# Patient Record
Sex: MALE | Race: WHITE | NOT HISPANIC OR LATINO
[De-identification: names, ages, dates, MRNs, and addresses within clinical notes are randomized per-mention and may not be internally consistent; named-entity substitution may affect disease eponyms.]

---

## 2017-03-09 ENCOUNTER — APPOINTMENT (OUTPATIENT)
Dept: FAMILY MEDICINE | Facility: CLINIC | Age: 82
End: 2017-03-09

## 2017-03-09 VITALS
TEMPERATURE: 98.6 F | HEART RATE: 71 BPM | DIASTOLIC BLOOD PRESSURE: 80 MMHG | BODY MASS INDEX: 22.5 KG/M2 | SYSTOLIC BLOOD PRESSURE: 120 MMHG | OXYGEN SATURATION: 98 % | HEIGHT: 66 IN | WEIGHT: 140 LBS | RESPIRATION RATE: 16 BRPM

## 2017-03-09 DIAGNOSIS — F17.210 NICOTINE DEPENDENCE, CIGARETTES, UNCOMPLICATED: ICD-10-CM

## 2017-03-09 DIAGNOSIS — Z78.9 OTHER SPECIFIED HEALTH STATUS: ICD-10-CM

## 2017-03-10 LAB
24R-OH-CALCIDIOL SERPL-MCNC: 36.3 PG/ML
ALBUMIN SERPL ELPH-MCNC: 4.1 G/DL
ALP BLD-CCNC: 145 U/L
ALT SERPL-CCNC: 9 U/L
ANION GAP SERPL CALC-SCNC: 14 MMOL/L
AST SERPL-CCNC: 17 U/L
BASOPHILS # BLD AUTO: 0.03 K/UL
BASOPHILS NFR BLD AUTO: 0.5 %
BILIRUB SERPL-MCNC: 1.4 MG/DL
BUN SERPL-MCNC: 22 MG/DL
CALCIUM SERPL-MCNC: 9.2 MG/DL
CHLORIDE SERPL-SCNC: 101 MMOL/L
CHOLEST SERPL-MCNC: 186 MG/DL
CHOLEST/HDLC SERPL: 3.4 RATIO
CO2 SERPL-SCNC: 24 MMOL/L
CREAT SERPL-MCNC: 1.25 MG/DL
EOSINOPHIL # BLD AUTO: 0.21 K/UL
EOSINOPHIL NFR BLD AUTO: 3.8 %
FERRITIN SERPL-MCNC: 252.7 NG/ML
FOLATE SERPL-MCNC: 7.7 NG/ML
GLUCOSE SERPL-MCNC: 100 MG/DL
HBA1C MFR BLD HPLC: 5.3 %
HCT VFR BLD CALC: 47.9 %
HDLC SERPL-MCNC: 54 MG/DL
HGB BLD-MCNC: 16.1 G/DL
IMM GRANULOCYTES NFR BLD AUTO: 0.2 %
IRON SATN MFR SERPL: 53 %
IRON SERPL-MCNC: 164 UG/DL
LDLC SERPL CALC-MCNC: 92 MG/DL
LYMPHOCYTES # BLD AUTO: 1.57 K/UL
LYMPHOCYTES NFR BLD AUTO: 28.5 %
MAN DIFF?: NORMAL
MCHC RBC-ENTMCNC: 32.3 PG
MCHC RBC-ENTMCNC: 33.6 GM/DL
MCV RBC AUTO: 96.2 FL
MONOCYTES # BLD AUTO: 0.58 K/UL
MONOCYTES NFR BLD AUTO: 10.5 %
NEUTROPHILS # BLD AUTO: 3.11 K/UL
NEUTROPHILS NFR BLD AUTO: 56.5 %
PLATELET # BLD AUTO: 214 K/UL
POTASSIUM SERPL-SCNC: 4.3 MMOL/L
PROT SERPL-MCNC: 7.5 G/DL
RBC # BLD: 4.98 M/UL
RBC # FLD: 13.6 %
SODIUM SERPL-SCNC: 139 MMOL/L
T4 FREE SERPL-MCNC: 1.4 NG/DL
TIBC SERPL-MCNC: 307 UG/DL
TRIGL SERPL-MCNC: 199 MG/DL
TSH SERPL-ACNC: 3.02 UIU/ML
UIBC SERPL-MCNC: 143 UG/DL
VIT B12 SERPL-MCNC: 320 PG/ML
WBC # FLD AUTO: 5.51 K/UL

## 2017-04-06 ENCOUNTER — APPOINTMENT (OUTPATIENT)
Dept: FAMILY MEDICINE | Facility: CLINIC | Age: 82
End: 2017-04-06

## 2017-04-06 VITALS
BODY MASS INDEX: 22.5 KG/M2 | DIASTOLIC BLOOD PRESSURE: 80 MMHG | HEIGHT: 66 IN | WEIGHT: 140 LBS | TEMPERATURE: 98.2 F | HEART RATE: 69 BPM | OXYGEN SATURATION: 98 % | SYSTOLIC BLOOD PRESSURE: 130 MMHG

## 2017-04-06 VITALS — RESPIRATION RATE: 14 BRPM

## 2017-04-06 DIAGNOSIS — F17.200 NICOTINE DEPENDENCE, UNSPECIFIED, UNCOMPLICATED: ICD-10-CM

## 2017-04-06 DIAGNOSIS — N40.0 BENIGN PROSTATIC HYPERPLASIA WITHOUT LOWER URINARY TRACT SYMPMS: ICD-10-CM

## 2017-04-14 ENCOUNTER — OUTPATIENT (OUTPATIENT)
Dept: OUTPATIENT SERVICES | Facility: HOSPITAL | Age: 82
LOS: 1 days | End: 2017-04-14

## 2017-04-14 ENCOUNTER — INPATIENT (INPATIENT)
Facility: HOSPITAL | Age: 82
LOS: 3 days | Discharge: HOME | DRG: 948 | End: 2017-04-18
Attending: FAMILY MEDICINE | Admitting: INTERNAL MEDICINE
Payer: MEDICARE

## 2017-04-14 ENCOUNTER — APPOINTMENT (OUTPATIENT)
Dept: MRI IMAGING | Facility: HOSPITAL | Age: 82
End: 2017-04-14

## 2017-04-14 DIAGNOSIS — M24.9 JOINT DERANGEMENT, UNSPECIFIED: ICD-10-CM

## 2017-04-14 DIAGNOSIS — R53.1 WEAKNESS: ICD-10-CM

## 2017-04-14 DIAGNOSIS — R26.9 UNSPECIFIED ABNORMALITIES OF GAIT AND MOBILITY: ICD-10-CM

## 2017-04-14 DIAGNOSIS — J44.9 CHRONIC OBSTRUCTIVE PULMONARY DISEASE, UNSPECIFIED: ICD-10-CM

## 2017-04-14 DIAGNOSIS — I63.30 CEREBRAL INFARCTION DUE TO THROMBOSIS OF UNSPECIFIED CEREBRAL ARTERY: ICD-10-CM

## 2017-04-14 DIAGNOSIS — Z96.641 PRESENCE OF RIGHT ARTIFICIAL HIP JOINT: ICD-10-CM

## 2017-04-14 DIAGNOSIS — N40.0 BENIGN PROSTATIC HYPERPLASIA WITHOUT LOWER URINARY TRACT SYMPTOMS: ICD-10-CM

## 2017-04-14 PROCEDURE — 93010 ELECTROCARDIOGRAM REPORT: CPT

## 2017-04-14 PROCEDURE — 99223 1ST HOSP IP/OBS HIGH 75: CPT

## 2017-04-14 PROCEDURE — 71010: CPT | Mod: 26

## 2017-04-15 PROCEDURE — 99233 SBSQ HOSP IP/OBS HIGH 50: CPT

## 2017-04-15 PROCEDURE — 93306 TTE W/DOPPLER COMPLETE: CPT | Mod: 26

## 2017-04-15 PROCEDURE — 93880 EXTRACRANIAL BILAT STUDY: CPT | Mod: 26

## 2017-04-16 PROCEDURE — 99233 SBSQ HOSP IP/OBS HIGH 50: CPT

## 2017-04-17 ENCOUNTER — APPOINTMENT (OUTPATIENT)
Dept: MRI IMAGING | Facility: HOSPITAL | Age: 82
End: 2017-04-17

## 2017-04-17 PROCEDURE — 99233 SBSQ HOSP IP/OBS HIGH 50: CPT

## 2017-04-17 PROCEDURE — 99222 1ST HOSP IP/OBS MODERATE 55: CPT

## 2017-04-17 PROCEDURE — 76376 3D RENDER W/INTRP POSTPROCES: CPT | Mod: 26

## 2017-04-17 PROCEDURE — 70544 MR ANGIOGRAPHY HEAD W/O DYE: CPT | Mod: 26

## 2017-04-17 PROCEDURE — 72192 CT PELVIS W/O DYE: CPT | Mod: 26

## 2017-04-17 PROCEDURE — 70547 MR ANGIOGRAPHY NECK W/O DYE: CPT | Mod: 26

## 2017-04-18 ENCOUNTER — INPATIENT (INPATIENT)
Facility: HOSPITAL | Age: 82
LOS: 9 days | Discharge: HOME HEALTH | DRG: 949 | End: 2017-04-28
Attending: PSYCHIATRY & NEUROLOGY | Admitting: PSYCHIATRY & NEUROLOGY
Payer: MEDICARE

## 2017-04-18 DIAGNOSIS — K59.00 CONSTIPATION, UNSPECIFIED: ICD-10-CM

## 2017-04-18 DIAGNOSIS — K21.9 GASTRO-ESOPHAGEAL REFLUX DISEASE WITHOUT ESOPHAGITIS: ICD-10-CM

## 2017-04-18 DIAGNOSIS — I69.391 DYSPHAGIA FOLLOWING CEREBRAL INFARCTION: ICD-10-CM

## 2017-04-18 DIAGNOSIS — G47.00 INSOMNIA, UNSPECIFIED: ICD-10-CM

## 2017-04-18 DIAGNOSIS — J84.10 PULMONARY FIBROSIS, UNSPECIFIED: ICD-10-CM

## 2017-04-18 DIAGNOSIS — R05 COUGH: ICD-10-CM

## 2017-04-18 DIAGNOSIS — Z51.89 ENCOUNTER FOR OTHER SPECIFIED AFTERCARE: ICD-10-CM

## 2017-04-18 DIAGNOSIS — I69.319 UNSPECIFIED SYMPTOMS AND SIGNS INVOLVING COGNITIVE FUNCTIONS FOLLOWING CEREBRAL INFARCTION: ICD-10-CM

## 2017-04-18 DIAGNOSIS — R53.81 OTHER MALAISE: ICD-10-CM

## 2017-04-18 DIAGNOSIS — I63.9 CEREBRAL INFARCTION, UNSPECIFIED: ICD-10-CM

## 2017-04-18 DIAGNOSIS — R45.4 IRRITABILITY AND ANGER: ICD-10-CM

## 2017-04-18 DIAGNOSIS — N40.0 BENIGN PROSTATIC HYPERPLASIA WITHOUT LOWER URINARY TRACT SYMPTOMS: ICD-10-CM

## 2017-04-18 DIAGNOSIS — J18.9 PNEUMONIA, UNSPECIFIED ORGANISM: ICD-10-CM

## 2017-04-18 DIAGNOSIS — R26.9 UNSPECIFIED ABNORMALITIES OF GAIT AND MOBILITY: ICD-10-CM

## 2017-04-18 DIAGNOSIS — B37.0 CANDIDAL STOMATITIS: ICD-10-CM

## 2017-04-18 DIAGNOSIS — F43.20 ADJUSTMENT DISORDER, UNSPECIFIED: ICD-10-CM

## 2017-04-18 DIAGNOSIS — J44.9 CHRONIC OBSTRUCTIVE PULMONARY DISEASE, UNSPECIFIED: ICD-10-CM

## 2017-04-18 DIAGNOSIS — J40 BRONCHITIS, NOT SPECIFIED AS ACUTE OR CHRONIC: ICD-10-CM

## 2017-04-18 PROCEDURE — 84443 ASSAY THYROID STIM HORMONE: CPT

## 2017-04-18 PROCEDURE — 72192 CT PELVIS W/O DYE: CPT

## 2017-04-18 PROCEDURE — 70553 MRI BRAIN STEM W/O & W/DYE: CPT

## 2017-04-18 PROCEDURE — 93306 TTE W/DOPPLER COMPLETE: CPT

## 2017-04-18 PROCEDURE — 85025 COMPLETE CBC W/AUTO DIFF WBC: CPT

## 2017-04-18 PROCEDURE — 81003 URINALYSIS AUTO W/O SCOPE: CPT

## 2017-04-18 PROCEDURE — A9579: CPT

## 2017-04-18 PROCEDURE — 97162 PT EVAL MOD COMPLEX 30 MIN: CPT

## 2017-04-18 PROCEDURE — 82746 ASSAY OF FOLIC ACID SERUM: CPT

## 2017-04-18 PROCEDURE — 82962 GLUCOSE BLOOD TEST: CPT

## 2017-04-18 PROCEDURE — 71045 X-RAY EXAM CHEST 1 VIEW: CPT

## 2017-04-18 PROCEDURE — 82607 VITAMIN B-12: CPT

## 2017-04-18 PROCEDURE — 93005 ELECTROCARDIOGRAM TRACING: CPT

## 2017-04-18 PROCEDURE — 82553 CREATINE MB FRACTION: CPT

## 2017-04-18 PROCEDURE — 80061 LIPID PANEL: CPT

## 2017-04-18 PROCEDURE — 83880 ASSAY OF NATRIURETIC PEPTIDE: CPT

## 2017-04-18 PROCEDURE — 85730 THROMBOPLASTIN TIME PARTIAL: CPT

## 2017-04-18 PROCEDURE — 76376 3D RENDER W/INTRP POSTPROCES: CPT

## 2017-04-18 PROCEDURE — 82550 ASSAY OF CK (CPK): CPT

## 2017-04-18 PROCEDURE — 84484 ASSAY OF TROPONIN QUANT: CPT

## 2017-04-18 PROCEDURE — 83735 ASSAY OF MAGNESIUM: CPT

## 2017-04-18 PROCEDURE — 99233 SBSQ HOSP IP/OBS HIGH 50: CPT

## 2017-04-18 PROCEDURE — 70544 MR ANGIOGRAPHY HEAD W/O DYE: CPT

## 2017-04-18 PROCEDURE — 94640 AIRWAY INHALATION TREATMENT: CPT

## 2017-04-18 PROCEDURE — 99223 1ST HOSP IP/OBS HIGH 75: CPT

## 2017-04-18 PROCEDURE — 70547 MR ANGIOGRAPHY NECK W/O DYE: CPT

## 2017-04-18 PROCEDURE — 93880 EXTRACRANIAL BILAT STUDY: CPT

## 2017-04-18 PROCEDURE — 84436 ASSAY OF TOTAL THYROXINE: CPT

## 2017-04-18 PROCEDURE — 85610 PROTHROMBIN TIME: CPT

## 2017-04-18 PROCEDURE — 85027 COMPLETE CBC AUTOMATED: CPT

## 2017-04-18 PROCEDURE — 83036 HEMOGLOBIN GLYCOSYLATED A1C: CPT

## 2017-04-18 PROCEDURE — 99285 EMERGENCY DEPT VISIT HI MDM: CPT | Mod: 25

## 2017-04-18 PROCEDURE — 97116 GAIT TRAINING THERAPY: CPT

## 2017-04-18 PROCEDURE — 80048 BASIC METABOLIC PNL TOTAL CA: CPT

## 2017-04-19 PROCEDURE — 93010 ELECTROCARDIOGRAM REPORT: CPT

## 2017-04-19 PROCEDURE — 99223 1ST HOSP IP/OBS HIGH 75: CPT

## 2017-04-19 PROCEDURE — 99233 SBSQ HOSP IP/OBS HIGH 50: CPT

## 2017-04-20 PROCEDURE — 99232 SBSQ HOSP IP/OBS MODERATE 35: CPT

## 2017-04-21 PROCEDURE — 99232 SBSQ HOSP IP/OBS MODERATE 35: CPT

## 2017-04-21 PROCEDURE — 71020: CPT | Mod: 26

## 2017-04-22 PROCEDURE — 99232 SBSQ HOSP IP/OBS MODERATE 35: CPT

## 2017-04-23 PROCEDURE — 71010: CPT | Mod: 26

## 2017-04-23 PROCEDURE — 99233 SBSQ HOSP IP/OBS HIGH 50: CPT

## 2017-04-23 PROCEDURE — 99232 SBSQ HOSP IP/OBS MODERATE 35: CPT

## 2017-04-24 PROCEDURE — 99233 SBSQ HOSP IP/OBS HIGH 50: CPT

## 2017-04-24 PROCEDURE — 99223 1ST HOSP IP/OBS HIGH 75: CPT

## 2017-04-24 PROCEDURE — 71250 CT THORAX DX C-: CPT | Mod: 26

## 2017-04-24 PROCEDURE — 99232 SBSQ HOSP IP/OBS MODERATE 35: CPT

## 2017-04-25 PROCEDURE — 99232 SBSQ HOSP IP/OBS MODERATE 35: CPT

## 2017-04-25 PROCEDURE — 74230 X-RAY XM SWLNG FUNCJ C+: CPT | Mod: 26

## 2017-04-25 PROCEDURE — 99233 SBSQ HOSP IP/OBS HIGH 50: CPT

## 2017-04-26 PROCEDURE — 99232 SBSQ HOSP IP/OBS MODERATE 35: CPT

## 2017-04-26 PROCEDURE — 99233 SBSQ HOSP IP/OBS HIGH 50: CPT

## 2017-04-27 PROCEDURE — 99232 SBSQ HOSP IP/OBS MODERATE 35: CPT

## 2017-04-27 PROCEDURE — 99233 SBSQ HOSP IP/OBS HIGH 50: CPT

## 2017-04-27 PROCEDURE — 74020: CPT | Mod: 26

## 2017-04-28 ENCOUNTER — APPOINTMENT (OUTPATIENT)
Dept: NEUROLOGY | Facility: CLINIC | Age: 82
End: 2017-04-28

## 2017-04-28 PROCEDURE — 99232 SBSQ HOSP IP/OBS MODERATE 35: CPT

## 2017-04-28 PROCEDURE — 99239 HOSP IP/OBS DSCHRG MGMT >30: CPT

## 2017-05-05 PROCEDURE — 92523 SPEECH SOUND LANG COMPREHEN: CPT

## 2017-05-05 PROCEDURE — 85025 COMPLETE CBC W/AUTO DIFF WBC: CPT

## 2017-05-05 PROCEDURE — 71046 X-RAY EXAM CHEST 2 VIEWS: CPT

## 2017-05-05 PROCEDURE — 97530 THERAPEUTIC ACTIVITIES: CPT

## 2017-05-05 PROCEDURE — 97110 THERAPEUTIC EXERCISES: CPT

## 2017-05-05 PROCEDURE — 92611 MOTION FLUOROSCOPY/SWALLOW: CPT

## 2017-05-05 PROCEDURE — 71250 CT THORAX DX C-: CPT

## 2017-05-05 PROCEDURE — 80048 BASIC METABOLIC PNL TOTAL CA: CPT

## 2017-05-05 PROCEDURE — 97167 OT EVAL HIGH COMPLEX 60 MIN: CPT

## 2017-05-05 PROCEDURE — 97163 PT EVAL HIGH COMPLEX 45 MIN: CPT

## 2017-05-05 PROCEDURE — 85027 COMPLETE CBC AUTOMATED: CPT

## 2017-05-05 PROCEDURE — 74230 X-RAY XM SWLNG FUNCJ C+: CPT

## 2017-05-05 PROCEDURE — 92610 EVALUATE SWALLOWING FUNCTION: CPT

## 2017-05-05 PROCEDURE — 93005 ELECTROCARDIOGRAM TRACING: CPT

## 2017-05-05 PROCEDURE — 80053 COMPREHEN METABOLIC PANEL: CPT

## 2017-05-05 PROCEDURE — 94640 AIRWAY INHALATION TREATMENT: CPT

## 2017-05-05 PROCEDURE — 82247 BILIRUBIN TOTAL: CPT

## 2017-05-05 PROCEDURE — 74020: CPT

## 2017-05-05 PROCEDURE — 97535 SELF CARE MNGMENT TRAINING: CPT

## 2017-05-05 PROCEDURE — 92507 TX SP LANG VOICE COMM INDIV: CPT

## 2017-05-05 PROCEDURE — 97116 GAIT TRAINING THERAPY: CPT

## 2017-05-05 PROCEDURE — 71045 X-RAY EXAM CHEST 1 VIEW: CPT

## 2017-05-11 ENCOUNTER — APPOINTMENT (OUTPATIENT)
Dept: FAMILY MEDICINE | Facility: CLINIC | Age: 82
End: 2017-05-11

## 2017-06-09 ENCOUNTER — APPOINTMENT (OUTPATIENT)
Dept: FAMILY MEDICINE | Facility: CLINIC | Age: 82
End: 2017-06-09

## 2017-08-03 ENCOUNTER — EMERGENCY (EMERGENCY)
Facility: HOSPITAL | Age: 82
LOS: 1 days | Discharge: HOME | End: 2017-08-03
Admitting: EMERGENCY MEDICINE
Payer: MEDICARE

## 2017-08-03 DIAGNOSIS — R06.02 SHORTNESS OF BREATH: ICD-10-CM
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
## 2017-08-03 DIAGNOSIS — S61.012A LACERATION WITHOUT FOREIGN BODY OF LEFT THUMB WITHOUT DAMAGE TO NAIL, INITIAL ENCOUNTER: ICD-10-CM

## 2017-08-03 DIAGNOSIS — Z23 ENCOUNTER FOR IMMUNIZATION: ICD-10-CM
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
## 2017-08-03 DIAGNOSIS — Z79.899 OTHER LONG TERM (CURRENT) DRUG THERAPY: ICD-10-CM

## 2017-08-03 DIAGNOSIS — F17.210 NICOTINE DEPENDENCE, CIGARETTES, UNCOMPLICATED: ICD-10-CM

## 2017-08-03 DIAGNOSIS — Z79.82 LONG TERM (CURRENT) USE OF ASPIRIN: ICD-10-CM

## 2017-08-03 PROCEDURE — 99285 EMERGENCY DEPT VISIT HI MDM: CPT | Mod: 25

## 2017-08-03 PROCEDURE — 71020: CPT | Mod: 26

## 2017-08-03 PROCEDURE — 99284 EMERGENCY DEPT VISIT MOD MDM: CPT

## 2017-08-03 PROCEDURE — 73140 X-RAY EXAM OF FINGER(S): CPT | Mod: 26,LT

## 2017-08-03 PROCEDURE — 90471 IMMUNIZATION ADMIN: CPT

## 2017-08-03 PROCEDURE — 94640 AIRWAY INHALATION TREATMENT: CPT

## 2017-08-03 PROCEDURE — 90700 DTAP VACCINE < 7 YRS IM: CPT

## 2017-08-03 PROCEDURE — 71046 X-RAY EXAM CHEST 2 VIEWS: CPT

## 2017-08-03 PROCEDURE — 73140 X-RAY EXAM OF FINGER(S): CPT

## 2017-12-17 ENCOUNTER — INPATIENT (INPATIENT)
Facility: HOSPITAL | Age: 82
LOS: 2 days | Discharge: HOME | DRG: 281 | End: 2017-12-20
Attending: INTERNAL MEDICINE | Admitting: INTERNAL MEDICINE
Payer: MEDICARE

## 2017-12-17 DIAGNOSIS — I21.4 NON-ST ELEVATION (NSTEMI) MYOCARDIAL INFARCTION: ICD-10-CM

## 2017-12-17 DIAGNOSIS — I25.10 ATHEROSCLEROTIC HEART DISEASE OF NATIVE CORONARY ARTERY WITHOUT ANGINA PECTORIS: ICD-10-CM

## 2017-12-17 DIAGNOSIS — N40.0 BENIGN PROSTATIC HYPERPLASIA WITHOUT LOWER URINARY TRACT SYMPTOMS: ICD-10-CM
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
## 2017-12-17 DIAGNOSIS — S22.41XA MULTIPLE FRACTURES OF RIBS, RIGHT SIDE, INITIAL ENCOUNTER FOR CLOSED FRACTURE: ICD-10-CM

## 2017-12-17 DIAGNOSIS — R55 SYNCOPE AND COLLAPSE: ICD-10-CM
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
## 2017-12-17 DIAGNOSIS — Z86.73 PERSONAL HISTORY OF TRANSIENT ISCHEMIC ATTACK (TIA), AND CEREBRAL INFARCTION WITHOUT RESIDUAL DEFICITS: ICD-10-CM

## 2017-12-17 DIAGNOSIS — F17.210 NICOTINE DEPENDENCE, CIGARETTES, UNCOMPLICATED: ICD-10-CM

## 2017-12-17 DIAGNOSIS — J44.1 CHRONIC OBSTRUCTIVE PULMONARY DISEASE WITH (ACUTE) EXACERBATION: ICD-10-CM

## 2017-12-17 PROCEDURE — 93010 ELECTROCARDIOGRAM REPORT: CPT

## 2017-12-17 PROCEDURE — 99223 1ST HOSP IP/OBS HIGH 75: CPT | Mod: AI

## 2017-12-17 PROCEDURE — 71010: CPT | Mod: 26

## 2017-12-17 PROCEDURE — 71250 CT THORAX DX C-: CPT | Mod: 26

## 2017-12-18 PROCEDURE — 99223 1ST HOSP IP/OBS HIGH 75: CPT

## 2017-12-18 PROCEDURE — 93010 ELECTROCARDIOGRAM REPORT: CPT

## 2017-12-18 PROCEDURE — 99233 SBSQ HOSP IP/OBS HIGH 50: CPT

## 2017-12-18 PROCEDURE — 93306 TTE W/DOPPLER COMPLETE: CPT | Mod: 26

## 2017-12-19 PROCEDURE — 93018 CV STRESS TEST I&R ONLY: CPT

## 2017-12-19 PROCEDURE — 78452 HT MUSCLE IMAGE SPECT MULT: CPT | Mod: 26

## 2017-12-19 PROCEDURE — 93016 CV STRESS TEST SUPVJ ONLY: CPT

## 2017-12-19 PROCEDURE — 99233 SBSQ HOSP IP/OBS HIGH 50: CPT

## 2017-12-20 PROCEDURE — 71045 X-RAY EXAM CHEST 1 VIEW: CPT

## 2017-12-20 PROCEDURE — 99285 EMERGENCY DEPT VISIT HI MDM: CPT | Mod: 25

## 2017-12-20 PROCEDURE — 85730 THROMBOPLASTIN TIME PARTIAL: CPT

## 2017-12-20 PROCEDURE — 99238 HOSP IP/OBS DSCHRG MGMT 30/<: CPT

## 2017-12-20 PROCEDURE — 83036 HEMOGLOBIN GLYCOSYLATED A1C: CPT

## 2017-12-20 PROCEDURE — 78452 HT MUSCLE IMAGE SPECT MULT: CPT

## 2017-12-20 PROCEDURE — 82550 ASSAY OF CK (CPK): CPT

## 2017-12-20 PROCEDURE — 97162 PT EVAL MOD COMPLEX 30 MIN: CPT

## 2017-12-20 PROCEDURE — 80061 LIPID PANEL: CPT

## 2017-12-20 PROCEDURE — 83735 ASSAY OF MAGNESIUM: CPT

## 2017-12-20 PROCEDURE — 85610 PROTHROMBIN TIME: CPT

## 2017-12-20 PROCEDURE — 96374 THER/PROPH/DIAG INJ IV PUSH: CPT

## 2017-12-20 PROCEDURE — 85027 COMPLETE CBC AUTOMATED: CPT

## 2017-12-20 PROCEDURE — 84484 ASSAY OF TROPONIN QUANT: CPT

## 2017-12-20 PROCEDURE — 71250 CT THORAX DX C-: CPT

## 2017-12-20 PROCEDURE — 80053 COMPREHEN METABOLIC PANEL: CPT

## 2017-12-20 PROCEDURE — 96375 TX/PRO/DX INJ NEW DRUG ADDON: CPT

## 2017-12-20 PROCEDURE — 94640 AIRWAY INHALATION TREATMENT: CPT

## 2017-12-20 PROCEDURE — 80048 BASIC METABOLIC PNL TOTAL CA: CPT

## 2017-12-20 PROCEDURE — 93306 TTE W/DOPPLER COMPLETE: CPT

## 2017-12-20 PROCEDURE — 83880 ASSAY OF NATRIURETIC PEPTIDE: CPT

## 2017-12-20 PROCEDURE — 93017 CV STRESS TEST TRACING ONLY: CPT

## 2017-12-20 PROCEDURE — 84100 ASSAY OF PHOSPHORUS: CPT

## 2017-12-20 PROCEDURE — 82553 CREATINE MB FRACTION: CPT

## 2017-12-20 PROCEDURE — 84443 ASSAY THYROID STIM HORMONE: CPT

## 2017-12-20 PROCEDURE — 93005 ELECTROCARDIOGRAM TRACING: CPT

## 2017-12-27 ENCOUNTER — MEDICATION RENEWAL (OUTPATIENT)
Age: 82
End: 2017-12-27

## 2018-04-22 ENCOUNTER — INPATIENT (INPATIENT)
Facility: HOSPITAL | Age: 83
LOS: 2 days | Discharge: LEFT BEFORE DISCHARGE | DRG: 554 | End: 2018-04-25
Attending: INTERNAL MEDICINE | Admitting: INTERNAL MEDICINE
Payer: MEDICARE

## 2018-04-22 DIAGNOSIS — N40.0 BENIGN PROSTATIC HYPERPLASIA WITHOUT LOWER URINARY TRACT SYMPTOMS: ICD-10-CM

## 2018-04-22 DIAGNOSIS — Z86.718 PERSONAL HISTORY OF OTHER VENOUS THROMBOSIS AND EMBOLISM: ICD-10-CM

## 2018-04-22 DIAGNOSIS — J44.1 CHRONIC OBSTRUCTIVE PULMONARY DISEASE WITH (ACUTE) EXACERBATION: ICD-10-CM

## 2018-04-22 DIAGNOSIS — R74.8 ABNORMAL LEVELS OF OTHER SERUM ENZYMES: ICD-10-CM

## 2018-04-22 DIAGNOSIS — M25.551 PAIN IN RIGHT HIP: ICD-10-CM
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
## 2018-04-22 DIAGNOSIS — F17.210 NICOTINE DEPENDENCE, CIGARETTES, UNCOMPLICATED: ICD-10-CM

## 2018-04-22 DIAGNOSIS — M88.1: ICD-10-CM
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
## 2018-04-22 DIAGNOSIS — M88.88 OSTEITIS DEFORMANS OF OTHER BONES: ICD-10-CM
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
## 2018-04-22 DIAGNOSIS — Z79.82 LONG TERM (CURRENT) USE OF ASPIRIN: ICD-10-CM

## 2018-04-22 DIAGNOSIS — Z79.01 LONG TERM (CURRENT) USE OF ANTICOAGULANTS: ICD-10-CM

## 2018-04-22 DIAGNOSIS — S22.41XA MULTIPLE FRACTURES OF RIBS, RIGHT SIDE, INITIAL ENCOUNTER FOR CLOSED FRACTURE: ICD-10-CM

## 2018-04-22 DIAGNOSIS — Z96.641 PRESENCE OF RIGHT ARTIFICIAL HIP JOINT: ICD-10-CM

## 2018-04-22 PROCEDURE — 71045 X-RAY EXAM CHEST 1 VIEW: CPT | Mod: 26

## 2018-04-22 PROCEDURE — 99222 1ST HOSP IP/OBS MODERATE 55: CPT

## 2018-04-22 PROCEDURE — 93010 ELECTROCARDIOGRAM REPORT: CPT

## 2018-04-22 PROCEDURE — 73552 X-RAY EXAM OF FEMUR 2/>: CPT | Mod: 26,RT

## 2018-04-22 PROCEDURE — 76376 3D RENDER W/INTRP POSTPROCES: CPT | Mod: 26

## 2018-04-22 PROCEDURE — 72192 CT PELVIS W/O DYE: CPT | Mod: 26

## 2018-04-22 PROCEDURE — 73502 X-RAY EXAM HIP UNI 2-3 VIEWS: CPT | Mod: 26,RT

## 2018-04-23 PROCEDURE — 99233 SBSQ HOSP IP/OBS HIGH 50: CPT

## 2018-04-24 PROCEDURE — 78320: CPT | Mod: 26

## 2018-04-24 PROCEDURE — 99233 SBSQ HOSP IP/OBS HIGH 50: CPT

## 2018-04-24 PROCEDURE — 71045 X-RAY EXAM CHEST 1 VIEW: CPT | Mod: 26

## 2018-04-24 PROCEDURE — 78306 BONE IMAGING WHOLE BODY: CPT | Mod: 26

## 2018-04-25 PROCEDURE — 99285 EMERGENCY DEPT VISIT HI MDM: CPT | Mod: 25

## 2018-04-25 PROCEDURE — 73552 X-RAY EXAM OF FEMUR 2/>: CPT

## 2018-04-25 PROCEDURE — 84080 ASSAY ALKALINE PHOSPHATASES: CPT

## 2018-04-25 PROCEDURE — 97161 PT EVAL LOW COMPLEX 20 MIN: CPT

## 2018-04-25 PROCEDURE — 76376 3D RENDER W/INTRP POSTPROCES: CPT

## 2018-04-25 PROCEDURE — 97116 GAIT TRAINING THERAPY: CPT

## 2018-04-25 PROCEDURE — 85730 THROMBOPLASTIN TIME PARTIAL: CPT

## 2018-04-25 PROCEDURE — 96374 THER/PROPH/DIAG INJ IV PUSH: CPT

## 2018-04-25 PROCEDURE — 80047 BASIC METABLC PNL IONIZED CA: CPT

## 2018-04-25 PROCEDURE — 94640 AIRWAY INHALATION TREATMENT: CPT

## 2018-04-25 PROCEDURE — 71045 X-RAY EXAM CHEST 1 VIEW: CPT

## 2018-04-25 PROCEDURE — 82306 VITAMIN D 25 HYDROXY: CPT

## 2018-04-25 PROCEDURE — 81003 URINALYSIS AUTO W/O SCOPE: CPT

## 2018-04-25 PROCEDURE — 85027 COMPLETE CBC AUTOMATED: CPT

## 2018-04-25 PROCEDURE — 78803 RP LOCLZJ TUM SPECT 1 AREA: CPT

## 2018-04-25 PROCEDURE — 72192 CT PELVIS W/O DYE: CPT

## 2018-04-25 PROCEDURE — 99239 HOSP IP/OBS DSCHRG MGMT >30: CPT

## 2018-04-25 PROCEDURE — 83735 ASSAY OF MAGNESIUM: CPT

## 2018-04-25 PROCEDURE — 78306 BONE IMAGING WHOLE BODY: CPT

## 2018-04-25 PROCEDURE — 80048 BASIC METABOLIC PNL TOTAL CA: CPT

## 2018-04-25 PROCEDURE — 73502 X-RAY EXAM HIP UNI 2-3 VIEWS: CPT

## 2018-04-25 PROCEDURE — 82310 ASSAY OF CALCIUM: CPT

## 2018-04-25 PROCEDURE — A9503: CPT

## 2018-04-25 PROCEDURE — 84100 ASSAY OF PHOSPHORUS: CPT

## 2018-04-25 PROCEDURE — 85610 PROTHROMBIN TIME: CPT

## 2018-04-25 PROCEDURE — 93005 ELECTROCARDIOGRAM TRACING: CPT

## 2018-04-25 PROCEDURE — 80053 COMPREHEN METABOLIC PANEL: CPT

## 2018-06-25 ENCOUNTER — APPOINTMENT (OUTPATIENT)
Dept: FAMILY MEDICINE | Facility: CLINIC | Age: 83
End: 2018-06-25
Payer: MEDICARE

## 2018-06-25 VITALS
HEIGHT: 66 IN | DIASTOLIC BLOOD PRESSURE: 80 MMHG | TEMPERATURE: 97.9 F | OXYGEN SATURATION: 94 % | BODY MASS INDEX: 22.34 KG/M2 | SYSTOLIC BLOOD PRESSURE: 124 MMHG | WEIGHT: 139 LBS | HEART RATE: 66 BPM

## 2018-06-25 VITALS — RESPIRATION RATE: 16 BRPM

## 2018-06-25 PROCEDURE — 99214 OFFICE O/P EST MOD 30 MIN: CPT

## 2018-06-25 NOTE — PHYSICAL EXAM
[No Acute Distress] : no acute distress [Well Nourished] : well nourished [Well Developed] : well developed [Ill-Appearing] : ill-appearing [Normal Sclera/Conjunctiva] : normal sclera/conjunctiva [PERRL] : pupils equal round and reactive to light [EOMI] : extraocular movements intact [Normal Outer Ear/Nose] : the outer ears and nose were normal in appearance [Normal Oropharynx] : the oropharynx was normal [No JVD] : no jugular venous distention [Supple] : supple [No Lymphadenopathy] : no lymphadenopathy [Thyroid Normal, No Nodules] : the thyroid was normal and there were no nodules present [No Respiratory Distress] : no respiratory distress  [No Accessory Muscle Use] : no accessory muscle use [Normal Rate] : normal rate  [Regular Rhythm] : with a regular rhythm [Normal S1, S2] : normal S1 and S2 [No Murmur] : no murmur heard [No Carotid Bruits] : no carotid bruits [No Abdominal Bruit] : a ~M bruit was not heard ~T in the abdomen [No Varicosities] : no varicosities [Pedal Pulses Present] : the pedal pulses are present [No Edema] : there was no peripheral edema [No Extremity Clubbing/Cyanosis] : no extremity clubbing/cyanosis [No Palpable Aorta] : no palpable aorta [Soft] : abdomen soft [Non Tender] : non-tender [Non-distended] : non-distended [No Masses] : no abdominal mass palpated [No HSM] : no HSM [Normal Bowel Sounds] : normal bowel sounds [Normal Posterior Cervical Nodes] : no posterior cervical lymphadenopathy [Normal Anterior Cervical Nodes] : no anterior cervical lymphadenopathy [No CVA Tenderness] : no CVA  tenderness [No Spinal Tenderness] : no spinal tenderness [No Joint Swelling] : no joint swelling [Grossly Normal Strength/Tone] : grossly normal strength/tone [No Rash] : no rash [Normal Gait] : normal gait [Coordination Grossly Intact] : coordination grossly intact [No Focal Deficits] : no focal deficits [Deep Tendon Reflexes (DTR)] : deep tendon reflexes were 2+ and symmetric [Speech Grossly Normal] : speech grossly normal [Memory Grossly Normal] : memory grossly normal [Normal Affect] : the affect was normal [Alert and Oriented x3] : oriented to person, place, and time [Normal Mood] : the mood was normal [Normal Insight/Judgement] : insight and judgment were intact [de-identified] : Expiratory wheezing [de-identified] : Severe hip girdle pain, radiating down both legs

## 2018-06-25 NOTE — HEALTH RISK ASSESSMENT
[Any fall with injury in past year] : Patient reported fall with injury in the past year [0] : 2) Feeling down, depressed, or hopeless: Not at all (0) [] : No [de-identified] : wine with meals [[id number]] : 0

## 2018-06-25 NOTE — REVIEW OF SYSTEMS
[Fatigue] : fatigue [Shortness Of Breath] : shortness of breath [Wheezing] : wheezing [Cough] : cough [Dyspnea on Exertion] : dyspnea on exertion [Joint Pain] : joint pain [Joint Stiffness] : joint stiffness [Muscle Weakness] : muscle weakness [Back Pain] : back pain [Negative] : Heme/Lymph [Fever] : no fever [Chills] : no chills [Night Sweats] : no night sweats [Recent Change In Weight] : ~T no recent weight change [Muscle Pain] : no muscle pain [Joint Swelling] : no joint swelling

## 2018-06-25 NOTE — HISTORY OF PRESENT ILLNESS
[FreeTextEntry8] : Patient is an 85-year-old gentleman presents today for an acute visit. He states that he is feeling fatigued, short of breath with minimal exertion. Patient has had multiple admissions due to fatigue. Patient is awake, alert, and oriented x3, presently in no acute distress. He was calm and cooperative, but does have multiple medical problems. Patient has had multiple episodes of blood work done. His most recent hemoglobin was 14.1, kidney functions were within normal limits liver function tests were normal cholesterol within normal limits. Hemoglobin A1c 5.0. Patient's medical history is significant for chronic obstructive pulmonary disease. That is emphysema. He started with frequent falls, and history of head trauma, status post a fall.

## 2018-06-25 NOTE — ASSESSMENT
[FreeTextEntry1] : Assessment and plan:\par \par 1. Severe hip girdle pain, especially with ambulation at rest. He says that the pain is better, but never totally 100% gone. In the differential patient may have underlying polymyalgia rheumatica. I will attempt a course of prednisone and have the patient followup in a week or 2.\par \par 2. Chronic obstructive pulmonary disease with chronic dyspnea, even at rest. I recommended the patient restart all of his medications for chronic obstructive pulmonary disease, ulcers, etc., which he has not been taking.\par \par 3. Unsteady gait with frequent falls. Recommend rolling walker.\par \par 4. Followup with me in 2 weeks\par \par I had a long and detailed discussion with patient regarding the use of prednisone. We will attempt starting milligrams p.o. daily. Patient will followup in 2 weeks as stated earlier. I told him to make certain that E. prior to taking the medications. Face-to-face with the patient 25 minutes

## 2018-07-06 ENCOUNTER — APPOINTMENT (OUTPATIENT)
Dept: FAMILY MEDICINE | Facility: CLINIC | Age: 83
End: 2018-07-06

## 2018-07-16 ENCOUNTER — APPOINTMENT (OUTPATIENT)
Dept: FAMILY MEDICINE | Facility: CLINIC | Age: 83
End: 2018-07-16
Payer: MEDICARE

## 2018-07-16 VITALS
TEMPERATURE: 97.6 F | SYSTOLIC BLOOD PRESSURE: 158 MMHG | DIASTOLIC BLOOD PRESSURE: 66 MMHG | OXYGEN SATURATION: 97 % | HEART RATE: 56 BPM

## 2018-07-16 DIAGNOSIS — R94.31 ABNORMAL ELECTROCARDIOGRAM [ECG] [EKG]: ICD-10-CM

## 2018-07-16 DIAGNOSIS — Z87.09 PERSONAL HISTORY OF OTHER DISEASES OF THE RESPIRATORY SYSTEM: ICD-10-CM

## 2018-07-16 PROCEDURE — 93000 ELECTROCARDIOGRAM COMPLETE: CPT | Mod: 59

## 2018-07-16 PROCEDURE — 99214 OFFICE O/P EST MOD 30 MIN: CPT | Mod: 25

## 2018-07-16 NOTE — ASSESSMENT
[FreeTextEntry1] : Assessment and plan:\par \par Electrocardiogram when compared to electrocardiogram done in 2014. There are no acute changes. Patient has acute exacerbation of COPD subsequently he has not been doing any of his puffers nor nebulizer treatment. Therefore, I have prescribed prednisone, which he needs to continue levofloxacin 500 mg p.o. daily for 7 days nebulizer treatments 4 times a day and Advair. Had a long and detailed discussion with patient face-to-face with the patient approximately 35 minutes greater than 50% of the time was spent on education, counseling, and coordination of care of chronic obstructive pulmonary disease.

## 2018-07-16 NOTE — HISTORY OF PRESENT ILLNESS
[Moderate] : moderate [Gradual] : gradually [Constant] : constant [Congestion] : congestion [Cough] : cough [Wheezing] : wheezing [Anorexia] : anorexia [Shortness Of Breath] : shortness of breath [Activity] : with activity [At Night] : at night [Worsening] : worsening [Sore Throat] : no sore throat [Chills] : no chills [Earache] : no earache [Fatigue] : not fatigue [Headache] : no headache [Fever] : no fever [FreeTextEntry5] : nothing [FreeTextEntry8] : Patient is an 85-year-old gentleman, who presents today for an acute visit. Patient states that he is short of breath. Subsequently, when questioned, he says that he is short of breath for years. Patient says that it has worsened exercise capacity has decreased. Patient has a history of chronic obstructive pulmonary disease, chronic shortness of breath, emphysema, and abnormal EKG. Patient recently was diagnosed with PMR and started on prednisone, which presently is not taking.\par \par \par Patient also complaining of mild chest discomfort\par

## 2018-07-16 NOTE — HEALTH RISK ASSESSMENT
[Any fall with injury in past year] : Patient reported fall with injury in the past year [0] : 2) Feeling down, depressed, or hopeless: Not at all (0) [] : No [de-identified] : wine [[id number]] : 0

## 2018-07-16 NOTE — PHYSICAL EXAM
[No Acute Distress] : no acute distress [Well Nourished] : well nourished [Well Developed] : well developed [Ill-Appearing] : ill-appearing [Normal Sclera/Conjunctiva] : normal sclera/conjunctiva [PERRL] : pupils equal round and reactive to light [EOMI] : extraocular movements intact [Normal Outer Ear/Nose] : the outer ears and nose were normal in appearance [Normal Oropharynx] : the oropharynx was normal [No JVD] : no jugular venous distention [Supple] : supple [No Lymphadenopathy] : no lymphadenopathy [Thyroid Normal, No Nodules] : the thyroid was normal and there were no nodules present [No Respiratory Distress] : no respiratory distress  [No Accessory Muscle Use] : no accessory muscle use [Normal Rate] : normal rate  [Regular Rhythm] : with a regular rhythm [Normal S1, S2] : normal S1 and S2 [No Murmur] : no murmur heard [No Carotid Bruits] : no carotid bruits [No Abdominal Bruit] : a ~M bruit was not heard ~T in the abdomen [No Varicosities] : no varicosities [Pedal Pulses Present] : the pedal pulses are present [No Edema] : there was no peripheral edema [No Extremity Clubbing/Cyanosis] : no extremity clubbing/cyanosis [No Palpable Aorta] : no palpable aorta [Soft] : abdomen soft [Non Tender] : non-tender [Non-distended] : non-distended [No Masses] : no abdominal mass palpated [No HSM] : no HSM [Normal Bowel Sounds] : normal bowel sounds [Normal Posterior Cervical Nodes] : no posterior cervical lymphadenopathy [Normal Anterior Cervical Nodes] : no anterior cervical lymphadenopathy [No CVA Tenderness] : no CVA  tenderness [No Spinal Tenderness] : no spinal tenderness [No Joint Swelling] : no joint swelling [Grossly Normal Strength/Tone] : grossly normal strength/tone [No Rash] : no rash [Normal Gait] : normal gait [Coordination Grossly Intact] : coordination grossly intact [No Focal Deficits] : no focal deficits [Deep Tendon Reflexes (DTR)] : deep tendon reflexes were 2+ and symmetric [Speech Grossly Normal] : speech grossly normal [Memory Grossly Normal] : memory grossly normal [Normal Affect] : the affect was normal [Alert and Oriented x3] : oriented to person, place, and time [Normal Mood] : the mood was normal [Normal Insight/Judgement] : insight and judgment were intact [de-identified] : Expiratory wheezing and rhonchi [de-identified] :  hip girdle pain, radiating down both legs,Improved with steroid

## 2018-07-27 ENCOUNTER — APPOINTMENT (OUTPATIENT)
Dept: FAMILY MEDICINE | Facility: CLINIC | Age: 83
End: 2018-07-27
Payer: MEDICARE

## 2018-07-27 VITALS
RESPIRATION RATE: 18 BRPM | HEART RATE: 68 BPM | DIASTOLIC BLOOD PRESSURE: 70 MMHG | TEMPERATURE: 98 F | SYSTOLIC BLOOD PRESSURE: 130 MMHG | OXYGEN SATURATION: 96 %

## 2018-07-27 DIAGNOSIS — Z00.00 ENCOUNTER FOR GENERAL ADULT MEDICAL EXAMINATION W/OUT ABNORMAL FINDINGS: ICD-10-CM
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
## 2018-07-27 DIAGNOSIS — M48.00 SPINAL STENOSIS, SITE UNSPECIFIED: ICD-10-CM

## 2018-07-27 PROCEDURE — 99214 OFFICE O/P EST MOD 30 MIN: CPT

## 2018-07-27 RX ORDER — LEVOFLOXACIN 500 MG/1
500 TABLET, FILM COATED ORAL DAILY
Qty: 7 | Refills: 1 | Status: COMPLETED | COMMUNITY
Start: 2018-07-16 | End: 2018-07-27

## 2018-07-27 NOTE — PHYSICAL EXAM
[No Acute Distress] : no acute distress [Well Nourished] : well nourished [Well Developed] : well developed [Ill-Appearing] : ill-appearing [Normal Sclera/Conjunctiva] : normal sclera/conjunctiva [PERRL] : pupils equal round and reactive to light [EOMI] : extraocular movements intact [Normal Outer Ear/Nose] : the outer ears and nose were normal in appearance [Normal Oropharynx] : the oropharynx was normal [No JVD] : no jugular venous distention [Supple] : supple [No Lymphadenopathy] : no lymphadenopathy [Thyroid Normal, No Nodules] : the thyroid was normal and there were no nodules present [No Respiratory Distress] : no respiratory distress  [No Accessory Muscle Use] : no accessory muscle use [Normal Rate] : normal rate  [Regular Rhythm] : with a regular rhythm [Normal S1, S2] : normal S1 and S2 [No Murmur] : no murmur heard [No Carotid Bruits] : no carotid bruits [No Abdominal Bruit] : a ~M bruit was not heard ~T in the abdomen [No Varicosities] : no varicosities [Pedal Pulses Present] : the pedal pulses are present [No Edema] : there was no peripheral edema [No Extremity Clubbing/Cyanosis] : no extremity clubbing/cyanosis [No Palpable Aorta] : no palpable aorta [Soft] : abdomen soft [Non Tender] : non-tender [Non-distended] : non-distended [No Masses] : no abdominal mass palpated [No HSM] : no HSM [Normal Bowel Sounds] : normal bowel sounds [Normal Posterior Cervical Nodes] : no posterior cervical lymphadenopathy [Normal Anterior Cervical Nodes] : no anterior cervical lymphadenopathy [No CVA Tenderness] : no CVA  tenderness [No Spinal Tenderness] : no spinal tenderness [No Joint Swelling] : no joint swelling [Grossly Normal Strength/Tone] : grossly normal strength/tone [No Rash] : no rash [Speech Grossly Normal] : speech grossly normal [Memory Grossly Normal] : memory grossly normal [Normal Affect] : the affect was normal [Alert and Oriented x3] : oriented to person, place, and time [Normal Mood] : the mood was normal [Normal Insight/Judgement] : insight and judgment were intact [de-identified] : Expiratory wheezing and rhonchi [de-identified] :  hip girdle pain, radiating down both legs,Improved with steroid [de-identified] : Unsteady gait. Patient ambulates with cane, frequent falls at home, usually indoor when he ambulates outdoor he utilizes walker

## 2018-07-27 NOTE — HISTORY OF PRESENT ILLNESS
[FreeTextEntry1] : Please see history of present illness:\par \par Patient presents again alone for followup appointment [de-identified] : Patient is an 85-year-old gentleman, who presents today for followup appointment. Patient has multiple medical problems. I was called by his daughter stating that she feels as the prednisone is causing a problem with her father's condition. Unfortunately, patient by physical exam and also by complaints has PMR. The instructions that were given to the patient were to take 30 mg a day of prednisone for one week then drop it to 20 mg for one week and then 10 mg p.o. and to leave it at that dose until seen by me. Patient still complaining of unsteady gait chronic lower back pain. He has also underlying chronic obstructive pulmonary disease patient still with severe unsteady gait ambulates with pain. Has had frequent falls. Patient does have chronic obstructive pulmonary disease with underlying emphysema continues to use tobacco. Patient does admit to not following direction, suite. Medications has not been utilizing nebulizer nor any of his pulmonary medications patient presents today with audible rhonchi and wheezing even at rest.

## 2018-07-27 NOTE — REVIEW OF SYSTEMS
[Fatigue] : fatigue [Shortness Of Breath] : shortness of breath [Wheezing] : wheezing [Cough] : cough [Dyspnea on Exertion] : dyspnea on exertion [Joint Pain] : joint pain [Joint Stiffness] : joint stiffness [Muscle Weakness] : muscle weakness [Back Pain] : back pain [Unsteady Walk] : ataxia [Negative] : Heme/Lymph [Fever] : no fever [Chills] : no chills [Night Sweats] : no night sweats [Recent Change In Weight] : ~T no recent weight change [Muscle Pain] : no muscle pain [Joint Swelling] : no joint swelling [Dizziness] : no dizziness

## 2018-07-27 NOTE — COUNSELING
[Smoking cessation counseling provided] : smoking cessation [Behavioral health counseling provided] : behavioral health  [Quit Smoking] : Quit smoking [Sleep ___ hours/day] : Sleep [unfilled] hours/day [Engage in a relaxing activity] : Engage in a relaxing activity [Plan in advance] : Plan in advance [Patient Non-adherent to care plan] : Patient non-adherent to care plan [Needs reinforcement, provided] : Patient needs reinforcement on understanding lifestyle changes and  the steps needed to achieve self management goals and reinforcement was provided

## 2018-07-27 NOTE — HEALTH RISK ASSESSMENT
[Two or more falls in past year] : Patient reported two or more falls in the past year [0] : 2) Feeling down, depressed, or hopeless: Not at all (0) [] : No [[id number]] : 0

## 2018-07-27 NOTE — ASSESSMENT
[FreeTextEntry1] : Assessment and plan:\par \par 1. Polymyalgia rheumatica. Patient has had response to steroids. Pain has decreased we will titrate prednisone for lowest effective dose. He will start 20 mg for the next week, then drop it to 10 mg a day. Followup appointment with me in 2 weeks.\par \par 2. Chronic obstructive pulmonary disease or medications. Refilled. Patient was not doing inhalations as prescribed and referral to pulmonology.\par \par 3. Tobacco use. Recommendations. Tobacco cessation 10 minutes spent on topic.\par \par 4. Unsteady gait secondary to balance problem. Discussed the patient decrease alcohol intake and recommend physical therapy to evaluate and treat that is to increase strength, gait and balance training.\par \par 5. Face to face with patient 25 minutes on above topics. Left a long and detailed message for patient's daughter Ella on answering machine. I have attempted to call her already 4 times not available.\par \par 6. Comprehensive blood work obtained at this visit\par \par 7. Detailed discussion with patient regarding compliance with medications and recommendations

## 2018-07-30 LAB
ALBUMIN SERPL ELPH-MCNC: 3.8 G/DL
ALP BLD-CCNC: 81 U/L
ALT SERPL-CCNC: 86 U/L
ANA SER IF-ACNC: NEGATIVE
ANION GAP SERPL CALC-SCNC: 13 MMOL/L
AST SERPL-CCNC: 31 U/L
BASOPHILS # BLD AUTO: 0.01 K/UL
BASOPHILS NFR BLD AUTO: 0.1 %
BILIRUB SERPL-MCNC: 0.9 MG/DL
BUN SERPL-MCNC: 31 MG/DL
CALCIUM SERPL-MCNC: 9.1 MG/DL
CHLORIDE SERPL-SCNC: 100 MMOL/L
CHOLEST SERPL-MCNC: 227 MG/DL
CHOLEST/HDLC SERPL: 2.8 RATIO
CO2 SERPL-SCNC: 26 MMOL/L
CREAT SERPL-MCNC: 1.01 MG/DL
CRP SERPL-MCNC: <0.1 MG/DL
EOSINOPHIL # BLD AUTO: 0.01 K/UL
EOSINOPHIL NFR BLD AUTO: 0.1 %
ERYTHROCYTE [SEDIMENTATION RATE] IN BLOOD BY WESTERGREN METHOD: 5 MM/HR
GLUCOSE SERPL-MCNC: 97 MG/DL
HBA1C MFR BLD HPLC: 5.4 %
HCT VFR BLD CALC: 48.4 %
HCV AB SER QL: NONREACTIVE
HCV S/CO RATIO: 0.16 S/CO
HDLC SERPL-MCNC: 82 MG/DL
HGB BLD-MCNC: 16.3 G/DL
IMM GRANULOCYTES NFR BLD AUTO: 0.3 %
LDLC SERPL CALC-MCNC: 99 MG/DL
LYMPHOCYTES # BLD AUTO: 1.19 K/UL
LYMPHOCYTES NFR BLD AUTO: 10 %
MAN DIFF?: NORMAL
MCHC RBC-ENTMCNC: 32.3 PG
MCHC RBC-ENTMCNC: 33.7 GM/DL
MCV RBC AUTO: 95.8 FL
MONOCYTES # BLD AUTO: 0.83 K/UL
MONOCYTES NFR BLD AUTO: 7 %
NEUTROPHILS # BLD AUTO: 9.85 K/UL
NEUTROPHILS NFR BLD AUTO: 82.5 %
PLATELET # BLD AUTO: 203 K/UL
POTASSIUM SERPL-SCNC: 4.7 MMOL/L
PROT SERPL-MCNC: 6.6 G/DL
RBC # BLD: 5.05 M/UL
RBC # FLD: 15.6 %
SODIUM SERPL-SCNC: 139 MMOL/L
T4 FREE SERPL-MCNC: 1.4 NG/DL
TRIGL SERPL-MCNC: 228 MG/DL
TSH SERPL-ACNC: 1.76 UIU/ML
WBC # FLD AUTO: 11.93 K/UL

## 2018-08-13 ENCOUNTER — APPOINTMENT (OUTPATIENT)
Dept: FAMILY MEDICINE | Facility: CLINIC | Age: 83
End: 2018-08-13
Payer: MEDICARE

## 2018-08-13 VITALS
RESPIRATION RATE: 18 BRPM | HEART RATE: 65 BPM | OXYGEN SATURATION: 98 % | DIASTOLIC BLOOD PRESSURE: 70 MMHG | TEMPERATURE: 98 F | SYSTOLIC BLOOD PRESSURE: 120 MMHG

## 2018-08-13 VITALS — WEIGHT: 139 LBS | BODY MASS INDEX: 22.34 KG/M2 | HEIGHT: 66 IN

## 2018-08-13 DIAGNOSIS — M35.3 POLYMYALGIA RHEUMATICA: ICD-10-CM

## 2018-08-13 DIAGNOSIS — J86.9 PYOTHORAX W/OUT FISTULA: ICD-10-CM

## 2018-08-13 DIAGNOSIS — R06.00 DYSPNEA, UNSPECIFIED: ICD-10-CM

## 2018-08-13 DIAGNOSIS — R07.9 CHEST PAIN, UNSPECIFIED: ICD-10-CM

## 2018-08-13 DIAGNOSIS — R53.83 OTHER FATIGUE: ICD-10-CM

## 2018-08-13 DIAGNOSIS — R26.89 OTHER ABNORMALITIES OF GAIT AND MOBILITY: ICD-10-CM

## 2018-08-13 DIAGNOSIS — J44.9 CHRONIC OBSTRUCTIVE PULMONARY DISEASE, UNSPECIFIED: ICD-10-CM

## 2018-08-13 DIAGNOSIS — I73.9 PERIPHERAL VASCULAR DISEASE, UNSPECIFIED: ICD-10-CM

## 2018-08-13 PROCEDURE — 99214 OFFICE O/P EST MOD 30 MIN: CPT

## 2018-08-13 NOTE — ASSESSMENT
[FreeTextEntry1] : Assessment and plan:\par \par Multiple medical problems due to the new onset of chest pain and lower extremity claudication. I recommended the patient be evaluated by cardiology and vascular surgery. He does have a history of DVT in the past, which was treated with anticoagulation for approximately one year. This information was gotten from the patient's daughter. Presently, the patient is awake, alert, and oriented x3. He is in no acute distress. He is ambulating with a cane. Nebulizer treatments and nebulizer were never picked up nor was the Advair secondary to expense.\par \par Face-to-face with the patient and daughter. Detailed discussion regarding chronic obstructive pulmonary disease, ischemic heart disease, and peripheral vascular disease. Both of them were given a chance to ask questions. All questions that they asked where answered to their satisfaction.

## 2018-08-13 NOTE — HEALTH RISK ASSESSMENT
[Any fall with injury in past year] : Patient reported fall with injury in the past year [] : No [de-identified] : 2 cigarettes daily [de-identified] : Wine with meals

## 2018-08-13 NOTE — PHYSICAL EXAM
[No Acute Distress] : no acute distress [Well Nourished] : well nourished [Well Developed] : well developed [Ill-Appearing] : ill-appearing [Normal Sclera/Conjunctiva] : normal sclera/conjunctiva [PERRL] : pupils equal round and reactive to light [EOMI] : extraocular movements intact [Normal Outer Ear/Nose] : the outer ears and nose were normal in appearance [Normal Oropharynx] : the oropharynx was normal [No JVD] : no jugular venous distention [Supple] : supple [No Lymphadenopathy] : no lymphadenopathy [Thyroid Normal, No Nodules] : the thyroid was normal and there were no nodules present [No Respiratory Distress] : no respiratory distress  [No Accessory Muscle Use] : no accessory muscle use [Normal Rate] : normal rate  [Regular Rhythm] : with a regular rhythm [Normal S1, S2] : normal S1 and S2 [No Murmur] : no murmur heard [Soft] : abdomen soft [Non Tender] : non-tender [Non-distended] : non-distended [No Masses] : no abdominal mass palpated [No HSM] : no HSM [Normal Bowel Sounds] : normal bowel sounds [Normal Posterior Cervical Nodes] : no posterior cervical lymphadenopathy [Normal Anterior Cervical Nodes] : no anterior cervical lymphadenopathy [No CVA Tenderness] : no CVA  tenderness [No Spinal Tenderness] : no spinal tenderness [No Joint Swelling] : no joint swelling [Grossly Normal Strength/Tone] : grossly normal strength/tone [No Rash] : no rash [Speech Grossly Normal] : speech grossly normal [Memory Grossly Normal] : memory grossly normal [Normal Affect] : the affect was normal [Alert and Oriented x3] : oriented to person, place, and time [Normal Mood] : the mood was normal [Normal Insight/Judgement] : insight and judgment were intact [de-identified] : Expiratory wheezing and rhonchi [de-identified] : Varicose veins bilaterally. Pedal pulses weak [de-identified] :  hip girdle pain,  [de-identified] : Unsteady gait. Patient ambulates with cane, frequent falls at home, usually indoor when he ambulates outdoor he utilizes walker

## 2018-08-13 NOTE — HISTORY OF PRESENT ILLNESS
[Family Member] : family member [FreeTextEntry1] : See history of present illness [de-identified] : Is an 85-year-old gentleman accompanied by his daughter. He is here for a followup visit. Still complaining of dyspnea with exertion and fatigue. We have stopped his prednisone history of polymyalgia rheumatica. Presently, the patient is not complaining of any pain. Wet snow at this visit is that he was complaining of chest pain. According to his daughter. It's been approximately one week that the patient has been complaining of chest pain presently. He presents here in the office and states that the chest pain is no longer there. Patient said that the pain lasted approximately a week. It would come on its own and would improve on its own. He said nothing because of the come on and he doesn't know if anything made it go away on its own. Patient presently awake, alert, and oriented x3 in no acute distress. Still has unsteady gait, which puts him at increased risk of falling.

## 2018-08-16 ENCOUNTER — INPATIENT (INPATIENT)
Facility: HOSPITAL | Age: 83
LOS: 1 days | Discharge: HOME | DRG: 176 | End: 2018-08-18
Attending: INTERNAL MEDICINE | Admitting: STUDENT IN AN ORGANIZED HEALTH CARE EDUCATION/TRAINING PROGRAM
Payer: MEDICARE

## 2018-08-16 VITALS
OXYGEN SATURATION: 97 % | DIASTOLIC BLOOD PRESSURE: 74 MMHG | TEMPERATURE: 98 F | RESPIRATION RATE: 24 BRPM | SYSTOLIC BLOOD PRESSURE: 148 MMHG | HEART RATE: 72 BPM

## 2018-08-16 DIAGNOSIS — R06.03 ACUTE RESPIRATORY DISTRESS: ICD-10-CM

## 2018-08-16 DIAGNOSIS — I25.10 ATHEROSCLEROTIC HEART DISEASE OF NATIVE CORONARY ARTERY WITHOUT ANGINA PECTORIS: ICD-10-CM

## 2018-08-16 DIAGNOSIS — R07.9 CHEST PAIN, UNSPECIFIED: ICD-10-CM

## 2018-08-16 DIAGNOSIS — I16.1 HYPERTENSIVE EMERGENCY: ICD-10-CM

## 2018-08-16 DIAGNOSIS — I77.819 AORTIC ECTASIA, UNSPECIFIED SITE: ICD-10-CM

## 2018-08-16 DIAGNOSIS — J44.1 CHRONIC OBSTRUCTIVE PULMONARY DISEASE WITH (ACUTE) EXACERBATION: ICD-10-CM

## 2018-08-16 DIAGNOSIS — Z71.89 OTHER SPECIFIED COUNSELING: ICD-10-CM

## 2018-08-16 LAB
ALBUMIN SERPL ELPH-MCNC: 3.1 G/DL — LOW (ref 3.3–5)
ALP SERPL-CCNC: 94 U/L — SIGNIFICANT CHANGE UP (ref 40–120)
ALT FLD-CCNC: 20 U/L DA — SIGNIFICANT CHANGE UP (ref 10–45)
ANION GAP SERPL CALC-SCNC: 9 MMOL/L — SIGNIFICANT CHANGE UP (ref 5–17)
APTT BLD: 29 SEC — SIGNIFICANT CHANGE UP (ref 27.5–37.4)
AST SERPL-CCNC: 22 U/L — SIGNIFICANT CHANGE UP (ref 10–40)
BASOPHILS # BLD AUTO: 0.1 K/UL — SIGNIFICANT CHANGE UP (ref 0–0.2)
BASOPHILS NFR BLD AUTO: 1 % — SIGNIFICANT CHANGE UP (ref 0–2)
BILIRUB SERPL-MCNC: 0.6 MG/DL — SIGNIFICANT CHANGE UP (ref 0.2–1.2)
BUN SERPL-MCNC: 18 MG/DL — SIGNIFICANT CHANGE UP (ref 7–23)
CALCIUM SERPL-MCNC: 8.9 MG/DL — SIGNIFICANT CHANGE UP (ref 8.4–10.5)
CHLORIDE SERPL-SCNC: 104 MMOL/L — SIGNIFICANT CHANGE UP (ref 96–108)
CK MB BLD-MCNC: 2.4 % — SIGNIFICANT CHANGE UP (ref 0–3.5)
CK MB CFR SERPL CALC: 1.8 NG/ML — SIGNIFICANT CHANGE UP (ref 0.5–10)
CK MB CFR SERPL CALC: 1.8 NG/ML — SIGNIFICANT CHANGE UP (ref 0.5–10)
CK MB CFR SERPL CALC: 1.9 NG/ML — SIGNIFICANT CHANGE UP (ref 0.5–10)
CK SERPL-CCNC: 79 U/L — SIGNIFICANT CHANGE UP (ref 30–200)
CO2 SERPL-SCNC: 27 MMOL/L — SIGNIFICANT CHANGE UP (ref 22–31)
CREAT SERPL-MCNC: 1.15 MG/DL — SIGNIFICANT CHANGE UP (ref 0.5–1.3)
D DIMER BLD IA.RAPID-MCNC: 458 NG/ML DDU — HIGH
EOSINOPHIL # BLD AUTO: 0.1 K/UL — SIGNIFICANT CHANGE UP (ref 0–0.5)
EOSINOPHIL NFR BLD AUTO: 1.4 % — SIGNIFICANT CHANGE UP (ref 0–6)
GLUCOSE SERPL-MCNC: 81 MG/DL — SIGNIFICANT CHANGE UP (ref 70–99)
HCT VFR BLD CALC: 44.1 % — SIGNIFICANT CHANGE UP (ref 39–50)
HGB BLD-MCNC: 14.8 G/DL — SIGNIFICANT CHANGE UP (ref 13–17)
INR BLD: 1.11 RATIO — SIGNIFICANT CHANGE UP (ref 0.88–1.16)
LIDOCAIN IGE QN: 79 U/L — SIGNIFICANT CHANGE UP (ref 73–393)
LYMPHOCYTES # BLD AUTO: 1.4 K/UL — SIGNIFICANT CHANGE UP (ref 1–3.3)
LYMPHOCYTES # BLD AUTO: 23.3 % — SIGNIFICANT CHANGE UP (ref 13–44)
MCHC RBC-ENTMCNC: 31.7 PG — SIGNIFICANT CHANGE UP (ref 27–34)
MCHC RBC-ENTMCNC: 33.6 GM/DL — SIGNIFICANT CHANGE UP (ref 32–36)
MCV RBC AUTO: 94.2 FL — SIGNIFICANT CHANGE UP (ref 80–100)
MONOCYTES # BLD AUTO: 0.4 K/UL — SIGNIFICANT CHANGE UP (ref 0–0.9)
MONOCYTES NFR BLD AUTO: 6.8 % — SIGNIFICANT CHANGE UP (ref 2–14)
NEUTROPHILS # BLD AUTO: 4.2 K/UL — SIGNIFICANT CHANGE UP (ref 1.8–7.4)
NEUTROPHILS NFR BLD AUTO: 67.5 % — SIGNIFICANT CHANGE UP (ref 43–77)
NT-PROBNP SERPL-SCNC: 399 PG/ML — HIGH (ref 0–300)
PLATELET # BLD AUTO: 279 K/UL — SIGNIFICANT CHANGE UP (ref 150–400)
POTASSIUM SERPL-MCNC: 4.5 MMOL/L — SIGNIFICANT CHANGE UP (ref 3.5–5.3)
POTASSIUM SERPL-SCNC: 4.5 MMOL/L — SIGNIFICANT CHANGE UP (ref 3.5–5.3)
PROT SERPL-MCNC: 7.1 G/DL — SIGNIFICANT CHANGE UP (ref 6–8.3)
PROTHROM AB SERPL-ACNC: 12.4 SEC — SIGNIFICANT CHANGE UP (ref 9.8–12.7)
RBC # BLD: 4.68 M/UL — SIGNIFICANT CHANGE UP (ref 4.2–5.8)
RBC # FLD: 12.4 % — SIGNIFICANT CHANGE UP (ref 10.3–14.5)
SODIUM SERPL-SCNC: 140 MMOL/L — SIGNIFICANT CHANGE UP (ref 135–145)
TROPONIN I SERPL-MCNC: <.017 NG/ML — LOW (ref 0.02–0.06)
WBC # BLD: 6.2 K/UL — SIGNIFICANT CHANGE UP (ref 3.8–10.5)
WBC # FLD AUTO: 6.2 K/UL — SIGNIFICANT CHANGE UP (ref 3.8–10.5)

## 2018-08-16 PROCEDURE — 93010 ELECTROCARDIOGRAM REPORT: CPT

## 2018-08-16 PROCEDURE — 71045 X-RAY EXAM CHEST 1 VIEW: CPT | Mod: 26

## 2018-08-16 PROCEDURE — 93306 TTE W/DOPPLER COMPLETE: CPT | Mod: 26

## 2018-08-16 PROCEDURE — 99223 1ST HOSP IP/OBS HIGH 75: CPT

## 2018-08-16 PROCEDURE — 99222 1ST HOSP IP/OBS MODERATE 55: CPT

## 2018-08-16 PROCEDURE — 75635 CT ANGIO ABDOMINAL ARTERIES: CPT | Mod: 26

## 2018-08-16 PROCEDURE — 99285 EMERGENCY DEPT VISIT HI MDM: CPT

## 2018-08-16 PROCEDURE — 71275 CT ANGIOGRAPHY CHEST: CPT | Mod: 26

## 2018-08-16 RX ORDER — ASPIRIN/CALCIUM CARB/MAGNESIUM 324 MG
81 TABLET ORAL DAILY
Qty: 0 | Refills: 0 | Status: DISCONTINUED | OUTPATIENT
Start: 2018-08-16 | End: 2018-08-18

## 2018-08-16 RX ORDER — NITROGLYCERIN 6.5 MG
1 CAPSULE, EXTENDED RELEASE ORAL ONCE
Qty: 0 | Refills: 0 | Status: COMPLETED | OUTPATIENT
Start: 2018-08-16 | End: 2018-08-16

## 2018-08-16 RX ORDER — IPRATROPIUM/ALBUTEROL SULFATE 18-103MCG
3 AEROSOL WITH ADAPTER (GRAM) INHALATION EVERY 4 HOURS
Qty: 0 | Refills: 0 | Status: DISCONTINUED | OUTPATIENT
Start: 2018-08-16 | End: 2018-08-18

## 2018-08-16 RX ORDER — HEPARIN SODIUM 5000 [USP'U]/ML
5000 INJECTION INTRAVENOUS; SUBCUTANEOUS EVERY 8 HOURS
Qty: 0 | Refills: 0 | Status: DISCONTINUED | OUTPATIENT
Start: 2018-08-16 | End: 2018-08-16

## 2018-08-16 RX ORDER — METOPROLOL TARTRATE 50 MG
25 TABLET ORAL DAILY
Qty: 0 | Refills: 0 | Status: DISCONTINUED | OUTPATIENT
Start: 2018-08-16 | End: 2018-08-18

## 2018-08-16 RX ORDER — AMLODIPINE BESYLATE 2.5 MG/1
5 TABLET ORAL DAILY
Qty: 0 | Refills: 0 | Status: DISCONTINUED | OUTPATIENT
Start: 2018-08-16 | End: 2018-08-18

## 2018-08-16 RX ORDER — SODIUM CHLORIDE 9 MG/ML
3 INJECTION INTRAMUSCULAR; INTRAVENOUS; SUBCUTANEOUS ONCE
Qty: 0 | Refills: 0 | Status: COMPLETED | OUTPATIENT
Start: 2018-08-16 | End: 2018-08-16

## 2018-08-16 RX ORDER — NITROGLYCERIN 6.5 MG
0.4 CAPSULE, EXTENDED RELEASE ORAL
Qty: 0 | Refills: 0 | Status: DISCONTINUED | OUTPATIENT
Start: 2018-08-16 | End: 2018-08-18

## 2018-08-16 RX ORDER — ENOXAPARIN SODIUM 100 MG/ML
65 INJECTION SUBCUTANEOUS EVERY 12 HOURS
Qty: 0 | Refills: 0 | Status: DISCONTINUED | OUTPATIENT
Start: 2018-08-16 | End: 2018-08-17

## 2018-08-16 RX ORDER — CILOSTAZOL 100 MG/1
100 TABLET ORAL
Qty: 0 | Refills: 0 | Status: DISCONTINUED | OUTPATIENT
Start: 2018-08-16 | End: 2018-08-18

## 2018-08-16 RX ORDER — CARBIDOPA AND LEVODOPA 25; 100 MG/1; MG/1
1 TABLET ORAL THREE TIMES A DAY
Qty: 0 | Refills: 0 | Status: DISCONTINUED | OUTPATIENT
Start: 2018-08-16 | End: 2018-08-18

## 2018-08-16 RX ORDER — PANTOPRAZOLE SODIUM 20 MG/1
40 TABLET, DELAYED RELEASE ORAL ONCE
Qty: 0 | Refills: 0 | Status: COMPLETED | OUTPATIENT
Start: 2018-08-16 | End: 2018-08-16

## 2018-08-16 RX ORDER — ASPIRIN/CALCIUM CARB/MAGNESIUM 324 MG
325 TABLET ORAL ONCE
Qty: 0 | Refills: 0 | Status: COMPLETED | OUTPATIENT
Start: 2018-08-16 | End: 2018-08-16

## 2018-08-16 RX ORDER — ENOXAPARIN SODIUM 100 MG/ML
65 INJECTION SUBCUTANEOUS EVERY 12 HOURS
Qty: 0 | Refills: 0 | Status: DISCONTINUED | OUTPATIENT
Start: 2018-08-16 | End: 2018-08-16

## 2018-08-16 RX ADMIN — PANTOPRAZOLE SODIUM 40 MILLIGRAM(S): 20 TABLET, DELAYED RELEASE ORAL at 11:51

## 2018-08-16 RX ADMIN — ENOXAPARIN SODIUM 65 MILLIGRAM(S): 100 INJECTION SUBCUTANEOUS at 18:41

## 2018-08-16 RX ADMIN — CILOSTAZOL 100 MILLIGRAM(S): 100 TABLET ORAL at 21:59

## 2018-08-16 RX ADMIN — Medication 3 MILLILITER(S): at 21:13

## 2018-08-16 RX ADMIN — Medication 60 MILLIGRAM(S): at 21:58

## 2018-08-16 RX ADMIN — CARBIDOPA AND LEVODOPA 1 TABLET(S): 25; 100 TABLET ORAL at 21:59

## 2018-08-16 RX ADMIN — SODIUM CHLORIDE 3 MILLILITER(S): 9 INJECTION INTRAMUSCULAR; INTRAVENOUS; SUBCUTANEOUS at 12:07

## 2018-08-16 RX ADMIN — Medication 1 INCH(S): at 18:18

## 2018-08-16 RX ADMIN — Medication 125 MILLIGRAM(S): at 11:51

## 2018-08-16 RX ADMIN — Medication 1 INCH(S): at 11:50

## 2018-08-16 RX ADMIN — Medication 3 MILLILITER(S): at 16:18

## 2018-08-16 RX ADMIN — Medication 325 MILLIGRAM(S): at 11:50

## 2018-08-16 NOTE — H&P ADULT - HISTORY OF PRESENT ILLNESS
Pt is a 85 y o M from home, lives with wife, presented with worsening shortness of breath, weakness, dizziness, chest pain yesterday. Pt states he is a current smoker, used to smoke a lot now only 2 cigarettes a day, denies any stents/MI in past, no history of CVA.

## 2018-08-16 NOTE — ED PROVIDER NOTE - PHYSICAL EXAMINATION
General:     NAD, well-nourished, well-appearing  Head:     NC/AT, EOMI, oral mucosa moist  Neck:     trachea midline  Lungs:     + wheezing bilaterally  CVS:     S1S2, RRR, no m/g/r  Abd:     +BS, s/nt/nd, no organomegaly  Ext:    2+ radial and pedal pulses, no c/c/e  Neuro: AAOx3, no sensory/motor deficits

## 2018-08-16 NOTE — H&P ADULT - PROBLEM SELECTOR PLAN 2
Unclear if cardiac or pulmonary, may be COPD exacerbation as pt has no signs of volume overload, will give nebs and steroids, but will get serial trops r/o ischemic event, echo

## 2018-08-16 NOTE — ED ADULT NURSE NOTE - NSIMPLEMENTINTERV_GEN_ALL_ED
Implemented All Fall with Harm Risk Interventions:  [place] to call system. Call bell, personal items and telephone within reach. Instruct patient to call for assistance. Room bathroom lighting operational. Non-slip footwear when patient is off stretcher. Physically safe environment: no spills, clutter or unnecessary equipment. Stretcher in lowest position, wheels locked, appropriate side rails in place. Provide visual cue, wrist band, yellow gown, etc. Monitor gait and stability. Monitor for mental status changes and reorient to person, place, and time. Review medications for side effects contributing to fall risk. Reinforce activity limits and safety measures with patient and family. Provide visual clues: red socks.

## 2018-08-16 NOTE — ED ADULT NURSE REASSESSMENT NOTE - NS ED NURSE REASSESS COMMENT FT1
Pt is received resting on stretcher and he offers c/c "I still feel a little tight" pointing to anterior chest region, pt is slightly tachpnic Pulse ox=98% on 3L nc. CM is SR. Pt awaits CTA, CT tech pt delay 2 hrs post meal. report called to Janette ABREU

## 2018-08-16 NOTE — H&P ADULT - PROBLEM SELECTOR PLAN 1
CXR shows aortic dilation possibly anuerysmal, will get CTA chest to r/o dissection and evaluate for aneurysm, as well as echo

## 2018-08-16 NOTE — ED PROVIDER NOTE - PMH
CAD (coronary artery disease)    Dyslipidemia    HTN (hypertension)    Parkinson disease    Peripheral vascular disease

## 2018-08-16 NOTE — H&P ADULT - NSHPLABSRESULTS_GEN_ALL_CORE
14.8   6.2   )-----------( 279      ( 16 Aug 2018 11:50 )             44.1   08-16    140  |  104  |  18  ----------------------------<  81  4.5   |  27  |  1.15    Ca    8.9      16 Aug 2018 11:50    TPro  7.1  /  Alb  3.1<L>  /  TBili  0.6  /  DBili  x   /  AST  22  /  ALT  20  /  AlkPhos  94  08-16    CARDIAC MARKERS ( 16 Aug 2018 11:50 )  <.017 ng/mL / x     / 79 U/L / x     / 1.9 ng/mL    Serum Pro-Brain Natriuretic Peptide (08.16.18 @ 11:50)    Serum Pro-Brain Natriuretic Peptide: 399 pg/mL        EXAM:  XR CHEST AP OR PA 1V      PROCEDURE DATE:  08/16/2018        INTERPRETATION:  AP chest on August 16, 2018 at 10:46 AM. Patient has   chest pain.    Poor inspiration crowds the chest. Heart is magnified by technique.    The aorta is dilated and uncoiled and possibly aneurysmal.    Small granuloma at the right base again noted.    On April 24 of this year there was a small right base pleural pulmonary   process which is no longer evident. Presently lungs show no acute finding.    Old right rib fractures again noted.    IMPRESSION: No acute lung finding at this time. Other findings stable as   above.

## 2018-08-16 NOTE — ED ADULT NURSE REASSESSMENT NOTE - NS ED NURSE REASSESS COMMENT FT1
patient received from outgoing RN resting in [facility] a&Ox3   sitting up in [facility] with no complaints of cp sob or dizziness  On cardiac monitor bp cuff and 02sat VSS breathing with ease on 2LNC and appears in no distress  Aware of admission, bedside echo in progress awaiting trasnport to assigned unit

## 2018-08-16 NOTE — H&P ADULT - ASSESSMENT
Pt is a 85 y o M PMH CAD/PAD, HTN from home, current smoker presented with shortness of breath, chest pain admitted for r/o ACS, COPD exacerbation, respiratory distress.

## 2018-08-16 NOTE — ED ADULT NURSE REASSESSMENT NOTE - NS ED NURSE REASSESS COMMENT FT1
pt. reports some relief of chest tight ness after Duoneb and repeat VSS. Care endorsed to [place] ABREU on tel.

## 2018-08-16 NOTE — H&P ADULT - PROBLEM SELECTOR PLAN 3
Serial trops, echo, Cardiology Skwiersky notified Serial trops, echo, discussed with Cardiology Dr. Hinds

## 2018-08-16 NOTE — H&P ADULT - PROBLEM SELECTOR PLAN 4
Pt reported some blurry vision last few weeks, has been worsening, may be related to uncontrolled HTN

## 2018-08-16 NOTE — H&P ADULT - NSHPPHYSICALEXAM_GEN_ALL_CORE
T(C): 36.5 (08-16-18 @ 10:32), Max: 36.5 (08-16-18 @ 10:32)  HR: 58 (08-16-18 @ 12:48) (58 - 72)  BP: 163/70 (08-16-18 @ 12:48) (148/74 - 163/70)  RR: 18 (08-16-18 @ 12:48) (18 - 25)  SpO2: 100% (08-16-18 @ 12:48) (97% - 100%)    PHYSICAL EXAM:  GENERAL: NAD, well-groomed, weak appearing  HEAD:  Atraumatic, Normocephalic  EYES: EOMI, PERRLA, conjunctiva and sclera clear  ENMT: Moist mucous membranes, Good dentition  NECK: Supple, No JVD  NERVOUS SYSTEM:  A/O x3, Good concentration; CN 2-12 intact, No focal deficits  CHEST/LUNG: Coarse upper airway sounds, congestion but lower lungs clear; No rales, rhonchi, wheezing, or rubs  HEART: Regular rate and rhythm; S1/S2, No murmurs, rubs, or gallops  ABDOMEN: Soft, Nontender, Nondistended; Bowel sounds present  VASCULAR: Normal pulses, Normal capillary refill  EXTREMITIES:  2+ Peripheral Pulses, No clubbing, cyanosis, or edema  SKIN: Warm, Intact  PSYCH: Normal mood and affect

## 2018-08-16 NOTE — ED PROVIDER NOTE - NS ED ROS FT
Constitutional: (-) fever  (-)chills  (-)sweats  Eyes/ENT: (-) blurry vision, (-) epistaxis  (-)rhinorrhea   (-) sore throat    Cardiovascular: (+) chest pain, (-) palpitations (-) edema   Respiratory: (-) cough, (+) shortness of breath   Gastrointestinal: (-)nausea  (-)vomiting, (-) diarrhea  (-) abdominal pain   :  (-)dysuria, (-)frequency, (-)urgency, (-)hematuria  Musculoskeletal: (-) neck pain, (-) back pain, (-) joint pain  Integumentary: (-) rash, (-) edema  Neurological: (-) headache, (-) altered mental status  (-)LOC

## 2018-08-16 NOTE — H&P ADULT - NSHPREVIEWOFSYSTEMS_GEN_ALL_CORE
REVIEW OF SYSTEMS:  CONSTITUTIONAL: No fever, weight loss, + fatigue  EYES: No eye pain, +blurry vision  ENMT:  No difficulty hearing, tinnitus, vertigo; No sinus or throat pain  NECK: No neck pain or neck stiffness  RESPIRATORY: No cough, wheezing, chills or hemoptysis; + shortness of breath  CARDIOVASCULAR: + chest pain, -palpitations, +dizziness, no leg swelling  GASTROINTESTINAL: No abdominal pain, No nausea, vomiting, or hematemesis; No diarrhea or constipation  GENITOURINARY: No dysuria, frequency, hematuria, or incontinence  NEUROLOGICAL: No headaches, memory loss, +overall loss of strength, numbness, or tremors  SKIN: No itching, burning, rashes, or lesions   ENDOCRINE: No heat or cold intolerance; No hair loss  MUSCULOSKELETAL: No joint pain or swelling; No muscle, back, or extremity pain  PSYCHIATRIC: No depression, anxiety, mood swings, or difficulty sleeping  HEME/LYMPH: No easy bruising or bleeding  ALLERY AND IMMUNOLOGIC: No hives or eczema    All other ROS reviewed and negative except as otherwise stated

## 2018-08-17 ENCOUNTER — TRANSCRIPTION ENCOUNTER (OUTPATIENT)
Age: 83
End: 2018-08-17

## 2018-08-17 DIAGNOSIS — I10 ESSENTIAL (PRIMARY) HYPERTENSION: ICD-10-CM

## 2018-08-17 DIAGNOSIS — E78.5 HYPERLIPIDEMIA, UNSPECIFIED: ICD-10-CM

## 2018-08-17 DIAGNOSIS — I73.9 PERIPHERAL VASCULAR DISEASE, UNSPECIFIED: ICD-10-CM

## 2018-08-17 DIAGNOSIS — I26.99 OTHER PULMONARY EMBOLISM WITHOUT ACUTE COR PULMONALE: ICD-10-CM

## 2018-08-17 DIAGNOSIS — G20 PARKINSON'S DISEASE: ICD-10-CM

## 2018-08-17 LAB
ANION GAP SERPL CALC-SCNC: 8 MMOL/L — SIGNIFICANT CHANGE UP (ref 5–17)
BUN SERPL-MCNC: 24 MG/DL — HIGH (ref 7–23)
CALCIUM SERPL-MCNC: 8.6 MG/DL — SIGNIFICANT CHANGE UP (ref 8.4–10.5)
CHLORIDE SERPL-SCNC: 103 MMOL/L — SIGNIFICANT CHANGE UP (ref 96–108)
CHOLEST SERPL-MCNC: 183 MG/DL — SIGNIFICANT CHANGE UP (ref 10–199)
CO2 SERPL-SCNC: 25 MMOL/L — SIGNIFICANT CHANGE UP (ref 22–31)
CREAT SERPL-MCNC: 1.27 MG/DL — SIGNIFICANT CHANGE UP (ref 0.5–1.3)
GLUCOSE SERPL-MCNC: 146 MG/DL — HIGH (ref 70–99)
HBA1C BLD-MCNC: 5.5 % — SIGNIFICANT CHANGE UP (ref 4–5.6)
HCT VFR BLD CALC: 41.6 % — SIGNIFICANT CHANGE UP (ref 39–50)
HDLC SERPL-MCNC: 52 MG/DL — SIGNIFICANT CHANGE UP
HGB BLD-MCNC: 14.1 G/DL — SIGNIFICANT CHANGE UP (ref 13–17)
LIPID PNL WITH DIRECT LDL SERPL: 118 MG/DL — SIGNIFICANT CHANGE UP
MCHC RBC-ENTMCNC: 31.9 PG — SIGNIFICANT CHANGE UP (ref 27–34)
MCHC RBC-ENTMCNC: 33.9 GM/DL — SIGNIFICANT CHANGE UP (ref 32–36)
MCV RBC AUTO: 94.1 FL — SIGNIFICANT CHANGE UP (ref 80–100)
PLATELET # BLD AUTO: 291 K/UL — SIGNIFICANT CHANGE UP (ref 150–400)
POTASSIUM SERPL-MCNC: 4 MMOL/L — SIGNIFICANT CHANGE UP (ref 3.5–5.3)
POTASSIUM SERPL-SCNC: 4 MMOL/L — SIGNIFICANT CHANGE UP (ref 3.5–5.3)
RBC # BLD: 4.42 M/UL — SIGNIFICANT CHANGE UP (ref 4.2–5.8)
RBC # FLD: 12.3 % — SIGNIFICANT CHANGE UP (ref 10.3–14.5)
SODIUM SERPL-SCNC: 136 MMOL/L — SIGNIFICANT CHANGE UP (ref 135–145)
TOTAL CHOLESTEROL/HDL RATIO MEASUREMENT: 3.5 RATIO — SIGNIFICANT CHANGE UP (ref 3.4–9.6)
TRIGL SERPL-MCNC: 66 MG/DL — SIGNIFICANT CHANGE UP (ref 10–149)
WBC # BLD: 9 K/UL — SIGNIFICANT CHANGE UP (ref 3.8–10.5)
WBC # FLD AUTO: 9 K/UL — SIGNIFICANT CHANGE UP (ref 3.8–10.5)

## 2018-08-17 PROCEDURE — 71045 X-RAY EXAM CHEST 1 VIEW: CPT | Mod: 26

## 2018-08-17 PROCEDURE — 78452 HT MUSCLE IMAGE SPECT MULT: CPT | Mod: 26

## 2018-08-17 PROCEDURE — 93016 CV STRESS TEST SUPVJ ONLY: CPT

## 2018-08-17 PROCEDURE — 99233 SBSQ HOSP IP/OBS HIGH 50: CPT

## 2018-08-17 PROCEDURE — 99232 SBSQ HOSP IP/OBS MODERATE 35: CPT | Mod: 25

## 2018-08-17 PROCEDURE — 93018 CV STRESS TEST I&R ONLY: CPT

## 2018-08-17 PROCEDURE — 93970 EXTREMITY STUDY: CPT | Mod: 26

## 2018-08-17 PROCEDURE — 93010 ELECTROCARDIOGRAM REPORT: CPT

## 2018-08-17 RX ORDER — APIXABAN 2.5 MG/1
2 TABLET, FILM COATED ORAL
Qty: 24 | Refills: 0 | OUTPATIENT
Start: 2018-08-17 | End: 2018-08-22

## 2018-08-17 RX ORDER — TIOTROPIUM BROMIDE 18 UG/1
1 CAPSULE ORAL; RESPIRATORY (INHALATION)
Qty: 1 | Refills: 0 | OUTPATIENT
Start: 2018-08-17

## 2018-08-17 RX ORDER — APIXABAN 2.5 MG/1
10 TABLET, FILM COATED ORAL EVERY 12 HOURS
Qty: 0 | Refills: 0 | Status: DISCONTINUED | OUTPATIENT
Start: 2018-08-17 | End: 2018-08-18

## 2018-08-17 RX ORDER — TIOTROPIUM BROMIDE 18 UG/1
1 CAPSULE ORAL; RESPIRATORY (INHALATION) DAILY
Qty: 0 | Refills: 0 | Status: DISCONTINUED | OUTPATIENT
Start: 2018-08-17 | End: 2018-08-18

## 2018-08-17 RX ORDER — BUDESONIDE AND FORMOTEROL FUMARATE DIHYDRATE 160; 4.5 UG/1; UG/1
2 AEROSOL RESPIRATORY (INHALATION)
Qty: 1 | Refills: 0 | OUTPATIENT
Start: 2018-08-17

## 2018-08-17 RX ORDER — TIOTROPIUM BROMIDE 18 UG/1
1 CAPSULE ORAL; RESPIRATORY (INHALATION) DAILY
Qty: 0 | Refills: 0 | Status: COMPLETED | OUTPATIENT
Start: 2018-08-17 | End: 2019-07-16

## 2018-08-17 RX ORDER — REGADENOSON 0.08 MG/ML
0.4 INJECTION, SOLUTION INTRAVENOUS ONCE
Qty: 0 | Refills: 0 | Status: COMPLETED | OUTPATIENT
Start: 2018-08-17 | End: 2018-08-17

## 2018-08-17 RX ORDER — BUDESONIDE AND FORMOTEROL FUMARATE DIHYDRATE 160; 4.5 UG/1; UG/1
2 AEROSOL RESPIRATORY (INHALATION)
Qty: 0 | Refills: 0 | Status: DISCONTINUED | OUTPATIENT
Start: 2018-08-17 | End: 2018-08-18

## 2018-08-17 RX ADMIN — CARBIDOPA AND LEVODOPA 1 TABLET(S): 25; 100 TABLET ORAL at 15:07

## 2018-08-17 RX ADMIN — Medication 3 MILLILITER(S): at 16:31

## 2018-08-17 RX ADMIN — Medication 3 MILLILITER(S): at 04:55

## 2018-08-17 RX ADMIN — AMLODIPINE BESYLATE 5 MILLIGRAM(S): 2.5 TABLET ORAL at 05:54

## 2018-08-17 RX ADMIN — Medication 3 MILLILITER(S): at 08:30

## 2018-08-17 RX ADMIN — Medication 3 MILLILITER(S): at 00:01

## 2018-08-17 RX ADMIN — ENOXAPARIN SODIUM 65 MILLIGRAM(S): 100 INJECTION SUBCUTANEOUS at 05:53

## 2018-08-17 RX ADMIN — Medication 81 MILLIGRAM(S): at 15:06

## 2018-08-17 RX ADMIN — APIXABAN 10 MILLIGRAM(S): 2.5 TABLET, FILM COATED ORAL at 17:33

## 2018-08-17 RX ADMIN — Medication 60 MILLIGRAM(S): at 05:53

## 2018-08-17 RX ADMIN — CILOSTAZOL 100 MILLIGRAM(S): 100 TABLET ORAL at 05:54

## 2018-08-17 RX ADMIN — CILOSTAZOL 100 MILLIGRAM(S): 100 TABLET ORAL at 17:32

## 2018-08-17 RX ADMIN — REGADENOSON 0.4 MILLIGRAM(S): 0.08 INJECTION, SOLUTION INTRAVENOUS at 10:40

## 2018-08-17 RX ADMIN — CARBIDOPA AND LEVODOPA 1 TABLET(S): 25; 100 TABLET ORAL at 05:54

## 2018-08-17 NOTE — PROGRESS NOTE ADULT - ASSESSMENT
86 y/o male with PMH of COPD, Parkinson, PAD, HTN, HLD, p/w chest pain and new PE. 86 y/o male with PMH of COPD, PAD, HTN, HLD, p/w chest pain and new PE.

## 2018-08-17 NOTE — PROGRESS NOTE ADULT - PROBLEM SELECTOR PLAN 6
New PE in RLL - started on eliquis Cont. Nebs and steroid  Pulmonary consult - Dr. Thomson following

## 2018-08-17 NOTE — DISCHARGE NOTE ADULT - SECONDARY DIAGNOSIS.
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

## 2018-08-17 NOTE — PROGRESS NOTE ADULT - PROBLEM SELECTOR PLAN 5
Cont. Sinemet   Pt. with frequent Falls, as per PMD - Dr. Oakley who recommended ДМИТРИЙ on discharge   PT eval for ДМИТРИЙ New PE in RLL - started on eliquis

## 2018-08-17 NOTE — CONSULT NOTE ADULT - PROBLEM SELECTOR PROBLEM 2
Chest pain, unspecified type Other pulmonary embolism without acute cor pulmonale, unspecified chronicity

## 2018-08-17 NOTE — CONSULT NOTE ADULT - PROBLEM SELECTOR PROBLEM 4
Peripheral vascular disease Coronary artery disease involving native coronary artery of native heart without angina pectoris

## 2018-08-17 NOTE — CHART NOTE - NSCHARTNOTEFT_GEN_A_CORE
B/L LE venous doppler shows right posterior tibial vein thrombus  pt. also with small PE  Pt. will continue apixaban as per protocol - 10 mg po q12 hrs for 14 doses followed by 5 mg q12hrs for 90 days

## 2018-08-17 NOTE — DISCHARGE NOTE ADULT - MEDICATION SUMMARY - MEDICATIONS TO TAKE
I will START or STAY ON the medications listed below when I get home from the hospital:    aspirin 81 mg oral tablet  -- 1 tab(s) by mouth once a day  -- Indication: For CAD    apixaban 5 mg oral tablet  -- 2 tab(s) by mouth every 12 hours  -- Indication: For Pulmonary embolism     apixaban 5 mg oral tablet  -- 1 tab(s) by mouth every 12 hours   -- Check with your doctor before becoming pregnant.  It is very important that you take or use this exactly as directed.  Do not skip doses or discontinue unless directed by your doctor.  Obtain medical advice before taking any non-prescription drugs as some may affect the action of this medication.    -- Indication: For Pulmonary embolism     carbidopa-levodopa 25 mg-100 mg oral tablet  -- 1 tab(s) by mouth 3 times a day  -- Indication: For Parkinson disease    Metoprolol Succinate ER 25 mg oral tablet, extended release  -- 1 tab(s) by mouth once a day  -- Indication: For CAD    Symbicort 160 mcg-4.5 mcg/inh inhalation aerosol  -- 2 puff(s) inhaled 2 times a day   -- Check with your doctor before becoming pregnant.  For inhalation only.  Rinse mouth thoroughly after use.    -- Indication: For COPD exacerbation    tiotropium 18 mcg inhalation capsule  -- 1 cap(s) inhaled once a day  -- Indication: For Chest pain, unspecified type    Norvasc 5 mg oral tablet  -- 1 tab(s) by mouth once a day  -- Indication: For HTN    cilostazol 100 mg oral tablet  -- 1 tab(s) by mouth 2 times a day  -- Indication: For Peripheral arterial disease I will START or STAY ON the medications listed below when I get home from the hospital:    predniSONE 10 mg oral tablet  -- Take 4 tabs once a day x 3 days, then 3 tabs once a day x 3 days, then 2 tabs once a day x 2 days, then 1 tab once a day x 3 days  -- It is very important that you take or use this exactly as directed.  Do not skip doses or discontinue unless directed by your doctor.  Obtain medical advice before taking any non-prescription drugs as some may affect the action of this medication.  Take with food or milk.    -- Indication: For COPD exacerbation    aspirin 81 mg oral tablet  -- 1 tab(s) by mouth once a day  -- Indication: For CAD    apixaban 5 mg oral tablet  -- 2 tab(s) by mouth every 12 hours  x 6 days then change to 1 tab every 12 hours   -- Indication: For Blood clot    carbidopa-levodopa 25 mg-100 mg oral tablet  -- 1 tab(s) by mouth 3 times a day  -- Indication: For Parkinson disease    Metoprolol Succinate ER 25 mg oral tablet, extended release  -- 1 tab(s) by mouth once a day  -- Indication: For CAD    Symbicort 160 mcg-4.5 mcg/inh inhalation aerosol  -- 2 puff(s) inhaled 2 times a day   -- Check with your doctor before becoming pregnant.  For inhalation only.  Rinse mouth thoroughly after use.    -- Indication: For COPD exacerbation    tiotropium 18 mcg inhalation capsule  -- 1 cap(s) inhaled once a day  -- Indication: For COPD exacerbation    Norvasc 5 mg oral tablet  -- 1 tab(s) by mouth once a day  -- Indication: For HTN    cilostazol 100 mg oral tablet  -- 1 tab(s) by mouth 2 times a day  -- Indication: For Peripheral arterial disease

## 2018-08-17 NOTE — CONSULT NOTE ADULT - ASSESSMENT
85 male with COPD exacerbation, tobacco use, chest pain
In summary the patient is an elderly man with fairly typical cp no wma on echo no ekg abnormalities and 2 negative troponin    if no further symptoms will get vmpi in am  could consider repeat cath

## 2018-08-17 NOTE — DISCHARGE NOTE ADULT - NSFTFCONTACT3DT_GEN_ALL_CORE
Pt stated her FMLA papers are filled out and turned into her job, pt needs a letter stating she is off work starting 10/22/17, she called off, and her HR dept stated she should get a letter stating she is off work and it can be excused, please advise 17-Aug-2018

## 2018-08-17 NOTE — PROGRESS NOTE ADULT - PROBLEM SELECTOR PLAN 2
Cont. ASA, BB, statin   Nuclear stress test today for chest pain Cont. ASA, BB, statin   Nuclear stress test on 8/17/18 - normal

## 2018-08-17 NOTE — DISCHARGE NOTE ADULT - HOSPITAL COURSE
86 y/o male with PMH of COPD (emphysema), smoker, PAD, HTN, p/e chest pain. CTA shows small PE - started on eliquis as per protocol, nuclear stress test normal, followed by cardiology - Dr. Locke and pulmonology - Dr. Thomson, stable for discharge. Pt. with Hx of Falls and weakness, refused ДМИТРИЙ, will be discharged home with home PT.

## 2018-08-17 NOTE — PROGRESS NOTE ADULT - PROBLEM SELECTOR PLAN 1
Resolved at present, negative cardiac enzymes x 3  Nuclear stress test today   Cardiology - Dr. Locke following Resolved at present, negative cardiac enzymes x 3  Nuclear stress test on 8/17/18 - normal   Cardiology - Dr. Locke following

## 2018-08-17 NOTE — DISCHARGE NOTE ADULT - CARE PLAN
Principal Discharge DX:	Other chronic pulmonary embolism without acute cor pulmonale  Goal:	monitor  Assessment and plan of treatment:	Cont. apixaban as per protocol  Secondary Diagnosis:	COPD exacerbation  Goal:	prevent exacerbations  Assessment and plan of treatment:	Cont. 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 current regimen  Secondary Diagnosis:	Essential hypertension  Goal:	maintain BP wnl  Assessment and plan of treatment:	Controlled - continue current meds  Secondary Diagnosis:	Peripheral arterial disease  Goal:	controlled  Assessment and plan of treatment:	Cont. pletal  Secondary Diagnosis:	Smoking  Goal:	smoking cessation  Assessment and plan of treatment:	Pt. does not plan to quit  Smoking cessation reinforced

## 2018-08-17 NOTE — CONSULT NOTE ADULT - PROBLEM SELECTOR PROBLEM 3
Coronary artery disease involving native coronary artery of native heart without angina pectoris Chest pain, unspecified type

## 2018-08-17 NOTE — PROGRESS NOTE ADULT - ASSESSMENT
85 year old with dyspnea , chest pain, RLL segmental PE, age indetermintate ( ?acute) CAD.Troponins negative.  Suspect COPD. R/o coronary ischemia.    Patient already anticoagulated, echo without RV dysfunction/enlargement.  Scheduled for pharm. Nuke stress, would also obtain venous duplex and pulmonary consults.

## 2018-08-17 NOTE — DISCHARGE NOTE ADULT - PATIENT PORTAL LINK FT
You can access the [facility] Patient Portal, offered by [facility], by registering with the following website: [email or web address]

## 2018-08-17 NOTE — DISCHARGE NOTE ADULT - PLAN OF CARE
monitor Cont. apixaban as per protocol prevent exacerbations Cont. spiriva and symbicort  Smoking cessation teaching controlled cont. current regimen maintain BP wnl Controlled - continue current meds Cont. pletal smoking cessation Pt. does not plan to quit  Smoking cessation reinforced

## 2018-08-17 NOTE — CONSULT NOTE ADULT - SUBJECTIVE AND OBJECTIVE BOX
PULMONARY CONSULT    Initial HPI on admission:  HPI:  Pt is a 85 y o M from home, lives with wife, presented with worsening shortness of breath, weakness, dizziness, chest pain yesterday. Pt states he is a current smoker, used to smoke a lot now only 2 cigarettes a day, denies any stents/MI in past, no history of CVA. (16 Aug 2018 14:05)      BRIEF HOSPITAL COURSE: Emphysema on CT. Small subacute to chronic PE found. DVT found. Poor exercise tolerance. Used to be on Advair but stopped it himself.     PAST MEDICAL & SURGICAL HISTORY:  HTN (hypertension)  Dyslipidemia  CAD (coronary artery disease)  Peripheral vascular disease  Parkinson disease  No significant past surgical history    Allergies    No Known Allergies    Intolerances      FAMILY HISTORY:  No pertinent family history in first degree relatives    Social history: active 70 pk-yr history    Review of Systems:  CONSTITUTIONAL: No fever, chills, or fatigue  EYES: No eye pain, visual disturbances, or discharge  ENMT:  No difficulty hearing, tinnitus, vertigo; No sinus or throat pain  NECK: No pain or stiffness  RESPIRATORY: Per above  CARDIOVASCULAR: chest pain  GASTROINTESTINAL: No abdominal or epigastric pain. No nausea, vomiting, or hematemesis; No diarrhea or constipation. No melena or hematochezia.  GENITOURINARY: No dysuria, frequency, hematuria, or incontinence  NEUROLOGICAL: No headaches, memory loss, loss of strength, numbness, or tremors  SKIN: No itching, burning, rashes, or lesions   MUSCULOSKELETAL: No joint pain or swelling; No muscle, back, or extremity pain  PSYCHIATRIC: No depression, anxiety, mood swings, or difficulty sleeping    Medications:  MEDICATIONS  (STANDING):  ALBUTerol/ipratropium for Nebulization 3 milliLiter(s) Nebulizer every 4 hours  amLODIPine   Tablet 5 milliGRAM(s) Oral daily  apixaban 10 milliGRAM(s) Oral every 12 hours  aspirin enteric coated 81 milliGRAM(s) Oral daily  carbidopa/levodopa  25/100 1 Tablet(s) Oral three times a day  cilostazol 100 milliGRAM(s) Oral two times a day  methylPREDNISolone sodium succinate Injectable 60 milliGRAM(s) IV Push every 8 hours  metoprolol succinate ER 25 milliGRAM(s) Oral daily    MEDICATIONS  (PRN):  nitroglycerin     SubLingual 0.4 milliGRAM(s) SubLingual every 5 minutes PRN Chest Pain    Vital Signs Last 24 Hrs  T(C): 36.4 (17 Aug 2018 05:43), Max: 36.5 (16 Aug 2018 20:49)  T(F): 97.5 (17 Aug 2018 05:43), Max: 97.7 (16 Aug 2018 20:49)  HR: 90 (17 Aug 2018 08:32) (63 - 90)  BP: 106/56 (17 Aug 2018 05:43) (106/56 - 169/86)  BP(mean): --  RR: 16 (17 Aug 2018 05:43) (16 - 22)  SpO2: 95% (17 Aug 2018 08:32) (95% - 100%)    08-16 @ 07:01  -  08-17 @ 07:00  --------------------------------------------------------  IN: 0 mL / OUT: 550 mL / NET: -550 mL    LABS:                        14.1   9.0   )-----------( 291      ( 17 Aug 2018 05:20 )             41.6     08-17    136  |  103  |  24<H>  ----------------------------<  146<H>  4.0   |  25  |  1.27    Ca    8.6      17 Aug 2018 05:20    TPro  7.1  /  Alb  3.1<L>  /  TBili  0.6  /  DBili  x   /  AST  22  /  ALT  20  /  AlkPhos  94  08-16    CARDIAC MARKERS ( 16 Aug 2018 22:30 )  <.017 ng/mL / x     / x     / x     / 1.8 ng/mL  CARDIAC MARKERS ( 16 Aug 2018 14:50 )  <.017 ng/mL / x     / x     / x     / 1.8 ng/mL  CARDIAC MARKERS ( 16 Aug 2018 11:50 )  <.017 ng/mL / x     / 79 U/L / x     / 1.9 ng/mL      CAPILLARY BLOOD GLUCOSE        PT/INR - ( 16 Aug 2018 11:50 )   PT: 12.4 sec;   INR: 1.11 ratio         PTT - ( 16 Aug 2018 11:50 )  PTT:29.0 sec      Serum Pro-Brain Natriuretic Peptide: 399 pg/mL (08-16-18 @ 11:50)    CULTURES: (if applicable)    Physical Examination:    General: No acute distress.      HEENT: Pupils equal, reactive to light.  Symmetric.    PULM: decreased BS at bases    CVS: S1, S2    ABD: Soft, nondistended, nontender, normoactive bowel sounds, no masses    EXT: No edema, nontender    SKIN: Warm and well perfused, no rashes noted.    NEURO: Alert, oriented, interactive, nonfocal    RADIOLOGY REVIEWED  CXR:    CT chest: eccentric subacute PE, emphysema, some fibrosis    TTE:
Chief Complaint: cp    HPI:History supplied by daughter and pt. 85 yr old white man presents with one day hx of midsternal chest pressure. Patient has had episodes before but today was the first day in recent weeks that daughter could convince him to come in. he denies radiation to neck back or arms. continue to smoke .Had cath in  with 30 per cent rca lesion. Negative nuclear study in December.    PMH:   HTN (hypertension)  Dyslipidemia  CAD (coronary artery disease)  Peripheral vascular disease  Parkinson disease    PSH:   No significant past surgical history    Family History:  FAMILY HISTORY:  No pertinent family history in first degree relatives      Social History:  Smokin ppdx70 yrs  Alcohol:occ  Drugs:no    Allergies:  No Known Allergies      Medications:  amLODIPine   Tablet 5 milliGRAM(s) Oral daily  aspirin enteric coated 81 milliGRAM(s) Oral daily  carbidopa/levodopa  25/100 1 Tablet(s) Oral three times a day  cilostazol 100 milliGRAM(s) Oral two times a day  metoprolol succinate ER 25 milliGRAM(s) Oral daily      REVIEW OF SYSTEMS:  CONSTITUTIONAL: No fever, weight loss, or fatigue  EYES: No eye pain, visual disturbances, or discharge  ENMT:  No difficulty hearing, tinnitus, vertigo; No sinus or throat pain  NECK: No pain or stiffness  BREASTS: No pain, masses, or nipple discharge  RESPIRATORY: No cough, wheezing, chills or hemoptysis; No shortness of breath  CARDIOVASCULAR: as per hpi  GASTROINTESTINAL: No abdominal or epigastric pain. No nausea, vomiting, or hematemesis; No diarrhea or constipation. No melena or hematochezia.  GENITOURINARY: No dysuria, frequency, hematuria, or incontinence  NEUROLOGICAL: No headaches, memory loss, loss of strength, numbness, or tremors  SKIN: No itching, burning, rashes, or lesions   LYMPH NODES: No enlarged glands  ENDOCRINE: No heat or cold intolerance; No hair loss  MUSCULOSKELETAL: No joint pain or swelling; No muscle, back, or extremity pain  PSYCHIATRIC: No depression, anxiety, mood swings, or difficulty sleeping  HEME/LYMPH: No easy bruising, or bleeding gums  ALLERY AND IMMUNOLOGIC: No hives or eczema    Physical Exam:  T(C): 36.5 (18 @ 10:32), Max: 36.5 (18 @ 10:32)  HR: 79 (18 @ 14:36) (58 - 79)  BP: 169/86 (18 @ 14:36) (148/74 - 169/86)  RR: 22 (18 @ 14:36) (18 - 25)  SpO2: 100% (18 @ 14:36) (97% - 100%)  Wt(kg): --    GENERAL: NAD, well-groomed, well-developed  HEAD:  Atraumatic, Normocephalic  EYES: EOMI, conjunctiva and sclera clear  ENT: Moist mucous membranes,  NECK: Supple, No JVD, no bruits  CHEST/LUNG: Clear to percussion bilaterally; No rales, rhonchi, wheezing, or rubs  HEART: Regular rate and rhythm; No murmurs, rubs, or gallops PMI non displaced.  ABDOMEN: Soft, Nontender, Nondistended; Bowel sounds present  EXTREMITIES:  2+ Peripheral Pulses, No clubbing, cyanosis, or edema  SKIN: No rashes or lesions  NERVOUS SYSTEM:  Alert & Oriented X3, Good concentration; Motor Strength 5/5 B/L upper and lower extremities; DTRs 2+ intact and symmetric    Cardiovascular Diagnostic Testing:  ECG:nl    Labs:                        14.8   6.2   )-----------( 279      ( 16 Aug 2018 11:50 )             44.1         140  |  104  |  18  ----------------------------<  81  4.5   |  27  |  1.15    Ca    8.9      16 Aug 2018 11:50    TPro  7.1  /  Alb  3.1<L>  /  TBili  0.6  /  DBili  x   /  AST  22  /  ALT  20  /  AlkPhos  94      PT/INR - ( 16 Aug 2018 11:50 )   PT: 12.4 sec;   INR: 1.11 ratio         PTT - ( 16 Aug 2018 11:50 )  PTT:29.0 sec  CARDIAC MARKERS ( 16 Aug 2018 14:50 )  <.017 ng/mL / x     / x     / x     / 1.8 ng/mL  CARDIAC MARKERS ( 16 Aug 2018 11:50 )  <.017 ng/mL / x     / 79 U/L / x     / 1.9 ng/mL      Serum Pro-Brain Natriuretic Peptide: 399 pg/mL ( @ 11:50)    cxr-nl        Imaging:

## 2018-08-18 VITALS — OXYGEN SATURATION: 98 %

## 2018-08-18 PROCEDURE — 75635 CT ANGIO ABDOMINAL ARTERIES: CPT

## 2018-08-18 PROCEDURE — 93005 ELECTROCARDIOGRAM TRACING: CPT

## 2018-08-18 PROCEDURE — 93970 EXTREMITY STUDY: CPT

## 2018-08-18 PROCEDURE — 99285 EMERGENCY DEPT VISIT HI MDM: CPT | Mod: 25

## 2018-08-18 PROCEDURE — 97161 PT EVAL LOW COMPLEX 20 MIN: CPT

## 2018-08-18 PROCEDURE — 80061 LIPID PANEL: CPT

## 2018-08-18 PROCEDURE — 85610 PROTHROMBIN TIME: CPT

## 2018-08-18 PROCEDURE — 80053 COMPREHEN METABOLIC PANEL: CPT

## 2018-08-18 PROCEDURE — 85730 THROMBOPLASTIN TIME PARTIAL: CPT

## 2018-08-18 PROCEDURE — 82550 ASSAY OF CK (CPK): CPT

## 2018-08-18 PROCEDURE — 83036 HEMOGLOBIN GLYCOSYLATED A1C: CPT

## 2018-08-18 PROCEDURE — 85379 FIBRIN DEGRADATION QUANT: CPT

## 2018-08-18 PROCEDURE — 94640 AIRWAY INHALATION TREATMENT: CPT

## 2018-08-18 PROCEDURE — 71045 X-RAY EXAM CHEST 1 VIEW: CPT

## 2018-08-18 PROCEDURE — 96375 TX/PRO/DX INJ NEW DRUG ADDON: CPT

## 2018-08-18 PROCEDURE — 83880 ASSAY OF NATRIURETIC PEPTIDE: CPT

## 2018-08-18 PROCEDURE — 93306 TTE W/DOPPLER COMPLETE: CPT

## 2018-08-18 PROCEDURE — 84484 ASSAY OF TROPONIN QUANT: CPT

## 2018-08-18 PROCEDURE — 85027 COMPLETE CBC AUTOMATED: CPT

## 2018-08-18 PROCEDURE — 71275 CT ANGIOGRAPHY CHEST: CPT

## 2018-08-18 PROCEDURE — 94760 N-INVAS EAR/PLS OXIMETRY 1: CPT

## 2018-08-18 PROCEDURE — 83690 ASSAY OF LIPASE: CPT

## 2018-08-18 PROCEDURE — 78452 HT MUSCLE IMAGE SPECT MULT: CPT

## 2018-08-18 PROCEDURE — 96374 THER/PROPH/DIAG INJ IV PUSH: CPT

## 2018-08-18 PROCEDURE — A9500: CPT

## 2018-08-18 PROCEDURE — 99232 SBSQ HOSP IP/OBS MODERATE 35: CPT

## 2018-08-18 PROCEDURE — 99239 HOSP IP/OBS DSCHRG MGMT >30: CPT

## 2018-08-18 PROCEDURE — 82553 CREATINE MB FRACTION: CPT

## 2018-08-18 PROCEDURE — 80048 BASIC METABOLIC PNL TOTAL CA: CPT

## 2018-08-18 RX ORDER — APIXABAN 2.5 MG/1
1 TABLET, FILM COATED ORAL
Qty: 60 | Refills: 0 | OUTPATIENT
Start: 2018-08-18 | End: 2018-09-16

## 2018-08-18 RX ADMIN — Medication 3 MILLILITER(S): at 09:46

## 2018-08-18 RX ADMIN — APIXABAN 10 MILLIGRAM(S): 2.5 TABLET, FILM COATED ORAL at 16:59

## 2018-08-18 RX ADMIN — CARBIDOPA AND LEVODOPA 1 TABLET(S): 25; 100 TABLET ORAL at 14:15

## 2018-08-18 RX ADMIN — Medication 20 MILLIGRAM(S): at 06:36

## 2018-08-18 RX ADMIN — CILOSTAZOL 100 MILLIGRAM(S): 100 TABLET ORAL at 06:37

## 2018-08-18 RX ADMIN — CARBIDOPA AND LEVODOPA 1 TABLET(S): 25; 100 TABLET ORAL at 06:37

## 2018-08-18 RX ADMIN — TIOTROPIUM BROMIDE 1 CAPSULE(S): 18 CAPSULE ORAL; RESPIRATORY (INHALATION) at 09:49

## 2018-08-18 RX ADMIN — AMLODIPINE BESYLATE 5 MILLIGRAM(S): 2.5 TABLET ORAL at 06:37

## 2018-08-18 RX ADMIN — Medication 25 MILLIGRAM(S): at 06:37

## 2018-08-18 RX ADMIN — BUDESONIDE AND FORMOTEROL FUMARATE DIHYDRATE 2 PUFF(S): 160; 4.5 AEROSOL RESPIRATORY (INHALATION) at 09:48

## 2018-08-18 RX ADMIN — Medication 3 MILLILITER(S): at 16:10

## 2018-08-18 RX ADMIN — APIXABAN 10 MILLIGRAM(S): 2.5 TABLET, FILM COATED ORAL at 06:37

## 2018-08-18 RX ADMIN — Medication 3 MILLILITER(S): at 16:09

## 2018-08-18 RX ADMIN — Medication 81 MILLIGRAM(S): at 12:06

## 2018-08-18 RX ADMIN — CILOSTAZOL 100 MILLIGRAM(S): 100 TABLET ORAL at 16:59

## 2018-08-18 RX ADMIN — Medication 40 MILLIGRAM(S): at 15:30

## 2018-08-18 NOTE — PROGRESS NOTE ADULT - PROBLEM SELECTOR PROBLEM 4
Coronary artery disease involving native coronary artery of native heart without angina pectoris
Peripheral vascular disease
Peripheral vascular disease

## 2018-08-18 NOTE — PROGRESS NOTE ADULT - ATTENDING COMMENTS
cont steroids/nebs/LABA/LAMA  OOB to chair  may need O2 at home  PT
patient seen and examined at bedside rounds in am. feels okay. no complaints at this time. pulmonary consult- COPD, PE, started on eliquis. d/w Dr. Locke, will get nuc stress.

## 2018-08-18 NOTE — PROGRESS NOTE ADULT - PROBLEM SELECTOR PLAN 1
Resolved at present, negative cardiac enzymes x 3  Nuclear stress test on 8/17/18 - normal   Cardiology - Dr. Locke following

## 2018-08-18 NOTE — PROGRESS NOTE ADULT - SUBJECTIVE AND OBJECTIVE BOX
Follow up for  dyspnea, chest pressure    SUBJ:  A little better, still some dyspnea at rest. No chest pain. Receiving lovenox for PE by CTA yesterday.    PMH  HTN (hypertension)  Dyslipidemia  CAD (coronary artery disease)  Peripheral vascular disease  Parkinson disease      MEDICATIONS  (STANDING):  ALBUTerol/ipratropium for Nebulization 3 milliLiter(s) Nebulizer every 4 hours  amLODIPine   Tablet 5 milliGRAM(s) Oral daily  aspirin enteric coated 81 milliGRAM(s) Oral daily  carbidopa/levodopa  25/100 1 Tablet(s) Oral three times a day  cilostazol 100 milliGRAM(s) Oral two times a day  enoxaparin Injectable 65 milliGRAM(s) SubCutaneous every 12 hours  methylPREDNISolone sodium succinate Injectable 60 milliGRAM(s) IV Push every 8 hours  metoprolol succinate ER 25 milliGRAM(s) Oral daily  regadenoson Injectable 0.4 milliGRAM(s) IV Push once    MEDICATIONS  (PRN):  nitroglycerin     SubLingual 0.4 milliGRAM(s) SubLingual every 5 minutes PRN Chest Pain        PHYSICAL EXAM:  Vital Signs Last 24 Hrs  T(C): 36.4 (17 Aug 2018 05:43), Max: 36.5 (16 Aug 2018 10:32)  T(F): 97.5 (17 Aug 2018 05:43), Max: 97.7 (16 Aug 2018 10:32)  HR: 90 (17 Aug 2018 08:32) (58 - 90)  BP: 106/56 (17 Aug 2018 05:43) (106/56 - 169/86)  BP(mean): --  RR: 16 (17 Aug 2018 05:43) (16 - 25)  SpO2: 95% (17 Aug 2018 08:32) (95% - 100%)    GENERAL: NAD, well-groomed, well-developed mildly dyspneic  HEAD:  Atraumatic, Normocephalic  EYES: EOMI, PERRLA, conjunctiva and sclera clear  ENT: Moist mucous membranes,  NECK: Supple, No JVD, no bruits  CHEST/LUNG: Clear to percussion and auscultation bilaterally; No rales, rhonchi, wheezing, or rubs  HEART: Regular rate and rhythm; No murmurs, rubs, or gallops PMI non displaced.  ABDOMEN: Soft, Nontender, Nondistended; Bowel sounds present  EXTREMITIES:  Absent Peripheral Pulses, No clubbing, cyanosis, or edema. No cords or Kathleen's          ECG:  < from: 12 Lead ECG (18 @ 06:09) >    Ventricular Rate 96 BPM    Atrial Rate 96 BPM    P-R Interval 156 ms    QRS Duration 86 ms    Q-T Interval 366 ms    QTC Calculation(Bezet) 462 ms    P Axis 49 degrees    R Axis -36 degrees    T Axis 23 degrees    Diagnosis Line Sinus rhythm with premature atrial complexes  Left axis deviation  Anterior infarct (cited on or before 2018)  Abnormal ECG  When compared with ECG of 16-AUG-2018 10:27,  premature atrial complexes are now present  Confirmed by APOLINAR TRIMBLE, ROSA VALDEZ () on 2018 8:14:22 AM    < end of copied text >      LABS:                        14.1   9.0   )-----------( 291      ( 17 Aug 2018 05:20 )             41.6         136  |  103  |  24<H>  ----------------------------<  146<H>  4.0   |  25  |  1.27    Ca    8.6      17 Aug 2018 05:20    TPro  7.1  /  Alb  3.1<L>  /  TBili  0.6  /  DBili  x   /  AST  22  /  ALT  20  /  AlkPhos  94  08-16    CARDIAC MARKERS ( 16 Aug 2018 22:30 )  <.017 ng/mL / x     / x     / x     / 1.8 ng/mL  CARDIAC MARKERS ( 16 Aug 2018 14:50 )  <.017 ng/mL / x     / x     / x     / 1.8 ng/mL  CARDIAC MARKERS ( 16 Aug 2018 11:50 )  <.017 ng/mL / x     / 79 U/L / x     / 1.9 ng/mL    elevated D dimer noted      PT/INR - ( 16 Aug 2018 11:50 )   PT: 12.4 sec;   INR: 1.11 ratio         PTT - ( 16 Aug 2018 11:50 )  PTT:29.0 sec    I&O's Summary    16 Aug 2018 07:01  -  17 Aug 2018 07:00  --------------------------------------------------------  IN: 0 mL / OUT: 550 mL / NET: -550 mL      BNPSerum Pro-Brain Natriuretic Peptide: 399 pg/mL ( @ 11:50)      RADIOLOGY & ADDITIONAL STUDIES:  < from: CT Angio Chest w/ IV Cont (18 @ 16:23) >    EXAM:  CT ANGIO CHEST (W)AW IC    EXAM:  CT ANGIO ABD AOR W RUN(W)AW IC      PROCEDURE DATE:  2018        INTERPRETATION:  CLINICAL HISTORY:  Chest pain, dilated aorta; evaluate   for pulmonary embolism and/or aneurysm/dissection.    Multipleaxial images of the chest, abdomen and pelvis were obtained from   the lung apices through symphysis pubis with the administration of   intravascular contrast. Nocc of Omnipaque 350 was administered   intravenously without complication. Reformatted coronal and sagittal   images are submitted.    COMPARISON: CT evaluation of the chest 2017 and 2016    FINDINGS: There is no evidence for thoracic or abdominal aortic aneurysm   formation or dissection caliber of the ascending and descending aorta   appears unremarkable. A filling defect is eccentric lesion located within   a segmental pulmonary arterial branch within the right lower lobe   consistent with pulmonary emboli of indeterminate age (representing an   interval change from prior CTA chest evaluation 2016).    No abnormally enlarged mediastinal or hilar lymph nodes are noted. There   is no evidence for axillary lymphadenopathy. The heart size appears   within normal limits. No pericardial effusion is identified. Coronary   arterial calcifications are identified. The central bronchial anatomy   appears patent.    Scarlike opacities are identified within the right apex and right lower   lobe. Reticular opacities are identified within the periphery of the lung   parenchyma, right greater than left, likely related to chronic   interstitial lung disease. There are calcified nodules identified within   the right lower lobe and left upper lobe measuring 9 mm and 11 mm   respectively. A 5 mm partially calcified right upper lobe lung nodule is   noted. A noncalcified 0.5 cm nodule is identified within the posterior   aspect of the left upper lobe. There is no evidence for lobar   consolidation. There is no evidence for pleural effusion or pneumothorax.   No mediastinal shift is noted.    The liver is normal in attenuation and size. No focal hepatic masses are   identified. There is no evidence for intrahepatic or extrahepatic biliary   dilatation. The gallbladder is contracted, limiting assessment; no   definite gallstones or gallbladder wall thickening identified. The   spleen, pancreas and adrenal glands are unremarkable.    The kidneys enhance bilaterally, without evidence for space-occupying   lesion or hydronephrosis. No renal calculi areidentified. The abdominal   aorta is normal in course and caliber. No abnormally enlarged   retroperitoneal or pelvic lymphadenopathy is noted.    Fecal material is scattered throughout the colon. There is no evidence   for mechanical bowel obstruction. No colonic wall thickening is   identified. There is no evidence for free intraperitoneal air or fluid.   There is no evidence for acute appendicitis.    The urinary bladder appears unremarkable.     The patient is status post right total hip replacement. Compression   fracture deformity of L3 identified, indeterminate age. 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 Degenerative changes of the   thoracic and lumbar spine noted. Chronic appearing deformity of the right   thoracic cage evident.    IMPRESSION:    1. A filling defect is eccentric lesion located within a right lower lobe   segmental pulmonary arterial branch consistent with pulmonary embolism of   indeterminate age. No evidence for thoracic or abdominal aortic aneurysm   or dissection.    Results discussed with Dr. Elias by telephone.      < end of copied text >      ECHO:        < from: TTE Echo Complete w/Doppler (18 @ 14:42) >    EXAM:  ECHO TTE WO CON COMP [id number]      PROCEDURE DATE:  2018        INTERPRETATION:  REPORT:    TRANSTHORACIC ECHOCARDIOGRAM REPORT         Patient Name:   ELIZABETH MCLAUGHLIN Patient Location: [address] Rec #:  [id number]           Accession #:      [id number]  Account #:      [id number]            Height:           69.7 in 177.0 cm  YOB: 1932         Weight:           144.0 lb 65.30 kg  Patient Age:    85 years          BSA:              1.81 m²  Patient Gender: M         BP:               / mmHg       Date of Exam:        2018 2:42:22 PM  Sonographer:         FT  Copy report sent to: [facility]    Procedure:     2D Echo/Doppler/Color Doppler Complete.  Indications:   Chest pain, unspecified - R07.9  Diagnosis:     Other chest pain - R07.89; Other chest pain - R07.89;   Other chest                 pain - R07.89  Study Details: Technically limited study. Study quality was adversely   affected                 due to body habitus, COPD and with limited parasternal   imaging.         2D AND M-MODE MEASUREMENTS (normal ranges within parentheses):  Aorta/Left Atrium:             Normal  Aortic Root (Mmode): 3.60 cm (2.4-3.7)  Left Atrium (Mmode): 4.22 cm (1.9-4.0)    LV DIASTOLIC FUNCTION:  MV Peak E: 0.68m/s Decel Time: 220 msec  MV Peak A: 1.25 m/s  E/A Ratio: 0.54    SPECTRAL DOPPLER ANALYSIS (where applicable):  Mitral Valve:  MV P1/2 Time: 63.83 msec  MV Area, PHT: 3.45 cm²    Aortic Valve: AoV Max Harshil: 1.70 m/s AoV Peak P.6 mmHg AoV Mean P.9 mmHg    LVOT Vmax: 1.03 m/s LVOT VTI: 0.217 m LVOT Diameter:    AoV Area, Vmax:  AoV Area, VTI:  AoV Area, Vmn:  Ao VTI: 0.292  Aortic Insufficiency:  AI Half-time:  555 msec  AI Decel Rate: 1.86 m/s²    Tricuspid Valve and PA/RV Systolic Pressure: TR Max Velocity: 2.16 m/s RA   Pressure:  RVSP/PASP:       PHYSICIAN INTERPRETATION:  Left Ventricle: The left ventricular internal cavity size is normal.  Global LV systolic function was normal. Left ventricular ejection   fraction, by visual estimation, is 61%. Spectral Doppler shows impaired   relaxation pattern of left ventricular myocardial filling (Grade I   diastolic dysfunction).  Right Ventricle: Normal right ventricular size and function.  Left Atrium: The left atrium is normal in size. LA volume Index is 22.6   ml/m² ml/m2.  Right Atrium: The right atrium is normal in size.  Pericardium: There is no evidence of pericardial effusion.  Mitral Valve: Thickening and calcification of the anterior and posterior   mitral valve leaflets. Mild mitral valve regurgitation is seen.  Tricuspid Valve: Structurally normal tricuspid valve, with normal leaflet   excursion. Mild tricuspid regurgitation is visualized.  Aortic Valve: Sclerotic aortic valve with normal opening. Peak Av   velocity is 1.70 m/s, mean transaortic gradient equals 5.9 mmHg. The   aortic valve mean gradient is 5.9 mmHgconsistent with normally opening   aortic valve. Mild aortic valve regurgitation is seen.  Pulmonic Valve: The pulmonic valve was not well visualized.  Aorta: The aortic root is normal in size and structure.  Pulmonary Artery: The pulmonary artery is not well seen.       Summary:   1. 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 Left ventricular ejection fraction, by visual estimation, is 61%.   3. Normal global left ventricular systolic function.   4. Spectral Doppler shows impaired relaxation pattern of left   ventricular myocardial filling (Grade I diastolic dysfunction).   5. Thickening and calcification of the anterior and posterior mitral   valve leaflets.   6. Mild tricuspid regurgitation.   7. Mild aortic regurgitation.   8. LA volume Index is 22.6 ml/m² ml/m2.   9. The aortic valve mean gradient is 5.9 mmHg consistent with normally   opening aortic valve.    < end of copied text >
Follow up for chest pain  SUBJ: Patient without further chest pain still with some sob  PMH  HTN (hypertension)  Dyslipidemia  CAD (coronary artery disease)  Peripheral vascular disease  Parkinson disease      MEDICATIONS  (STANDING):  ALBUTerol/ipratropium for Nebulization 3 milliLiter(s) Nebulizer every 4 hours  amLODIPine   Tablet 5 milliGRAM(s) Oral daily  apixaban 10 milliGRAM(s) Oral every 12 hours  aspirin enteric coated 81 milliGRAM(s) Oral daily  buDESOnide 160 MICROgram(s)/formoterol 4.5 MICROgram(s) Inhaler 2 Puff(s) Inhalation two times a day  carbidopa/levodopa  25/100 1 Tablet(s) Oral three times a day  cilostazol 100 milliGRAM(s) Oral two times a day  metoprolol succinate ER 25 milliGRAM(s) Oral daily  predniSONE   Tablet 40 milliGRAM(s) Oral daily  tiotropium 18 MICROgram(s) Capsule 1 Capsule(s) Inhalation daily    MEDICATIONS  (PRN):  nitroglycerin     SubLingual 0.4 milliGRAM(s) SubLingual every 5 minutes PRN Chest Pain        PHYSICAL EXAM:  Vital Signs Last 24 Hrs  T(C): 36.6 (18 Aug 2018 06:56), Max: 36.9 (17 Aug 2018 23:29)  T(F): 97.8 (18 Aug 2018 06:56), Max: 98.4 (17 Aug 2018 23:29)  HR: 82 (18 Aug 2018 06:56) (82 - 110)  BP: 120/58 (18 Aug 2018 06:56) (105/43 - 121/65)  BP(mean): --  RR: 16 (18 Aug 2018 06:56) (16 - 16)  SpO2: 98% (18 Aug 2018 09:50) (92% - 98%)    GENERAL: NAD, well-groomed, well-developed  HEAD:  Atraumatic, Normocephalic  EYES: EOMI, PERRLA, conjunctiva and sclera clear  ENT: Moist mucous membranes,  NECK: Supple, No JVD, no bruits  CHEST/LUNG: Clear to percussion bilaterally; No rales, rhonchi, wheezing, or rubs  HEART: Regular rate and rhythm; No murmurs, rubs, or gallops PMI non displaced.  ABDOMEN: Soft, Nontender, Nondistended; Bowel sounds present  EXTREMITIES:  2+ Peripheral Pulses, No clubbing, cyanosis, or edema  SKIN: No rashes or lesions  NERVOUS SYSTEM:  Alert & Oriented X3, Good concentration; Motor Strength 5/5 B/L upper and lower extremities; DTRs 2+ intact and symmetric      TELEMETRY: rsr    ECG:    LABS:                        14.1   9.0   )-----------( 291      ( 17 Aug 2018 05:20 )             41.6     08-17    136  |  103  |  24<H>  ----------------------------<  146<H>  4.0   |  25  |  1.27    Ca    8.6      17 Aug 2018 05:20      CARDIAC MARKERS ( 16 Aug 2018 22:30 )  <.017 ng/mL / x     / x     / x     / 1.8 ng/mL  CARDIAC MARKERS ( 16 Aug 2018 14:50 )  <.017 ng/mL / x     / x     / x     / 1.8 ng/mL          I&O's Summary    17 Aug 2018 07:01  -  18 Aug 2018 07:00  --------------------------------------------------------  IN: 0 mL / OUT: 700 mL / NET: -700 mL    18 Aug 2018 07:01  -  18 Aug 2018 13:57  --------------------------------------------------------  IN: 250 mL / OUT: 200 mL / NET: 50 mL      BNP    RADIOLOGY & ADDITIONAL STUDIES:    ECHO:
Follow-up Pulm Progress Note    Weak in chair. "I feel ok".     Medications:  MEDICATIONS  (STANDING):  ALBUTerol/ipratropium for Nebulization 3 milliLiter(s) Nebulizer every 4 hours  amLODIPine   Tablet 5 milliGRAM(s) Oral daily  apixaban 10 milliGRAM(s) Oral every 12 hours  aspirin enteric coated 81 milliGRAM(s) Oral daily  buDESOnide 160 MICROgram(s)/formoterol 4.5 MICROgram(s) Inhaler 2 Puff(s) Inhalation two times a day  carbidopa/levodopa  25/100 1 Tablet(s) Oral three times a day  cilostazol 100 milliGRAM(s) Oral two times a day  methylPREDNISolone sodium succinate Injectable 20 milliGRAM(s) IV Push every 8 hours  metoprolol succinate ER 25 milliGRAM(s) Oral daily  tiotropium 18 MICROgram(s) Capsule 1 Capsule(s) Inhalation daily    MEDICATIONS  (PRN):  nitroglycerin     SubLingual 0.4 milliGRAM(s) SubLingual every 5 minutes PRN Chest Pain    Vital Signs Last 24 Hrs  T(C): 36.6 (18 Aug 2018 06:56), Max: 36.9 (17 Aug 2018 23:29)  T(F): 97.8 (18 Aug 2018 06:56), Max: 98.4 (17 Aug 2018 23:29)  HR: 82 (18 Aug 2018 06:56) (82 - 110)  BP: 120/58 (18 Aug 2018 06:56) (105/43 - 121/65)  BP(mean): --  RR: 16 (18 Aug 2018 06:56) (16 - 16)  SpO2: 95% (18 Aug 2018 06:56) (92% - 95%)    08-17 @ 07:01  -  08-18 @ 07:00  --------------------------------------------------------  IN: 0 mL / OUT: 700 mL / NET: -700 mL    LABS:                        14.1   9.0   )-----------( 291      ( 17 Aug 2018 05:20 )             41.6     08-17    136  |  103  |  24<H>  ----------------------------<  146<H>  4.0   |  25  |  1.27    Ca    8.6      17 Aug 2018 05:20    TPro  7.1  /  Alb  3.1<L>  /  TBili  0.6  /  DBili  x   /  AST  22  /  ALT  20  /  AlkPhos  94  08-16      CARDIAC MARKERS ( 16 Aug 2018 22:30 )  <.017 ng/mL / x     / x     / x     / 1.8 ng/mL  CARDIAC MARKERS ( 16 Aug 2018 14:50 )  <.017 ng/mL / x     / x     / x     / 1.8 ng/mL  CARDIAC MARKERS ( 16 Aug 2018 11:50 )  <.017 ng/mL / x     / 79 U/L / x     / 1.9 ng/mL    CAPILLARY BLOOD GLUCOSE    PT/INR - ( 16 Aug 2018 11:50 )   PT: 12.4 sec;   INR: 1.11 ratio         PTT - ( 16 Aug 2018 11:50 )  PTT:29.0 sec      Serum Pro-Brain Natriuretic Peptide: 399 pg/mL (08-16-18 @ 11:50)    CULTURES: (if applicable)    Physical Examination:  PULM: Decreased BS bilaterally  CVS: S1, S2 heard    RADIOLOGY REVIEWED  CXR:     CT chest: emphysema, some fibrosis, eccentric PE    TTE:
Patient is a 85y old  Male who presents with a chief complaint of shortness of breath, weak, dizziness, chest pain (16 Aug 2018 14:05)      Patient seen and examined at bedside, no events overnight, in no acute distress.     ALLERGIES:  No Known Allergies    MEDICATIONS:  ALBUTerol/ipratropium for Nebulization 3 milliLiter(s) Nebulizer every 4 hours  amLODIPine   Tablet 5 milliGRAM(s) Oral daily  apixaban 10 milliGRAM(s) Oral every 12 hours  aspirin enteric coated 81 milliGRAM(s) Oral daily  carbidopa/levodopa  25/100 1 Tablet(s) Oral three times a day  cilostazol 100 milliGRAM(s) Oral two times a day  methylPREDNISolone sodium succinate Injectable 60 milliGRAM(s) IV Push every 8 hours  metoprolol succinate ER 25 milliGRAM(s) Oral daily  nitroglycerin     SubLingual 0.4 milliGRAM(s) SubLingual every 5 minutes PRN  regadenoson Injectable 0.4 milliGRAM(s) IV Push once    Vital Signs Last 24 Hrs  T(C): 36.4 (17 Aug 2018 05:43), Max: 36.5 (16 Aug 2018 20:49)  T(F): 97.5 (17 Aug 2018 05:43), Max: 97.7 (16 Aug 2018 20:49)  HR: 90 (17 Aug 2018 08:32) (58 - 90)  BP: 106/56 (17 Aug 2018 05:43) (106/56 - 169/86)  BP(mean): --  RR: 16 (17 Aug 2018 05:43) (16 - 22)  SpO2: 95% (17 Aug 2018 08:32) (95% - 100%)  I&O's Summary    16 Aug 2018 07:01  -  17 Aug 2018 07:00  --------------------------------------------------------  IN: 0 mL / OUT: 550 mL / NET: -550 mL          PAST MEDICAL & SURGICAL HISTORY:  HTN (hypertension)  Dyslipidemia  CAD (coronary artery disease)  Peripheral vascular disease  Parkinson disease  No significant past surgical history      Home Medications:  aspirin 81 mg oral tablet: 1 tab(s) orally once a day (08 Aug 2014 10:16)  carbidopa-levodopa 25 mg-100 mg oral tablet: 1 tab(s) orally 3 times a day (08 Aug 2014 10:16)  cilostazol 100 mg oral tablet: 1 tab(s) orally 2 times a day (08 Aug 2014 10:16)  Metoprolol Succinate ER 25 mg oral tablet, extended release: 1 tab(s) orally once a day (08 Aug 2014 10:16)  Norvasc 5 mg oral tablet: 1 tab(s) orally once a day (08 Aug 2014 14:48)      PHYSICAL EXAM:  General: NAD, A/O x3  ENT: MMM  Neck: Supple, No JVD  Lungs: Clear to percussion bilaterally  Cardio: RRR, S1/S2, No murmurs  Abdomen: Soft, Nontender, Nondistended; Bowel sounds present  Extremities: No clubbing, cyanosis, or edema    LABS:                        14.1   9.0   )-----------( 291      ( 17 Aug 2018 05:20 )             41.6     08-17    136  |  103  |  24  ----------------------------<  146  4.0   |  25  |  1.27    Ca    8.6      17 Aug 2018 05:20    TPro  7.1  /  Alb  3.1  /  TBili  0.6  /  DBili  x   /  AST  22  /  ALT  20  /  AlkPhos  94  08-16    PT/INR - ( 16 Aug 2018 11:50 )   PT: 12.4 sec;   INR: 1.11 ratio         PTT - ( 16 Aug 2018 11:50 )  PTT:29.0 sec      08-17 [id number] 5.5    RADIOLOGY & ADDITIONAL TESTS: < from: CT Angio Abd Aorta w/run-off w/ IV Cont (08.16.18 @ 16:23) >  1. A filling defect is eccentric lesion located within a right lower lobe   segmental pulmonary arterial branch consistent with pulmonary embolism of   indeterminate age. No evidence for thoracic or abdominal aortic aneurysm   or dissection.    < end of copied text >      Care Discussed with Consultants/Other Providers: Cardiology - Amador Tafoya,monology - Dr. Thomson
Patient is a 85y old  Male who presents with a chief complaint of shortness of breath, weak, dizziness, chest pain      Patient seen and examined at bedside. feels well, no complaints.     ALLERGIES:  No Known Allergies    MEDICATIONS:  ALBUTerol/ipratropium for Nebulization 3 milliLiter(s) Nebulizer every 4 hours  amLODIPine   Tablet 5 milliGRAM(s) Oral daily  apixaban 10 milliGRAM(s) Oral every 12 hours  aspirin enteric coated 81 milliGRAM(s) Oral daily  buDESOnide 160 MICROgram(s)/formoterol 4.5 MICROgram(s) Inhaler 2 Puff(s) Inhalation two times a day  carbidopa/levodopa  25/100 1 Tablet(s) Oral three times a day  cilostazol 100 milliGRAM(s) Oral two times a day  metoprolol succinate ER 25 milliGRAM(s) Oral daily  nitroglycerin     SubLingual 0.4 milliGRAM(s) SubLingual every 5 minutes PRN  predniSONE   Tablet 40 milliGRAM(s) Oral daily  tiotropium 18 MICROgram(s) Capsule 1 Capsule(s) Inhalation daily    Vital Signs Last 24 Hrs  T(F): 97.8 (18 Aug 2018 06:56), Max: 98.4 (17 Aug 2018 23:29)  HR: 82 (18 Aug 2018 06:56) (82 - 110)  BP: 120/58 (18 Aug 2018 06:56) (105/43 - 121/65)  RR: 16 (18 Aug 2018 06:56) (16 - 16)  SpO2: 98% (18 Aug 2018 09:50) (92% - 98%)  I&O's Summary    17 Aug 2018 07:01  -  18 Aug 2018 07:00  --------------------------------------------------------  IN: 0 mL / OUT: 700 mL / NET: -700 mL    18 Aug 2018 07:01  -  18 Aug 2018 12:08  --------------------------------------------------------  IN: 250 mL / OUT: 200 mL / NET: 50 mL        PHYSICAL EXAM:  General: NAD, alert oriented   Neck: Supple, No JVD  Lungs: Clear to auscultation bilaterally  Cardio: RRR, S1/S2, No murmurs  Abdomen: Soft, Nontender, Nondistended; Bowel sounds present  Extremities: No clubbing, cyanosis, or edema    LABS:                        14.1   9.0   )-----------( 291      ( 17 Aug 2018 05:20 )             41.6     08-17    136  |  103  |  24  ----------------------------<  146  4.0   |  25  |  1.27    Ca    8.6      17 Aug 2018 05:20    TPro  7.1  /  Alb  3.1  /  TBili  0.6  /  DBili  x   /  AST  22  /  ALT  20  /  AlkPhos  94  08-16    eGFR if Non African American: 51 mL/min/1.73M2 (08-17-18 @ 05:20)  eGFR if : 59 mL/min/1.73M2 (08-17-18 @ 05:20)    PT/INR - ( 16 Aug 2018 11:50 )   PT: 12.4 sec;   INR: 1.11 ratio         PTT - ( 16 Aug 2018 11:50 )  PTT:29.0 sec    CARDIAC MARKERS ( 16 Aug 2018 22:30 )  <.017 ng/mL / x     / x     / x     / 1.8 ng/mL  CARDIAC MARKERS ( 16 Aug 2018 14:50 )  <.017 ng/mL / x     / x     / x     / 1.8 ng/mL  CARDIAC MARKERS ( 16 Aug 2018 11:50 )  <.017 ng/mL / x     / 79 U/L / x     / 1.9 ng/mL      08-17 Chol 183 mg/dL  mg/dL HDL 52 mg/dL Trig 66 mg/dL    CAPILLARY BLOOD GLUCOSE      08-17 [id number] 5.5      RADIOLOGY & ADDITIONAL TESTS:    Care Discussed with Consultants/Other Providers:

## 2018-08-18 NOTE — PROGRESS NOTE ADULT - PROBLEM SELECTOR PROBLEM 2
Coronary artery disease involving native coronary artery of native heart without angina pectoris
Other pulmonary embolism without acute cor pulmonale, unspecified chronicity
Coronary artery disease involving native coronary artery of native heart without angina pectoris

## 2018-08-18 NOTE — PHYSICAL THERAPY INITIAL EVALUATION ADULT - ADDITIONAL COMMENTS
Pt. reports he lives in private house with 4 LUCY with HR, and remains on first floor.  Pt. reports he has a RW and shower chair at home, and uses a straight cane to ambulate.  He wears glasses.

## 2018-08-18 NOTE — PROGRESS NOTE ADULT - ASSESSMENT
84 y/o male with PMH of COPD, PAD, HTN, HLD, p/w chest pain and new PE.    discharge planning, family and patient refused subacute rehab, wants to go home with home care/PT.

## 2018-08-18 NOTE — PROGRESS NOTE ADULT - ASSESSMENT
Imp:    Elderly man with cp, hx of non obs cad with no evidence of acs and negative vmpi  Currently being treated for PE    Continue current regimen

## 2018-08-19 RX ORDER — CILOSTAZOL 100 MG/1
1 TABLET ORAL
Qty: 60 | Refills: 0 | OUTPATIENT
Start: 2018-08-19

## 2018-08-19 RX ORDER — AMLODIPINE BESYLATE 2.5 MG/1
1 TABLET ORAL
Qty: 30 | Refills: 0 | OUTPATIENT
Start: 2018-08-19

## 2018-08-19 RX ORDER — METOPROLOL TARTRATE 50 MG
1 TABLET ORAL
Qty: 30 | Refills: 0 | OUTPATIENT
Start: 2018-08-19

## 2018-08-20 ENCOUNTER — CHART COPY (OUTPATIENT)
Age: 83
End: 2018-08-20

## 2018-08-20 DIAGNOSIS — I26.99 OTHER PULMONARY EMBOLISM W/OUT ACUTE COR PULMONALE: ICD-10-CM

## 2018-08-20 DIAGNOSIS — I10 ESSENTIAL (PRIMARY) HYPERTENSION: ICD-10-CM

## 2018-08-20 RX ORDER — BUDESONIDE AND FORMOTEROL FUMARATE DIHYDRATE 160; 4.5 UG/1; UG/1
160-4.5 AEROSOL RESPIRATORY (INHALATION) TWICE DAILY
Refills: 0 | Status: ACTIVE | COMMUNITY

## 2018-08-20 RX ORDER — TIOTROPIUM BROMIDE 18 UG/1
18 CAPSULE ORAL; RESPIRATORY (INHALATION) DAILY
Refills: 0 | Status: ACTIVE | COMMUNITY

## 2018-08-20 RX ORDER — ASPIRIN 81 MG
81 TABLET, DELAYED RELEASE (ENTERIC COATED) ORAL DAILY
Refills: 0 | Status: ACTIVE | COMMUNITY

## 2018-08-20 RX ORDER — ALBUTEROL SULFATE 90 UG/1
108 (90 BASE) AEROSOL, METERED RESPIRATORY (INHALATION)
Qty: 1 | Refills: 3 | Status: COMPLETED | COMMUNITY
Start: 2018-07-27 | End: 2018-08-18

## 2018-08-20 RX ORDER — TAMSULOSIN HYDROCHLORIDE 0.4 MG/1
0.4 CAPSULE ORAL
Qty: 30 | Refills: 3 | Status: COMPLETED | COMMUNITY
Start: 2017-04-06 | End: 2018-08-18

## 2018-08-21 ENCOUNTER — MEDICATION RENEWAL (OUTPATIENT)
Age: 83
End: 2018-08-21

## 2018-08-21 RX ORDER — APIXABAN 5 MG/1
5 TABLET, FILM COATED ORAL TWICE DAILY
Refills: 0 | Status: DISCONTINUED | COMMUNITY
End: 2018-08-21

## 2018-08-22 ENCOUNTER — MEDICATION RENEWAL (OUTPATIENT)
Age: 83
End: 2018-08-22

## 2018-08-22 RX ORDER — DABIGATRAN ETEXILATE MESYLATE 150 MG/1
150 CAPSULE ORAL TWICE DAILY
Qty: 60 | Refills: 3 | Status: DISCONTINUED | COMMUNITY
Start: 2018-08-21 | End: 2018-08-21

## 2018-08-22 RX ORDER — RIVAROXABAN 20 MG/1
20 TABLET, FILM COATED ORAL
Qty: 90 | Refills: 3 | Status: ACTIVE | COMMUNITY
Start: 2018-08-21 | End: 1900-01-01

## 2018-08-24 RX ORDER — APIXABAN 2.5 MG/1
1 TABLET, FILM COATED ORAL
Qty: 180 | Refills: 0 | OUTPATIENT
Start: 2018-08-24 | End: 2018-11-21

## 2018-08-27 ENCOUNTER — RX RENEWAL (OUTPATIENT)
Age: 83
End: 2018-08-27

## 2018-08-28 ENCOUNTER — APPOINTMENT (OUTPATIENT)
Dept: FAMILY MEDICINE | Facility: HOME HEALTH | Age: 83
End: 2018-08-28
Payer: MEDICARE

## 2018-08-28 PROCEDURE — 99349 HOME/RES VST EST MOD MDM 40: CPT

## 2018-08-30 ENCOUNTER — APPOINTMENT (OUTPATIENT)
Dept: CARDIOLOGY | Facility: CLINIC | Age: 83
End: 2018-08-30

## 2018-08-31 ENCOUNTER — APPOINTMENT (OUTPATIENT)
Dept: FAMILY MEDICINE | Facility: CLINIC | Age: 83
End: 2018-08-31

## 2018-09-10 RX ORDER — AMLODIPINE BESYLATE 5 MG/1
5 TABLET ORAL
Refills: 0 | Status: COMPLETED | COMMUNITY
End: 2018-09-10

## 2018-09-10 RX ORDER — CARBIDOPA AND LEVODOPA 25; 100 MG/1; MG/1
25-100 TABLET ORAL 3 TIMES DAILY
Refills: 0 | Status: COMPLETED | COMMUNITY
End: 2018-09-10

## 2018-09-12 ENCOUNTER — MEDICATION RENEWAL (OUTPATIENT)
Age: 83
End: 2018-09-12

## 2018-09-12 DIAGNOSIS — R60.0 LOCALIZED EDEMA: ICD-10-CM

## 2018-09-23 ENCOUNTER — RX RENEWAL (OUTPATIENT)
Age: 83
End: 2018-09-23

## 2018-10-02 ENCOUNTER — MED ADMIN CHARGE (OUTPATIENT)
Age: 83
End: 2018-10-02

## 2018-10-02 ENCOUNTER — APPOINTMENT (OUTPATIENT)
Dept: FAMILY MEDICINE | Facility: HOME HEALTH | Age: 83
End: 2018-10-02
Payer: MEDICARE

## 2018-10-02 PROCEDURE — 90732 PPSV23 VACC 2 YRS+ SUBQ/IM: CPT

## 2018-10-02 PROCEDURE — 90662 IIV NO PRSV INCREASED AG IM: CPT

## 2018-10-02 PROCEDURE — G0008: CPT

## 2018-10-02 PROCEDURE — 99349 HOME/RES VST EST MOD MDM 40: CPT | Mod: 25

## 2018-10-02 PROCEDURE — G0009: CPT

## 2018-10-18 ENCOUNTER — OTHER (OUTPATIENT)
Age: 83
End: 2018-10-18

## 2018-10-22 LAB
25(OH)D3 SERPL-MCNC: 33 NG/ML
ALBUMIN SERPL ELPH-MCNC: 3.9 G/DL
ALP BLD-CCNC: 84 U/L
ALT SERPL-CCNC: 12 U/L
ANION GAP SERPL CALC-SCNC: 14 MMOL/L
AST SERPL-CCNC: 19 U/L
BASOPHILS # BLD AUTO: 0.03 K/UL
BASOPHILS NFR BLD AUTO: 0.4 %
BILIRUB SERPL-MCNC: 0.4 MG/DL
BUN SERPL-MCNC: 23 MG/DL
CALCIUM SERPL-MCNC: 9.6 MG/DL
CHLORIDE SERPL-SCNC: 104 MMOL/L
CHOLEST SERPL-MCNC: 148 MG/DL
CHOLEST/HDLC SERPL: 3.1 RATIO
CO2 SERPL-SCNC: 22 MMOL/L
CREAT SERPL-MCNC: 1.13 MG/DL
EOSINOPHIL # BLD AUTO: 0.21 K/UL
EOSINOPHIL NFR BLD AUTO: 2.9 %
GLUCOSE SERPL-MCNC: 95 MG/DL
HBA1C MFR BLD HPLC: 4.3 %
HCT VFR BLD CALC: 29.5 %
HDLC SERPL-MCNC: 48 MG/DL
HGB BLD-MCNC: 9 G/DL
IMM GRANULOCYTES NFR BLD AUTO: 0.1 %
LDLC SERPL CALC-MCNC: 75 MG/DL
LYMPHOCYTES # BLD AUTO: 2.03 K/UL
LYMPHOCYTES NFR BLD AUTO: 28.3 %
MAN DIFF?: NORMAL
MCHC RBC-ENTMCNC: 27 PG
MCHC RBC-ENTMCNC: 30.5 GM/DL
MCV RBC AUTO: 88.6 FL
MONOCYTES # BLD AUTO: 0.77 K/UL
MONOCYTES NFR BLD AUTO: 10.7 %
NEUTROPHILS # BLD AUTO: 4.12 K/UL
NEUTROPHILS NFR BLD AUTO: 57.6 %
PLATELET # BLD AUTO: 516 K/UL
POTASSIUM SERPL-SCNC: 4.7 MMOL/L
PROT SERPL-MCNC: 7.4 G/DL
RBC # BLD: 3.33 M/UL
RBC # FLD: 15.8 %
SODIUM SERPL-SCNC: 140 MMOL/L
T4 SERPL-MCNC: 6.2 UG/DL
TRIGL SERPL-MCNC: 123 MG/DL
TSH SERPL-ACNC: 3.71 UIU/ML
WBC # FLD AUTO: 7.17 K/UL

## 2018-10-23 ENCOUNTER — INPATIENT (INPATIENT)
Facility: HOSPITAL | Age: 83
LOS: 2 days | Discharge: HOME | DRG: 812 | End: 2018-10-26
Attending: INTERNAL MEDICINE | Admitting: INTERNAL MEDICINE
Payer: MEDICARE

## 2018-10-23 VITALS
SYSTOLIC BLOOD PRESSURE: 90 MMHG | TEMPERATURE: 98 F | HEIGHT: 67 IN | WEIGHT: 139.99 LBS | HEART RATE: 59 BPM | DIASTOLIC BLOOD PRESSURE: 66 MMHG | OXYGEN SATURATION: 95 % | RESPIRATION RATE: 16 BRPM

## 2018-10-23 DIAGNOSIS — I10 ESSENTIAL (PRIMARY) HYPERTENSION: ICD-10-CM

## 2018-10-23 DIAGNOSIS — J44.9 CHRONIC OBSTRUCTIVE PULMONARY DISEASE, UNSPECIFIED: ICD-10-CM

## 2018-10-23 DIAGNOSIS — D64.9 ANEMIA, UNSPECIFIED: ICD-10-CM

## 2018-10-23 DIAGNOSIS — I26.99 OTHER PULMONARY EMBOLISM WITHOUT ACUTE COR PULMONALE: ICD-10-CM

## 2018-10-23 DIAGNOSIS — Z29.9 ENCOUNTER FOR PROPHYLACTIC MEASURES, UNSPECIFIED: ICD-10-CM

## 2018-10-23 DIAGNOSIS — I73.9 PERIPHERAL VASCULAR DISEASE, UNSPECIFIED: ICD-10-CM

## 2018-10-23 DIAGNOSIS — E78.5 HYPERLIPIDEMIA, UNSPECIFIED: ICD-10-CM

## 2018-10-23 LAB
ABO RH CONFIRMATION: SIGNIFICANT CHANGE UP
ALBUMIN SERPL ELPH-MCNC: 3.3 G/DL — SIGNIFICANT CHANGE UP (ref 3.3–5)
ALP SERPL-CCNC: 100 U/L — SIGNIFICANT CHANGE UP (ref 40–120)
ALT FLD-CCNC: 20 U/L DA — SIGNIFICANT CHANGE UP (ref 10–45)
ANION GAP SERPL CALC-SCNC: 10 MMOL/L — SIGNIFICANT CHANGE UP (ref 5–17)
APTT BLD: 38.7 SEC — HIGH (ref 27.5–37.4)
AST SERPL-CCNC: 21 U/L — SIGNIFICANT CHANGE UP (ref 10–40)
BASOPHILS # BLD AUTO: 0.1 K/UL — SIGNIFICANT CHANGE UP (ref 0–0.2)
BASOPHILS NFR BLD AUTO: 0.9 % — SIGNIFICANT CHANGE UP (ref 0–2)
BILIRUB SERPL-MCNC: 0.3 MG/DL — SIGNIFICANT CHANGE UP (ref 0.2–1.2)
BLD GP AB SCN SERPL QL: SIGNIFICANT CHANGE UP
BUN SERPL-MCNC: 29 MG/DL — HIGH (ref 7–23)
CALCIUM SERPL-MCNC: 9 MG/DL — SIGNIFICANT CHANGE UP (ref 8.4–10.5)
CHLORIDE SERPL-SCNC: 104 MMOL/L — SIGNIFICANT CHANGE UP (ref 96–108)
CK SERPL-CCNC: 54 U/L — SIGNIFICANT CHANGE UP (ref 30–200)
CO2 SERPL-SCNC: 26 MMOL/L — SIGNIFICANT CHANGE UP (ref 22–31)
CREAT SERPL-MCNC: 1.38 MG/DL — HIGH (ref 0.5–1.3)
EOSINOPHIL # BLD AUTO: 0.2 K/UL — SIGNIFICANT CHANGE UP (ref 0–0.5)
EOSINOPHIL NFR BLD AUTO: 3.7 % — SIGNIFICANT CHANGE UP (ref 0–6)
GLUCOSE SERPL-MCNC: 109 MG/DL — HIGH (ref 70–99)
HCT VFR BLD CALC: 31.3 % — LOW (ref 39–50)
HGB BLD-MCNC: 9.6 G/DL — LOW (ref 13–17)
INR BLD: 1.89 RATIO — HIGH (ref 0.88–1.16)
LIDOCAIN IGE QN: 143 U/L — SIGNIFICANT CHANGE UP (ref 73–393)
LYMPHOCYTES # BLD AUTO: 1.7 K/UL — SIGNIFICANT CHANGE UP (ref 1–3.3)
LYMPHOCYTES # BLD AUTO: 26.4 % — SIGNIFICANT CHANGE UP (ref 13–44)
MCHC RBC-ENTMCNC: 27.1 PG — SIGNIFICANT CHANGE UP (ref 27–34)
MCHC RBC-ENTMCNC: 30.8 GM/DL — LOW (ref 32–36)
MCV RBC AUTO: 88.2 FL — SIGNIFICANT CHANGE UP (ref 80–100)
MONOCYTES # BLD AUTO: 0.5 K/UL — SIGNIFICANT CHANGE UP (ref 0–0.9)
MONOCYTES NFR BLD AUTO: 8.1 % — SIGNIFICANT CHANGE UP (ref 1–9)
NEUTROPHILS # BLD AUTO: 4 K/UL — SIGNIFICANT CHANGE UP (ref 1.8–7.4)
NEUTROPHILS NFR BLD AUTO: 60.9 % — SIGNIFICANT CHANGE UP (ref 43–77)
PLATELET # BLD AUTO: 466 K/UL — HIGH (ref 150–400)
POTASSIUM SERPL-MCNC: 4.2 MMOL/L — SIGNIFICANT CHANGE UP (ref 3.5–5.3)
POTASSIUM SERPL-SCNC: 4.2 MMOL/L — SIGNIFICANT CHANGE UP (ref 3.5–5.3)
PROT SERPL-MCNC: 8 G/DL — SIGNIFICANT CHANGE UP (ref 6–8.3)
PROTHROM AB SERPL-ACNC: 21.2 SEC — HIGH (ref 9.8–12.7)
RBC # BLD: 3.55 M/UL — LOW (ref 4.2–5.8)
RBC # FLD: 16.1 % — HIGH (ref 10.3–14.5)
SODIUM SERPL-SCNC: 140 MMOL/L — SIGNIFICANT CHANGE UP (ref 135–145)
TROPONIN I SERPL-MCNC: <.017 NG/ML — LOW (ref 0.02–0.06)
WBC # BLD: 6.6 K/UL — SIGNIFICANT CHANGE UP (ref 3.8–10.5)
WBC # FLD AUTO: 6.6 K/UL — SIGNIFICANT CHANGE UP (ref 3.8–10.5)

## 2018-10-23 PROCEDURE — 99223 1ST HOSP IP/OBS HIGH 75: CPT | Mod: AI

## 2018-10-23 PROCEDURE — 99285 EMERGENCY DEPT VISIT HI MDM: CPT

## 2018-10-23 PROCEDURE — 71045 X-RAY EXAM CHEST 1 VIEW: CPT | Mod: 26

## 2018-10-23 PROCEDURE — 93010 ELECTROCARDIOGRAM REPORT: CPT

## 2018-10-23 RX ORDER — BUDESONIDE AND FORMOTEROL FUMARATE DIHYDRATE 160; 4.5 UG/1; UG/1
2 AEROSOL RESPIRATORY (INHALATION)
Qty: 0 | Refills: 0 | Status: DISCONTINUED | OUTPATIENT
Start: 2018-10-23 | End: 2018-10-26

## 2018-10-23 RX ORDER — METOPROLOL TARTRATE 50 MG
25 TABLET ORAL DAILY
Qty: 0 | Refills: 0 | Status: DISCONTINUED | OUTPATIENT
Start: 2018-10-23 | End: 2018-10-26

## 2018-10-23 RX ORDER — TIOTROPIUM BROMIDE 18 UG/1
1 CAPSULE ORAL; RESPIRATORY (INHALATION) DAILY
Qty: 0 | Refills: 0 | Status: DISCONTINUED | OUTPATIENT
Start: 2018-10-23 | End: 2018-10-26

## 2018-10-23 RX ORDER — SODIUM CHLORIDE 9 MG/ML
3 INJECTION INTRAMUSCULAR; INTRAVENOUS; SUBCUTANEOUS ONCE
Qty: 0 | Refills: 0 | Status: COMPLETED | OUTPATIENT
Start: 2018-10-23 | End: 2018-10-23

## 2018-10-23 RX ORDER — SODIUM CHLORIDE 9 MG/ML
1000 INJECTION INTRAMUSCULAR; INTRAVENOUS; SUBCUTANEOUS
Qty: 0 | Refills: 0 | Status: COMPLETED | OUTPATIENT
Start: 2018-10-23 | End: 2018-10-23

## 2018-10-23 RX ORDER — CILOSTAZOL 100 MG/1
100 TABLET ORAL
Qty: 0 | Refills: 0 | Status: DISCONTINUED | OUTPATIENT
Start: 2018-10-23 | End: 2018-10-26

## 2018-10-23 RX ORDER — RIVAROXABAN 15 MG-20MG
15 KIT ORAL
Qty: 0 | Refills: 0 | Status: DISCONTINUED | OUTPATIENT
Start: 2018-10-23 | End: 2018-10-24

## 2018-10-23 RX ADMIN — CILOSTAZOL 100 MILLIGRAM(S): 100 TABLET ORAL at 22:06

## 2018-10-23 RX ADMIN — BUDESONIDE AND FORMOTEROL FUMARATE DIHYDRATE 2 PUFF(S): 160; 4.5 AEROSOL RESPIRATORY (INHALATION) at 21:57

## 2018-10-23 RX ADMIN — SODIUM CHLORIDE 3 MILLILITER(S): 9 INJECTION INTRAMUSCULAR; INTRAVENOUS; SUBCUTANEOUS at 16:53

## 2018-10-23 RX ADMIN — SODIUM CHLORIDE 75 MILLILITER(S): 9 INJECTION INTRAMUSCULAR; INTRAVENOUS; SUBCUTANEOUS at 22:00

## 2018-10-23 NOTE — H&P ADULT - HISTORY OF PRESENT ILLNESS
87 yo m pmh PE on long term a/c, htn, dyslipidemia, copd, pad was sent from pcp office dr dsouza for a drop in hgb 16 to 9.6.  patient c/o occasional dizziness and generalized weakness  pt denies any weight loss chest pain sob palpitations no fever no chills

## 2018-10-23 NOTE — H&P ADULT - NSHPREVIEWOFSYSTEMS_GEN_ALL_CORE
REVIEW OF SYSTEM:    Constitutional: No fever, chills, fatigue  Neuro: No headache, numbness,   Resp: No cough, wheezing, shortness of breath  CVS: No chest pain, palpitations, leg swelling  GI: No abdominal pain, nausea, vomiting, diarrhea   : No dysuria, frequency, incontinence  Skin: No itching, burning, rashes, or lesions   Msk: No joint pain or swelling  Psych: No depression, anxiety, mood swings

## 2018-10-23 NOTE — ED PROVIDER NOTE - NS ED ROS FT
Constitutional: (-) fever  (-)chills  (-)sweats, weakness, fatigue  Eyes/ENT: (-) blurry vision, (-) epistaxis  (-)rhinorrhea   (-) sore throat    Cardiovascular: (-) chest pain, (-) palpitations (-) edema   Respiratory: (-) cough, (-) shortness of breath   Gastrointestinal: (-)nausea  (-)vomiting, (-) diarrhea  (-) abdominal pain   :  (-)dysuria, (-)frequency, (-)urgency, (-)hematuria  Musculoskeletal: (-) neck pain, (-) back pain, (-) joint pain  Integumentary: (-) rash, (-) edema  Neurological: (-) headache, (-) altered mental status  (-)LOC

## 2018-10-23 NOTE — H&P ADULT - NSHPSOCIALHISTORY_GEN_ALL_CORE
+ active smoker  for 60 years 1 cig daily  no etoh no drug abuse  mom dead cause unknown  dad dead stroke

## 2018-10-23 NOTE — ED PROVIDER NOTE - PHYSICAL EXAMINATION
General:     NAD, pale   Head:     NC/AT, EOMI, oral mucosa dry  Neck:     trachea midline  Lungs:     CTA b/l, no w/r/r  CVS:     S1S2, RRR,  Abd:     +BS, s/nt/nd, no organomegaly  Ext:    2+ radial and pedal pulses, no c/c/e  Neuro: AAOx3, no sensory/motor deficits

## 2018-10-23 NOTE — H&P ADULT - PROBLEM SELECTOR PLAN 7
IMPROVE VTE Individual Risk Assessment          RISK                                                          Points  [  ] Previous VTE                                                3  [  ] Thrombophilia                                             2  [  ] Lower limb paralysis                                   2        (unable to hold up >15 seconds)    [  ] Current Cancer                                             2         (within 6 months)  [  ] Immobilization > 24 hrs                              1  [  ] ICU/CCU stay > 24 hours                             1  [  x] Age > 60                                                         1    IMPROVE VTE Score:         [       1  ]

## 2018-10-23 NOTE — H&P ADULT - ASSESSMENT
85 yo m pmh PE on long term a/c ,htn, dyslipidemia, copd, pad was sent from pcp office dr dsouza for a drop in hgb 16 to 9.6.  guaic neg trend h/h  +risa ck urine sodium urine creatinine normal saline 75/ml hr stop after 1 liter ck bmp in am hold lasix

## 2018-10-23 NOTE — H&P ADULT - NSHPLABSRESULTS_GEN_ALL_CORE
9.6    6.6   )-----------( 466      ( 23 Oct 2018 16:49 )             31.3       10-23    140  |  104  |  29<H>  ----------------------------<  109<H>  4.2   |  26  |  1.38<H>    Ca    9.0      23 Oct 2018 16:49    TPro  8.0  /  Alb  3.3  /  TBili  0.3  /  DBili  x   /  AST  21  /  ALT  20  /  AlkPhos  100  10-23      PT/INR - ( 23 Oct 2018 16:49 )   PT: 21.2 sec;   INR: 1.89 ratio         PTT - ( 23 Oct 2018 16:49 )  PTT:38.7 sec    CARDIAC MARKERS ( 23 Oct 2018 16:49 )  <.017 ng/mL / x     / 54 U/L / x     / x              < from: Xray Chest 1 View AP/PA (10.23.18 @ 16:52) >      IMPRESSION:   Stable chest.  Negative for acute cardiopulmonary process.    < end of copied text >

## 2018-10-24 DIAGNOSIS — D64.9 ANEMIA, UNSPECIFIED: ICD-10-CM

## 2018-10-24 LAB
ANION GAP SERPL CALC-SCNC: 7 MMOL/L — SIGNIFICANT CHANGE UP (ref 5–17)
APPEARANCE UR: CLEAR — SIGNIFICANT CHANGE UP
BACTERIA # UR AUTO: ABNORMAL /HPF
BILIRUB UR-MCNC: ABNORMAL
BUN SERPL-MCNC: 27 MG/DL — HIGH (ref 7–23)
CALCIUM SERPL-MCNC: 8.1 MG/DL — LOW (ref 8.4–10.5)
CHLORIDE SERPL-SCNC: 107 MMOL/L — SIGNIFICANT CHANGE UP (ref 96–108)
CO2 SERPL-SCNC: 25 MMOL/L — SIGNIFICANT CHANGE UP (ref 22–31)
COLOR SPEC: YELLOW — SIGNIFICANT CHANGE UP
CREAT SERPL-MCNC: 1.22 MG/DL — SIGNIFICANT CHANGE UP (ref 0.5–1.3)
DIFF PNL FLD: NEGATIVE — SIGNIFICANT CHANGE UP
EPI CELLS # UR: SIGNIFICANT CHANGE UP
GLUCOSE SERPL-MCNC: 91 MG/DL — SIGNIFICANT CHANGE UP (ref 70–99)
GLUCOSE UR QL: 50 MG/DL
HCT VFR BLD CALC: 22.9 % — LOW (ref 39–50)
HCT VFR BLD CALC: 24.2 % — LOW (ref 39–50)
HCT VFR BLD CALC: 24.4 % — LOW (ref 39–50)
HGB BLD-MCNC: 7.2 G/DL — LOW (ref 13–17)
HGB BLD-MCNC: 7.4 G/DL — LOW (ref 13–17)
HGB BLD-MCNC: 7.6 G/DL — LOW (ref 13–17)
KETONES UR-MCNC: NEGATIVE — SIGNIFICANT CHANGE UP
LEUKOCYTE ESTERASE UR-ACNC: ABNORMAL
MCHC RBC-ENTMCNC: 27.3 PG — SIGNIFICANT CHANGE UP (ref 27–34)
MCHC RBC-ENTMCNC: 27.5 PG — SIGNIFICANT CHANGE UP (ref 27–34)
MCHC RBC-ENTMCNC: 27.5 PG — SIGNIFICANT CHANGE UP (ref 27–34)
MCHC RBC-ENTMCNC: 30.6 GM/DL — LOW (ref 32–36)
MCHC RBC-ENTMCNC: 31.3 GM/DL — LOW (ref 32–36)
MCHC RBC-ENTMCNC: 31.6 GM/DL — LOW (ref 32–36)
MCV RBC AUTO: 87.2 FL — SIGNIFICANT CHANGE UP (ref 80–100)
MCV RBC AUTO: 87.9 FL — SIGNIFICANT CHANGE UP (ref 80–100)
MCV RBC AUTO: 89.1 FL — SIGNIFICANT CHANGE UP (ref 80–100)
NITRITE UR-MCNC: NEGATIVE — SIGNIFICANT CHANGE UP
PH UR: 5 — SIGNIFICANT CHANGE UP (ref 5–8)
PLATELET # BLD AUTO: 349 K/UL — SIGNIFICANT CHANGE UP (ref 150–400)
PLATELET # BLD AUTO: 350 K/UL — SIGNIFICANT CHANGE UP (ref 150–400)
PLATELET # BLD AUTO: 383 K/UL — SIGNIFICANT CHANGE UP (ref 150–400)
POTASSIUM SERPL-MCNC: 4.1 MMOL/L — SIGNIFICANT CHANGE UP (ref 3.5–5.3)
POTASSIUM SERPL-SCNC: 4.1 MMOL/L — SIGNIFICANT CHANGE UP (ref 3.5–5.3)
PROT UR-MCNC: 30 MG/DL
RBC # BLD: 2.62 M/UL — LOW (ref 4.2–5.8)
RBC # BLD: 2.7 M/UL — LOW (ref 4.2–5.8)
RBC # BLD: 2.72 M/UL — LOW (ref 4.2–5.8)
RBC # BLD: 2.78 M/UL — LOW (ref 4.2–5.8)
RBC # FLD: 16.4 % — HIGH (ref 10.3–14.5)
RBC # FLD: 16.5 % — HIGH (ref 10.3–14.5)
RBC # FLD: 16.7 % — HIGH (ref 10.3–14.5)
RBC CASTS # UR COMP ASSIST: NEGATIVE /HPF — SIGNIFICANT CHANGE UP (ref 0–4)
RETICS #: 35.9 K/UL — SIGNIFICANT CHANGE UP (ref 25–125)
RETICS/RBC NFR: 1.3 % — SIGNIFICANT CHANGE UP (ref 0.5–2.5)
SODIUM SERPL-SCNC: 139 MMOL/L — SIGNIFICANT CHANGE UP (ref 135–145)
SP GR SPEC: 1.02 — SIGNIFICANT CHANGE UP (ref 1.01–1.02)
UROBILINOGEN FLD QL: NEGATIVE — SIGNIFICANT CHANGE UP
WBC # BLD: 6.7 K/UL — SIGNIFICANT CHANGE UP (ref 3.8–10.5)
WBC # BLD: 6.9 K/UL — SIGNIFICANT CHANGE UP (ref 3.8–10.5)
WBC # BLD: 7.5 K/UL — SIGNIFICANT CHANGE UP (ref 3.8–10.5)
WBC # FLD AUTO: 6.7 K/UL — SIGNIFICANT CHANGE UP (ref 3.8–10.5)
WBC # FLD AUTO: 6.9 K/UL — SIGNIFICANT CHANGE UP (ref 3.8–10.5)
WBC # FLD AUTO: 7.5 K/UL — SIGNIFICANT CHANGE UP (ref 3.8–10.5)
WBC UR QL: ABNORMAL /HPF (ref 0–5)

## 2018-10-24 PROCEDURE — 99223 1ST HOSP IP/OBS HIGH 75: CPT

## 2018-10-24 RX ORDER — LANOLIN ALCOHOL/MO/W.PET/CERES
3 CREAM (GRAM) TOPICAL AT BEDTIME
Qty: 0 | Refills: 0 | Status: DISCONTINUED | OUTPATIENT
Start: 2018-10-24 | End: 2018-10-26

## 2018-10-24 RX ADMIN — CILOSTAZOL 100 MILLIGRAM(S): 100 TABLET ORAL at 17:21

## 2018-10-24 RX ADMIN — BUDESONIDE AND FORMOTEROL FUMARATE DIHYDRATE 2 PUFF(S): 160; 4.5 AEROSOL RESPIRATORY (INHALATION) at 09:12

## 2018-10-24 RX ADMIN — Medication 3 MILLIGRAM(S): at 21:02

## 2018-10-24 RX ADMIN — BUDESONIDE AND FORMOTEROL FUMARATE DIHYDRATE 2 PUFF(S): 160; 4.5 AEROSOL RESPIRATORY (INHALATION) at 21:21

## 2018-10-24 RX ADMIN — TIOTROPIUM BROMIDE 1 CAPSULE(S): 18 CAPSULE ORAL; RESPIRATORY (INHALATION) at 09:12

## 2018-10-24 RX ADMIN — Medication 3 MILLIGRAM(S): at 01:43

## 2018-10-24 RX ADMIN — RIVAROXABAN 15 MILLIGRAM(S): KIT at 05:29

## 2018-10-24 RX ADMIN — CILOSTAZOL 100 MILLIGRAM(S): 100 TABLET ORAL at 05:29

## 2018-10-24 NOTE — CONSULT NOTE ADULT - ASSESSMENT
86-year-old gentleman with history of hypertension, COPD, and peripheral vascular disease, who was sent from his primary care physician's office (Dr. Day) for anemia. Patient complained of dizziness and weakness on admission. Patient denies any gross bleeding. No complaints of nausea/vomiting, or abdominal pain.  Hematology consulted for patient's anemia.

## 2018-10-24 NOTE — CONSULT NOTE ADULT - PROBLEM SELECTOR RECOMMENDATION 9
Patient with recent drop in hemoglobin-rule out occult blood loss, hemolysis. No overt bleeding noted clinically at present. Guaiac stool, check iron studies, B12/folate, reticulocyte count, haptoglobin.   Continue to follow CBC closely-pending the above/course, may need to consider abdominal/pelvic imaging to rule out bleed in patient on anticoagulant PTA.

## 2018-10-24 NOTE — CONSULT NOTE ADULT - SUBJECTIVE AND OBJECTIVE BOX
ELIZABETH MCLAUGHLIN  86y  Male  Admitting: LUCAS Orellana    HPI:  86-year-old gentleman with history of hypertension, COPD, and peripheral vascular disease, who was sent from his primary care physician's office (Dr. Day) for anemia. Patient complained of dizziness and weakness on admission. Patient denies any gross bleeding. No complaints of nausea/vomiting, or abdominal pain.  Hematology consulted for patient's anemia.    PAST MEDICAL & SURGICAL HISTORY:  HTN (hypertension)  Dyslipidemia  CAD (coronary artery disease)  Peripheral vascular disease  Parkinson disease  No significant past surgical history    HEALTH ISSUES - PROBLEM Dx:  Prophylactic measure: Prophylactic measure  Dyslipidemia: Dyslipidemia  COPD (chronic obstructive pulmonary disease): COPD (chronic obstructive pulmonary disease)  Peripheral vascular disease: Peripheral vascular disease  Essential hypertension: Essential hypertension  Pulmonary embolism: Pulmonary embolism  Normocytic anemia: Normocytic anemia    MEDICATIONS  (STANDING):  buDESOnide 160 MICROgram(s)/formoterol 4.5 MICROgram(s) Inhaler 2 Puff(s) Inhalation two times a day  cilostazol 100 milliGRAM(s) Oral two times a day  melatonin 3 milliGRAM(s) Oral at bedtime  metoprolol succinate ER 25 milliGRAM(s) Oral daily  tiotropium 18 MICROgram(s) Capsule 1 Capsule(s) Inhalation daily    MEDICATIONS  (PRN):    Allergies    No Known Allergies    FAMILY HISTORY:  No pertinent family history in first degree relatives. Patient denies FH of anemia.      SOCIAL HISTORY: No EtOH, no tobacco. Retired [employer]. .    REVIEW OF SYSTEMS:    CONSTITUTIONAL: +weakness; no fevers or chills  EYES/ENT: No visual changes;  No throat pain   NECK: No pain or stiffness  RESPIRATORY: No cough, wheezing, hemoptysis; No shortness of breath  CARDIOVASCULAR: No chest pain or palpitations  GASTROINTESTINAL: No abdominal or epigastric pain. No nausea, vomiting, or hematemesis; No diarrhea or constipation. No melena or hematochezia.  GENITOURINARY: No dysuria, frequency or hematuria  NEUROLOGICAL: +weakness  SKIN: No itching, burning, rashes, or lesions   All other review of systems is negative unless indicated above.    Height (cm): 177.8 (10-23 @ 18:57)  Weight (kg): 71.3 (10-23 @ 18:57)  BMI (kg/m2): 22.6 (10-23 @ 18:57)  BSA (m2): 1.88 (10-23 @ 18:57)    T(F): 97.9 (10-24-18 @ 05:20), Max: 98.2 (10-23-18 @ 16:15)  HR: 65 (10-24-18 @ 09:12)  BP: 98/40 (10-24-18 @ 05:20)  RR: 14 (10-24-18 @ 05:20)  SpO2: 100% (10-24-18 @ 09:12)    GENERAL: NAD, well-developed  HEAD:  Atraumatic, Normocephalic  EYES: EOMI, PERRLA, conjunctiva and sclera clear  Oropharynx: poor dentition  NECK: Supple, No JVD  CHEST/LUNG: Clear to auscultation bilaterally; No wheeze  HEART: Regular rate and rhythm; No murmurs, rubs, or gallops  ABDOMEN: Soft, Nontender, Nondistended; Unable to appreciate hepatosplenomegaly.  EXTREMITIES:  no calf tenderness  NEUROLOGY: generally weak  SKIN: No rashes or lesions      Labs:             7.4    6.9   )-----------( 350      ( 10-24 @ 06:51 )             24.2                9.6    6.6   )-----------( 466      ( 10-23 @ 16:49 )             31.3       10-24    139  |  107  |  27<H>  ----------------------------<  91  4.1   |  25  |  1.22    TPro  8.0  /  Alb  3.3  /  TBili  0.3  /  DBili  x   /  AST  21  /  ALT  20  /  AlkPhos  100  10-23      PT/INR - ( 23 Oct 2018 16:49 )   PT: 21.2 sec;   INR: 1.89 ratio         PTT - ( 23 Oct 2018 16:49 )  PTT:38.7 sec  Absolute Reticulocytes: 35.9 K/uL (10-24-18 @ 06:31)    Radiology and additional tests:  < from: Xray Chest 1 View AP/PA (10.23.18 @ 16:52) >    EXAM:  XR CHEST AP OR PA 1V      PROCEDURE DATE:  10/23/2018        INTERPRETATION:    DATE OF STUDY: 10/23/18.    PRIOR: 8/17/18.    CLINICAL INDICATION: 86-yo-male - weakness - assess for chest process.    TECHNIQUE: portable chest - done upright.    FINDINGS:   Thoracic aortic atheromatous changes and ectasia noted.  The cardiomediastinal silhouette is otherwise unremarkable.  Chronic, bilateral, fibrotic/interstitial lung changes are stable.  No bilateral focal infiltrates. No pleural effusion or pneumothorax.  There are degenerative changes of the thoracic spine.    IMPRESSION:   Stable chest.  Negative for acute cardiopulmonary process.    ROLLY PRESLEY   This document has been electronically signed. Oct 23 2018  5:23PM    < end of copied text >

## 2018-10-24 NOTE — PROGRESS NOTE ADULT - SUBJECTIVE AND OBJECTIVE BOX
Patient is a 86y old  Male who presents with a chief complaint of weakness (23 Oct 2018 17:57)          Patient seen and examined at bedside.    ALLERGIES:  No Known Allergies        Vital Signs Last 24 Hrs  T(F): 97.9 (24 Oct 2018 05:20), Max: 98.2 (23 Oct 2018 16:15)  HR: 65 (24 Oct 2018 09:12) (59 - 81)  BP: 98/40 (24 Oct 2018 05:20) (90/66 - 149/43)  RR: 14 (24 Oct 2018 05:20) (14 - 18)  SpO2: 100% (24 Oct 2018 09:12) (95% - 100%)  I&O's Summary    23 Oct 2018 07:01  -  24 Oct 2018 07:00  --------------------------------------------------------  IN: 120 mL / OUT: 100 mL / NET: 20 mL      MEDICATIONS:  buDESOnide 160 MICROgram(s)/formoterol 4.5 MICROgram(s) Inhaler 2 Puff(s) Inhalation two times a day  cilostazol 100 milliGRAM(s) Oral two times a day  melatonin 3 milliGRAM(s) Oral at bedtime  metoprolol succinate ER 25 milliGRAM(s) Oral daily  rivaroxaban 15 milliGRAM(s) Oral two times a day  tiotropium 18 MICROgram(s) Capsule 1 Capsule(s) Inhalation daily      PHYSICAL EXAM:  General: NAD, A/O x 3  ENT: MMM  Neck: Supple, No JVD  Lungs: Clear to auscultation bilaterally  Cardio: RRR, S1/S2, No murmurs  Abdomen: Soft, Nontender, Nondistended; Bowel sounds present  Extremities: No cyanosis, No edema    LABS:                        7.4    6.9   )-----------( 350      ( 24 Oct 2018 06:51 )             24.2     10-24    139  |  107  |  27  ----------------------------<  91  4.1   |  25  |  1.22    Ca    8.1      24 Oct 2018 06:51    TPro  8.0  /  Alb  3.3  /  TBili  0.3  /  DBili  x   /  AST  21  /  ALT  20  /  AlkPhos  100  10-23    eGFR if Non African American: 53 mL/min/1.73M2 (10-24-18 @ 06:51)  eGFR if : 62 mL/min/1.73M2 (10-24-18 @ 06:51)    PT/INR - ( 23 Oct 2018 16:49 )   PT: 21.2 sec;   INR: 1.89 ratio         PTT - ( 23 Oct 2018 16:49 )  PTT:38.7 sec    CARDIAC MARKERS ( 23 Oct 2018 16:49 )  <.017 ng/mL / x     / 54 U/L / x     / x          08-17 Chol 183 mg/dL  mg/dL HDL 52 mg/dL Trig 66 mg/dL        CAPILLARY BLOOD GLUCOSE        08-17 [id number] 5.5          RADIOLOGY & ADDITIONAL TESTS:    < from: Xray Chest 1 View AP/PA (10.23.18 @ 16:52) >  EXAM:  XR CHEST AP OR PA 1V      PROCEDURE DATE:  10/23/2018        INTERPRETATION:    DATE OF STUDY: 10/23/18.    PRIOR: 8/17/18.    CLINICAL INDICATION: 86-yo-male - weakness - assess for chest process.    TECHNIQUE: portable chest - done upright.    FINDINGS:   Thoracic aortic atheromatous changes and ectasia noted.  The cardiomediastinal silhouette is otherwise unremarkable.  Chronic, bilateral, fibrotic/interstitial lung changes are stable.  No bilateral focal infiltrates. No pleural effusion or pneumothorax.  There are degenerative changes of the thoracic spine.    IMPRESSION:   Stable chest.  Negative for acute cardiopulmonary process.                    ROLLY PRESLEY   This document has been electronically signed. Oct 23 2018  5:23PM          Care Discussed with Consultants/Other Providers: Patient is a 86y old  Male who presents with a chief complaint of weakness (23 Oct 2018 17:57)          Patient seen and examined at bedside.    ALLERGIES:  No Known Allergies        Vital Signs Last 24 Hrs  T(F): 97.9 (24 Oct 2018 05:20), Max: 98.2 (23 Oct 2018 16:15)  HR: 65 (24 Oct 2018 09:12) (59 - 81)  BP: 98/40 (24 Oct 2018 05:20) (90/66 - 149/43)  RR: 14 (24 Oct 2018 05:20) (14 - 18)  SpO2: 100% (24 Oct 2018 09:12) (95% - 100%)  I&O's Summary    23 Oct 2018 07:01  -  24 Oct 2018 07:00  --------------------------------------------------------  IN: 120 mL / OUT: 100 mL / NET: 20 mL      MEDICATIONS:  buDESOnide 160 MICROgram(s)/formoterol 4.5 MICROgram(s) Inhaler 2 Puff(s) Inhalation two times a day  cilostazol 100 milliGRAM(s) Oral two times a day  melatonin 3 milliGRAM(s) Oral at bedtime  metoprolol succinate ER 25 milliGRAM(s) Oral daily  rivaroxaban 15 milliGRAM(s) Oral two times a day  tiotropium 18 MICROgram(s) Capsule 1 Capsule(s) Inhalation daily      PHYSICAL EXAM:  General: NAD, A/O x 3  Neck: Supple, No JVD  Lungs: Clear to auscultation bilaterally  Cardio: RRR, S1/S2, No murmurs  Abdomen: Soft, Nontender, Nondistended; Bowel sounds present  Extremities: No cyanosis, No edema    LABS:                        7.4    6.9   )-----------( 350      ( 24 Oct 2018 06:51 )             24.2     10-24    139  |  107  |  27  ----------------------------<  91  4.1   |  25  |  1.22    Ca    8.1      24 Oct 2018 06:51    TPro  8.0  /  Alb  3.3  /  TBili  0.3  /  DBili  x   /  AST  21  /  ALT  20  /  AlkPhos  100  10-23    eGFR if Non African American: 53 mL/min/1.73M2 (10-24-18 @ 06:51)  eGFR if : 62 mL/min/1.73M2 (10-24-18 @ 06:51)    PT/INR - ( 23 Oct 2018 16:49 )   PT: 21.2 sec;   INR: 1.89 ratio         PTT - ( 23 Oct 2018 16:49 )  PTT:38.7 sec    CARDIAC MARKERS ( 23 Oct 2018 16:49 )  <.017 ng/mL / x     / 54 U/L / x     / x          08-17 Chol 183 mg/dL  mg/dL HDL 52 mg/dL Trig 66 mg/dL        CAPILLARY BLOOD GLUCOSE        08-17 [id number] 5.5          RADIOLOGY & ADDITIONAL TESTS:    < from: Xray Chest 1 View AP/PA (10.23.18 @ 16:52) >  EXAM:  XR CHEST AP OR PA 1V      PROCEDURE DATE:  10/23/2018        INTERPRETATION:    DATE OF STUDY: 10/23/18.    PRIOR: 8/17/18.    CLINICAL INDICATION: 86-yo-male - weakness - assess for chest process.    TECHNIQUE: portable chest - done upright.    FINDINGS:   Thoracic aortic atheromatous changes and ectasia noted.  The cardiomediastinal silhouette is otherwise unremarkable.  Chronic, bilateral, fibrotic/interstitial lung changes are stable.  No bilateral focal infiltrates. No pleural effusion or pneumothorax.  There are degenerative changes of the thoracic spine.    IMPRESSION:   Stable chest.  Negative for acute cardiopulmonary process.                    ROLLY PRESLEY   This document has been electronically signed. Oct 23 2018  5:23PM          Care Discussed with Consultants/Other Providers:

## 2018-10-24 NOTE — PROGRESS NOTE ADULT - PROBLEM SELECTOR PLAN 1
heme/onc consult pending  continue to monitor cbc  no signs of bleeding - initial guaiac negative  will repeat guaiac

## 2018-10-25 LAB
ANION GAP SERPL CALC-SCNC: 8 MMOL/L — SIGNIFICANT CHANGE UP (ref 5–17)
BUN SERPL-MCNC: 23 MG/DL — SIGNIFICANT CHANGE UP (ref 7–23)
CALCIUM SERPL-MCNC: 8.4 MG/DL — SIGNIFICANT CHANGE UP (ref 8.4–10.5)
CHLORIDE SERPL-SCNC: 106 MMOL/L — SIGNIFICANT CHANGE UP (ref 96–108)
CO2 SERPL-SCNC: 26 MMOL/L — SIGNIFICANT CHANGE UP (ref 22–31)
CREAT SERPL-MCNC: 1.17 MG/DL — SIGNIFICANT CHANGE UP (ref 0.5–1.3)
FERRITIN SERPL-MCNC: 22 NG/ML — LOW (ref 30–400)
GLUCOSE SERPL-MCNC: 97 MG/DL — SIGNIFICANT CHANGE UP (ref 70–99)
HAPTOGLOB SERPL-MCNC: 121 MG/DL — SIGNIFICANT CHANGE UP (ref 34–200)
HCT VFR BLD CALC: 23 % — LOW (ref 39–50)
HCT VFR BLD CALC: 23 % — LOW (ref 39–50)
HCT VFR BLD CALC: 27.2 % — LOW (ref 39–50)
HGB BLD-MCNC: 7.3 G/DL — LOW (ref 13–17)
HGB BLD-MCNC: 8.5 G/DL — LOW (ref 13–17)
IRON SATN MFR SERPL: 14 UG/DL — LOW (ref 45–165)
IRON SATN MFR SERPL: 4 % — LOW (ref 16–55)
MCHC RBC-ENTMCNC: 27.9 PG — SIGNIFICANT CHANGE UP (ref 27–34)
MCHC RBC-ENTMCNC: 28 PG — SIGNIFICANT CHANGE UP (ref 27–34)
MCHC RBC-ENTMCNC: 31.2 GM/DL — LOW (ref 32–36)
MCHC RBC-ENTMCNC: 31.7 GM/DL — LOW (ref 32–36)
MCV RBC AUTO: 88.5 FL — SIGNIFICANT CHANGE UP (ref 80–100)
MCV RBC AUTO: 89.3 FL — SIGNIFICANT CHANGE UP (ref 80–100)
PLATELET # BLD AUTO: 331 K/UL — SIGNIFICANT CHANGE UP (ref 150–400)
PLATELET # BLD AUTO: 340 K/UL — SIGNIFICANT CHANGE UP (ref 150–400)
POTASSIUM SERPL-MCNC: 4.1 MMOL/L — SIGNIFICANT CHANGE UP (ref 3.5–5.3)
POTASSIUM SERPL-SCNC: 4.1 MMOL/L — SIGNIFICANT CHANGE UP (ref 3.5–5.3)
RBC # BLD: 2.6 M/UL — LOW (ref 4.2–5.8)
RBC # BLD: 3.05 M/UL — LOW (ref 4.2–5.8)
RBC # FLD: 16 % — HIGH (ref 10.3–14.5)
RBC # FLD: 16.7 % — HIGH (ref 10.3–14.5)
SODIUM SERPL-SCNC: 140 MMOL/L — SIGNIFICANT CHANGE UP (ref 135–145)
TIBC SERPL-MCNC: 324 UG/DL — SIGNIFICANT CHANGE UP (ref 220–430)
UIBC SERPL-MCNC: 310 UG/DL — SIGNIFICANT CHANGE UP (ref 110–370)
VIT B12 SERPL-MCNC: 362 PG/ML — SIGNIFICANT CHANGE UP (ref 232–1245)
WBC # BLD: 6.9 K/UL — SIGNIFICANT CHANGE UP (ref 3.8–10.5)
WBC # BLD: 7.3 K/UL — SIGNIFICANT CHANGE UP (ref 3.8–10.5)
WBC # FLD AUTO: 6.9 K/UL — SIGNIFICANT CHANGE UP (ref 3.8–10.5)
WBC # FLD AUTO: 7.3 K/UL — SIGNIFICANT CHANGE UP (ref 3.8–10.5)

## 2018-10-25 PROCEDURE — 99232 SBSQ HOSP IP/OBS MODERATE 35: CPT

## 2018-10-25 PROCEDURE — 71260 CT THORAX DX C+: CPT | Mod: 26

## 2018-10-25 PROCEDURE — 99233 SBSQ HOSP IP/OBS HIGH 50: CPT | Mod: GC

## 2018-10-25 PROCEDURE — 74177 CT ABD & PELVIS W/CONTRAST: CPT | Mod: 26

## 2018-10-25 RX ADMIN — CILOSTAZOL 100 MILLIGRAM(S): 100 TABLET ORAL at 17:21

## 2018-10-25 RX ADMIN — TIOTROPIUM BROMIDE 1 CAPSULE(S): 18 CAPSULE ORAL; RESPIRATORY (INHALATION) at 09:41

## 2018-10-25 RX ADMIN — CILOSTAZOL 100 MILLIGRAM(S): 100 TABLET ORAL at 05:39

## 2018-10-25 RX ADMIN — BUDESONIDE AND FORMOTEROL FUMARATE DIHYDRATE 2 PUFF(S): 160; 4.5 AEROSOL RESPIRATORY (INHALATION) at 09:40

## 2018-10-25 RX ADMIN — Medication 25 MILLIGRAM(S): at 05:39

## 2018-10-25 RX ADMIN — Medication 3 MILLIGRAM(S): at 21:41

## 2018-10-25 RX ADMIN — BUDESONIDE AND FORMOTEROL FUMARATE DIHYDRATE 2 PUFF(S): 160; 4.5 AEROSOL RESPIRATORY (INHALATION) at 21:28

## 2018-10-25 NOTE — PROGRESS NOTE ADULT - SUBJECTIVE AND OBJECTIVE BOX
Patient is a 86y old  Male who presents with a chief complaint of weakness (25 Oct 2018 08:53)          Patient seen and examined at bedside.    ALLERGIES:  No Known Allergies        Vital Signs Last 24 Hrs  T(F): 98 (25 Oct 2018 05:15), Max: 98.6 (24 Oct 2018 16:35)  HR: 67 (25 Oct 2018 05:15) (65 - 72)  BP: 128/52 (25 Oct 2018 05:15) (108/72 - 133/57)  RR: 14 (25 Oct 2018 05:15) (14 - 14)  SpO2: 96% (24 Oct 2018 21:22) (96% - 100%)  I&O's Summary    24 Oct 2018 07:01  -  25 Oct 2018 07:00  --------------------------------------------------------  IN: 1200 mL / OUT: 1050 mL / NET: 150 mL      MEDICATIONS:  buDESOnide 160 MICROgram(s)/formoterol 4.5 MICROgram(s) Inhaler 2 Puff(s) Inhalation two times a day  cilostazol 100 milliGRAM(s) Oral two times a day  melatonin 3 milliGRAM(s) Oral at bedtime  metoprolol succinate ER 25 milliGRAM(s) Oral daily  tiotropium 18 MICROgram(s) Capsule 1 Capsule(s) Inhalation daily      PHYSICAL EXAM:  General: NAD, A/O x 3  ENT: MMM  Neck: Supple, No JVD  Lungs: Clear to auscultation bilaterally  Cardio: RRR, S1/S2, No murmurs  Abdomen: Soft, Nontender, Nondistended; Bowel sounds present  Extremities: No cyanosis, No edema    LABS:                        7.3    6.9   )-----------( 331      ( 25 Oct 2018 06:41 )             23.0     10-25    140  |  106  |  23  ----------------------------<  97  4.1   |  26  |  1.17    Ca    8.4      25 Oct 2018 06:41    TPro  8.0  /  Alb  3.3  /  TBili  0.3  /  DBili  x   /  AST  21  /  ALT  20  /  AlkPhos  100  10-23    eGFR if Non African American: 56 mL/min/1.73M2 (10-25-18 @ 06:41)  eGFR if : 65 mL/min/1.73M2 (10-25-18 @ 06:41)    PT/INR - ( 23 Oct 2018 16:49 )   PT: 21.2 sec;   INR: 1.89 ratio         PTT - ( 23 Oct 2018 16:49 )  PTT:38.7 sec    CARDIAC MARKERS ( 23 Oct 2018 16:49 )  <.017 ng/mL / x     / 54 U/L / x     / x           Chol 183 mg/dL  mg/dL HDL 52 mg/dL Trig 66 mg/dL        CAPILLARY BLOOD GLUCOSE         [id number] 5.5    Urinalysis Basic - ( 24 Oct 2018 15:50 )    Color: Yellow / Appearance: Clear / S.025 / pH: x  Gluc: x / Ketone: Negative  / Bili: Small / Urobili: Negative   Blood: x / Protein: 30 mg/dL / Nitrite: Negative   Leuk Esterase: Moderate / RBC: Negative /HPF / WBC 6-10 /HPF   Sq Epi: x / Non Sq Epi: Neg.-Few / Bacteria: Few /HPF          RADIOLOGY & ADDITIONAL TESTS:    Care Discussed with Consultants/Other Providers:

## 2018-10-25 NOTE — PROGRESS NOTE ADULT - ATTENDING COMMENTS
Consultant notes reviewed : YES [x ] ; NO [ ]  Case discussed with attending physician / consultant: YES [x ] ; NO [ ]
I have personally seen and examined patient on the above date.  I discussed the case with NP and I agree with findings and plan as detailed per note above, which I have amended where appropriate.    85 yo m pmh PE on long term a/c ,htn, dyslipidemia, copd, pad was sent from pcp office dr dsouza for a drop in hgb 16 to 9.6.  guaic neg repeat guaic h/h dropping ct chest abd pel neg for malignancy. Will transfuse 1 unit ck cbc in am c/w holding ac at this time   +risa resolved
I have personally seen and examined patient on the above date.  I discussed the case with JENNA Moy and I agree with findings and plan as detailed per note above, which I have amended where appropriate.  pt with worsening dyspnea, chronic hypoxic respiratory failure, on home oxygen. acute anemia, bedside guaiac negative in ER. send stool for occult blood, check iron studies, retic count. heme/onc consult- Dr Castillo. monitor H/H q8H, hold xarelto for now.

## 2018-10-25 NOTE — PROGRESS NOTE ADULT - PROBLEM SELECTOR PLAN 1
Patient with recent drop in hemoglobin-rule out occult blood loss. No overt bleeding noted clinically at present. Case discussed with Dr. Orellana. Patient to have abdominal/pelvic CAT scan to rule out bleed (prior anticoagulant, which is now on hold). Check heptoglobin. Iron studies pending. Continue to monitor CBC. Hemoglobin low, but stable at present. To consider packed red blood cells if further decrease in hemoglobin/increasing symptoms develop.

## 2018-10-25 NOTE — PROGRESS NOTE ADULT - PROBLEM SELECTOR PLAN 1
heme/onc consult appreciated  f/u imaging to rule out bleeding/malignancy  trend cbc  transfuse if necessary

## 2018-10-25 NOTE — PROGRESS NOTE ADULT - SUBJECTIVE AND OBJECTIVE BOX
ELIZABETH MCLAUGHLIN   86y   Male    Admitting: LUCAS Orellana  HPI:  86-year-old gentleman with history of hypertension, COPD, and peripheral vascular disease, who was sent from his primary care physician's office (Dr. Day) for anemia. Patient complained of dizziness and weakness on admission. Patient denies any gross bleeding. No complaints of nausea/vomiting, or abdominal pain.  Hematology consulted for patient's anemia.    PAST MEDICAL & SURGICAL HISTORY:  HTN (hypertension)  Dyslipidemia  CAD (coronary artery disease)  Peripheral vascular disease  Parkinson disease  No significant past surgical history    HEALTH ISSUES - PROBLEM Dx:  Acute anemia: Acute anemia  Prophylactic measure: Prophylactic measure  Dyslipidemia: Dyslipidemia  COPD (chronic obstructive pulmonary disease): COPD (chronic obstructive pulmonary disease)  Peripheral vascular disease: Peripheral vascular disease  Essential hypertension: Essential hypertension  Pulmonary embolism: Pulmonary embolism  Normocytic anemia: Normocytic anemia    MEDICATIONS  (STANDING):  buDESOnide 160 MICROgram(s)/formoterol 4.5 MICROgram(s) Inhaler 2 Puff(s) Inhalation two times a day  cilostazol 100 milliGRAM(s) Oral two times a day  melatonin 3 milliGRAM(s) Oral at bedtime  metoprolol succinate ER 25 milliGRAM(s) Oral daily  tiotropium 18 MICROgram(s) Capsule 1 Capsule(s) Inhalation daily    MEDICATIONS  (PRN):    Allergies    No Known Allergies    INTERVAL HPI/OVERNIGHT EVENTS:  Patient S&E at bedside. No c/o CP or SOB.     VITAL SIGNS:  T(F): 98 (10-25-18 @ 05:15)  HR: 67 (10-25-18 @ 05:15)  BP: 128/52 (10-25-18 @ 05:15)  RR: 14 (10-25-18 @ 05:15)  SpO2: 96% (10-24-18 @ 21:22)    PHYSICAL EXAM:  Constitutional: NAD  Eyes: sclera non-icteric  Neck: no JVD  Respiratory: scattered crackles ant.  Cardiovascular: RRR, no M/R/G  Gastrointestinal: soft, NTND, no masses palpable, no hepatosplenomegaly  Extremities: no calf tenderness  Neurological: Awake, alert.    Labs:             7.3    6.9   )-----------( 331      ( 10-25 @ 06:41 )             23.0                7.2    7.5   )-----------( 349      ( 10-24 @ 23:20 )             22.9                7.6    6.7   )-----------( 383      ( 10-24 @ 15:39 )             24.4                7.4    6.9   )-----------( 350      ( 10-24 @ 06:51 )             24.2                9.6    6.6   )-----------( 466      ( 10-23 @ 16:49 )             31.3       10-25    140  |  106  |  23  ----------------------------<  97  4.1   |  26  |  1.17    Ca    8.4      25 Oct 2018 06:41    TPro  8.0  /  Alb  3.3  /  TBili  0.3  /  DBili  x   /  AST  21  /  ALT  20  /  AlkPhos  100  10-23    PT/INR - ( 23 Oct 2018 16:49 )   PT: 21.2 sec;   INR: 1.89 ratio    PTT - ( 23 Oct 2018 16:49 )  PTT:38.7 sec  Absolute Reticulocytes: 35.9 K/uL (10-24-18 @ 06:31)

## 2018-10-26 ENCOUNTER — TRANSCRIPTION ENCOUNTER (OUTPATIENT)
Age: 83
End: 2018-10-26

## 2018-10-26 VITALS — OXYGEN SATURATION: 96 %

## 2018-10-26 LAB
ALBUMIN SERPL ELPH-MCNC: 2.8 G/DL — LOW (ref 3.3–5)
ALP SERPL-CCNC: 85 U/L — SIGNIFICANT CHANGE UP (ref 40–120)
ALT FLD-CCNC: 15 U/L DA — SIGNIFICANT CHANGE UP (ref 10–45)
ANION GAP SERPL CALC-SCNC: 7 MMOL/L — SIGNIFICANT CHANGE UP (ref 5–17)
AST SERPL-CCNC: 13 U/L — SIGNIFICANT CHANGE UP (ref 10–40)
BILIRUB SERPL-MCNC: 1.2 MG/DL — SIGNIFICANT CHANGE UP (ref 0.2–1.2)
BUN SERPL-MCNC: 20 MG/DL — SIGNIFICANT CHANGE UP (ref 7–23)
CALCIUM SERPL-MCNC: 8.4 MG/DL — SIGNIFICANT CHANGE UP (ref 8.4–10.5)
CHLORIDE SERPL-SCNC: 107 MMOL/L — SIGNIFICANT CHANGE UP (ref 96–108)
CO2 SERPL-SCNC: 26 MMOL/L — SIGNIFICANT CHANGE UP (ref 22–31)
CREAT SERPL-MCNC: 1.23 MG/DL — SIGNIFICANT CHANGE UP (ref 0.5–1.3)
FOLATE RBC-MCNC: 3833 NG/ML — HIGH (ref 499–1504)
GLUCOSE SERPL-MCNC: 96 MG/DL — SIGNIFICANT CHANGE UP (ref 70–99)
HCT VFR BLD CALC: 27.4 % — LOW (ref 39–50)
HGB BLD-MCNC: 8.9 G/DL — LOW (ref 13–17)
MCHC RBC-ENTMCNC: 28.1 PG — SIGNIFICANT CHANGE UP (ref 27–34)
MCHC RBC-ENTMCNC: 32.3 GM/DL — SIGNIFICANT CHANGE UP (ref 32–36)
MCV RBC AUTO: 86.9 FL — SIGNIFICANT CHANGE UP (ref 80–100)
PLATELET # BLD AUTO: 324 K/UL — SIGNIFICANT CHANGE UP (ref 150–400)
POTASSIUM SERPL-MCNC: 4.1 MMOL/L — SIGNIFICANT CHANGE UP (ref 3.5–5.3)
POTASSIUM SERPL-SCNC: 4.1 MMOL/L — SIGNIFICANT CHANGE UP (ref 3.5–5.3)
PROT SERPL-MCNC: 6.7 G/DL — SIGNIFICANT CHANGE UP (ref 6–8.3)
RBC # BLD: 3.16 M/UL — LOW (ref 4.2–5.8)
RBC # FLD: 16.6 % — HIGH (ref 10.3–14.5)
SODIUM SERPL-SCNC: 140 MMOL/L — SIGNIFICANT CHANGE UP (ref 135–145)
WBC # BLD: 7.2 K/UL — SIGNIFICANT CHANGE UP (ref 3.8–10.5)
WBC # FLD AUTO: 7.2 K/UL — SIGNIFICANT CHANGE UP (ref 3.8–10.5)

## 2018-10-26 PROCEDURE — 99232 SBSQ HOSP IP/OBS MODERATE 35: CPT

## 2018-10-26 PROCEDURE — 99239 HOSP IP/OBS DSCHRG MGMT >30: CPT

## 2018-10-26 RX ORDER — FERROUS SULFATE 325(65) MG
325 TABLET ORAL DAILY
Qty: 0 | Refills: 0 | Status: DISCONTINUED | OUTPATIENT
Start: 2018-10-26 | End: 2018-10-26

## 2018-10-26 RX ORDER — RIVAROXABAN 15 MG-20MG
15 KIT ORAL
Qty: 0 | Refills: 0 | Status: DISCONTINUED | OUTPATIENT
Start: 2018-10-26 | End: 2018-10-26

## 2018-10-26 RX ORDER — RIVAROXABAN 15 MG-20MG
1 KIT ORAL
Qty: 0 | Refills: 0 | COMMUNITY
Start: 2018-10-26

## 2018-10-26 RX ADMIN — Medication 25 MILLIGRAM(S): at 07:13

## 2018-10-26 RX ADMIN — TIOTROPIUM BROMIDE 1 CAPSULE(S): 18 CAPSULE ORAL; RESPIRATORY (INHALATION) at 09:34

## 2018-10-26 RX ADMIN — CILOSTAZOL 100 MILLIGRAM(S): 100 TABLET ORAL at 07:13

## 2018-10-26 RX ADMIN — BUDESONIDE AND FORMOTEROL FUMARATE DIHYDRATE 2 PUFF(S): 160; 4.5 AEROSOL RESPIRATORY (INHALATION) at 09:34

## 2018-10-26 NOTE — DISCHARGE NOTE ADULT - MEDICATION SUMMARY - MEDICATIONS TO TAKE
I will START or STAY ON the medications listed below when I get home from the hospital:    rivaroxaban 15 mg oral tablet  -- 1 tab(s) by mouth 2 times a day  -- Indication: For vte ppx    Metoprolol Succinate ER 25 mg oral tablet, extended release  -- 1 tab(s) by mouth once a day  -- Indication: For hypertension    Symbicort 160 mcg-4.5 mcg/inh inhalation aerosol  -- 2 puff(s) inhaled 2 times a day   -- Check with your doctor before becoming pregnant.  For inhalation only.  Rinse mouth thoroughly after use.    -- Indication: For COPD (chronic obstructive pulmonary disease)    tiotropium 18 mcg inhalation capsule  -- 1 cap(s) inhaled once a day  -- Indication: For COPD (chronic obstructive pulmonary disease)    Lasix 20 mg oral tablet  -- 1 tab(s) by mouth once a day  -- Indication: For Chf    cilostazol 100 mg oral tablet  -- 1 tab(s) by mouth 2 times a day  -- Indication: For intermittent claudication

## 2018-10-26 NOTE — PROGRESS NOTE ADULT - SUBJECTIVE AND OBJECTIVE BOX
Patient is a 86y old  Male who presents with a chief complaint of weakness (26 Oct 2018 06:38)      Patient seen and examined at bedside.    ALLERGIES:  No Known Allergies    MEDICATIONS:  buDESOnide 160 MICROgram(s)/formoterol 4.5 MICROgram(s) Inhaler 2 Puff(s) Inhalation two times a day  cilostazol 100 milliGRAM(s) Oral two times a day  melatonin 3 milliGRAM(s) Oral at bedtime  metoprolol succinate ER 25 milliGRAM(s) Oral daily  tiotropium 18 MICROgram(s) Capsule 1 Capsule(s) Inhalation daily    Vital Signs Last 24 Hrs  T(F): 98.3 (26 Oct 2018 04:54), Max: 98.9 (25 Oct 2018 16:00)  HR: 61 (26 Oct 2018 04:54) (61 - 72)  BP: 127/53 (26 Oct 2018 04:54) (108/49 - 143/55)  RR: 14 (26 Oct 2018 04:54) (14 - 14)  SpO2: 94% (26 Oct 2018 04:54) (94% - 100%)  I&O's Summary    25 Oct 2018 07:01  -  26 Oct 2018 07:00  --------------------------------------------------------  IN: 300 mL / OUT: 1600 mL / NET: -1300 mL        PHYSICAL EXAM:  General: NAD,   ENT: MMM +pallor   Neck: Supple, No JVD  Lungs: Clear to auscultation bilaterally  Cardio: RRR, S1/S2, No murmurs  Abdomen: Soft, Nontender, Nondistended; Bowel sounds present  Extremities: No cyanosis, No edema    LABS:                        8.5    7.3   )-----------( 340      ( 25 Oct 2018 20:10 )             27.2     10-25    140  |  106  |  23  ----------------------------<  97  4.1   |  26  |  1.17    Ca    8.4      25 Oct 2018 06:41    TPro  8.0  /  Alb  3.3  /  TBili  0.3  /  DBili  x   /  AST  21  /  ALT  20  /  AlkPhos  100  10-23    eGFR if Non African American: 56 mL/min/1.73M2 (10-25-18 @ 06:41)  eGFR if : 65 mL/min/1.73M2 (10-25-18 @ 06:41)    PT/INR - ( 23 Oct 2018 16:49 )   PT: 21.2 sec;   INR: 1.89 ratio         PTT - ( 23 Oct 2018 16:49 )  PTT:38.7 sec    CARDIAC MARKERS ( 23 Oct 2018 16:49 )  <.017 ng/mL / x     / 54 U/L / x     / x           Chol 183 mg/dL  mg/dL HDL 52 mg/dL Trig 66 mg/dL        CAPILLARY BLOOD GLUCOSE         [id number] 5.5    Urinalysis Basic - ( 24 Oct 2018 15:50 )    Color: Yellow / Appearance: Clear / S.025 / pH: x  Gluc: x / Ketone: Negative  / Bili: Small / Urobili: Negative   Blood: x / Protein: 30 mg/dL / Nitrite: Negative   Leuk Esterase: Moderate / RBC: Negative /HPF / WBC 6-10 /HPF   Sq Epi: x / Non Sq Epi: Neg.-Few / Bacteria: Few /HPF          RADIOLOGY & ADDITIONAL TESTS:  < from: CT Abdomen and Pelvis w/ IV Cont (10.25.18 @ 10:10) >  INTERPRETATION:  CT chest, abdomen and pelvis with contrast    History anemia    Contrast Omnipaque 90 cc; 10 cc discarded    There is mild to moderate pulmonary fibrotic change peripherally without   acute consolidation, discrete mass, pleural or pericardial effusion or   hilar or mediastinal adenopathy. There is mild cardiomegaly. The   ascending aorta is mildly ectatic. Granulomatous calcifications are noted    The liver, spleen, pancreas and gallbladder, adrenals and kidneys and   appendix are within normal limits. Urinary bladder is grossly normal. The   prostate is enlarged, measuring 4.5 cm across. There is metallic related   artifact related to a right hip prosthesis limiting evaluation of the low   pelvis. 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 There are no   gross osseous metastases. There is no bowel dilatation or ascites or   adenopathy or focal inflammation of the peritoneal fat. There are healed   right mid thoracic rib fractures.    IMPRESSION:    Negative for evidence of occult malignancy.    < end of copied text >    Care Discussed with Consultants/Other Providers:

## 2018-10-26 NOTE — DISCHARGE NOTE ADULT - HOSPITAL COURSE
86-year-old gentleman with history of hypertension, COPD, and peripheral vascular disease, who was sent from his primary care physician's office (Dr. Day) for anemia. Patient complained of dizziness and weakness on admission. Patient denies any gross bleeding. No complaints of nausea/vomiting, or abdominal pain.  Hematology consult obtained.  Pt transfused PRBC"s.  Imaging did not reveal any bleeding or malignancy.  No signs of bleeding.  Pt medically stable for outpatient follow up.

## 2018-10-26 NOTE — PROGRESS NOTE ADULT - PROBLEM SELECTOR PLAN 1
s/p 1 prbc transfusion ck cbc today if stable restart anticoagulation outpatient f/u hematology and pcp s/p 1 prbc transfusion ck cbc today if stable restart anticoagulation outpatient f/u hematology and pcp  + NOAH will start ferrous sulfate today

## 2018-10-26 NOTE — DISCHARGE NOTE ADULT - CARE PROVIDERS DIRECT ADDRESSES
[email or web address]

## 2018-10-26 NOTE — DISCHARGE NOTE ADULT - PATIENT PORTAL LINK FT
You can access the [facility] Patient Portal, offered by [facility], by registering with the following website: [email or web address]

## 2018-10-26 NOTE — DISCHARGE NOTE ADULT - CARE PLAN
Principal Discharge DX:	Acute anemia  Goal:	prevent symptomatic anemia and further blood transfusions  Assessment and plan of treatment:	hematology follow up and management  Secondary Diagnosis:	Chronic obstructive pulmonary disease, unspecified COPD type  Secondary Diagnosis:	Dyslipidemia  Secondary Diagnosis:	Essential hypertension  Secondary Diagnosis:	Peripheral vascular disease  Secondary Diagnosis:	Other pulmonary embolism without acute cor pulmonale, unspecified chronicity

## 2018-10-26 NOTE — DISCHARGE NOTE ADULT - CARE PROVIDER_API CALL
Betzy Pretty), Internal Medicine; Medical Oncology  [address] [place], [place] [id number]  Phone: [phone]  Fax: [phone]    Natanael Day), Medicine  [address]  [place], [place] [id number]  Phone: [phone]  Fax: [phone]

## 2018-10-26 NOTE — PROGRESS NOTE ADULT - PROBLEM SELECTOR PLAN 1
Reviewed CT abdomen- unremarkable study, no evidence of malignancy; hemoglobin improving. Consider transfusion support if symptomatic. Would reduce unnecessary phlebotomies.

## 2018-10-26 NOTE — DISCHARGE NOTE ADULT - SECONDARY DIAGNOSIS.
Chronic obstructive pulmonary disease, unspecified COPD type Dyslipidemia Essential hypertension Peripheral vascular disease Other pulmonary embolism without acute cor pulmonale, unspecified chronicity

## 2018-10-26 NOTE — PROGRESS NOTE ADULT - ASSESSMENT
85 yo m pmh PE on long term a/c ,htn, dyslipidemia, copd, pad was sent from pcp office dr dsouza for a drop in hgb 16 to 9.6.  s/p 1 PRBC transfusion yesterday 10/25   monitor h/h ct chest abd pel neg for occult malignancy unlikely hemolysis ck cbc today if stable restart a/c and d/c with outpatient f/u with both pcp and hematology
86-year-old gentleman with history of hypertension, COPD, and peripheral vascular disease, who presented with symptomatic (dizziness and weakness) anemia. Patient denies any gross bleeding. No complaints of nausea/vomiting, or abdominal pain.  Hematology consulted for patient's anemia.
86-year-old gentleman with history of hypertension, COPD, and peripheral vascular disease, who was sent from his primary care physician's office (Dr. Day) for anemia. Patient complained of dizziness and weakness on admission. Patient denies any gross bleeding. No complaints of nausea/vomiting, or abdominal pain.  Hematology consulted for patient's anemia.
87 yo m pmh PE on long term a/c ,htn, dyslipidemia, copd, pad was sent from pcp office dr dsouza for a drop in hgb 16 to 9.6.
87 yo m pmh PE on long term a/c ,htn, dyslipidemia, copd, pad was sent from pcp office dr dsouza for a drop in hgb 16 to 9.6.

## 2018-10-26 NOTE — CHART NOTE - NSCHARTNOTEFT_GEN_A_CORE
Nutrition    Patient seen secondary to h/o COPD.  Patient noted with discharge plans for today.  Discussed COPD and nutrition interventions with patient.  Small/frequent meals suggested to patient as well as p.o. supplements if intake declines.  Patient receptive to information.  Will remain available prn.

## 2018-10-26 NOTE — PROGRESS NOTE ADULT - SUBJECTIVE AND OBJECTIVE BOX
ELIZABETH MCLAUGHLIN, 86y Male  MRN: [id number]  ATTENDING: Nagi Orellana    HPI:  86-year-old gentleman with history of hypertension, COPD, and peripheral vascular disease, who presented with symptomatic (dizziness and weakness) anemia. Patient denies any gross bleeding. No complaints of nausea/vomiting, or abdominal pain.  Hematology consulted for patient's anemia.    MEDICATIONS:  buDESOnide 160 MICROgram(s)/formoterol 4.5 MICROgram(s) Inhaler 2 Puff(s) Inhalation two times a day  cilostazol 100 milliGRAM(s) Oral two times a day  melatonin 3 milliGRAM(s) Oral at bedtime  metoprolol succinate ER 25 milliGRAM(s) Oral daily  tiotropium 18 MICROgram(s) Capsule 1 Capsule(s) Inhalation daily    All other medications reviewed.    SUBJECTIVE:  Alert; denies N/V/ BRBPR; appears comfortable.    VITALS:  T(C): 36.8 (10-26-18 @ 04:54), Max: 37.2 (10-25-18 @ 16:00)  T(F): 98.3 (10-26-18 @ 04:54), Max: 98.9 (10-25-18 @ 16:00)  HR: 61 (10-26-18 @ 04:54) (61 - 72)  BP: 127/53 (10-26-18 @ 04:54) (108/49 - 143/55)    PHYSICAL EXAM:  Alert and oriented  HEENT: non- icteric sclerae  Respiratory: bilateral clear to auscultation  Cardiovascular : S1, S2 rhythmic  Abdomen: soft, distended, + BS  Extremities: no tenderness;  -c/c/e    LABS:  (10-25) WBC: 7.3 K/uL,Hemoglobin: 8.5 g/dL, Hematocrit: 27.2 %,  Platelet: 340 K/uL  (10-25) Na: 140 mmol/L ; K: 4.1 mmol/L ; BUN: 23 mg/dL ; Cr: 1.17 mg/dL.  Hemoglobin: 8.5 g/dL (10-25-18 @ 20:10)  Hemoglobin: 7.3 g/dL (10-25-18 @ 06:41)  Hemoglobin: 7.2 g/dL (10-24-18 @ 23:20)    RADIOLOGY:  CT Abdomen and Pelvis w/ IV Cont (10.25.18 @ 10:10)   IMPRESSION:  Negative for evidence of occult malignancy. ELIZABETH MCLAUGHLIN, 86y Male  MRN: [id number]  ATTENDING: Nagi Orellana    HPI:  86-year-old gentleman with history of hypertension, COPD, and peripheral vascular disease, who presented with symptomatic (dizziness and weakness) anemia. Patient denies any gross bleeding. No complaints of nausea/vomiting, or abdominal pain.  Hematology consulted for patient's anemia.    MEDICATIONS:  buDESOnide 160 MICROgram(s)/formoterol 4.5 MICROgram(s) Inhaler 2 Puff(s) Inhalation two times a day  cilostazol 100 milliGRAM(s) Oral two times a day  melatonin 3 milliGRAM(s) Oral at bedtime  metoprolol succinate ER 25 milliGRAM(s) Oral daily  tiotropium 18 MICROgram(s) Capsule 1 Capsule(s) Inhalation daily    All other medications reviewed.    SUBJECTIVE:  Alert; denies N/V/ BRBPR; appears comfortable.    VITALS:  T(C): 36.8 (10-26-18 @ 04:54), Max: 37.2 (10-25-18 @ 16:00)  T(F): 98.3 (10-26-18 @ 04:54), Max: 98.9 (10-25-18 @ 16:00)  HR: 61 (10-26-18 @ 04:54) (61 - 72)  BP: 127/53 (10-26-18 @ 04:54) (108/49 - 143/55)    PHYSICAL EXAM:  Alert and oriented  HEENT: non- icteric sclerae  Respiratory: bilateral clear to auscultation  Cardiovascular : S1, S2 rhythmic  Abdomen: soft, distended, + BS  Extremities: no tenderness;  -c/c/e    LABS:  (10-26) WBC: 7.2 K/uL,Hemoglobin: 8.9 g/dL, Hematocrit: 27.4 %,  Platelet: 324 K/uL  (10-25) WBC: 7.3 K/uL,Hemoglobin: 8.5 g/dL, Hematocrit: 27.2 %,  Platelet: 340 K/uL  (10-25) Na: 140 mmol/L ; K: 4.1 mmol/L ; BUN: 23 mg/dL ; Cr: 1.17 mg/dL.  Hemoglobin: 8.9 g/dL (10-26-18 @ 07:00)  Hemoglobin: 8.5 g/dL (10-25-18 @ 20:10)  Hemoglobin: 7.3 g/dL (10-25-18 @ 06:41)    RADIOLOGY:  CT Abdomen and Pelvis w/ IV Cont (10.25.18 @ 10:10)   IMPRESSION:  Negative for evidence of occult malignancy.

## 2018-10-30 ENCOUNTER — APPOINTMENT (OUTPATIENT)
Dept: FAMILY MEDICINE | Facility: HOME HEALTH | Age: 83
End: 2018-10-30
Payer: MEDICARE

## 2018-10-30 PROCEDURE — 99349 HOME/RES VST EST MOD MDM 40: CPT

## 2018-10-31 ENCOUNTER — RX RENEWAL (OUTPATIENT)
Age: 83
End: 2018-10-31

## 2018-10-31 DIAGNOSIS — R05 COUGH: ICD-10-CM

## 2018-11-05 ENCOUNTER — LABORATORY RESULT (OUTPATIENT)
Age: 83
End: 2018-11-05

## 2018-11-27 PROCEDURE — 99221 1ST HOSP IP/OBS SF/LOW 40: CPT

## 2018-11-27 PROCEDURE — 71045 X-RAY EXAM CHEST 1 VIEW: CPT

## 2018-11-27 PROCEDURE — 86922 COMPATIBILITY TEST ANTIGLOB: CPT

## 2018-11-27 PROCEDURE — 84484 ASSAY OF TROPONIN QUANT: CPT

## 2018-11-27 PROCEDURE — 83010 ASSAY OF HAPTOGLOBIN QUANT: CPT

## 2018-11-27 PROCEDURE — 83540 ASSAY OF IRON: CPT

## 2018-11-27 PROCEDURE — 86850 RBC ANTIBODY SCREEN: CPT

## 2018-11-27 PROCEDURE — 94640 AIRWAY INHALATION TREATMENT: CPT

## 2018-11-27 PROCEDURE — 85027 COMPLETE CBC AUTOMATED: CPT

## 2018-11-27 PROCEDURE — 36000 PLACE NEEDLE IN VEIN: CPT

## 2018-11-27 PROCEDURE — 74177 CT ABD & PELVIS W/CONTRAST: CPT

## 2018-11-27 PROCEDURE — 81001 URINALYSIS AUTO W/SCOPE: CPT

## 2018-11-27 PROCEDURE — P9016: CPT

## 2018-11-27 PROCEDURE — 93005 ELECTROCARDIOGRAM TRACING: CPT

## 2018-11-27 PROCEDURE — 86901 BLOOD TYPING SEROLOGIC RH(D): CPT

## 2018-11-27 PROCEDURE — 85045 AUTOMATED RETICULOCYTE COUNT: CPT

## 2018-11-27 PROCEDURE — 36430 TRANSFUSION BLD/BLD COMPNT: CPT

## 2018-11-27 PROCEDURE — 99285 EMERGENCY DEPT VISIT HI MDM: CPT

## 2018-11-27 PROCEDURE — 83690 ASSAY OF LIPASE: CPT

## 2018-11-27 PROCEDURE — 82747 ASSAY OF FOLIC ACID RBC: CPT

## 2018-11-27 PROCEDURE — 82550 ASSAY OF CK (CPK): CPT

## 2018-11-27 PROCEDURE — 85610 PROTHROMBIN TIME: CPT

## 2018-11-27 PROCEDURE — 80053 COMPREHEN METABOLIC PANEL: CPT

## 2018-11-27 PROCEDURE — 82607 VITAMIN B-12: CPT

## 2018-11-27 PROCEDURE — 82728 ASSAY OF FERRITIN: CPT

## 2018-11-27 PROCEDURE — 71260 CT THORAX DX C+: CPT

## 2018-11-27 PROCEDURE — 83550 IRON BINDING TEST: CPT

## 2018-11-27 PROCEDURE — 86900 BLOOD TYPING SEROLOGIC ABO: CPT

## 2018-11-27 PROCEDURE — 80048 BASIC METABOLIC PNL TOTAL CA: CPT

## 2018-11-27 PROCEDURE — 85730 THROMBOPLASTIN TIME PARTIAL: CPT

## 2018-12-11 ENCOUNTER — APPOINTMENT (OUTPATIENT)
Dept: FAMILY MEDICINE | Facility: HOME HEALTH | Age: 83
End: 2018-12-11
Payer: MEDICARE

## 2018-12-11 PROCEDURE — 99349 HOME/RES VST EST MOD MDM 40: CPT

## 2018-12-18 NOTE — ED PROVIDER NOTE - CADM POA URETHRAL CATHETER
INR is 2.4 with goal of 2.0 to 3.0. Per protocol, warfarin dose remains the same with INR recheck in 2 weeks. On-call Provider: Dr Bob Pickens. Niurka vgea.
No

## 2019-02-12 ENCOUNTER — APPOINTMENT (OUTPATIENT)
Dept: FAMILY MEDICINE | Facility: HOME HEALTH | Age: 84
End: 2019-02-12
Payer: MEDICARE

## 2019-02-12 PROCEDURE — 99349 HOME/RES VST EST MOD MDM 40: CPT

## 2019-02-13 ENCOUNTER — RX RENEWAL (OUTPATIENT)
Age: 84
End: 2019-02-13

## 2019-02-13 DIAGNOSIS — G62.9 POLYNEUROPATHY, UNSPECIFIED: ICD-10-CM

## 2019-02-14 ENCOUNTER — APPOINTMENT (OUTPATIENT)
Dept: FAMILY MEDICINE | Facility: CLINIC | Age: 84
End: 2019-02-14

## 2019-03-19 ENCOUNTER — INPATIENT (INPATIENT)
Facility: HOSPITAL | Age: 84
LOS: 2 days | Discharge: NURSING, REHAB OR LONG-TERM CARE | DRG: 65 | End: 2019-03-22
Attending: HOSPITALIST | Admitting: HOSPITALIST
Payer: MEDICARE

## 2019-03-19 VITALS
OXYGEN SATURATION: 95 % | WEIGHT: 139.99 LBS | TEMPERATURE: 98 F | RESPIRATION RATE: 17 BRPM | SYSTOLIC BLOOD PRESSURE: 163 MMHG | DIASTOLIC BLOOD PRESSURE: 83 MMHG | HEIGHT: 67 IN | HEART RATE: 79 BPM

## 2019-03-19 DIAGNOSIS — I63.9 CEREBRAL INFARCTION, UNSPECIFIED: ICD-10-CM

## 2019-03-19 DIAGNOSIS — J44.9 CHRONIC OBSTRUCTIVE PULMONARY DISEASE, UNSPECIFIED: ICD-10-CM

## 2019-03-19 DIAGNOSIS — Z72.0 TOBACCO USE: ICD-10-CM

## 2019-03-19 DIAGNOSIS — R20.0 ANESTHESIA OF SKIN: ICD-10-CM

## 2019-03-19 LAB
ALBUMIN SERPL ELPH-MCNC: 3.4 G/DL — SIGNIFICANT CHANGE UP (ref 3.3–5)
ALP SERPL-CCNC: 128 U/L — HIGH (ref 40–120)
ALT FLD-CCNC: 14 U/L DA — SIGNIFICANT CHANGE UP (ref 10–45)
ANION GAP SERPL CALC-SCNC: 11 MMOL/L — SIGNIFICANT CHANGE UP (ref 5–17)
APPEARANCE UR: CLEAR — SIGNIFICANT CHANGE UP
APTT BLD: 36.9 SEC — HIGH (ref 27.5–36.3)
AST SERPL-CCNC: 23 U/L — SIGNIFICANT CHANGE UP (ref 10–40)
BASOPHILS # BLD AUTO: 0.1 K/UL — SIGNIFICANT CHANGE UP (ref 0–0.2)
BASOPHILS NFR BLD AUTO: 1.2 % — SIGNIFICANT CHANGE UP (ref 0–2)
BILIRUB SERPL-MCNC: 0.4 MG/DL — SIGNIFICANT CHANGE UP (ref 0.2–1.2)
BILIRUB UR-MCNC: NEGATIVE — SIGNIFICANT CHANGE UP
BUN SERPL-MCNC: 15 MG/DL — SIGNIFICANT CHANGE UP (ref 7–23)
CALCIUM SERPL-MCNC: 9.2 MG/DL — SIGNIFICANT CHANGE UP (ref 8.4–10.5)
CHLORIDE SERPL-SCNC: 105 MMOL/L — SIGNIFICANT CHANGE UP (ref 96–108)
CO2 SERPL-SCNC: 27 MMOL/L — SIGNIFICANT CHANGE UP (ref 22–31)
COLOR SPEC: YELLOW — SIGNIFICANT CHANGE UP
CREAT SERPL-MCNC: 1.2 MG/DL — SIGNIFICANT CHANGE UP (ref 0.5–1.3)
DIFF PNL FLD: ABNORMAL
EOSINOPHIL # BLD AUTO: 0.3 K/UL — SIGNIFICANT CHANGE UP (ref 0–0.5)
EOSINOPHIL NFR BLD AUTO: 4.2 % — SIGNIFICANT CHANGE UP (ref 0–6)
GLUCOSE SERPL-MCNC: 92 MG/DL — SIGNIFICANT CHANGE UP (ref 70–99)
GLUCOSE UR QL: NEGATIVE — SIGNIFICANT CHANGE UP
HCT VFR BLD CALC: 36.1 % — LOW (ref 39–50)
HGB BLD-MCNC: 10.7 G/DL — LOW (ref 13–17)
INR BLD: 1.89 RATIO — HIGH (ref 0.88–1.16)
KETONES UR-MCNC: NEGATIVE — SIGNIFICANT CHANGE UP
LEUKOCYTE ESTERASE UR-ACNC: NEGATIVE — SIGNIFICANT CHANGE UP
LYMPHOCYTES # BLD AUTO: 1.5 K/UL — SIGNIFICANT CHANGE UP (ref 1–3.3)
LYMPHOCYTES # BLD AUTO: 23.8 % — SIGNIFICANT CHANGE UP (ref 13–44)
MCHC RBC-ENTMCNC: 25.4 PG — LOW (ref 27–34)
MCHC RBC-ENTMCNC: 29.7 GM/DL — LOW (ref 32–36)
MCV RBC AUTO: 85.6 FL — SIGNIFICANT CHANGE UP (ref 80–100)
MONOCYTES # BLD AUTO: 0.5 K/UL — SIGNIFICANT CHANGE UP (ref 0–0.9)
MONOCYTES NFR BLD AUTO: 8.6 % — SIGNIFICANT CHANGE UP (ref 1–9)
NEUTROPHILS # BLD AUTO: 3.9 K/UL — SIGNIFICANT CHANGE UP (ref 1.8–7.4)
NEUTROPHILS NFR BLD AUTO: 62.3 % — SIGNIFICANT CHANGE UP (ref 43–77)
NITRITE UR-MCNC: NEGATIVE — SIGNIFICANT CHANGE UP
PH UR: 6 — SIGNIFICANT CHANGE UP (ref 5–8)
PLATELET # BLD AUTO: 382 K/UL — SIGNIFICANT CHANGE UP (ref 150–400)
POTASSIUM SERPL-MCNC: 4.7 MMOL/L — SIGNIFICANT CHANGE UP (ref 3.5–5.3)
POTASSIUM SERPL-SCNC: 4.7 MMOL/L — SIGNIFICANT CHANGE UP (ref 3.5–5.3)
PROT SERPL-MCNC: 8.7 G/DL — HIGH (ref 6–8.3)
PROT UR-MCNC: 15
PROTHROM AB SERPL-ACNC: 21.6 SEC — HIGH (ref 10–12.9)
RBC # BLD: 4.22 M/UL — SIGNIFICANT CHANGE UP (ref 4.2–5.8)
RBC # FLD: 19.1 % — HIGH (ref 10.3–14.5)
SODIUM SERPL-SCNC: 143 MMOL/L — SIGNIFICANT CHANGE UP (ref 135–145)
SP GR SPEC: 1.02 — SIGNIFICANT CHANGE UP (ref 1.01–1.02)
TROPONIN I SERPL-MCNC: <.017 NG/ML — LOW (ref 0.02–0.06)
UROBILINOGEN FLD QL: NEGATIVE — SIGNIFICANT CHANGE UP
WBC # BLD: 6.2 K/UL — SIGNIFICANT CHANGE UP (ref 3.8–10.5)
WBC # FLD AUTO: 6.2 K/UL — SIGNIFICANT CHANGE UP (ref 3.8–10.5)

## 2019-03-19 PROCEDURE — 99223 1ST HOSP IP/OBS HIGH 75: CPT | Mod: AI

## 2019-03-19 PROCEDURE — 99285 EMERGENCY DEPT VISIT HI MDM: CPT

## 2019-03-19 PROCEDURE — 93010 ELECTROCARDIOGRAM REPORT: CPT

## 2019-03-19 PROCEDURE — 71045 X-RAY EXAM CHEST 1 VIEW: CPT | Mod: 26

## 2019-03-19 PROCEDURE — 70450 CT HEAD/BRAIN W/O DYE: CPT | Mod: 26

## 2019-03-19 RX ORDER — GABAPENTIN 400 MG/1
300 CAPSULE ORAL THREE TIMES A DAY
Qty: 0 | Refills: 0 | Status: DISCONTINUED | OUTPATIENT
Start: 2019-03-19 | End: 2019-03-20

## 2019-03-19 RX ORDER — METOPROLOL TARTRATE 50 MG
1 TABLET ORAL
Qty: 0 | Refills: 0 | COMMUNITY

## 2019-03-19 RX ORDER — RIVAROXABAN 15 MG-20MG
0 KIT ORAL
Qty: 0 | Refills: 0 | COMMUNITY

## 2019-03-19 RX ORDER — BUDESONIDE AND FORMOTEROL FUMARATE DIHYDRATE 160; 4.5 UG/1; UG/1
2 AEROSOL RESPIRATORY (INHALATION)
Qty: 0 | Refills: 0 | Status: DISCONTINUED | OUTPATIENT
Start: 2019-03-19 | End: 2019-03-22

## 2019-03-19 RX ORDER — TAMSULOSIN HYDROCHLORIDE 0.4 MG/1
0.4 CAPSULE ORAL AT BEDTIME
Qty: 0 | Refills: 0 | Status: DISCONTINUED | OUTPATIENT
Start: 2019-03-19 | End: 2019-03-22

## 2019-03-19 RX ORDER — GABAPENTIN 400 MG/1
0 CAPSULE ORAL
Qty: 0 | Refills: 0 | COMMUNITY

## 2019-03-19 RX ORDER — ASPIRIN/CALCIUM CARB/MAGNESIUM 324 MG
81 TABLET ORAL DAILY
Qty: 0 | Refills: 0 | Status: DISCONTINUED | OUTPATIENT
Start: 2019-03-19 | End: 2019-03-22

## 2019-03-19 RX ORDER — ATORVASTATIN CALCIUM 80 MG/1
40 TABLET, FILM COATED ORAL AT BEDTIME
Qty: 0 | Refills: 0 | Status: DISCONTINUED | OUTPATIENT
Start: 2019-03-19 | End: 2019-03-22

## 2019-03-19 RX ORDER — NICOTINE POLACRILEX 2 MG
1 GUM BUCCAL DAILY
Qty: 0 | Refills: 0 | Status: DISCONTINUED | OUTPATIENT
Start: 2019-03-19 | End: 2019-03-22

## 2019-03-19 RX ORDER — FUROSEMIDE 40 MG
1 TABLET ORAL
Qty: 0 | Refills: 0 | COMMUNITY

## 2019-03-19 RX ORDER — ASPIRIN/CALCIUM CARB/MAGNESIUM 324 MG
81 TABLET ORAL DAILY
Qty: 0 | Refills: 0 | Status: DISCONTINUED | OUTPATIENT
Start: 2019-03-19 | End: 2019-03-19

## 2019-03-19 RX ORDER — IPRATROPIUM/ALBUTEROL SULFATE 18-103MCG
3 AEROSOL WITH ADAPTER (GRAM) INHALATION EVERY 6 HOURS
Qty: 0 | Refills: 0 | Status: DISCONTINUED | OUTPATIENT
Start: 2019-03-19 | End: 2019-03-22

## 2019-03-19 RX ADMIN — GABAPENTIN 300 MILLIGRAM(S): 400 CAPSULE ORAL at 21:52

## 2019-03-19 RX ADMIN — Medication 3 MILLILITER(S): at 22:44

## 2019-03-19 RX ADMIN — ATORVASTATIN CALCIUM 40 MILLIGRAM(S): 80 TABLET, FILM COATED ORAL at 21:52

## 2019-03-19 RX ADMIN — TAMSULOSIN HYDROCHLORIDE 0.4 MILLIGRAM(S): 0.4 CAPSULE ORAL at 21:52

## 2019-03-19 RX ADMIN — BUDESONIDE AND FORMOTEROL FUMARATE DIHYDRATE 2 PUFF(S): 160; 4.5 AEROSOL RESPIRATORY (INHALATION) at 22:39

## 2019-03-19 NOTE — ED PROVIDER NOTE - OBJECTIVE STATEMENT
this is an 87 yo male presenting to the ED with complaints of numbness to the left side of his mouth, with difficulty speaking and weakness starting over the weekend. states he stopped his medication about 1 weeks ago (xarelto for PE 1 yr ago, metoprolol) because he was frustrated with difficulty walking. today, daughter decided to bring patient to the ED for further evaluation. denies headache, nausea, vomiting, fever, chills. states he is a smoker and has COPD and has worsening cough. normally able to walk, slowly, with the assistance of a cane.

## 2019-03-19 NOTE — ED PROVIDER NOTE - ATTENDING CONTRIBUTION TO CARE
Vipul with NP [place]. Patient with numbness to the left side of mouth. Pt state he feels like he is having a hard time speaking and has weakness. Onset was over the weekend. Patient with rhochi that cleared with cough (he is an active smoker). Ptient with subjective numbness or diminished sensation tot he left side of face. Tongue midline. Poor effort during exam. Lower extremity exam, 2/5. Plan for ekg labs cxr ct. I performed a face to face bedside interview with patient regarding history of present illness, review of symptoms and past medical history. I completed an independent physical exam.  I have discussed the patient's plan of care with Physician Assistant (PA). I agree with note as stated above, having amended the EMR as needed to reflect my findings.   This includes History of Present Illness, HIV, Past Medical/Surgical/Family/Social History, Allergies and Home Medications, Review of Systems, Physical Exam, and any Progress Notes during the time I functioned as the attending physician for this patient

## 2019-03-19 NOTE — H&P ADULT - NSICDXPASTMEDICALHX_GEN_ALL_CORE_FT
PAST MEDICAL HISTORY:  CAD (coronary artery disease)     Dyslipidemia     HTN (hypertension)     Parkinson disease     Peripheral vascular disease

## 2019-03-19 NOTE — ED ADULT NURSE NOTE - NSIMPLEMENTINTERV_GEN_ALL_ED
Implemented All Fall with Harm Risk Interventions:  [place] to call system. Call bell, personal items and telephone within reach. Instruct patient to call for assistance. Room bathroom lighting operational. Non-slip footwear when patient is off stretcher. Physically safe environment: no spills, clutter or unnecessary equipment. Stretcher in lowest position, wheels locked, appropriate side rails in place. Provide visual cue, wrist band, yellow gown, etc. Monitor gait and stability. Monitor for mental status changes and reorient to person, place, and time. Review medications for side effects contributing to fall risk. Reinforce activity limits and safety measures with patient and family. Provide visual clues: red socks.

## 2019-03-19 NOTE — H&P ADULT - NSHPLABSRESULTS_GEN_ALL_CORE
LABS:                     10.7   6.2   )-----------( 382      ( 19 Mar 2019 17:05 )             36.1     03-    143  |  105  |  15  ----------------------------<  92  4.7   |  27  |  1.20    Ca    9.2      19 Mar 2019 17:05    TPro  8.7<H>  /  Alb  3.4  /  TBili  0.4  /  DBili  x   /  AST  23  /  ALT  14  /  AlkPhos  128<H>  03-19    PT/INR - ( 19 Mar 2019 17:05 )   PT: 21.6 sec;   INR: 1.89 ratio         PTT - ( 19 Mar 2019 17:05 )  PTT:36.9 sec  Urinalysis Basic - ( 19 Mar 2019 17:25 )    Color: Yellow / Appearance: Clear / S.020 / pH: x  Gluc: x / Ketone: Negative  / Bili: Negative / Urobili: Negative   Blood: x / Protein: 15 / Nitrite: Negative   Leuk Esterase: Negative / RBC: 0-4 /HPF / WBC Negative /HPF   Sq Epi: x / Non Sq Epi: Neg.-Few / Bacteria: Trace /HPF     CAPILLARY BLOOD GLUCOSE    Urinalysis Basic - ( 19 Mar 2019 17:25 )    Color: Yellow / Appearance: Clear / S.020 / pH: x  Gluc: x / Ketone: Negative  / Bili: Negative / Urobili: Negative   Blood: x / Protein: 15 / Nitrite: Negative   Leuk Esterase: Negative / RBC: 0-4 /HPF / WBC Negative /HPF   Sq Epi: x / Non Sq Epi: Neg.-Few / Bacteria: Trace /HPF    RADIOLOGY & ADDITIONAL TESTS:    Consultant(s) Notes Reviewed:  [x ] YES  [ ] NO  Care Discussed with Consultants/Other Providers [ x] YES  [ ] NO  Imaging Personally Reviewed:  [ ] YES  [ ] NO

## 2019-03-19 NOTE — H&P ADULT - ASSESSMENT
86M PMH CAD, HTN, tobacco, daily alcohol, COPD, poorly compliant with medications presents for left sided numbness and dysarthria.  Patient recently stopped all his medications at home.      Meds from Dr. Day's note but patient very poorly compliant.      IMPROVE VTE Individual Risk Assessment          RISK                                                          Points  [ x ] Previous VTE                                                3  [  ] Thrombophilia                                             2  [  ] Lower limb paralysis                                   2        (unable to hold up >15 seconds)    [  ] Current Cancer                                             2         (within 6 months)  [X  ] Immobilization > 24 hrs                              1  [  ] ICU/CCU stay > 24 hours                             1  [X  ] Age > 60                                                         1    IMPROVE VTE Score:         [     5    ]    Total Risk Factor Score:    0 - 1:   Consider IPC  >2 - 3:  Thromboprophylaxis required (enoxaparin or SQ heparin)        >4:   High Risk: Thromboprophylaxis required (enoxaparin or SQ heparin), optional add IPC  **If CONTRAINDICATION to enoxaparin or SQ heparin, USE IPCs**

## 2019-03-19 NOTE — ED PROVIDER NOTE - PHYSICAL EXAMINATION
PE:   GEN: Awake, alert, interactive, NAD, non-toxic appearing.   HEAD: Atraumatic  EYES: Sclera white, conjunctiva pink, PERRLA  MOUTH/SPEECH: Mild dysarthria, poor dentition  CARDIAC: Reg rate and rhythm, S1,S2, no murmur/rub/gallop. Strong central and peripheral pulses, Brisk cap refill, no evident pedal edema.   RESP: No distress noted. L/S rhonchi that clear with cough, without accessory muscle  ABD: soft, supple, non-tender, no guarding. BS x 4, normoactive.   NEURO: diminished sensation to left upper and lower quadrants of face, R>L strength in each side of face, tongue midline, normal jaw clench, asymmetric smile, eom wnl, no nystagmus, normal shoulder shrug, poor effort assessing pronator drift, good strength upper and lower extremities, unable to thumb to other fingers, hand over hand and finger to nose due to UE tremors  SKIN: Warm, dry, normal color, without apparent rashes.

## 2019-03-19 NOTE — H&P ADULT - NSHPPHYSICALEXAM_GEN_ALL_CORE
T(C): 36.4 (03-19-19 @ 16:21), Max: 36.4 (03-19-19 @ 16:21)  HR: 66 (03-19-19 @ 18:49) (66 - 79)  BP: 154/73 (03-19-19 @ 18:49) (154/73 - 163/83)  RR: 16 (03-19-19 @ 18:49) (16 - 17)  SpO2: 100% (03-19-19 @ 18:49) (95% - 100%)  Wt(kg): --Vital Signs Last 24 Hrs  T(C): 36.4 (19 Mar 2019 16:21), Max: 36.4 (19 Mar 2019 16:21)  T(F): 97.5 (19 Mar 2019 16:21), Max: 97.5 (19 Mar 2019 16:21)  HR: 66 (19 Mar 2019 18:49) (66 - 79)  BP: 154/73 (19 Mar 2019 18:49) (154/73 - 163/83)  BP(mean): --  RR: 16 (19 Mar 2019 18:49) (16 - 17)  SpO2: 100% (19 Mar 2019 18:49) (95% - 100%)    PHYSICAL EXAM:  GENERAL: NAD, appears chronically ill   HEAD:  Atraumatic, Normocephalic, maybe mild left sided droop?  EYES: EOMI, PERRLA, conjunctiva and sclera clear  ENMT: No tonsillar erythema, exudates, or enlargement; Moist mucous membranes, Good dentition, No lesions  NECK: Supple, No JVD, Normal thyroid  NERVOUS SYSTEM:  Alert & Oriented X3, Good concentration; Motor Strength 2/5 B/L upper and lower extremities; DTRs 2+ intact and symmetric  CHEST/LUNG: Clear to percussion bilaterally; No rales, rhonchi, wheezing, or rubs  HEART: Regular rate and rhythm; No murmurs, rubs, or gallops  ABDOMEN: Soft, Nontender, Nondistended; Bowel sounds present  EXTREMITIES:  2+ Peripheral Pulses, No clubbing, cyanosis, or edema  LYMPH: No lymphadenopathy noted  SKIN: No rashes or lesions

## 2019-03-19 NOTE — ED PROVIDER NOTE - NIH STROKE SCALE: 6A. MOTOR LEG, LEFT, QM
How Severe Is Your Rash?: mild Is This A New Presentation, Or A Follow-Up?: Rash Additional History: He has eczema but she wants to make sure something else isn’t going on. (0) No drift; leg holds 30 degree position for full 5 secs

## 2019-03-19 NOTE — ED PROVIDER NOTE - NS ED ROS FT
Constitutional: - Fever, - Chills, - Anorexia, - Fatigue  Eyes: - Discharge, - Irritation, - Redness, - Visual changes, - Light sensitivity  EARS: - Ear Pain, - Apparent hearing problems  NOSE: - Congestion, - Bloody nose  MOUTH/THROAT: + Vocal Changes, - Drooling, - Sore throat, + Trouble speaking  NECK: - Lumps, - Stiffness, - Pain  CV: - Palpitations, - Chest Pain, - Edema   RESP:  + Cough, - Shortness of Breath, - Dyspnea on Exertion, - Trouble speaking, - Pain with breathing, - Wheezing  GI: - Diarrhea, - Constipation, - Nausea, - Vomiting, + Abdominal Pain (left lower quadrant, intermittently)  : - Dysuria, -Frequency, - Hematuria, - Hesitancy, - Incontinence  MSK: - Myalgias, - Arthralgias, - Weakness, - Deformities, - Injuries  SKIN: - Color change, - Rash, - Swelling, - Bruises, - Wounds  NEURO: - Change in behavior, - Dec. Alertness, - Headache, - Dizziness, - Numbness/Tingling, - Change in speech, - Weakness, - Seizure-like activity Constitutional: - Fever, - Chills, - Anorexia, - Fatigue  Eyes: - Discharge, - Irritation, - Redness, - Visual changes, - Light sensitivity  EARS: - Ear Pain, - Apparent hearing problems  NOSE: - Congestion, - Bloody nose  MOUTH/THROAT: + Vocal Changes, - Drooling, - Sore throat  NECK: - Lumps, - Stiffness, - Pain  CV: - Palpitations, - Chest Pain, - Edema   RESP:  + Cough, - Shortness of Breath, - Dyspnea on Exertion, - Trouble speaking, - Pain with breathing, - Wheezing  GI: - Diarrhea, - Constipation, - Nausea, - Vomiting, + Abdominal Pain (left lower quadrant, intermittently)  : - Dysuria, -Frequency, - Hematuria, - Hesitancy, - Incontinence  MSK: - Myalgias, - Arthralgias, + Weakness  SKIN: - Color change, - Rash, - Swelling  NEURO: - Change in behavior, - Dec. Alertness, - Headache, - Dizziness, + Numbness/Tingling, + Change in speech, + Weakness

## 2019-03-19 NOTE — H&P ADULT - HISTORY OF PRESENT ILLNESS
86M PMH COPD, current smoker, alcohol use, anemia presents for left sided numbness, facial droop and dysarthria.  Patient states he hasn't felt well in many months. States he hasn't left the house in several months.  Denies chest pain, sob, nausea, vomiting.  He is limited in his mobility.  His family noticed he was more weak than usual and complaining about left sided numbness and talking weird so they brought him in.

## 2019-03-19 NOTE — ED PROVIDER NOTE - CLINICAL SUMMARY MEDICAL DECISION MAKING FREE TEXT BOX
87 yo male with numbness/tingling to mouth with diminished sensation to left facial quadrants. with speech improving since the weekend, and no gross motor deficit. will eval cxr, ekg, labs, brain CT, re-eval.

## 2019-03-19 NOTE — H&P ADULT - NSICDXPROBLEM_GEN_ALL_CORE_FT
PROBLEM DIAGNOSES  Problem: Cerebrovascular accident (CVA)  Assessment and Plan: concern for stroke/tia, asa/statin, neurology, lipids, a1c, telemetry  will defer imaging for neurology input  check TTE     Problem: Dysarthria due to acute stroke  Assessment and Plan: secondary to above    Problem: Tobacco abuse  Assessment and Plan: nicotine patch     Problem: COPD, mild  Assessment and Plan: advair, prn nebs
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
## 2019-03-19 NOTE — ED ADULT NURSE NOTE - OBJECTIVE STATEMENT
Patient presents to ED with his spouse and daughter. Patient's daughter states that patient is non-compliant with medications. He stopped taking his xarelto 1.5 weeks ago. "A few days ago" patient was noted by his spouse to have slurred speech with facial numbness and a right-sided facial droop. Patient's family is verbalizing that patient's speech has improved moderately however still abnormal. Patient verbalizes weakness, states he can not walk however patient's daughter states patient has not been able to walk for "years." Airway patent. Patient presents to ED with his spouse and daughter. Patient's daughter states that patient is non-compliant with medications. He stopped taking his xarelto 1.5 weeks ago. "A few days ago" patient was noted by his spouse to have slurred speech with facial numbness and a right-sided facial droop. Patient's family is verbalizing that patient's speech has improved moderately however still abnormal. Patient verbalizes weakness, states he can not walk however patient's daughter states patient has not been able to walk for "years." Airway patent. Patient exhibits equal strength bilaterally. Patient presents to ED with his spouse and daughter. Patient's daughter states that patient is non-compliant with medications. He stopped taking his xarelto 1.5 weeks ago. "A few days ago" patient was noted by his spouse to have slurred speech with facial numbness and a left-sided facial droop. Patient's family is verbalizing that patient's speech has improved moderately however still abnormal. Patient verbalizes weakness, states he can not walk however patient's daughter states patient has not been able to walk for "years." Airway patent. Patient exhibits equal strength bilaterally.

## 2019-03-20 DIAGNOSIS — I63.9 CEREBRAL INFARCTION, UNSPECIFIED: ICD-10-CM

## 2019-03-20 LAB
CHOLEST SERPL-MCNC: 113 MG/DL — SIGNIFICANT CHANGE UP (ref 10–199)
CULTURE RESULTS: SIGNIFICANT CHANGE UP
ESTIMATED AVERAGE GLUCOSE: 94 MG/DL — SIGNIFICANT CHANGE UP (ref 68–114)
HBA1C BLD-MCNC: 4.9 % — SIGNIFICANT CHANGE UP (ref 4–5.6)
HBA1C BLD-MCNC: 4.9 % — SIGNIFICANT CHANGE UP (ref 4–5.6)
HDLC SERPL-MCNC: 39 MG/DL — LOW
LIPID PNL WITH DIRECT LDL SERPL: 57 MG/DL — SIGNIFICANT CHANGE UP
SPECIMEN SOURCE: SIGNIFICANT CHANGE UP
TOTAL CHOLESTEROL/HDL RATIO MEASUREMENT: 2.9 RATIO — LOW (ref 3.4–9.6)
TRIGL SERPL-MCNC: 84 MG/DL — SIGNIFICANT CHANGE UP (ref 10–149)

## 2019-03-20 PROCEDURE — 99233 SBSQ HOSP IP/OBS HIGH 50: CPT

## 2019-03-20 PROCEDURE — 93306 TTE W/DOPPLER COMPLETE: CPT | Mod: 26

## 2019-03-20 RX ORDER — RIVAROXABAN 15 MG-20MG
20 KIT ORAL EVERY 24 HOURS
Qty: 0 | Refills: 0 | Status: DISCONTINUED | OUTPATIENT
Start: 2019-03-20 | End: 2019-03-22

## 2019-03-20 RX ORDER — GABAPENTIN 400 MG/1
300 CAPSULE ORAL AT BEDTIME
Qty: 0 | Refills: 0 | Status: DISCONTINUED | OUTPATIENT
Start: 2019-03-20 | End: 2019-03-22

## 2019-03-20 RX ADMIN — TAMSULOSIN HYDROCHLORIDE 0.4 MILLIGRAM(S): 0.4 CAPSULE ORAL at 21:50

## 2019-03-20 RX ADMIN — Medication 3 MILLILITER(S): at 21:06

## 2019-03-20 RX ADMIN — Medication 1 PATCH: at 13:31

## 2019-03-20 RX ADMIN — RIVAROXABAN 20 MILLIGRAM(S): KIT at 19:06

## 2019-03-20 RX ADMIN — ATORVASTATIN CALCIUM 40 MILLIGRAM(S): 80 TABLET, FILM COATED ORAL at 21:50

## 2019-03-20 RX ADMIN — Medication 81 MILLIGRAM(S): at 11:20

## 2019-03-20 RX ADMIN — GABAPENTIN 300 MILLIGRAM(S): 400 CAPSULE ORAL at 21:50

## 2019-03-20 RX ADMIN — Medication 1 PATCH: at 19:06

## 2019-03-20 RX ADMIN — BUDESONIDE AND FORMOTEROL FUMARATE DIHYDRATE 2 PUFF(S): 160; 4.5 AEROSOL RESPIRATORY (INHALATION) at 21:06

## 2019-03-20 RX ADMIN — GABAPENTIN 300 MILLIGRAM(S): 400 CAPSULE ORAL at 13:32

## 2019-03-20 RX ADMIN — BUDESONIDE AND FORMOTEROL FUMARATE DIHYDRATE 2 PUFF(S): 160; 4.5 AEROSOL RESPIRATORY (INHALATION) at 09:11

## 2019-03-20 RX ADMIN — GABAPENTIN 300 MILLIGRAM(S): 400 CAPSULE ORAL at 05:05

## 2019-03-20 NOTE — PROGRESS NOTE ADULT - SUBJECTIVE AND OBJECTIVE BOX
Patient is a 86y old  Male who presents with a chief complaint of left sided numbness, dysarthria (19 Mar 2019 18:51)  No acute complaints      Patient seen and examined at bedside.    ALLERGIES:  No Known Allergies        Vital Signs Last 24 Hrs  T(F): 97.7 (20 Mar 2019 09:03), Max: 98.2 (20 Mar 2019 05:44)  HR: 74 (20 Mar 2019 09:12) (63 - 79)  BP: 128/53 (20 Mar 2019 09:03) (128/53 - 163/83)  RR: 15 (20 Mar 2019 09:03) (15 - 17)  SpO2: 95% (20 Mar 2019 09:12) (94% - 100%)  I&O's Summary    19 Mar 2019 07:01  -  20 Mar 2019 07:00  --------------------------------------------------------  IN: 200 mL / OUT: 501 mL / NET: -301 mL      MEDICATIONS:  ALBUTerol/ipratropium for Nebulization 3 milliLiter(s) Nebulizer every 6 hours PRN  aspirin enteric coated 81 milliGRAM(s) Oral daily  atorvastatin 40 milliGRAM(s) Oral at bedtime  buDESOnide 160 MICROgram(s)/formoterol 4.5 MICROgram(s) Inhaler 2 Puff(s) Inhalation two times a day  gabapentin 300 milliGRAM(s) Oral three times a day  nicotine -   7 mG/24Hr(s) Patch 1 patch Transdermal daily  tamsulosin 0.4 milliGRAM(s) Oral at bedtime      PHYSICAL EXAM:  General: NAD, A/O x 3  ENT: MMM  Neck: Supple, No JVD  Lungs: Clear to auscultation bilaterally  Cardio: RRR, S1/S2, No murmurs  Abdomen: Soft, Nontender, Nondistended; Bowel sounds present  Extremities: No cyanosis, No edema    LABS:                        10.7   6.2   )-----------( 382      ( 19 Mar 2019 17:05 )             36.1     03-19    143  |  105  |  15  ----------------------------<  92  4.7   |  27  |  1.20    Ca    9.2      19 Mar 2019 17:05    TPro  8.7  /  Alb  3.4  /  TBili  0.4  /  DBili  x   /  AST  23  /  ALT  14  /  AlkPhos  128      eGFR if : 63 mL/min/1.73M2 (19 @ 17:05)  eGFR if Non African American: 55 mL/min/1.73M2 (19 @ 17:05)    PT/INR - ( 19 Mar 2019 17:05 )   PT: 21.6 sec;   INR: 1.89 ratio         PTT - ( 19 Mar 2019 17:05 )  PTT:36.9 sec    CARDIAC MARKERS ( 19 Mar 2019 17:05 )  <.017 ng/mL / x     / x     / x     / x          03-20 Chol 113 mg/dL LDL 57 mg/dL HDL 39 mg/dL Trig 84 mg/dL        CAPILLARY BLOOD GLUCOSE          Urinalysis Basic - ( 19 Mar 2019 17:25 )    Color: Yellow / Appearance: Clear / S.020 / pH: x  Gluc: x / Ketone: Negative  / Bili: Negative / Urobili: Negative   Blood: x / Protein: 15 / Nitrite: Negative   Leuk Esterase: Negative / RBC: 0-4 /HPF / WBC Negative /HPF   Sq Epi: x / Non Sq Epi: Neg.-Few / Bacteria: Trace /HPF          RADIOLOGY & ADDITIONAL TESTS:    < from: CT Head No Cont (19 @ 17:46) >  IMPRESSION:     No acute intracranial hemorrhage, mass effect, or evidence of acute   vascular territorial infarction.    If clinical symptoms persist or worsen, more sensitive evaluation with   brain MRI may be obtained, if no contraindications exist.    < end of copied text >          Care Discussed with Consultants/Other Providers:

## 2019-03-20 NOTE — CHART NOTE - NSCHARTNOTEFT_GEN_A_CORE
Upon Nutritional Assessment by the Registered Dietitian your patient was determined to meet criteria / has evidence of the following diagnosis/diagnoses:          [ ]  Mild Protein Calorie Malnutrition        [x ]  Moderate Protein Calorie Malnutrition        [ ] Severe Protein Calorie Malnutrition        [ ] Unspecified Protein Calorie Malnutrition        [ ] Underweight / BMI <19        [ ] Morbid Obesity / BMI > 40      Findings as based on:  [ x] Comprehensive nutrition assessment   [x ] Nutrition Focused Physical Exam- evidence of loss of body fat and muscle mass(Temporal, Orbital, Clavicle)  [ ] Other:       Nutrition Plan/Recommendations:  Provide Ensure Enlive TID        PROVIDER Section:     By signing this assessment you are acknowledging and agree with the diagnosis/diagnoses assigned by the Registered Dietitian    Comments:

## 2019-03-20 NOTE — PROGRESS NOTE ADULT - ASSESSMENT
86M PMH CAD, HTN, tobacco, daily alcohol, COPD, poorly compliant with medications presents for left sided numbness and dysarthria.  Patient recently stopped all his medications at home.

## 2019-03-20 NOTE — PROGRESS NOTE ADULT - ATTENDING COMMENTS
I have personally seen and examined patient on the above date.  I discussed the case with Chinmay Yu NP and I agree with findings and plan as detailed per note above, which I have amended where appropriate.      CVA/TIA rule out - unclear if worsening parkinson's versus cva - has some dysarthria.  Parkinson's disease

## 2019-03-20 NOTE — PHYSICAL THERAPY INITIAL EVALUATION ADULT - ADDITIONAL COMMENTS
pt lives in private house w/spouse, 3 rivera w/1 handrail. pt uses straight cane to ambulate in house but states he has not been ambulating much for past few weeks due to weakness

## 2019-03-20 NOTE — PROGRESS NOTE ADULT - PROBLEM SELECTOR PLAN 1
cth negative, f/u tte, passed speech and swallow, tolerating diet, awaiting neuro input cth negative, continue asa and statin, f/u tte, passed speech and swallow, tolerating diet, awaiting neuro input

## 2019-03-21 LAB
ALBUMIN SERPL ELPH-MCNC: 2.8 G/DL — LOW (ref 3.3–5)
ALP SERPL-CCNC: 106 U/L — SIGNIFICANT CHANGE UP (ref 40–120)
ALT FLD-CCNC: 7 U/L DA — LOW (ref 10–45)
ANION GAP SERPL CALC-SCNC: 12 MMOL/L — SIGNIFICANT CHANGE UP (ref 5–17)
AST SERPL-CCNC: 17 U/L — SIGNIFICANT CHANGE UP (ref 10–40)
BILIRUB SERPL-MCNC: 0.3 MG/DL — SIGNIFICANT CHANGE UP (ref 0.2–1.2)
BUN SERPL-MCNC: 28 MG/DL — HIGH (ref 7–23)
CALCIUM SERPL-MCNC: 8.5 MG/DL — SIGNIFICANT CHANGE UP (ref 8.4–10.5)
CHLORIDE SERPL-SCNC: 106 MMOL/L — SIGNIFICANT CHANGE UP (ref 96–108)
CO2 SERPL-SCNC: 23 MMOL/L — SIGNIFICANT CHANGE UP (ref 22–31)
CREAT SERPL-MCNC: 1.18 MG/DL — SIGNIFICANT CHANGE UP (ref 0.5–1.3)
GLUCOSE SERPL-MCNC: 90 MG/DL — SIGNIFICANT CHANGE UP (ref 70–99)
HCT VFR BLD CALC: 27 % — LOW (ref 39–50)
HGB BLD-MCNC: 8.4 G/DL — LOW (ref 13–17)
MCHC RBC-ENTMCNC: 26.3 PG — LOW (ref 27–34)
MCHC RBC-ENTMCNC: 31.1 GM/DL — LOW (ref 32–36)
MCV RBC AUTO: 84.5 FL — SIGNIFICANT CHANGE UP (ref 80–100)
PLATELET # BLD AUTO: 306 K/UL — SIGNIFICANT CHANGE UP (ref 150–400)
POTASSIUM SERPL-MCNC: 4.3 MMOL/L — SIGNIFICANT CHANGE UP (ref 3.5–5.3)
POTASSIUM SERPL-SCNC: 4.3 MMOL/L — SIGNIFICANT CHANGE UP (ref 3.5–5.3)
PROT SERPL-MCNC: 7.1 G/DL — SIGNIFICANT CHANGE UP (ref 6–8.3)
RBC # BLD: 3.2 M/UL — LOW (ref 4.2–5.8)
RBC # FLD: 19 % — HIGH (ref 10.3–14.5)
SODIUM SERPL-SCNC: 141 MMOL/L — SIGNIFICANT CHANGE UP (ref 135–145)
WBC # BLD: 7.2 K/UL — SIGNIFICANT CHANGE UP (ref 3.8–10.5)
WBC # FLD AUTO: 7.2 K/UL — SIGNIFICANT CHANGE UP (ref 3.8–10.5)

## 2019-03-21 PROCEDURE — 99222 1ST HOSP IP/OBS MODERATE 55: CPT

## 2019-03-21 RX ADMIN — Medication 1 PATCH: at 11:12

## 2019-03-21 RX ADMIN — TAMSULOSIN HYDROCHLORIDE 0.4 MILLIGRAM(S): 0.4 CAPSULE ORAL at 21:10

## 2019-03-21 RX ADMIN — RIVAROXABAN 20 MILLIGRAM(S): KIT at 17:15

## 2019-03-21 RX ADMIN — BUDESONIDE AND FORMOTEROL FUMARATE DIHYDRATE 2 PUFF(S): 160; 4.5 AEROSOL RESPIRATORY (INHALATION) at 09:37

## 2019-03-21 RX ADMIN — Medication 81 MILLIGRAM(S): at 11:10

## 2019-03-21 RX ADMIN — GABAPENTIN 300 MILLIGRAM(S): 400 CAPSULE ORAL at 21:10

## 2019-03-21 RX ADMIN — BUDESONIDE AND FORMOTEROL FUMARATE DIHYDRATE 2 PUFF(S): 160; 4.5 AEROSOL RESPIRATORY (INHALATION) at 21:29

## 2019-03-21 RX ADMIN — Medication 1 PATCH: at 11:10

## 2019-03-21 RX ADMIN — ATORVASTATIN CALCIUM 40 MILLIGRAM(S): 80 TABLET, FILM COATED ORAL at 21:10

## 2019-03-21 RX ADMIN — Medication 1 PATCH: at 21:14

## 2019-03-21 RX ADMIN — Medication 1 PATCH: at 14:25

## 2019-03-21 NOTE — PROGRESS NOTE ADULT - ASSESSMENT
87 yo m admitted 3-19-19 with pmh copd, current smoker, etoh use, anemia, presents 87 yo m admitted 3-19-19 with pmh copd, current smoker, etoh use, anemia, presents with acute cva.    1. acute cva: neg ct head. neurology consulted dr murphy daughter aware. c/w asa and statin  lipid panel done. hba1c 4.9 echo done showed ef 60-65% with grade 1 diastolic dysfunction   2. copd: stable c/w symbicort and duoneb  3. smoking cessation: c/w nicotine patch  4. PT recommends ДМИТРИЙ  5. dvt ppx: xarelto (started this admission s/p acute cva on asa patient has been non compliant w/ asa f/u neurology regarding xarelto.)

## 2019-03-21 NOTE — CONSULT NOTE ADULT - ATTENDING COMMENTS
anticoagulation  indication is for a history of DVT and pulmonary embolism. there have been issues with non compliance in the past perhaps on the basis of expense. that not withstanding, would continue to monitor to rule out atrial fibrillation. an echo would be helpful as well.

## 2019-03-21 NOTE — CONSULT NOTE ADULT - SUBJECTIVE AND OBJECTIVE BOX
Chief Complaint:     86M PMH COPD, current smoker, alcohol use, anemia presents for left sided numbness, facial droop and dysarthria.  Patient states he hasn't felt well in many months. States he hasn't left the house in several months.  Denies chest pain, sob, nausea, vomiting.  He is limited in his mobility.  His family noticed he was more weak than usual and complaining about left sided numbness and talking weird so they brought him in. review of outpaitne record recvela ahsitory of DVT and pulmonary embolism for which he was placed on anticoagulation a  noac. he has a hsitor of anemia and copd and an unsteady gait. a cardiac evaluation is requested with respect to indications for an anticoagulation    HPI:    PMH:   HTN (hypertension)  Dyslipidemia  CAD (coronary artery disease)  Peripheral vascular disease  Parkinson disease    PSH:   No significant past surgical history    Family History:  FAMILY HISTORY:  No pertinent family history in first degree relatives      Social History:  Smoking:  Alcohol:  Drugs:    Allergies:  No Known Allergies      Medications:  ALBUTerol/ipratropium for Nebulization 3 milliLiter(s) Nebulizer every 6 hours PRN  aspirin enteric coated 81 milliGRAM(s) Oral daily  atorvastatin 40 milliGRAM(s) Oral at bedtime  buDESOnide 160 MICROgram(s)/formoterol 4.5 MICROgram(s) Inhaler 2 Puff(s) Inhalation two times a day  gabapentin 300 milliGRAM(s) Oral at bedtime  nicotine -   7 mG/24Hr(s) Patch 1 patch Transdermal daily  rivaroxaban 20 milliGRAM(s) Oral every 24 hours  tamsulosin 0.4 milliGRAM(s) Oral at bedtime      REVIEW OF SYSTEMS:  CONSTITUTIONAL: No fever, weight loss, or fatigue  EYES: No eye pain, visual disturbances, or discharge  ENMT:  No difficulty hearing, tinnitus, vertigo; No sinus or throat pain  NECK: No pain or stiffness  BREASTS: No pain, masses, or nipple discharge  RESPIRATORY: No cough, wheezing, chills or hemoptysis; No shortness of breath  CARDIOVASCULAR: No chest pain, palpitations, dizziness, or leg swelling  GASTROINTESTINAL: No abdominal or epigastric pain. No nausea, vomiting, or hematemesis; No diarrhea or constipation. No melena or hematochezia.  GENITOURINARY: No dysuria, frequency, hematuria, or incontinence  NEUROLOGICAL: No headaches, memory loss, loss of strength, numbness, or tremors  SKIN: No itching, burning, rashes, or lesions   LYMPH NODES: No enlarged glands  ENDOCRINE: No heat or cold intolerance; No hair loss  MUSCULOSKELETAL: No joint pain or swelling; No muscle, back, or extremity pain  PSYCHIATRIC: No depression, anxiety, mood swings, or difficulty sleeping  HEME/LYMPH: No easy bruising, or bleeding gums  ALLERY AND IMMUNOLOGIC: No hives or eczema    Physical Exam:  T(C): 36.8 (19 @ 15:58), Max: 36.8 (19 @ 15:58)  HR: 74 (19 @ 15:58) (67 - 77)  BP: 122/53 (19 @ 15:58) (122/53 - 166/74)  RR: 14 (19 @ 15:58) (14 - 18)  SpO2: 96% (19 @ 15:58) (93% - 100%)  Wt(kg): --    GENERAL: NAD, elderly gentleman alopecia daughter at bedside  HEAD:  Atraumatic, Normocephalic  EYES: EOMI, conjunctiva and sclera clear  ENT: Moist mucous membranes,  NECK: Supple, No JVD, no bruits  CHEST/LUNG: Clear to percussion bilaterally; No rales, rhonchi, wheezing, or rubs  HEART: Regular rate and rhythm; No murmurs, rubs, or gallops PMI non displaced.  ABDOMEN: Soft, Nontender, Nondistended; Bowel sounds present  EXTREMITIES:  2+ Peripheral Pulses, No clubbing, cyanosis, or edema  SKIN: No rashes or lesions  NERVOUS SYSTEM:  Cranial Nerves II-XII intact     Cardiovascular Diagnostic Testing:  ECG:    < from: 12 Lead ECG (19 @ 16:55) >  Ventricular Rate 66 BPM    Atrial Rate 66 BPM    P-R Interval 156 ms    QRS Duration 74 ms    Q-T Interval 418 ms    QTC Calculation(Bezet) 438 ms    P Axis 48 degrees    R Axis -17 degrees    T Axis 28 degrees    Diagnosis Line Normal sinus rhythm  Normal ECG  When compared with ECG of 23-OCT-2018 16:11,  No significant change was found  Confirmed by TONO TRIMBLE, HAMILTON VALDEZ () on 3/20/2019 8:33:44 AM    < end of copied text >      ECHO:    Labs:                        8.4    7.2   )-----------( 306      ( 21 Mar 2019 05:15 )             27.0         141  |  106  |  28<H>  ----------------------------<  90  4.3   |  23  |  1.18    Ca    8.5      21 Mar 2019 05:15    TPro  7.1  /  Alb  2.8<L>  /  TBili  0.3  /  DBili  x   /  AST  17  /  ALT  7<L>  /  AlkPhos  106        Total Cholesterol: 113  LDL: 57  HDL: 39  T    Hemoglobin A1C, Whole Blood: 4.9 % ( @ 19:20)  Hemoglobin A1C, Whole Blood: 4.9 % ( @ 19:20)        Imaging:    < from: Xray Chest 1 View- PORTABLE-Urgent (19 @ 17:47) >  EXAM:  XR CHEST PORTABLE URGENT 1V      PROCEDURE DATE:  2019        INTERPRETATION:  AP erect chest on 2019 at 5:28 PM. Patient has   cough.    Heart is magnified by technique.    Old fracture deformities of right upper ribs again seen. Scattered right   pleural calcifications mild in degree over right chest again seen.    Small granuloma right lower lung field again appreciated.    CAT scan of 2018 should scattered areas of lung scarring of   the right upper lobe medially and at the right base. These densities are   again suggested on the present x-ray.    There is no sense of active lung or pleural finding.    Chest x-ray appearance is similar to 2018.    IMPRESSION: Old right upper lobe rib fractures and lung scars again   appreciated.      VICENTE HERNANDEZ M.D., ATTENDING RADIOLOGIST  This document has been electronically signed. Mar 19 2019  5:57PM    < end of copied text >

## 2019-03-21 NOTE — OCCUPATIONAL THERAPY INITIAL EVALUATION ADULT - RANGE OF MOTION EXAMINATION
B/L UE's limited at a,ll joints in all planes of motion; unable to lift UE's above 90 degrees of flexion, unable to fully flex elbows into flexion ~100 degrees./deficits as listed below

## 2019-03-21 NOTE — CONSULT NOTE ADULT - ASSESSMENT
85 yo male known to me with a chronic probable cervical myelopathy and gait disorder with non diagnostic MRIs presents with probable TIA/CVA with dysarthria and facial droop and left sided numbness now improved.  R/O right carotid stenosis, R/O subcortical involvement due to small vessel disease.  R/O cardioembolic event.  Unclear why patient receives Xarelto.    REC:  MRI brain noncontrast, carotid sonogram, TTE,, ASA, statin, cardiac opinion regards indication for Xarelto in this setting. 87 yo male known to me with a chronic probable cervical myelopathy and gait disorder with non diagnostic MRIs presents with probable TIA/CVA with dysarthria and facial droop and left sided numbness now improved.  R/O right carotid stenosis, R/O subcortical involvement due to small vessel disease.  R/O cardioembolic event.  Unclear why patient receives Xarelto.    REC:  MRI brain noncontrast, carotid sonogram, TTE,, ASA, statin, smoking cessation, cardiac opinion regards indication for Xarelto in this setting.

## 2019-03-21 NOTE — OCCUPATIONAL THERAPY INITIAL EVALUATION ADULT - RANGE OF MOTION EXAMINATION, UPPER EXTREMITY
+ pain noted at end range at each joint./bilateral UE Passive ROM was WFL  (within functional limits)

## 2019-03-21 NOTE — CONSULT NOTE ADULT - SUBJECTIVE AND OBJECTIVE BOX
Neurology consult    ELIZABETH [id number]     Patient is a 86y old  Male who presents with a chief complaint of left sided numbness, dysarthria (21 Mar 2019 10:49)      HPI:  86M PMH COPD, current smoker, alcohol use, anemia presents for left sided numbness, facial droop and dysarthria.  Patient states he hasn't felt well in many months. States he hasn't left the house in several months.  Denies chest pain, sob, nausea, vomiting.  He is limited in his mobility.  His family noticed he was more weak than usual and complaining about left sided numbness and talking weird so they brought him in. (19 Mar 2019 18:51    Patient known to me with chronic progressive gait disorder and bilateral brisk refleses and Babinski signs last sen in 2017 and MRI brain and spine non diagnostic though cervical myelopathy was suspected.     MEDICATIONS    ALBUTerol/ipratropium for Nebulization 3 milliLiter(s) Nebulizer every 6 hours PRN  aspirin enteric coated 81 milliGRAM(s) Oral daily  atorvastatin 40 milliGRAM(s) Oral at bedtime  buDESOnide 160 MICROgram(s)/formoterol 4.5 MICROgram(s) Inhaler 2 Puff(s) Inhalation two times a day  gabapentin 300 milliGRAM(s) Oral at bedtime  nicotine -   7 mG/24Hr(s) Patch 1 patch Transdermal daily  rivaroxaban 20 milliGRAM(s) Oral every 24 hours  tamsulosin 0.4 milliGRAM(s) Oral at bedtime      PMH: HTN (hypertension)  Dyslipidemia  CAD (coronary artery disease)  Peripheral vascular disease  Parkinson disease       PSH: No significant past surgical history      Family history:   FAMILY HISTORY:  No pertinent family history in first degree relatives      SOCIAL HISTORY:  No history of tobacco or alcohol use     Allergies    No Known Allergies    Intolerances            Vital Signs Last 24 Hrs  T(C): 36.8 (21 Mar 2019 15:58), Max: 36.8 (21 Mar 2019 15:58)  T(F): 98.2 (21 Mar 2019 15:58), Max: 98.2 (21 Mar 2019 15:58)  HR: 74 (21 Mar 2019 15:58) (67 - 77)  BP: 122/53 (21 Mar 2019 15:58) (122/53 - 166/74)  BP(mean): 98 (20 Mar 2019 20:18) (68 - 98)  RR: 14 (21 Mar 2019 15:58) (14 - 18)  SpO2: 96% (21 Mar 2019 15:58) (93% - 100%)      On Neurological Examination:    Head: normocephalic Neck: supple no carotid bruits    Mental Status - Patient is alert, approximately oriented dysarthric edentulous not aphasic    Cranial Nerves - PERRL, EOMI, VFF, normal V through XII no nystagmus    Motor Exam :  No drift or focal paresis increased tone negative dysmetria    Sensory    Intact to light touch and pinprick     Reflexes:  symmetric brisk 3+  plantars bilaterally upgoing    Gait:  not tested           PTT - ( 19 Mar 2019 17:05 )  PTT:36.9 sec  Total [id number] mg/dL   HDL 39 mg/dL  LDL 57 mg/dL          RADIOLOGY  CT:   < from: CT Head No Cont (03.19.19 @ 17:46) >    EXAM:  CT BRAIN      PROCEDURE DATE:  03/19/2019        INTERPRETATION:  CLINICAL INFORMATION: Slurred speech.    COMPARISON: CT head of 7/22/2016.    PROCEDURE:   Noncontrast CT of the Head was performed from the skull base to the   vertex. Coronal and Sagittal reformats were obtained.    FINDINGS:    There is no acute intracranial hemorrhage, mass effect, midline shift,   extra-axial collection, hydrocephalus, or evidence of acute vascular   territorial infarction. Moderate patchy hypodensities within the   periventricular and subcortical white matter, although nonspecific,   likely reflect chronic microvascular disease. Cerebral white loss results   in mild prominence of the ventricles and sulci. Stable bilateral basal   ganglia and thalamic calcification/mineralization.    Patchy paranasal sinus mucosal thickening, predominantly involving the   left frontal sinus and bilateral ethmoid air cells. Intraorbital contents   are unremarkable. Visualized osseous structures are intact.    IMPRESSION:     No acute intracranial hemorrhage, mass effect, or evidence of acute   vascular territorial infarction.    < end of copied text >

## 2019-03-21 NOTE — OCCUPATIONAL THERAPY INITIAL EVALUATION ADULT - NS ASR FOLLOW COMMAND OT EVAL
able to follow multistep instructions/100% of the time/able to follow single-step instructions/+ slurred speech.

## 2019-03-21 NOTE — OCCUPATIONAL THERAPY INITIAL EVALUATION ADULT - RANGE OF MOTION, PT EVAL
Pt. will improve AROM B/L UE's by at least 10 degrees or greater in order to perform functional ADL's at each joint into flexion/extension.

## 2019-03-21 NOTE — OCCUPATIONAL THERAPY INITIAL EVALUATION ADULT - ADL RETRAINING, OT EVAL
Pt. will be able to don pants with moderate assistance and don t-shirt with minimum assistance with AE as needed.

## 2019-03-21 NOTE — PROGRESS NOTE ADULT - SUBJECTIVE AND OBJECTIVE BOX
Patient is a 86y old  Male who presents with a chief complaint of left sided numbness, dysarthria (20 Mar 2019 12:00)      Patient seen and examined at bedside.    ALLERGIES:  No Known Allergies    MEDICATIONS:  ALBUTerol/ipratropium for Nebulization 3 milliLiter(s) Nebulizer every 6 hours PRN  aspirin enteric coated 81 milliGRAM(s) Oral daily  atorvastatin 40 milliGRAM(s) Oral at bedtime  buDESOnide 160 MICROgram(s)/formoterol 4.5 MICROgram(s) Inhaler 2 Puff(s) Inhalation two times a day  gabapentin 300 milliGRAM(s) Oral at bedtime  nicotine -   7 mG/24Hr(s) Patch 1 patch Transdermal daily  rivaroxaban 20 milliGRAM(s) Oral every 24 hours  tamsulosin 0.4 milliGRAM(s) Oral at bedtime    Vital Signs Last 24 Hrs  T(F): 98.1 (21 Mar 2019 10:13), Max: 98.1 (21 Mar 2019 10:13)  HR: 67 (21 Mar 2019 10:13) (59 - 77)  BP: 128/72 (21 Mar 2019 10:13) (107/48 - 166/74)  RR: 16 (21 Mar 2019 10:13) (15 - 18)  SpO2: 95% (21 Mar 2019 10:13) (93% - 100%)  I&O's Summary    20 Mar 2019 07:01  -  21 Mar 2019 07:00  --------------------------------------------------------  IN: 50 mL / OUT: 0 mL / NET: 50 mL        PHYSICAL EXAM:  General: NAD,    ENT: MMM  Neck: Supple, No JVD  Lungs: Clear to auscultation bilaterally  Cardio: RRR, S1/S2, No murmurs  Abdomen: Soft, Nontender, Nondistended; Bowel sounds present  Extremities: No cyanosis, No edema    LABS:                        8.4    7.2   )-----------( 306      ( 21 Mar 2019 05:15 )             27.0     03-21    141  |  106  |  28  ----------------------------<  90  4.3   |  23  |  1.18    Ca    8.5      21 Mar 2019 05:15    TPro  7.1  /  Alb  2.8  /  TBili  0.3  /  DBili  x   /  AST  17  /  ALT  7   /  AlkPhos  106      eGFR if Non African American: 55 mL/min/1.73M2 (19 @ 05:15)  eGFR if African American: 64 mL/min/1.73M2 (19 @ 05:15)    PT/INR - ( 19 Mar 2019 17:05 )   PT: 21.6 sec;   INR: 1.89 ratio         PTT - ( 19 Mar 2019 17:05 )  PTT:36.9 sec    CARDIAC MARKERS ( 19 Mar 2019 17:05 )  <.017 ng/mL / x     / x     / x     / x           Chol 113 mg/dL LDL 57 mg/dL HDL 39 mg/dL Trig 84 mg/dL        CAPILLARY BLOOD GLUCOSE         [id number] 4.9    Urinalysis Basic - ( 19 Mar 2019 17:25 )    Color: Yellow / Appearance: Clear / S.020 / pH: x  Gluc: x / Ketone: Negative  / Bili: Negative / Urobili: Negative   Blood: x / Protein: 15 / Nitrite: Negative   Leuk Esterase: Negative / RBC: 0-4 /HPF / WBC Negative /HPF   Sq Epi: x / Non Sq Epi: Neg.-Few / Bacteria: Trace /HPF        Culture - Urine (collected 19 Mar 2019 17:25)  Source: .Urine Clean Catch (Midstream)  Final Report (20 Mar 2019 15:41):    <10,000 CFU/mL Normal Urogenital Keri        RADIOLOGY & ADDITIONAL TESTS:    Care Discussed with Consultants/Other Providers:

## 2019-03-22 ENCOUNTER — APPOINTMENT (OUTPATIENT)
Dept: MRI IMAGING | Facility: HOSPITAL | Age: 84
End: 2019-03-22

## 2019-03-22 ENCOUNTER — TRANSCRIPTION ENCOUNTER (OUTPATIENT)
Age: 84
End: 2019-03-22

## 2019-03-22 ENCOUNTER — INPATIENT (INPATIENT)
Facility: HOSPITAL | Age: 84
LOS: 11 days | Discharge: HOME | DRG: 949 | End: 2019-04-03
Attending: PSYCHIATRY & NEUROLOGY | Admitting: PSYCHIATRY & NEUROLOGY
Payer: MEDICARE

## 2019-03-22 VITALS
HEIGHT: 66.93 IN | SYSTOLIC BLOOD PRESSURE: 138 MMHG | WEIGHT: 154.32 LBS | TEMPERATURE: 98 F | RESPIRATION RATE: 14 BRPM | HEART RATE: 87 BPM | DIASTOLIC BLOOD PRESSURE: 74 MMHG

## 2019-03-22 VITALS
SYSTOLIC BLOOD PRESSURE: 126 MMHG | OXYGEN SATURATION: 95 % | TEMPERATURE: 98 F | DIASTOLIC BLOOD PRESSURE: 69 MMHG | HEART RATE: 78 BPM | RESPIRATION RATE: 16 BRPM

## 2019-03-22 DIAGNOSIS — I63.9 CEREBRAL INFARCTION, UNSPECIFIED: ICD-10-CM

## 2019-03-22 LAB
ANION GAP SERPL CALC-SCNC: 11 MMOL/L — SIGNIFICANT CHANGE UP (ref 5–17)
BUN SERPL-MCNC: 32 MG/DL — HIGH (ref 7–23)
CALCIUM SERPL-MCNC: 8.4 MG/DL — SIGNIFICANT CHANGE UP (ref 8.4–10.5)
CHLORIDE SERPL-SCNC: 105 MMOL/L — SIGNIFICANT CHANGE UP (ref 96–108)
CO2 SERPL-SCNC: 24 MMOL/L — SIGNIFICANT CHANGE UP (ref 22–31)
CREAT SERPL-MCNC: 1.13 MG/DL — SIGNIFICANT CHANGE UP (ref 0.5–1.3)
GLUCOSE SERPL-MCNC: 85 MG/DL — SIGNIFICANT CHANGE UP (ref 70–99)
HCT VFR BLD CALC: 27.4 % — LOW (ref 39–50)
HGB BLD-MCNC: 8.5 G/DL — LOW (ref 13–17)
MCHC RBC-ENTMCNC: 26.2 PG — LOW (ref 27–34)
MCHC RBC-ENTMCNC: 30.9 GM/DL — LOW (ref 32–36)
MCV RBC AUTO: 84.9 FL — SIGNIFICANT CHANGE UP (ref 80–100)
PLATELET # BLD AUTO: 330 K/UL — SIGNIFICANT CHANGE UP (ref 150–400)
POTASSIUM SERPL-MCNC: 4.2 MMOL/L — SIGNIFICANT CHANGE UP (ref 3.5–5.3)
POTASSIUM SERPL-SCNC: 4.2 MMOL/L — SIGNIFICANT CHANGE UP (ref 3.5–5.3)
RBC # BLD: 3.23 M/UL — LOW (ref 4.2–5.8)
RBC # FLD: 19.4 % — HIGH (ref 10.3–14.5)
SODIUM SERPL-SCNC: 140 MMOL/L — SIGNIFICANT CHANGE UP (ref 135–145)
WBC # BLD: 8.5 K/UL — SIGNIFICANT CHANGE UP (ref 3.8–10.5)
WBC # FLD AUTO: 8.5 K/UL — SIGNIFICANT CHANGE UP (ref 3.8–10.5)

## 2019-03-22 PROCEDURE — 93306 TTE W/DOPPLER COMPLETE: CPT

## 2019-03-22 PROCEDURE — 85027 COMPLETE CBC AUTOMATED: CPT

## 2019-03-22 PROCEDURE — 80061 LIPID PANEL: CPT

## 2019-03-22 PROCEDURE — 70551 MRI BRAIN STEM W/O DYE: CPT

## 2019-03-22 PROCEDURE — 92610 EVALUATE SWALLOWING FUNCTION: CPT

## 2019-03-22 PROCEDURE — 85730 THROMBOPLASTIN TIME PARTIAL: CPT

## 2019-03-22 PROCEDURE — 81001 URINALYSIS AUTO W/SCOPE: CPT

## 2019-03-22 PROCEDURE — 36000 PLACE NEEDLE IN VEIN: CPT

## 2019-03-22 PROCEDURE — 93880 EXTRACRANIAL BILAT STUDY: CPT

## 2019-03-22 PROCEDURE — 80048 BASIC METABOLIC PNL TOTAL CA: CPT

## 2019-03-22 PROCEDURE — 36415 COLL VENOUS BLD VENIPUNCTURE: CPT

## 2019-03-22 PROCEDURE — 71045 X-RAY EXAM CHEST 1 VIEW: CPT

## 2019-03-22 PROCEDURE — 87086 URINE CULTURE/COLONY COUNT: CPT

## 2019-03-22 PROCEDURE — 70551 MRI BRAIN STEM W/O DYE: CPT | Mod: 26

## 2019-03-22 PROCEDURE — 94640 AIRWAY INHALATION TREATMENT: CPT

## 2019-03-22 PROCEDURE — 84484 ASSAY OF TROPONIN QUANT: CPT

## 2019-03-22 PROCEDURE — 99239 HOSP IP/OBS DSCHRG MGMT >30: CPT

## 2019-03-22 PROCEDURE — 99223 1ST HOSP IP/OBS HIGH 75: CPT

## 2019-03-22 PROCEDURE — 99285 EMERGENCY DEPT VISIT HI MDM: CPT | Mod: 25

## 2019-03-22 PROCEDURE — 83036 HEMOGLOBIN GLYCOSYLATED A1C: CPT

## 2019-03-22 PROCEDURE — 99232 SBSQ HOSP IP/OBS MODERATE 35: CPT

## 2019-03-22 PROCEDURE — 85610 PROTHROMBIN TIME: CPT

## 2019-03-22 PROCEDURE — 97162 PT EVAL MOD COMPLEX 30 MIN: CPT

## 2019-03-22 PROCEDURE — 93880 EXTRACRANIAL BILAT STUDY: CPT | Mod: 26

## 2019-03-22 PROCEDURE — 93005 ELECTROCARDIOGRAM TRACING: CPT

## 2019-03-22 PROCEDURE — 80053 COMPREHEN METABOLIC PANEL: CPT

## 2019-03-22 PROCEDURE — 70450 CT HEAD/BRAIN W/O DYE: CPT

## 2019-03-22 RX ORDER — BUDESONIDE AND FORMOTEROL FUMARATE DIHYDRATE 160; 4.5 UG/1; UG/1
2 AEROSOL RESPIRATORY (INHALATION)
Qty: 0 | Refills: 0 | Status: DISCONTINUED | OUTPATIENT
Start: 2019-03-22 | End: 2019-04-03

## 2019-03-22 RX ORDER — TAMSULOSIN HYDROCHLORIDE 0.4 MG/1
1 CAPSULE ORAL
Qty: 0 | Refills: 0 | COMMUNITY

## 2019-03-22 RX ORDER — RIVAROXABAN 15 MG-20MG
1 KIT ORAL
Qty: 0 | Refills: 0 | COMMUNITY
Start: 2019-03-22

## 2019-03-22 RX ORDER — ATORVASTATIN CALCIUM 80 MG/1
1 TABLET, FILM COATED ORAL
Qty: 0 | Refills: 0 | COMMUNITY
Start: 2019-03-22

## 2019-03-22 RX ORDER — LANOLIN ALCOHOL/MO/W.PET/CERES
3 CREAM (GRAM) TOPICAL AT BEDTIME
Qty: 0 | Refills: 0 | Status: DISCONTINUED | OUTPATIENT
Start: 2019-03-22 | End: 2019-04-03

## 2019-03-22 RX ORDER — RIVAROXABAN 15 MG-20MG
20 KIT ORAL EVERY 24 HOURS
Qty: 0 | Refills: 0 | Status: DISCONTINUED | OUTPATIENT
Start: 2019-03-24 | End: 2019-03-28

## 2019-03-22 RX ORDER — GABAPENTIN 400 MG/1
300 CAPSULE ORAL AT BEDTIME
Qty: 0 | Refills: 0 | Status: DISCONTINUED | OUTPATIENT
Start: 2019-03-24 | End: 2019-04-03

## 2019-03-22 RX ORDER — TAMSULOSIN HYDROCHLORIDE 0.4 MG/1
0.4 CAPSULE ORAL AT BEDTIME
Qty: 0 | Refills: 0 | Status: DISCONTINUED | OUTPATIENT
Start: 2019-03-22 | End: 2019-04-03

## 2019-03-22 RX ORDER — ASPIRIN/CALCIUM CARB/MAGNESIUM 324 MG
81 TABLET ORAL DAILY
Qty: 0 | Refills: 0 | Status: DISCONTINUED | OUTPATIENT
Start: 2019-03-22 | End: 2019-04-03

## 2019-03-22 RX ORDER — NICOTINE POLACRILEX 2 MG
1 GUM BUCCAL DAILY
Qty: 0 | Refills: 0 | Status: DISCONTINUED | OUTPATIENT
Start: 2019-03-22 | End: 2019-04-03

## 2019-03-22 RX ORDER — PANTOPRAZOLE SODIUM 20 MG/1
40 TABLET, DELAYED RELEASE ORAL
Qty: 0 | Refills: 0 | Status: DISCONTINUED | OUTPATIENT
Start: 2019-03-22 | End: 2019-04-03

## 2019-03-22 RX ORDER — NICOTINE POLACRILEX 2 MG
0 GUM BUCCAL
Qty: 0 | Refills: 0 | COMMUNITY
Start: 2019-03-22

## 2019-03-22 RX ORDER — ATORVASTATIN CALCIUM 80 MG/1
40 TABLET, FILM COATED ORAL AT BEDTIME
Qty: 0 | Refills: 0 | Status: DISCONTINUED | OUTPATIENT
Start: 2019-03-22 | End: 2019-04-03

## 2019-03-22 RX ADMIN — Medication 3 MILLIGRAM(S): at 22:31

## 2019-03-22 RX ADMIN — TAMSULOSIN HYDROCHLORIDE 0.4 MILLIGRAM(S): 0.4 CAPSULE ORAL at 22:31

## 2019-03-22 RX ADMIN — Medication 1 PATCH: at 11:10

## 2019-03-22 RX ADMIN — Medication 81 MILLIGRAM(S): at 11:48

## 2019-03-22 RX ADMIN — ATORVASTATIN CALCIUM 40 MILLIGRAM(S): 80 TABLET, FILM COATED ORAL at 22:31

## 2019-03-22 RX ADMIN — Medication 1 PATCH: at 07:34

## 2019-03-22 RX ADMIN — Medication 1 PATCH: at 11:49

## 2019-03-22 RX ADMIN — BUDESONIDE AND FORMOTEROL FUMARATE DIHYDRATE 2 PUFF(S): 160; 4.5 AEROSOL RESPIRATORY (INHALATION) at 21:59

## 2019-03-22 RX ADMIN — RIVAROXABAN 20 MILLIGRAM(S): KIT at 16:12

## 2019-03-22 NOTE — PROGRESS NOTE ADULT - SUBJECTIVE AND OBJECTIVE BOX
Patient is a 86y old  Male who presents with a chief complaint of left sided numbness, dysarthria (21 Mar 2019 17:16)      Patient seen and examined at bedside. Complains of slurred speech. No focal weakness per patient.    ALLERGIES:  No Known Allergies    MEDICATIONS  (STANDING):  aspirin enteric coated 81 milliGRAM(s) Oral daily  atorvastatin 40 milliGRAM(s) Oral at bedtime  buDESOnide 160 MICROgram(s)/formoterol 4.5 MICROgram(s) Inhaler 2 Puff(s) Inhalation two times a day  gabapentin 300 milliGRAM(s) Oral at bedtime  nicotine -   7 mG/24Hr(s) Patch 1 patch Transdermal daily  rivaroxaban 20 milliGRAM(s) Oral every 24 hours  tamsulosin 0.4 milliGRAM(s) Oral at bedtime    MEDICATIONS  (PRN):  ALBUTerol/ipratropium for Nebulization 3 milliLiter(s) Nebulizer every 6 hours PRN Shortness of Breath and/or Wheezing    Vital Signs Last 24 Hrs  T(F): 98.2 (22 Mar 2019 10:05), Max: 98.2 (21 Mar 2019 15:58)  HR: 77 (22 Mar 2019 10:05) (74 - 79)  BP: 106/54 (22 Mar 2019 10:05) (106/54 - 140/69)  RR: 15 (22 Mar 2019 10:05) (14 - 16)  SpO2: 93% (22 Mar 2019 10:05) (93% - 98%)  I&O's Summary    21 Mar 2019 07:01  -  22 Mar 2019 07:00  --------------------------------------------------------  IN: 0 mL / OUT: 750 mL / NET: -750 mL      PHYSICAL EXAM:  General: NAD, A/O x 3  ENT: poor dentition  Neck: Supple, No JVD  Lungs: Clear to auscultation bilaterally  Cardio: RRR, S1/S2, No murmurs  Abdomen: Soft, Nontender, Nondistended; Bowel sounds present  Extremities: No calf tenderness, No pitting edema  Neuro: Slurred speech    LABS:                        8.5    8.5   )-----------( 330      ( 22 Mar 2019 06:53 )             27.4         140  |  105  |  32  ----------------------------<  85  4.2   |  24  |  1.13    Ca    8.4      22 Mar 2019 06:53    TPro  7.1  /  Alb  2.8  /  TBili  0.3  /  DBili  x   /  AST  17  /  ALT  7   /  AlkPhos  106      eGFR if Non African American: 59 mL/min/1.73M2 (19 @ 06:53)  eGFR if African American: 68 mL/min/1.73M2 (19 @ 06:53)    PT/INR - ( 19 Mar 2019 17:05 )   PT: 21.6 sec;   INR: 1.89 ratio         PTT - ( 19 Mar 2019 17:05 )  PTT:36.9 sec    CARDIAC MARKERS ( 19 Mar 2019 17:05 )  <.017 ng/mL / x     / x     / x     / x           Chol 113 mg/dL LDL 57 mg/dL HDL 39 mg/dL Trig 84 mg/dL        CAPILLARY BLOOD GLUCOSE         [id number] 4.9    Urinalysis Basic - ( 19 Mar 2019 17:25 )    Color: Yellow / Appearance: Clear / S.020 / pH: x  Gluc: x / Ketone: Negative  / Bili: Negative / Urobili: Negative   Blood: x / Protein: 15 / Nitrite: Negative   Leuk Esterase: Negative / RBC: 0-4 /HPF / WBC Negative /HPF   Sq Epi: x / Non Sq Epi: Neg.-Few / Bacteria: Trace /HPF        Culture - Urine (collected 19 Mar 2019 17:25)  Source: .Urine Clean Catch (Midstream)  Final Report (20 Mar 2019 15:41):    <10,000 CFU/mL Normal Urogenital Keri      RADIOLOGY & ADDITIONAL TESTS:    < from: TTE Echo Complete w/Doppler (19 @ 08:35) >   1. Left ventricular ejection fraction, by visual estimation, is 60 to   65%.   2. Technically limited study.   3. Spectral Doppler shows impaired relaxation pattern of left   ventricular myocardial filling (Grade I diastolic dysfunction).   4. Thickening and calcification of the anterior and posterior mitral   valve leaflets.   5. Mild tricuspid regurgitation.   6. Mild aortic regurgitation.   7. Sclerotic aortic valve with normal opening.   8. LA volume Index is 29.6 ml/m² ml/m2.   9. The aortic valve mean gradient is 5.0 mmHg consistent with normally   opening aortic valve.    < end of copied text >    < from: 12 Lead ECG (19 @ 16:55) >  Diagnosis Line Normal sinus rhythm  Normal ECG    < end of copied text >    TELE: SR [id number]    Care Discussed with Consultants/Other Providers: Dr. Nye Patient is a 86y old  Male who presents with a chief complaint of left sided numbness, dysarthria (21 Mar 2019 17:16)      Patient seen and examined at bedside. Complains of slurred speech. No focal weakness per patient.    ALLERGIES:  No Known Allergies    MEDICATIONS  (STANDING):  aspirin enteric coated 81 milliGRAM(s) Oral daily  atorvastatin 40 milliGRAM(s) Oral at bedtime  buDESOnide 160 MICROgram(s)/formoterol 4.5 MICROgram(s) Inhaler 2 Puff(s) Inhalation two times a day  gabapentin 300 milliGRAM(s) Oral at bedtime  nicotine -   7 mG/24Hr(s) Patch 1 patch Transdermal daily  rivaroxaban 20 milliGRAM(s) Oral every 24 hours  tamsulosin 0.4 milliGRAM(s) Oral at bedtime    MEDICATIONS  (PRN):  ALBUTerol/ipratropium for Nebulization 3 milliLiter(s) Nebulizer every 6 hours PRN Shortness of Breath and/or Wheezing    Vital Signs Last 24 Hrs  T(F): 98.2 (22 Mar 2019 10:05), Max: 98.2 (21 Mar 2019 15:58)  HR: 77 (22 Mar 2019 10:05) (74 - 79)  BP: 106/54 (22 Mar 2019 10:05) (106/54 - 140/69)  RR: 15 (22 Mar 2019 10:05) (14 - 16)  SpO2: 93% (22 Mar 2019 10:05) (93% - 98%)  I&O's Summary    21 Mar 2019 07:01  -  22 Mar 2019 07:00  --------------------------------------------------------  IN: 0 mL / OUT: 750 mL / NET: -750 mL      PHYSICAL EXAM:  General: NAD, A/O x 3  ENT: poor dentition  Neck: Supple, No JVD  Lungs: Clear to auscultation bilaterally  Cardio: RRR, S1/S2, No murmurs  Abdomen: Soft, Nontender, Nondistended; Bowel sounds present  Extremities: No calf tenderness, No pitting edema  Neuro: Slurred speech    LABS:                        8.5    8.5   )-----------( 330      ( 22 Mar 2019 06:53 )             27.4         140  |  105  |  32  ----------------------------<  85  4.2   |  24  |  1.13    Ca    8.4      22 Mar 2019 06:53    TPro  7.1  /  Alb  2.8  /  TBili  0.3  /  DBili  x   /  AST  17  /  ALT  7   /  AlkPhos  106      eGFR if Non African American: 59 mL/min/1.73M2 (19 @ 06:53)  eGFR if African American: 68 mL/min/1.73M2 (19 @ 06:53)    PT/INR - ( 19 Mar 2019 17:05 )   PT: 21.6 sec;   INR: 1.89 ratio         PTT - ( 19 Mar 2019 17:05 )  PTT:36.9 sec    CARDIAC MARKERS ( 19 Mar 2019 17:05 )  <.017 ng/mL / x     / x     / x     / x           Chol 113 mg/dL LDL 57 mg/dL HDL 39 mg/dL Trig 84 mg/dL        CAPILLARY BLOOD GLUCOSE         [id number] 4.9    Urinalysis Basic - ( 19 Mar 2019 17:25 )    Color: Yellow / Appearance: Clear / S.020 / pH: x  Gluc: x / Ketone: Negative  / Bili: Negative / Urobili: Negative   Blood: x / Protein: 15 / Nitrite: Negative   Leuk Esterase: Negative / RBC: 0-4 /HPF / WBC Negative /HPF   Sq Epi: x / Non Sq Epi: Neg.-Few / Bacteria: Trace /HPF        Culture - Urine (collected 19 Mar 2019 17:25)  Source: .Urine Clean Catch (Midstream)  Final Report (20 Mar 2019 15:41):    <10,000 CFU/mL Normal Urogenital Keri      RADIOLOGY & ADDITIONAL TESTS:    < from: MR Head No Cont (19 @ 10:24) >  Subcentimeter acute infarct right harper.    No acute intracranial hemorrhage    < end of copied text >      < from: TTE Echo Complete w/Doppler (19 @ 08:35) >   1. Left ventricular ejection fraction, by visual estimation, is 60 to   65%.   2. Technically limited study.   3. Spectral Doppler shows impaired relaxation pattern of left   ventricular myocardial filling (Grade I diastolic dysfunction).   4. Thickening and calcification of the anterior and posterior mitral   valve leaflets.   5. Mild tricuspid regurgitation.   6. Mild aortic regurgitation.   7. Sclerotic aortic valve with normal opening.   8. LA volume Index is 29.6 ml/m² ml/m2.   9. The aortic valve mean gradient is 5.0 mmHg consistent with normally   opening aortic valve.    < end of copied text >    < from: 12 Lead ECG (19 @ 16:55) >  Diagnosis Line Normal sinus rhythm  Normal ECG    < end of copied text >    TELE:  [id number]    Care Discussed with Consultants/Other Providers: Dr. Nye

## 2019-03-22 NOTE — H&P ADULT - NSICDXPASTMEDICALHX_GEN_ALL_CORE_FT
PAST MEDICAL HISTORY:  CAD (coronary artery disease)     Deep vein thrombosis (DVT)     Dyslipidemia     HTN (hypertension)     Parkinson disease     Peripheral vascular disease

## 2019-03-22 NOTE — DISCHARGE NOTE PROVIDER - HOSPITAL COURSE
Patient admitted for left sided numbness, dysarthria and dysphagia. Initial CT head negative for acute CVA. MRI of brain showed acute right pontine infarct. Started on ASA and statin. Numbness is improved. Patient accepted to acute rehab for further brain injury rehabilitation. PMD Dr. Day notified.

## 2019-03-22 NOTE — H&P ADULT - NSHPREVIEWOFSYSTEMS_GEN_ALL_CORE
REVIEW OF SYSTEMS:  CONSTITUTIONAL: No fever, weight loss, or fatigue  EYES: No eye pain, visual disturbances, or discharge  ENMT:  No difficulty hearing, tinnitus, vertigo; No sinus or throat pain  NECK: No pain or stiffness  BREASTS: No pain, masses, or nipple discharge  RESPIRATORY: No cough, wheezing, chills or hemoptysis; No shortness of breath  CARDIOVASCULAR: No chest pain, palpitations, dizziness, or leg swelling  GASTROINTESTINAL: No abdominal or epigastric pain. No nausea, vomiting, or hematemesis; No diarrhea or constipation. No melena or hematochezia.  GENITOURINARY: No dysuria, frequency, hematuria, or incontinence  NEUROLOGICAL: No headaches, memory loss, loss of strength, numbness, or tremors  SKIN: No itching, burning, rashes, or lesions   LYMPH NODES: No enlarged glands  ENDOCRINE: No heat or cold intolerance; No hair loss  MUSCULOSKELETAL: No joint pain or swelling; No muscle, back, or extremity pain  PSYCHIATRIC: No depression, anxiety, mood swings, or difficulty sleeping  HEME/LYMPH: No easy bruising, or bleeding gums  ALLERY AND IMMUNOLOGIC: No hives or eczema CONSTITUTIONAL: No fever, weight loss, or fatigue  EYES: No eye pain, visual disturbances, or discharge  ENMT:  No difficulty hearing, tinnitus, vertigo; No sinus or throat pain  NECK: No pain or stiffness  BREASTS: No pain, masses, or nipple discharge  RESPIRATORY: No cough, wheezing, chills or hemoptysis; No shortness of breath  CARDIOVASCULAR: No chest pain, palpitations, dizziness, or leg swelling  GASTROINTESTINAL: No abdominal or epigastric pain. No nausea, vomiting, or hematemesis; No diarrhea or constipation. No melena or hematochezia.  GENITOURINARY: No dysuria, frequency, hematuria, or incontinence  NEUROLOGICAL: No headaches, memory loss, +loss of strength, +numbness  SKIN: No itching, burning, rashes, or lesions   LYMPH NODES: No enlarged glands  ENDOCRINE: No heat or cold intolerance; No hair loss  MUSCULOSKELETAL: No joint pain or swelling; No muscle, back, or extremity pain  PSYCHIATRIC: No depression, anxiety, mood swings, or difficulty sleeping  HEME/LYMPH: No easy bruising, or bleeding gums  ALLERY AND IMMUNOLOGIC: No hives or eczema

## 2019-03-22 NOTE — DISCHARGE NOTE PROVIDER - NSDCCPCAREPLAN_GEN_ALL_CORE_FT
PRINCIPAL DISCHARGE DIAGNOSIS  Diagnosis: Cerebrovascular accident (CVA), unspecified mechanism  Assessment and Plan of Treatment: Acute rehab, stop smoking, medication compliance

## 2019-03-22 NOTE — PROGRESS NOTE ADULT - REASON FOR ADMISSION
A phone message was left for the patient explaining that I would increase the glipizide to 10 mg daily.  She was encouraged to follow-up with a phone call in one month to let me know how things are going.    Type 2 diabetes mellitus without complication, without long-term current use of insulin (CMS/[facility])  -     metformin (GLUCOPHAGE-XR) 500 MG 24 hr tablet; Take 4 tablets daily.  #360, refill 3.  -     glipiZIDE (GLUCOTROL) 10 MG 24 hr tablet; Take 1 tablet by mouth daily.  #30, refill one.          
left sided numbness, dysarthria
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
## 2019-03-22 NOTE — H&P ADULT - HISTORY OF PRESENT ILLNESS
85 yo male c PMH of PE, DVT, COPD, (current smoker), alcohol use, and anemia presents with acute onset of left sided numbness, facial droop and dysarthria on 3/ . CT head on arrival was negative. 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 Patient states that numbness has subsided but his speech deficits persist and his balance is notably worse. At [place] evaluated by cardiology. TTE revealed no thrombus, EF 65%. Started with ASA for small vessel disease and Xarelto due to the remote Hx of DVTs. Stable to d/c to rehab on 3/22/19.   Of note, seen by neurology Dr Nye in 2017 for b/l leg weakness. MRI brain and spine was performed showing possible cervical myelopathy. 87 yo male c PMH of PE, DVT, COPD, (current smoker), alcohol use, and anemia presents with acute onset of left sided numbness, facial droop and dysarthria on 3/ . CT head on arrival was negative. 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 Patient states that numbness has subsided but his speech deficits persist and his balance is notably worse. At [place] evaluated by cardiology. TTE revealed no thrombus, EF 65%. Started with ASA for small vessel disease and stroke prevention  and Xarelto was restarted for  Hx of multiple DVTs. Patient stopped Xarelto one week prior  to admission. Stable to d/c to rehab on 3/22/19.   Of note, seen by neurology Dr Nye in 2017 for b/l leg weakness. MRI brain and spine was performed showing possible cervical myelopathy.

## 2019-03-22 NOTE — DISCHARGE NOTE NURSING/CASE MANAGEMENT/SOCIAL WORK - NSDCPEPTSTRK_GEN_ALL_CORE
Signs and symptoms of stroke/Need for follow up after discharge/Prescribed medications/Risk factors for stroke/Stroke education booklet/Call 911 for stroke/Stroke support groups for patients, families, and friends/Stroke warning signs and symptoms

## 2019-03-22 NOTE — PROGRESS NOTE ADULT - ASSESSMENT
85 yo m admitted 3-19-19 with pmh copd, current smoker, etoh use, anemia, presents with acute cva.    1. acute RIGHT PONTINE CVA complicated by dysarthria and probable dysphagia: c/w asa and statin, PT/OT/SLP, PMR eval for acute rehab. lipid panel done. hba1c 4.9 echo done showed ef 60-65% with grade 1 diastolic dysfunction. NPO pending speech recs  2. copd without exacerbation: stable c/w symbicort and duoneb  3. Nicotine dependence: c/w nicotine patch  4. dvt ppx: xarelto started (hx DVT/PE in the past, as per cardiology, so was started on Xarelto on this admission, which would also help to treat "cryptogenic" stroke) Defer to cardio/neuro to see if we should continue Xarelto upon d/c 85 yo m admitted 3-19-19 with pmh copd, current smoker, etoh use, anemia, presents with acute cva.    1. acute RIGHT PONTINE CVA complicated by dysarthria and probable dysphagia: c/w asa and statin, PT/OT/SLP, PMR eval for acute rehab. lipid panel done. hba1c 4.9 echo done showed ef 60-65% with grade 1 diastolic dysfunction. NPO pending speech recs  2. copd without exacerbation: stable c/w symbicort and duoneb  3. Nicotine dependence: c/w nicotine patch  4. dvt ppx: xarelto started (HISTORY of DVT in the past, confirmed on outpatient records, was previously on full dose A/C -- so patient was started on Xarelto on this admission, which would also help to treat "cryptogenic" stroke) Defer to cardio/neuro to see if we should continue Xarelto upon d/c 85 yo m admitted 3-19-19 with pmh copd, current smoker, etoh use, anemia, presents with acute cva.    1. acute RIGHT PONTINE CVA complicated by dysarthria and probable dysphagia: c/w asa and statin, PT/OT/SLP, PMR eval for acute rehab. lipid panel done. hba1c 4.9 echo done showed ef 60-65% with grade 1 diastolic dysfunction. NPO pending speech recs  2. copd without exacerbation: stable c/w symbicort and duoneb  3. Nicotine dependence: c/w nicotine patch  4. dvt ppx: xarelto started (HISTORY of DVT in the past, confirmed on outpatient records, was previously on full dose A/C -- so patient was started on Xarelto on this admission, which would also help to treat "cryptogenic" stroke) Defer to cardio/neuro to see if we should continue Xarelto upon d/c. If we continue Xarelto, I am unsure if we also need ASA. Will talk to cardio/neuro re: ASA +/- continued Xarelto

## 2019-03-22 NOTE — H&P ADULT - ASSESSMENT
87 yo male c hx of COPD, PE, DVT, current smoker s/p right pontine cva and now with functional deficits.   Precautions:    fall, aspiration                                                                              Diet: reg c thins    DVT Prophylaxis:        xarelto                                                                  Medical Prognosis: good    Prescreen Comparison: I have reviewed the prescreen information and I found no relevant changes between the preadmission  screening and my post admission evaluation.     #CVA  Xarelto, ASA  start PT/OT/SLP  fall and aspiration precautions    #smoker  Nicotine patch, smoking cessation education    #COPD  inhalers    Expected Therapy:   P.T.   1     hrs/day           O. T.    1  hrs/day           S.L.P.  1      hrs/day                    P&O      eval                                             Excpected Frequency: 5 days/7 day period    Rehab Potential:            excellent                               Estimated Disposition:     home                     ELOS:   14           days      Rationale For Inpatient Rehab Admission- Patient demonstrates the following:     [X] Medically appropriate for rehabilitation admission   [X] Has attainable rehab goals with an approrpriate discharge plan  [X] Has rehabilitation potential (expected to make significant improvement within a reasonable period of time)  [X] Requires close medical management by a rehab physician, rehab nursing care and comprehensive interdisciplinary team (including         PT, OT, SLP and/or prosthetics and orthotics) 87 yo male c hx of COPD, PE, DVT, current smoker s/p right pontine cva and now with functional deficits.   Precautions:    fall, aspiration                                                                              Diet: reg c thins    DVT Prophylaxis:        xarelto                                                                  Medical Prognosis: good    Prescreen Comparison: I have reviewed the prescreen information and I found no relevant changes between the preadmission  screening and my post admission evaluation.     #CVA   ASA ? statin  start PT/OT/SLP  fall and aspiration precautions    #smoker  Nicotine patch, smoking cessation education    #COPD  inhalers      # history of multiple DVTs  -xarelto  Expected Therapy:   P.T.   1     hrs/day           O. T.    1  hrs/day           S.L.P.  1      hrs/day                    P&O      eval                                             Excpected Frequency: 5 days/7 day period    Rehab Potential:            excellent                               Estimated Disposition:     home                     ELOS:   14           days      Rationale For Inpatient Rehab Admission- Patient demonstrates the following:     [X] Medically appropriate for rehabilitation admission   [X] Has attainable rehab goals with an approrpriate discharge plan  [X] Has rehabilitation potential (expected to make significant improvement within a reasonable period of time)  [X] Requires close medical management by a rehab physician, rehab nursing care and comprehensive interdisciplinary team (including         PT, OT, SLP and/or prosthetics and orthotics) 87 yo male c hx of COPD, PE, DVT, current smoker s/p right pontine cva and now with functional deficits.   Precautions:    fall, aspiration                                                                              Diet: reg c thins    DVT Prophylaxis:        xarelto                                                                  Medical Prognosis: good    Prescreen Comparison: I have reviewed the prescreen information and I found no relevant changes between the preadmission  screening and my post admission evaluation.     #CVA   ASA and  statin  start PT/OT/SLP  fall and aspiration precautions    #smoker  Nicotine patch, smoking cessation education    #COPD  inhalers      # history of multiple DVTs  -xarelto  Expected Therapy:   P.T.   1     hrs/day           O. T.    1  hrs/day           S.L.P.  1      hrs/day                    P&O      eval                                             Excpected Frequency: 5 days/7 day period    Rehab Potential:            excellent                               Estimated Disposition:     home                     ELOS:   14           days      Rationale For Inpatient Rehab Admission- Patient demonstrates the following:     [X] Medically appropriate for rehabilitation admission   [X] Has attainable rehab goals with an approrpriate discharge plan  [X] Has rehabilitation potential (expected to make significant improvement within a reasonable period of time)  [X] Requires close medical management by a rehab physician, rehab nursing care and comprehensive interdisciplinary team (including         PT, OT, SLP and/or prosthetics and orthotics)

## 2019-03-22 NOTE — SWALLOW BEDSIDE ASSESSMENT ADULT - SWALLOW EVAL: RECOMMENDED FEEDING/EATING TECHNIQUES
alternate food with liquid/maintain upright posture during/after eating for 30 mins/check mouth frequently for oral residue/pocketing/crush medication (when feasible)/position upright (90 degrees)/allow for swallow between intakes/no straws/small sips/bites/oral hygiene

## 2019-03-22 NOTE — PROGRESS NOTE ADULT - ASSESSMENT
S/P acute right pontine infarct.  Suspect due to small vessel disease.  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  questionable cervical myelopathy or vascular etiology.    REC:  ASA and Xarelto, statin, smoking cessation, rehab.

## 2019-03-22 NOTE — PROGRESS NOTE ADULT - SUBJECTIVE AND OBJECTIVE BOX
Neurology Progress Note    Subjective & Objective:  Stable neuro status without new focal signs.  Chronic bilateral motor signs with hyperreflexia and upgoing toes.  Chronic gait disorder.  MRI reveals ace small right pontine infarct.  Xarelto being use for recurrent DVT/PE.             total cholesterol 113 mg/dL  HDL 39 mg/dL  LDL 57 mg/dL                             Radiology: CT                      MRI  < from: MR Head No Cont (03.22.19 @ 10:24) >    EXAM:  MR BRAIN      PROCEDURE DATE:  03/22/2019        INTERPRETATION:  CLINICAL STATEMENT: Numbness slurred speech    TECHNIQUE: MRI of the brain was performed without gadolinium.    COMPARISON: CT head 3/19/2019    FINDINGS:  There is moderate diffuse parenchymal volume loss. There are T2   prolongation signal abnormalities in the brainstem, periventricular   subcortical white matter likely related to severe chronic microvascular   ischemic changes.    Chronic lacunar infarcts thalamus and basal ganglia noted. Small chronic   infarct left cerebellum.    Few gradient susceptibility affects noted which may be related to chronic   microhemorrhages.    There is no acute parenchymal hemorrhage, parenchymal mass,  or midline   shift. There is no extra-axial fluid collection.  There is no   hydrocephalus. The    4 x 4 mm acute infarct noted in the right harper.    Mucosal thickening paranasal sinuses.    IMPRESSION:  Subcentimeter acute infarct right harper.    No acute intracranial hemorrhage    < end of copied text >      MEDICATIONS  (STANDING):  aspirin enteric coated 81 milliGRAM(s) Oral daily  atorvastatin 40 milliGRAM(s) Oral at bedtime  buDESOnide 160 MICROgram(s)/formoterol 4.5 MICROgram(s) Inhaler 2 Puff(s) Inhalation two times a day  gabapentin 300 milliGRAM(s) Oral at bedtime  nicotine -   7 mG/24Hr(s) Patch 1 patch Transdermal daily  rivaroxaban 20 milliGRAM(s) Oral every 24 hours  tamsulosin 0.4 milliGRAM(s) Oral at bedtime    MEDICATIONS  (PRN):  ALBUTerol/ipratropium for Nebulization 3 milliLiter(s) Nebulizer every 6 hours PRN Shortness of Breath and/or Wheezing

## 2019-03-22 NOTE — DISCHARGE NOTE PROVIDER - CARE PROVIDER_API CALL
Natanael Day)  Medicine  [address], [place] [address]  Phone: [phone]  Fax: [phone]  Follow Up Time:

## 2019-03-22 NOTE — SWALLOW BEDSIDE ASSESSMENT ADULT - ASR SWALLOW ASPIRATION MONITOR
fever/throat clearing/upper respiratory infection/change of breathing pattern/position upright (90Y)/cough/gurgly voice/oral hygiene/pneumonia

## 2019-03-22 NOTE — SWALLOW BEDSIDE ASSESSMENT ADULT - SWALLOW EVAL: DIAGNOSIS
Pt presents with clinical signs of oropharyngeal dysphagia marked by minimally reduced stripping of bolus from spoon, reduced bolus formation and manipulation of ground consistency, delayed AP transport, delayed swallow initiation and mildly reduced laryngeal elevation upon palpation. Pt with immediate cough post ground with c/o of "food sticking" in throat, minimal wet/gurgly throat clears post thin and nectar thickened liquids.  Mild wet/gurgly breath sounds at baseline, unchanged post honey and puree consistency.  Pt prefers straws, however, instructed NOT to use them at this time.  Must be monitored under close supervision during meal.  Discussed with RNAngella and NEL Gonzalez.

## 2019-03-22 NOTE — SWALLOW BEDSIDE ASSESSMENT ADULT - PHARYNGEAL PHASE
Throat clear post oral intake/Multiple swallows/Delayed pharyngeal swallow/Decreased laryngeal elevation/Wet vocal quality post oral intake Decreased laryngeal elevation/Wet vocal quality post oral intake/Throat clear post oral intake/Delayed pharyngeal swallow Delayed pharyngeal swallow/Decreased laryngeal elevation Delayed pharyngeal swallow/Cough post oral intake/Throat clear post oral intake/Multiple swallows/Decreased laryngeal elevation/Complaints of pharyngeal stasis

## 2019-03-22 NOTE — SWALLOW BEDSIDE ASSESSMENT ADULT - ASR SWALLOW LINGUAL MOBILITY
lingual deviation to left/impaired protrusion/impaired anterior elevation/impaired left lateral movement/impaired right lateral movement

## 2019-03-22 NOTE — DISCHARGE NOTE NURSING/CASE MANAGEMENT/SOCIAL WORK - NSDCDPATPORTLINK_GEN_ALL_CORE
You can access the [facility] Patient Portal, offered by [facility], by registering with the following website: [email or web address]

## 2019-03-22 NOTE — CONSULT NOTE ADULT - ASSESSMENT
86 man with right pontine stroke with dysarthria, poor balance affecting functional ambulation.   Would benefit from acute inpt rehab as has multiple medical issues, needs skilled therapy services and can make progress to community discharge.     Thank you.

## 2019-03-22 NOTE — PROGRESS NOTE ADULT - SUBJECTIVE AND OBJECTIVE BOX
Follow up for    CVA    SUBJ:    feels " so so" no chest pain, dyspnea, palpitations    PMH  HTN (hypertension)  Dyslipidemia  CAD (coronary artery disease)  Peripheral vascular disease  Parkinson disease      MEDICATIONS  (STANDING):  aspirin enteric coated 81 milliGRAM(s) Oral daily  atorvastatin 40 milliGRAM(s) Oral at bedtime  buDESOnide 160 MICROgram(s)/formoterol 4.5 MICROgram(s) Inhaler 2 Puff(s) Inhalation two times a day  gabapentin 300 milliGRAM(s) Oral at bedtime  nicotine -   7 mG/24Hr(s) Patch 1 patch Transdermal daily  rivaroxaban 20 milliGRAM(s) Oral every 24 hours  tamsulosin 0.4 milliGRAM(s) Oral at bedtime    MEDICATIONS  (PRN):  ALBUTerol/ipratropium for Nebulization 3 milliLiter(s) Nebulizer every 6 hours PRN Shortness of Breath and/or Wheezing        PHYSICAL EXAM:  Vital Signs Last 24 Hrs  T(C): 36.8 (22 Mar 2019 10:05), Max: 36.8 (21 Mar 2019 15:58)  T(F): 98.2 (22 Mar 2019 10:05), Max: 98.2 (21 Mar 2019 15:58)  HR: 77 (22 Mar 2019 10:05) (74 - 79)  BP: 106/54 (22 Mar 2019 10:05) (106/54 - 140/69)  BP(mean): --  RR: 15 (22 Mar 2019 10:05) (14 - 16)  SpO2: 93% (22 Mar 2019 10:05) (93% - 98%)    GENERAL: NAD, well-groomed, well-developed  HEAD:  Atraumatic, Normocephalic  EYES: EOMI, PERRLA, conjunctiva and sclera clear  ENT: Moist mucous membranes,  NECK: Supple, No JVD, no bruits  CHEST/LUNG: Clear to percussion and auscultation bilaterally; No rales, rhonchi, wheezing, or rubs  HEART: Regular rate and rhythm; No murmurs, rubs, or gallops PMI non displaced.  ABDOMEN: Soft, Nontender, Nondistended; Bowel sounds present  EXTREMITIES:  No clubbing, cyanosis, or edema  SKIN: No rashes or lesions        TELEMETRY:    sinus    ECG:    < from: 12 Lead ECG (19 @ 16:55) >    Ventricular Rate 66 BPM    Atrial Rate 66 BPM    P-R Interval 156 ms    QRS Duration 74 ms    Q-T Interval 418 ms    QTC Calculation(Bezet) 438 ms    P Axis 48 degrees    R Axis -17 degrees    T Axis 28 degrees    Diagnosis Line Normal sinus rhythm  Normal ECG  When compared with ECG of 23-OCT-2018 16:11,  No significant change was found  Confirmed by TONO TRIMBLE, HAMILTON VALDEZ () on 3/20/2019 8:33:44 AM    < end of copied text >      LABS:                        8.5    8.5   )-----------( 330      ( 22 Mar 2019 06:53 )             27.4         140  |  105  |  32<H>  ----------------------------<  85  4.2   |  24  |  1.13    Ca    8.4      22 Mar 2019 06:53    TPro  7.1  /  Alb  2.8<L>  /  TBili  0.3  /  DBili  x   /  AST  17  /  ALT  7<L>  /  AlkPhos  106      I&O's Summary    21 Mar 2019 07:01  -  22 Mar 2019 07:00  --------------------------------------------------------  IN: 0 mL / OUT: 750 mL / NET: -750 mL          RADIOLOGY & ADDITIONAL STUDIES:    < from: MR Head No Cont (19 @ 10:24) >    EXAM:  MR BRAIN      PROCEDURE DATE:  2019        INTERPRETATION:  CLINICAL STATEMENT: Numbness slurred speech    TECHNIQUE: MRI of the brain was performed without gadolinium.    COMPARISON: CT head 3/19/2019    FINDINGS:  There is moderate diffuse parenchymal volume loss. There are T2   prolongation signal abnormalities in the brainstem, periventricular   subcortical white matter likely related to severe chronic microvascular   ischemic changes.    Chronic lacunar infarcts thalamus and basal ganglia noted. Small chronic   infarct left cerebellum.    Few gradient susceptibility affects noted which may be related to chronic   microhemorrhages.    There is no acute parenchymal hemorrhage, parenchymal mass,  or midline   shift. There is no extra-axial fluid collection.  There is no   hydrocephalus. The    4 x 4 mm acute infarct noted in the right harper.    Mucosal thickening paranasal sinuses.    IMPRESSION:  Subcentimeter acute infarct right harper.    No acute intracranial hemorrhage      < from: Xray Chest 1 View- PORTABLE-Urgent (19 @ 17:47) >  EXAM:  XR CHEST PORTABLE URGENT 1V      PROCEDURE DATE:  2019        INTERPRETATION:  AP erect chest on 2019 at 5:28 PM. Patient has   cough.    Heart is magnified by technique.    Old fracture deformities of right upper ribs again seen. Scattered right   pleural calcifications mild in degree over right chest again seen.    Small granuloma right lower lung field again appreciated.    CAT scan of 2018 should scattered areas of lung scarring of   the right upper lobe medially and at the right base. These densities are   again suggested on the present x-ray.    There is no sense of active lung or pleural finding.    Chest x-ray appearance is similar to 2018.    IMPRESSION: Old right upper lobe rib fractures and lung scars again   appreciated.    ECHO:    < from: TTE Echo Complete w/Doppler (19 @ 08:35) >    EXAM:  ECHO TTE WO CON COMP [id number]      PROCEDURE DATE:  2019        INTERPRETATION:  REPORT:    TRANSTHORACIC ECHOCARDIOGRAM REPORT         Patient Name:   ELIZABETH MCLAUGHLIN Patient Location: [address] Rec #:  [id number]     Accession #:    [id number]  Account #:      [id number]      Height:           67.0 in 170.2 cm  YOB: 1932   Weight:           155.0 lb 70.30 kg  Patient Age:    86 years    BSA:              1.81 m²  Patient Gender: M           BP:               129/67mmHg       Date of Exam:        3/20/2019 8:35:12 AM  Sonographer:         FRANKY  Copy report sent to: ODILIA    Procedure:     2D Echo/Doppler/Color Doppler Complete.  Indications:   Cerebral infarction due to unspecified occlusion or   stenosis of            unspecified cerebral artery - I63.50  Diagnosis:     Cerebral infarction due to unspecified occlusion or   stenosis of                 unspecified cerebral artery - I63.50  Study Details: Technically limited study. Study quality was adversely   affected                 due to COPD and body habitus.         2D AND M-MODE MEASUREMENTS (normal ranges within parentheses):  Aorta/Left Atrium:             Normal  Aortic Root (Mmode): 3.00 cm (2.4-3.7)  AoV Cusp Separation: 1.87 cm (1.5-2.6)  Left Atrium (Mmode): 3.57 cm (1.9-4.0)    LV DIASTOLIC FUNCTION:  MV Peak E: 0.87 m/s Decel Time: 360 msec  MV Peak A: 1.20 m/s  E/A Ratio: 0.72    SPECTRAL DOPPLER ANALYSIS (where applicable):  Mitral Valve:  MV P1/2 Time: 104.33 msec  MV Area, PHT: 2.11 cm²    Aortic Valve: AoV Max Harshil: 1.55 m/s AoV Peak P.6 mmHg AoV Mean P.0 mmHg    AoV Area, Vmax:  AoV Area, VTI:  AoV Area, Vmn:  Ao VTI: 0.360  Aortic Insufficiency:  AI Half-time:  473 msec  AI Decel Rate: 1.46 m/s²    Tricuspid Valve and PA/RV Systolic Pressure: TR Max Velocity: 1.83 m/s RA   Pressure: 10 mmHg RVSP/PASP: 23.4 mmHg       PHYSICIAN INTERPRETATION:  Left Ventricle: The left ventricular internal cavity size is normal.  Left ventricular ejection fraction, by visual estimation, is 60 to 65%.   Spectral Doppler shows impaired relaxation pattern of left ventricular   myocardial filling (Grade I diastolic dysfunction).  Right Ventricle: The right ventricle was not well visualized. The right   ventricular size is normal.  Left Atrium: The left atrium is normal in size. Normal left atrial size.   LA volume Index is 29.6 ml/m² ml/m2.  Right Atrium: The right atrium is normal in size. Normal right atrial   size.  Pericardium: There is no evidence of pericardial effusion.  Mitral Valve: Thickening and calcification of the anterior and posterior   mitral valve leaflets. Mild to moderate mitral valve regurgitation is   seen.  Tricuspid Valve: The tricuspid valve is not well visualized. Mild   tricuspid regurgitationis visualized.  Aortic Valve: Sclerotic aortic valve with normal opening. Peak Av   velocity is 1.55 m/s, mean transaortic gradient equals 5.0 mmHg. The   aortic valve mean gradient is 5.0 mmHgconsistent with normally opening   aortic valve. Mild aortic valve regurgitation is seen.  Pulmonic Valve: The pulmonic valve was not well visualized.  Aorta: The aortic root is normal in size and structure.  Pulmonary Artery: Pulmonary hypertension is present.       Summary:   1. Left ventricular ejection fraction, by visual estimation, is 60 to   65%.   2. Technically limited study.   3. Spectral Doppler shows impaired relaxation pattern of left   ventricular myocardial filling (Grade I diastolic dysfunction).   4. Thickening and calcification of the anterior and posterior mitral   valve leaflets.   5. Mild tricuspid regurgitation.   6. Mild aortic regurgitation.   7. Sclerotic aortic valve with normal opening.   8. LA volume Index is 29.6 ml/m² ml/m2.   9. The aortic valve mean gradient is 5.0 mmHg consistent with normally   opening aortic valve.    [id number] Hamilton Arenas MD, [facility] , Electronically signed on 3/20/2019 at   12:29:10 PM       < end of copied text >

## 2019-03-22 NOTE — CONSULT NOTE ADULT - REASON FOR ADMISSION
left sided numbness, dysarthria
left sided numbness, dysarthria/pontine stroke
left sided numbness, dysarthria

## 2019-03-22 NOTE — PROGRESS NOTE ADULT - ASSESSMENT
cva    remote dvt      D/w Dr. Spain...would w/u for etiology of CVA...telemetry, carotid/vertebral disease evaluation.   If negative, consider for CHANDRIKA and ILR.    No clear cardiac indication for oral a/c, would await input from Dr. Nye, neurology.

## 2019-03-22 NOTE — H&P ADULT - NSHPPHYSICALEXAM_GEN_ALL_CORE
PHYSICAL EXAM  Constitutional - NAD, Comfortable  HEENT - NCAT, EOMI  Neck - Supple, No limited ROM  Chest - CTA bilaterally, No wheeze, No rhonchi, No crackles  Cardiovascular - RRR, S1S2, No murmurs  Abdomen - BS+, Soft, NTND  Extremities - No C/C/E, No calf tenderness   Skin-no rash  Woumds-      Neurologic Exam -                   HIF  - Awake, Alert, AAO to self, place, date, year, situation      No aphasia, normal attention, normal concentration, no apraxia, agnosia     Cranial Nerves - CN 2-12 intact     Motor - No focal deficits                            Sensory - Intact to LT,PP,Temprature,JPS, vibration                      Cortical sensory modalities intact     Reflexes - DTR Intact     Toes are flexor     Coordination/dysmetria - FTN intact             Balance - WNL Static and dynamic     Psychiatric - Mood stable, Affect WNL PHYSICAL EXAM  Constitutional - NAD, Comfortable  HEENT - NCAT, EOMI  Neck - Supple, No limited ROM  Chest - CTA bilaterally, No wheeze, No rhonchi, No crackles  Cardiovascular - RRR, S1S2, No murmurs  Abdomen - BS+, Soft, NTND  Extremities - No C/C/E, No calf tenderness   Skin-no rash  Wounds-      Neurologic Exam -                   HIF  - Awake, Alert, AAO to self, place, date, year, situation      No aphasia, normal attention, normal concentration, no apraxia, agnosia     Cranial Nerves - CN 2-12 intact     Motor - No focal deficits                            Sensory - Intact to LT,PP,Temprature,JPS, vibration                      Cortical sensory modalities intact     Reflexes - DTR Intact     Toes are flexor     Coordination/dysmetria - FTN intact             Balance - WNL Static and dynamic     Psychiatric - Mood stable, Affect WNL PHYSICAL EXAM  Constitutional - NAD, Comfortable  HEENT - NCAT, EOMI  Neck - Supple, No limited ROM  Chest - CTA bilaterally, No wheeze, No rhonchi, No crackles  Cardiovascular - RRR, S1S2, No murmurs  Abdomen - BS+, Soft, NTND  Extremities - No C/C/E, No calf tenderness   Skin-no rash  Neurologic Exam -                   HIF  - Awake, Alert, AAO to self, place, date, year, situation      No aphasia, normal attention,      Cranial Nerves - CN 2-12  VFF, EOMI, no diplopia. Mild left facial weakness. Dysarthria and Dysphagia present     Motor - mild left hemiparesis 4+/5                            Sensory - Intact to LT,                      Cortical sensory modalities intact     Reflexes - DTR  1+ =     Toes are flexor     Coordination/dysmetria - FTN impired on the left             Balance - ataxic     Psychiatric - Mood stable, Affect WNL

## 2019-03-22 NOTE — SWALLOW BEDSIDE ASSESSMENT ADULT - COMMENTS
WBC 8.5  CT Head 3/19: No acute intracranial hemorrhage, mass effect or evidence of acute vascular territorial infarction  MRI 3/22/19: Subcentimeter acute infarct right harper  Chest X-ray 3/19/19: Old right upper lobe rib fractures and lung scars again appreciated    Bedside: Pt with moderate dysarthria; slow rate, imprecise articulatory precision, low vocal volume.  Left facial droop, lingual deviation to the left.  A&Ox3 required honey liquid was to clear likely pharyngeal stasis

## 2019-03-23 LAB
ALBUMIN SERPL ELPH-MCNC: 2.8 G/DL — LOW (ref 3.3–5)
ALP SERPL-CCNC: 100 U/L — SIGNIFICANT CHANGE UP (ref 40–120)
ALT FLD-CCNC: 9 U/L DA — LOW (ref 10–45)
ANION GAP SERPL CALC-SCNC: 8 MMOL/L — SIGNIFICANT CHANGE UP (ref 5–17)
AST SERPL-CCNC: 17 U/L — SIGNIFICANT CHANGE UP (ref 10–40)
BASOPHILS # BLD AUTO: 0.1 K/UL — SIGNIFICANT CHANGE UP (ref 0–0.2)
BASOPHILS NFR BLD AUTO: 0.8 % — SIGNIFICANT CHANGE UP (ref 0–2)
BILIRUB SERPL-MCNC: 0.5 MG/DL — SIGNIFICANT CHANGE UP (ref 0.2–1.2)
BUN SERPL-MCNC: 34 MG/DL — HIGH (ref 7–23)
CALCIUM SERPL-MCNC: 8.7 MG/DL — SIGNIFICANT CHANGE UP (ref 8.4–10.5)
CHLORIDE SERPL-SCNC: 107 MMOL/L — SIGNIFICANT CHANGE UP (ref 96–108)
CO2 SERPL-SCNC: 26 MMOL/L — SIGNIFICANT CHANGE UP (ref 22–31)
CREAT SERPL-MCNC: 1.11 MG/DL — SIGNIFICANT CHANGE UP (ref 0.5–1.3)
EOSINOPHIL # BLD AUTO: 0.3 K/UL — SIGNIFICANT CHANGE UP (ref 0–0.5)
EOSINOPHIL NFR BLD AUTO: 4 % — SIGNIFICANT CHANGE UP (ref 0–6)
GLUCOSE SERPL-MCNC: 99 MG/DL — SIGNIFICANT CHANGE UP (ref 70–99)
HCT VFR BLD CALC: 26.7 % — LOW (ref 39–50)
HGB BLD-MCNC: 8.6 G/DL — LOW (ref 13–17)
LYMPHOCYTES # BLD AUTO: 1.8 K/UL — SIGNIFICANT CHANGE UP (ref 1–3.3)
LYMPHOCYTES # BLD AUTO: 23.8 % — SIGNIFICANT CHANGE UP (ref 13–44)
MCHC RBC-ENTMCNC: 27 PG — SIGNIFICANT CHANGE UP (ref 27–34)
MCHC RBC-ENTMCNC: 32.3 GM/DL — SIGNIFICANT CHANGE UP (ref 32–36)
MCV RBC AUTO: 83.7 FL — SIGNIFICANT CHANGE UP (ref 80–100)
MONOCYTES # BLD AUTO: 0.8 K/UL — SIGNIFICANT CHANGE UP (ref 0–0.9)
MONOCYTES NFR BLD AUTO: 10.6 % — SIGNIFICANT CHANGE UP (ref 2–14)
NEUTROPHILS # BLD AUTO: 4.7 K/UL — SIGNIFICANT CHANGE UP (ref 1.8–7.4)
NEUTROPHILS NFR BLD AUTO: 60.8 % — SIGNIFICANT CHANGE UP (ref 43–77)
PLATELET # BLD AUTO: 354 K/UL — SIGNIFICANT CHANGE UP (ref 150–400)
POTASSIUM SERPL-MCNC: 4.5 MMOL/L — SIGNIFICANT CHANGE UP (ref 3.5–5.3)
POTASSIUM SERPL-SCNC: 4.5 MMOL/L — SIGNIFICANT CHANGE UP (ref 3.5–5.3)
PROT SERPL-MCNC: 7.1 G/DL — SIGNIFICANT CHANGE UP (ref 6–8.3)
RBC # BLD: 3.19 M/UL — LOW (ref 4.2–5.8)
RBC # FLD: 18.8 % — HIGH (ref 10.3–14.5)
SODIUM SERPL-SCNC: 141 MMOL/L — SIGNIFICANT CHANGE UP (ref 135–145)
WBC # BLD: 7.7 K/UL — SIGNIFICANT CHANGE UP (ref 3.8–10.5)
WBC # FLD AUTO: 7.7 K/UL — SIGNIFICANT CHANGE UP (ref 3.8–10.5)

## 2019-03-23 PROCEDURE — 99233 SBSQ HOSP IP/OBS HIGH 50: CPT

## 2019-03-23 PROCEDURE — 99223 1ST HOSP IP/OBS HIGH 75: CPT

## 2019-03-23 RX ADMIN — Medication 1 PATCH: at 12:16

## 2019-03-23 RX ADMIN — PANTOPRAZOLE SODIUM 40 MILLIGRAM(S): 20 TABLET, DELAYED RELEASE ORAL at 06:24

## 2019-03-23 RX ADMIN — Medication 40 MILLIGRAM(S): at 12:16

## 2019-03-23 RX ADMIN — BUDESONIDE AND FORMOTEROL FUMARATE DIHYDRATE 2 PUFF(S): 160; 4.5 AEROSOL RESPIRATORY (INHALATION) at 20:12

## 2019-03-23 RX ADMIN — Medication 81 MILLIGRAM(S): at 12:16

## 2019-03-23 RX ADMIN — Medication 3 MILLIGRAM(S): at 21:16

## 2019-03-23 RX ADMIN — BUDESONIDE AND FORMOTEROL FUMARATE DIHYDRATE 2 PUFF(S): 160; 4.5 AEROSOL RESPIRATORY (INHALATION) at 09:30

## 2019-03-23 RX ADMIN — ATORVASTATIN CALCIUM 40 MILLIGRAM(S): 80 TABLET, FILM COATED ORAL at 21:16

## 2019-03-23 RX ADMIN — Medication 1 PATCH: at 19:48

## 2019-03-23 RX ADMIN — TAMSULOSIN HYDROCHLORIDE 0.4 MILLIGRAM(S): 0.4 CAPSULE ORAL at 21:16

## 2019-03-23 NOTE — PROGRESS NOTE ADULT - SUBJECTIVE AND OBJECTIVE BOX
History of Present Illness:  Reason for Admission: s/p Right pontine CVA c functional deficits	  History of Present Illness: 	  87 yo male c PMH of PE, DVT, COPD, (current smoker), alcohol use, and anemia presents with acute onset of left sided numbness, facial droop and dysarthria on 3/ . CT head on arrival was negative. 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 Patient states that numbness has subsided but his speech deficits persist and his balance is notably worse. At [place] evaluated by cardiology. TTE revealed no thrombus, EF 65%. Started with ASA for small vessel disease and stroke prevention  and Xarelto was restarted for  Hx of multiple DVTs. Patient stopped Xarelto one week prior  to admission. Stable to d/c to rehab on 3/22/19.   Of note, seen by neurology Dr Nye in 2017 for b/l leg weakness. MRI brain and spine was performed showing possible cervical myelopathy.          Review of Systems/Subjective: No SOB/CP, no n/v, no pain.  Review of Systems:  CONSTITUTIONAL: No fever or fatigue  NECK: No pain or stiffness  RESPIRATORY: No cough, wheezing, chills. No shortness of breath  CARDIOVASCULAR: No chest pain, palpitations, dizziness, or leg swelling  GASTROINTESTINAL: No abdominal or epigastric pain. No nausea, vomiting  GENITOURINARY: No dysuria  NEUROLOGICAL: No headaches  SKIN: No rashes, or lesions   MUSCULOSKELETAL: No joint pain or swelling; No muscle, back, or extremity pain  PSYCHIATRIC: No depression  HEME/LYMPH: No easy bruising	      Allergies and Intolerances:        Allergies:  	No Known Allergies:     Home Medications:   * Patient Currently Takes Medications as of 19-Mar-2019 18:55 documented in Structured Notes  · 	aspirin 81 mg oral tablet: 1 tab(s) orally once a day  · 	Advair Diskus 250 mcg-50 mcg inhalation powder: 1 puff(s) inhaled 2 times a day  · 	DuoNeb 0.5 mg-2.5 mg/3 mL inhalation solution: 3 milliliter(s) inhaled 4 times a day, As Needed  · 	Flomax 0.4 mg oral capsule: 1 cap(s) orally once a day    .    Patient History:    Past Medical, Past Surgical, and Family History:  PAST MEDICAL HISTORY:  CAD (coronary artery disease)     Deep vein thrombosis (DVT)     Dyslipidemia     HTN (hypertension)     Parkinson disease     Peripheral vascular disease.     PAST SURGICAL HISTORY:  No significant past surgical history.              Physical Exam:  Physical Exam:  Vital Signs Last 24 Hrs T(C): 36.6 (22 Mar 2019 20:45), Max: 36.8 (22 Mar 2019 10:05) T(F): 97.9 (22 Mar 2019 20:45), Max: 98.2 (22 Mar 2019 10:05) HR: 76 (22 Mar 2019 22:00) (76 - 87) BP: 145/74 (22 Mar 2019 20:45) (106/54 - 145/74) BP(mean): -- RR: 116 (22 Mar 2019 20:45) (14 - 116) SpO2: 93% (22 Mar 2019 22:00) (93% - 96%)   PHYSICAL EXAM  Constitutional - NAD, Comfortable  HEENT - NCAT  Neck - Supple  Chest - CTA bilaterally, No wheeze  Cardiovascular - RRR, S1S2  Abdomen - BS+, Soft, NTND  Extremities - No C/C/E, No calf tenderness   Skin-no rash  Neurologic Exam -                   HIF  - Awake, Alert, AAO to self, situation      No aphasia, normal attention,           Motor - mild left hemiparesis 4+/5                            Sensory - Intact to LT,                      Cortical sensory modalities intact     Reflexes - DTR  1+ =         Psychiatric - Mood stable, Affect WNL	       Labs and Results:  Labs, Radiology, Cardiology, and Other Results:  LABS:                      8.6   7.7   )-----------( 354      ( 23 Mar 2019 06:30 )            26.7    Ca    8.4        22 Mar 2019 06:53           EXAM:  MR BRAIN     PROCEDURE DATE:  03/22/2019      INTERPRETATION:  CLINICAL STATEMENT: Numbness slurred speech   TECHNIQUE: MRI of the brain was performed without gadolinium.   COMPARISON: CT head 3/19/2019   FINDINGS:  There is moderate diffuse parenchymal volume loss. There are T2   prolongation signal abnormalities in the brainstem, periventricular   subcortical white matter likely related to severe chronic microvascular   ischemic changes.   Chronic lacunar infarcts thalamus and basal ganglia noted. Small chronic   infarct left cerebellum.   Few gradient susceptibility affects noted which may be related to chronic   microhemorrhages.   There is no acute parenchymal hemorrhage, parenchymal mass,  or midline   shift. There is no extra-axial fluid collection.  There is no   hydrocephalus. The   4 x 4 mm acute infarct noted in the right harper.   Mucosal thickening paranasal sinuses.   IMPRESSION:  Subcentimeter acute infarct right harper.   No acute intracranial hemorrhage          MICHELLE TOBAR M.D., ATTENDING RADIOLOGIST This document has been electronically signed. Mar 22 2019 10:48AM

## 2019-03-23 NOTE — PROGRESS NOTE ADULT - ASSESSMENT
87 yo male c hx of COPD, PE, DVT, current smoker s/p right pontine cva and now with functional deficits.   Precautions:    fall, aspiration                                                                              Diet: reg c thins    DVT Prophylaxis:        xarelto                                                                  Medical Prognosis: good    Prescreen Comparison: I have reviewed the prescreen information and I found no relevant changes between the preadmission  screening and my post admission evaluation.     #CVA   ASA and  statin  start PT/OT/SLP  fall and aspiration precautions    #smoker  Nicotine patch, smoking cessation education    #COPD  inhalers      # history of multiple DVTs  -xarelto  Expected Therapy:   P.T.   1     hrs/day           O. T.    1  hrs/day           S.L.P.  1      hrs/day                    P&O      eval                                             Excpected Frequency: 5 days/7 day period    Rehab Potential:            excellent                               Estimated Disposition:     home                     ELOS:   14           days      Rationale For Inpatient Rehab Admission- Patient demonstrates the following:     [X] Medically appropriate for rehabilitation admission   [X] Has attainable rehab goals with an approrpriate discharge plan  [X] Has rehabilitation potential (expected to make significant improvement within a reasonable period of time)  [X] Requires close medical management by a rehab physician, rehab nursing care and comprehensive interdisciplinary team (including         PT, OT, SLP and/or prosthetics and orthotics) 85 yo male c hx of COPD, PE, DVT, current smoker s/p right pontine cva and now with functional deficits.                                                                   #CVA  - ASA and  statin  -start PT/OT/SLP  -fall and aspiration precautions  -Check CMP    #smoker  -Nicotine patch, smoking cessation education    #COPD  -inhalers  -Hospitalist eval      # history of multiple DVTs  -xarelto          Rehab Potential:            excellent                               Estimated Disposition:     home                     ELOS:   14           days      s)

## 2019-03-23 NOTE — DIETITIAN INITIAL EVALUATION ADULT. - OTHER INFO
Pt is an 85 yo male c hx of COPD, PE, DVT, current smoker s/p right pontine cva and now with functional deficits. Pt reports tolerating current diet with fair appetite/po intake. Reports UBW between 140-150 lbs. Denies food allergies/intolerances. Denies GI distress- reports last BM on 3/22. Reviewed current diet and encouraged po fluid intake. PT agreeable to adding supplements.

## 2019-03-23 NOTE — CONSULT NOTE ADULT - SUBJECTIVE AND OBJECTIVE BOX
Patient is a 86y old  Male who presents with a chief complaint of s/p Right pontine CVA c functional deficits (23 Mar 2019 07:59)    HPI:  85 yo male c PMH of PE, DVT, COPD, (current smoker), alcohol use, and anemia presents with acute onset of left sided numbness, facial droop and dysarthria on 3/ . CT head on arrival was negative. 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 Patient states that numbness has subsided but his speech deficits persist and his balance is notably worse. At [place] evaluated by cardiology. TTE revealed no thrombus, EF 65%. Started with ASA for small vessel disease and stroke prevention  and Xarelto was restarted for  Hx of multiple DVTs. Patient stopped Xarelto one week prior  to admission. Stable to d/c to rehab on 3/22/19.   Of note, seen by neurology Dr Nye in 2017 for b/l leg weakness. MRI brain and spine was performed showing possible cervical myelopathy. (22 Mar 2019 14:54)    PAST MEDICAL & SURGICAL HISTORY:  Deep vein thrombosis (DVT)  HTN (hypertension)  Dyslipidemia  CAD (coronary artery disease)  Peripheral vascular disease  Parkinson disease  No significant past surgical history    SOCIAL HISTORY:  Tobacco Usage:  (   ) never smoked   (   ) former smoker   (   ) current smoker  (     ) pack years    Tobacco Quit Date:  Substance Use (Street drugs): (  ) never used  (  ) other:  Alcohol Usage:    Family history reviewed and otherwise non-contributory  ALLERGIES:  No Known Allergies    MEDICATIONS:  melatonin 3 milliGRAM(s) Oral at bedtime PRN    REVIEW OF SYSTEMS:  CONSTITUTIONAL: No fever, weight loss, or fatigue  EYES: No eye pain, visual disturbances, or discharge  ENMT:  No difficulty hearing, tinnitus, vertigo; No sinus or throat pain  NECK: No neck pain or neck stiffness  BREASTS: No pain, masses, or nipple discharge  RESPIRATORY: No cough, wheezing, chills or hemoptysis; No shortness of breath  CARDIOVASCULAR: No chest pain, palpitations, dizziness, or leg swelling  GASTROINTESTINAL: No abdominal pain, No nausea, vomiting, or hematemesis; No diarrhea or constipation  GENITOURINARY: No dysuria, frequency, hematuria, or incontinence  NEUROLOGICAL: No headaches, memory loss, loss of strength, numbness, or tremors  SKIN: No itching, burning, rashes, or lesions   LYMPH NODES: No enlarged glands  ENDOCRINE: No heat or cold intolerance; No hair loss  MUSCULOSKELETAL: No joint pain or swelling; No muscle, back, or extremity pain  PSYCHIATRIC: No depression, anxiety, mood swings, or difficulty sleeping  HEME/LYMPH: No easy bruising or bleeding  ALLERY AND IMMUNOLOGIC: No hives or eczema    All other ROS reviewed and negative except as otherwise stated    Vital Signs Last 24 Hrs  T(F): 97.4 (23 Mar 2019 08:47), Max: 98.2 (22 Mar 2019 18:06)  HR: 68 (23 Mar 2019 08:47) (68 - 87)  BP: 147/74 (23 Mar 2019 08:47) (126/69 - 147/74)  RR: 14 (23 Mar 2019 08:47) (14 - 116)  SpO2: 97% (23 Mar 2019 08:47) (93% - 97%)  I&O's Summary    PHYSICAL EXAM:  GENERAL: NAD, well-groomed, well-developed  HEAD:  Atraumatic, Normocephalic  EYES: EOMI, PERRLA, conjunctiva and sclera clear  ENMT: No tonsillar erythema, exudates, or enlargement; Moist mucous membranes, Good dentition  NECK: Supple, No JVD  CHEST/LUNG: Clear to auscultation bilaterally; No rales, rhonchi, +wheezing, or rubs,   HEART: Regular rate and rhythm; S1/S2, No murmurs, rubs, or gallops  ABDOMEN: Soft, Nontender, Nondistended; Bowel sounds present  VASCULAR: Normal pulses, Normal capillary refill  EXTREMITIES:  2+ Peripheral Pulses, No cyanosis, No edema  LYMPH: No lymphadenopathy noted  SKIN: Warm, Intact  PSYCH: Normal mood and affect  NERVOUS SYSTEM:  A/O x3, Left Hemiparesis, Dysarthria     LABS:                        8.6    7.7   )-----------( 354      ( 23 Mar 2019 06:30 )             26.7     03-22    140  |  105  |  32  ----------------------------<  85  4.2   |  24  |  1.13    Ca    8.4      22 Mar 2019 06:53    TPro  x   /  Alb  x   /  TBili  0.5  /  DBili  x   /  AST  x   /  ALT  x   /  AlkPhos  x   03-23    eGFR if Non African American: 59 mL/min/1.73M2 (03-22-19 @ 06:53)  eGFR if African American: 68 mL/min/1.73M2 (03-22-19 @ 06:53)                  CAPILLARY BLOOD GLUCOSE        03-19 [id number] 4.9        Culture - Urine (collected 19 Mar 2019 17:25)  Source: .Urine Clean Catch (Midstream)  Final Report (20 Mar 2019 15:41):    <10,000 CFU/mL Normal Urogenital Keri        RADIOLOGY & ADDITIONAL TESTS:    Care Discussed with Consultants/Other Providers: Patient is a 86y old  Male who presents with a chief complaint of s/p Right pontine CVA c functional deficits (23 Mar 2019 07:59)    HPI:  87 yo male c PMH of PE, DVT, COPD, (current smoker), alcohol use, and anemia presents with acute onset of left sided numbness, facial droop and dysarthria on 3/ . CT head on arrival was negative. 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 Patient states that numbness has subsided but his speech deficits persist and his balance is notably worse. At [place] evaluated by cardiology. TTE revealed no thrombus, EF 65%. Started with ASA for small vessel disease and stroke prevention  and Xarelto was restarted for  Hx of multiple DVTs. Patient stopped Xarelto one week prior  to admission. Stable to d/c to rehab on 3/22/19.   Of note, seen by neurology Dr Nye in 2017 for b/l leg weakness. MRI brain and spine was performed showing possible cervical myelopathy. (22 Mar 2019 14:54)    PAST MEDICAL & SURGICAL HISTORY:  Deep vein thrombosis (DVT)  HTN (hypertension)  Dyslipidemia  CAD (coronary artery disease)  Peripheral vascular disease  Parkinson disease  No significant past surgical history    SOCIAL HISTORY:  Tobacco Usage:  ( +  ) never smoked   (   ) former smoker   (   ) current smoker  (     ) pack years    Tobacco Quit Date:  Substance Use (Street drugs): (  ) never used  (  ) other:  Alcohol Usage: +    Family history reviewed and otherwise non-contributory  ALLERGIES:  No Known Allergies    MEDICATIONS:  melatonin 3 milliGRAM(s) Oral at bedtime PRN    REVIEW OF SYSTEMS:  CONSTITUTIONAL: No fever, weight loss, or fatigue  EYES: No eye pain, visual disturbances, or discharge  ENMT:  No difficulty hearing, tinnitus, vertigo; No sinus or throat pain  NECK: No neck pain or neck stiffness  BREASTS: No pain, masses, or nipple discharge  RESPIRATORY: No cough, wheezing, chills or hemoptysis; No shortness of breath  CARDIOVASCULAR: No chest pain, palpitations, dizziness, or leg swelling  GASTROINTESTINAL: No abdominal pain, No nausea, vomiting, or hematemesis; No diarrhea or constipation  GENITOURINARY: No dysuria, frequency, hematuria, or incontinence  NEUROLOGICAL: No headaches, memory loss, loss of strength, numbness, or tremors  SKIN: No itching, burning, rashes, or lesions   LYMPH NODES: No enlarged glands  ENDOCRINE: No heat or cold intolerance; No hair loss  MUSCULOSKELETAL: No joint pain or swelling; No muscle, back, or extremity pain  PSYCHIATRIC: No depression, anxiety, mood swings, or difficulty sleeping  HEME/LYMPH: No easy bruising or bleeding  ALLERY AND IMMUNOLOGIC: No hives or eczema    All other ROS reviewed and negative except as otherwise stated    Vital Signs Last 24 Hrs  T(F): 97.4 (23 Mar 2019 08:47), Max: 98.2 (22 Mar 2019 18:06)  HR: 68 (23 Mar 2019 08:47) (68 - 87)  BP: 147/74 (23 Mar 2019 08:47) (126/69 - 147/74)  RR: 14 (23 Mar 2019 08:47) (14 - 116)  SpO2: 97% (23 Mar 2019 08:47) (93% - 97%)  I&O's Summary    PHYSICAL EXAM:  GENERAL: NAD, well-groomed, well-developed  HEAD:  Atraumatic, Normocephalic  EYES: EOMI, PERRLA, conjunctiva and sclera clear  ENMT: No tonsillar erythema, exudates, or enlargement; Moist mucous membranes, Good dentition  NECK: Supple, No JVD  CHEST/LUNG: Clear to auscultation bilaterally; No rales, rhonchi, +wheezing, or rubs,   HEART: Regular rate and rhythm; S1/S2, No murmurs, rubs, or gallops  ABDOMEN: Soft, Nontender, Nondistended; Bowel sounds present  VASCULAR: Normal pulses, Normal capillary refill  EXTREMITIES:  2+ Peripheral Pulses, No cyanosis, No edema  LYMPH: No lymphadenopathy noted  SKIN: Warm, Intact  PSYCH: Normal mood and affect  NERVOUS SYSTEM:  A/O x3, Left Hemiparesis, Dysarthria     LABS:                        8.6    7.7   )-----------( 354      ( 23 Mar 2019 06:30 )             26.7     03-22    140  |  105  |  32  ----------------------------<  85  4.2   |  24  |  1.13    Ca    8.4      22 Mar 2019 06:53    TPro  x   /  Alb  x   /  TBili  0.5  /  DBili  x   /  AST  x   /  ALT  x   /  AlkPhos  x   03-23    eGFR if Non African American: 59 mL/min/1.73M2 (03-22-19 @ 06:53)  eGFR if African American: 68 mL/min/1.73M2 (03-22-19 @ 06:53)                  CAPILLARY BLOOD GLUCOSE        03-19 [id number] 4.9        Culture - Urine (collected 19 Mar 2019 17:25)  Source: .Urine Clean Catch (Midstream)  Final Report (20 Mar 2019 15:41):    <10,000 CFU/mL Normal Urogenital Keri        RADIOLOGY & ADDITIONAL TESTS:    Care Discussed with Consultants/Other Providers:

## 2019-03-23 NOTE — DIETITIAN INITIAL EVALUATION ADULT. - ENERGY NEEDS
abdominal pain Height: 5'10" (per pt report), Weight: (3/22) 154.3lbs, BMI: 22.0  Skin: free from pressure ulcers, Edema: none  IBW range: 149-183lbs  Last BM: 3/22

## 2019-03-23 NOTE — DIETITIAN INITIAL EVALUATION ADULT. - ORAL INTAKE PTA
Pt transferred from [facility] and noted with fair po intake and evidence of moderate malnutrition. Pt states that he's been trying to eat because he knows he has to/fair

## 2019-03-23 NOTE — CHART NOTE - NSCHARTNOTEFT_GEN_A_CORE
Upon Nutritional Assessment by the Registered Dietitian your patient was determined to meet criteria / has evidence of the following diagnosis/diagnoses:          [ ]  Mild Protein Calorie Malnutrition        [X]  Moderate Protein Calorie Malnutrition        [ ] Severe Protein Calorie Malnutrition        [ ] Unspecified Protein Calorie Malnutrition        [ ] Underweight / BMI <19        [ ] Morbid Obesity / BMI > 40      Findings as based on:  [X] Comprehensive nutrition assessment   [X] Nutrition Focused Physical Exam  [X] Other: muscle/fat depletion      Nutrition Plan/Recommendations:  Recommend add Ensure Enlive 8oz BID        PROVIDER Section:     By signing this assessment you are acknowledging and agree with the diagnosis/diagnoses assigned by the Registered Dietitian    Comments:

## 2019-03-24 PROCEDURE — 99233 SBSQ HOSP IP/OBS HIGH 50: CPT

## 2019-03-24 RX ORDER — IPRATROPIUM/ALBUTEROL SULFATE 18-103MCG
3 AEROSOL WITH ADAPTER (GRAM) INHALATION EVERY 6 HOURS
Qty: 0 | Refills: 0 | Status: DISCONTINUED | OUTPATIENT
Start: 2019-03-24 | End: 2019-04-03

## 2019-03-24 RX ADMIN — RIVAROXABAN 20 MILLIGRAM(S): KIT at 17:36

## 2019-03-24 RX ADMIN — Medication 1 PATCH: at 12:22

## 2019-03-24 RX ADMIN — Medication 1 PATCH: at 07:10

## 2019-03-24 RX ADMIN — BUDESONIDE AND FORMOTEROL FUMARATE DIHYDRATE 2 PUFF(S): 160; 4.5 AEROSOL RESPIRATORY (INHALATION) at 21:16

## 2019-03-24 RX ADMIN — Medication 40 MILLIGRAM(S): at 05:46

## 2019-03-24 RX ADMIN — Medication 40 MILLIGRAM(S): at 17:37

## 2019-03-24 RX ADMIN — GABAPENTIN 300 MILLIGRAM(S): 400 CAPSULE ORAL at 21:12

## 2019-03-24 RX ADMIN — Medication 3 MILLILITER(S): at 06:13

## 2019-03-24 RX ADMIN — ATORVASTATIN CALCIUM 40 MILLIGRAM(S): 80 TABLET, FILM COATED ORAL at 21:12

## 2019-03-24 RX ADMIN — Medication 1 PATCH: at 12:20

## 2019-03-24 RX ADMIN — Medication 40 MILLIGRAM(S): at 09:50

## 2019-03-24 RX ADMIN — Medication 81 MILLIGRAM(S): at 12:19

## 2019-03-24 RX ADMIN — BUDESONIDE AND FORMOTEROL FUMARATE DIHYDRATE 2 PUFF(S): 160; 4.5 AEROSOL RESPIRATORY (INHALATION) at 09:46

## 2019-03-24 RX ADMIN — TAMSULOSIN HYDROCHLORIDE 0.4 MILLIGRAM(S): 0.4 CAPSULE ORAL at 21:12

## 2019-03-24 RX ADMIN — PANTOPRAZOLE SODIUM 40 MILLIGRAM(S): 20 TABLET, DELAYED RELEASE ORAL at 05:44

## 2019-03-24 RX ADMIN — Medication 1 PATCH: at 20:09

## 2019-03-24 NOTE — PROGRESS NOTE ADULT - SUBJECTIVE AND OBJECTIVE BOX
History of Present Illness:  Reason for Admission: s/p Right pontine CVA c functional deficits	  History of Present Illness: 	  85 yo male c PMH of PE, DVT, COPD, (current smoker), alcohol use, and anemia presents with acute onset of left sided numbness, facial droop and dysarthria on 3/ . CT head on arrival was negative. 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 Patient states that numbness has subsided but his speech deficits persist and his balance is notably worse. At [place] evaluated by cardiology. TTE revealed no thrombus, EF 65%. Started with ASA for small vessel disease and stroke prevention  and Xarelto was restarted for  Hx of multiple DVTs. Patient stopped Xarelto one week prior  to admission. Stable to d/c to rehab on 3/22/19.   Of note, seen by neurology Dr Nye in 2017 for b/l leg weakness. MRI brain and spine was performed showing possible cervical myelopathy.          Review of Systems/Subjective: No SOB/CP, no n/v, no pain.  Review of Systems:  CONSTITUTIONAL: No fever or fatigue  NECK: No pain or stiffness  RESPIRATORY: No cough, wheezing, chills. No shortness of breath  CARDIOVASCULAR: No chest pain  GASTROINTESTINAL: No nausea, vomiting  GENITOURINARY: No dysuria  NEUROLOGICAL: No headaches  SKIN: No rashes, or lesions   MUSCULOSKELETAL: No joint pain   	      Allergies and Intolerances:        Allergies:  	No Known Allergies:     Home Medications:   * Patient Currently Takes Medications as of 19-Mar-2019 18:55 documented in Structured Notes  · 	aspirin 81 mg oral tablet: 1 tab(s) orally once a day  · 	Advair Diskus 250 mcg-50 mcg inhalation powder: 1 puff(s) inhaled 2 times a day  · 	DuoNeb 0.5 mg-2.5 mg/3 mL inhalation solution: 3 milliliter(s) inhaled 4 times a day, As Needed  · 	Flomax 0.4 mg oral capsule: 1 cap(s) orally once a day    .    Patient History:    Past Medical, Past Surgical, and Family History:  PAST MEDICAL HISTORY:  CAD (coronary artery disease)     Deep vein thrombosis (DVT)     Dyslipidemia     HTN (hypertension)     Parkinson disease     Peripheral vascular disease.     PAST SURGICAL HISTORY:  No significant past surgical history.              Physical Exam:  Physical Exam:  Vital Signs Last 24 Hrs T(C): 36.5 (23 Mar 2019 21:23), Max: 36.5 (23 Mar 2019 21:23) T(F): 97.7 (23 Mar 2019 21:23), Max: 97.7 (23 Mar 2019 21:23) HR: 62 (24 Mar 2019 06:13) (62 - 91) BP: 130/73 (24 Mar 2019 06:07) (130/73 - 138/68) BP(mean): -- RR: 15 (24 Mar 2019 06:07) (12 - 15) SpO2: 98% (24 Mar 2019 06:13) (92% - 98%)   PHYSICAL EXAM  Constitutional - NAD, Comfortable  HEENT - NCAT  Neck - Supple  Chest - CTA bilaterally, No wheeze  Cardiovascular - RRR, S1S2  Abdomen - BS+, Soft, NTND  Extremities - No C/C/E, No calf tenderness   Skin-no rash  Neurologic Exam -                   HIF  - Awake, Alert, AAO to self, situation      No aphasia, normal attention,           Motor - mild left hemiparesis 4+/5                            Sensory - Intact to LT,                      Cortical sensory modalities intact     Reflexes - DTR  1+ =         Psychiatric - Mood stable, Affect WNL	       Labs and Results:  Labs, Radiology, Cardiology, and Other Results:  LABS:                      8.6   7.7   )-----------( 354      ( 23 Mar 2019 06:30 )            26.7    Ca    8.4        22 Mar 2019 06:53           EXAM:  MR BRAIN     PROCEDURE DATE:  03/22/2019      INTERPRETATION:  CLINICAL STATEMENT: Numbness slurred speech   TECHNIQUE: MRI of the brain was performed without gadolinium.   COMPARISON: CT head 3/19/2019   FINDINGS:  There is moderate diffuse parenchymal volume loss. There are T2   prolongation signal abnormalities in the brainstem, periventricular   subcortical white matter likely related to severe chronic microvascular   ischemic changes.   Chronic lacunar infarcts thalamus and basal ganglia noted. Small chronic   infarct left cerebellum.   Few gradient susceptibility affects noted which may be related to chronic   microhemorrhages.   There is no acute parenchymal hemorrhage, parenchymal mass,  or midline   shift. There is no extra-axial fluid collection.  There is no   hydrocephalus. The   4 x 4 mm acute infarct noted in the right harper.   Mucosal thickening paranasal sinuses.   IMPRESSION:  Subcentimeter acute infarct right harper.   No acute intracranial hemorrhage          MICHELLE TOBRA M.D., ATTENDING RADIOLOGIST This document has been electronically signed. Mar 22 2019 10:48AM

## 2019-03-24 NOTE — PROGRESS NOTE ADULT - ASSESSMENT
85 yo male c hx of COPD, PE, DVT, current smoker s/p right pontine cva and now with functional deficits.                                                                   #CVA  - ASA and  statin  -start PT/OT/SLP  -fall and aspiration precautions    #smoker  -Nicotine patch, smoking cessation education    #COPD  -inhalers  -Hospitalist eval      # history of multiple DVTs  -xarelto

## 2019-03-24 NOTE — PROGRESS NOTE ADULT - ASSESSMENT
87 yo male c hx of COPD, PE, DVT, current smoker s/p right pontine cva and now with functional deficits.                                                                   #CVA  c/w ASA and  statin  c/w  PT/OT/SLP    #smoker  c/w Nicotine patch    #COPD Acute exacerbation   c/w Budesonide   Stop Prednisone, Start Solumedrol 40 Q12   O2 with NC as needed   Start Duonebs   CXR     # H/O multiple DVTs  c/w Xarelto

## 2019-03-25 DIAGNOSIS — I63.81 OTHER CEREBRAL INFARCTION DUE TO OCCLUSION OR STENOSIS OF SMALL ARTERY: ICD-10-CM

## 2019-03-25 LAB
ALBUMIN SERPL ELPH-MCNC: 3 G/DL — LOW (ref 3.3–5)
ALP SERPL-CCNC: 102 U/L — SIGNIFICANT CHANGE UP (ref 40–120)
ALT FLD-CCNC: 14 U/L DA — SIGNIFICANT CHANGE UP (ref 10–45)
ANION GAP SERPL CALC-SCNC: 13 MMOL/L — SIGNIFICANT CHANGE UP (ref 5–17)
AST SERPL-CCNC: 17 U/L — SIGNIFICANT CHANGE UP (ref 10–40)
BILIRUB SERPL-MCNC: 0.5 MG/DL — SIGNIFICANT CHANGE UP (ref 0.2–1.2)
BUN SERPL-MCNC: 39 MG/DL — HIGH (ref 7–23)
CALCIUM SERPL-MCNC: 8.8 MG/DL — SIGNIFICANT CHANGE UP (ref 8.4–10.5)
CHLORIDE SERPL-SCNC: 104 MMOL/L — SIGNIFICANT CHANGE UP (ref 96–108)
CO2 SERPL-SCNC: 23 MMOL/L — SIGNIFICANT CHANGE UP (ref 22–31)
CREAT SERPL-MCNC: 1.29 MG/DL — SIGNIFICANT CHANGE UP (ref 0.5–1.3)
GLUCOSE SERPL-MCNC: 140 MG/DL — HIGH (ref 70–99)
HCT VFR BLD CALC: 30.3 % — LOW (ref 39–50)
HGB BLD-MCNC: 8.9 G/DL — LOW (ref 13–17)
MCHC RBC-ENTMCNC: 25.1 PG — LOW (ref 27–34)
MCHC RBC-ENTMCNC: 29.5 GM/DL — LOW (ref 32–36)
MCV RBC AUTO: 85 FL — SIGNIFICANT CHANGE UP (ref 80–100)
PLATELET # BLD AUTO: 463 K/UL — HIGH (ref 150–400)
POTASSIUM SERPL-MCNC: 4.1 MMOL/L — SIGNIFICANT CHANGE UP (ref 3.5–5.3)
POTASSIUM SERPL-SCNC: 4.1 MMOL/L — SIGNIFICANT CHANGE UP (ref 3.5–5.3)
PROT SERPL-MCNC: 7.8 G/DL — SIGNIFICANT CHANGE UP (ref 6–8.3)
RBC # BLD: 3.56 M/UL — LOW (ref 4.2–5.8)
RBC # FLD: 18.8 % — HIGH (ref 10.3–14.5)
SODIUM SERPL-SCNC: 140 MMOL/L — SIGNIFICANT CHANGE UP (ref 135–145)
WBC # BLD: 12.2 K/UL — HIGH (ref 3.8–10.5)
WBC # FLD AUTO: 12.2 K/UL — HIGH (ref 3.8–10.5)

## 2019-03-25 PROCEDURE — 99233 SBSQ HOSP IP/OBS HIGH 50: CPT

## 2019-03-25 PROCEDURE — 74176 CT ABD & PELVIS W/O CONTRAST: CPT | Mod: 26

## 2019-03-25 PROCEDURE — 71045 X-RAY EXAM CHEST 1 VIEW: CPT | Mod: 26

## 2019-03-25 PROCEDURE — 93010 ELECTROCARDIOGRAM REPORT: CPT

## 2019-03-25 RX ORDER — LACTULOSE 10 G/15ML
15 SOLUTION ORAL AT BEDTIME
Qty: 0 | Refills: 0 | Status: DISCONTINUED | OUTPATIENT
Start: 2019-03-25 | End: 2019-04-03

## 2019-03-25 RX ADMIN — Medication 1 PATCH: at 08:21

## 2019-03-25 RX ADMIN — Medication 40 MILLIGRAM(S): at 17:45

## 2019-03-25 RX ADMIN — GABAPENTIN 300 MILLIGRAM(S): 400 CAPSULE ORAL at 21:05

## 2019-03-25 RX ADMIN — ATORVASTATIN CALCIUM 40 MILLIGRAM(S): 80 TABLET, FILM COATED ORAL at 21:04

## 2019-03-25 RX ADMIN — Medication 3 MILLILITER(S): at 13:00

## 2019-03-25 RX ADMIN — TAMSULOSIN HYDROCHLORIDE 0.4 MILLIGRAM(S): 0.4 CAPSULE ORAL at 21:05

## 2019-03-25 RX ADMIN — RIVAROXABAN 20 MILLIGRAM(S): KIT at 17:45

## 2019-03-25 RX ADMIN — LACTULOSE 15 GRAM(S): 10 SOLUTION ORAL at 21:05

## 2019-03-25 RX ADMIN — BUDESONIDE AND FORMOTEROL FUMARATE DIHYDRATE 2 PUFF(S): 160; 4.5 AEROSOL RESPIRATORY (INHALATION) at 21:53

## 2019-03-25 RX ADMIN — Medication 1 PATCH: at 18:09

## 2019-03-25 RX ADMIN — PANTOPRAZOLE SODIUM 40 MILLIGRAM(S): 20 TABLET, DELAYED RELEASE ORAL at 06:03

## 2019-03-25 RX ADMIN — BUDESONIDE AND FORMOTEROL FUMARATE DIHYDRATE 2 PUFF(S): 160; 4.5 AEROSOL RESPIRATORY (INHALATION) at 08:44

## 2019-03-25 RX ADMIN — Medication 3 MILLIGRAM(S): at 21:04

## 2019-03-25 RX ADMIN — Medication 1 PATCH: at 11:28

## 2019-03-25 RX ADMIN — Medication 81 MILLIGRAM(S): at 11:28

## 2019-03-25 RX ADMIN — Medication 40 MILLIGRAM(S): at 05:36

## 2019-03-25 NOTE — PROGRESS NOTE ADULT - ASSESSMENT
87 yo male c hx of COPD, PE, DVT, current smoker s/p right pontine cva and now with functional deficits.                                                                   #CVA  c/w ASA and  statin  c/w  PT/OT/SLP    #Smoker  c/w Nicotine patch    #COPD Acute exacerbation   Lungs more clear today, cough has improved.   c/w  Solumedrol 40 Q12 for 1 more day, will switch to prednisone.   O2 with NC as needed   c/w  Duonebs   f/u CXR - pending     #BPH   c/w Tamsulosin     # H/O multiple DVTs  c/w Xarelto 85 yo male c hx of COPD, PE, DVT, current smoker s/p right pontine cva and now with functional deficits.                                                                   #CVA  c/w ASA and  statin  c/w  PT/OT/SLP    #Smoker  c/w Nicotine patch    #COPD Acute exacerbation   Lungs more clear today, cough has improved.   c/w  Solumedrol 40 Q12 for 1 more day, will switch to prednisone.   O2 with NC as needed   c/w  Duoneb   f/u CXR - pending     #BPH   c/w Tamsulosin     #Abdominal pain x 2 weeks   LLQ   f/u CT abdomen     #Anemia  f/u FOBT, CT abdomen.   H/h stable over last few days.,     # H/O multiple DVTs  c/w Xarelto 85 yo male c hx of COPD, PE, DVT, current smoker s/p right pontine cva and now with functional deficits.                                                                   #CVA  c/w ASA and  statin  c/w  PT/OT/SLP    #Smoker  c/w Nicotine patch    #COPD Acute exacerbation   Lungs more clear today, cough has improved.   c/w  Solumedrol 40 Q12 for 1 more day, will switch to prednisone.   O2 with NC as needed   c/w  Duoneb   f/u CXR - pending     #BPH   c/w Tamsulosin     #Abdominal pain x 2 weeks   LLQ   f/u CT abdomen     #Anemia  f/u FOBT, CT abdomen.   monitor H/H , f/u CBC stat     # H/O multiple DVTs  c/w Xarelto

## 2019-03-25 NOTE — PROGRESS NOTE ADULT - SUBJECTIVE AND OBJECTIVE BOX
Patient is a 86y old  Male who presents with a chief complaint of s/p Right pontine CVA c functional deficits (24 Mar 2019 09:59)      Patient seen and examined at bedside. No acute issues/complaints.     ALLERGIES:  No Known Allergies    MEDICATIONS:  ALBUTerol/ipratropium for Nebulization 3 milliLiter(s) Nebulizer every 6 hours PRN  melatonin 3 milliGRAM(s) Oral at bedtime PRN  methylPREDNISolone sodium succinate Injectable 40 milliGRAM(s) IV Push every 12 hours    Vital Signs Last 24 Hrs  T(F): 97.9 (25 Mar 2019 07:15), Max: 97.9 (25 Mar 2019 07:15)  HR: 74 (25 Mar 2019 07:15) (64 - 93)  BP: 129/69 (25 Mar 2019 07:15) (120/72 - 151/65)  RR: 13 (25 Mar 2019 07:15) (13 - 14)  SpO2: 96% (25 Mar 2019 07:15) (94% - 98%)  I&O's Summary      PHYSICAL EXAM:  General: NAD, comfortable   ENT: MMM  Neck: Supple, No JVD  Lungs: CTA, BLAE, No added sounds.   Cardio: RRR, S1/S2, No murmurs  Abdomen: Soft, NT/ND, BS+   Extremities: No edema  CNS: nonfocal     LABS:                        8.6    7.7   )-----------( 354      ( 23 Mar 2019 06:30 )             26.7     03-23    141  |  107  |  34  ----------------------------<  99  4.5   |  26  |  1.11    Ca    8.7      23 Mar 2019 06:30    TPro  7.1  /  Alb  2.8  /  TBili  0.5  /  DBili  x   /  AST  17  /  ALT  9   /  AlkPhos  100  03-23    eGFR if Non African American: 60 mL/min/1.73M2 (03-23-19 @ 06:30)  eGFR if : 69 mL/min/1.73M2 (03-23-19 @ 06:30)            03-20 Chol 113 mg/dL LDL 57 mg/dL HDL 39 mg/dL Trig 84 mg/dL        CAPILLARY BLOOD GLUCOSE        03-19 [id number] 4.9          RADIOLOGY & ADDITIONAL TESTS:    Care Discussed with Consultants/Other Providers:

## 2019-03-25 NOTE — PROGRESS NOTE ADULT - ASSESSMENT
87 yo male c hx of COPD, PE, DVT, current smoker s/p right pontine cva and now with functional deficits.   Precautions:    fall, aspiration                                                                              Diet: reg c thins    DVT Prophylaxis:        xarelto                                                                  Medical Prognosis: good    Prescreen Comparison: I have reviewed the prescreen information and I found no relevant changes between the preadmission  screening and my post admission evaluation.     #CVA   ASA and  statin, xarelto  continue PT/OT/SLP  fall and aspiration precautions, MBS am.     #smoker  Nicotine patch, smoking cessation education    #COPD  inhalers, steroid per medicine.       # history of multiple DVTs  -xarelto to continue    #anemia   CBC stat today, FOBS to follow, CT Abd. Will follow medicine's plan.     #Abd pain  CT abd today.       Expected Therapy:   P.T.   1     hrs/day           O. T.    1  hrs/day           S.L.P.  1      hrs/day                    P&O      eval                                             Excpected Frequency: 5 days/7 day period    Rehab Potential:            excellent                               Estimated Disposition:     home                     ELOS:   14           days      Rationale For Inpatient Rehab Admission- Patient demonstrates the following:     [X] Medically appropriate for rehabilitation admission   [X] Has attainable rehab goals with an approrpriate discharge plan  [X] Has rehabilitation potential (expected to make significant improvement within a reasonable period of time)  [X] Requires close medical management by a rehab physician, rehab nursing care and comprehensive interdisciplinary team (including         PT, OT, SLP and/or prosthetics and orthotics) 85 yo male c hx of COPD, PE, DVT, current smoker s/p right pontine cva and now with functional deficits.   Precautions:    fall, aspiration                                                                              Diet: reg c thins    DVT Prophylaxis:        xarelto                                                                  Medical Prognosis: good    Prescreen Comparison: I have reviewed the prescreen information and I found no relevant changes between the preadmission  screening and my post admission evaluation.     #CVA   ASA and  statin, xarelto  continue PT/OT/SLP  fall and aspiration precautions, MBS am.     #smoker  Nicotine patch, smoking cessation education    #COPD  inhalers, steroid per medicine.       # history of multiple DVTs  -xarelto to continue    #anemia   CBC stat today, FOBS to follow, CT Abd. Will follow medicine's plan.       #Abd pain  CT abd today.       Expected Therapy:   P.T.   1     hrs/day           O. T.    1  hrs/day           S.L.P.  1      hrs/day                    P&O      eval                                             Excpected Frequency: 5 days/7 day period    Rehab Potential:            excellent                               Estimated Disposition:     home                     ELOS:   14           days      Rationale For Inpatient Rehab Admission- Patient demonstrates the following:     [X] Medically appropriate for rehabilitation admission   [X] Has attainable rehab goals with an approrpriate discharge plan  [X] Has rehabilitation potential (expected to make significant improvement within a reasonable period of time)  [X] Requires close medical management by a rehab physician, rehab nursing care and comprehensive interdisciplinary team (including         PT, OT, SLP and/or prosthetics and orthotics)

## 2019-03-25 NOTE — PROGRESS NOTE ADULT - SUBJECTIVE AND OBJECTIVE BOX
CHIEF COMPLAINT: Abdominal pain.       HISTORY OF PRESENT ILLNESS  87 yo male c PMH of PE, DVT, COPD, (current smoker), alcohol use, and anemia presents with acute onset of left sided numbness, facial droop and dysarthria on 3/ . CT head on arrival was negative. 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 Patient states that numbness has subsided but his speech deficits persist and his balance is notably worse. At [place] evaluated by cardiology. TTE revealed no thrombus, EF 65%. Started with ASA for small vessel disease and stroke prevention  and Xarelto was restarted for  Hx of multiple DVTs. Patient stopped Xarelto one week prior  to admission. Stable to d/c to rehab on 3/22/19.   Of note, seen by neurology Dr Nye in 2017 for b/l leg weakness. MRI brain and spine was performed showing possible cervical myelopathy. (22 Mar 2019 14:54)      PAST MEDICAL & SURGICAL HISTORY:  Deep vein thrombosis (DVT)  HTN (hypertension)  Dyslipidemia  CAD (coronary artery disease)  Peripheral vascular disease  Parkinson disease  No significant past surgical history       REVIEW OF SYMPTOMS  [X] Constitutional WNL     [X] Cardio WNL                                    [X]  WNL               [X] Endo WNL                     [X] Skin WNL                 [X] MSK WNL                   [X] Psych WNL      VITALS  Vital Signs Last 24 Hrs  T(C): 36.6 (25 Mar 2019 07:15), Max: 36.6 (24 Mar 2019 20:02)  T(F): 97.9 (25 Mar 2019 07:15), Max: 97.9 (25 Mar 2019 07:15)  HR: 91 (25 Mar 2019 08:45) (72 - 91)  BP: 129/69 (25 Mar 2019 07:15) (129/69 - 151/65)  BP(mean): --  RR: 13 (25 Mar 2019 07:15) (13 - 14)  SpO2: 93% (25 Mar 2019 08:45) (93% - 96%)      PHYSICAL EXAM  Constitutional - Abdominal pain.   HEENT - NCAT, EOMI  Neck - Supple, No limited ROM  Chest - Dimnished, No wheeze, No rhonchi, No crackles  Cardiovascular - irregular, No murmurs  Abdomen - BS+, tender to palpation  Extremities - No C/C/E, No calf tenderness   Skin-no rash  Wounds-na      Neurologic Exam -  no new deficits                  Balance - impaired     Psychiatric - Mood stable, Affect WNL         FUNCTIONAL PROGRESS  Gait -   ADLs -   Transfers -  Functional transfer -     RECENT LABS                        8.9    12.2  )-----------( 463      ( 25 Mar 2019 10:43 )             30.3     03-25    140  |  104  |  39<H>  ----------------------------<  140<H>  4.1   |  23  |  1.29    Ca    8.8      25 Mar 2019 10:43    TPro  7.8  /  Alb  3.0<L>  /  TBili  0.5  /  DBili  x   /  AST  17  /  ALT  14  /  AlkPhos  102  03-25      LIVER FUNCTIONS - ( 25 Mar 2019 10:43 )  Alb: 3.0 g/dL / Pro: 7.8 g/dL / ALK PHOS: 102 U/L / ALT: 14 U/L DA / AST: 17 U/L / GGT: x             Direct LDL: 57 mg/dL (03-20-19 @ 07:10)    Hemoglobin A1C, Whole Blood: 4.9 % (03-19-19 @ 19:20)  Hemoglobin A1C, Whole Blood: 4.9 % (03-19-19 @ 19:20)              RADIOLOGY/OTHER RESULTS      CURRENT MEDICATIONS  MEDICATIONS  (STANDING):  aspirin  chewable 81 milliGRAM(s) Oral daily  atorvastatin 40 milliGRAM(s) Oral at bedtime  buDESOnide 160 MICROgram(s)/formoterol 4.5 MICROgram(s) Inhaler 2 Puff(s) Inhalation two times a day  gabapentin 300 milliGRAM(s) Oral at bedtime  methylPREDNISolone sodium succinate Injectable 40 milliGRAM(s) IV Push every 12 hours  nicotine -   7 mG/24Hr(s) Patch 1 patch Transdermal daily  pantoprazole    Tablet 40 milliGRAM(s) Oral before breakfast  rivaroxaban 20 milliGRAM(s) Oral every 24 hours  tamsulosin 0.4 milliGRAM(s) Oral at bedtime    MEDICATIONS  (PRN):  ALBUTerol/ipratropium for Nebulization 3 milliLiter(s) Nebulizer every 6 hours PRN Shortness of Breath and/or Wheezing  melatonin 3 milliGRAM(s) Oral at bedtime PRN Insomnia      ASSESSMENT & PLAN          GI/Bowel Management - hold   Management - Toilet Q2  Skin - Turn Q2  Pain - Tylenol PRN  DVT PPX - Xarelto  Diet - dysphagia c strict aspiration, MBS    Continue comprehensive acute rehab program consisting of 3hrs/day of OT/PT and SLP. CHIEF COMPLAINT: Abdominal pain.       HISTORY OF PRESENT ILLNESS  87 yo male c PMH of PE, DVT, COPD, (current smoker), alcohol use, and anemia presents with acute onset of left sided numbness, facial droop and dysarthria on 3/ . CT head on arrival was negative. 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 Patient states that numbness has subsided but his speech deficits persist and his balance is notably worse. At [place] evaluated by cardiology. TTE revealed no thrombus, EF 65%. Started with ASA for small vessel disease and stroke prevention  and Xarelto was restarted for  Hx of multiple DVTs. Patient stopped Xarelto one week prior  to admission. Stable to d/c to rehab on 3/22/19.   Of note, seen by neurology Dr Nye in 2017 for b/l leg weakness. MRI brain and spine was performed showing possible cervical myelopathy. (22 Mar 2019 14:54)      PAST MEDICAL & SURGICAL HISTORY:  Deep vein thrombosis (DVT)  HTN (hypertension)  Dyslipidemia  CAD (coronary artery disease)  Peripheral vascular disease  Parkinson disease  No significant past surgical history       REVIEW OF SYMPTOMS  [X] Constitutional WNL     [X] Cardio WNL                                    [X]  WNL               [X] Endo WNL                     [X] Skin WNL                 [X] MSK WNL                   [X] Psych WNL      VITALS  Vital Signs Last 24 Hrs  T(C): 36.6 (25 Mar 2019 07:15), Max: 36.6 (24 Mar 2019 20:02)  T(F): 97.9 (25 Mar 2019 07:15), Max: 97.9 (25 Mar 2019 07:15)  HR: 91 (25 Mar 2019 08:45) (72 - 91)  BP: 129/69 (25 Mar 2019 07:15) (129/69 - 151/65)  BP(mean): --  RR: 13 (25 Mar 2019 07:15) (13 - 14)  SpO2: 93% (25 Mar 2019 08:45) (93% - 96%)      PHYSICAL EXAM  Constitutional - Abdominal pain.   HEENT - NCAT, EOMI  Neck - Supple, No limited ROM  Chest - Dimnished, No wheeze, No rhonchi, No crackles  Cardiovascular - irregular, No murmurs  Abdomen - BS+, tender to palpation  Extremities - No C/C/E, No calf tenderness   Skin-no rash  Wounds-na      Neurologic Exam -  no new deficits                  Balance - impaired     Psychiatric - Mood stable, Affect WNL         FUNCTIONAL PROGRESS  Gait -   ADLs -   Transfers -  Functional transfer -     RECENT LABS                        8.9    12.2  )-----------( 463      ( 25 Mar 2019 10:43 )             30.3     03-25    140  |  104  |  39<H>  ----------------------------<  140<H>  4.1   |  23  |  1.29    Ca    8.8      25 Mar 2019 10:43    TPro  7.8  /  Alb  3.0<L>  /  TBili  0.5  /  DBili  x   /  AST  17  /  ALT  14  /  AlkPhos  102  03-25      LIVER FUNCTIONS - ( 25 Mar 2019 10:43 )  Alb: 3.0 g/dL / Pro: 7.8 g/dL / ALK PHOS: 102 U/L / ALT: 14 U/L DA / AST: 17 U/L / GGT: x             Direct LDL: 57 mg/dL (03-20-19 @ 07:10)    Hemoglobin A1C, Whole Blood: 4.9 % (03-19-19 @ 19:20)  Hemoglobin A1C, Whole Blood: 4.9 % (03-19-19 @ 19:20)              RADIOLOGY/OTHER RESULTS      CURRENT MEDICATIONS  MEDICATIONS  (STANDING):  aspirin  chewable 81 milliGRAM(s) Oral daily  atorvastatin 40 milliGRAM(s) Oral at bedtime  buDESOnide 160 MICROgram(s)/formoterol 4.5 MICROgram(s) Inhaler 2 Puff(s) Inhalation two times a day  gabapentin 300 milliGRAM(s) Oral at bedtime  methylPREDNISolone sodium succinate Injectable 40 milliGRAM(s) IV Push every 12 hours  nicotine -   7 mG/24Hr(s) Patch 1 patch Transdermal daily  pantoprazole    Tablet 40 milliGRAM(s) Oral before breakfast  rivaroxaban 20 milliGRAM(s) Oral every 24 hours  tamsulosin 0.4 milliGRAM(s) Oral at bedtime    MEDICATIONS  (PRN):  ALBUTerol/ipratropium for Nebulization 3 milliLiter(s) Nebulizer every 6 hours PRN Shortness of Breath and/or Wheezing  melatonin 3 milliGRAM(s) Oral at bedtime PRN Insomnia      ASSESSMENT & PLAN          GI/Bowel Management - hold   Management - Toilet Q2  Skin - Turn Q2  Pain - Tylenol PRN  DVT PPX - Xarelto  Diet - dysphagia c strict aspiration, MBS, puree honey    Continue comprehensive acute rehab program consisting of 3hrs/day of OT/PT and SLP. CHIEF COMPLAINT: Abdominal pain.       HISTORY OF PRESENT ILLNESS  87 yo male c PMH of PE, DVT, COPD, (current smoker), alcohol use, and anemia presents with acute onset of left sided numbness, facial droop and dysarthria on 3/ . CT head on arrival was negative. 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 Patient states that numbness has subsided but his speech deficits persist and his balance is notably worse. At [place] evaluated by cardiology. TTE revealed no thrombus, EF 65%. Started with ASA for small vessel disease and stroke prevention  and Xarelto was restarted for  Hx of multiple DVTs. Patient stopped Xarelto one week prior  to admission. Stable to d/c to rehab on 3/22/19.   Of note, seen by neurology Dr Nye in 2017 for b/l leg weakness. MRI brain and spine was performed showing possible cervical myelopathy. (22 Mar 2019 14:54)      PAST MEDICAL & SURGICAL HISTORY:  Deep vein thrombosis (DVT)  HTN (hypertension)  Dyslipidemia  CAD (coronary artery disease)  Peripheral vascular disease  Parkinson disease  No significant past surgical history       REVIEW OF SYMPTOMS  [X] Constitutional WNL     [X] Cardio WNL                                    [X]  WNL               [X] Endo WNL                     [X] Skin WNL                 [X] MSK WNL                   [X] Psych WNL      VITALS  Vital Signs Last 24 Hrs  T(C): 36.6 (25 Mar 2019 07:15), Max: 36.6 (24 Mar 2019 20:02)  T(F): 97.9 (25 Mar 2019 07:15), Max: 97.9 (25 Mar 2019 07:15)  HR: 91 (25 Mar 2019 08:45) (72 - 91)  BP: 129/69 (25 Mar 2019 07:15) (129/69 - 151/65)  BP(mean): --  RR: 13 (25 Mar 2019 07:15) (13 - 14)  SpO2: 93% (25 Mar 2019 08:45) (93% - 96%)      PHYSICAL EXAM  Constitutional - Abdominal pain.   HEENT - NCAT, EOMI  Neck - Supple, No limited ROM  Chest - Dimnished, No wheeze, No rhonchi, No crackles  Cardiovascular - irregular, No murmurs  Abdomen - BS+, tender to palpation  Extremities - No C/C/E, No calf tenderness   Skin-no rash  Wounds-na      Neurologic Exam -  no new deficits                  Balance - impaired     Psychiatric - Mood stable, Affect WNL         FUNCTIONAL PROGRESS  Gait - 40ft min A c RW  ADLs - CG  Transfers -CG  Functional transfer - CG    RECENT LABS                        8.9    12.2  )-----------( 463      ( 25 Mar 2019 10:43 )             30.3     03-25    140  |  104  |  39<H>  ----------------------------<  140<H>  4.1   |  23  |  1.29    Ca    8.8      25 Mar 2019 10:43    TPro  7.8  /  Alb  3.0<L>  /  TBili  0.5  /  DBili  x   /  AST  17  /  ALT  14  /  AlkPhos  102  03-25      LIVER FUNCTIONS - ( 25 Mar 2019 10:43 )  Alb: 3.0 g/dL / Pro: 7.8 g/dL / ALK PHOS: 102 U/L / ALT: 14 U/L DA / AST: 17 U/L / GGT: x             Direct LDL: 57 mg/dL (03-20-19 @ 07:10)    Hemoglobin A1C, Whole Blood: 4.9 % (03-19-19 @ 19:20)  Hemoglobin A1C, Whole Blood: 4.9 % (03-19-19 @ 19:20)              RADIOLOGY/OTHER RESULTS      CURRENT MEDICATIONS  MEDICATIONS  (STANDING):  aspirin  chewable 81 milliGRAM(s) Oral daily  atorvastatin 40 milliGRAM(s) Oral at bedtime  buDESOnide 160 MICROgram(s)/formoterol 4.5 MICROgram(s) Inhaler 2 Puff(s) Inhalation two times a day  gabapentin 300 milliGRAM(s) Oral at bedtime  methylPREDNISolone sodium succinate Injectable 40 milliGRAM(s) IV Push every 12 hours  nicotine -   7 mG/24Hr(s) Patch 1 patch Transdermal daily  pantoprazole    Tablet 40 milliGRAM(s) Oral before breakfast  rivaroxaban 20 milliGRAM(s) Oral every 24 hours  tamsulosin 0.4 milliGRAM(s) Oral at bedtime    MEDICATIONS  (PRN):  ALBUTerol/ipratropium for Nebulization 3 milliLiter(s) Nebulizer every 6 hours PRN Shortness of Breath and/or Wheezing  melatonin 3 milliGRAM(s) Oral at bedtime PRN Insomnia      ASSESSMENT & PLAN          GI/Bowel Management - hold   Management - Toilet Q2  Skin - Turn Q2  Pain - Tylenol PRN  DVT PPX - Xarelto  Diet - dysphagia c strict aspiration, MBS, puree honey    Continue comprehensive acute rehab program consisting of 3hrs/day of OT/PT and SLP. CHIEF COMPLAINT: Abdominal pain intermittently this morning      HISTORY OF PRESENT ILLNESS  85 yo male c PMH of PE, DVT, COPD, (current smoker), alcohol use, and anemia presents with acute onset of left sided numbness, facial droop and dysarthria on 3/ . CT head on arrival was negative. 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 Patient states that numbness has subsided but his speech deficits persist and his balance is notably worse. At [place] evaluated by cardiology. TTE revealed no thrombus, EF 65%. Started with ASA for small vessel disease and stroke prevention  and Xarelto was restarted for  Hx of multiple DVTs. Patient stopped Xarelto one week prior  to admission. Stable to d/c to rehab on 3/22/19.   Of note, seen by neurology Dr Nye in 2017 for b/l leg weakness. MRI brain and spine was performed showing possible cervical myelopathy. (22 Mar 2019 14:54)      PAST MEDICAL & SURGICAL HISTORY:  Deep vein thrombosis (DVT)  HTN (hypertension)  Dyslipidemia  CAD (coronary artery disease)  Peripheral vascular disease  Parkinson disease  No significant past surgical history       REVIEW OF SYMPTOMS  [X] Constitutional WNL     [X] Cardio WNL                                    [X]  WNL               [X] Endo WNL                     [X] Skin WNL                 [X] MSK WNL                   [X] Psych WNL      VITALS  Vital Signs Last 24 Hrs  T(C): 36.6 (25 Mar 2019 07:15), Max: 36.6 (24 Mar 2019 20:02)  T(F): 97.9 (25 Mar 2019 07:15), Max: 97.9 (25 Mar 2019 07:15)  HR: 91 (25 Mar 2019 08:45) (72 - 91)  BP: 129/69 (25 Mar 2019 07:15) (129/69 - 151/65)  BP(mean): --  RR: 13 (25 Mar 2019 07:15) (13 - 14)  SpO2: 93% (25 Mar 2019 08:45) (93% - 96%)      PHYSICAL EXAM  Constitutional - Abdominal pain.   HEENT - NCAT, EOMI  Neck - Supple, No limited ROM  Chest - Dimnished, No wheeze, No rhonchi, No crackles  Cardiovascular - irregular, No murmurs  Abdomen - BS+, tender to palpation  Extremities - No C/C/E, No calf tenderness   Skin-no rash  Wounds-na      Neurologic Exam -  no new deficits                  Balance - impaired     Psychiatric - Mood stable, Affect WNL         FUNCTIONAL PROGRESS  Gait - 40ft min A c RW  ADLs - CG  Transfers -CG  Functional transfer - CG    RECENT LABS                        8.9    12.2  )-----------( 463      ( 25 Mar 2019 10:43 )             30.3     03-25    140  |  104  |  39<H>  ----------------------------<  140<H>  4.1   |  23  |  1.29    Ca    8.8      25 Mar 2019 10:43    TPro  7.8  /  Alb  3.0<L>  /  TBili  0.5  /  DBili  x   /  AST  17  /  ALT  14  /  AlkPhos  102  03-25      LIVER FUNCTIONS - ( 25 Mar 2019 10:43 )  Alb: 3.0 g/dL / Pro: 7.8 g/dL / ALK PHOS: 102 U/L / ALT: 14 U/L DA / AST: 17 U/L / GGT: x             Direct LDL: 57 mg/dL (03-20-19 @ 07:10)    Hemoglobin A1C, Whole Blood: 4.9 % (03-19-19 @ 19:20)  Hemoglobin A1C, Whole Blood: 4.9 % (03-19-19 @ 19:20)              RADIOLOGY/OTHER RESULTS      CURRENT MEDICATIONS  MEDICATIONS  (STANDING):  aspirin  chewable 81 milliGRAM(s) Oral daily  atorvastatin 40 milliGRAM(s) Oral at bedtime  buDESOnide 160 MICROgram(s)/formoterol 4.5 MICROgram(s) Inhaler 2 Puff(s) Inhalation two times a day  gabapentin 300 milliGRAM(s) Oral at bedtime  methylPREDNISolone sodium succinate Injectable 40 milliGRAM(s) IV Push every 12 hours  nicotine -   7 mG/24Hr(s) Patch 1 patch Transdermal daily  pantoprazole    Tablet 40 milliGRAM(s) Oral before breakfast  rivaroxaban 20 milliGRAM(s) Oral every 24 hours  tamsulosin 0.4 milliGRAM(s) Oral at bedtime    MEDICATIONS  (PRN):  ALBUTerol/ipratropium for Nebulization 3 milliLiter(s) Nebulizer every 6 hours PRN Shortness of Breath and/or Wheezing  melatonin 3 milliGRAM(s) Oral at bedtime PRN Insomnia      ASSESSMENT & PLAN          GI/Bowel Management - hold   Management - Toilet Q2  Skin - Turn Q2  Pain - Tylenol PRN  DVT PPX - Xarelto  Diet - dysphagia c strict aspiration, MBS, puree honey    Continue comprehensive acute rehab program consisting of 3hrs/day of OT/PT and SLP.

## 2019-03-25 NOTE — PROVIDER CONTACT NOTE (OTHER) - ASSESSMENT
Patient alert and following commands. Patient with VS: /74, heart rate 90 to 120 bpm irregular, temp 98.3, RR16, 97% Room Air.
Pt with rhonchi, 02sat=95%, Pt noted to be a mouth breather

## 2019-03-26 DIAGNOSIS — R11.0 NAUSEA: ICD-10-CM

## 2019-03-26 DIAGNOSIS — K92.2 GASTROINTESTINAL HEMORRHAGE, UNSPECIFIED: ICD-10-CM

## 2019-03-26 DIAGNOSIS — K59.00 CONSTIPATION, UNSPECIFIED: ICD-10-CM

## 2019-03-26 DIAGNOSIS — D64.9 ANEMIA, UNSPECIFIED: ICD-10-CM

## 2019-03-26 LAB
HCT VFR BLD CALC: 28.2 % — LOW (ref 39–50)
HGB BLD-MCNC: 8.3 G/DL — LOW (ref 13–17)
MCHC RBC-ENTMCNC: 25.1 PG — LOW (ref 27–34)
MCHC RBC-ENTMCNC: 29.6 GM/DL — LOW (ref 32–36)
MCV RBC AUTO: 84.7 FL — SIGNIFICANT CHANGE UP (ref 80–100)
OB PNL STL: POSITIVE
PLATELET # BLD AUTO: 441 K/UL — HIGH (ref 150–400)
RBC # BLD: 3.32 M/UL — LOW (ref 4.2–5.8)
RBC # FLD: 18.6 % — HIGH (ref 10.3–14.5)
WBC # BLD: 11.1 K/UL — HIGH (ref 3.8–10.5)
WBC # FLD AUTO: 11.1 K/UL — HIGH (ref 3.8–10.5)

## 2019-03-26 PROCEDURE — 99233 SBSQ HOSP IP/OBS HIGH 50: CPT

## 2019-03-26 PROCEDURE — 99223 1ST HOSP IP/OBS HIGH 75: CPT

## 2019-03-26 PROCEDURE — 74230 X-RAY XM SWLNG FUNCJ C+: CPT | Mod: 26

## 2019-03-26 RX ORDER — SALIVA SUBSTITUTE COMB NO.11 351 MG
5 POWDER IN PACKET (EA) MUCOUS MEMBRANE
Qty: 0 | Refills: 0 | Status: DISCONTINUED | OUTPATIENT
Start: 2019-03-26 | End: 2019-04-03

## 2019-03-26 RX ORDER — DOCUSATE SODIUM 100 MG
100 CAPSULE ORAL DAILY
Qty: 0 | Refills: 0 | Status: DISCONTINUED | OUTPATIENT
Start: 2019-03-26 | End: 2019-04-03

## 2019-03-26 RX ORDER — ONDANSETRON 8 MG/1
4 TABLET, FILM COATED ORAL EVERY 6 HOURS
Qty: 0 | Refills: 0 | Status: DISCONTINUED | OUTPATIENT
Start: 2019-03-26 | End: 2019-04-03

## 2019-03-26 RX ADMIN — BUDESONIDE AND FORMOTEROL FUMARATE DIHYDRATE 2 PUFF(S): 160; 4.5 AEROSOL RESPIRATORY (INHALATION) at 09:10

## 2019-03-26 RX ADMIN — GABAPENTIN 300 MILLIGRAM(S): 400 CAPSULE ORAL at 21:16

## 2019-03-26 RX ADMIN — Medication 1 PATCH: at 12:02

## 2019-03-26 RX ADMIN — Medication 81 MILLIGRAM(S): at 12:26

## 2019-03-26 RX ADMIN — Medication 1 PATCH: at 05:51

## 2019-03-26 RX ADMIN — Medication 1 PATCH: at 12:27

## 2019-03-26 RX ADMIN — BUDESONIDE AND FORMOTEROL FUMARATE DIHYDRATE 2 PUFF(S): 160; 4.5 AEROSOL RESPIRATORY (INHALATION) at 21:46

## 2019-03-26 RX ADMIN — PANTOPRAZOLE SODIUM 40 MILLIGRAM(S): 20 TABLET, DELAYED RELEASE ORAL at 05:35

## 2019-03-26 RX ADMIN — Medication 1 PATCH: at 18:31

## 2019-03-26 RX ADMIN — Medication 40 MILLIGRAM(S): at 05:34

## 2019-03-26 RX ADMIN — Medication 5 MILLILITER(S): at 21:16

## 2019-03-26 RX ADMIN — LACTULOSE 15 GRAM(S): 10 SOLUTION ORAL at 21:16

## 2019-03-26 RX ADMIN — RIVAROXABAN 20 MILLIGRAM(S): KIT at 17:19

## 2019-03-26 RX ADMIN — Medication 3 MILLIGRAM(S): at 21:16

## 2019-03-26 RX ADMIN — ATORVASTATIN CALCIUM 40 MILLIGRAM(S): 80 TABLET, FILM COATED ORAL at 21:16

## 2019-03-26 RX ADMIN — TAMSULOSIN HYDROCHLORIDE 0.4 MILLIGRAM(S): 0.4 CAPSULE ORAL at 21:16

## 2019-03-26 NOTE — PROGRESS NOTE ADULT - ASSESSMENT
85 yo male c hx of COPD, PE, DVT, current smoker s/p right pontine cva and now with functional deficits.                                                                 #CVA  c/w ASA and  statin  c/w  PT/OT/SLP    #Smoker  c/w Nicotine patch    #COPD Acute exacerbation   Start Prednisone 40 OD, taper over 5 days.   O2 with NC as needed   c/w  Duoneb   CXR - no acute issues.     #BPH   c/w Tamsulosin     #Abdominal pain - resolved   CT abdomen WNL     #Anemia  f/u FOBT,  H/H stable.   f/u iron panel, retic count     #H/O multiple DVTs  c/w Xarelto 87 yo male c hx of COPD, PE, DVT, current smoker s/p right pontine cva and now with functional deficits.                                                                 #CVA  c/w ASA and  statin  c/w  PT/OT/SLP    #Smoker  c/w Nicotine patch    #COPD Acute exacerbation   Start Prednisone 40 OD, taper over 5 days.   O2 with NC as needed   c/w  Duoneb   CXR - no acute issues.     #BPH   c/w Tamsulosin     #Abdominal pain - resolved   CT abdomen WNL     #Anemia  FOBT +,   ?need for colonoscopy r/o GI bleed   CT negative   H/H stable.   f/u iron panel, retic count   GI consult     #H/O multiple DVTs  c/w Xarelto

## 2019-03-26 NOTE — CONSULT NOTE ADULT - SUBJECTIVE AND OBJECTIVE BOX
ELIZABETH MCLAUGHLIN  86y  Male  Admitting: TD Humphries    HPI:  86-year-old gentleman with history of venous thromboembolic disease, COPD and anemia who presented with left-sided numbness/facial droop/dysarthria. 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 Patient is receiving aspirin for stroke prevention and XARELTO for history of venous thromboembolic disease. He is currently on the acute rehabilitation unit in [facility].    PAST MEDICAL & SURGICAL HISTORY:  Deep vein thrombosis (DVT)  HTN (hypertension)  Dyslipidemia  CAD (coronary artery disease)  Peripheral vascular disease  Parkinson disease    HEALTH ISSUES - PROBLEM Dx:  Nausea: Nausea  Constipation: Constipation  GI bleed: GI bleed    MEDICATIONS  (STANDING):  aspirin  chewable 81 milliGRAM(s) Oral daily  atorvastatin 40 milliGRAM(s) Oral at bedtime  buDESOnide 160 MICROgram(s)/formoterol 4.5 MICROgram(s) Inhaler 2 Puff(s) Inhalation two times a day  docusate sodium 100 milliGRAM(s) Oral daily  gabapentin 300 milliGRAM(s) Oral at bedtime  lactulose Syrup 15 Gram(s) Oral at bedtime  nicotine -   7 mG/24Hr(s) Patch 1 patch Transdermal daily  pantoprazole    Tablet 40 milliGRAM(s) Oral before breakfast  predniSONE   Tablet 40 milliGRAM(s) Oral daily  rivaroxaban 20 milliGRAM(s) Oral every 24 hours  tamsulosin 0.4 milliGRAM(s) Oral at bedtime    MEDICATIONS  (PRN):  ALBUTerol/ipratropium for Nebulization 3 milliLiter(s) Nebulizer every 6 hours PRN Shortness of Breath and/or Wheezing  melatonin 3 milliGRAM(s) Oral at bedtime PRN Insomnia  ondansetron    Tablet 4 milliGRAM(s) Oral every 6 hours PRN Nausea    Allergies    No Known Allergies    FAMILY HISTORY:  No pertinent family history in first degree relatives      SOCIAL HISTORY: +tobacco    REVIEW OF SYSTEMS:    CONSTITUTIONAL: No fevers or chills  EYES/ENT: No throat pain   NECK: No pain or stiffness  RESPIRATORY: No cough, wheezing, hemoptysis; No shortness of breath  CARDIOVASCULAR: No chest pain or palpitations  GASTROINTESTINAL: No hematemesis  GENITOURINARY: No dysuria, frequency or hematuria  NEUROLOGICAL: +weakness  SKIN: No itching, burning, rashes, or lesions   All other review of systems is negative unless indicated above.    T(F): 97.9 (03-26-19 @ 07:09), Max: 97.9 (03-26-19 @ 07:09)  HR: 73 (03-26-19 @ 09:11)  BP: 145/74 (03-26-19 @ 07:09)  RR: 14 (03-26-19 @ 07:09)  SpO2: 99% (03-26-19 @ 09:11)    GENERAL: NAD, well-developed  HEAD:  Atraumatic, Normocephalic  EYES: EOMI, PERRLA, conjunctiva and sclera clear  NECK: Supple, No JVD  CHEST/LUNG: Clear to auscultation bilaterally ant.  HEART: Regular rate and rhythm; No murmurs, rubs, or gallops  ABDOMEN: Soft, Nondistended; Bowel sounds present; unable to appreciate hepatosplenomegaly  EXTREMITIES: no calf tenderness  NEUROLOGY: +facial droop; dysarthria  SKIN: No rashes or lesions    Labs:             8.3    11.1  )-----------( 441      ( 03-26 @ 05:30 )             28.2                8.9    12.2  )-----------( 463      ( 03-25 @ 10:43 )             30.3       03-25    140  |  104  |  39<H>  ----------------------------<  140<H>  4.1   |  23  |  1.29    Ca    8.8      25 Mar 2019 10:43    TPro  7.8  /  Alb  3.0<L>  /  TBili  0.5  /  DBili  x   /  AST  17  /  ALT  14  /  AlkPhos  102  03-25    Occult Blood, Feces: Positive (03-26-19 @ 09:35)    Radiology and additional tests:  < from: CT Abdomen and Pelvis No Cont (03.25.19 @ 11:15) >    EXAM:  CT ABDOMEN AND PELVIS      PROCEDURE DATE:  03/25/2019        INTERPRETATION:  CT abdomen and pelvis without contrast    History left-sided abdominal pain and guarding    Comparison 10/25/18    The gallbladder is contracted. The liver, spleen, pancreas and adrenals   and kidneys and appendix are grossly normal given limitations of a   noncontrast exam. 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 The urinary bladder is   relatively contracted. There is no bowel dilatation or ascites or   adenopathy or focal inflammation of the peritoneal fat. There is no free   air or fluid collection.    IMPRESSION:    No acute findings    EDDIE TEJADA M.D., ATTENDING RADIOLOGIST  This document has been electronically signed. Mar 25 2019 11:37AM        < end of copied text >

## 2019-03-26 NOTE — CONSULT NOTE ADULT - ATTENDING COMMENTS
Consultant notes reviewed : YES [x ] ; NO [ ]
Patient seen and examined with Vilma Birmingham NP.  Agree with assessment and plan as above.    Elderly male with history of recent CVA, on Xarelto and aspirin now in rehab service.  Recent CVA occurred when Xarelto was discontinued.  GI requested for anemia and fecal occult blood positive.  No reports of BRBPR or melena.  VSS.  Abdomen soft, nontender BS+    Monitor Hgb/Hct and bowel movements for now.  Patient requires antiplatelet and anticoagulation without interruption.

## 2019-03-26 NOTE — PROGRESS NOTE ADULT - ASSESSMENT
85 yo male c hx of COPD, PE, DVT, current smoker s/p right pontine cva and now with functional deficits.   Precautions:    fall, aspiration                                                                              Diet: reg c thins    DVT Prophylaxis:        xarelto                                                                  Medical Prognosis: good    Prescreen Comparison: I have reviewed the prescreen information and I found no relevant changes between the preadmission  screening and my post admission evaluation.     #CVA   ASA and  statin, xarelto  continue PT/OT/SLP  fall and aspiration precautions, MBS today.     #smoker  Nicotine patch, smoking cessation education    #COPD  inhalers, steroid per medicine.       # history of multiple DVTs  -xarelto to continue, monitor CBC, guaiac pending    #anemia   HH 8.3, FOBS pending collection, CT Abd neg findings. Will follow medicine's plan. GI eval?       #Abd pain  CT abd neg. Poor appetite, mild nausea, dropping HH, GI eval?      Expected Therapy:   P.T.   1     hrs/day           O. T.    1  hrs/day           S.L.P.  1      hrs/day                    P&O      eval                                             Excpected Frequency: 5 days/7 day period    Rehab Potential:            excellent                               Estimated Disposition:     home                     ELOS:   14           days      Rationale For Inpatient Rehab Admission- Patient demonstrates the following:     [X] Medically appropriate for rehabilitation admission   [X] Has attainable rehab goals with an approrpriate discharge plan  [X] Has rehabilitation potential (expected to make significant improvement within a reasonable period of time)  [X] Requires close medical management by a rehab physician, rehab nursing care and comprehensive interdisciplinary team (including         PT, OT, SLP and/or prosthetics and orthotics) 87 yo male c hx of COPD, PE, DVT, current smoker s/p right pontine cva and now with functional deficits.   Precautions:    fall, aspiration                                                                              Diet: reg c thins    DVT Prophylaxis:        xarelto                                                                  Medical Prognosis: good    Prescreen Comparison: I have reviewed the prescreen information and I found no relevant changes between the preadmission  screening and my post admission evaluation.     #CVA   ASA and  statin, xarelto  continue PT/OT/SLP  fall and aspiration precautions, MBS today.     #smoker  Nicotine patch, smoking cessation education    #COPD  inhalers, steroid per medicine.       # history of multiple DVTs  -xarelto to continue, monitor CBC, guaiac pending    #anemia   HH 8.3, FOBS pending collection, CT Abd neg findings. Will follow medicine's plan. GI eval and heme eval. Labs am.       #Abd pain  CT abd neg. Poor appetite, mild nausea, dropping HH, GI eval.       Expected Therapy:   P.T.   1     hrs/day           O. T.    1  hrs/day           S.L.P.  1      hrs/day                    P&O      eval                                             Excpected Frequency: 5 days/7 day period    Rehab Potential:            excellent                               Estimated Disposition:     home                     ELOS:   14           days      Rationale For Inpatient Rehab Admission- Patient demonstrates the following:     [X] Medically appropriate for rehabilitation admission   [X] Has attainable rehab goals with an approrpriate discharge plan  [X] Has rehabilitation potential (expected to make significant improvement within a reasonable period of time)  [X] Requires close medical management by a rehab physician, rehab nursing care and comprehensive interdisciplinary team (including         PT, OT, SLP and/or prosthetics and orthotics)

## 2019-03-26 NOTE — PROGRESS NOTE ADULT - SUBJECTIVE AND OBJECTIVE BOX
Patient is a 86y old  Male who presents with a chief complaint of s/p Right pontine CVA c functional deficits (26 Mar 2019 09:07)      Patient seen and examined at bedside.     ALLERGIES:  No Known Allergies    MEDICATIONS:  ALBUTerol/ipratropium for Nebulization 3 milliLiter(s) Nebulizer every 6 hours PRN  lactulose Syrup 15 Gram(s) Oral at bedtime  melatonin 3 milliGRAM(s) Oral at bedtime PRN  predniSONE   Tablet 40 milliGRAM(s) Oral daily    Vital Signs Last 24 Hrs  T(F): 97.9 (26 Mar 2019 07:09), Max: 97.9 (26 Mar 2019 07:09)  HR: 73 (26 Mar 2019 09:11) (73 - 92)  BP: 145/74 (26 Mar 2019 07:09) (125/67 - 145/74)  RR: 14 (26 Mar 2019 07:09) (14 - 15)  SpO2: 99% (26 Mar 2019 09:11) (95% - 99%)  I&O's Summary    25 Mar 2019 07:01  -  26 Mar 2019 07:00  --------------------------------------------------------  IN: 240 mL / OUT: 0 mL / NET: 240 mL        PHYSICAL EXAM:  General: NAD, comfortable   ENT: MMM  Neck: Supple, No JVD  Lungs: BLAE,, mild wheeze   Cardio: RRR, S1/S2, No murmurs  Abdomen: Soft, NT/ND, BS+ ,  Extremities: No edema  CNS: no new deficits     LABS:                        8.3    11.1  )-----------( 441      ( 26 Mar 2019 05:30 )             28.2     03-25    140  |  104  |  39  ----------------------------<  140  4.1   |  23  |  1.29    Ca    8.8      25 Mar 2019 10:43    TPro  7.8  /  Alb  3.0  /  TBili  0.5  /  DBili  x   /  AST  17  /  ALT  14  /  AlkPhos  102  03-25    eGFR if Non African American: 50 mL/min/1.73M2 (03-25-19 @ 10:43)  eGFR if African American: 58 mL/min/1.73M2 (03-25-19 @ 10:43)            03-20 Chol 113 mg/dL LDL 57 mg/dL HDL 39 mg/dL Trig 84 mg/dL        CAPILLARY BLOOD GLUCOSE        03-19 [id number] 4.9          RADIOLOGY & ADDITIONAL TESTS:    Care Discussed with Consultants/Other Providers:

## 2019-03-26 NOTE — CONSULT NOTE ADULT - PROBLEM SELECTOR RECOMMENDATION 9
Continue ASA and Xarelto as benefits outweigh risks  Monitor stool  Monitor H/H daily
Patient with anemia-guaiac positive stool noted. GI following. Iron studies pending. Check B12/folate, reticulocyte/haptoglobin. Continue to follow CBC. Consider packed red blood cell transfusion for hemoglobin less than 7/increased symptoms.

## 2019-03-26 NOTE — CONSULT NOTE ADULT - SUBJECTIVE AND OBJECTIVE BOX
INTERVAL HPI/OVERNIGHT EVENTS:  HPI:  85 y/o male PMH PE ( on xarelto), DVT, COPD, ETOH, anemia, and s/o recent CVA who is admitted to Rehab. GI consulted to see patient due to complaints of abdominal pain, nausea, and drop in H/H. Patient seen and examined at bedside this morning. Patient states his abdominal pain has subsided since he had a BM this morning and nausea is minimal. He has an appetite but states he doesn't care too much for the food. Denies vomiting, diarrhea, fever, chills.     MEDICATIONS  (STANDING):  aspirin  chewable 81 milliGRAM(s) Oral daily  atorvastatin 40 milliGRAM(s) Oral at bedtime  buDESOnide 160 MICROgram(s)/formoterol 4.5 MICROgram(s) Inhaler 2 Puff(s) Inhalation two times a day  gabapentin 300 milliGRAM(s) Oral at bedtime  lactulose Syrup 15 Gram(s) Oral at bedtime  nicotine -   7 mG/24Hr(s) Patch 1 patch Transdermal daily  pantoprazole    Tablet 40 milliGRAM(s) Oral before breakfast  predniSONE   Tablet 40 milliGRAM(s) Oral daily  rivaroxaban 20 milliGRAM(s) Oral every 24 hours  tamsulosin 0.4 milliGRAM(s) Oral at bedtime    MEDICATIONS  (PRN):  ALBUTerol/ipratropium for Nebulization 3 milliLiter(s) Nebulizer every 6 hours PRN Shortness of Breath and/or Wheezing  melatonin 3 milliGRAM(s) Oral at bedtime PRN Insomnia      Allergies    No Known Allergies    Intolerances        PHYSICAL EXAM:   Vital Signs:  Vital Signs Last 24 Hrs  T(C): 36.6 (26 Mar 2019 07:09), Max: 36.6 (26 Mar 2019 07:09)  T(F): 97.9 (26 Mar 2019 07:09), Max: 97.9 (26 Mar 2019 07:09)  HR: 73 (26 Mar 2019 09:11) (73 - 92)  BP: 145/74 (26 Mar 2019 07:09) (125/67 - 145/74)  BP(mean): --  RR: 14 (26 Mar 2019 07:09) (14 - 15)  SpO2: 99% (26 Mar 2019 09:11) (95% - 99%)  Daily     Daily I&O's Summary    25 Mar 2019 07:01  -  26 Mar 2019 07:00  --------------------------------------------------------  IN: 240 mL / OUT: 0 mL / NET: 240 mL        GENERAL:  Appears stated age,  HEENT:  NC/AT,  conjunctivae clear and pink,   CHEST:  Full & symmetric excursion, no increased effort, slight wheeze  HEART:  Regular rhythm, S1, S2, no murmur  ABDOMEN:  Soft, non-tender, non-distended, + normoactive bowel sounds disease  EXTEREMITIES:  no cyanosis,clubbing or edema  SKIN:  No rash/erythema, warm/dry  NEURO:  Alert, oriented,      LABS:                        8.3    11.1  )-----------( 441      ( 26 Mar 2019 05:30 )             28.2     03-25    140  |  104  |  39<H>  ----------------------------<  140<H>  4.1   |  23  |  1.29    Ca    8.8      25 Mar 2019 10:43    TPro  7.8  /  Alb  3.0<L>  /  TBili  0.5  /  DBili  x   /  AST  17  /  ALT  14  /  AlkPhos  102  03-25        amylase   lipase  RADIOLOGY & ADDITIONAL TESTS:

## 2019-03-26 NOTE — PROGRESS NOTE ADULT - SUBJECTIVE AND OBJECTIVE BOX
CHIEF COMPLAINT: Poor appetite, mild nausea.       HISTORY OF PRESENT ILLNESS  85 yo male c PMH of PE, DVT, COPD, (current smoker), alcohol use, and anemia presents with acute onset of left sided numbness, facial droop and dysarthria on 3/ . CT head on arrival was negative. 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 Patient states that numbness has subsided but his speech deficits persist and his balance is notably worse. At [facility] evaluated by cardiology. TTE revealed no thrombus, EF 65%. Started with ASA for small vessel disease and stroke prevention  and Xarelto was restarted for  Hx of multiple DVTs. Patient stopped Xarelto one week prior  to admission. Stable to d/c to rehab on 3/22/19.   Of note, seen by neurology Dr Nye in 2017 for b/l leg weakness. MRI brain and spine was performed showing possible cervical myelopathy. (22 Mar 2019 14:54)      PAST MEDICAL & SURGICAL HISTORY:  Deep vein thrombosis (DVT)  HTN (hypertension)  Dyslipidemia  CAD (coronary artery disease)  Peripheral vascular disease  Parkinson disease  No significant past surgical history       REVIEW OF SYMPTOMS  [X] Constitutional WNL     [X] Cardio WNL            [X] Resp WNL                 [X]  WNL       [X] Endo WNL                     [X] Skin WNL                 [X] MSK WNL                             [X] Cognitive WNL        [X] Psych WNL      VITALS  Vital Signs Last 24 Hrs  T(C): 36.6 (26 Mar 2019 07:09), Max: 36.6 (26 Mar 2019 07:09)  T(F): 97.9 (26 Mar 2019 07:09), Max: 97.9 (26 Mar 2019 07:09)  HR: 77 (26 Mar 2019 07:09) (76 - 92)  BP: 145/74 (26 Mar 2019 07:09) (125/67 - 145/74)  BP(mean): --  RR: 14 (26 Mar 2019 07:09) (14 - 15)  SpO2: 96% (26 Mar 2019 07:09) (95% - 99%)      PHYSICAL EXAM  Constitutional - nausea  HEENT - NCAT, EOMI  Neck - Supple, No limited ROM  Chest - Diminished, No wheeze, No rhonchi, No crackles  Cardiovascular - RRR, S1S2, No murmurs  Abdomen - BS+, Soft, diffuse tenderness  Extremities - No C/C/E, No calf tenderness   Skin-no rash  Wounds-na     Neurologic Exam -  no new deficits                  Balance - impaired     Psychiatric - Mood stable, Affect WNL         FUNCTIONAL PROGRESS  Gait - 40ft min A c RW  ADLs - CG  Transfers -CG  Functional transfer - CG    RECENT LABS                        8.3    11.1  )-----------( 441      ( 26 Mar 2019 05:30 )             28.2     03-25    140  |  104  |  39<H>  ----------------------------<  140<H>  4.1   |  23  |  1.29    Ca    8.8      25 Mar 2019 10:43    TPro  7.8  /  Alb  3.0<L>  /  TBili  0.5  /  DBili  x   /  AST  17  /  ALT  14  /  AlkPhos  102  03-25      LIVER FUNCTIONS - ( 25 Mar 2019 10:43 )  Alb: 3.0 g/dL / Pro: 7.8 g/dL / ALK PHOS: 102 U/L / ALT: 14 U/L DA / AST: 17 U/L / GGT: x             Direct LDL: 57 mg/dL (03-20-19 @ 07:10)    Hemoglobin A1C, Whole Blood: 4.9 % (03-19-19 @ 19:20)  Hemoglobin A1C, Whole Blood: 4.9 % (03-19-19 @ 19:20)              RADIOLOGY/OTHER RESULTS      CURRENT MEDICATIONS  MEDICATIONS  (STANDING):  aspirin  chewable 81 milliGRAM(s) Oral daily  atorvastatin 40 milliGRAM(s) Oral at bedtime  buDESOnide 160 MICROgram(s)/formoterol 4.5 MICROgram(s) Inhaler 2 Puff(s) Inhalation two times a day  gabapentin 300 milliGRAM(s) Oral at bedtime  lactulose Syrup 15 Gram(s) Oral at bedtime  nicotine -   7 mG/24Hr(s) Patch 1 patch Transdermal daily  pantoprazole    Tablet 40 milliGRAM(s) Oral before breakfast  predniSONE   Tablet 40 milliGRAM(s) Oral daily  rivaroxaban 20 milliGRAM(s) Oral every 24 hours  tamsulosin 0.4 milliGRAM(s) Oral at bedtime    MEDICATIONS  (PRN):  ALBUTerol/ipratropium for Nebulization 3 milliLiter(s) Nebulizer every 6 hours PRN Shortness of Breath and/or Wheezing  melatonin 3 milliGRAM(s) Oral at bedtime PRN Insomnia      ASSESSMENT & PLAN      GI/Bowel Management - hold   Management - Toilet Q2  Skin - Turn Q2  Pain - Tylenol PRN  DVT PPX - Xarelto  Diet - dysphagia c strict aspiration, MBS, puree honey      Continue comprehensive acute rehab program consisting of 3hrs/day of OT/PT and SLP.

## 2019-03-27 LAB
ALBUMIN SERPL ELPH-MCNC: 2.7 G/DL — LOW (ref 3.3–5)
ALP SERPL-CCNC: 89 U/L — SIGNIFICANT CHANGE UP (ref 40–120)
ALT FLD-CCNC: 20 U/L DA — SIGNIFICANT CHANGE UP (ref 10–45)
ANION GAP SERPL CALC-SCNC: 8 MMOL/L — SIGNIFICANT CHANGE UP (ref 5–17)
AST SERPL-CCNC: 17 U/L — SIGNIFICANT CHANGE UP (ref 10–40)
BILIRUB SERPL-MCNC: 0.3 MG/DL — SIGNIFICANT CHANGE UP (ref 0.2–1.2)
BUN SERPL-MCNC: 43 MG/DL — HIGH (ref 7–23)
CALCIUM SERPL-MCNC: 8.6 MG/DL — SIGNIFICANT CHANGE UP (ref 8.4–10.5)
CHLORIDE SERPL-SCNC: 107 MMOL/L — SIGNIFICANT CHANGE UP (ref 96–108)
CO2 SERPL-SCNC: 26 MMOL/L — SIGNIFICANT CHANGE UP (ref 22–31)
CREAT SERPL-MCNC: 1.15 MG/DL — SIGNIFICANT CHANGE UP (ref 0.5–1.3)
FERRITIN SERPL-MCNC: 29 NG/ML — LOW (ref 30–400)
FOLATE SERPL-MCNC: 17.5 NG/ML — SIGNIFICANT CHANGE UP
GLUCOSE SERPL-MCNC: 90 MG/DL — SIGNIFICANT CHANGE UP (ref 70–99)
HAPTOGLOB SERPL-MCNC: 151 MG/DL — SIGNIFICANT CHANGE UP (ref 34–200)
HCT VFR BLD CALC: 26.6 % — LOW (ref 39–50)
HGB BLD-MCNC: 8.3 G/DL — LOW (ref 13–17)
IRON SATN MFR SERPL: 20 UG/DL — LOW (ref 45–165)
IRON SATN MFR SERPL: 5 % — LOW (ref 16–55)
MCHC RBC-ENTMCNC: 25.8 PG — LOW (ref 27–34)
MCHC RBC-ENTMCNC: 31.2 GM/DL — LOW (ref 32–36)
MCV RBC AUTO: 82.5 FL — SIGNIFICANT CHANGE UP (ref 80–100)
PLATELET # BLD AUTO: 546 K/UL — HIGH (ref 150–400)
POTASSIUM SERPL-MCNC: 4.2 MMOL/L — SIGNIFICANT CHANGE UP (ref 3.5–5.3)
POTASSIUM SERPL-SCNC: 4.2 MMOL/L — SIGNIFICANT CHANGE UP (ref 3.5–5.3)
PROT SERPL-MCNC: 6.8 G/DL — SIGNIFICANT CHANGE UP (ref 6–8.3)
RBC # BLD: 3.16 M/UL — LOW (ref 4.2–5.8)
RBC # BLD: 3.22 M/UL — LOW (ref 4.2–5.8)
RBC # FLD: 20.4 % — HIGH (ref 10.3–14.5)
RETICS #: 61 K/UL — SIGNIFICANT CHANGE UP (ref 25–125)
RETICS/RBC NFR: 1.9 % — SIGNIFICANT CHANGE UP (ref 0.5–2.5)
SODIUM SERPL-SCNC: 141 MMOL/L — SIGNIFICANT CHANGE UP (ref 135–145)
TIBC SERPL-MCNC: 365 UG/DL — SIGNIFICANT CHANGE UP (ref 220–430)
UIBC SERPL-MCNC: 345 UG/DL — SIGNIFICANT CHANGE UP (ref 110–370)
VIT B12 SERPL-MCNC: 369 PG/ML — SIGNIFICANT CHANGE UP (ref 232–1245)
WBC # BLD: 10.1 K/UL — SIGNIFICANT CHANGE UP (ref 3.8–10.5)
WBC # FLD AUTO: 10.1 K/UL — SIGNIFICANT CHANGE UP (ref 3.8–10.5)

## 2019-03-27 PROCEDURE — 99233 SBSQ HOSP IP/OBS HIGH 50: CPT

## 2019-03-27 PROCEDURE — 99232 SBSQ HOSP IP/OBS MODERATE 35: CPT

## 2019-03-27 RX ORDER — PREGABALIN 225 MG/1
1000 CAPSULE ORAL DAILY
Qty: 0 | Refills: 0 | Status: DISCONTINUED | OUTPATIENT
Start: 2019-03-27 | End: 2019-04-03

## 2019-03-27 RX ORDER — IRON SUCROSE 20 MG/ML
200 INJECTION, SOLUTION INTRAVENOUS ONCE
Qty: 0 | Refills: 0 | Status: COMPLETED | OUTPATIENT
Start: 2019-03-27 | End: 2019-03-27

## 2019-03-27 RX ADMIN — IRON SUCROSE 110 MILLIGRAM(S): 20 INJECTION, SOLUTION INTRAVENOUS at 18:27

## 2019-03-27 RX ADMIN — ATORVASTATIN CALCIUM 40 MILLIGRAM(S): 80 TABLET, FILM COATED ORAL at 21:19

## 2019-03-27 RX ADMIN — RIVAROXABAN 20 MILLIGRAM(S): KIT at 17:04

## 2019-03-27 RX ADMIN — Medication 100 MILLIGRAM(S): at 12:13

## 2019-03-27 RX ADMIN — Medication 5 MILLILITER(S): at 17:04

## 2019-03-27 RX ADMIN — GABAPENTIN 300 MILLIGRAM(S): 400 CAPSULE ORAL at 21:20

## 2019-03-27 RX ADMIN — Medication 1 PATCH: at 18:15

## 2019-03-27 RX ADMIN — LACTULOSE 15 GRAM(S): 10 SOLUTION ORAL at 21:19

## 2019-03-27 RX ADMIN — Medication 81 MILLIGRAM(S): at 12:13

## 2019-03-27 RX ADMIN — Medication 3 MILLILITER(S): at 18:41

## 2019-03-27 RX ADMIN — Medication 1 PATCH: at 06:45

## 2019-03-27 RX ADMIN — PANTOPRAZOLE SODIUM 40 MILLIGRAM(S): 20 TABLET, DELAYED RELEASE ORAL at 05:33

## 2019-03-27 RX ADMIN — Medication 1 PATCH: at 12:13

## 2019-03-27 RX ADMIN — TAMSULOSIN HYDROCHLORIDE 0.4 MILLIGRAM(S): 0.4 CAPSULE ORAL at 21:20

## 2019-03-27 RX ADMIN — Medication 1 PATCH: at 12:16

## 2019-03-27 RX ADMIN — Medication 3 MILLIGRAM(S): at 21:25

## 2019-03-27 RX ADMIN — BUDESONIDE AND FORMOTEROL FUMARATE DIHYDRATE 2 PUFF(S): 160; 4.5 AEROSOL RESPIRATORY (INHALATION) at 21:51

## 2019-03-27 RX ADMIN — Medication 40 MILLIGRAM(S): at 05:33

## 2019-03-27 RX ADMIN — Medication 5 MILLILITER(S): at 05:33

## 2019-03-27 NOTE — PROGRESS NOTE ADULT - SUBJECTIVE AND OBJECTIVE BOX
INTERVAL HPI/OVERNIGHT EVENTS:  HPI:  85 y/o male PMH PE ( on xarelto), DVT, COPD, ETOH, anemia, and s/o recent CVA who is admitted to Rehab. Patient seen and examined at bedside this morning. He states he does not have anymore abdominal pain or nausea. Patient states he's eating minimally because he does not like the taste of the food. Denies vomiting or difficulty swallowing.      MEDICATIONS  (STANDING):  aspirin  chewable 81 milliGRAM(s) Oral daily  atorvastatin 40 milliGRAM(s) Oral at bedtime  Biotene Dry Mouth Oral Rinse 5 milliLiter(s) Swish and Spit two times a day  buDESOnide 160 MICROgram(s)/formoterol 4.5 MICROgram(s) Inhaler 2 Puff(s) Inhalation two times a day  docusate sodium 100 milliGRAM(s) Oral daily  gabapentin 300 milliGRAM(s) Oral at bedtime  lactulose Syrup 15 Gram(s) Oral at bedtime  nicotine -   7 mG/24Hr(s) Patch 1 patch Transdermal daily  pantoprazole    Tablet 40 milliGRAM(s) Oral before breakfast  predniSONE   Tablet 20 milliGRAM(s) Oral daily  rivaroxaban 20 milliGRAM(s) Oral every 24 hours  tamsulosin 0.4 milliGRAM(s) Oral at bedtime    MEDICATIONS  (PRN):  ALBUTerol/ipratropium for Nebulization 3 milliLiter(s) Nebulizer every 6 hours PRN Shortness of Breath and/or Wheezing  melatonin 3 milliGRAM(s) Oral at bedtime PRN Insomnia  ondansetron    Tablet 4 milliGRAM(s) Oral every 6 hours PRN Nausea      Allergies    No Known Allergies    Intolerances        PHYSICAL EXAM:   Vital Signs:  Vital Signs Last 24 Hrs  T(C): 36.6 (27 Mar 2019 08:03), Max: 36.6 (27 Mar 2019 08:03)  T(F): 97.9 (27 Mar 2019 08:03), Max: 97.9 (27 Mar 2019 08:03)  HR: 64 (27 Mar 2019 08:03) (64 - 75)  BP: 136/66 (27 Mar 2019 08:03) (119/60 - 136/66)  BP(mean): --  RR: 15 (27 Mar 2019 08:03) (15 - 16)  SpO2: 99% (27 Mar 2019 08:03) (96% - 99%)  Daily     Daily I&O's Summary    26 Mar 2019 07:01  -  27 Mar 2019 07:00  --------------------------------------------------------  IN: 0 mL / OUT: 200 mL / NET: -200 mL    GENERAL:  Appears stated age,  HEENT:  NC/AT,  conjunctivae clear and pink,   CHEST:  Full & symmetric excursion, no increased effort, slight wheeze  HEART:  Regular rhythm, S1, S2, no murmur  ABDOMEN:  Soft, non-tender, non-distended, + normoactive bowel sounds disease  EXTEREMITIES:  no cyanosis,clubbing or edema  SKIN:  No rash/erythema, warm/dry  NEURO:  Alert, oriented,    LABS:                        8.3    10.1  )-----------( 546      ( 27 Mar 2019 05:26 )             26.6     03-27    141  |  107  |  43<H>  ----------------------------<  90  4.2   |  26  |  1.15    Ca    8.6      27 Mar 2019 05:26    TPro  6.8  /  Alb  2.7<L>  /  TBili  0.3  /  DBili  x   /  AST  17  /  ALT  20  /  AlkPhos  89  03-27        amylase   lipase  RADIOLOGY & ADDITIONAL TESTS:

## 2019-03-27 NOTE — PROGRESS NOTE ADULT - SUBJECTIVE AND OBJECTIVE BOX
Patient is a 86y old  Male who presents with a chief complaint of s/p Right pontine CVA c functional deficits (26 Mar 2019 18:51)      Patient seen and examined at bedside.     ALLERGIES:  No Known Allergies    MEDICATIONS:  ALBUTerol/ipratropium for Nebulization 3 milliLiter(s) Nebulizer every 6 hours PRN  Biotene Dry Mouth Oral Rinse 5 milliLiter(s) Swish and Spit two times a day  docusate sodium 100 milliGRAM(s) Oral daily  lactulose Syrup 15 Gram(s) Oral at bedtime  melatonin 3 milliGRAM(s) Oral at bedtime PRN  ondansetron    Tablet 4 milliGRAM(s) Oral every 6 hours PRN  predniSONE   Tablet 40 milliGRAM(s) Oral daily    Vital Signs Last 24 Hrs  T(F): 97.9 (27 Mar 2019 08:03), Max: 97.9 (27 Mar 2019 08:03)  HR: 64 (27 Mar 2019 08:03) (64 - 75)  BP: 136/66 (27 Mar 2019 08:03) (119/60 - 136/66)  RR: 15 (27 Mar 2019 08:03) (15 - 16)  SpO2: 99% (27 Mar 2019 08:03) (96% - 99%)  I&O's Summary    26 Mar 2019 07:01  -  27 Mar 2019 07:00  --------------------------------------------------------  IN: 0 mL / OUT: 200 mL / NET: -200 mL        PHYSICAL EXAM:  General: NAD, comfortable   ENT: MMM  Neck: Supple, No JVD  Lungs: CTA, BLAE, No added sounds.   Cardio: RRR, S1/S2, No murmurs  Abdomen: Soft, NT/ND, BS+   Extremities: No edema  CNS: no new deficits     LABS:                        8.3    10.1  )-----------( 546      ( 27 Mar 2019 05:26 )             26.6     03-27    141  |  107  |  43  ----------------------------<  90  4.2   |  26  |  1.15    Ca    8.6      27 Mar 2019 05:26    TPro  6.8  /  Alb  2.7  /  TBili  0.3  /  DBili  x   /  AST  17  /  ALT  20  /  AlkPhos  89  03-27    eGFR if Non African American: 57 mL/min/1.73M2 (03-27-19 @ 05:26)  eGFR if African American: 66 mL/min/1.73M2 (03-27-19 @ 05:26)            03-20 Chol 113 mg/dL LDL 57 mg/dL HDL 39 mg/dL Trig 84 mg/dL        CAPILLARY BLOOD GLUCOSE        03-19 [id number] 4.9          RADIOLOGY & ADDITIONAL TESTS:    Care Discussed with Consultants/Other Providers:

## 2019-03-27 NOTE — PROGRESS NOTE ADULT - ASSESSMENT
87 yo male c hx of COPD, PE, DVT, current smoker s/p right pontine cva and now with functional deficits.                                                                 #CVA  c/w ASA and  statin  c/w  PT/OT/SLP    #Smoker  c/w Nicotine patch    #COPD Acute exacerbation - improved.   Start prednisone taper    O2 with NC as needed   c/w  Duoneb   CXR - no acute issues.     #BPH   c/w Tamsulosin     #Abdominal pain - resolved   CT abdomen WNL     #Anemia  FOBT +,   H/H stable at 8.3  Transfuse for Hb < 7   f/u iron panel, retic count   GI & hematology  consult appreciated.   Monitor H/H     #H/O multiple DVTs  c/w Xarelto

## 2019-03-27 NOTE — PROGRESS NOTE ADULT - SUBJECTIVE AND OBJECTIVE BOX
ELIZABETH MCLAUGHLIN   86y   Male    Admitting: TD Humphries  HPI:  86-year-old gentleman with history of venous thromboembolic disease, COPD and anemia who presented with left-sided numbness/facial droop/dysarthria. 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 Patient is receiving aspirin for stroke prevention and XARELTO for history of venous thromboembolic disease. He is currently on the acute rehabilitation unit in [facility].    PAST MEDICAL & SURGICAL HISTORY:  Deep vein thrombosis (DVT)  HTN (hypertension)  Dyslipidemia  CAD (coronary artery disease)  Peripheral vascular disease  Parkinson disease    HEALTH ISSUES - PROBLEM Dx:  Anemia, unspecified type: Anemia, unspecified type  Nausea: Nausea  Constipation: Constipation  GI bleed: GI bleed    MEDICATIONS  (STANDING):  aspirin  chewable 81 milliGRAM(s) Oral daily  atorvastatin 40 milliGRAM(s) Oral at bedtime  Biotene Dry Mouth Oral Rinse 5 milliLiter(s) Swish and Spit two times a day  buDESOnide 160 MICROgram(s)/formoterol 4.5 MICROgram(s) Inhaler 2 Puff(s) Inhalation two times a day  docusate sodium 100 milliGRAM(s) Oral daily  gabapentin 300 milliGRAM(s) Oral at bedtime  lactulose Syrup 15 Gram(s) Oral at bedtime  nicotine -   7 mG/24Hr(s) Patch 1 patch Transdermal daily  pantoprazole    Tablet 40 milliGRAM(s) Oral before breakfast  predniSONE   Tablet 20 milliGRAM(s) Oral daily  rivaroxaban 20 milliGRAM(s) Oral every 24 hours  tamsulosin 0.4 milliGRAM(s) Oral at bedtime    MEDICATIONS  (PRN):  ALBUTerol/ipratropium for Nebulization 3 milliLiter(s) Nebulizer every 6 hours PRN Shortness of Breath and/or Wheezing  melatonin 3 milliGRAM(s) Oral at bedtime PRN Insomnia  ondansetron    Tablet 4 milliGRAM(s) Oral every 6 hours PRN Nausea    Allergies    No Known Allergies    INTERVAL HPI/OVERNIGHT EVENTS:  Patient S&E at bedside. No c/o CP or SOB.     VITAL SIGNS:  T(F): 97.9 (03-27-19 @ 08:03)  HR: 64 (03-27-19 @ 08:03)  BP: 136/66 (03-27-19 @ 08:03)  RR: 15 (03-27-19 @ 08:03)  SpO2: 99% (03-27-19 @ 08:03)    PHYSICAL EXAM:  Constitutional: NAD  Eyes: sclera non-icteric  Neck: no JVD  Respiratory: CTA b/l, good air entry b/l ant.  Cardiovascular: RRR, no M/R/G  Gastrointestinal: soft, NTND, no masses palpable, no hepatosplenomegaly  Extremities: no calf tenderness  Neurological: Awake, alert.    Labs:             8.3    10.1  )-----------( 546      ( 03-27 @ 05:26 )             26.6                8.3    11.1  )-----------( 441      ( 03-26 @ 05:30 )             28.2                8.9    12.2  )-----------( 463      ( 03-25 @ 10:43 )             30.3       03-27    141  |  107  |  43<H>  ----------------------------<  90  4.2   |  26  |  1.15    Ca    8.6      27 Mar 2019 05:26    TPro  6.8  /  Alb  2.7<L>  /  TBili  0.3  /  DBili  x   /  AST  17  /  ALT  20  /  AlkPhos  89  03-27      Ferritin, Serum: 29 ng/mL (03-27-19 @ 05:26)  Iron - Total Binding Capacity.: 365 ug/dL (03-27-19 @ 05:26)  % Saturation, Iron: 5 % (03-27-19 @ 05:26)  Iron Total, Serum: 20 ug/dL (03-27-19 @ 05:26)  Absolute Reticulocytes: 61.0 K/uL (03-27-19 @ 05:26)  Haptoglobin, Serum: 151 mg/dL (03-27-19 @ 05:26)  Folate, Serum: 17.5 ng/mL (03-27-19 @ 05:26)  Vitamin B12, Serum: 369 pg/mL (03-27-19 @ 05:26)    Occult Blood, Feces: Positive (03-26-19 @ 09:35)

## 2019-03-27 NOTE — PROGRESS NOTE ADULT - ASSESSMENT
87 yo male c hx of COPD, PE, DVT, current smoker s/p right pontine cva and now with functional deficits.   Precautions:    fall, aspiration                                                                              Diet: reg c thins    DVT Prophylaxis:        xarelto                                                                  Medical Prognosis: good    Prescreen Comparison: I have reviewed the prescreen information and I found no relevant changes between the preadmission  screening and my post admission evaluation.     #CVA   ASA and  statin, xarelto to continue as per GI plan.   continue PT/OT/SLP  fall and aspiration precautions, MBS completed-diet to advance.     #smoker  Nicotine patch, smoking cessation education    #COPD  inhalers, steroid per medicine.       # history of multiple DVTs  -xarelto to continue, monitor CBC, guaiac+. GI in-will follow rec's. No melena reported. VS stable.     #anemia   HH 8.3, FOBS+, CT Abd neg findings. GI in and heme workup ongoing. Xarelto and ASA to continue.       #Abd pain  CT abd neg. Poor appetite, mild nausea fluctuating, GI in.       Expected Therapy:   P.T.   1     hrs/day           O. T.    1  hrs/day           S.L.P.  1      hrs/day                    P&O      eval                                             Excpected Frequency: 5 days/7 day period    Rehab Potential:            excellent                               Estimated Disposition:     home                     ELOS:   14           days      Rationale For Inpatient Rehab Admission- Patient demonstrates the following:     [X] Medically appropriate for rehabilitation admission   [X] Has attainable rehab goals with an approrpriate discharge plan  [X] Has rehabilitation potential (expected to make significant improvement within a reasonable period of time)  [X] Requires close medical management by a rehab physician, rehab nursing care and comprehensive interdisciplinary team (including         PT, OT, SLP and/or prosthetics and orthotics)

## 2019-03-27 NOTE — PROGRESS NOTE ADULT - ASSESSMENT
86-year-old gentleman with history of venous thromboembolic disease, COPD and anemia who presented with left-sided numbness/facial droop/dysarthria. 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 Patient is receiving aspirin for stroke prevention and XARELTO for history of venous thromboembolic disease. He is currently on the acute rehabilitation unit in [facility].

## 2019-03-27 NOTE — PROGRESS NOTE ADULT - ASSESSMENT
87 y/o male with multiple comorbidities who is s/p recent CVA. Abdomen soft, non tender. VSS. H/H today 8.3/26.6, stable from yesterday.  +FOB

## 2019-03-27 NOTE — PROGRESS NOTE ADULT - SUBJECTIVE AND OBJECTIVE BOX
CHIEF COMPLAINT: Night uneventful, denies pain.       HISTORY OF PRESENT ILLNESS  85 yo male c PMH of PE, DVT, COPD, (current smoker), alcohol use, and anemia presents with acute onset of left sided numbness, facial droop and dysarthria on 3/ . CT head on arrival was negative. 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 Patient states that numbness has subsided but his speech deficits persist and his balance is notably worse. At [place] evaluated by cardiology. TTE revealed no thrombus, EF 65%. Started with ASA for small vessel disease and stroke prevention  and Xarelto was restarted for  Hx of multiple DVTs. Patient stopped Xarelto one week prior  to admission. Stable to d/c to rehab on 3/22/19.   Of note, seen by neurology Dr Nye in 2017 for b/l leg weakness. MRI brain and spine was performed showing possible cervical myelopathy. (22 Mar 2019 14:54)      PAST MEDICAL & SURGICAL HISTORY:  Deep vein thrombosis (DVT)  HTN (hypertension)  Dyslipidemia  CAD (coronary artery disease)  Peripheral vascular disease  Parkinson disease  No significant past surgical history       REVIEW OF SYMPTOMS  [X] Constitutional WNL     [X] Cardio WNL                  [X]  WNL                   [X] Heme WNL              [X] Endo WNL                     [X] Skin WNL                 [X] MSK WNL                  [X] Psych WNL      VITALS  Vital Signs Last 24 Hrs  T(C): 36.6 (27 Mar 2019 08:03), Max: 36.6 (27 Mar 2019 08:03)  T(F): 97.9 (27 Mar 2019 08:03), Max: 97.9 (27 Mar 2019 08:03)  HR: 64 (27 Mar 2019 08:03) (64 - 75)  BP: 136/66 (27 Mar 2019 08:03) (119/60 - 136/66)  BP(mean): --  RR: 15 (27 Mar 2019 08:03) (15 - 16)  SpO2: 99% (27 Mar 2019 08:03) (96% - 99%)      PHYSICAL EXAM  Constitutional - NAD  HEENT - NCAT, EOMI  Neck - Supple, No limited ROM  Chest - CTA bilaterally, No wheeze, No rhonchi, No crackles  Cardiovascular - RRR, S1S2, No murmurs  Abdomen - BS+, Soft, NTND  Extremities - No C/C/E, No calf tenderness   Skin-no rash  Wounds-na      Neurologic Exam - no new deficits                     Psychiatric - Mood stable, Affect WNL       FUNCTIONAL PROGRESS  Gait - 40ft min A c RW  ADLs - CG  Transfers -CG  Functional transfer - CG       RECENT LABS                        8.3    10.1  )-----------( 546      ( 27 Mar 2019 05:26 )             26.6     03-27    141  |  107  |  43<H>  ----------------------------<  90  4.2   |  26  |  1.15    Ca    8.6      27 Mar 2019 05:26    TPro  6.8  /  Alb  2.7<L>  /  TBili  0.3  /  DBili  x   /  AST  17  /  ALT  20  /  AlkPhos  89  03-27      LIVER FUNCTIONS - ( 27 Mar 2019 05:26 )  Alb: 2.7 g/dL / Pro: 6.8 g/dL / ALK PHOS: 89 U/L / ALT: 20 U/L DA / AST: 17 U/L / GGT: x             Direct LDL: 57 mg/dL (03-20-19 @ 07:10)    Hemoglobin A1C, Whole Blood: 4.9 % (03-19-19 @ 19:20)  Hemoglobin A1C, Whole Blood: 4.9 % (03-19-19 @ 19:20)              RADIOLOGY/OTHER RESULTS      CURRENT MEDICATIONS  MEDICATIONS  (STANDING):  aspirin  chewable 81 milliGRAM(s) Oral daily  atorvastatin 40 milliGRAM(s) Oral at bedtime  Biotene Dry Mouth Oral Rinse 5 milliLiter(s) Swish and Spit two times a day  buDESOnide 160 MICROgram(s)/formoterol 4.5 MICROgram(s) Inhaler 2 Puff(s) Inhalation two times a day  docusate sodium 100 milliGRAM(s) Oral daily  gabapentin 300 milliGRAM(s) Oral at bedtime  lactulose Syrup 15 Gram(s) Oral at bedtime  nicotine -   7 mG/24Hr(s) Patch 1 patch Transdermal daily  pantoprazole    Tablet 40 milliGRAM(s) Oral before breakfast  predniSONE   Tablet 20 milliGRAM(s) Oral daily  rivaroxaban 20 milliGRAM(s) Oral every 24 hours  tamsulosin 0.4 milliGRAM(s) Oral at bedtime    MEDICATIONS  (PRN):  ALBUTerol/ipratropium for Nebulization 3 milliLiter(s) Nebulizer every 6 hours PRN Shortness of Breath and/or Wheezing  melatonin 3 milliGRAM(s) Oral at bedtime PRN Insomnia  ondansetron    Tablet 4 milliGRAM(s) Oral every 6 hours PRN Nausea      ASSESSMENT & PLAN      GI/Bowel Management - hold   Management - Toilet Q2  Skin - Turn Q2  Pain - Tylenol PRN  DVT PPX - Xarelto  Diet - dysphagia c strict aspiration, MBS this am, advance diet      Continue comprehensive acute rehab program consisting of 3hrs/day of OT/PT and SLP. CHIEF COMPLAINT: Night uneventful, denies pain.       HISTORY OF PRESENT ILLNESS  87 yo male c PMH of PE, DVT, COPD, (current smoker), alcohol use, and anemia presents with acute onset of left sided numbness, facial droop and dysarthria on 3/ . CT head on arrival was negative. 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 Patient states that numbness has subsided but his speech deficits persist and his balance is notably worse. At [place] evaluated by cardiology. TTE revealed no thrombus, EF 65%. Started with ASA for small vessel disease and stroke prevention  and Xarelto was restarted for  Hx of multiple DVTs. Patient stopped Xarelto one week prior  to admission. Stable to d/c to rehab on 3/22/19.   Of note, seen by neurology Dr Nye in 2017 for b/l leg weakness. MRI brain and spine was performed showing possible cervical myelopathy. (22 Mar 2019 14:54)      PAST MEDICAL & SURGICAL HISTORY:  Deep vein thrombosis (DVT)  HTN (hypertension)  Dyslipidemia  CAD (coronary artery disease)  Peripheral vascular disease  Parkinson disease  No significant past surgical history       REVIEW OF SYMPTOMS  [X] Constitutional WNL     [X] Cardio WNL                  [X]  WNL                   [X] Heme WNL              [X] Endo WNL                     [X] Skin WNL                 [X] MSK WNL                  [X] Psych WNL      VITALS  Vital Signs Last 24 Hrs  T(C): 36.6 (27 Mar 2019 08:03), Max: 36.6 (27 Mar 2019 08:03)  T(F): 97.9 (27 Mar 2019 08:03), Max: 97.9 (27 Mar 2019 08:03)  HR: 64 (27 Mar 2019 08:03) (64 - 75)  BP: 136/66 (27 Mar 2019 08:03) (119/60 - 136/66)  BP(mean): --  RR: 15 (27 Mar 2019 08:03) (15 - 16)  SpO2: 99% (27 Mar 2019 08:03) (96% - 99%)      PHYSICAL EXAM  Constitutional - NAD  HEENT - NCAT, EOMI  Neck - Supple, No limited ROM  Chest - CTA bilaterally, No wheeze, No rhonchi, No crackles  Cardiovascular - RRR, S1S2, No murmurs  Abdomen - BS+, Soft, NTND  Extremities - No C/C/E, No calf tenderness   Skin-no rash  Wounds-na      Neurologic Exam - no new deficits                     Psychiatric - Mood stable, Affect WNL       FUNCTIONAL PROGRESS  Gait - 100ft min A c RW  ADLs - CG  Transfers -CG/min A  Functional transfer - CG/min A       RECENT LABS                        8.3    10.1  )-----------( 546      ( 27 Mar 2019 05:26 )             26.6     03-27    141  |  107  |  43<H>  ----------------------------<  90  4.2   |  26  |  1.15    Ca    8.6      27 Mar 2019 05:26    TPro  6.8  /  Alb  2.7<L>  /  TBili  0.3  /  DBili  x   /  AST  17  /  ALT  20  /  AlkPhos  89  03-27      LIVER FUNCTIONS - ( 27 Mar 2019 05:26 )  Alb: 2.7 g/dL / Pro: 6.8 g/dL / ALK PHOS: 89 U/L / ALT: 20 U/L DA / AST: 17 U/L / GGT: x             Direct LDL: 57 mg/dL (03-20-19 @ 07:10)    Hemoglobin A1C, Whole Blood: 4.9 % (03-19-19 @ 19:20)  Hemoglobin A1C, Whole Blood: 4.9 % (03-19-19 @ 19:20)              RADIOLOGY/OTHER RESULTS      CURRENT MEDICATIONS  MEDICATIONS  (STANDING):  aspirin  chewable 81 milliGRAM(s) Oral daily  atorvastatin 40 milliGRAM(s) Oral at bedtime  Biotene Dry Mouth Oral Rinse 5 milliLiter(s) Swish and Spit two times a day  buDESOnide 160 MICROgram(s)/formoterol 4.5 MICROgram(s) Inhaler 2 Puff(s) Inhalation two times a day  docusate sodium 100 milliGRAM(s) Oral daily  gabapentin 300 milliGRAM(s) Oral at bedtime  lactulose Syrup 15 Gram(s) Oral at bedtime  nicotine -   7 mG/24Hr(s) Patch 1 patch Transdermal daily  pantoprazole    Tablet 40 milliGRAM(s) Oral before breakfast  predniSONE   Tablet 20 milliGRAM(s) Oral daily  rivaroxaban 20 milliGRAM(s) Oral every 24 hours  tamsulosin 0.4 milliGRAM(s) Oral at bedtime    MEDICATIONS  (PRN):  ALBUTerol/ipratropium for Nebulization 3 milliLiter(s) Nebulizer every 6 hours PRN Shortness of Breath and/or Wheezing  melatonin 3 milliGRAM(s) Oral at bedtime PRN Insomnia  ondansetron    Tablet 4 milliGRAM(s) Oral every 6 hours PRN Nausea      ASSESSMENT & PLAN      GI/Bowel Management - hold   Management - Toilet Q2  Skin - Turn Q2  Pain - Tylenol PRN  DVT PPX - Xarelto  Diet - dysphagia c strict aspiration, MBS this am, advance diet      Continue comprehensive acute rehab program consisting of 3hrs/day of OT/PT and SLP.

## 2019-03-28 ENCOUNTER — TRANSCRIPTION ENCOUNTER (OUTPATIENT)
Age: 84
End: 2019-03-28

## 2019-03-28 LAB
GLUCOSE BLDC GLUCOMTR-MCNC: 92 MG/DL — SIGNIFICANT CHANGE UP (ref 70–99)
HCT VFR BLD CALC: 31 % — LOW (ref 39–50)
HGB BLD-MCNC: 9.3 G/DL — LOW (ref 13–17)
MCHC RBC-ENTMCNC: 25.8 PG — LOW (ref 27–34)
MCHC RBC-ENTMCNC: 29.9 GM/DL — LOW (ref 32–36)
MCV RBC AUTO: 86.1 FL — SIGNIFICANT CHANGE UP (ref 80–100)
PLATELET # BLD AUTO: 488 K/UL — HIGH (ref 150–400)
RBC # BLD: 3.6 M/UL — LOW (ref 4.2–5.8)
RBC # FLD: 18.5 % — HIGH (ref 10.3–14.5)
WBC # BLD: 9.9 K/UL — SIGNIFICANT CHANGE UP (ref 3.8–10.5)
WBC # FLD AUTO: 9.9 K/UL — SIGNIFICANT CHANGE UP (ref 3.8–10.5)

## 2019-03-28 PROCEDURE — 99233 SBSQ HOSP IP/OBS HIGH 50: CPT

## 2019-03-28 PROCEDURE — 99232 SBSQ HOSP IP/OBS MODERATE 35: CPT

## 2019-03-28 RX ORDER — ONDANSETRON 8 MG/1
4 TABLET, FILM COATED ORAL ONCE
Qty: 0 | Refills: 0 | Status: COMPLETED | OUTPATIENT
Start: 2019-03-28 | End: 2019-03-28

## 2019-03-28 RX ORDER — SODIUM CHLORIDE 9 MG/ML
1000 INJECTION INTRAMUSCULAR; INTRAVENOUS; SUBCUTANEOUS
Qty: 0 | Refills: 0 | Status: DISCONTINUED | OUTPATIENT
Start: 2019-03-28 | End: 2019-03-29

## 2019-03-28 RX ORDER — SOD SULF/SODIUM/NAHCO3/KCL/PEG
1000 SOLUTION, RECONSTITUTED, ORAL ORAL
Qty: 0 | Refills: 0 | Status: DISCONTINUED | OUTPATIENT
Start: 2019-03-28 | End: 2019-03-29

## 2019-03-28 RX ADMIN — Medication 100 MILLIGRAM(S): at 11:44

## 2019-03-28 RX ADMIN — ONDANSETRON 4 MILLIGRAM(S): 8 TABLET, FILM COATED ORAL at 23:19

## 2019-03-28 RX ADMIN — Medication 81 MILLIGRAM(S): at 11:44

## 2019-03-28 RX ADMIN — Medication 1 PATCH: at 06:36

## 2019-03-28 RX ADMIN — Medication 1 PATCH: at 11:44

## 2019-03-28 RX ADMIN — Medication 5 MILLILITER(S): at 05:37

## 2019-03-28 RX ADMIN — PANTOPRAZOLE SODIUM 40 MILLIGRAM(S): 20 TABLET, DELAYED RELEASE ORAL at 06:09

## 2019-03-28 RX ADMIN — BUDESONIDE AND FORMOTEROL FUMARATE DIHYDRATE 2 PUFF(S): 160; 4.5 AEROSOL RESPIRATORY (INHALATION) at 21:12

## 2019-03-28 RX ADMIN — Medication 20 MILLIGRAM(S): at 05:38

## 2019-03-28 RX ADMIN — PREGABALIN 1000 MICROGRAM(S): 225 CAPSULE ORAL at 11:44

## 2019-03-28 RX ADMIN — BUDESONIDE AND FORMOTEROL FUMARATE DIHYDRATE 2 PUFF(S): 160; 4.5 AEROSOL RESPIRATORY (INHALATION) at 08:24

## 2019-03-28 RX ADMIN — Medication 1 PATCH: at 18:04

## 2019-03-28 RX ADMIN — Medication 1 PATCH: at 11:46

## 2019-03-28 RX ADMIN — Medication 1000 MILLILITER(S): at 14:03

## 2019-03-28 RX ADMIN — SODIUM CHLORIDE 50 MILLILITER(S): 9 INJECTION INTRAMUSCULAR; INTRAVENOUS; SUBCUTANEOUS at 23:20

## 2019-03-28 NOTE — PROGRESS NOTE ADULT - ASSESSMENT
87 yo male c hx of COPD, PE, DVT, current smoker s/p right pontine cva and now with functional deficits.                                                                 #CVA  c/w ASA and  statin  c/w  PT/OT/SLP    #Smoker  c/w Nicotine patch    #COPD Acute exacerbation - improved.   Start prednisone taper    O2 with NC as needed   c/w  Duoneb   CXR - no acute issues.     #BPH   c/w Tamsulosin     #Abdominal pain - resolved   CT abdomen WNL     #Anemia Microcytic , ?LGIB -   FOBT +  H/H improved s/p IV venofer   Transfuse for Hb < 7   Scope tomorrow with GI, Clear liquid diet today   GI & hematology  consult appreciated.   Monitor H/H     #H/O multiple DVTs  c/w Xarelto

## 2019-03-28 NOTE — PROGRESS NOTE ADULT - SUBJECTIVE AND OBJECTIVE BOX
ELIZABETH MCLAUGHLIN   86y   Male    Admitting: TD Humphries  HPI:  86-year-old gentleman with history of venous thromboembolic disease, COPD and anemia who presented with left-sided numbness/facial droop/dysarthria. 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 Patient is receiving aspirin for stroke prevention and XARELTO for history of venous thromboembolic disease. He is currently on the acute rehabilitation unit in [facility].    PAST MEDICAL & SURGICAL HISTORY:  Deep vein thrombosis (DVT)  HTN (hypertension)  Dyslipidemia  CAD (coronary artery disease)  Peripheral vascular disease  Parkinson disease  No significant past surgical history    HEALTH ISSUES - PROBLEM Dx:  Anemia, unspecified type: Anemia, unspecified type  Nausea: Nausea  Constipation: Constipation  GI bleed: GI bleed    MEDICATIONS  (STANDING):  aspirin  chewable 81 milliGRAM(s) Oral daily  atorvastatin 40 milliGRAM(s) Oral at bedtime  Biotene Dry Mouth Oral Rinse 5 milliLiter(s) Swish and Spit two times a day  buDESOnide 160 MICROgram(s)/formoterol 4.5 MICROgram(s) Inhaler 2 Puff(s) Inhalation two times a day  cyanocobalamin 1000 MICROGram(s) Oral daily  docusate sodium 100 milliGRAM(s) Oral daily  gabapentin 300 milliGRAM(s) Oral at bedtime  lactulose Syrup 15 Gram(s) Oral at bedtime  nicotine -   7 mG/24Hr(s) Patch 1 patch Transdermal daily  pantoprazole    Tablet 40 milliGRAM(s) Oral before breakfast  predniSONE   Tablet 20 milliGRAM(s) Oral daily  rivaroxaban 20 milliGRAM(s) Oral every 24 hours  tamsulosin 0.4 milliGRAM(s) Oral at bedtime    MEDICATIONS  (PRN):  ALBUTerol/ipratropium for Nebulization 3 milliLiter(s) Nebulizer every 6 hours PRN Shortness of Breath and/or Wheezing  melatonin 3 milliGRAM(s) Oral at bedtime PRN Insomnia  ondansetron    Tablet 4 milliGRAM(s) Oral every 6 hours PRN Nausea    Allergies    No Known Allergies    INTERVAL HPI/OVERNIGHT EVENTS:  Patient S&E at bedside. In therapy. No c/o CP or SOB.      VITAL SIGNS:  T(F): 97.7 (03-28-19 @ 08:18)  HR: 78 (03-28-19 @ 08:26)  BP: 149/79 (03-28-19 @ 08:18)  RR: 14 (03-28-19 @ 08:18)  SpO2: 98% (03-28-19 @ 08:26)    PHYSICAL EXAM:  Constitutional: NAD  Eyes: sclera non-icteric  Neck: no JVD  Respiratory: CTA b/l, good air entry b/l ant.  Cardiovascular: RRR, no M/R/G  Gastrointestinal: soft, NTND, no masses palpable, no hepatosplenomegaly appreciated  Extremities: no calf tenderness  Neurological: Awake, alert.    Labs:             8.3    10.1  )-----------( 546      ( 03-27 @ 05:26 )             26.6                8.3    11.1  )-----------( 441      ( 03-26 @ 05:30 )             28.2                8.9    12.2  )-----------( 463      ( 03-25 @ 10:43 )             30.3       03-27    141  |  107  |  43<H>  ----------------------------<  90  4.2   |  26  |  1.15    Ca    8.6      27 Mar 2019 05:26    TPro  6.8  /  Alb  2.7<L>  /  TBili  0.3  /  DBili  x   /  AST  17  /  ALT  20  /  AlkPhos  89  03-27      Ferritin, Serum: 29 ng/mL (03-27-19 @ 05:26)  Iron - Total Binding Capacity.: 365 ug/dL (03-27-19 @ 05:26)  % Saturation, Iron: 5 % (03-27-19 @ 05:26)  Iron Total, Serum: 20 ug/dL (03-27-19 @ 05:26)  Absolute Reticulocytes: 61.0 K/uL (03-27-19 @ 05:26)  Haptoglobin, Serum: 151 mg/dL (03-27-19 @ 05:26)  Folate, Serum: 17.5 ng/mL (03-27-19 @ 05:26)  Vitamin B12, Serum: 369 pg/mL (03-27-19 @ 05:26)    Occult Blood, Feces: Positive (03-26-19 @ 09:35)

## 2019-03-28 NOTE — PROGRESS NOTE ADULT - ASSESSMENT
86-year-old gentleman with history of venous thromboembolic disease, COPD and anemia who presented with left-sided numbness/facial droop/dysarthria. 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 Patient is receiving aspirin for stroke prevention and XARELTO for history of venous thromboembolic disease. He is currently on the acute rehabilitation unit in [facility].

## 2019-03-28 NOTE — CHART NOTE - NSCHARTNOTEFT_GEN_A_CORE
Nutrition Follow Up Note  Hospital Course (Per Electronic Medical Record):   Source: Medical Record [X] Patient [X] Family [ ] Nursing Staff [ ]     Diet: Dysphagia 2 Mechanical Soft, Honey Consistency, DASH/TLC-Upgraded on 3/27  Pt was lethargic upon interview and was unable to answer all of the questions  Pt states that he has not had a good appetite over the past few days, PO intake 0-100% per chart  Pt states that he is tolerating diet ok but has difficulty swallowing on occasion   Pt states that he was drinking supplement prior to diet upgrade, recommend adding Ensure Enlive BID back to diet order  Pt reports staying hydrated, but finds it difficult to drink on occasion   Pt reports no recent N/V/D/C    Enteral/Parenteral Nutrition: N/A    Current Weight: 154.3lb on 3/22  Edema: none noted  Skin: No pressure ulcer per chart      Pertinent Medications: MEDICATIONS  (STANDING):  aspirin  chewable 81 milliGRAM(s) Oral daily  atorvastatin 40 milliGRAM(s) Oral at bedtime  Biotene Dry Mouth Oral Rinse 5 milliLiter(s) Swish and Spit two times a day  buDESOnide 160 MICROgram(s)/formoterol 4.5 MICROgram(s) Inhaler 2 Puff(s) Inhalation two times a day  cyanocobalamin 1000 MICROGram(s) Oral daily  docusate sodium 100 milliGRAM(s) Oral daily  gabapentin 300 milliGRAM(s) Oral at bedtime  lactulose Syrup 15 Gram(s) Oral at bedtime  nicotine -   7 mG/24Hr(s) Patch 1 patch Transdermal daily  pantoprazole    Tablet 40 milliGRAM(s) Oral before breakfast  predniSONE   Tablet 20 milliGRAM(s) Oral daily  rivaroxaban 20 milliGRAM(s) Oral every 24 hours  tamsulosin 0.4 milliGRAM(s) Oral at bedtime    MEDICATIONS  (PRN):  ALBUTerol/ipratropium for Nebulization 3 milliLiter(s) Nebulizer every 6 hours PRN Shortness of Breath and/or Wheezing  melatonin 3 milliGRAM(s) Oral at bedtime PRN Insomnia  ondansetron    Tablet 4 milliGRAM(s) Oral every 6 hours PRN Nausea      Pertinent Labs:  03-27 Na141 mmol/L Glu 90 mg/dL K+ 4.2 mmol/L Cr  1.15 mg/dL BUN 43 mg/dL<H> 03-27 Alb 2.7 g/dL<L> 03-19 [id number] 4.9 % 03-20 Chol 113 mg/dL LDL 57 mg/dL HDL 39 mg/dL<L> Trig 84 mg/dL      Last BM: on 3/26 per chart    Estimated Needs:   [X] No Change since Previous Assessment    Previous Nutrition Diagnosis:   Malnutrition; moderate    Nutrition Diagnosis is [X] Ongoing - will follow up per protocol        New Nutrition Diagnosis: [X] Not Applicable      Interventions:   1. Recommend adding Ensure Enlive supplement BID  2. Educated pt on the need for supplementation when PO intake is poor    Monitoring & Evaluation:   [X] Weights   [X] PO Intake   [X] Follow Up (Per Protocol)  [X] Tolerance to Diet Prescription   [X] Other: Labs     Remains Available.  Harinder Rodriguez Dietetic Intern

## 2019-03-28 NOTE — PROGRESS NOTE ADULT - ASSESSMENT
85 yo male c hx of COPD, PE, DVT, current smoker s/p right pontine cva and now with functional deficits.   Precautions:    fall, aspiration                                                                              Diet: reg c thins    DVT Prophylaxis:        xarelto                                                                  Medical Prognosis: good    Prescreen Comparison: I have reviewed the prescreen information and I found no relevant changes between the preadmission  screening and my post admission evaluation.     #CVA   ASA and  statin, xarelto to continue as per GI plan.   continue PT/OT/SLP  fall and aspiration precautions, MBS completed-diet to advance.     #smoker  Nicotine patch, smoking cessation education    #COPD  Cough c clear mucus this am. Afebrile. Inhalers, steroid per medicine. Continue.       # history of multiple DVTs  -xarelto to continue, repeat CBC, guaiac+. GI in-will follow rec's. No melena reported. VS stable.     #anemia   HH this am, FOBS+, CT Abd neg findings. GI in and heme workup ongoing. Xarelto and ASA to continue. S/p Venofer yesterday. Added b12 suppl.       #Abd pain  CT abd neg. Poor appetite, mild nausea fluctuating, GI in.       Expected Therapy:   P.T.   1     hrs/day           O. T.    1  hrs/day           S.L.P.  1      hrs/day                    P&O      eval                                             Excpected Frequency: 5 days/7 day period    Rehab Potential:            excellent                               Estimated Disposition:     home                     ELOS:   14           days      Rationale For Inpatient Rehab Admission- Patient demonstrates the following:     [X] Medically appropriate for rehabilitation admission   [X] Has attainable rehab goals with an approrpriate discharge plan  [X] Has rehabilitation potential (expected to make significant improvement within a reasonable period of time)  [X] Requires close medical management by a rehab physician, rehab nursing care and comprehensive interdisciplinary team (including         PT, OT, SLP and/or prosthetics and orthotics) 85 yo male c hx of COPD, PE, DVT, current smoker s/p right pontine cva and now with functional deficits.   Precautions:    fall, aspiration                                                                              Diet: reg c thins    DVT Prophylaxis:        xarelto                                                                  Medical Prognosis: good    Prescreen Comparison: I have reviewed the prescreen information and I found no relevant changes between the preadmission  screening and my post admission evaluation.     #CVA   ASA and  statin, xarelto to continue as per GI plan.   continue PT/OT/SLP  fall and aspiration precautions, MBS completed-diet to advance.     #smoker  Nicotine patch, smoking cessation education    #COPD  Cough c clear mucus this am. Afebrile. Inhalers, steroid per medicine. Continue.       # history of multiple DVTs  -xarelto held by GI for EGD am, repeat CBC, guaiac+. Clear liquids. Monitor melena.    #anemia   HH this am, FOBS+, CT Abd neg findings. GI in and heme workup ongoing. Xarelto held by GI for EGD. S/p Venofer yesterday. Added b12 suppl.       #Abd pain  CT abd neg. Poor appetite, mild nausea fluctuating, GI in.       Expected Therapy:   P.T.   1     hrs/day           O. T.    1  hrs/day           S.L.P.  1      hrs/day                    P&O      eval                                             Excpected Frequency: 5 days/7 day period    Rehab Potential:            excellent                               Estimated Disposition:     home                     ELOS:   14           days      Rationale For Inpatient Rehab Admission- Patient demonstrates the following:     [X] Medically appropriate for rehabilitation admission   [X] Has attainable rehab goals with an approrpriate discharge plan  [X] Has rehabilitation potential (expected to make significant improvement within a reasonable period of time)  [X] Requires close medical management by a rehab physician, rehab nursing care and comprehensive interdisciplinary team (including         PT, OT, SLP and/or prosthetics and orthotics) 85 yo male c hx of COPD, PE, DVT, current smoker s/p right pontine cva and now with functional deficits.   Precautions:    fall, aspiration                                                                              Diet: reg c thins    DVT Prophylaxis:        xarelto                                                                  Medical Prognosis: good    Prescreen Comparison: I have reviewed the prescreen information and I found no relevant changes between the preadmission  screening and my post admission evaluation.     #CVA   ASA and  statin, xarelto to continue as per GI plan.   continue PT/OT/SLP  fall and aspiration precautions, MBS completed-diet to advance.     #smoker  Nicotine patch, smoking cessation education    #COPD  Cough c clear mucus this am. Afebrile. Inhalers, steroids trial as per medicine      # history of multiple DVTs  -xarelto held by GI for EGD am, repeat CBC, guaiac+. Clear liquids.     #anemia   HH this am, FOBS+, CT Abd neg findings. GI  and heme workup is  ongoing. Xarelto held by GI for EGD. S/p Venofer yesterday. Added b12 suppl.       #Abd pain  CT abd neg. Poor appetite, mild nausea fluctuating, GI in.       Expected Therapy:   P.T.   1     hrs/day           O. T.    1  hrs/day           S.L.P.  1      hrs/day                    P&O      eval                                             Excpected Frequency: 5 days/7 day period    Rehab Potential:            excellent                               Estimated Disposition:     home                     ELOS:   14           days      Rationale For Inpatient Rehab Admission- Patient demonstrates the following:     [X] Medically appropriate for rehabilitation admission   [X] Has attainable rehab goals with an approrpriate discharge plan  [X] Has rehabilitation potential (expected to make significant improvement within a reasonable period of time)  [X] Requires close medical management by a rehab physician, rehab nursing care and comprehensive interdisciplinary team (including         PT, OT, SLP and/or prosthetics and orthotics)

## 2019-03-28 NOTE — PROGRESS NOTE ADULT - SUBJECTIVE AND OBJECTIVE BOX
Patient is a 86y old  Male who presents with a chief complaint of s/p Right pontine CVA c functional deficits (28 Mar 2019 09:43)      Patient seen and examined at bedside.     ALLERGIES:  No Known Allergies    MEDICATIONS:  ALBUTerol/ipratropium for Nebulization 3 milliLiter(s) Nebulizer every 6 hours PRN  Biotene Dry Mouth Oral Rinse 5 milliLiter(s) Swish and Spit two times a day  cyanocobalamin 1000 MICROGram(s) Oral daily  docusate sodium 100 milliGRAM(s) Oral daily  lactulose Syrup 15 Gram(s) Oral at bedtime  melatonin 3 milliGRAM(s) Oral at bedtime PRN  ondansetron    Tablet 4 milliGRAM(s) Oral every 6 hours PRN  predniSONE   Tablet 20 milliGRAM(s) Oral daily    Vital Signs Last 24 Hrs  T(F): 97.7 (28 Mar 2019 08:18), Max: 97.9 (27 Mar 2019 19:27)  HR: 78 (28 Mar 2019 08:26) (67 - 78)  BP: 149/79 (28 Mar 2019 08:18) (131/63 - 149/79)  RR: 14 (28 Mar 2019 08:18) (14 - 14)  SpO2: 98% (28 Mar 2019 08:26) (94% - 98%)  I&O's Summary      PHYSICAL EXAM:  General: NAD, comfortable   ENT: MMM  Neck: Supple, No JVD  Lungs: CTA, BLAE, No added sounds.   Cardio: RRR, S1/S2, No murmurs  Abdomen: Soft, NT/ND, BS+   Extremities: No edema  CNS: no new deficits     LABS:                        9.3    9.9   )-----------( 488      ( 28 Mar 2019 09:28 )             31.0     03-27    141  |  107  |  43  ----------------------------<  90  4.2   |  26  |  1.15    Ca    8.6      27 Mar 2019 05:26    TPro  6.8  /  Alb  2.7  /  TBili  0.3  /  DBili  x   /  AST  17  /  ALT  20  /  AlkPhos  89  03-27    eGFR if Non African American: 57 mL/min/1.73M2 (03-27-19 @ 05:26)  eGFR if African American: 66 mL/min/1.73M2 (03-27-19 @ 05:26)            03-20 Chol 113 mg/dL LDL 57 mg/dL HDL 39 mg/dL Trig 84 mg/dL        CAPILLARY BLOOD GLUCOSE      POCT Blood Glucose.: 92 mg/dL (28 Mar 2019 07:31)    03-19 [id number] 4.9          RADIOLOGY & ADDITIONAL TESTS:    Care Discussed with Consultants/Other Providers:

## 2019-03-28 NOTE — PROGRESS NOTE ADULT - SUBJECTIVE AND OBJECTIVE BOX
INTERVAL HPI/OVERNIGHT EVENTS:  HPI:  87 y/o male PMH PE ( on xarelto), DVT, COPD, ETOH, anemia, and s/o recent CVA who is admitted to Rehab. Patient seen and examined at bedside this morning. Denies abdominal pain.     MEDICATIONS  (STANDING):  aspirin  chewable 81 milliGRAM(s) Oral daily  atorvastatin 40 milliGRAM(s) Oral at bedtime  Biotene Dry Mouth Oral Rinse 5 milliLiter(s) Swish and Spit two times a day  buDESOnide 160 MICROgram(s)/formoterol 4.5 MICROgram(s) Inhaler 2 Puff(s) Inhalation two times a day  cyanocobalamin 1000 MICROGram(s) Oral daily  docusate sodium 100 milliGRAM(s) Oral daily  gabapentin 300 milliGRAM(s) Oral at bedtime  lactulose Syrup 15 Gram(s) Oral at bedtime  nicotine -   7 mG/24Hr(s) Patch 1 patch Transdermal daily  pantoprazole    Tablet 40 milliGRAM(s) Oral before breakfast  polyethylene glycol/electrolyte Solution 1000 milliLiter(s) Oral <User Schedule>  polyethylene glycol/electrolyte Solution 1000 milliLiter(s) Oral <User Schedule>  predniSONE   Tablet 20 milliGRAM(s) Oral daily  sodium chloride 0.9%. 1000 milliLiter(s) (50 mL/Hr) IV Continuous <Continuous>  tamsulosin 0.4 milliGRAM(s) Oral at bedtime    MEDICATIONS  (PRN):  ALBUTerol/ipratropium for Nebulization 3 milliLiter(s) Nebulizer every 6 hours PRN Shortness of Breath and/or Wheezing  melatonin 3 milliGRAM(s) Oral at bedtime PRN Insomnia  ondansetron    Tablet 4 milliGRAM(s) Oral every 6 hours PRN Nausea      Allergies    No Known Allergies    Intolerances        PHYSICAL EXAM:   Vital Signs:  Vital Signs Last 24 Hrs  T(C): 36.5 (28 Mar 2019 08:18), Max: 36.6 (27 Mar 2019 19:27)  T(F): 97.7 (28 Mar 2019 08:18), Max: 97.9 (27 Mar 2019 19:27)  HR: 78 (28 Mar 2019 08:26) (67 - 78)  BP: 149/79 (28 Mar 2019 08:18) (131/63 - 149/79)  BP(mean): --  RR: 14 (28 Mar 2019 08:18) (14 - 14)  SpO2: 98% (28 Mar 2019 08:26) (94% - 98%)  Daily     Daily I&O's Summary    GENERAL:  Appears stated age,  HEENT:  NC/AT,  conjunctivae clear and pink,   CHEST:  Full & symmetric excursion, slight wheeze, decreased breath sounds, + cough  HEART:  Regular rhythm, S1, S2, no murmur  ABDOMEN:  Soft, non-tender, non-distended, + normoactive bowel sounds disease  EXTEREMITIES:  no cyanosis,clubbing or edema  SKIN:  No rash/erythema, warm/dry  NEURO:  Alert, oriented,    LABS:                        9.3    9.9   )-----------( 488      ( 28 Mar 2019 09:28 )             31.0     03-27    141  |  107  |  43<H>  ----------------------------<  90  4.2   |  26  |  1.15    Ca    8.6      27 Mar 2019 05:26    TPro  6.8  /  Alb  2.7<L>  /  TBili  0.3  /  DBili  x   /  AST  17  /  ALT  20  /  AlkPhos  89  03-27        amylase   lipase  RADIOLOGY & ADDITIONAL TESTS:

## 2019-03-28 NOTE — PROGRESS NOTE ADULT - ASSESSMENT
85 y/o male with multiple comorbidities who is s/p recent CVA. Abdomen soft, non tender. VSS. H/H stable.  +FOB. Will proceed with upper endoscopy and colonoscopy to evaluate for bleeding as patient has +FOB and H/H fluctuating.

## 2019-03-28 NOTE — PROGRESS NOTE ADULT - SUBJECTIVE AND OBJECTIVE BOX
CHIEF COMPLAINT: cough      HISTORY OF PRESENT ILLNESS  85 yo male c PMH of PE, DVT, COPD, (current smoker), alcohol use, and anemia presents with acute onset of left sided numbness, facial droop and dysarthria on 3/ . CT head on arrival was negative. 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 Patient states that numbness has subsided but his speech deficits persist and his balance is notably worse. At [place] evaluated by cardiology. TTE revealed no thrombus, EF 65%. Started with ASA for small vessel disease and stroke prevention  and Xarelto was restarted for  Hx of multiple DVTs. Patient stopped Xarelto one week prior  to admission. Stable to d/c to rehab on 3/22/19.   Of note, seen by neurology Dr Nye in 2017 for b/l leg weakness. MRI brain and spine was performed showing possible cervical myelopathy. (22 Mar 2019 14:54)      PAST MEDICAL & SURGICAL HISTORY:  Deep vein thrombosis (DVT)  HTN (hypertension)  Dyslipidemia  CAD (coronary artery disease)  Peripheral vascular disease  Parkinson disease  No significant past surgical history       REVIEW OF SYMPTOMS  [X] Constitutional WNL     [X] Cardio WNL       [X] GI WNL                          [X]  WNL              [X] Endo WNL                     [X] Skin WNL                 [X] MSK WNL                  [X] Psych WNL      VITALS  Vital Signs Last 24 Hrs  T(C): 36.5 (28 Mar 2019 08:18), Max: 36.6 (27 Mar 2019 19:27)  T(F): 97.7 (28 Mar 2019 08:18), Max: 97.9 (27 Mar 2019 19:27)  HR: 78 (28 Mar 2019 08:26) (67 - 78)  BP: 149/79 (28 Mar 2019 08:18) (131/63 - 149/79)  BP(mean): --  RR: 14 (28 Mar 2019 08:18) (14 - 14)  SpO2: 98% (28 Mar 2019 08:26) (94% - 98%)      PHYSICAL EXAM  Constitutional - cough  HEENT - NCAT, EOMI  Neck - Supple, No limited ROM  Chest - + wheeze, No rhonchi, No crackles, cough-productive  Cardiovascular - RRR, S1S2, No murmurs  Abdomen - BS+, Soft, NTND  Extremities - No C/C/E, No calf tenderness   Skin-no rash  Wounds-na      Neurologic Exam - no new deficits                     Balance - impaired     Psychiatric - Mood stable, Affect WNL       FUNCTIONAL PROGRESS  Gait - 100ft min A c RW  ADLs - CG  Transfers -CG/min A  Functional transfer - CG/min A      RECENT LABS                        8.3    10.1  )-----------( 546      ( 27 Mar 2019 05:26 )             26.6     03-27    141  |  107  |  43<H>  ----------------------------<  90  4.2   |  26  |  1.15    Ca    8.6      27 Mar 2019 05:26    TPro  6.8  /  Alb  2.7<L>  /  TBili  0.3  /  DBili  x   /  AST  17  /  ALT  20  /  AlkPhos  89  03-27      LIVER FUNCTIONS - ( 27 Mar 2019 05:26 )  Alb: 2.7 g/dL / Pro: 6.8 g/dL / ALK PHOS: 89 U/L / ALT: 20 U/L DA / AST: 17 U/L / GGT: x             Direct LDL: 57 mg/dL (03-20-19 @ 07:10)    Hemoglobin A1C, Whole Blood: 4.9 % (03-19-19 @ 19:20)  Hemoglobin A1C, Whole Blood: 4.9 % (03-19-19 @ 19:20)              RADIOLOGY/OTHER RESULTS      CURRENT MEDICATIONS  MEDICATIONS  (STANDING):  aspirin  chewable 81 milliGRAM(s) Oral daily  atorvastatin 40 milliGRAM(s) Oral at bedtime  Biotene Dry Mouth Oral Rinse 5 milliLiter(s) Swish and Spit two times a day  buDESOnide 160 MICROgram(s)/formoterol 4.5 MICROgram(s) Inhaler 2 Puff(s) Inhalation two times a day  cyanocobalamin 1000 MICROGram(s) Oral daily  docusate sodium 100 milliGRAM(s) Oral daily  gabapentin 300 milliGRAM(s) Oral at bedtime  lactulose Syrup 15 Gram(s) Oral at bedtime  nicotine -   7 mG/24Hr(s) Patch 1 patch Transdermal daily  pantoprazole    Tablet 40 milliGRAM(s) Oral before breakfast  predniSONE   Tablet 20 milliGRAM(s) Oral daily  rivaroxaban 20 milliGRAM(s) Oral every 24 hours  tamsulosin 0.4 milliGRAM(s) Oral at bedtime    MEDICATIONS  (PRN):  ALBUTerol/ipratropium for Nebulization 3 milliLiter(s) Nebulizer every 6 hours PRN Shortness of Breath and/or Wheezing  melatonin 3 milliGRAM(s) Oral at bedtime PRN Insomnia  ondansetron    Tablet 4 milliGRAM(s) Oral every 6 hours PRN Nausea      ASSESSMENT & PLAN      GI/Bowel Management - lactulose, awaiting BM   Management - Toilet Q2  Skin - Turn Q2  Pain - Tylenol PRN  DVT PPX - Xarelto  Diet - dysphagia c strict aspiration, MBS this am, advance diet    Continue comprehensive acute rehab program consisting of 3hrs/day of OT/PT and SLP. CHIEF COMPLAINT: cough intermittently       HISTORY OF PRESENT ILLNESS  87 yo male c PMH of PE, DVT, COPD, (current smoker), alcohol use, and anemia presents with acute onset of left sided numbness, facial droop and dysarthria on 3/ . CT head on arrival was negative. 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 Patient states that numbness has subsided but his speech deficits persist and his balance is notably worse. At [place] evaluated by cardiology. TTE revealed no thrombus, EF 65%. Started with ASA for small vessel disease and stroke prevention  and Xarelto was restarted for  Hx of multiple DVTs. Patient stopped Xarelto one week prior  to admission. Stable to d/c to rehab on 3/22/19.   Of note, seen by neurology Dr Nye in 2017 for b/l leg weakness. MRI brain and spine was performed showing possible cervical myelopathy. (22 Mar 2019 14:54)      PAST MEDICAL & SURGICAL HISTORY:  Deep vein thrombosis (DVT)  HTN (hypertension)  Dyslipidemia  CAD (coronary artery disease)  Peripheral vascular disease  Parkinson disease  No significant past surgical history       REVIEW OF SYMPTOMS  [X] Constitutional WNL     [X] Cardio WNL       [X] GI WNL                          [X]  WNL              [X] Endo WNL                     [X] Skin WNL                 [X] MSK WNL                  [X] Psych WNL      VITALS  Vital Signs Last 24 Hrs  T(C): 36.5 (28 Mar 2019 08:18), Max: 36.6 (27 Mar 2019 19:27)  T(F): 97.7 (28 Mar 2019 08:18), Max: 97.9 (27 Mar 2019 19:27)  HR: 78 (28 Mar 2019 08:26) (67 - 78)  BP: 149/79 (28 Mar 2019 08:18) (131/63 - 149/79)  BP(mean): --  RR: 14 (28 Mar 2019 08:18) (14 - 14)  SpO2: 98% (28 Mar 2019 08:26) (94% - 98%)      PHYSICAL EXAM  Constitutional - cough  HEENT - NCAT, EOMI  Neck - Supple, No limited ROM  Chest - + wheeze, No rhonchi, No crackles, cough-productive  Cardiovascular - RRR, S1S2, No murmurs  Abdomen - BS+, Soft, NTND  Extremities - No C/C/E, No calf tenderness   Skin-no rash  Wounds-na      Neurologic Exam - no new deficits                     Balance - impaired     Psychiatric - Mood stable, Affect WNL       FUNCTIONAL PROGRESS  Gait - 100ft min A c RW  ADLs - CG  Transfers -CG/min A  Functional transfer - CG/min A      RECENT LABS                        8.3    10.1  )-----------( 546      ( 27 Mar 2019 05:26 )             26.6     03-27    141  |  107  |  43<H>  ----------------------------<  90  4.2   |  26  |  1.15    Ca    8.6      27 Mar 2019 05:26    TPro  6.8  /  Alb  2.7<L>  /  TBili  0.3  /  DBili  x   /  AST  17  /  ALT  20  /  AlkPhos  89  03-27      LIVER FUNCTIONS - ( 27 Mar 2019 05:26 )  Alb: 2.7 g/dL / Pro: 6.8 g/dL / ALK PHOS: 89 U/L / ALT: 20 U/L DA / AST: 17 U/L / GGT: x             Direct LDL: 57 mg/dL (03-20-19 @ 07:10)    Hemoglobin A1C, Whole Blood: 4.9 % (03-19-19 @ 19:20)  Hemoglobin A1C, Whole Blood: 4.9 % (03-19-19 @ 19:20)              RADIOLOGY/OTHER RESULTS      CURRENT MEDICATIONS  MEDICATIONS  (STANDING):  aspirin  chewable 81 milliGRAM(s) Oral daily  atorvastatin 40 milliGRAM(s) Oral at bedtime  Biotene Dry Mouth Oral Rinse 5 milliLiter(s) Swish and Spit two times a day  buDESOnide 160 MICROgram(s)/formoterol 4.5 MICROgram(s) Inhaler 2 Puff(s) Inhalation two times a day  cyanocobalamin 1000 MICROGram(s) Oral daily  docusate sodium 100 milliGRAM(s) Oral daily  gabapentin 300 milliGRAM(s) Oral at bedtime  lactulose Syrup 15 Gram(s) Oral at bedtime  nicotine -   7 mG/24Hr(s) Patch 1 patch Transdermal daily  pantoprazole    Tablet 40 milliGRAM(s) Oral before breakfast  predniSONE   Tablet 20 milliGRAM(s) Oral daily  rivaroxaban 20 milliGRAM(s) Oral every 24 hours  tamsulosin 0.4 milliGRAM(s) Oral at bedtime    MEDICATIONS  (PRN):  ALBUTerol/ipratropium for Nebulization 3 milliLiter(s) Nebulizer every 6 hours PRN Shortness of Breath and/or Wheezing  melatonin 3 milliGRAM(s) Oral at bedtime PRN Insomnia  ondansetron    Tablet 4 milliGRAM(s) Oral every 6 hours PRN Nausea      ASSESSMENT & PLAN      GI/Bowel Management - lactulose, awaiting BM   Management - Toilet Q2  Skin - Turn Q2  Pain - Tylenol PRN  DVT PPX - Xarelto  Diet - dysphagia c strict aspiration, MBS this am, advance diet    Continue comprehensive acute rehab program consisting of 3hrs/day of OT/PT and SLP.

## 2019-03-29 LAB
ALBUMIN SERPL ELPH-MCNC: 2.6 G/DL — LOW (ref 3.3–5)
ALLERGY+IMMUNOLOGY DIAG STUDY NOTE: SIGNIFICANT CHANGE UP
ALP SERPL-CCNC: 81 U/L — SIGNIFICANT CHANGE UP (ref 40–120)
ALT FLD-CCNC: 18 U/L DA — SIGNIFICANT CHANGE UP (ref 10–45)
ANION GAP SERPL CALC-SCNC: 11 MMOL/L — SIGNIFICANT CHANGE UP (ref 5–17)
AST SERPL-CCNC: 19 U/L — SIGNIFICANT CHANGE UP (ref 10–40)
BILIRUB SERPL-MCNC: 0.6 MG/DL — SIGNIFICANT CHANGE UP (ref 0.2–1.2)
BLD GP AB SCN SERPL QL: ABNORMAL
BUN SERPL-MCNC: 46 MG/DL — HIGH (ref 7–23)
CALCIUM SERPL-MCNC: 7.9 MG/DL — LOW (ref 8.4–10.5)
CHLORIDE SERPL-SCNC: 109 MMOL/L — HIGH (ref 96–108)
CO2 SERPL-SCNC: 25 MMOL/L — SIGNIFICANT CHANGE UP (ref 22–31)
CREAT SERPL-MCNC: 1.22 MG/DL — SIGNIFICANT CHANGE UP (ref 0.5–1.3)
DIR ANTIGLOB POLYSPECIFIC INTERPRETATION: SIGNIFICANT CHANGE UP
GLUCOSE SERPL-MCNC: 86 MG/DL — SIGNIFICANT CHANGE UP (ref 70–99)
HCT VFR BLD CALC: 29.3 % — LOW (ref 39–50)
HGB BLD-MCNC: 8.8 G/DL — LOW (ref 13–17)
MCHC RBC-ENTMCNC: 25.2 PG — LOW (ref 27–34)
MCHC RBC-ENTMCNC: 30.1 GM/DL — LOW (ref 32–36)
MCV RBC AUTO: 83.7 FL — SIGNIFICANT CHANGE UP (ref 80–100)
PLATELET # BLD AUTO: 430 K/UL — HIGH (ref 150–400)
POTASSIUM SERPL-MCNC: 4.4 MMOL/L — SIGNIFICANT CHANGE UP (ref 3.5–5.3)
POTASSIUM SERPL-SCNC: 4.4 MMOL/L — SIGNIFICANT CHANGE UP (ref 3.5–5.3)
PROT SERPL-MCNC: 6.1 G/DL — SIGNIFICANT CHANGE UP (ref 6–8.3)
RBC # BLD: 3.5 M/UL — LOW (ref 4.2–5.8)
RBC # FLD: 17.9 % — HIGH (ref 10.3–14.5)
SODIUM SERPL-SCNC: 145 MMOL/L — SIGNIFICANT CHANGE UP (ref 135–145)
WBC # BLD: 7.4 K/UL — SIGNIFICANT CHANGE UP (ref 3.8–10.5)
WBC # FLD AUTO: 7.4 K/UL — SIGNIFICANT CHANGE UP (ref 3.8–10.5)

## 2019-03-29 PROCEDURE — 99233 SBSQ HOSP IP/OBS HIGH 50: CPT

## 2019-03-29 PROCEDURE — 99232 SBSQ HOSP IP/OBS MODERATE 35: CPT

## 2019-03-29 RX ORDER — IRON SUCROSE 20 MG/ML
200 INJECTION, SOLUTION INTRAVENOUS ONCE
Qty: 0 | Refills: 0 | Status: COMPLETED | OUTPATIENT
Start: 2019-03-29 | End: 2019-03-29

## 2019-03-29 RX ORDER — RIVAROXABAN 15 MG-20MG
20 KIT ORAL EVERY 24 HOURS
Qty: 0 | Refills: 0 | Status: DISCONTINUED | OUTPATIENT
Start: 2019-03-30 | End: 2019-04-03

## 2019-03-29 RX ADMIN — SODIUM CHLORIDE 50 MILLILITER(S): 9 INJECTION INTRAMUSCULAR; INTRAVENOUS; SUBCUTANEOUS at 08:11

## 2019-03-29 RX ADMIN — IRON SUCROSE 110 MILLIGRAM(S): 20 INJECTION, SOLUTION INTRAVENOUS at 20:55

## 2019-03-29 RX ADMIN — Medication 1 PATCH: at 12:14

## 2019-03-29 RX ADMIN — SODIUM CHLORIDE 50 MILLILITER(S): 9 INJECTION INTRAMUSCULAR; INTRAVENOUS; SUBCUTANEOUS at 12:13

## 2019-03-29 RX ADMIN — TAMSULOSIN HYDROCHLORIDE 0.4 MILLIGRAM(S): 0.4 CAPSULE ORAL at 21:04

## 2019-03-29 RX ADMIN — BUDESONIDE AND FORMOTEROL FUMARATE DIHYDRATE 2 PUFF(S): 160; 4.5 AEROSOL RESPIRATORY (INHALATION) at 08:22

## 2019-03-29 RX ADMIN — Medication 3 MILLILITER(S): at 08:22

## 2019-03-29 RX ADMIN — GABAPENTIN 300 MILLIGRAM(S): 400 CAPSULE ORAL at 21:04

## 2019-03-29 RX ADMIN — PANTOPRAZOLE SODIUM 40 MILLIGRAM(S): 20 TABLET, DELAYED RELEASE ORAL at 05:20

## 2019-03-29 RX ADMIN — Medication 5 MILLILITER(S): at 05:19

## 2019-03-29 RX ADMIN — Medication 1 PATCH: at 07:53

## 2019-03-29 RX ADMIN — ATORVASTATIN CALCIUM 40 MILLIGRAM(S): 80 TABLET, FILM COATED ORAL at 21:03

## 2019-03-29 RX ADMIN — Medication 20 MILLIGRAM(S): at 05:20

## 2019-03-29 RX ADMIN — BUDESONIDE AND FORMOTEROL FUMARATE DIHYDRATE 2 PUFF(S): 160; 4.5 AEROSOL RESPIRATORY (INHALATION) at 21:05

## 2019-03-29 RX ADMIN — Medication 1 PATCH: at 18:02

## 2019-03-29 RX ADMIN — LACTULOSE 15 GRAM(S): 10 SOLUTION ORAL at 21:04

## 2019-03-29 NOTE — PROGRESS NOTE ADULT - ASSESSMENT
85 yo male c hx of COPD, PE, DVT, current smoker s/p right pontine cva and now with functional deficits.   Precautions:    fall, aspiration                                                                              Diet: reg c thins    DVT Prophylaxis:        xarelto                                                                  Medical Prognosis: good    Prescreen Comparison: I have reviewed the prescreen information and I found no relevant changes between the preadmission  screening and my post admission evaluation.     #CVA   ASA and  statin, xarelto held as per GI plan. EGD today.   continue PT/OT/SLP  fall and aspiration precautions, MBS completed-diet to advance.     #smoker  Nicotine patch, smoking cessation education    #COPD  Cough c clear mucus still this am. Afebrile. Inhalers, steroids trial as per medicine      # history of multiple DVTs  -xarelto held by GI for EGD, repeat CBC, guaiac+. Clear liquids.     #anemia   HH this am, FOBS+, CT Abd neg findings. GI  and heme workup is  ongoing. Xarelto held by GI for EGD. S/p Venofer. Added b12 suppl.       #Abd pain  CT abd neg. Poor appetite, mild nausea fluctuating, GI in.       Expected Therapy:   P.T.   1     hrs/day           O. T.    1  hrs/day           S.L.P.  1      hrs/day                    P&O      eval                                             Excpected Frequency: 5 days/7 day period    Rehab Potential:            excellent                               Estimated Disposition:     home                     ELOS:   14           days      Rationale For Inpatient Rehab Admission- Patient demonstrates the following:     [X] Medically appropriate for rehabilitation admission   [X] Has attainable rehab goals with an approrpriate discharge plan  [X] Has rehabilitation potential (expected to make significant improvement within a reasonable period of time)  [X] Requires close medical management by a rehab physician, rehab nursing care and comprehensive interdisciplinary team (including         PT, OT, SLP and/or prosthetics and orthotics) 87 yo male c hx of COPD, PE, DVT, current smoker s/p right pontine cva and now with functional deficits.   Precautions:    fall, aspiration                                                                              Diet: reg c thins    DVT Prophylaxis:        xarelto                                                                  Medical Prognosis: good    Prescreen Comparison: I have reviewed the prescreen information and I found no relevant changes between the preadmission  screening and my post admission evaluation.     #CVA   ASA and  statin,   EGD today to r/out GI bleed  continue PT/OT/SLP  fall and aspiration precautions, MBS completed-diet to advance.     #smoker  Nicotine patch, smoking cessation education    #COPD  Cough c clear mucus still this am. Afebrile. Inhalers, steroids trial as per medicine      # history of multiple DVTs  -xarelto held by GI for EGD, repeat CBC, guaiac+. Clear liquids.     #anemia   HH this am, FOBS+, CT Abd neg findings. GI  and heme workup is  ongoing. Xarelto held by GI for EGD. S/p Venofer. Added b12 suppl.       #Abd pain  CT abd neg. Poor appetite, mild nausea fluctuating, GI in.       Expected Therapy:   P.T.   1     hrs/day           O. T.    1  hrs/day           S.L.P.  1      hrs/day                    P&O      eval                                             Excpected Frequency: 5 days/7 day period    Rehab Potential:            excellent                               Estimated Disposition:     home                     ELOS:   14           days      Rationale For Inpatient Rehab Admission- Patient demonstrates the following:     [X] Medically appropriate for rehabilitation admission   [X] Has attainable rehab goals with an approrpriate discharge plan  [X] Has rehabilitation potential (expected to make significant improvement within a reasonable period of time)  [X] Requires close medical management by a rehab physician, rehab nursing care and comprehensive interdisciplinary team (including         PT, OT, SLP and/or prosthetics and orthotics)

## 2019-03-29 NOTE — PROGRESS NOTE ADULT - ASSESSMENT
87 yo male c hx of COPD, PE, DVT, current smoker s/p right pontine cva and now with functional deficits.                                                                 #CVA  c/w ASA and  statin  c/w  PT/OT/SLP    #Smoker  c/w Nicotine patch    #COPD Acute exacerbation - improved.   c/w  prednisone  O2 with NC as needed   c/w  Duoneb   CXR WNL     #BPH   c/w Tamsulosin     #Abdominal pain - resolved   CT abdomen WNL     #Anemia Microcytic , ?LGIB -   FOBT +  H/H stable.   Transfuse for Hb < 7   UGI endo  and Colonoscopy today   GI & hematology  consult appreciated.   Monitor H/H     #H/O multiple DVTs  c/w Xarelto- hold for endoscopy

## 2019-03-29 NOTE — PROGRESS NOTE ADULT - SUBJECTIVE AND OBJECTIVE BOX
Patient is a 86y old  Male who presents with a chief complaint of s/p Right pontine CVA c functional deficits (28 Mar 2019 14:57)      Patient seen and examined at bedside.     ALLERGIES:  No Known Allergies    MEDICATIONS:  ALBUTerol/ipratropium for Nebulization 3 milliLiter(s) Nebulizer every 6 hours PRN  Biotene Dry Mouth Oral Rinse 5 milliLiter(s) Swish and Spit two times a day  cyanocobalamin 1000 MICROGram(s) Oral daily  docusate sodium 100 milliGRAM(s) Oral daily  lactulose Syrup 15 Gram(s) Oral at bedtime  melatonin 3 milliGRAM(s) Oral at bedtime PRN  ondansetron    Tablet 4 milliGRAM(s) Oral every 6 hours PRN  polyethylene glycol/electrolyte Solution 1000 milliLiter(s) Oral <User Schedule>  polyethylene glycol/electrolyte Solution 1000 milliLiter(s) Oral <User Schedule>  sodium chloride 0.9%. 1000 milliLiter(s) IV Continuous <Continuous>    Vital Signs Last 24 Hrs  T(F): 97.4 (29 Mar 2019 08:22), Max: 98 (28 Mar 2019 20:53)  HR: 67 (29 Mar 2019 08:23) (66 - 98)  BP: 123/75 (29 Mar 2019 08:22) (123/75 - 162/69)  RR: 15 (29 Mar 2019 08:22) (15 - 24)  SpO2: 95% (29 Mar 2019 08:23) (95% - 100%)  I&O's Summary      PHYSICAL EXAM:  General: NAD, comfortable   ENT: MMM  Neck: Supple, No JVD  Lungs: CTA, BLAE, No added sounds.   Cardio: RRR, S1/S2, No murmurs  Abdomen: Soft, NT/ND, BS+   Extremities: No edema  CNS: no new deficits     LABS:                        8.8    7.4   )-----------( 430      ( 29 Mar 2019 05:20 )             29.3     03-29    145  |  109  |  46  ----------------------------<  86  4.4   |  25  |  1.22    Ca    7.9      29 Mar 2019 05:20    TPro  6.1  /  Alb  2.6  /  TBili  0.6  /  DBili  x   /  AST  19  /  ALT  18  /  AlkPhos  81  03-29    eGFR if Non African American: 53 mL/min/1.73M2 (03-29-19 @ 05:20)  eGFR if : 62 mL/min/1.73M2 (03-29-19 @ 05:20)            03-20 Chol 113 mg/dL LDL 57 mg/dL HDL 39 mg/dL Trig 84 mg/dL        CAPILLARY BLOOD GLUCOSE        03-19 [id number] 4.9          RADIOLOGY & ADDITIONAL TESTS:    Care Discussed with Consultants/Other Providers:

## 2019-03-29 NOTE — PROGRESS NOTE ADULT - SUBJECTIVE AND OBJECTIVE BOX
ELIZABETH MCLAUGHLIN   86y   Male    Admitting: TD Humphries  HPI:  86-year-old gentleman with history of venous thromboembolic disease, COPD and anemia who presented with left-sided numbness/facial droop/dysarthria. 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 Patient is receiving aspirin for stroke prevention and XARELTO for history of venous thromboembolic disease. He is currently on the acute rehabilitation unit in [facility].    PAST MEDICAL & SURGICAL HISTORY:  Deep vein thrombosis (DVT)  HTN (hypertension)  Dyslipidemia  CAD (coronary artery disease)  Peripheral vascular disease  Parkinson disease  No significant past surgical history    HEALTH ISSUES - PROBLEM Dx:  Anemia, unspecified type: Anemia, unspecified type  Nausea: Nausea  Constipation: Constipation  GI bleed: GI bleed    MEDICATIONS  (STANDING):  aspirin  chewable 81 milliGRAM(s) Oral daily  atorvastatin 40 milliGRAM(s) Oral at bedtime  Biotene Dry Mouth Oral Rinse 5 milliLiter(s) Swish and Spit two times a day  buDESOnide 160 MICROgram(s)/formoterol 4.5 MICROgram(s) Inhaler 2 Puff(s) Inhalation two times a day  cyanocobalamin 1000 MICROGram(s) Oral daily  docusate sodium 100 milliGRAM(s) Oral daily  gabapentin 300 milliGRAM(s) Oral at bedtime  lactulose Syrup 15 Gram(s) Oral at bedtime  nicotine -   7 mG/24Hr(s) Patch 1 patch Transdermal daily  pantoprazole    Tablet 40 milliGRAM(s) Oral before breakfast  polyethylene glycol/electrolyte Solution 1000 milliLiter(s) Oral <User Schedule>  polyethylene glycol/electrolyte Solution 1000 milliLiter(s) Oral <User Schedule>  sodium chloride 0.9%. 1000 milliLiter(s) (50 mL/Hr) IV Continuous <Continuous>  tamsulosin 0.4 milliGRAM(s) Oral at bedtime    MEDICATIONS  (PRN):  ALBUTerol/ipratropium for Nebulization 3 milliLiter(s) Nebulizer every 6 hours PRN Shortness of Breath and/or Wheezing  melatonin 3 milliGRAM(s) Oral at bedtime PRN Insomnia  ondansetron    Tablet 4 milliGRAM(s) Oral every 6 hours PRN Nausea    Allergies    No Known Allergies    INTERVAL HPI/OVERNIGHT EVENTS:  Patient S&E at bedside. No c/o CP or SOB. Wife and daughter Ella at bedside.     VITAL SIGNS:  T(F): 98.6 (03-29-19 @ 13:07)  HR: 67 (03-29-19 @ 08:23)  BP: 110/67 (03-29-19 @ 13:07)  RR: 15 (03-29-19 @ 13:07)  SpO2: 100% (03-29-19 @ 13:07)    PHYSICAL EXAM:  Constitutional: NAD  Eyes: sclera non-icteric  Neck: no JVD  Respiratory: CTA b/l, good air entry b/l ant.  Cardiovascular: RRR, no M/R/G  Gastrointestinal: soft, NTND, no masses palpable, no hepatosplenomegaly appreciated  Extremities: no calf tenderness  Neurological: Awake, alert.    Labs:             8.8    7.4   )-----------( 430      ( 03-29 @ 05:20 )             29.3                9.3    9.9   )-----------( 488      ( 03-28 @ 09:28 )             31.0                8.3    10.1  )-----------( 546      ( 03-27 @ 05:26 )             26.6       03-29    145  |  109<H>  |  46<H>  ----------------------------<  86  4.4   |  25  |  1.22    Ferritin, Serum: 29 ng/mL (03-27-19 @ 05:26)  Iron - Total Binding Capacity.: 365 ug/dL (03-27-19 @ 05:26)  % Saturation, Iron: 5 % (03-27-19 @ 05:26)  Iron Total, Serum: 20 ug/dL (03-27-19 @ 05:26)  Absolute Reticulocytes: 61.0 K/uL (03-27-19 @ 05:26)  Haptoglobin, Serum: 151 mg/dL (03-27-19 @ 05:26)  Folate, Serum: 17.5 ng/mL (03-27-19 @ 05:26)  Vitamin B12, Serum: 369 pg/mL (03-27-19 @ 05:26)

## 2019-03-29 NOTE — PROGRESS NOTE ADULT - SUBJECTIVE AND OBJECTIVE BOX
CHIEF COMPLAINT: Nausea this am and overnight. Vomited x1.       HISTORY OF PRESENT ILLNESS  85 yo male c PMH of PE, DVT, COPD, (current smoker), alcohol use, and anemia presents with acute onset of left sided numbness, facial droop and dysarthria on 3/ . CT head on arrival was negative. 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 Patient states that numbness has subsided but his speech deficits persist and his balance is notably worse. At [place] evaluated by cardiology. TTE revealed no thrombus, EF 65%. Started with ASA for small vessel disease and stroke prevention  and Xarelto was restarted for  Hx of multiple DVTs. Patient stopped Xarelto one week prior  to admission. Stable to d/c to rehab on 3/22/19.   Of note, seen by neurology Dr Nye in 2017 for b/l leg weakness. MRI brain and spine was performed showing possible cervical myelopathy. (22 Mar 2019 14:54)      PAST MEDICAL & SURGICAL HISTORY:  Deep vein thrombosis (DVT)  HTN (hypertension)  Dyslipidemia  CAD (coronary artery disease)  Peripheral vascular disease  Parkinson disease  No significant past surgical history       REVIEW OF SYMPTOMS  [X] Constitutional WNL     [X] Cardio WNL                        [X]  WNL   [X] Endo WNL                     [X] Skin WNL                 [X] MSK WNL                         [X] Cognitive WNL        [X] Psych WNL      VITALS  Vital Signs Last 24 Hrs  T(C): 36.3 (29 Mar 2019 08:22), Max: 36.7 (28 Mar 2019 20:53)  T(F): 97.4 (29 Mar 2019 08:22), Max: 98 (28 Mar 2019 20:53)  HR: 67 (29 Mar 2019 08:23) (66 - 98)  BP: 123/75 (29 Mar 2019 08:22) (123/75 - 162/69)  BP(mean): --  RR: 15 (29 Mar 2019 08:22) (15 - 24)  SpO2: 95% (29 Mar 2019 08:23) (95% - 100%)      PHYSICAL EXAM  Constitutional - [place]  HEENT - NCAT, EOMI  Neck - Supple, No limited ROM  Chest - No wheeze,+ rhonchi, No crackles  Cardiovascular - RRR, S1S2, No murmurs  Abdomen - BS+, Soft, NTND  Extremities - No C/C/E, No calf tenderness   Skin-no rash    PHYSICAL EXAM  Constitutional - cough  HEENT - NCAT, EOMI  Neck - Supple, No limited ROM  Chest - + wheeze, No rhonchi, No crackles, cough-productive  Cardiovascular - RRR, S1S2, No murmurs  Abdomen - BS+, Soft, NTND  Extremities - No C/C/E, No calf tenderness   Skin-no rash  Wounds-na      Neurologic Exam - no new deficits                     Balance - impaired     Psychiatric - Mood stable, Affect WNL       FUNCTIONAL PROGRESS  Gait - 100ft min A c RW  ADLs - CG  Transfers -CG/min A  Functional transfer - CG/min A      RECENT LABS                        8.8    7.4   )-----------( 430      ( 29 Mar 2019 05:20 )             29.3     03-29    145  |  109<H>  |  46<H>  ----------------------------<  86  4.4   |  25  |  1.22    Ca    7.9<L>      29 Mar 2019 05:20    TPro  6.1  /  Alb  2.6<L>  /  TBili  0.6  /  DBili  x   /  AST  19  /  ALT  18  /  AlkPhos  81  03-29      LIVER FUNCTIONS - ( 29 Mar 2019 05:20 )  Alb: 2.6 g/dL / Pro: 6.1 g/dL / ALK PHOS: 81 U/L / ALT: 18 U/L DA / AST: 19 U/L / GGT: x             Direct LDL: 57 mg/dL (03-20-19 @ 07:10)    Hemoglobin A1C, Whole Blood: 4.9 % (03-19-19 @ 19:20)  Hemoglobin A1C, Whole Blood: 4.9 % (03-19-19 @ 19:20)              RADIOLOGY/OTHER RESULTS      CURRENT MEDICATIONS  MEDICATIONS  (STANDING):  aspirin  chewable 81 milliGRAM(s) Oral daily  atorvastatin 40 milliGRAM(s) Oral at bedtime  Biotene Dry Mouth Oral Rinse 5 milliLiter(s) Swish and Spit two times a day  buDESOnide 160 MICROgram(s)/formoterol 4.5 MICROgram(s) Inhaler 2 Puff(s) Inhalation two times a day  cyanocobalamin 1000 MICROGram(s) Oral daily  docusate sodium 100 milliGRAM(s) Oral daily  gabapentin 300 milliGRAM(s) Oral at bedtime  lactulose Syrup 15 Gram(s) Oral at bedtime  nicotine -   7 mG/24Hr(s) Patch 1 patch Transdermal daily  pantoprazole    Tablet 40 milliGRAM(s) Oral before breakfast  polyethylene glycol/electrolyte Solution 1000 milliLiter(s) Oral <User Schedule>  polyethylene glycol/electrolyte Solution 1000 milliLiter(s) Oral <User Schedule>  sodium chloride 0.9%. 1000 milliLiter(s) (50 mL/Hr) IV Continuous <Continuous>  tamsulosin 0.4 milliGRAM(s) Oral at bedtime    MEDICATIONS  (PRN):  ALBUTerol/ipratropium for Nebulization 3 milliLiter(s) Nebulizer every 6 hours PRN Shortness of Breath and/or Wheezing  melatonin 3 milliGRAM(s) Oral at bedtime PRN Insomnia  ondansetron    Tablet 4 milliGRAM(s) Oral every 6 hours PRN Nausea      ASSESSMENT & PLAN      GI/Bowel Management - for egd today   Management - Toilet Q2  Skin - Turn Q2  Pain - Tylenol PRN  DVT PPX - Xarelto held for egd  Diet - dysphagia c strict aspiration, MBS       Continue comprehensive acute rehab program consisting of 3hrs/day of OT/PT and SLP. CHIEF COMPLAINT: Nausea this am and overnight. Vomited x1 during endoscopy prep. No acute distress now, stable without new complaints      HISTORY OF PRESENT ILLNESS  85 yo male c PMH of PE, DVT, COPD, (current smoker), alcohol use, and anemia presents with acute onset of left sided numbness, facial droop and dysarthria on 3/ . CT head on arrival was negative. 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 Patient states that numbness has subsided but his speech deficits persist and his balance is notably worse. At [place] evaluated by cardiology. TTE revealed no thrombus, EF 65%. Started with ASA for small vessel disease and stroke prevention  and Xarelto was restarted for  Hx of multiple DVTs. Patient stopped Xarelto one week prior  to admission. Stable to d/c to rehab on 3/22/19.   Of note, seen by neurology Dr Nye in 2017 for b/l leg weakness. MRI brain and spine was performed showing possible cervical myelopathy. (22 Mar 2019 14:54)      PAST MEDICAL & SURGICAL HISTORY:  Deep vein thrombosis (DVT)  HTN (hypertension)  Dyslipidemia  CAD (coronary artery disease)  Peripheral vascular disease  Parkinson disease  No significant past surgical history       REVIEW OF SYMPTOMS  [X] Constitutional WNL     [X] Cardio WNL                        [X]  WNL   [X] Endo WNL                     [X] Skin WNL                 [X] MSK WNL                         [X] Cognitive WNL        [X] Psych WNL      VITALS  Vital Signs Last 24 Hrs  T(C): 36.3 (29 Mar 2019 08:22), Max: 36.7 (28 Mar 2019 20:53)  T(F): 97.4 (29 Mar 2019 08:22), Max: 98 (28 Mar 2019 20:53)  HR: 67 (29 Mar 2019 08:23) (66 - 98)  BP: 123/75 (29 Mar 2019 08:22) (123/75 - 162/69)  BP(mean): --  RR: 15 (29 Mar 2019 08:22) (15 - 24)  SpO2: 95% (29 Mar 2019 08:23) (95% - 100%)      PHYSICAL EXAM  Constitutional - [place]  HEENT - NCAT, EOMI  Neck - Supple, No limited ROM  Chest - No wheeze,+ rhonchi, No crackles  Cardiovascular - RRR, S1S2, No murmurs  Abdomen - BS+, Soft, NTND  Extremities - No C/C/E, No calf tenderness   Skin-no rash    PHYSICAL EXAM  Constitutional - cough  HEENT - NCAT, EOMI  Neck - Supple, No limited ROM  Chest - + wheeze, No rhonchi, No crackles, cough-productive  Cardiovascular - RRR, S1S2, No murmurs  Abdomen - BS+, Soft, NTND  Extremities - No C/C/E, No calf tenderness   Skin-no rash  Wounds-na      Neurologic Exam - no new deficits                     Balance - impaired     Psychiatric - Mood stable, Affect WNL       FUNCTIONAL PROGRESS  Gait - 100ft min A c RW  ADLs - CG  Transfers -CG/min A  Functional transfer - CG/min A      RECENT LABS                        8.8    7.4   )-----------( 430      ( 29 Mar 2019 05:20 )             29.3     03-29    145  |  109<H>  |  46<H>  ----------------------------<  86  4.4   |  25  |  1.22    Ca    7.9<L>      29 Mar 2019 05:20    TPro  6.1  /  Alb  2.6<L>  /  TBili  0.6  /  DBili  x   /  AST  19  /  ALT  18  /  AlkPhos  81  03-29      LIVER FUNCTIONS - ( 29 Mar 2019 05:20 )  Alb: 2.6 g/dL / Pro: 6.1 g/dL / ALK PHOS: 81 U/L / ALT: 18 U/L DA / AST: 19 U/L / GGT: x             Direct LDL: 57 mg/dL (03-20-19 @ 07:10)    Hemoglobin A1C, Whole Blood: 4.9 % (03-19-19 @ 19:20)  Hemoglobin A1C, Whole Blood: 4.9 % (03-19-19 @ 19:20)              RADIOLOGY/OTHER RESULTS      CURRENT MEDICATIONS  MEDICATIONS  (STANDING):  aspirin  chewable 81 milliGRAM(s) Oral daily  atorvastatin 40 milliGRAM(s) Oral at bedtime  Biotene Dry Mouth Oral Rinse 5 milliLiter(s) Swish and Spit two times a day  buDESOnide 160 MICROgram(s)/formoterol 4.5 MICROgram(s) Inhaler 2 Puff(s) Inhalation two times a day  cyanocobalamin 1000 MICROGram(s) Oral daily  docusate sodium 100 milliGRAM(s) Oral daily  gabapentin 300 milliGRAM(s) Oral at bedtime  lactulose Syrup 15 Gram(s) Oral at bedtime  nicotine -   7 mG/24Hr(s) Patch 1 patch Transdermal daily  pantoprazole    Tablet 40 milliGRAM(s) Oral before breakfast  polyethylene glycol/electrolyte Solution 1000 milliLiter(s) Oral <User Schedule>  polyethylene glycol/electrolyte Solution 1000 milliLiter(s) Oral <User Schedule>  sodium chloride 0.9%. 1000 milliLiter(s) (50 mL/Hr) IV Continuous <Continuous>  tamsulosin 0.4 milliGRAM(s) Oral at bedtime    MEDICATIONS  (PRN):  ALBUTerol/ipratropium for Nebulization 3 milliLiter(s) Nebulizer every 6 hours PRN Shortness of Breath and/or Wheezing  melatonin 3 milliGRAM(s) Oral at bedtime PRN Insomnia  ondansetron    Tablet 4 milliGRAM(s) Oral every 6 hours PRN Nausea      ASSESSMENT & PLAN      GI/Bowel Management - for egd today   Management - Toilet Q2  Skin - Turn Q2  Pain - Tylenol PRN  DVT PPX - Xarelto held for egd  Diet - dysphagia c strict aspiration, MBS       Continue comprehensive acute rehab program consisting of 3hrs/day of OT/PT and SLP.

## 2019-03-29 NOTE — PROGRESS NOTE ADULT - ASSESSMENT
86-year-old gentleman with history of venous thromboembolic disease, COPD and anemia who presented with left-sided numbness/facial droop/dysarthria. 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 Patient is receiving aspirin for stroke prevention and XARELTO for history of venous thromboembolic disease. He is currently on the acute rehabilitation unit in [facility].

## 2019-03-30 LAB
HCT VFR BLD CALC: 29.7 % — LOW (ref 39–50)
HGB BLD-MCNC: 8.7 G/DL — LOW (ref 13–17)
MCHC RBC-ENTMCNC: 25 PG — LOW (ref 27–34)
MCHC RBC-ENTMCNC: 29.3 GM/DL — LOW (ref 32–36)
MCV RBC AUTO: 85.4 FL — SIGNIFICANT CHANGE UP (ref 80–100)
PLATELET # BLD AUTO: 462 K/UL — HIGH (ref 150–400)
RBC # BLD: 3.48 M/UL — LOW (ref 4.2–5.8)
RBC # FLD: 18.2 % — HIGH (ref 10.3–14.5)
WBC # BLD: 9.8 K/UL — SIGNIFICANT CHANGE UP (ref 3.8–10.5)
WBC # FLD AUTO: 9.8 K/UL — SIGNIFICANT CHANGE UP (ref 3.8–10.5)

## 2019-03-30 PROCEDURE — 99232 SBSQ HOSP IP/OBS MODERATE 35: CPT

## 2019-03-30 RX ADMIN — GABAPENTIN 300 MILLIGRAM(S): 400 CAPSULE ORAL at 21:25

## 2019-03-30 RX ADMIN — Medication 1 PATCH: at 12:02

## 2019-03-30 RX ADMIN — BUDESONIDE AND FORMOTEROL FUMARATE DIHYDRATE 2 PUFF(S): 160; 4.5 AEROSOL RESPIRATORY (INHALATION) at 21:28

## 2019-03-30 RX ADMIN — Medication 100 MILLIGRAM(S): at 12:02

## 2019-03-30 RX ADMIN — PREGABALIN 1000 MICROGRAM(S): 225 CAPSULE ORAL at 12:02

## 2019-03-30 RX ADMIN — LACTULOSE 15 GRAM(S): 10 SOLUTION ORAL at 21:25

## 2019-03-30 RX ADMIN — Medication 1 PATCH: at 18:31

## 2019-03-30 RX ADMIN — TAMSULOSIN HYDROCHLORIDE 0.4 MILLIGRAM(S): 0.4 CAPSULE ORAL at 21:25

## 2019-03-30 RX ADMIN — Medication 5 MILLILITER(S): at 17:58

## 2019-03-30 RX ADMIN — Medication 5 MILLILITER(S): at 05:43

## 2019-03-30 RX ADMIN — Medication 1 PATCH: at 12:03

## 2019-03-30 RX ADMIN — Medication 1 PATCH: at 06:26

## 2019-03-30 RX ADMIN — PANTOPRAZOLE SODIUM 40 MILLIGRAM(S): 20 TABLET, DELAYED RELEASE ORAL at 05:44

## 2019-03-30 RX ADMIN — ATORVASTATIN CALCIUM 40 MILLIGRAM(S): 80 TABLET, FILM COATED ORAL at 21:25

## 2019-03-30 RX ADMIN — BUDESONIDE AND FORMOTEROL FUMARATE DIHYDRATE 2 PUFF(S): 160; 4.5 AEROSOL RESPIRATORY (INHALATION) at 09:11

## 2019-03-30 RX ADMIN — RIVAROXABAN 20 MILLIGRAM(S): KIT at 17:55

## 2019-03-30 RX ADMIN — Medication 81 MILLIGRAM(S): at 12:02

## 2019-03-30 NOTE — PROGRESS NOTE ADULT - SUBJECTIVE AND OBJECTIVE BOX
CC: Gait dysfunction    Subjective: Patient seen marisol valuated at the bedside. GI and hematology appreciated.  Pain controlled, no chest pain, no nausea, vomitting, no fever, chills.   No acute events overnight  Has been tolerating rehabilitation program.    ALBUTerol/ipratropium for Nebulization 3 milliLiter(s) Nebulizer every 6 hours PRN  aspirin  chewable 81 milliGRAM(s) Oral daily  atorvastatin 40 milliGRAM(s) Oral at bedtime  Biotene Dry Mouth Oral Rinse 5 milliLiter(s) Swish and Spit two times a day  buDESOnide 160 MICROgram(s)/formoterol 4.5 MICROgram(s) Inhaler 2 Puff(s) Inhalation two times a day  cyanocobalamin 1000 MICROGram(s) Oral daily  docusate sodium 100 milliGRAM(s) Oral daily  gabapentin 300 milliGRAM(s) Oral at bedtime  lactulose Syrup 15 Gram(s) Oral at bedtime  melatonin 3 milliGRAM(s) Oral at bedtime PRN  nicotine -   7 mG/24Hr(s) Patch 1 patch Transdermal daily  ondansetron    Tablet 4 milliGRAM(s) Oral every 6 hours PRN  pantoprazole    Tablet 40 milliGRAM(s) Oral before breakfast  rivaroxaban 20 milliGRAM(s) Oral every 24 hours  tamsulosin 0.4 milliGRAM(s) Oral at bedtime      T(C): 36.5 (03-30-19 @ 07:57), Max: 37.2 (03-29-19 @ 16:20)  HR: 76 (03-30-19 @ 09:20) (59 - 77)  BP: 120/64 (03-30-19 @ 07:57) (110/67 - 124/58)  RR: 16 (03-30-19 @ 07:57) (14 - 16)  SpO2: 97% (03-30-19 @ 09:20) (94% - 100%)    Exam:  NAD  HEENT: EOMI  Heart: RRR, S1/2  Lungs: +wheezes, congestion  cough - productive  Abd: soft ND  Ext: no calf pain  Neuro: exam unchanged    Impression:  Other cerebral infarction due to occlusion or stenosis of small artery  Cerebral infarction  Undefined  No pertinent family history in first degree relatives  Handoff  MEWS Score  Deep vein thrombosis (DVT)  HTN (hypertension)  Dyslipidemia  CAD (coronary artery disease)  Peripheral vascular disease  Parkinson disease  Anemia, unspecified type  Nausea  Constipation  GI bleed  No significant past surgical history      ELIZABETH MCLAUGHLIN 85 yo male c hx of COPD, PE, DVT, current smoker s/p right pontine cva and now with functional deficits.     Plan:  - continue medical management as per primary team  - continue PT/OT/SLP  - DVT prophylaxis - Xarelto (hx of DVT)  follow up heme, GI  - CVS stable  - Patient is stable to continue current rehabilitation program

## 2019-03-30 NOTE — PROVIDER CONTACT NOTE (OTHER) - RECOMMENDATIONS
Dr. Mckeon called to speak with  pt as per pt request.
Place back patient in bed and do an EKG.
o2 2L, duoneb?

## 2019-03-30 NOTE — PROGRESS NOTE ADULT - SUBJECTIVE AND OBJECTIVE BOX
Patient is a 86y old  Male who presents with a chief complaint of s/p Right pontine CVA c functional deficits (30 Mar 2019 10:22)    Patient seen and examined at bedside.           MEDICATIONS  (STANDING):  aspirin  chewable 81 milliGRAM(s) Oral daily  atorvastatin 40 milliGRAM(s) Oral at bedtime  Biotene Dry Mouth Oral Rinse 5 milliLiter(s) Swish and Spit two times a day  buDESOnide 160 MICROgram(s)/formoterol 4.5 MICROgram(s) Inhaler 2 Puff(s) Inhalation two times a day  cyanocobalamin 1000 MICROGram(s) Oral daily  docusate sodium 100 milliGRAM(s) Oral daily  gabapentin 300 milliGRAM(s) Oral at bedtime  lactulose Syrup 15 Gram(s) Oral at bedtime  nicotine -   7 mG/24Hr(s) Patch 1 patch Transdermal daily  pantoprazole    Tablet 40 milliGRAM(s) Oral before breakfast  rivaroxaban 20 milliGRAM(s) Oral every 24 hours  tamsulosin 0.4 milliGRAM(s) Oral at bedtime    MEDICATIONS  (PRN):  ALBUTerol/ipratropium for Nebulization 3 milliLiter(s) Nebulizer every 6 hours PRN Shortness of Breath and/or Wheezing  melatonin 3 milliGRAM(s) Oral at bedtime PRN Insomnia  ondansetron    Tablet 4 milliGRAM(s) Oral every 6 hours PRN Nausea      REVIEW OF SYSTEMS:  CONSTITUTIONAL: No fever, weight loss, or fatigue  EYES: No eye pain, visual disturbances, or discharge  ENMT:  No difficulty hearing, tinnitus, vertigo; No sinus or throat pain  NECK: No pain or stiffness  RESPIRATORY: No cough, wheezing, chills or hemoptysis; No shortness of breath  CARDIOVASCULAR: No chest pain, palpitations, dizziness, or leg swelling  GASTROINTESTINAL: No abdominal or epigastric pain. No nausea, vomiting, or hematemesis; No diarrhea or constipation. No melena or hematochezia.  GENITOURINARY: No dysuria, frequency, hematuria, or incontinence  NEUROLOGICAL: No headaches, memory loss, loss of strength, numbness, or tremors  SKIN: No itching, burning, rashes, or lesions   ENDOCRINE: No heat or cold intolerance; No hair loss  MUSCULOSKELETAL: No joint pain or swelling; No muscle, back, or extremity pain  PSYCHIATRIC: No depression, anxiety, mood swings, or difficulty sleeping  HEME/LYMPH: No easy bruising, or bleeding gums  ALLERGY AND IMMUNOLOGIC: No hives or eczema    PHYSICAL EXAM:  T(C): 36.5 (03-30-19 @ 07:57), Max: 37.2 (03-29-19 @ 16:20)  HR: 76 (03-30-19 @ 09:20) (59 - 77)  BP: 120/64 (03-30-19 @ 07:57) (110/67 - 124/58)  RR: 16 (03-30-19 @ 07:57) (14 - 16)  SpO2: 97% (03-30-19 @ 09:20) (94% - 100%)        GENERAL: NAD, well-groomed, well-developed  HEAD:  Atraumatic, Normocephalic  EYES: EOMI, PERRL, conjunctiva and sclera clear  ENMT: Moist mucous membranes,   NECK: Supple, No JVD, Normal thyroid  NERVOUS SYSTEM:  Alert & Oriented X3, no new deficit  CHEST/LUNG: Clear to ascultation bilaterally; No rales, rhonchi, wheezing, or rubs  HEART: Regular rate and rhythm; No murmurs, rubs, or gallops  ABDOMEN: Soft, Nontender, Nondistended; Bowel sounds present  EXTREMITIES:  2+ Peripheral Pulses, No clubbing, cyanosis, or edema  SKIN: No rashes or lesions        LABS:                        8.7    9.8   )-----------( 462      ( 30 Mar 2019 05:30 )             29.7     03-29    145  |  109<H>  |  46<H>  ----------------------------<  86  4.4   |  25  |  1.22    Ca    7.9<L>      29 Mar 2019 05:20    TPro  6.1  /  Alb  2.6<L>  /  TBili  0.6  /  DBili  x   /  AST  19  /  ALT  18  /  AlkPhos  81  03-29      RADIOLOGY & ADDITIONAL TESTS:    Imaging Personally Reviewed:  [x] YES  [ ] NO    Consultant(s) Notes Reviewed:  [x] YES  [ ] NO    Care Discussed with Consultants/Other Providers [x] YES  [ ] NO Patient is a 86y old  Male who presents with a chief complaint of s/p Right pontine CVA c functional deficits (30 Mar 2019 10:22)    Patient seen and examined at bedside, denies any complaints.   No acute event overnight    MEDICATIONS  (STANDING):  aspirin  chewable 81 milliGRAM(s) Oral daily  atorvastatin 40 milliGRAM(s) Oral at bedtime  Biotene Dry Mouth Oral Rinse 5 milliLiter(s) Swish and Spit two times a day  buDESOnide 160 MICROgram(s)/formoterol 4.5 MICROgram(s) Inhaler 2 Puff(s) Inhalation two times a day  cyanocobalamin 1000 MICROGram(s) Oral daily  docusate sodium 100 milliGRAM(s) Oral daily  gabapentin 300 milliGRAM(s) Oral at bedtime  lactulose Syrup 15 Gram(s) Oral at bedtime  nicotine -   7 mG/24Hr(s) Patch 1 patch Transdermal daily  pantoprazole    Tablet 40 milliGRAM(s) Oral before breakfast  rivaroxaban 20 milliGRAM(s) Oral every 24 hours  tamsulosin 0.4 milliGRAM(s) Oral at bedtime    MEDICATIONS  (PRN):  ALBUTerol/ipratropium for Nebulization 3 milliLiter(s) Nebulizer every 6 hours PRN Shortness of Breath and/or Wheezing  melatonin 3 milliGRAM(s) Oral at bedtime PRN Insomnia  ondansetron    Tablet 4 milliGRAM(s) Oral every 6 hours PRN Nausea      REVIEW OF SYSTEMS:  CONSTITUTIONAL: No fever, weight loss, or fatigue  EYES: No eye pain, visual disturbances, or discharge  ENMT:  No difficulty hearing, tinnitus, vertigo; No sinus or throat pain  NECK: No pain or stiffness  RESPIRATORY: No cough, wheezing, chills or hemoptysis; No shortness of breath  CARDIOVASCULAR: No chest pain, palpitations, dizziness, or leg swelling  GASTROINTESTINAL: No abdominal or epigastric pain. No nausea, vomiting, or hematemesis; No diarrhea or constipation. No melena or hematochezia.  GENITOURINARY: No dysuria, frequency, hematuria, or incontinence  NEUROLOGICAL: No headaches, memory loss, loss of strength, numbness, or tremors  SKIN: No itching, burning, rashes, or lesions   ENDOCRINE: No heat or cold intolerance; No hair loss  MUSCULOSKELETAL: No joint pain or swelling; No muscle, back, or extremity pain  PSYCHIATRIC: No depression, anxiety, mood swings, or difficulty sleeping  HEME/LYMPH: No easy bruising, or bleeding gums  ALLERGY AND IMMUNOLOGIC: No hives or eczema    PHYSICAL EXAM:  T(C): 36.5 (03-30-19 @ 07:57), Max: 37.2 (03-29-19 @ 16:20)  HR: 76 (03-30-19 @ 09:20) (59 - 77)  BP: 120/64 (03-30-19 @ 07:57) (110/67 - 124/58)  RR: 16 (03-30-19 @ 07:57) (14 - 16)  SpO2: 97% (03-30-19 @ 09:20) (94% - 100%)        GENERAL: NAD, well-groomed, well-developed  HEAD:  Atraumatic, Normocephalic  EYES: EOMI, PERRL, conjunctiva and sclera clear  ENMT: Moist mucous membranes,   NECK: Supple, No JVD, Normal thyroid  NERVOUS SYSTEM:  Alert & Oriented X3, no new deficit  CHEST/LUNG: Clear to ascultation bilaterally; No rales, rhonchi, wheezing, or rubs  HEART: Regular rate and rhythm; No murmurs, rubs, or gallops  ABDOMEN: Soft, Nontender, Nondistended; Bowel sounds present  EXTREMITIES:  2+ Peripheral Pulses, No clubbing, cyanosis, or edema  SKIN: No rashes or lesions        LABS:                        8.7    9.8   )-----------( 462      ( 30 Mar 2019 05:30 )             29.7     03-29    145  |  109<H>  |  46<H>  ----------------------------<  86  4.4   |  25  |  1.22    Ca    7.9<L>      29 Mar 2019 05:20    TPro  6.1  /  Alb  2.6<L>  /  TBili  0.6  /  DBili  x   /  AST  19  /  ALT  18  /  AlkPhos  81  03-29      RADIOLOGY & ADDITIONAL TESTS:    Imaging Personally Reviewed:  [x] YES  [ ] NO    Consultant(s) Notes Reviewed:  [x] YES  [ ] NO    Care Discussed with Consultants/Other Providers [x] YES  [ ] NO Patient is a 86y old  Male who presents with a chief complaint of s/p Right pontine CVA c functional deficits (30 Mar 2019 10:22)    Patient seen and examined at bedside, denies any complaints, has dysarthria.   No acute event overnight    MEDICATIONS  (STANDING):  aspirin  chewable 81 milliGRAM(s) Oral daily  atorvastatin 40 milliGRAM(s) Oral at bedtime  Biotene Dry Mouth Oral Rinse 5 milliLiter(s) Swish and Spit two times a day  buDESOnide 160 MICROgram(s)/formoterol 4.5 MICROgram(s) Inhaler 2 Puff(s) Inhalation two times a day  cyanocobalamin 1000 MICROGram(s) Oral daily  docusate sodium 100 milliGRAM(s) Oral daily  gabapentin 300 milliGRAM(s) Oral at bedtime  lactulose Syrup 15 Gram(s) Oral at bedtime  nicotine -   7 mG/24Hr(s) Patch 1 patch Transdermal daily  pantoprazole    Tablet 40 milliGRAM(s) Oral before breakfast  rivaroxaban 20 milliGRAM(s) Oral every 24 hours  tamsulosin 0.4 milliGRAM(s) Oral at bedtime    MEDICATIONS  (PRN):  ALBUTerol/ipratropium for Nebulization 3 milliLiter(s) Nebulizer every 6 hours PRN Shortness of Breath and/or Wheezing  melatonin 3 milliGRAM(s) Oral at bedtime PRN Insomnia  ondansetron    Tablet 4 milliGRAM(s) Oral every 6 hours PRN Nausea      REVIEW OF SYSTEMS:  CONSTITUTIONAL: No fever, weight loss, or fatigue  EYES: No eye pain, visual disturbances, or discharge  ENMT:  No difficulty hearing, tinnitus, vertigo; No sinus or throat pain  NECK: No pain or stiffness  RESPIRATORY: No cough, wheezing, chills or hemoptysis; No shortness of breath  CARDIOVASCULAR: No chest pain, palpitations, dizziness, or leg swelling  GASTROINTESTINAL: No abdominal or epigastric pain. No nausea, vomiting, or hematemesis; No diarrhea or constipation. No melena or hematochezia.  GENITOURINARY: No dysuria, frequency, hematuria, or incontinence  NEUROLOGICAL: No headaches, memory loss, loss of strength, numbness, or tremors  SKIN: No itching, burning, rashes, or lesions   ENDOCRINE: No heat or cold intolerance; No hair loss  MUSCULOSKELETAL: No joint pain or swelling; No muscle, back, or extremity pain  PSYCHIATRIC: No depression, anxiety, mood swings, or difficulty sleeping  HEME/LYMPH: No easy bruising, or bleeding gums  ALLERGY AND IMMUNOLOGIC: No hives or eczema    PHYSICAL EXAM:  T(C): 36.5 (03-30-19 @ 07:57), Max: 37.2 (03-29-19 @ 16:20)  HR: 76 (03-30-19 @ 09:20) (59 - 77)  BP: 120/64 (03-30-19 @ 07:57) (110/67 - 124/58)  RR: 16 (03-30-19 @ 07:57) (14 - 16)  SpO2: 97% (03-30-19 @ 09:20) (94% - 100%)        GENERAL: NAD, well-groomed, well-developed  HEAD:  Atraumatic, Normocephalic  EYES: EOMI, PERRL, conjunctiva and sclera clear  ENMT: Moist mucous membranes,   NECK: Supple, No JVD, Normal thyroid  NERVOUS SYSTEM:  Alert & Oriented X3, dysarthria, no new deficit  CHEST/LUNG: Clear to ascultation bilaterally; No rales, rhonchi, wheezing, or rubs  HEART: Regular rate and rhythm; No murmurs, rubs, or gallops  ABDOMEN: Soft, Nontender, Nondistended; Bowel sounds present  EXTREMITIES:  2+ Peripheral Pulses, No clubbing, cyanosis, or edema  SKIN: No rash      LABS:                        8.7    9.8   )-----------( 462      ( 30 Mar 2019 05:30 )             29.7     03-29    145  |  109<H>  |  46<H>  ----------------------------<  86  4.4   |  25  |  1.22    Ca    7.9<L>      29 Mar 2019 05:20    TPro  6.1  /  Alb  2.6<L>  /  TBili  0.6  /  DBili  x   /  AST  19  /  ALT  18  /  AlkPhos  81  03-29      RADIOLOGY & ADDITIONAL TESTS:    Imaging Personally Reviewed:  [x] YES  [ ] NO    Consultant(s) Notes Reviewed:  [x] YES  [ ] NO    Care Discussed with Consultants/Other Providers [x] YES  [ ] NO

## 2019-03-30 NOTE — PROVIDER CONTACT NOTE (OTHER) - SITUATION
Daughter found giving pt thin liquids. Explained to family and pt that he is on Honey thick liquids and why at this time he needs thicker liquids. Pt requesting to speak with MD

## 2019-03-30 NOTE — PROGRESS NOTE ADULT - ASSESSMENT
87 yo male  with history of COPD, PE, DVT, current smoker s/p right pontine cva and now with functional deficits.                                                                 #CVA  c/w ASA and  statin  c/w  PT/OT/SLP    #Smoker  c/w Nicotine patch    #COPD Acute exacerbation - improved.   prednisone taper  O2 with NC as needed   c/w  Duoneb   CXR WNL     #BPH   c/w Tamsulosin     #Abdominal pain - resolved   CT abdomen WNL     #Anemia Microcytic , ?LGIB -   FOBT +  H/H stable.   Transfuse for Hb < 7   UGI endo  and Colonoscopy   GI & hematology  consult appreciated.   Monitor H/H     #H/O multiple DVTs  c/w Xarelto- was held for endoscopy   resume 87 yo male  with history of COPD, PE, DVT, current smoker s/p right pontine cva and now with functional deficits.                                                                 #CVA  c/w ASA and  statin  c/w  PT/OT/SLP    #Smoker  c/w Nicotine patch    #COPD Acute exacerbation - improved.   prednisone taper  O2 with NC as needed   c/w  Duoneb   CXR WNL     #BPH   c/w Tamsulosin     #Abdominal pain - resolved   CT abdomen WNL     #Anemia Microcytic , ?LGIB -   FOBT +  H/H stable.   Transfuse for Hb < 7   Plan was for UGI endo  and Colonoscopy   Colonoscopy was not performed due to poor prep  GI & hematology  consult appreciated.   Monitor H/H     #H/O multiple DVTs  c/w Xarelto- was held for endoscopy   resume

## 2019-03-31 PROCEDURE — 99232 SBSQ HOSP IP/OBS MODERATE 35: CPT

## 2019-03-31 RX ADMIN — BUDESONIDE AND FORMOTEROL FUMARATE DIHYDRATE 2 PUFF(S): 160; 4.5 AEROSOL RESPIRATORY (INHALATION) at 08:53

## 2019-03-31 RX ADMIN — ATORVASTATIN CALCIUM 40 MILLIGRAM(S): 80 TABLET, FILM COATED ORAL at 21:40

## 2019-03-31 RX ADMIN — Medication 1 PATCH: at 11:25

## 2019-03-31 RX ADMIN — PANTOPRAZOLE SODIUM 40 MILLIGRAM(S): 20 TABLET, DELAYED RELEASE ORAL at 05:09

## 2019-03-31 RX ADMIN — Medication 5 MILLILITER(S): at 05:09

## 2019-03-31 RX ADMIN — Medication 1 PATCH: at 11:29

## 2019-03-31 RX ADMIN — TAMSULOSIN HYDROCHLORIDE 0.4 MILLIGRAM(S): 0.4 CAPSULE ORAL at 21:40

## 2019-03-31 RX ADMIN — Medication 5 MILLILITER(S): at 17:46

## 2019-03-31 RX ADMIN — RIVAROXABAN 20 MILLIGRAM(S): KIT at 17:46

## 2019-03-31 RX ADMIN — BUDESONIDE AND FORMOTEROL FUMARATE DIHYDRATE 2 PUFF(S): 160; 4.5 AEROSOL RESPIRATORY (INHALATION) at 20:40

## 2019-03-31 RX ADMIN — Medication 100 MILLIGRAM(S): at 11:25

## 2019-03-31 RX ADMIN — Medication 1 PATCH: at 06:34

## 2019-03-31 RX ADMIN — Medication 1 PATCH: at 18:10

## 2019-03-31 RX ADMIN — Medication 81 MILLIGRAM(S): at 11:25

## 2019-03-31 RX ADMIN — PREGABALIN 1000 MICROGRAM(S): 225 CAPSULE ORAL at 11:25

## 2019-03-31 RX ADMIN — GABAPENTIN 300 MILLIGRAM(S): 400 CAPSULE ORAL at 21:40

## 2019-03-31 NOTE — PROGRESS NOTE ADULT - SUBJECTIVE AND OBJECTIVE BOX
CC: Gait dysfunction    Subjective: Patient seen marisol valuated at the bedside. GI and hematology appreciated.  no chest pain, no nausea, vomitting, no fever, chills.  Less congestive this morning  No acute events overnight  Has been tolerating rehabilitation program.    ALBUTerol/ipratropium for Nebulization 3 milliLiter(s) Nebulizer every 6 hours PRN  aspirin  chewable 81 milliGRAM(s) Oral daily  atorvastatin 40 milliGRAM(s) Oral at bedtime  Biotene Dry Mouth Oral Rinse 5 milliLiter(s) Swish and Spit two times a day  buDESOnide 160 MICROgram(s)/formoterol 4.5 MICROgram(s) Inhaler 2 Puff(s) Inhalation two times a day  cyanocobalamin 1000 MICROGram(s) Oral daily  docusate sodium 100 milliGRAM(s) Oral daily  gabapentin 300 milliGRAM(s) Oral at bedtime  lactulose Syrup 15 Gram(s) Oral at bedtime  melatonin 3 milliGRAM(s) Oral at bedtime PRN  nicotine -   7 mG/24Hr(s) Patch 1 patch Transdermal daily  ondansetron    Tablet 4 milliGRAM(s) Oral every 6 hours PRN  pantoprazole    Tablet 40 milliGRAM(s) Oral before breakfast  rivaroxaban 20 milliGRAM(s) Oral every 24 hours  tamsulosin 0.4 milliGRAM(s) Oral at bedtime    Vital Signs Last 24 Hrs  T(C): 36.3 (31 Mar 2019 07:50), Max: 36.4 (30 Mar 2019 21:42)  T(F): 97.3 (31 Mar 2019 07:50), Max: 97.6 (30 Mar 2019 21:42)  HR: 73 (31 Mar 2019 07:50) (67 - 77)  BP: 118/71 (31 Mar 2019 07:50) (118/71 - 144/75)  BP(mean): --  RR: 15 (31 Mar 2019 07:50) (15 - 16)  SpO2: 98% (31 Mar 2019 08:54) (96% - 98%)    Exam:  NAD  HEENT: EOMI  Heart: RRR, S1/2  Lungs: CTA bilaterally  cough - productive  Abd: soft ND  Ext: no calf pain  Neuro: exam unchanged    Impression:  Other cerebral infarction due to occlusion or stenosis of small artery  Cerebral infarction  Undefined  No pertinent family history in first degree relatives  Handoff  MEWS Score  Deep vein thrombosis (DVT)  HTN (hypertension)  Dyslipidemia  CAD (coronary artery disease)  Peripheral vascular disease  Parkinson disease  Anemia, unspecified type  Nausea  Constipation  GI bleed  No significant past surgical history      ELIZABETH MCLAUGHLIN 85 yo male c hx of COPD, PE, DVT, current smoker s/p right pontine cva and now with functional deficits.     Plan:  - continue medical management as per primary team  - continue PT/OT/SLP  - DVT prophylaxis - Xarelto (hx of DVT)  follow up heme, GI  - CVS stable  - Patient is stable to continue current rehabilitation program

## 2019-03-31 NOTE — PROGRESS NOTE ADULT - SUBJECTIVE AND OBJECTIVE BOX
Patient is a 86 year old male who presents with a chief complaint of s/p Right pontine CVA with functional deficits (31 Mar 2019 09:17)    Patient seen and examined at bedside, reports feels better today, denies any complaints, has dysarthria.   No acute event overnight      MEDICATIONS  (STANDING):  aspirin  chewable 81 milliGRAM(s) Oral daily  atorvastatin 40 milliGRAM(s) Oral at bedtime  Biotene Dry Mouth Oral Rinse 5 milliLiter(s) Swish and Spit two times a day  buDESOnide 160 MICROgram(s)/formoterol 4.5 MICROgram(s) Inhaler 2 Puff(s) Inhalation two times a day  cyanocobalamin 1000 MICROGram(s) Oral daily  docusate sodium 100 milliGRAM(s) Oral daily  gabapentin 300 milliGRAM(s) Oral at bedtime  lactulose Syrup 15 Gram(s) Oral at bedtime  nicotine -   7 mG/24Hr(s) Patch 1 patch Transdermal daily  pantoprazole    Tablet 40 milliGRAM(s) Oral before breakfast  rivaroxaban 20 milliGRAM(s) Oral every 24 hours  tamsulosin 0.4 milliGRAM(s) Oral at bedtime    MEDICATIONS  (PRN):  ALBUTerol/ipratropium for Nebulization 3 milliLiter(s) Nebulizer every 6 hours PRN Shortness of Breath and/or Wheezing  melatonin 3 milliGRAM(s) Oral at bedtime PRN Insomnia  ondansetron    Tablet 4 milliGRAM(s) Oral every 6 hours PRN Nausea      REVIEW OF SYSTEMS:  CONSTITUTIONAL: No fever, weight loss, or fatigue  EYES: No eye pain, visual disturbances, or discharge  ENMT:  No difficulty hearing, tinnitus, vertigo; No sinus or throat pain  NECK: No pain or stiffness  RESPIRATORY: No cough, wheezing, chills or hemoptysis; No shortness of breath  CARDIOVASCULAR: No chest pain, palpitations, dizziness, or leg swelling  GASTROINTESTINAL: No abdominal or epigastric pain. No nausea, vomiting, or hematemesis; No diarrhea or constipation. No melena or hematochezia.  GENITOURINARY: No dysuria, frequency, hematuria, or incontinence  NEUROLOGICAL: No headaches, memory loss, loss of strength, numbness, or tremors  SKIN: No itching, burning, rashes, or lesions   ENDOCRINE: No heat or cold intolerance; No hair loss  MUSCULOSKELETAL: No joint pain or swelling; No muscle, back, or extremity pain  PSYCHIATRIC: No depression, anxiety, mood swings, or difficulty sleeping  HEME/LYMPH: No easy bruising, or bleeding gums  ALLERGY AND IMMUNOLOGIC: No hives or eczema        PHYSICAL EXAM:  T(C): 36.3 (03-31-19 @ 07:50), Max: 36.4 (03-30-19 @ 21:42)  HR: 73 (03-31-19 @ 07:50) (67 - 77)  BP: 118/71 (03-31-19 @ 07:50) (118/71 - 144/75)  RR: 15 (03-31-19 @ 07:50) (15 - 16)  SpO2: 98% (03-31-19 @ 08:54) (96% - 98%)    GENERAL: NAD, well-groomed, well-developed  HEAD:  Atraumatic, Normocephalic  EYES: EOMI, PERRL, conjunctiva and sclera clear  ENMT: Moist mucous membranes,   NECK: Supple, No JVD, Normal thyroid  NERVOUS SYSTEM:  Alert & Oriented X3, dysarthria, no new deficit  CHEST/LUNG: Clear to ascultation bilaterally; No rales, rhonchi, wheezing, or rubs  HEART: Regular rate and rhythm; No murmurs, rubs, or gallops  ABDOMEN: Soft, Nontender, Nondistended; Bowel sounds present  EXTREMITIES:  2+ Peripheral Pulses, No clubbing, cyanosis, or edema  SKIN: No rash      LABS:                        8.7    9.8   )-----------( 462      ( 30 Mar 2019 05:30 )             29.7         RADIOLOGY & ADDITIONAL TESTS:    Imaging Personally Reviewed:  [x] YES  [ ] NO    Consultant(s) Notes Reviewed:  [x] YES  [ ] NO    Care Discussed with Consultants/Other Providers [x] YES  [ ] NO

## 2019-03-31 NOTE — PROGRESS NOTE ADULT - ASSESSMENT
85 yo male  with history of COPD, PE, DVT, current smoker s/p right pontine cva and now with functional deficits.                                                                 #CVA  c/w ASA and  statin  c/w  PT/OT/rehab/ SLP    #Smoker  c/w Nicotine patch    #COPD Acute exacerbation - improved.   prednisone taper  O2 with NC as needed   c/w  Duoneb   CXR WNL     #Vitamin B12 deficiency  - supplement Vit B12    #BPH   c/w Tamsulosin     #Abdominal pain - resolved   CT abdomen WNL     #Anemia Microcytic , ?LGIB -   FOBT +  H/H stable.   Transfuse for Hb < 7   Plan was for UGI endo  and Colonoscopy   Colonoscopy was not performed due to poor prep  GI & hematology  consult appreciated.   Monitor H/H     #H/O multiple DVTs  c/w Xarelto- was held for endoscopy   resume

## 2019-04-01 ENCOUNTER — RESULT REVIEW (OUTPATIENT)
Age: 84
End: 2019-04-01

## 2019-04-01 LAB
ALBUMIN SERPL ELPH-MCNC: 2.4 G/DL — LOW (ref 3.3–5)
ALP SERPL-CCNC: 83 U/L — SIGNIFICANT CHANGE UP (ref 40–120)
ALT FLD-CCNC: 13 U/L DA — SIGNIFICANT CHANGE UP (ref 10–45)
ANION GAP SERPL CALC-SCNC: 9 MMOL/L — SIGNIFICANT CHANGE UP (ref 5–17)
AST SERPL-CCNC: 11 U/L — SIGNIFICANT CHANGE UP (ref 10–40)
BILIRUB SERPL-MCNC: 0.9 MG/DL — SIGNIFICANT CHANGE UP (ref 0.2–1.2)
BUN SERPL-MCNC: 26 MG/DL — HIGH (ref 7–23)
CALCIUM SERPL-MCNC: 8.4 MG/DL — SIGNIFICANT CHANGE UP (ref 8.4–10.5)
CHLORIDE SERPL-SCNC: 107 MMOL/L — SIGNIFICANT CHANGE UP (ref 96–108)
CO2 SERPL-SCNC: 25 MMOL/L — SIGNIFICANT CHANGE UP (ref 22–31)
CREAT SERPL-MCNC: 1.14 MG/DL — SIGNIFICANT CHANGE UP (ref 0.5–1.3)
GLUCOSE SERPL-MCNC: 90 MG/DL — SIGNIFICANT CHANGE UP (ref 70–99)
HCT VFR BLD CALC: 28.6 % — LOW (ref 39–50)
HGB BLD-MCNC: 8.8 G/DL — LOW (ref 13–17)
MCHC RBC-ENTMCNC: 26.2 PG — LOW (ref 27–34)
MCHC RBC-ENTMCNC: 30.6 GM/DL — LOW (ref 32–36)
MCV RBC AUTO: 85.5 FL — SIGNIFICANT CHANGE UP (ref 80–100)
PLATELET # BLD AUTO: 375 K/UL — SIGNIFICANT CHANGE UP (ref 150–400)
POTASSIUM SERPL-MCNC: 3.9 MMOL/L — SIGNIFICANT CHANGE UP (ref 3.5–5.3)
POTASSIUM SERPL-SCNC: 3.9 MMOL/L — SIGNIFICANT CHANGE UP (ref 3.5–5.3)
PROT SERPL-MCNC: 6.1 G/DL — SIGNIFICANT CHANGE UP (ref 6–8.3)
RBC # BLD: 3.35 M/UL — LOW (ref 4.2–5.8)
RBC # FLD: 18.9 % — HIGH (ref 10.3–14.5)
SODIUM SERPL-SCNC: 141 MMOL/L — SIGNIFICANT CHANGE UP (ref 135–145)
WBC # BLD: 12.4 K/UL — HIGH (ref 3.8–10.5)
WBC # FLD AUTO: 12.4 K/UL — HIGH (ref 3.8–10.5)

## 2019-04-01 PROCEDURE — 71045 X-RAY EXAM CHEST 1 VIEW: CPT | Mod: 26

## 2019-04-01 PROCEDURE — 99232 SBSQ HOSP IP/OBS MODERATE 35: CPT

## 2019-04-01 PROCEDURE — 99233 SBSQ HOSP IP/OBS HIGH 50: CPT

## 2019-04-01 PROCEDURE — 88104 CYTOPATH FL NONGYN SMEARS: CPT | Mod: 26

## 2019-04-01 RX ADMIN — BUDESONIDE AND FORMOTEROL FUMARATE DIHYDRATE 2 PUFF(S): 160; 4.5 AEROSOL RESPIRATORY (INHALATION) at 09:46

## 2019-04-01 RX ADMIN — Medication 1 PATCH: at 17:51

## 2019-04-01 RX ADMIN — Medication 81 MILLIGRAM(S): at 12:08

## 2019-04-01 RX ADMIN — Medication 1 PATCH: at 12:07

## 2019-04-01 RX ADMIN — PANTOPRAZOLE SODIUM 40 MILLIGRAM(S): 20 TABLET, DELAYED RELEASE ORAL at 06:12

## 2019-04-01 RX ADMIN — PREGABALIN 1000 MICROGRAM(S): 225 CAPSULE ORAL at 12:08

## 2019-04-01 RX ADMIN — RIVAROXABAN 20 MILLIGRAM(S): KIT at 16:40

## 2019-04-01 RX ADMIN — Medication 1 PATCH: at 06:13

## 2019-04-01 RX ADMIN — Medication 1 PATCH: at 12:08

## 2019-04-01 RX ADMIN — Medication 5 MILLILITER(S): at 06:12

## 2019-04-01 NOTE — PROGRESS NOTE ADULT - ASSESSMENT
85 yo male c hx of COPD, PE, DVT, current smoker s/p right pontine cva and now with functional deficits.   Precautions:    fall, aspiration                                                                              Diet: reg c thins    DVT Prophylaxis:        xarelto                                                                  Medical Prognosis: good    Prescreen Comparison: I have reviewed the prescreen information and I found no relevant changes between the preadmission  screening and my post admission evaluation.     #CVA   ASA and  statin, AC resumed per GI rec's.   continue PT/OT/SLP  fall and aspiration precautions, MBS to repeat.     #smoker  Nicotine patch, smoking cessation education    #COPD  Cough c clear mucus. Afebrile. Inhalers, steroids trial as per medicine. CXR today       # history of multiple DVTs  -xarelto resumed, repeat CBC, guaiac+. Clear liquids.     #anemia   HH this am, FOBS+, CT Abd neg findings. GI  and heme workup is  ongoing. S/p Venofer. Added b12 suppl.       #Abd pain  CT abd neg. Poor appetite, mild nausea fluctuating, GI in.       Expected Therapy:   P.T.   1     hrs/day           O. T.    1  hrs/day           S.L.P.  1      hrs/day                    P&O      eval                                             Excpected Frequency: 5 days/7 day period    Rehab Potential:            excellent                               Estimated Disposition:     home                     ELOS:   14           days      Rationale For Inpatient Rehab Admission- Patient demonstrates the following:     [X] Medically appropriate for rehabilitation admission   [X] Has attainable rehab goals with an approrpriate discharge plan  [X] Has rehabilitation potential (expected to make significant improvement within a reasonable period of time)  [X] Requires close medical management by a rehab physician, rehab nursing care and comprehensive interdisciplinary team (including         PT, OT, SLP and/or prosthetics and orthotics)

## 2019-04-01 NOTE — PROGRESS NOTE ADULT - PROBLEM SELECTOR PLAN 1
Continue to monitor H/H  Continue present diet  Monitor stools
Continue to monitor H/H  NPO after midnight  Upper Endoscopy and Colonoscopy 3/29/19  Movi Prep 3:00 pm and 9:00 pm  NS 50 cc/hr IV starting at 11:00 pm  Primary team to hold Xarelto
Continue to monitor H/H  Monitor stools  Continue ASA and Xarelto as benefits outweigh risks
Patient with iron deficiency anemia-with guaiac positive stool noted. Recommend GI evaluation for source of blood loss, in patient requiring anticoagulation. Discussed with patient iron supplementation options-he is agreeable to IV Venofer at this time. Continue to follow CBC. Consider packed red blood cell transfusion for hemoglobin less than 7/increased symptoms.  PO vitamin B12 supplementation for vitamin B12 level <400.  Case d/w Dr. Humphries.
Patient with iron deficiency anemia-with guaiac positive stool noted. Recommend GI evaluation for source of blood loss, in patient requiring anticoagulation. S/P dose of IV venofer. Continue to follow CBC. Consider packed red blood cell transfusion for hemoglobin less than 7/increased symptoms.  PO vitamin B12 supplementation for vitamin B12 level <400.
Patient with iron deficiency anemia-with guaiac positive stool. GI evaluation in progress. S/P dose of IV venofer-will give second dose-discussed with patient, who is agreeable. Continue to follow CBC. Consider packed red blood cell transfusion for hemoglobin less than 7/increased symptoms.  PO vitamin B12 supplementation for vitamin B12 level <400.  Updated wife and daughter regarding heme plans of care.
Patient with iron deficiency anemia-with guaiac positive stool. GI f/u. S/P IV venofer x 2. Continue to follow CBC. Consider packed red blood cell transfusion for hemoglobin less than 7/increased symptoms.  PO vitamin B12 supplementation for vitamin B12 level <400.  Patient continues with rehab.

## 2019-04-01 NOTE — PROGRESS NOTE ADULT - ASSESSMENT
86-year-old gentleman with history of venous thromboembolic disease, COPD and anemia who presented with left-sided numbness/facial droop/dysarthria. 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 Patient is receiving aspirin for stroke prevention and XARELTO for history of venous thromboembolic disease. He is currently on the acute rehabilitation unit in [facility].

## 2019-04-01 NOTE — PROGRESS NOTE ADULT - ASSESSMENT
85 y/o male with multiple comorbidities who is s/p recent CVA. Abdomen soft, non tender. VSS. H/H stable.  +FOB. Colonoscopy on 3/29/19 aborted due to poor prep. Upper endoscopy 3/29/19 without out and bleeding noted on exam.

## 2019-04-01 NOTE — PROGRESS NOTE ADULT - SUBJECTIVE AND OBJECTIVE BOX
INTERVAL HPI/OVERNIGHT EVENTS:  HPI:  87 y/o male PMH PE ( on xarelto), DVT, COPD, ETOH, anemia, and s/o recent CVA who is admitted to Rehab. Patient seen and examined at bedside this morning. Denies abdominal pain.     MEDICATIONS  (STANDING):  aspirin  chewable 81 milliGRAM(s) Oral daily  atorvastatin 40 milliGRAM(s) Oral at bedtime  Biotene Dry Mouth Oral Rinse 5 milliLiter(s) Swish and Spit two times a day  buDESOnide 160 MICROgram(s)/formoterol 4.5 MICROgram(s) Inhaler 2 Puff(s) Inhalation two times a day  cyanocobalamin 1000 MICROGram(s) Oral daily  docusate sodium 100 milliGRAM(s) Oral daily  gabapentin 300 milliGRAM(s) Oral at bedtime  lactulose Syrup 15 Gram(s) Oral at bedtime  nicotine -   7 mG/24Hr(s) Patch 1 patch Transdermal daily  pantoprazole    Tablet 40 milliGRAM(s) Oral before breakfast  rivaroxaban 20 milliGRAM(s) Oral every 24 hours  tamsulosin 0.4 milliGRAM(s) Oral at bedtime    MEDICATIONS  (PRN):  ALBUTerol/ipratropium for Nebulization 3 milliLiter(s) Nebulizer every 6 hours PRN Shortness of Breath and/or Wheezing  melatonin 3 milliGRAM(s) Oral at bedtime PRN Insomnia  ondansetron    Tablet 4 milliGRAM(s) Oral every 6 hours PRN Nausea      Allergies    No Known Allergies    Intolerances        PHYSICAL EXAM:   Vital Signs:  Vital Signs Last 24 Hrs  T(C): 37.6 (01 Apr 2019 07:10), Max: 37.6 (01 Apr 2019 07:10)  T(F): 99.6 (01 Apr 2019 07:10), Max: 99.6 (01 Apr 2019 07:10)  HR: 64 (01 Apr 2019 07:10) (64 - 66)  BP: 100/59 (01 Apr 2019 07:10) (100/59 - 121/62)  BP(mean): --  RR: 16 (01 Apr 2019 07:10) (15 - 16)  SpO2: 96% (01 Apr 2019 07:10) (96% - 96%)  Daily     Daily I&O's Summary    31 Mar 2019 07:01  -  01 Apr 2019 07:00  --------------------------------------------------------  IN: 0 mL / OUT: 300 mL / NET: -300 mL    01 Apr 2019 07:01  -  01 Apr 2019 15:14  --------------------------------------------------------  IN: 600 mL / OUT: 200 mL / NET: 400 mL    GENERAL:  Appears stated age,  HEENT:  NC/AT,  conjunctivae clear and pink,   CHEST:  Full & symmetric excursion, slight wheeze, decreased breath sounds, + cough  HEART:  Regular rhythm, S1, S2, no murmur  ABDOMEN:  Soft, non-tender, non-distended, + normoactive bowel sounds disease  EXTEREMITIES:  no cyanosis,clubbing or edema  SKIN:  No rash/erythema, warm/dry  NEURO:  Alert, oriented,    LABS:                        8.8    12.4  )-----------( 375      ( 01 Apr 2019 07:00 )             28.6     04-01    141  |  107  |  26<H>  ----------------------------<  90  3.9   |  25  |  1.14    Ca    8.4      01 Apr 2019 07:00    TPro  6.1  /  Alb  2.4<L>  /  TBili  0.9  /  DBili  x   /  AST  11  /  ALT  13  /  AlkPhos  83  04-01        amylase   lipase  RADIOLOGY & ADDITIONAL TESTS:

## 2019-04-01 NOTE — PROGRESS NOTE ADULT - ASSESSMENT
86 y o M  with history of COPD, PE, DVT, current smoker s/p right pontine CVA and now with functional deficits admitted to acute rehab.     #Low grade temp  Get CXR, pt has a cough, slight leukocytosis                                                                #CVA  c/w ASA and  statin  c/w  rehab    #Smoker  c/w Nicotine patch    #COPD Acute exacerbation  Resolved     #Vitamin B12 deficiency  c/w supplement Vit B12    #BPH   c/w Flomax    #Abdominal pain  Resolved   CT abdomen WNL     #Anemia, acute blood loss   Plan was for endoscopy, not performed due to poor prep  GI & Hematology following  Monitor H/H, currently Hg>8     #H/o multiple DVTs  c/w Xarelto

## 2019-04-01 NOTE — PROGRESS NOTE ADULT - SUBJECTIVE AND OBJECTIVE BOX
CC: Patient is a 86y old  Male who presents with a chief complaint of s/p Right pontine CVA c functional deficits (01 Apr 2019 08:06)      S: No f/c/n/v/pain    Patient seen and examined at bedside.    ALLERGIES:  No Known Allergies      MEDICATIONS:  ALBUTerol/ipratropium for Nebulization 3 milliLiter(s) Nebulizer every 6 hours PRN  Biotene Dry Mouth Oral Rinse 5 milliLiter(s) Swish and Spit two times a day  cyanocobalamin 1000 MICROGram(s) Oral daily  docusate sodium 100 milliGRAM(s) Oral daily  lactulose Syrup 15 Gram(s) Oral at bedtime  melatonin 3 milliGRAM(s) Oral at bedtime PRN  ondansetron    Tablet 4 milliGRAM(s) Oral every 6 hours PRN  rivaroxaban 20 milliGRAM(s) Oral every 24 hours        Vital Signs Last 24 Hrs  T(F): 99.6 (01 Apr 2019 07:10), Max: 99.6 (01 Apr 2019 07:10)  HR: 64 (01 Apr 2019 07:10) (64 - 66)  BP: 100/59 (01 Apr 2019 07:10) (100/59 - 121/62)  RR: 16 (01 Apr 2019 07:10) (15 - 16)  SpO2: 96% (01 Apr 2019 07:10) (96% - 96%)  I&O's Summary    31 Mar 2019 07:01  -  01 Apr 2019 07:00  --------------------------------------------------------  IN: 0 mL / OUT: 300 mL / NET: -300 mL        PHYSICAL EXAM:  General: NAD  ENT: MMM  Neck: Supple, No JVD  Lungs: Clear to auscultation bilaterally  Cardio: RRR, S1/S2, No murmurs  Abdomen: Soft, Nontender, Nondistended; Bowel sounds present  Extremities: No cyanosis, No edema  Neuro: no new deficits  Skin: no rashes  Psych: AAO    LABS:                        8.8    12.4  )-----------( 375      ( 01 Apr 2019 07:00 )             28.6     04-01    141  |  107  |  26  ----------------------------<  90  3.9   |  25  |  1.14    Ca    8.4      01 Apr 2019 07:00    TPro  6.1  /  Alb  2.4  /  TBili  0.9  /  DBili  x   /  AST  11  /  ALT  13  /  AlkPhos  83  04-01    eGFR if Non African American: 58 mL/min/1.73M2 (04-01-19 @ 07:00)  eGFR if : 67 mL/min/1.73M2 (04-01-19 @ 07:00)            03-20 Chol 113 mg/dL LDL 57 mg/dL HDL 39 mg/dL Trig 84 mg/dL        CAPILLARY BLOOD GLUCOSE        03-19 [id number] 4.9          RADIOLOGY & ADDITIONAL TESTS:    Care Discussed with Consultants/Other Providers:

## 2019-04-01 NOTE — PROGRESS NOTE ADULT - SUBJECTIVE AND OBJECTIVE BOX
ELIZABETH MCLAUGHLIN   86y   Male    Admitting: TD Humphries  HPI:  86-year-old gentleman with history of venous thromboembolic disease, COPD and anemia who presented with left-sided numbness/facial droop/dysarthria. 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 Patient is receiving aspirin for stroke prevention and XARELTO for history of venous thromboembolic disease. He is currently on the acute rehabilitation unit in [facility].    PAST MEDICAL & SURGICAL HISTORY:  Deep vein thrombosis (DVT)  HTN (hypertension)  Dyslipidemia  CAD (coronary artery disease)  Peripheral vascular disease  Parkinson disease  No significant past surgical history    HEALTH ISSUES - PROBLEM Dx:  Anemia, unspecified type: Anemia, unspecified type  Nausea: Nausea  Constipation: Constipation  GI bleed: GI bleed    MEDICATIONS  (STANDING):  aspirin  chewable 81 milliGRAM(s) Oral daily  atorvastatin 40 milliGRAM(s) Oral at bedtime  Biotene Dry Mouth Oral Rinse 5 milliLiter(s) Swish and Spit two times a day  buDESOnide 160 MICROgram(s)/formoterol 4.5 MICROgram(s) Inhaler 2 Puff(s) Inhalation two times a day  cyanocobalamin 1000 MICROGram(s) Oral daily  docusate sodium 100 milliGRAM(s) Oral daily  gabapentin 300 milliGRAM(s) Oral at bedtime  lactulose Syrup 15 Gram(s) Oral at bedtime  nicotine -   7 mG/24Hr(s) Patch 1 patch Transdermal daily  pantoprazole    Tablet 40 milliGRAM(s) Oral before breakfast  rivaroxaban 20 milliGRAM(s) Oral every 24 hours  tamsulosin 0.4 milliGRAM(s) Oral at bedtime    MEDICATIONS  (PRN):  ALBUTerol/ipratropium for Nebulization 3 milliLiter(s) Nebulizer every 6 hours PRN Shortness of Breath and/or Wheezing  melatonin 3 milliGRAM(s) Oral at bedtime PRN Insomnia  ondansetron    Tablet 4 milliGRAM(s) Oral every 6 hours PRN Nausea    Allergies    No Known Allergies    INTERVAL HPI/OVERNIGHT EVENTS:  Patient S&E at bedside. No c/o pain or SOB.     VITAL SIGNS:  T(F): 98.2 (03-31-19 @ 20:29)  HR: 66 (03-31-19 @ 20:29)  BP: 121/62 (03-31-19 @ 20:29)  RR: 15 (03-31-19 @ 20:29)  SpO2: 96% (03-31-19 @ 20:29)    PHYSICAL EXAM:  Constitutional: NAD  Eyes: sclera non-icteric  Neck: no JVD  Respiratory: CTA b/l, good air entry b/l ant.  Cardiovascular: RRR, no M/R/G  Gastrointestinal: soft, NTND, no masses palpable, no hepatosplenomegaly appreciated  Extremities: no calf tenderness  Neurological: Awake, alert.    Labs:             8.8    12.4  )-----------( 375      ( 04-01 @ 07:00 )             28.6                8.7    9.8   )-----------( 462      ( 03-30 @ 05:30 )             29.7       04-01    141  |  107  |  26<H>  ----------------------------<  90  3.9   |  25  |  1.14    Ca    8.4      01 Apr 2019 07:00    TPro  6.1  /  Alb  2.4<L>  /  TBili  0.9  /  DBili  x   /  AST  11  /  ALT  13  /  AlkPhos  83  04-01

## 2019-04-01 NOTE — PROGRESS NOTE ADULT - SUBJECTIVE AND OBJECTIVE BOX
CHIEF COMPLAINT:      HISTORY OF PRESENT ILLNESS  85 yo male c PMH of PE, DVT, COPD, (current smoker), alcohol use, and anemia presents with acute onset of left sided numbness, facial droop and dysarthria on 3/ . CT head on arrival was negative. 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 Patient states that numbness has subsided but his speech deficits persist and his balance is notably worse. At [place] evaluated by cardiology. TTE revealed no thrombus, EF 65%. Started with ASA for small vessel disease and stroke prevention  and Xarelto was restarted for  Hx of multiple DVTs. Patient stopped Xarelto one week prior  to admission. Stable to d/c to rehab on 3/22/19.   Of note, seen by neurology Dr Nye in 2017 for b/l leg weakness. MRI brain and spine was performed showing possible cervical myelopathy. (22 Mar 2019 14:54)      PAST MEDICAL & SURGICAL HISTORY:  Deep vein thrombosis (DVT)  HTN (hypertension)  Dyslipidemia  CAD (coronary artery disease)  Peripheral vascular disease  Parkinson disease  No significant past surgical history        REVIEW OF SYMPTOMS  [X] Constitutional WNL     [X] Cardio WNL                    [X]  WNL                   [X] Heme WNL              [X] Endo WNL                     [X] Skin WNL                 [X] MSK WNL                   [X] Psych WNL      VITALS  Vital Signs Last 24 Hrs  T(C): 37.6 (01 Apr 2019 07:10), Max: 37.6 (01 Apr 2019 07:10)  T(F): 99.6 (01 Apr 2019 07:10), Max: 99.6 (01 Apr 2019 07:10)  HR: 64 (01 Apr 2019 07:10) (64 - 66)  BP: 100/59 (01 Apr 2019 07:10) (100/59 - 121/62)  BP(mean): --  RR: 16 (01 Apr 2019 07:10) (15 - 16)  SpO2: 96% (01 Apr 2019 07:10) (96% - 96%)      PHYSICAL EXAM  Constitutional - Nausea  HEENT - NCAT, EOMI  Neck - Supple, No limited ROM  Chest -  + wheeze, + rhonchi, No crackles  Cardiovascular - RRR, S1S2, No murmurs  Abdomen - BS+, Soft, NTND  Extremities - No C/C/E, No calf tenderness   Skin-no rash  Wounds-na      Neurologic Exam - no new deficits                     Balance - impaired     Psychiatric - Mood stable, Affect WNL       FUNCTIONAL PROGRESS  Gait - 100ft min A c RW  ADLs - CG  Transfers -CG/min A  Functional transfer - CG/min A        RECENT LABS                        8.8    12.4  )-----------( 375      ( 01 Apr 2019 07:00 )             28.6     04-01    141  |  107  |  26<H>  ----------------------------<  90  3.9   |  25  |  1.14    Ca    8.4      01 Apr 2019 07:00    TPro  6.1  /  Alb  2.4<L>  /  TBili  0.9  /  DBili  x   /  AST  11  /  ALT  13  /  AlkPhos  83  04-01      LIVER FUNCTIONS - ( 01 Apr 2019 07:00 )  Alb: 2.4 g/dL / Pro: 6.1 g/dL / ALK PHOS: 83 U/L / ALT: 13 U/L DA / AST: 11 U/L / GGT: x             Direct LDL: 57 mg/dL (03-20-19 @ 07:10)    Hemoglobin A1C, Whole Blood: 4.9 % (03-19-19 @ 19:20)  Hemoglobin A1C, Whole Blood: 4.9 % (03-19-19 @ 19:20)              RADIOLOGY/OTHER RESULTS      CURRENT MEDICATIONS  MEDICATIONS  (STANDING):  aspirin  chewable 81 milliGRAM(s) Oral daily  atorvastatin 40 milliGRAM(s) Oral at bedtime  Biotene Dry Mouth Oral Rinse 5 milliLiter(s) Swish and Spit two times a day  buDESOnide 160 MICROgram(s)/formoterol 4.5 MICROgram(s) Inhaler 2 Puff(s) Inhalation two times a day  cyanocobalamin 1000 MICROGram(s) Oral daily  docusate sodium 100 milliGRAM(s) Oral daily  gabapentin 300 milliGRAM(s) Oral at bedtime  lactulose Syrup 15 Gram(s) Oral at bedtime  nicotine -   7 mG/24Hr(s) Patch 1 patch Transdermal daily  pantoprazole    Tablet 40 milliGRAM(s) Oral before breakfast  rivaroxaban 20 milliGRAM(s) Oral every 24 hours  tamsulosin 0.4 milliGRAM(s) Oral at bedtime    MEDICATIONS  (PRN):  ALBUTerol/ipratropium for Nebulization 3 milliLiter(s) Nebulizer every 6 hours PRN Shortness of Breath and/or Wheezing  melatonin 3 milliGRAM(s) Oral at bedtime PRN Insomnia  ondansetron    Tablet 4 milliGRAM(s) Oral every 6 hours PRN Nausea      ASSESSMENT & PLAN      GI/Bowel Management - AC resumed.   Management - Toilet Q2  Skin - Turn Q2  Pain - Tylenol PRN  DVT PPX - Xarelto resumed  Diet - dysphagia c strict aspiration, MBS repeat tmrw      Continue comprehensive acute rehab program consisting of 3hrs/day of OT/PT and SLP.

## 2019-04-01 NOTE — PROGRESS NOTE ADULT - PROBLEM SELECTOR PROBLEM 1
GI bleed
GI bleed
Anemia, unspecified type
GI bleed

## 2019-04-02 ENCOUNTER — TRANSCRIPTION ENCOUNTER (OUTPATIENT)
Age: 84
End: 2019-04-02

## 2019-04-02 LAB
BASOPHILS # BLD AUTO: 0 K/UL — SIGNIFICANT CHANGE UP (ref 0–0.2)
BASOPHILS NFR BLD AUTO: 0.4 % — SIGNIFICANT CHANGE UP (ref 0–2)
EOSINOPHIL # BLD AUTO: 0.1 K/UL — SIGNIFICANT CHANGE UP (ref 0–0.5)
EOSINOPHIL NFR BLD AUTO: 1.1 % — SIGNIFICANT CHANGE UP (ref 0–6)
HCT VFR BLD CALC: 32.3 % — LOW (ref 39–50)
HGB BLD-MCNC: 9.7 G/DL — LOW (ref 13–17)
LYMPHOCYTES # BLD AUTO: 1.6 K/UL — SIGNIFICANT CHANGE UP (ref 1–3.3)
LYMPHOCYTES # BLD AUTO: 13.9 % — SIGNIFICANT CHANGE UP (ref 13–44)
MCHC RBC-ENTMCNC: 25.5 PG — LOW (ref 27–34)
MCHC RBC-ENTMCNC: 30.2 GM/DL — LOW (ref 32–36)
MCV RBC AUTO: 84.7 FL — SIGNIFICANT CHANGE UP (ref 80–100)
MONOCYTES # BLD AUTO: 0.9 K/UL — SIGNIFICANT CHANGE UP (ref 0–0.9)
MONOCYTES NFR BLD AUTO: 8 % — SIGNIFICANT CHANGE UP (ref 1–9)
NEUTROPHILS # BLD AUTO: 8.8 K/UL — HIGH (ref 1.8–7.4)
NEUTROPHILS NFR BLD AUTO: 76.6 % — SIGNIFICANT CHANGE UP (ref 43–77)
NON-GYNECOLOGICAL CYTOLOGY STUDY: SIGNIFICANT CHANGE UP
PLATELET # BLD AUTO: 400 K/UL — SIGNIFICANT CHANGE UP (ref 150–400)
RBC # BLD: 3.81 M/UL — LOW (ref 4.2–5.8)
RBC # FLD: 19.3 % — HIGH (ref 10.3–14.5)
WBC # BLD: 11.4 K/UL — HIGH (ref 3.8–10.5)
WBC # FLD AUTO: 11.4 K/UL — HIGH (ref 3.8–10.5)

## 2019-04-02 PROCEDURE — 99233 SBSQ HOSP IP/OBS HIGH 50: CPT

## 2019-04-02 PROCEDURE — 99232 SBSQ HOSP IP/OBS MODERATE 35: CPT

## 2019-04-02 RX ORDER — ASPIRIN/CALCIUM CARB/MAGNESIUM 324 MG
1 TABLET ORAL
Qty: 0 | Refills: 0 | COMMUNITY
Start: 2019-04-02

## 2019-04-02 RX ORDER — NICOTINE POLACRILEX 2 MG
1 GUM BUCCAL
Qty: 0 | Refills: 0 | COMMUNITY
Start: 2019-04-02

## 2019-04-02 RX ORDER — FLUTICASONE PROPIONATE AND SALMETEROL 50; 250 UG/1; UG/1
1 POWDER ORAL; RESPIRATORY (INHALATION)
Qty: 0 | Refills: 0 | COMMUNITY

## 2019-04-02 RX ORDER — IPRATROPIUM/ALBUTEROL SULFATE 18-103MCG
3 AEROSOL WITH ADAPTER (GRAM) INHALATION
Qty: 0 | Refills: 0 | DISCHARGE
Start: 2019-04-02

## 2019-04-02 RX ORDER — ATORVASTATIN CALCIUM 80 MG/1
1 TABLET, FILM COATED ORAL
Qty: 0 | Refills: 0 | COMMUNITY
Start: 2019-04-02

## 2019-04-02 RX ORDER — GABAPENTIN 400 MG/1
1 CAPSULE ORAL
Qty: 0 | Refills: 0 | COMMUNITY
Start: 2019-04-02

## 2019-04-02 RX ORDER — PREGABALIN 225 MG/1
1 CAPSULE ORAL
Qty: 0 | Refills: 0 | COMMUNITY
Start: 2019-04-02

## 2019-04-02 RX ORDER — RIVAROXABAN 15 MG-20MG
1 KIT ORAL
Qty: 0 | Refills: 0 | COMMUNITY
Start: 2019-04-02

## 2019-04-02 RX ORDER — TAMSULOSIN HYDROCHLORIDE 0.4 MG/1
1 CAPSULE ORAL
Qty: 0 | Refills: 0 | COMMUNITY
Start: 2019-04-02

## 2019-04-02 RX ORDER — PANTOPRAZOLE SODIUM 20 MG/1
1 TABLET, DELAYED RELEASE ORAL
Qty: 0 | Refills: 0 | COMMUNITY
Start: 2019-04-02

## 2019-04-02 RX ORDER — ASPIRIN/CALCIUM CARB/MAGNESIUM 324 MG
1 TABLET ORAL
Qty: 0 | Refills: 0 | COMMUNITY

## 2019-04-02 RX ORDER — IPRATROPIUM/ALBUTEROL SULFATE 18-103MCG
3 AEROSOL WITH ADAPTER (GRAM) INHALATION
Qty: 0 | Refills: 0 | COMMUNITY

## 2019-04-02 RX ADMIN — Medication 1 PATCH: at 16:40

## 2019-04-02 RX ADMIN — TAMSULOSIN HYDROCHLORIDE 0.4 MILLIGRAM(S): 0.4 CAPSULE ORAL at 20:20

## 2019-04-02 RX ADMIN — Medication 1 PATCH: at 05:41

## 2019-04-02 RX ADMIN — GABAPENTIN 300 MILLIGRAM(S): 400 CAPSULE ORAL at 20:20

## 2019-04-02 RX ADMIN — BUDESONIDE AND FORMOTEROL FUMARATE DIHYDRATE 2 PUFF(S): 160; 4.5 AEROSOL RESPIRATORY (INHALATION) at 08:48

## 2019-04-02 RX ADMIN — ATORVASTATIN CALCIUM 40 MILLIGRAM(S): 80 TABLET, FILM COATED ORAL at 20:19

## 2019-04-02 RX ADMIN — PANTOPRAZOLE SODIUM 40 MILLIGRAM(S): 20 TABLET, DELAYED RELEASE ORAL at 05:33

## 2019-04-02 RX ADMIN — Medication 1 PATCH: at 11:37

## 2019-04-02 RX ADMIN — RIVAROXABAN 20 MILLIGRAM(S): KIT at 20:19

## 2019-04-02 RX ADMIN — BUDESONIDE AND FORMOTEROL FUMARATE DIHYDRATE 2 PUFF(S): 160; 4.5 AEROSOL RESPIRATORY (INHALATION) at 21:08

## 2019-04-02 RX ADMIN — Medication 5 MILLILITER(S): at 05:33

## 2019-04-02 RX ADMIN — Medication 1 PATCH: at 11:23

## 2019-04-02 NOTE — DISCHARGE NOTE PROVIDER - CARE PROVIDER_API CALL
Lorenzo Nye)  Neurology  [address] [facility], [address]  [place], [place] [id number]  Phone: [phone]  Fax: [phone]  Follow Up Time:     Natanael Day)  Medicine  [address], [address]  [place] [address]  Phone: [phone]  Fax: [phone]  Follow Up Time:     Jean Paul Huizar)  Gastroenterology; Internal Medicine  [address] [facility] [place] [address]  Phone: [phone]  Fax: [phone]  Follow Up Time:

## 2019-04-02 NOTE — PROGRESS NOTE ADULT - ASSESSMENT
87 yo male  with history of COPD, PE, DVT, current smoker s/p right pontine cva and now with functional deficits.                                                                 #CVA  c/w ASA and  statin  c/w  PT/OT/rehab/ SLP      #Leukocytosis, mild  CXR done,  pt has a cough,    #Smoker  c/w Nicotine patch    #COPD  exacerbation - resolved  prednisone taper  O2 with NC as needed   c/w  Duoneb   CXR WNL     #Vitamin B12 deficiency  - supplement Vit B12    #BPH   c/w Tamsulosin     #Abdominal pain - resolved   CT abdomen WNL     #Anemia Microcytic , ?LGIB, acute blood loss  FOBT +  H/H stable.   Transfuse for Hb < 7   Plan was for UGI endo  and Colonoscopy   Colonoscopy was not performed due to poor prep  GI & hematology  consult appreciated.   Monitor H/H     #H/O multiple DVTs  c/w Xarelto 86 year old male  with history of COPD, PE, DVT, current smoker s/p right pontine cva and now with functional deficits.                                                                 #CVA  c/w ASA and  statin  c/w  PT/OT/rehab/ SLP      #Leukocytosis, mild  CXR done,  pt has a cough,    #Smoker  c/w Nicotine patch    #COPD  exacerbation - resolved  prednisone taper  O2 with NC as needed   c/w  Duoneb   CXR WNL     #Vitamin B12 deficiency  - supplement Vit B12    #BPH   c/w Tamsulosin     #Abdominal pain - resolved   CT abdomen WNL     #Anemia Microcytic , ?LGIB, acute blood loss  FOBT +  H/H stable.   Transfuse for Hb < 7   Plan was for UGI endo  and Colonoscopy   Colonoscopy was not performed due to poor prep  GI & hematology  consult appreciated.   Monitor H/H     #H/O multiple DVTs  c/w Xarelto 86 year old male  with history of COPD, PE, DVT, current smoker s/p right pontine cva and now with functional deficits.                                                                 #CVA  c/w ASA and  statin  c/w  PT/OT/rehab/ SLP      #Leukocytosis, mild  CXR done, shows calcified granulomas again noted. Chronic right rib deformities. No new consolidation or pleural effusion. Heart size cannot be accurately assessed in this projection.  pt has improving cough    #Smoker  c/w Nicotine patch    #COPD  exacerbation - resolved  prednisone taper completed  O2 with NC as needed   c/w  Duoneb   CXR WNL     #Vitamin B12 deficiency  - supplement Vit B12    #BPH   c/w Tamsulosin     #Abdominal pain - resolved   CT abdomen WNL     #Anemia Microcytic , ?LGIB, acute blood loss  FOBT +  H/H stable.   Transfuse for Hb < 7   Upper endoscopy was done on 3/29/2019 without out any bleeding noted on exam.   Colonoscopy was not performed due to poor prep  GI & hematology  consult appreciated.   Monitor H/H     #H/O multiple DVTs  c/w Xarelto

## 2019-04-02 NOTE — DISCHARGE NOTE PROVIDER - NSDCCPCAREPLAN_GEN_ALL_CORE_FT
PRINCIPAL DISCHARGE DIAGNOSIS  Diagnosis: Cerebrovascular accident (CVA)  Assessment and Plan of Treatment:       SECONDARY DISCHARGE DIAGNOSES  Diagnosis: COPD, mild  Assessment and Plan of Treatment:     Diagnosis: Anemia, unspecified type  Assessment and Plan of Treatment: Anemia, unspecified type    Diagnosis: Anemia, unspecified type  Assessment and Plan of Treatment: Anemia, unspecified type

## 2019-04-02 NOTE — CONSULT NOTE ADULT - ASSESSMENT
85 y/o male with multiple comorbidities who is s/p recent CVA. Abdomen soft, non tender. VSS. H/H 8.3/28.2. +FOB
85 yo male c hx of COPD, PE, DVT, current smoker s/p right pontine cva and now with functional deficits.                                                                   #CVA  c/w ASA and  statin  c/w  PT/OT/SLP    #smoker  c/w Nicotine patch    #COPD  c/w Budesonide   start Prednisone 40 mg OD x 5 days   O2 with NC as needed     # H/O multiple DVTs  c/w Xarelto
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
86-year-old gentleman with history of venous thromboembolic disease, COPD and anemia who presented with left-sided numbness/facial droop/dysarthria. 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 Patient is receiving aspirin for stroke prevention and XARELTO for history of venous thromboembolic disease. He is currently on the acute rehabilitation unit in [facility].

## 2019-04-02 NOTE — PROGRESS NOTE ADULT - SUBJECTIVE AND OBJECTIVE BOX
Patient is a 86 year old  Male who presents with a chief complaint of s/p Right pontine CVA c functional deficits (01 Apr 2019 15:14)    Patient seen and examined at bedside, denies any complaints, has dysarthria.   No acute event overnight      MEDICATIONS  (STANDING):  aspirin  chewable 81 milliGRAM(s) Oral daily  atorvastatin 40 milliGRAM(s) Oral at bedtime  Biotene Dry Mouth Oral Rinse 5 milliLiter(s) Swish and Spit two times a day  buDESOnide 160 MICROgram(s)/formoterol 4.5 MICROgram(s) Inhaler 2 Puff(s) Inhalation two times a day  cyanocobalamin 1000 MICROGram(s) Oral daily  docusate sodium 100 milliGRAM(s) Oral daily  gabapentin 300 milliGRAM(s) Oral at bedtime  lactulose Syrup 15 Gram(s) Oral at bedtime  nicotine -   7 mG/24Hr(s) Patch 1 patch Transdermal daily  pantoprazole    Tablet 40 milliGRAM(s) Oral before breakfast  rivaroxaban 20 milliGRAM(s) Oral every 24 hours  tamsulosin 0.4 milliGRAM(s) Oral at bedtime    MEDICATIONS  (PRN):  ALBUTerol/ipratropium for Nebulization 3 milliLiter(s) Nebulizer every 6 hours PRN Shortness of Breath and/or Wheezing  melatonin 3 milliGRAM(s) Oral at bedtime PRN Insomnia  ondansetron    Tablet 4 milliGRAM(s) Oral every 6 hours PRN Nausea      REVIEW OF SYSTEMS:  CONSTITUTIONAL: No fever, weight loss, or fatigue  EYES: No eye pain, visual disturbances, or discharge  ENMT:  No difficulty hearing, tinnitus, vertigo; No sinus or throat pain  NECK: No pain or stiffness  RESPIRATORY: No cough, wheezing, chills or hemoptysis; No shortness of breath  CARDIOVASCULAR: No chest pain, palpitations, dizziness, or leg swelling  GASTROINTESTINAL: No abdominal or epigastric pain. No nausea, vomiting, or hematemesis; No diarrhea or constipation. No melena or hematochezia.  GENITOURINARY: No dysuria, frequency, hematuria, or incontinence  NEUROLOGICAL: No headaches, memory loss, loss of strength, numbness, or tremors  SKIN: No itching, burning, rashes, or lesions   ENDOCRINE: No heat or cold intolerance; No hair loss  MUSCULOSKELETAL: No joint pain or swelling; No muscle, back, or extremity pain  PSYCHIATRIC: No depression, anxiety, mood swings, or difficulty sleeping  HEME/LYMPH: No easy bruising, or bleeding gums  ALLERGY AND IMMUNOLOGIC: No hives or eczema      PHYSICAL EXAM:    T(C): 36.6 (04-02-19 @ 07:57), Max: 37.6 (04-01-19 @ 15:38)  HR: 62 (04-02-19 @ 07:57) (62 - 70)  BP: 116/68 (04-02-19 @ 07:57) (116/68 - 116/70)  RR: 15 (04-02-19 @ 07:57) (15 - 16)  SpO2: 97% (04-02-19 @ 07:57) (97% - 99%)    I&O's Summary    01 Apr 2019 07:01  -  02 Apr 2019 07:00  --------------------------------------------------------  IN: 600 mL / OUT: 200 mL / NET: 400 mL      GENERAL: NAD, well-groomed, well-developed  HEAD:  Atraumatic, Normocephalic  EYES: EOMI, PERRL, conjunctiva and sclera clear  ENMT: Moist mucous membranes,   NECK: Supple, No JVD, Normal thyroid  NERVOUS SYSTEM:  Alert & Oriented X3, dysarthria, no new deficit  CHEST/LUNG: Clear to ascultation bilaterally; No rales, rhonchi, wheezing, or rubs  HEART: Regular rate and rhythm; No murmurs, rubs, or gallops  ABDOMEN: Soft, Nontender, Nondistended; Bowel sounds present  EXTREMITIES:  2+ Peripheral Pulses, No clubbing, cyanosis, or edema  SKIN: No rash          LABS:                        8.8    12.4  )-----------( 375      ( 01 Apr 2019 07:00 )             28.6     04-01    141  |  107  |  26<H>  ----------------------------<  90  3.9   |  25  |  1.14    Ca    8.4      01 Apr 2019 07:00    TPro  6.1  /  Alb  2.4<L>  /  TBili  0.9  /  DBili  x   /  AST  11  /  ALT  13  /  AlkPhos  83  04-01          RADIOLOGY & ADDITIONAL TESTS:    Imaging Personally Reviewed:  [x] YES  [ ] NO    Consultant(s) Notes Reviewed:  [x] YES  [ ] NO    Care Discussed with Consultants/Other Providers [x] YES  [ ] NO

## 2019-04-02 NOTE — DISCHARGE NOTE PROVIDER - HOSPITAL COURSE
85 yo male c PMH of PE, DVT, COPD, (current smoker), alcohol use, and anemia presents with acute onset of left sided numbness, facial droop and dysarthria on 3/ . CT head on arrival was negative. 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 Patient states that numbness has subsided but his speech deficits persist and his balance is notably worse. At [place] evaluated by cardiology. TTE revealed no thrombus, EF 65%. Started with ASA for small vessel disease and stroke prevention  and Xarelto was restarted for  Hx of multiple DVTs. Patient stopped Xarelto one week prior  to admission. Stable to d/c to rehab on 3/22/19.     Of note, seen by neurology Dr Nye in 2017 for b/l leg weakness. MRI brain and spine was performed showing possible cervical myelopathy. 87 yo male c PMH of PE, DVT, COPD, (current smoker), alcohol use, and anemia presents with acute onset of left sided numbness, facial droop and dysarthria on 3/ . CT head on arrival was negative. 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 Patient states that numbness has subsided but his speech deficits persist and his balance is notably worse. At [place] evaluated by cardiology. TTE revealed no thrombus, EF 65%. Started with ASA for small vessel disease and stroke prevention  and Xarelto was restarted for  Hx of multiple DVTs. Patient stopped Xarelto one week prior  to admission. Stable to d/c to rehab on 3/22/19.     Of note, seen by neurology Dr Nye in 2017 for b/l leg weakness. MRI brain and spine was performed showing possible cervical myelopathy.     At [facility] rehab patient evaluated by GI for +FOBT and anemia. Anticoagulation resumed. Evaluated for nausea and constipation-bowel regimen started. CT abdomen negative. Patient evaluated by pulmonary for COPD exacerbation, completed course of steroids. 85 yo male c PMH of PE, DVT, COPD, (current smoker), alcohol use, and anemia presents with acute onset of left sided numbness, facial droop and dysarthria on 3/ . CT head on arrival was negative. 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 Patient states that numbness has subsided but his speech deficits persist and his balance is notably worse. At [place] evaluated by cardiology. TTE revealed no thrombus, EF 65%. Started with ASA for small vessel disease and stroke prevention  and Xarelto was restarted for  Hx of multiple DVTs. Patient stopped Xarelto one week prior  to admission. Stable to d/c to rehab on 3/22/19.     Of note, seen by neurology Dr Nye in 2017 for b/l leg weakness. MRI brain and spine was performed showing possible cervical myelopathy.     At [facility] re[facility] patient evaluated by GI for +FOBT and anemia. Anticoagulation resumed. Evaluated for nausea and constipation-bowel regimen started. CT abdomen negative. Patient evaluated by pulmonary for COPD exacerbation, completed course of steroids. Throughout rehab stay patient intermittently refusing medications and treatments.     Patient also evaluated by psychiatry for decision making capacity. Patient and family requesting premature discharge AMA on 4/3/19. All risks of such discharge explained at length to family and patient and voiced understanding.     Family agreed to assume all responsibility for all possible bad outcomes, including life threatening injury and still insisting on discharge today.    All medications were sent to pharmacy. Equipment ordered. Follow ups delineated. Family is refusing Home care services. 87 yo male c PMH of PE, DVT, COPD, (current smoker), alcohol use, and anemia presents with acute onset of left sided numbness, facial droop and dysarthria on 3/ . CT head on arrival was negative. 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 Patient states that numbness has subsided but his speech deficits persist and his balance is notably worse. At [place] evaluated by cardiology. TTE revealed no thrombus, EF 65%. Started with ASA for small vessel disease and stroke prevention  and Xarelto was restarted for  Hx of multiple DVTs. Patient stopped Xarelto one week prior  to admission. Stable to d/c to rehab on 3/22/19.     Of note, seen by neurology Dr Nye in 2017 for b/l leg weakness. MRI brain and spine was performed showing possible cervical myelopathy.     At [facility] re[facility] patient evaluated by GI for +FOBT and anemia. EGD unable to complete, Anticoagulation resumed per GI recommendation. Outpt follow up. Evaluated for nausea and constipation-bowel regimen started. CT abdomen negative. Patient evaluated by pulmonary for COPD exacerbation, completed course of steroids. Throughout rehab stay patient intermittently refusing medications and treatments.     Patient also evaluated by psychiatry for decision making capacity. Patient and family requesting premature discharge AMA on 4/3/19. All risks of such discharge explained at length to family and patient and voiced understanding.     Family agreed to assume all responsibility for all possible bad outcomes, including life threatening injury and still insisting on discharge today.    All medications were sent to pharmacy. Equipment ordered. Follow ups delineated. Family is refusing Home care services.

## 2019-04-02 NOTE — DISCHARGE NOTE PROVIDER - CARE PROVIDERS DIRECT ADDRESSES
,DirectAddress_Unknown,[email or web address],DirectAddress_Unknown

## 2019-04-02 NOTE — PROVIDER CONTACT NOTE (OTHER) - ACTION/TREATMENT ORDERED:
Cristi carrerafied
Awaiting Dr. Mckeon
EKG done at the bedside.
2L 02 via nasal cannula, PO fluids encouraged, KELSY to order duoneb.

## 2019-04-02 NOTE — DISCHARGE NOTE PROVIDER - PROVIDER TOKENS
PROVIDER:[TOKEN:[[id number]]],PROVIDER:[TOKEN:[[id number]]],PROVIDER:[TOKEN:[[id number]]]

## 2019-04-02 NOTE — DISCHARGE NOTE PROVIDER - NSDCACTIVITY_GEN_ALL_CORE
Walking - Indoors allowed/Walking - Outdoors allowed/Showering allowed/Do not make important decisions/No heavy lifting/straining/Sex allowed/Do not drive or operate machinery/Stairs allowed

## 2019-04-02 NOTE — PROGRESS NOTE ADULT - ASSESSMENT
87 yo male c hx of COPD, PE, DVT, current smoker s/p right pontine cva and now with functional deficits.   Precautions:    fall, aspiration                                                                              Diet: reg c thins    DVT Prophylaxis:        xarelto                                                                  Medical Prognosis: good    Prescreen Comparison: I have reviewed the prescreen information and I found no relevant changes between the preadmission  screening and my post admission evaluation.     #CVA   ASA and  statin, AC resumed per GI rec's.   continue PT/OT/SLP  fall and aspiration precautions, MBS to repeat.     #smoker  Nicotine patch, smoking cessation education    #COPD  Cough c clear mucus. Afebrile. Inhalers, steroids trial as per medicine. CXR neg consolidations. Seen by medicine.        # history of multiple DVTs  -xarelto resumed, repeat CBC, guaiac+. Clear liquids.     #anemia   HH stable, FOBS+, CT Abd neg findings. GI  and heme workup is  ongoing. S/p Venofer. Added b12 suppl.       #Abd pain  CT abd neg. Poor appetite, mild nausea fluctuating, GI in.       Expected Therapy:   P.T.   1     hrs/day           O. T.    1  hrs/day           S.L.P.  1      hrs/day                    P&O      eval                                             Excpected Frequency: 5 days/7 day period    Rehab Potential:            excellent                               Estimated Disposition:     home                     ELOS:   14           days      Rationale For Inpatient Rehab Admission- Patient demonstrates the following:     [X] Medically appropriate for rehabilitation admission   [X] Has attainable rehab goals with an approrpriate discharge plan  [X] Has rehabilitation potential (expected to make significant improvement within a reasonable period of time)  [X] Requires close medical management by a rehab physician, rehab nursing care and comprehensive interdisciplinary team (including         PT, OT, SLP and/or prosthetics and orthotics) 87 yo male c hx of COPD, PE, DVT, current smoker s/p right pontine cva and now with functional deficits.   Precautions:    fall, aspiration                                                                              Diet: reg c thins    DVT Prophylaxis:        xarelto                                                                  Medical Prognosis: good    Prescreen Comparison: I have reviewed the prescreen information and I found no relevant changes between the preadmission  screening and my post admission evaluation.     #CVA   ASA and  statin, AC resumed per GI rec's.   continue PT/OT/SLP  fall and aspiration precautions, MBS to repeat.     #smoker  Nicotine patch, smoking cessation education    #COPD  Cough c clear mucus. Afebrile. Inhalers, steroids trial as per medicine. CXR neg consolidations. Seen by medicine.        # history of multiple DVTs  -xarelto resumed, repeat CBC, guaiac+. Clear liquids.     #anemia   HH stable, FOBS+, CT Abd neg findings. GI  and heme workup is  ongoing. S/p Venofer. Added b12 suppl.       #Abd pain  CT abd neg. Poor appetite, mild nausea fluctuating, GI in.

## 2019-04-02 NOTE — PROGRESS NOTE ADULT - SUBJECTIVE AND OBJECTIVE BOX
CHIEF COMPLAINT: NAD      HISTORY OF PRESENT ILLNESS  85 yo male c PMH of PE, DVT, COPD, (current smoker), alcohol use, and anemia presents with acute onset of left sided numbness, facial droop and dysarthria on 3/ . CT head on arrival was negative. 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 Patient states that numbness has subsided but his speech deficits persist and his balance is notably worse. At [place] evaluated by cardiology. TTE revealed no thrombus, EF 65%. Started with ASA for small vessel disease and stroke prevention  and Xarelto was restarted for  Hx of multiple DVTs. Patient stopped Xarelto one week prior  to admission. Stable to d/c to rehab on 3/22/19.   Of note, seen by neurology Dr Nye in 2017 for b/l leg weakness. MRI brain and spine was performed showing possible cervical myelopathy. (22 Mar 2019 14:54)      PAST MEDICAL & SURGICAL HISTORY:  Deep vein thrombosis (DVT)  HTN (hypertension)  Dyslipidemia  CAD (coronary artery disease)  Peripheral vascular disease  Parkinson disease  No significant past surgical history        REVIEW OF SYMPTOMS  [X] Constitutional WNL     [X] Cardio WNL                                    [X]  WNL                   [X] Heme WNL              [X] Endo WNL                     [X] Skin WNL                 [X] MSK WNL                VITALS  Vital Signs Last 24 Hrs  T(C): 36.6 (02 Apr 2019 07:57), Max: 37.6 (01 Apr 2019 15:38)  T(F): 97.9 (02 Apr 2019 07:57), Max: 99.6 (01 Apr 2019 15:38)  HR: 60 (02 Apr 2019 08:50) (60 - 70)  BP: 116/68 (02 Apr 2019 07:57) (116/68 - 116/70)  BP(mean): --  RR: 15 (02 Apr 2019 07:57) (15 - 16)  SpO2: 97% (02 Apr 2019 08:50) (97% - 99%)      PHYSICAL EXAM  Constitutional - NAD  HEENT - NCAT, EOMI  Neck - Supple, No limited ROM  Chest - CTA bilaterally, No wheeze, + rhonchi, No crackles  Cardiovascular - RRR, S1S2, No murmurs  Abdomen - BS+, Soft, NTND  Extremities - No C/C/E, No calf tenderness   Skin-no rash  Wounds-na      Neurologic Exam - no new deficits overnight                    Psychiatric - Mood irritable, Affect WNL, refused medications this am       FUNCTIONAL PROGRESS  Gait - 100ft min A c RW  ADLs - CG  Transfers -CG/min A  Functional transfer - CG/min A      RECENT LABS                        9.7    11.4  )-----------( 400      ( 02 Apr 2019 10:40 )             32.3     04-01    141  |  107  |  26<H>  ----------------------------<  90  3.9   |  25  |  1.14    Ca    8.4      01 Apr 2019 07:00    TPro  6.1  /  Alb  2.4<L>  /  TBili  0.9  /  DBili  x   /  AST  11  /  ALT  13  /  AlkPhos  83  04-01      LIVER FUNCTIONS - ( 01 Apr 2019 07:00 )  Alb: 2.4 g/dL / Pro: 6.1 g/dL / ALK PHOS: 83 U/L / ALT: 13 U/L DA / AST: 11 U/L / GGT: x             Direct LDL: 57 mg/dL (03-20-19 @ 07:10)    Hemoglobin A1C, Whole Blood: 4.9 % (03-19-19 @ 19:20)  Hemoglobin A1C, Whole Blood: 4.9 % (03-19-19 @ 19:20)              RADIOLOGY/OTHER RESULTS      CURRENT MEDICATIONS  MEDICATIONS  (STANDING):  aspirin  chewable 81 milliGRAM(s) Oral daily  atorvastatin 40 milliGRAM(s) Oral at bedtime  Biotene Dry Mouth Oral Rinse 5 milliLiter(s) Swish and Spit two times a day  buDESOnide 160 MICROgram(s)/formoterol 4.5 MICROgram(s) Inhaler 2 Puff(s) Inhalation two times a day  cyanocobalamin 1000 MICROGram(s) Oral daily  docusate sodium 100 milliGRAM(s) Oral daily  gabapentin 300 milliGRAM(s) Oral at bedtime  lactulose Syrup 15 Gram(s) Oral at bedtime  nicotine -   7 mG/24Hr(s) Patch 1 patch Transdermal daily  pantoprazole    Tablet 40 milliGRAM(s) Oral before breakfast  rivaroxaban 20 milliGRAM(s) Oral every 24 hours  tamsulosin 0.4 milliGRAM(s) Oral at bedtime    MEDICATIONS  (PRN):  ALBUTerol/ipratropium for Nebulization 3 milliLiter(s) Nebulizer every 6 hours PRN Shortness of Breath and/or Wheezing  melatonin 3 milliGRAM(s) Oral at bedtime PRN Insomnia  ondansetron    Tablet 4 milliGRAM(s) Oral every 6 hours PRN Nausea      ASSESSMENT & PLAN      GI/Bowel Management - AC resumed.   Management - Toilet Q2  Skin - Turn Q2  Pain - Tylenol PRN  DVT PPX - Xarelto resumed  Diet - dysphagia c strict aspiration, MBS     Continue comprehensive acute rehab program consisting of 3hrs/day of OT/PT and SLP.

## 2019-04-02 NOTE — CONSULT NOTE ADULT - SUBJECTIVE AND OBJECTIVE BOX
Source: patient, RN daughter  Reliability:yes    Identifying Data: 86yyo [sexual orientation and gender identity if nonbinary] [domicile status] [vocational status] [marital status] [race/ethnicity] Male with HEALTH ISSUES - PROBLEM Dx:  Anemia, unspecified type: Anemia, unspecified type  Nausea: Nausea  Constipation: Constipation  GI bleed: GI bleed          Chief Complaint: I want to be at home, i feel tired and do not like this food,, I will take medications later    History of Present Illness:  HPI:  87 yo male c PMH of PE, DVT, COPD, (current smoker), alcohol use, and anemia presents with acute onset of left sided numbness, facial droop and dysarthria on 3/ . CT head on arrival was negative. 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 Patient states that numbness has subsided but his speech deficits persist and his balance is notably worse. At [place] evaluated by cardiology. TTE revealed no thrombus, EF 65%. Started with ASA for small vessel disease and stroke prevention  and Xarelto was restarted for  Hx of multiple DVTs. Patient stopped Xarelto one week prior  to admission. Stable to d/c to rehab on 3/22/19.   Of note, seen by neurology Dr Nye in 2017 for b/l leg weakness. MRI brain and spine was performed showing possible cervical myelopathy. (22 Mar 2019 14:54)      Psychiatric History of Present Illness: Denies            Psychiatric Review of Systems:        Suicidality:none    Mental Status Exam:  General Appearance: looks stated age  Remarkable Features:  Affect: sad  Mood:sad  Psychomotor state:   Abnormal Movements and posture: laying in a bed  Cognition, Perceptual Abnormalities  Consciousness:alert  Orientation: x2,5  Memory: Recent/Past/Remote: limited  Attention: fair  Judgment/Insight:poor  Fund of Information/Intelligence:good  List abnormalities:  Hallucinations: Auditory/visual/tactile/olfactory/other not elicited  Delusions: Persecutory/somatic; not elicited                  Medications:  MEDICATIONS  (STANDING):  aspirin  chewable 81 milliGRAM(s) Oral daily  atorvastatin 40 milliGRAM(s) Oral at bedtime  Biotene Dry Mouth Oral Rinse 5 milliLiter(s) Swish and Spit two times a day  buDESOnide 160 MICROgram(s)/formoterol 4.5 MICROgram(s) Inhaler 2 Puff(s) Inhalation two times a day  cyanocobalamin 1000 MICROGram(s) Oral daily  docusate sodium 100 milliGRAM(s) Oral daily  gabapentin 300 milliGRAM(s) Oral at bedtime  lactulose Syrup 15 Gram(s) Oral at bedtime  nicotine -   7 mG/24Hr(s) Patch 1 patch Transdermal daily  pantoprazole    Tablet 40 milliGRAM(s) Oral before breakfast  rivaroxaban 20 milliGRAM(s) Oral every 24 hours  tamsulosin 0.4 milliGRAM(s) Oral at bedtime    MEDICATIONS  (PRN):  ALBUTerol/ipratropium for Nebulization 3 milliLiter(s) Nebulizer every 6 hours PRN Shortness of Breath and/or Wheezing  melatonin 3 milliGRAM(s) Oral at bedtime PRN Insomnia  ondansetron    Tablet 4 milliGRAM(s) Oral every 6 hours PRN Nausea          Past Medical and Surgical History:  PAST MEDICAL & SURGICAL HISTORY:  Deep vein thrombosis (DVT)  HTN (hypertension)  Dyslipidemia  CAD (coronary artery disease)  Peripheral vascular disease  Parkinson disease  No significant past surgical history      Past Psychiatric History:denies                 Substance Use History: As per family has been drinking vine  at lunch and dinner for long time              Social and Personal History: [employer], lives at home with his wife , has 3 chidren ,2 alive , 4 grandchildren    Family History:  FAMILY HISTORY:  No pertinent family history in first degree relatives none         T(C): 36.8 (04-02-19 @ 20:26), Max: 36.8 (04-02-19 @ 20:26)  HR: 69 (04-02-19 @ 20:26) (60 - 69)  BP: 107/53 (04-02-19 @ 20:26) (107/53 - 116/68)  RR: 14 (04-02-19 @ 20:26) (14 - 15)  SpO2: 95% (04-02-19 @ 20:26) (95% - 97%)  Wt(kg): --          Laboratory testing-                        9.7    11.4  )-----------( 400      ( 02 Apr 2019 10:40 )             32.3     04-01    141  |  107  |  26<H>  ----------------------------<  90  3.9   |  25  |  1.14    Ca    8.4      01 Apr 2019 07:00    TPro  6.1  /  Alb  2.4<L>  /  TBili  0.9  /  DBili  x   /  AST  11  /  ALT  13  /  AlkPhos  83  04-01    CAPILLARY BLOOD GLUCOSE        LIVER FUNCTIONS - ( 01 Apr 2019 07:00 )  Alb: 2.4 g/dL / Pro: 6.1 g/dL / ALK PHOS: 83 U/L / ALT: 13 U/L DA / AST: 11 U/L / GGT: x                         Psychiatric Diagnosis: Adjustment disorder with mixed emotions    Recommendations:  may benefit from transfer  to [facility], as per family wishes.      Other Treatment considerations- trial of antidepressant suggested, but daughter is against .

## 2019-04-02 NOTE — CHART NOTE - NSCHARTNOTEFT_GEN_A_CORE
Assessment:   Patient seen for: F/U    Source: [x] medical review, [x] patient, [x] family member: daughter at the bed side    Factors impacting intake: [x] swallowing issues     Intake: Pt observed at the interview with good appetite consuming home foods (pasta). Pt doesn't like the in house foods. As per RN last week pt refused the supplements. Pt stated today that he wants them in chocolate flavor. Pt gets irritated and is not able to express his food preferences. Daughter at the bed side was offered to fill up pt's food preferences in advance. Pt complained about constipation. Last BM: 03/29. Adding vegetables to his foods was suggested or fruit shakes, but pt doesn't like them. Pt in honey thick fluids is observed with dehydration. Daughter requested re-evaluation of swallow test.     Diet Prescription: Diet, Dysphagia 2 Mechanical Soft-Honey Consistency Fluid:   DASH/TLC {Sodium & Cholesterol Restricted}  Supplement Feeding Modality:  Oral  Ensure Enlive Servings Per Day:  1       Frequency:  Two Times a day (03-29-19 @ 16:53)      Current Weight: Weight (kg): 67.9 kg (04/01)  % Weight Change  + 3.8 % (gained 4.6 lbs) likely to constipation     Pertinent Medications: MEDICATIONS  (STANDING):  aspirin  chewable 81 milliGRAM(s) Oral daily  atorvastatin 40 milliGRAM(s) Oral at bedtime  Biotene Dry Mouth Oral Rinse 5 milliLiter(s) Swish and Spit two times a day  buDESOnide 160 MICROgram(s)/formoterol 4.5 MICROgram(s) Inhaler 2 Puff(s) Inhalation two times a day  cyanocobalamin 1000 MICROGram(s) Oral daily  docusate sodium 100 milliGRAM(s) Oral daily  gabapentin 300 milliGRAM(s) Oral at bedtime  lactulose Syrup 15 Gram(s) Oral at bedtime  nicotine -   7 mG/24Hr(s) Patch 1 patch Transdermal daily  pantoprazole    Tablet 40 milliGRAM(s) Oral before breakfast  rivaroxaban 20 milliGRAM(s) Oral every 24 hours  tamsulosin 0.4 milliGRAM(s) Oral at bedtime    MEDICATIONS  (PRN):  ALBUTerol/ipratropium for Nebulization 3 milliLiter(s) Nebulizer every 6 hours PRN Shortness of Breath and/or Wheezing  melatonin 3 milliGRAM(s) Oral at bedtime PRN Insomnia  ondansetron    Tablet 4 milliGRAM(s) Oral every 6 hours PRN Nausea    Pertinent Labs: 04-01 Na141 mmol/L Glu 90 mg/dL K+ 3.9 mmol/L Cr  1.14 mg/dL BUN 26 mg/dL<H> 04-01 Alb 2.4 g/dL<L> 03-19 [id number] 4.9 % 03-20 Chol 113 mg/dL LDL 57 mg/dL HDL 39 mg/dL<L> Trig 84 mg/dL    CAPILLARY BLOOD GLUCOSE    Skin: intact    Edema: none    Estimated Needs:   [x] no change since previous assessment  [ ] recalculated:     Previous Nutrition Diagnosis:   [x] Malnutrition Moderate    Nutrition Diagnosis is [x] ongoing     Interventions:   Recommend  [ ] Change Diet To:  [x] Continue with current diet: Dysphagia 2 Mechanical Soft-Honey Consistency Fluid, DASH/TLC {Sodium & Cholesterol Restricted}  [x] Nutrition Supplement: Ensure BID    [x] Other:     Monitoring and Evaluation:   Continue to monitor Nutritional intake, Tolerance to diet prescription, weights, labs, skin integrity.  other:  RD remains available upon request and will follow up per protocol.

## 2019-04-03 ENCOUNTER — TRANSCRIPTION ENCOUNTER (OUTPATIENT)
Age: 84
End: 2019-04-03

## 2019-04-03 VITALS
TEMPERATURE: 98 F | RESPIRATION RATE: 14 BRPM | SYSTOLIC BLOOD PRESSURE: 117 MMHG | OXYGEN SATURATION: 100 % | DIASTOLIC BLOOD PRESSURE: 58 MMHG | HEART RATE: 59 BPM

## 2019-04-03 LAB
ALBUMIN SERPL ELPH-MCNC: 2.2 G/DL — LOW (ref 3.3–5)
ALP SERPL-CCNC: 78 U/L — SIGNIFICANT CHANGE UP (ref 40–120)
ALT FLD-CCNC: 16 U/L DA — SIGNIFICANT CHANGE UP (ref 10–45)
ANION GAP SERPL CALC-SCNC: 11 MMOL/L — SIGNIFICANT CHANGE UP (ref 5–17)
AST SERPL-CCNC: 16 U/L — SIGNIFICANT CHANGE UP (ref 10–40)
BILIRUB SERPL-MCNC: 0.8 MG/DL — SIGNIFICANT CHANGE UP (ref 0.2–1.2)
BUN SERPL-MCNC: 29 MG/DL — HIGH (ref 7–23)
CALCIUM SERPL-MCNC: 8.3 MG/DL — LOW (ref 8.4–10.5)
CHLORIDE SERPL-SCNC: 109 MMOL/L — HIGH (ref 96–108)
CO2 SERPL-SCNC: 23 MMOL/L — SIGNIFICANT CHANGE UP (ref 22–31)
CREAT SERPL-MCNC: 1.08 MG/DL — SIGNIFICANT CHANGE UP (ref 0.5–1.3)
GLUCOSE SERPL-MCNC: 99 MG/DL — SIGNIFICANT CHANGE UP (ref 70–99)
HCT VFR BLD CALC: 27.5 % — LOW (ref 39–50)
HGB BLD-MCNC: 8.5 G/DL — LOW (ref 13–17)
MCHC RBC-ENTMCNC: 25.7 PG — LOW (ref 27–34)
MCHC RBC-ENTMCNC: 30.8 GM/DL — LOW (ref 32–36)
MCV RBC AUTO: 83.4 FL — SIGNIFICANT CHANGE UP (ref 80–100)
PLATELET # BLD AUTO: 330 K/UL — SIGNIFICANT CHANGE UP (ref 150–400)
POTASSIUM SERPL-MCNC: 3.9 MMOL/L — SIGNIFICANT CHANGE UP (ref 3.5–5.3)
POTASSIUM SERPL-SCNC: 3.9 MMOL/L — SIGNIFICANT CHANGE UP (ref 3.5–5.3)
PROT SERPL-MCNC: 6.2 G/DL — SIGNIFICANT CHANGE UP (ref 6–8.3)
RBC # BLD: 3.3 M/UL — LOW (ref 4.2–5.8)
RBC # FLD: 18.8 % — HIGH (ref 10.3–14.5)
SODIUM SERPL-SCNC: 143 MMOL/L — SIGNIFICANT CHANGE UP (ref 135–145)
WBC # BLD: 11.7 K/UL — HIGH (ref 3.8–10.5)
WBC # FLD AUTO: 11.7 K/UL — HIGH (ref 3.8–10.5)

## 2019-04-03 PROCEDURE — 90791 PSYCH DIAGNOSTIC EVALUATION: CPT

## 2019-04-03 PROCEDURE — 99233 SBSQ HOSP IP/OBS HIGH 50: CPT

## 2019-04-03 PROCEDURE — 74230 X-RAY XM SWLNG FUNCJ C+: CPT | Mod: 26

## 2019-04-03 RX ORDER — TAMSULOSIN HYDROCHLORIDE 0.4 MG/1
1 CAPSULE ORAL
Qty: 30 | Refills: 0
Start: 2019-04-03 | End: 2019-05-02

## 2019-04-03 RX ORDER — ASPIRIN/CALCIUM CARB/MAGNESIUM 324 MG
1 TABLET ORAL
Qty: 30 | Refills: 0
Start: 2019-04-03 | End: 2019-05-02

## 2019-04-03 RX ORDER — NICOTINE POLACRILEX 2 MG
1 GUM BUCCAL
Qty: 30 | Refills: 0
Start: 2019-04-03 | End: 2019-05-02

## 2019-04-03 RX ORDER — PREGABALIN 225 MG/1
1 CAPSULE ORAL
Qty: 30 | Refills: 0
Start: 2019-04-03 | End: 2019-05-02

## 2019-04-03 RX ORDER — GABAPENTIN 400 MG/1
1 CAPSULE ORAL
Qty: 30 | Refills: 0
Start: 2019-04-03 | End: 2019-05-02

## 2019-04-03 RX ORDER — OLANZAPINE 15 MG/1
2.5 TABLET, FILM COATED ORAL ONCE
Qty: 0 | Refills: 0 | Status: COMPLETED | OUTPATIENT
Start: 2019-04-03 | End: 2019-04-03

## 2019-04-03 RX ORDER — RIVAROXABAN 15 MG-20MG
1 KIT ORAL
Qty: 30 | Refills: 0
Start: 2019-04-03 | End: 2019-05-02

## 2019-04-03 RX ORDER — PANTOPRAZOLE SODIUM 20 MG/1
1 TABLET, DELAYED RELEASE ORAL
Qty: 30 | Refills: 0
Start: 2019-04-03 | End: 2019-05-02

## 2019-04-03 RX ORDER — ATORVASTATIN CALCIUM 80 MG/1
1 TABLET, FILM COATED ORAL
Qty: 30 | Refills: 0
Start: 2019-04-03 | End: 2019-05-02

## 2019-04-03 RX ADMIN — Medication 5 MILLILITER(S): at 06:09

## 2019-04-03 RX ADMIN — BUDESONIDE AND FORMOTEROL FUMARATE DIHYDRATE 2 PUFF(S): 160; 4.5 AEROSOL RESPIRATORY (INHALATION) at 09:09

## 2019-04-03 RX ADMIN — PANTOPRAZOLE SODIUM 40 MILLIGRAM(S): 20 TABLET, DELAYED RELEASE ORAL at 06:09

## 2019-04-03 RX ADMIN — Medication 1 PATCH: at 06:12

## 2019-04-03 NOTE — CONSULT NOTE ADULT - NSHPATTENDINGPLANDISCUSS_GEN_ALL_CORE
Case discussed with Dr. Humphries, Case discussed with case coordinator and patient's daughter Ella.
renato

## 2019-04-03 NOTE — PROGRESS NOTE ADULT - ASSESSMENT
87 yo male c hx of COPD, PE, DVT, current smoker s/p right pontine cva and now with functional deficits.   Precautions:    fall, aspiration                                                                              Diet: reg c thins    DVT Prophylaxis:        xarelto                                                                  Medical Prognosis: good    Prescreen Comparison: I have reviewed the prescreen information and I found no relevant changes between the preadmission  screening and my post admission evaluation.     #CVA   ASA and  statin, AC resumed per GI rec's.   continue PT/OT/SLP  fall and aspiration precautions, MBS today completed , diet advanced to dysphagia diet with nectar liquids    #smoker  Nicotine patch, smoking cessation education    # history of multiple DVTs  -xarelto resumed, repeat CBC, guaiac+. Clear liquids.     #anemia   HH stable, FOBS+, CT Abd neg findings. GI  and heme are following., cleared for full dose AC

## 2019-04-03 NOTE — PROGRESS NOTE ADULT - ATTENDING COMMENTS
Maintain fall, aspiration decub precautions at all times  Yisel Gardner MD
Maintain fall, aspiration decub precautions at all times  Yisel Gardner MD
Maintain fall, aspiration decub precautions at all times  Yisel aGrdner MD
Patient's family, wife and daughter, are at bedside.  Given patient's presentation, I asked Psychiatry to evaluate patient's competency for medical decision making, as well as discharge planning. Case discussed with Dr. Chase.  She determined that patient lacks that type of capacity. Dr. Chase, SW and I explained to patient , his wife and daughter that he needs 24/7 assistance with ambulation, transfers and self care due to high risk of falls and injury, especially being on full dose AC. Patient would benefit from additional therapy at [facility]. Family refused ДМИТРИЙ placement. Daughter and wife verbalized understanding. Daughter's response : " If he falls he falls. It is all in God's hands." All questions answered, family again verbalized understanding.  Daughter and wife were notified by multiple staff members, including physicians and myself, that given these circumstances patient is being discharged against medical advice with family assuming all responsibility for all possible bad outcomes, including life threatening injury.  All medications were sent to pharmacy. Equipment ordered. Family is refusing Home care services at present.
No acute events overnight  Stable neurologically  Tolerates program  Full dose AC, no signs of toxicity  Medicine, Hematology and GI input noted
Better day today, denies abdominal pain, no nausea., had a large BM yesterday.  Stable neurologically and participates in therapy.  Anemia is stable, Hematology and GI input noted.    Multidisciplinary team meeting today:  patient's functional goals and needs, functional and clinical  progress were discussed, barriers to discharge were identified. Anticipate discharge home with home care, 24/7 supervision for safety,    EDOD 4/5/19
EGD results discussed with GI, negative study. Colonoscopy was nor performed due to poor prep. OK ro restart full dose AC abd diet, as per GI.  Remains neurologically unchanged.  Will continue to monitor cbc, Medicine and Hematology are following
Patient medically  and neurologically stable. Making progress towards rehab goals.   Continue rehab program. Discharge plan discussed with team, CANDY
Stable neurologically, participates in therapy.  Drop in H/H over several days noted, Guaiac positive on Xarelto, ASA and now on Prednisone course. GI ppx is in  place .  Had a BM, negative Abd CT. GI and Hematology called to evaluate. Medicine input noted.
Consultant notes reviewed : YES [x ] ; NO [ ]
Consultant notes reviewed : YES [x ] ; NO [ ]  Case discussed with attending physician / consultant: YES [x ] ; NO [ ]
Stable neurologically and participates in therapy.  Anemia is being investigated by GI, Hematology and Medicine. Case discussed with all consultants.   Upper and Lower endoscopy in the morning scheduled.  AC is held for procedure  IV Venofer given  No acute GI bleed at present
Abdominal pain without nausea/ vomiting, had a BM last night, voids well  Case discussed with Medicine and Abdominal CT ordered.  Labs reviewed   All medication unchanged, full dose AC/ for h/o DVT  GI ppx, Stool Guaiacs   Steroids trial ordered by Medicine for COPD  Case management discussed with team, CANDY
Patient seen and examined.  Agree with assessment and plan as above.    Patient has no acute complaints.  Tolerating PO intake without difficulty.  No abdominal pain, BRBPR or melena.    Monitor Hgb/Hct & bowel movements for now.  Diet as tolerated.  May continue antiplatelet and anticoagulants as necessary.
Patient seen and examined.  Agree with assessment and plan as above.    Patient requires to be on anticoagulation and antiplatlet agents.  No abdominal pain, BRBPR or melena, however fecal occult blood is positive.    EGD & Colonoscopy planned for tomorrow.
Patient seen and examined.  Agree with assessment and plan as above.  Patient continues to do well.  No new complaints.    VSS.  Abdomen soft, nontender to palpation.    Continue Aspirin and Xarelto for now.

## 2019-04-03 NOTE — PROGRESS NOTE ADULT - PROVIDER SPECIALTY LIST ADULT
Gastroenterology
Heme/Onc
Hospitalist
Neurology
Rehab Medicine
Neurology
Gastroenterology
Gastroenterology

## 2019-04-03 NOTE — PROGRESS NOTE ADULT - SUBJECTIVE AND OBJECTIVE BOX
CHIEF COMPLAINT: Agitated today, refusing therapy and medications. Insists on going home now. Confrontational and  angry. " I am not sick. You are keeping me here against my will".      HISTORY OF PRESENT ILLNESS    85 yo male c PMH of PE, DVT, COPD, (current smoker), alcohol use, and anemia presents with acute onset of left sided numbness, facial droop and dysarthria on 3/ . CT head on arrival was negative. 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 Patient states that numbness has subsided but his speech deficits persist and his balance is notably worse. At [place] evaluated by cardiology. TTE revealed no thrombus, EF 65%. Started with ASA for small vessel disease and stroke prevention  and Xarelto was restarted for  Hx of multiple DVTs. Patient stopped Xarelto one week prior  to admission. Stable to d/c to rehab on 3/22/19.   Of note, seen by neurology Dr Nye in 2017 for b/l leg weakness. MRI brain and spine was performed showing possible cervical myelopathy. (22 Mar 2019 14:54)        PAST MEDICAL & SURGICAL HISTORY:  Deep vein thrombosis (DVT)  HTN (hypertension)  Dyslipidemia  CAD (coronary artery disease)  Peripheral vascular disease  Parkinson disease  No significant past surgical history        VITALS  Vital Signs Last 24 Hrs  T(C): 36.6 (03 Apr 2019 07:22), Max: 36.8 (02 Apr 2019 20:26)  T(F): 97.8 (03 Apr 2019 07:22), Max: 98.2 (02 Apr 2019 20:26)  HR: 59 (03 Apr 2019 07:22) (59 - 69)  BP: 117/58 (03 Apr 2019 07:22) (107/53 - 117/58)  BP(mean): --  RR: 14 (03 Apr 2019 07:22) (14 - 14)  SpO2: 100% (03 Apr 2019 07:22) (95% - 100%)      PHYSICAL EXAM  Constitutional - NAD, Comfortable  HEENT - NCAT, EOMI  Neck - Supple, No limited ROM  Chest - CTA bilaterally,  Cardiovascular - RRR, S1S2, No murmurs  Abdomen - BS+, Soft, NTND  Extremities - No C/C/E, No calf tenderness   Skin-no rash  Neurologic Exam -  no new focal deficits                   RECENT LABS                        8.5    11.7  )-----------( 330      ( 03 Apr 2019 06:00 )             27.5     04-03    143  |  109<H>  |  29<H>  ----------------------------<  99  3.9   |  23  |  1.08    Ca    8.3<L>      03 Apr 2019 06:00    TPro  6.2  /  Alb  2.2<L>  /  TBili  0.8  /  DBili  x   /  AST  16  /  ALT  16  /  AlkPhos  78  04-03      LIVER FUNCTIONS - ( 03 Apr 2019 06:00 )  Alb: 2.2 g/dL / Pro: 6.2 g/dL / ALK PHOS: 78 U/L / ALT: 16 U/L DA / AST: 16 U/L / GGT: x             Direct LDL: 57 mg/dL (03-20-19 @ 07:10)    Hemoglobin A1C, Whole Blood: 4.9 % (03-19-19 @ 19:20)  Hemoglobin A1C, Whole Blood: 4.9 % (03-19-19 @ 19:20)      CURRENT MEDICATIONS  MEDICATIONS  (STANDING):  aspirin  chewable 81 milliGRAM(s) Oral daily  atorvastatin 40 milliGRAM(s) Oral at bedtime  Biotene Dry Mouth Oral Rinse 5 milliLiter(s) Swish and Spit two times a day  buDESOnide 160 MICROgram(s)/formoterol 4.5 MICROgram(s) Inhaler 2 Puff(s) Inhalation two times a day  cyanocobalamin 1000 MICROGram(s) Oral daily  docusate sodium 100 milliGRAM(s) Oral daily  gabapentin 300 milliGRAM(s) Oral at bedtime  lactulose Syrup 15 Gram(s) Oral at bedtime  nicotine -   7 mG/24Hr(s) Patch 1 patch Transdermal daily  pantoprazole    Tablet 40 milliGRAM(s) Oral before breakfast  rivaroxaban 20 milliGRAM(s) Oral every 24 hours  tamsulosin 0.4 milliGRAM(s) Oral at bedtime    MEDICATIONS  (PRN):  ALBUTerol/ipratropium for Nebulization 3 milliLiter(s) Nebulizer every 6 hours PRN Shortness of Breath and/or Wheezing  melatonin 3 milliGRAM(s) Oral at bedtime PRN Insomnia  ondansetron    Tablet 4 milliGRAM(s) Oral every 6 hours PRN Nausea

## 2019-04-03 NOTE — DISCHARGE NOTE NURSING/CASE MANAGEMENT/SOCIAL WORK - NSDCPEPTSTRK_GEN_ALL_CORE
Signs and symptoms of stroke/Call 911 for stroke/Stroke support groups for patients, families, and friends/Prescribed medications/Risk factors for stroke/Stroke education booklet/Need for follow up after discharge/Stroke warning signs and symptoms

## 2019-04-03 NOTE — CONSULT NOTE ADULT - SUBJECTIVE AND OBJECTIVE BOX
Psychiatry Consultation-Liaison Follow-up Note:  85yo Male who presents s/p Right Pontine CVA  Encounter: At the bedside on BIU  Chart reviewed.	  Contacted: Daughter Ella for collateral and substitutive judgement [phone]  Case Discussed with: Nursing staff/Treatment Team/Attending of Record.     Interval History:  87 yo male c PMH of PE, DVT, COPD, (current smoker), alcohol use, and anemia presents with acute onset of left sided numbness, facial droop and dysarthria on 3/ . CT head on arrival was negative. 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 Patient states that numbness has subsided but his speech deficits persist and his balance is notably worse. At [place] evaluated by cardiology. TTE revealed no thrombus, EF 65%. Started with ASA for small vessel disease and stroke prevention  and Xarelto was restarted for  Hx of multiple DVTs. Patient stopped Xarelto one week prior  to admission. Stable to d/c to rehab on 3/22/19.   Of note, seen by neurology Dr Nye in 2017 for b/l leg weakness. MRI brain and spine was performed showing possible cervical myelopathy. (22 Mar 2019 14:54)          Symptom progression/resolution:  Pt has refused PT the past two days and per family , his appetite is poor. Patients chief complaint to writer was " I want nothing, nothing, nothing, I want to go home!"  Pt was agitated, and was offered po Zyprexa which he refused to take. Pt seen in consultation by psychiatry last pm, Dr. Coburn who felt pt was a risk for a mood disorder post CVA and pt also has hx of alcohol use twice per day. She stated to writer in her estimation clinically patient does not have decision making capacity. Pt today told writer he was not sick, he did not need help and he was able to return home and function normally. Pt appears to evidence a choice, however as per treatment team he remains a significant falls risk, has not met treatment goals, and that he would optimally benefit from ДМИТРИЙ as opposed to d/c from acute rehab to home.       MEDICATIONS  (STANDING):  aspirin  chewable 81 milliGRAM(s) Oral daily  atorvastatin 40 milliGRAM(s) Oral at bedtime  Biotene Dry Mouth Oral Rinse 5 milliLiter(s) Swish and Spit two times a day  buDESOnide 160 MICROgram(s)/formoterol 4.5 MICROgram(s) Inhaler 2 Puff(s) Inhalation two times a day  cyanocobalamin 1000 MICROGram(s) Oral daily  docusate sodium 100 milliGRAM(s) Oral daily  gabapentin 300 milliGRAM(s) Oral at bedtime  lactulose Syrup 15 Gram(s) Oral at bedtime  nicotine -   7 mG/24Hr(s) Patch 1 patch Transdermal daily  pantoprazole    Tablet 40 milliGRAM(s) Oral before breakfast  rivaroxaban 20 milliGRAM(s) Oral every 24 hours  tamsulosin 0.4 milliGRAM(s) Oral at bedtime    MEDICATIONS  (PRN):  ALBUTerol/ipratropium for Nebulization 3 milliLiter(s) Nebulizer every 6 hours PRN Shortness of Breath and/or Wheezing  melatonin 3 milliGRAM(s) Oral at bedtime PRN Insomnia  ondansetron    Tablet 4 milliGRAM(s) Oral every 6 hours PRN Nausea      Mental Status Examination:  General Appearance: Elderly male , dressed in hospital scrubs, makes fair eye contact.   Remarkable Features: none  Psychomotor State: moves upper extremities freely, deficits as per physiatry notes.   Abnormal movements and posture: dysarthric speech as compared to baseline, facial weakness.   Social interaction and affect: " I'm not sick, I'm not crazy!" , Restricted.   Cognition, perceptual abnormalities: none noted.   Consciousness: Alert  Orientation:to person, and date.  Memory: Recent/Past/Remote: unable to test, but there has been documentation of his refusal of medications ( even Zarelto) and he wants food from home, despite speech /swallowing evaluations regarding dietary recommendations.   Attention: inattentive and ended interview and refused to speak further.   Naming/Repitition/Comprehension: refused to participate in formal testing.   Insight/Judgment: Limited, superficial.     Studies:    Laboratory testing-                        8.5    11.7  )-----------( 330      ( 03 Apr 2019 06:00 )             27.5     04-03    143  |  109<H>  |  29<H>  ----------------------------<  99  3.9   |  23  |  1.08    Ca    8.3<L>      03 Apr 2019 06:00    TPro  6.2  /  Alb  2.2<L>  /  TBili  0.8  /  DBili  x   /  AST  16  /  ALT  16  /  AlkPhos  78  04-03      LIVER FUNCTIONS - ( 03 Apr 2019 06:00 )  Alb: 2.2 g/dL / Pro: 6.2 g/dL / ALK PHOS: 78 U/L / ALT: 16 U/L DA / AST: 16 U/L / GGT: x           Vital Signs Last 24 Hrs  T(C): 36.6 (03 Apr 2019 07:22), Max: 36.8 (02 Apr 2019 20:26)  T(F): 97.8 (03 Apr 2019 07:22), Max: 98.2 (02 Apr 2019 20:26)  HR: 59 (03 Apr 2019 07:22) (59 - 69)  BP: 117/58 (03 Apr 2019 07:22) (107/53 - 117/58)  BP(mean): --  RR: 14 (03 Apr 2019 07:22) (14 - 14)  SpO2: 100% (03 Apr 2019 07:22) (95% - 100%)    Psychiatric Diagnosis: 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    Recommendations:  Pt not willing at this point to take medication  to treat a mood disorder.   Pt's daughter reports she is aware of risks of his discharge to home and supports her father's decision to be home as opposed to ДМИТРИЙ.  Writer reiterated that this would be against medical advice, Ella stated she is aware of the teams concerns, but states her father   just wants to be home to resume his "regular routine."         Guidance for team/ family management-Family took my contact information , should they have questions upon return home.     Guidance for patient management-seek substitutive judgment.         Hospital course Follow-up: Pt does not seem to have understanding or appreciation of being incapacitated, with PT present in the room ,  he refused any intervention -even doing exercises at the bedside for strengthening. Patient demonstrates a consistent choice, but level of denial of illness interferes with his understanding of risks involved with lack of further medical intervention.

## 2019-04-03 NOTE — DISCHARGE NOTE NURSING/CASE MANAGEMENT/SOCIAL WORK - NSDCDPATPORTLINK_GEN_ALL_CORE
You can access the [facility] Patient Portal, offered by [facility], by registering with the following website: [email or web address]

## 2019-04-03 NOTE — DISCHARGE NOTE NURSING/CASE MANAGEMENT/SOCIAL WORK - NSDCDMETYPESERV_GEN_ALL_CORE_FT
commode to be delivered to pt's home (pt signed out AMA, no time to have commode delivered to hospital)

## 2019-04-03 NOTE — CONSULT NOTE ADULT - REASON FOR ADMISSION
s/p Right pontine CVA c functional deficits

## 2019-04-03 NOTE — PROGRESS NOTE ADULT - REASON FOR ADMISSION
s/p Right pontine CVA c functional deficits
s/p Right pontine CVA  with functional deficits
s/p Right pontine CVA c functional deficits
s/p Right pontine CVA with functional deficits
s/p Right pontine CVA with functional deficits
s/p Right pontine CVA c functional deficits

## 2019-04-04 DIAGNOSIS — I63.9 CEREBRAL INFARCTION, UNSPECIFIED: ICD-10-CM

## 2019-04-04 RX ORDER — RIVAROXABAN 15 MG/1
15 TABLET, FILM COATED ORAL
Qty: 42 | Refills: 0 | Status: COMPLETED | COMMUNITY
Start: 2018-08-27 | End: 2019-04-04

## 2019-04-04 RX ORDER — METOPROLOL SUCCINATE 25 MG/1
25 TABLET, EXTENDED RELEASE ORAL
Qty: 15 | Refills: 5 | Status: COMPLETED | COMMUNITY
End: 2019-04-04

## 2019-04-04 RX ORDER — CILOSTAZOL 100 MG/1
100 TABLET ORAL
Qty: 60 | Refills: 3 | Status: COMPLETED | COMMUNITY
Start: 2018-09-23 | End: 2019-04-04

## 2019-04-04 RX ORDER — NICOTINE TRANSDERMAL SYSTEM 7 MG/24H
7 PATCH, EXTENDED RELEASE TRANSDERMAL DAILY
Refills: 0 | Status: ACTIVE | COMMUNITY

## 2019-04-04 RX ORDER — FUROSEMIDE 20 MG/1
20 TABLET ORAL DAILY
Qty: 30 | Refills: 0 | Status: COMPLETED | COMMUNITY
Start: 2018-09-12 | End: 2019-04-04

## 2019-04-04 RX ORDER — PREDNISONE 10 MG/1
10 TABLET ORAL 3 TIMES DAILY
Qty: 90 | Refills: 1 | Status: COMPLETED | COMMUNITY
Start: 2018-06-25 | End: 2019-04-04

## 2019-04-04 RX ORDER — GABAPENTIN 300 MG/1
300 CAPSULE ORAL
Qty: 60 | Refills: 3 | Status: COMPLETED | COMMUNITY
Start: 2019-02-13 | End: 2019-04-04

## 2019-04-04 RX ORDER — PANTOPRAZOLE 40 MG/1
40 TABLET, DELAYED RELEASE ORAL
Refills: 0 | Status: ACTIVE | COMMUNITY

## 2019-04-04 RX ORDER — CILOSTAZOL 100 MG/1
100 TABLET ORAL TWICE DAILY
Refills: 0 | Status: COMPLETED | COMMUNITY
End: 2019-04-04

## 2019-04-04 RX ORDER — PROMETHAZINE HYDROCHLORIDE AND DEXTROMETHORPHAN HYDROBROMIDE ORAL SOLUTION 15; 6.25 MG/5ML; MG/5ML
6.25-15 SOLUTION ORAL
Qty: 360 | Refills: 1 | Status: COMPLETED | COMMUNITY
Start: 2018-10-31 | End: 2019-04-04

## 2019-04-09 ENCOUNTER — APPOINTMENT (OUTPATIENT)
Dept: FAMILY MEDICINE | Facility: HOME HEALTH | Age: 84
End: 2019-04-09
Payer: MEDICARE

## 2019-04-09 PROCEDURE — 97530 THERAPEUTIC ACTIVITIES: CPT

## 2019-04-09 PROCEDURE — 97116 GAIT TRAINING THERAPY: CPT

## 2019-04-09 PROCEDURE — 82272 OCCULT BLD FECES 1-3 TESTS: CPT

## 2019-04-09 PROCEDURE — 92523 SPEECH SOUND LANG COMPREHEN: CPT

## 2019-04-09 PROCEDURE — 97110 THERAPEUTIC EXERCISES: CPT

## 2019-04-09 PROCEDURE — 82962 GLUCOSE BLOOD TEST: CPT

## 2019-04-09 PROCEDURE — 82746 ASSAY OF FOLIC ACID SERUM: CPT

## 2019-04-09 PROCEDURE — 92526 ORAL FUNCTION THERAPY: CPT

## 2019-04-09 PROCEDURE — 86850 RBC ANTIBODY SCREEN: CPT

## 2019-04-09 PROCEDURE — 83550 IRON BINDING TEST: CPT

## 2019-04-09 PROCEDURE — 92611 MOTION FLUOROSCOPY/SWALLOW: CPT

## 2019-04-09 PROCEDURE — 92610 EVALUATE SWALLOWING FUNCTION: CPT

## 2019-04-09 PROCEDURE — 83540 ASSAY OF IRON: CPT

## 2019-04-09 PROCEDURE — 85027 COMPLETE CBC AUTOMATED: CPT

## 2019-04-09 PROCEDURE — 82728 ASSAY OF FERRITIN: CPT

## 2019-04-09 PROCEDURE — 88104 CYTOPATH FL NONGYN SMEARS: CPT

## 2019-04-09 PROCEDURE — 82607 VITAMIN B-12: CPT

## 2019-04-09 PROCEDURE — 97535 SELF CARE MNGMENT TRAINING: CPT

## 2019-04-09 PROCEDURE — 92507 TX SP LANG VOICE COMM INDIV: CPT

## 2019-04-09 PROCEDURE — 71045 X-RAY EXAM CHEST 1 VIEW: CPT

## 2019-04-09 PROCEDURE — 83010 ASSAY OF HAPTOGLOBIN QUANT: CPT

## 2019-04-09 PROCEDURE — 86870 RBC ANTIBODY IDENTIFICATION: CPT

## 2019-04-09 PROCEDURE — 86880 COOMBS TEST DIRECT: CPT

## 2019-04-09 PROCEDURE — 86901 BLOOD TYPING SEROLOGIC RH(D): CPT

## 2019-04-09 PROCEDURE — 80053 COMPREHEN METABOLIC PANEL: CPT

## 2019-04-09 PROCEDURE — 93005 ELECTROCARDIOGRAM TRACING: CPT

## 2019-04-09 PROCEDURE — 86905 BLOOD TYPING RBC ANTIGENS: CPT

## 2019-04-09 PROCEDURE — 99349 HOME/RES VST EST MOD MDM 40: CPT

## 2019-04-09 PROCEDURE — 86900 BLOOD TYPING SEROLOGIC ABO: CPT

## 2019-04-09 PROCEDURE — 74230 X-RAY XM SWLNG FUNCJ C+: CPT

## 2019-04-09 PROCEDURE — 97167 OT EVAL HIGH COMPLEX 60 MIN: CPT

## 2019-04-09 PROCEDURE — 85045 AUTOMATED RETICULOCYTE COUNT: CPT

## 2019-04-09 PROCEDURE — 36415 COLL VENOUS BLD VENIPUNCTURE: CPT

## 2019-04-09 PROCEDURE — 74176 CT ABD & PELVIS W/O CONTRAST: CPT

## 2019-04-09 PROCEDURE — 97163 PT EVAL HIGH COMPLEX 45 MIN: CPT

## 2019-04-09 PROCEDURE — 94640 AIRWAY INHALATION TREATMENT: CPT

## 2019-04-10 PROBLEM — I82.409 ACUTE EMBOLISM AND THROMBOSIS OF UNSPECIFIED DEEP VEINS OF UNSPECIFIED LOWER EXTREMITY: Chronic | Status: ACTIVE | Noted: 2019-03-22

## 2019-04-15 RX ORDER — CYANOCOBALAMIN (VITAMIN B-12) 1000 MCG
1000 TABLET ORAL
Qty: 30 | Refills: 3 | Status: ACTIVE | COMMUNITY

## 2019-04-17 ENCOUNTER — CLINICAL ADVICE (OUTPATIENT)
Age: 84
End: 2019-04-17

## 2019-05-01 RX ORDER — ATORVASTATIN CALCIUM 40 MG/1
40 TABLET, FILM COATED ORAL AT BEDTIME
Qty: 90 | Refills: 3 | Status: ACTIVE | COMMUNITY

## 2019-05-01 RX ORDER — TAMSULOSIN HYDROCHLORIDE 0.4 MG/1
0.4 CAPSULE ORAL AT BEDTIME
Qty: 90 | Refills: 3 | Status: ACTIVE | COMMUNITY

## 2019-05-01 RX ORDER — GABAPENTIN 300 MG/1
300 CAPSULE ORAL
Qty: 90 | Refills: 3 | Status: ACTIVE | COMMUNITY

## 2019-05-14 ENCOUNTER — APPOINTMENT (OUTPATIENT)
Dept: FAMILY MEDICINE | Facility: HOME HEALTH | Age: 84
End: 2019-05-14
Payer: MEDICARE

## 2019-05-14 PROCEDURE — 99350 HOME/RES VST EST HIGH MDM 60: CPT

## 2019-05-16 ENCOUNTER — MEDICATION RENEWAL (OUTPATIENT)
Age: 84
End: 2019-05-16

## 2019-05-19 ENCOUNTER — INPATIENT (INPATIENT)
Facility: HOSPITAL | Age: 84
LOS: 5 days | Discharge: HOME | DRG: 394 | End: 2019-05-25
Attending: INTERNAL MEDICINE | Admitting: FAMILY MEDICINE
Payer: MEDICARE

## 2019-05-19 VITALS
OXYGEN SATURATION: 100 % | SYSTOLIC BLOOD PRESSURE: 170 MMHG | TEMPERATURE: 98 F | DIASTOLIC BLOOD PRESSURE: 62 MMHG | WEIGHT: 139.99 LBS | HEIGHT: 67 IN | HEART RATE: 66 BPM | RESPIRATION RATE: 16 BRPM

## 2019-05-19 DIAGNOSIS — Z87.09 PERSONAL HISTORY OF OTHER DISEASES OF THE RESPIRATORY SYSTEM: ICD-10-CM

## 2019-05-19 DIAGNOSIS — D64.9 ANEMIA, UNSPECIFIED: ICD-10-CM

## 2019-05-19 DIAGNOSIS — I82.403 ACUTE EMBOLISM AND THROMBOSIS OF UNSPECIFIED DEEP VEINS OF LOWER EXTREMITY, BILATERAL: ICD-10-CM

## 2019-05-19 DIAGNOSIS — E78.5 HYPERLIPIDEMIA, UNSPECIFIED: ICD-10-CM

## 2019-05-19 LAB
ALBUMIN SERPL ELPH-MCNC: 2.9 G/DL — LOW (ref 3.3–5)
ALLERGY+IMMUNOLOGY DIAG STUDY NOTE: SIGNIFICANT CHANGE UP
ALP SERPL-CCNC: 147 U/L — HIGH (ref 40–120)
ALT FLD-CCNC: 29 U/L DA — SIGNIFICANT CHANGE UP (ref 10–45)
ANION GAP SERPL CALC-SCNC: 10 MMOL/L — SIGNIFICANT CHANGE UP (ref 5–17)
ANISOCYTOSIS BLD QL: SLIGHT — SIGNIFICANT CHANGE UP
APTT BLD: 33 SEC — SIGNIFICANT CHANGE UP (ref 27.5–36.3)
AST SERPL-CCNC: 34 U/L — SIGNIFICANT CHANGE UP (ref 10–40)
BILIRUB SERPL-MCNC: 0.5 MG/DL — SIGNIFICANT CHANGE UP (ref 0.2–1.2)
BLD GP AB SCN SERPL QL: ABNORMAL
BUN SERPL-MCNC: 21 MG/DL — SIGNIFICANT CHANGE UP (ref 7–23)
CALCIUM SERPL-MCNC: 8.5 MG/DL — SIGNIFICANT CHANGE UP (ref 8.4–10.5)
CHLORIDE SERPL-SCNC: 104 MMOL/L — SIGNIFICANT CHANGE UP (ref 96–108)
CO2 SERPL-SCNC: 25 MMOL/L — SIGNIFICANT CHANGE UP (ref 22–31)
CREAT SERPL-MCNC: 1.22 MG/DL — SIGNIFICANT CHANGE UP (ref 0.5–1.3)
DIR ANTIGLOB POLYSPECIFIC INTERPRETATION: SIGNIFICANT CHANGE UP
EOSINOPHIL NFR BLD AUTO: 6 % — SIGNIFICANT CHANGE UP (ref 0–6)
GLUCOSE SERPL-MCNC: 96 MG/DL — SIGNIFICANT CHANGE UP (ref 70–99)
HCT VFR BLD CALC: 22.6 % — LOW (ref 39–50)
HGB BLD-MCNC: 6.5 G/DL — CRITICAL LOW (ref 13–17)
HYPOCHROMIA BLD QL: SLIGHT — SIGNIFICANT CHANGE UP
INR BLD: 1.47 RATIO — HIGH (ref 0.88–1.16)
LYMPHOCYTES # BLD AUTO: 30 % — SIGNIFICANT CHANGE UP (ref 13–44)
MCHC RBC-ENTMCNC: 21.8 PG — LOW (ref 27–34)
MCHC RBC-ENTMCNC: 28.8 GM/DL — LOW (ref 32–36)
MCV RBC AUTO: 75.8 FL — LOW (ref 80–100)
MICROCYTES BLD QL: SLIGHT — SIGNIFICANT CHANGE UP
MONOCYTES NFR BLD AUTO: 10 % — SIGNIFICANT CHANGE UP (ref 2–14)
NEUTROPHILS NFR BLD AUTO: 54 % — SIGNIFICANT CHANGE UP (ref 43–77)
NT-PROBNP SERPL-SCNC: 300 PG/ML — SIGNIFICANT CHANGE UP (ref 0–300)
PLAT MORPH BLD: NORMAL — SIGNIFICANT CHANGE UP
PLATELET # BLD AUTO: 456 K/UL — HIGH (ref 150–400)
POIKILOCYTOSIS BLD QL AUTO: SLIGHT — SIGNIFICANT CHANGE UP
POTASSIUM SERPL-MCNC: 4.5 MMOL/L — SIGNIFICANT CHANGE UP (ref 3.5–5.3)
POTASSIUM SERPL-SCNC: 4.5 MMOL/L — SIGNIFICANT CHANGE UP (ref 3.5–5.3)
PROT SERPL-MCNC: 7 G/DL — SIGNIFICANT CHANGE UP (ref 6–8.3)
PROTHROM AB SERPL-ACNC: 16.7 SEC — HIGH (ref 10–12.9)
RBC # BLD: 2.98 M/UL — LOW (ref 4.2–5.8)
RBC # FLD: 19.3 % — HIGH (ref 10.3–14.5)
RBC BLD AUTO: ABNORMAL
SODIUM SERPL-SCNC: 139 MMOL/L — SIGNIFICANT CHANGE UP (ref 135–145)
TROPONIN I SERPL-MCNC: 0.03 NG/ML — SIGNIFICANT CHANGE UP (ref 0.02–0.06)
WBC # BLD: 6.16 K/UL — SIGNIFICANT CHANGE UP (ref 3.8–10.5)
WBC # FLD AUTO: 6.16 K/UL — SIGNIFICANT CHANGE UP (ref 3.8–10.5)

## 2019-05-19 PROCEDURE — 71045 X-RAY EXAM CHEST 1 VIEW: CPT | Mod: 26

## 2019-05-19 PROCEDURE — 93010 ELECTROCARDIOGRAM REPORT: CPT

## 2019-05-19 PROCEDURE — 99223 1ST HOSP IP/OBS HIGH 75: CPT

## 2019-05-19 PROCEDURE — 99285 EMERGENCY DEPT VISIT HI MDM: CPT

## 2019-05-19 RX ORDER — DIPHENHYDRAMINE HCL 50 MG
50 CAPSULE ORAL ONCE
Refills: 0 | Status: COMPLETED | OUTPATIENT
Start: 2019-05-19 | End: 2019-05-19

## 2019-05-19 RX ORDER — PANTOPRAZOLE SODIUM 20 MG/1
40 TABLET, DELAYED RELEASE ORAL
Refills: 0 | Status: DISCONTINUED | OUTPATIENT
Start: 2019-05-19 | End: 2019-05-24

## 2019-05-19 RX ORDER — DEXAMETHASONE 0.5 MG/5ML
10 ELIXIR ORAL ONCE
Refills: 0 | Status: COMPLETED | OUTPATIENT
Start: 2019-05-19 | End: 2019-05-19

## 2019-05-19 RX ORDER — ACETAMINOPHEN 500 MG
650 TABLET ORAL EVERY 6 HOURS
Refills: 0 | Status: DISCONTINUED | OUTPATIENT
Start: 2019-05-19 | End: 2019-05-24

## 2019-05-19 RX ORDER — BUDESONIDE, MICRONIZED 100 %
0.5 POWDER (GRAM) MISCELLANEOUS EVERY 12 HOURS
Refills: 0 | Status: DISCONTINUED | OUTPATIENT
Start: 2019-05-19 | End: 2019-05-24

## 2019-05-19 RX ORDER — GABAPENTIN 400 MG/1
300 CAPSULE ORAL AT BEDTIME
Refills: 0 | Status: DISCONTINUED | OUTPATIENT
Start: 2019-05-19 | End: 2019-05-24

## 2019-05-19 RX ORDER — FUROSEMIDE 40 MG
20 TABLET ORAL ONCE
Refills: 0 | Status: COMPLETED | OUTPATIENT
Start: 2019-05-19 | End: 2019-05-19

## 2019-05-19 RX ORDER — TAMSULOSIN HYDROCHLORIDE 0.4 MG/1
0.4 CAPSULE ORAL AT BEDTIME
Refills: 0 | Status: DISCONTINUED | OUTPATIENT
Start: 2019-05-19 | End: 2019-05-24

## 2019-05-19 RX ORDER — IPRATROPIUM/ALBUTEROL SULFATE 18-103MCG
3 AEROSOL WITH ADAPTER (GRAM) INHALATION EVERY 6 HOURS
Refills: 0 | Status: DISCONTINUED | OUTPATIENT
Start: 2019-05-19 | End: 2019-05-24

## 2019-05-19 RX ORDER — IPRATROPIUM/ALBUTEROL SULFATE 18-103MCG
3 AEROSOL WITH ADAPTER (GRAM) INHALATION
Refills: 0 | Status: COMPLETED | OUTPATIENT
Start: 2019-05-19 | End: 2019-05-19

## 2019-05-19 RX ORDER — ALBUTEROL 90 UG/1
1 AEROSOL, METERED ORAL EVERY 4 HOURS
Refills: 0 | Status: DISCONTINUED | OUTPATIENT
Start: 2019-05-19 | End: 2019-05-24

## 2019-05-19 RX ORDER — TIOTROPIUM BROMIDE 18 UG/1
1 CAPSULE ORAL; RESPIRATORY (INHALATION) DAILY
Refills: 0 | Status: DISCONTINUED | OUTPATIENT
Start: 2019-05-19 | End: 2019-05-24

## 2019-05-19 RX ORDER — DIPHENHYDRAMINE HCL 50 MG
25 CAPSULE ORAL ONCE
Refills: 0 | Status: COMPLETED | OUTPATIENT
Start: 2019-05-19 | End: 2019-05-19

## 2019-05-19 RX ORDER — ATORVASTATIN CALCIUM 80 MG/1
40 TABLET, FILM COATED ORAL AT BEDTIME
Refills: 0 | Status: DISCONTINUED | OUTPATIENT
Start: 2019-05-19 | End: 2019-05-24

## 2019-05-19 RX ORDER — ACETAMINOPHEN 500 MG
650 TABLET ORAL ONCE
Refills: 0 | Status: COMPLETED | OUTPATIENT
Start: 2019-05-19 | End: 2019-05-19

## 2019-05-19 RX ADMIN — Medication 650 MILLIGRAM(S): at 12:08

## 2019-05-19 RX ADMIN — GABAPENTIN 300 MILLIGRAM(S): 400 CAPSULE ORAL at 21:31

## 2019-05-19 RX ADMIN — Medication 10 MILLIGRAM(S): at 02:45

## 2019-05-19 RX ADMIN — Medication 25 MILLIGRAM(S): at 10:01

## 2019-05-19 RX ADMIN — Medication 20 MILLIGRAM(S): at 17:19

## 2019-05-19 RX ADMIN — Medication 102 MILLIGRAM(S): at 02:15

## 2019-05-19 RX ADMIN — Medication 0.5 MILLIGRAM(S): at 09:10

## 2019-05-19 RX ADMIN — Medication 3 MILLILITER(S): at 02:35

## 2019-05-19 RX ADMIN — Medication 3 MILLILITER(S): at 02:15

## 2019-05-19 RX ADMIN — Medication 110 MILLIGRAM(S): at 04:24

## 2019-05-19 RX ADMIN — Medication 3 MILLILITER(S): at 03:02

## 2019-05-19 RX ADMIN — TAMSULOSIN HYDROCHLORIDE 0.4 MILLIGRAM(S): 0.4 CAPSULE ORAL at 21:31

## 2019-05-19 RX ADMIN — Medication 3 MILLILITER(S): at 21:19

## 2019-05-19 RX ADMIN — Medication 3 MILLILITER(S): at 15:32

## 2019-05-19 RX ADMIN — Medication 50 MILLIGRAM(S): at 17:19

## 2019-05-19 RX ADMIN — Medication 0.5 MILLIGRAM(S): at 21:19

## 2019-05-19 RX ADMIN — ATORVASTATIN CALCIUM 40 MILLIGRAM(S): 80 TABLET, FILM COATED ORAL at 21:31

## 2019-05-19 RX ADMIN — Medication 650 MILLIGRAM(S): at 10:05

## 2019-05-19 RX ADMIN — Medication 3 MILLILITER(S): at 09:10

## 2019-05-19 RX ADMIN — PANTOPRAZOLE SODIUM 40 MILLIGRAM(S): 20 TABLET, DELAYED RELEASE ORAL at 05:41

## 2019-05-19 NOTE — PROVIDER CONTACT NOTE (OTHER) - SITUATION
Blood bank called stating compatible blood for pt will take some time to prepare due to pts antibodies. emergency non-compatible blood aware.

## 2019-05-19 NOTE — ED ADULT NURSE REASSESSMENT NOTE - NS ED NURSE REASSESS COMMENT FT1
Called Keisha from laboratory. 1 unit of PRBC is not ready yet due to antibodies. Informed Keisha patient will be transferred to telemetry unit. Will continue to monitor. Called Keisha from laboratory. 1 unit of PRBC is not ready yet due to antibodies. Patient is asymptomatic. Denies shortness of breath/chest pain/palpitations.  Informed Keisha patient will be transferred to telemetry unit. Will continue to monitor. Called Keisha from laboratory. 1 unit of PRBC is not ready yet due to antibodies. Patient is asymptomatic. Denies shortness of breath/chest pain/palpitations.  Informed Keisha patient will be transferred to telemetry unit. Patient to receive blood transfusion on telemetry unit. Dr. Costa made aware. Will continue to monitor. Called Keisha from laboratory. 1 unit of PRBC is not ready yet due to patient's antibodies. Patient is asymptomatic. Denies shortness of breath/chest pain/palpitations. No s/s of bleeding noted. Informed Keisha patient will be transferred to telemetry unit. Patient to receive blood transfusion on telemetry unit. Dr. Costa made aware. Will continue to monitor.

## 2019-05-19 NOTE — H&P ADULT - ASSESSMENT
86 y o m with h/o copd, PE, dvt, c/o low h/h, requiring 2 units prb's in ed, chronic cough 2/2 copd    CAPRINI SCORE [CLOT]    AGE RELATED RISK FACTORS                                                       MOBILITY RELATED FACTORS  [ ] Age 41-60 years                                            (1 Point)                  [x ] Bed rest                                                        (1 Point)  [ ] Age: 61-74 years                                           (2 Points)                 [ ] Plaster cast                                                   (2 Points)  [ x] Age= 75 years                                              (3 Points)                 [ ] Bed bound for more than 72 hours                 (2 Points)    DISEASE RELATED RISK FACTORS                                               GENDER SPECIFIC FACTORS  [ ] Edema in the lower extremities                       (1 Point)                  [ ] Pregnancy                                                     (1 Point)  [ ] Varicose veins                                               (1 Point)                  [ ] Post-partum < 6 weeks                                   (1 Point)             [ ] BMI > 25 Kg/m2                                            (1 Point)                  [ ] Hormonal therapy  or oral contraception          (1 Point)                 [ ] Sepsis (in the previous month)                        (1 Point)                  [ ] History of pregnancy complications                 (1 point)  [ ] Pneumonia or serious lung disease                                               [ ] Unexplained or recurrent                     (1 Point)           (in the previous month)                               (1 Point)  [ ] Abnormal pulmonary function test                     (1 Point)                 SURGERY RELATED RISK FACTORS  [ ] Acute myocardial infarction                              (1 Point)                 [ ]  Section                                             (1 Point)  [ ] Congestive heart failure (in the previous month)  (1 Point)               [ ] Minor surgery                                                  (1 Point)   [ ] Inflammatory bowel disease                             (1 Point)                 [ ] Arthroscopic surgery                                        (2 Points)  [ ] Central venous access                                      (2 Points)                [ ] General surgery lasting more than 45 minutes   (2 Points)       [ ] Stroke (in the previous month)                          (5 Points)               [ ] Elective arthroplasty                                         (5 Points)                                                                                                                                               HEMATOLOGY RELATED FACTORS                                                 TRAUMA RELATED RISK FACTORS  [ ] Prior episodes of VTE                                     (3 Points)                 [ ] Fracture of the hip, pelvis, or leg                       (5 Points)  [ ] Positive family history for VTE                         (3 Points)                 [ ] Acute spinal cord injury (in the previous month)  (5 Points)  [ ] Prothrombin 85799 A                                     (3 Points)                 [ ] Paralysis  (less than 1 month)                             (5 Points)  [ ] Factor V Leiden                                             (3 Points)                  [ ] Multiple Trauma within 1 month                        (5 Points)  [ ] Lupus anticoagulants                                     (3 Points)                                                           [ ] Anticardiolipin antibodies                               (3 Points)                                                       [ ] High homocysteine in the blood                      (3 Points)                                             [ ] Other congenital or acquired thrombophilia      (3 Points)                                                [ ] Heparin induced thrombocytopenia                  (3 Points)                                          Total Score [      4    ]

## 2019-05-19 NOTE — H&P ADULT - NSHPPHYSICALEXAM_GEN_ALL_CORE
Vital Signs Last 24 Hrs  T(C): 37 (19 May 2019 02:20), Max: 37 (19 May 2019 02:20)  T(F): 98.6 (19 May 2019 02:20), Max: 98.6 (19 May 2019 02:20)  HR: 66 (19 May 2019 01:53) (66 - 66)  BP: 170/62 (19 May 2019 01:53) (170/62 - 170/62)  BP(mean): --  RR: 16 (19 May 2019 01:53) (16 - 16)  SpO2: 100% (19 May 2019 01:53) (100% - 100%)    NAD, ROHIT,MMM,NCAT  SUPPLE  rales at bases  S1S2  SOFT NT BS PRESENT  NO PEDAL EDEMA  AAO X 3  NO GROSS NEURO DEFICITS

## 2019-05-19 NOTE — H&P ADULT - NSICDXPASTMEDICALHX_GEN_ALL_CORE_FT
PAST MEDICAL HISTORY:  CAD (coronary artery disease)     Deep vein thrombosis (DVT)     Dyslipidemia     History of COPD     [place] (hard of hearing)     Peripheral vascular disease

## 2019-05-19 NOTE — ED PROVIDER NOTE - CLINICAL SUMMARY MEDICAL DECISION MAKING FREE TEXT BOX
86 y m c low hb-6.5, also has hx of copd having acute copd exacerbation - blood transfusion copd rx, admission

## 2019-05-19 NOTE — H&P ADULT - PROBLEM SELECTOR PLAN 1
admit to [facility],   2 units prb's   lasix and tylenol after 1st unit  h/h 4 hr after 2nd unit  hold a/c symptomatic anemia, r/o  gi bleed  stool guiac  admit to [facility],   2 units prb's   lasix and tylenol after 1st unit  h/h 4 hr after 2nd unit  hold a/c

## 2019-05-19 NOTE — H&P ADULT - HISTORY OF PRESENT ILLNESS
86y Male with h/o copd,PE, DVT RLE, ON XARELTO AND ASA,  anemia low h/h, bib ems, after daughter got a recorded message on phone about patients h/h being low and he should go to ER. per daughter, patient has been more lethargic for the last 2 days, has had blood tranfusion 1 yr back for anemia, had endoscopy about 1 y back but could not drink the prep for colonoscopy, also noted to havea chronic cough from copd, 2/2 smoking ciggaretts.  no belly pain, no n/v, no dysuria, has not noted any blood in stool, or dark stools lately, last mb was 2 days back.

## 2019-05-19 NOTE — ED PROVIDER NOTE - PMH
CAD (coronary artery disease)    Deep vein thrombosis (DVT)    Dyslipidemia    History of COPD    [place] (hard of hearing)    Peripheral vascular disease CAD (coronary artery disease)    Deep vein thrombosis (DVT)    Dyslipidemia    History of COPD    [place] (hard of hearing)    PE (pulmonary thromboembolism)    Peripheral vascular disease

## 2019-05-19 NOTE — ED ADULT TRIAGE NOTE - CHIEF COMPLAINT QUOTE
He is here for low H & H, he takes Xarelto for Blood Clots. He is not bleeding but has been feeling weak lately, as per family"

## 2019-05-19 NOTE — CHART NOTE - NSCHARTNOTEFT_GEN_A_CORE
87 yo male with h.o COPD, PE/DVT on Xarelto here with anemia    #Anemia:  R.o GI bleed--obtain FOBT  -Monitor Hgb  2 iu PRBC ordered--Dr Santi umaña spoke with lab supervisor--was initially told blood is incompatible but after further analysis, it was deemed safe to give, Will premedicate patient with benadryl and tylenol and vitals q 15 min for the first 45 min  Monitor lung exam after tranfusion (lasix ordered after first unit)    #DVT:   Xarelto on hold  Venodynes    #COPD:  Cont spiriva and nebs

## 2019-05-19 NOTE — ED ADULT NURSE NOTE - PMH
CAD (coronary artery disease)    Deep vein thrombosis (DVT)    Dyslipidemia    Peripheral vascular disease CAD (coronary artery disease)    Deep vein thrombosis (DVT)    Dyslipidemia    History of COPD    [place] (hard of hearing)    Peripheral vascular disease CAD (coronary artery disease)    Deep vein thrombosis (DVT)    Dyslipidemia    History of COPD    [place] (hard of hearing)    PE (pulmonary thromboembolism)    Peripheral vascular disease

## 2019-05-20 DIAGNOSIS — R13.10 DYSPHAGIA, UNSPECIFIED: ICD-10-CM

## 2019-05-20 DIAGNOSIS — I26.99 OTHER PULMONARY EMBOLISM WITHOUT ACUTE COR PULMONALE: ICD-10-CM

## 2019-05-20 DIAGNOSIS — E87.71 TRANSFUSION ASSOCIATED CIRCULATORY OVERLOAD: ICD-10-CM

## 2019-05-20 DIAGNOSIS — J44.9 CHRONIC OBSTRUCTIVE PULMONARY DISEASE, UNSPECIFIED: ICD-10-CM

## 2019-05-20 DIAGNOSIS — D64.9 ANEMIA, UNSPECIFIED: ICD-10-CM

## 2019-05-20 LAB
ALBUMIN SERPL ELPH-MCNC: 3 G/DL — LOW (ref 3.3–5)
ALBUMIN SERPL ELPH-MCNC: 3.6 G/DL
ALP BLD-CCNC: 132 U/L
ALP SERPL-CCNC: 125 U/L — HIGH (ref 40–120)
ALT FLD-CCNC: 27 U/L DA — SIGNIFICANT CHANGE UP (ref 10–45)
ALT SERPL-CCNC: 17 U/L
ANION GAP SERPL CALC-SCNC: 12 MMOL/L — SIGNIFICANT CHANGE UP (ref 5–17)
ANION GAP SERPL CALC-SCNC: 14 MMOL/L
AST SERPL-CCNC: 19 U/L
AST SERPL-CCNC: 21 U/L — SIGNIFICANT CHANGE UP (ref 10–40)
BASOPHILS # BLD AUTO: 0.01 K/UL — SIGNIFICANT CHANGE UP (ref 0–0.2)
BASOPHILS # BLD AUTO: 0.02 K/UL
BASOPHILS NFR BLD AUTO: 0.1 % — SIGNIFICANT CHANGE UP (ref 0–2)
BASOPHILS NFR BLD AUTO: 0.3 %
BILIRUB SERPL-MCNC: 0.5 MG/DL
BILIRUB SERPL-MCNC: 0.8 MG/DL — SIGNIFICANT CHANGE UP (ref 0.2–1.2)
BUN SERPL-MCNC: 18 MG/DL
BUN SERPL-MCNC: 23 MG/DL — SIGNIFICANT CHANGE UP (ref 7–23)
CALCIUM SERPL-MCNC: 8.7 MG/DL — SIGNIFICANT CHANGE UP (ref 8.4–10.5)
CALCIUM SERPL-MCNC: 8.9 MG/DL
CHLORIDE SERPL-SCNC: 104 MMOL/L
CHLORIDE SERPL-SCNC: 104 MMOL/L — SIGNIFICANT CHANGE UP (ref 96–108)
CHOLEST SERPL-MCNC: 96 MG/DL
CHOLEST/HDLC SERPL: 2 RATIO
CK SERPL-CCNC: 72 U/L
CO2 SERPL-SCNC: 22 MMOL/L
CO2 SERPL-SCNC: 24 MMOL/L — SIGNIFICANT CHANGE UP (ref 22–31)
CREAT SERPL-MCNC: 1.18 MG/DL
CREAT SERPL-MCNC: 1.36 MG/DL — HIGH (ref 0.5–1.3)
EOSINOPHIL # BLD AUTO: 0 K/UL — SIGNIFICANT CHANGE UP (ref 0–0.5)
EOSINOPHIL # BLD AUTO: 0.24 K/UL
EOSINOPHIL NFR BLD AUTO: 0 % — SIGNIFICANT CHANGE UP (ref 0–6)
EOSINOPHIL NFR BLD AUTO: 3.5 %
ESTIMATED AVERAGE GLUCOSE: 111 MG/DL
GLUCOSE SERPL-MCNC: 102 MG/DL — HIGH (ref 70–99)
GLUCOSE SERPL-MCNC: 116 MG/DL
HBA1C MFR BLD HPLC: 5.5 %
HCT VFR BLD CALC: 24 %
HCT VFR BLD CALC: 26.9 % — LOW (ref 39–50)
HCT VFR BLD CALC: 28.7 % — LOW (ref 39–50)
HDLC SERPL-MCNC: 47 MG/DL
HGB BLD-MCNC: 6.8 G/DL
HGB BLD-MCNC: 8.3 G/DL — LOW (ref 13–17)
HGB BLD-MCNC: 8.7 G/DL — LOW (ref 13–17)
IMM GRANULOCYTES NFR BLD AUTO: 0.3 %
IMM GRANULOCYTES NFR BLD AUTO: 0.6 % — SIGNIFICANT CHANGE UP (ref 0–1.5)
LDLC SERPL CALC-MCNC: 28 MG/DL
LYMPHOCYTES # BLD AUTO: 0.99 K/UL — LOW (ref 1–3.3)
LYMPHOCYTES # BLD AUTO: 2.11 K/UL
LYMPHOCYTES # BLD AUTO: 9.7 % — LOW (ref 13–44)
LYMPHOCYTES NFR BLD AUTO: 30.5 %
MAN DIFF?: NORMAL
MCHC RBC-ENTMCNC: 22.1 PG
MCHC RBC-ENTMCNC: 23.7 PG — LOW (ref 27–34)
MCHC RBC-ENTMCNC: 23.9 PG — LOW (ref 27–34)
MCHC RBC-ENTMCNC: 28.3 GM/DL
MCHC RBC-ENTMCNC: 30.3 GM/DL — LOW (ref 32–36)
MCHC RBC-ENTMCNC: 30.9 GM/DL — LOW (ref 32–36)
MCV RBC AUTO: 77.3 FL — LOW (ref 80–100)
MCV RBC AUTO: 78.2 FL
MCV RBC AUTO: 78.2 FL — LOW (ref 80–100)
MONOCYTES # BLD AUTO: 0.74 K/UL — SIGNIFICANT CHANGE UP (ref 0–0.9)
MONOCYTES # BLD AUTO: 0.79 K/UL
MONOCYTES NFR BLD AUTO: 11.4 %
MONOCYTES NFR BLD AUTO: 7.3 % — SIGNIFICANT CHANGE UP (ref 2–14)
NEUTROPHILS # BLD AUTO: 3.74 K/UL
NEUTROPHILS # BLD AUTO: 8.36 K/UL — HIGH (ref 1.8–7.4)
NEUTROPHILS NFR BLD AUTO: 54 %
NEUTROPHILS NFR BLD AUTO: 82.3 % — HIGH (ref 43–77)
NRBC # BLD: 0 /100 WBCS — SIGNIFICANT CHANGE UP (ref 0–0)
NRBC # BLD: 0 /100 WBCS — SIGNIFICANT CHANGE UP (ref 0–0)
PLATELET # BLD AUTO: 458 K/UL — HIGH (ref 150–400)
PLATELET # BLD AUTO: 462 K/UL — HIGH (ref 150–400)
PLATELET # BLD AUTO: 481 K/UL
POTASSIUM SERPL-MCNC: 4 MMOL/L — SIGNIFICANT CHANGE UP (ref 3.5–5.3)
POTASSIUM SERPL-SCNC: 4 MMOL/L — SIGNIFICANT CHANGE UP (ref 3.5–5.3)
POTASSIUM SERPL-SCNC: 4.7 MMOL/L
PROT SERPL-MCNC: 6.7 G/DL
PROT SERPL-MCNC: 7.2 G/DL — SIGNIFICANT CHANGE UP (ref 6–8.3)
PSA FREE FLD-MCNC: 10 %
PSA FREE SERPL-MCNC: 0.48 NG/ML
PSA SERPL-MCNC: 4.94 NG/ML
RBC # BLD: 3.07 M/UL
RBC # BLD: 3.48 M/UL — LOW (ref 4.2–5.8)
RBC # BLD: 3.67 M/UL — LOW (ref 4.2–5.8)
RBC # FLD: 19.3 % — HIGH (ref 10.3–14.5)
RBC # FLD: 19.5 %
RBC # FLD: 19.6 % — HIGH (ref 10.3–14.5)
SODIUM SERPL-SCNC: 139 MMOL/L
SODIUM SERPL-SCNC: 140 MMOL/L — SIGNIFICANT CHANGE UP (ref 135–145)
T4 FREE SERPL-MCNC: 1.1 NG/DL
TRIGL SERPL-MCNC: 103 MG/DL
TSH SERPL-ACNC: 1.92 UIU/ML
WBC # BLD: 10.16 K/UL — SIGNIFICANT CHANGE UP (ref 3.8–10.5)
WBC # BLD: 12.59 K/UL — HIGH (ref 3.8–10.5)
WBC # FLD AUTO: 10.16 K/UL — SIGNIFICANT CHANGE UP (ref 3.8–10.5)
WBC # FLD AUTO: 12.59 K/UL — HIGH (ref 3.8–10.5)
WBC # FLD AUTO: 6.92 K/UL

## 2019-05-20 PROCEDURE — 99233 SBSQ HOSP IP/OBS HIGH 50: CPT | Mod: GC

## 2019-05-20 RX ORDER — FUROSEMIDE 40 MG
20 TABLET ORAL ONCE
Refills: 0 | Status: COMPLETED | OUTPATIENT
Start: 2019-05-20 | End: 2019-05-20

## 2019-05-20 RX ORDER — POLYETHYLENE GLYCOL 3350 17 G/17G
17 POWDER, FOR SOLUTION ORAL ONCE
Refills: 0 | Status: COMPLETED | OUTPATIENT
Start: 2019-05-20 | End: 2019-05-20

## 2019-05-20 RX ORDER — DOCUSATE SODIUM 100 MG
100 CAPSULE ORAL DAILY
Refills: 0 | Status: DISCONTINUED | OUTPATIENT
Start: 2019-05-20 | End: 2019-05-24

## 2019-05-20 RX ADMIN — Medication 20 MILLIGRAM(S): at 18:05

## 2019-05-20 RX ADMIN — Medication 3 MILLILITER(S): at 21:06

## 2019-05-20 RX ADMIN — Medication 3 MILLILITER(S): at 09:41

## 2019-05-20 RX ADMIN — Medication 0.5 MILLIGRAM(S): at 09:41

## 2019-05-20 RX ADMIN — POLYETHYLENE GLYCOL 3350 17 GRAM(S): 17 POWDER, FOR SOLUTION ORAL at 18:05

## 2019-05-20 RX ADMIN — ATORVASTATIN CALCIUM 40 MILLIGRAM(S): 80 TABLET, FILM COATED ORAL at 21:48

## 2019-05-20 RX ADMIN — Medication 100 MILLIGRAM(S): at 18:06

## 2019-05-20 RX ADMIN — Medication 0.5 MILLIGRAM(S): at 21:06

## 2019-05-20 RX ADMIN — Medication 3 MILLILITER(S): at 03:33

## 2019-05-20 RX ADMIN — GABAPENTIN 300 MILLIGRAM(S): 400 CAPSULE ORAL at 21:48

## 2019-05-20 RX ADMIN — Medication 3 MILLILITER(S): at 15:21

## 2019-05-20 RX ADMIN — PANTOPRAZOLE SODIUM 40 MILLIGRAM(S): 20 TABLET, DELAYED RELEASE ORAL at 06:02

## 2019-05-20 RX ADMIN — TAMSULOSIN HYDROCHLORIDE 0.4 MILLIGRAM(S): 0.4 CAPSULE ORAL at 21:48

## 2019-05-20 NOTE — SWALLOW BEDSIDE ASSESSMENT ADULT - COMMENTS
Oral stasis cleared with cued liquid washes. Pt noted to mix puree consistency with soft solids trials. Pt exhibited increased fatigue during mastication of small cut up soft solids. Pt with dx of anemia and r/o GI bleed. Case discussed with PA and MD prior to initiation of assessment. Pt cleared for po trials as PA stated no acute bleed at this time.

## 2019-05-20 NOTE — SWALLOW BEDSIDE ASSESSMENT ADULT - SWALLOW EVAL: DIAGNOSIS
Pt presents with moderate oral and mild pharyngeal dysphagia. Oral stage is marked by asymmetry of the Right palate, prolonged preparation, fatigue during mastication of solids, and presence of oral residue on posterior lingual blade that was cleared with a cued liquid wash. Pharyngeal stage is characterized by repeat swallow with trial of thin liquid via cup suggestive of pharyngeal residue and intermittent reduced hyolaryngeal elevation. Pt found consuming lunch tray at onset of this assessment. Pt noted to cough 1x prior to the onset of the assessment; however, Pt with a H/O cough secondary to dx of COPD due to heavy smoking. Pt presents with moderate oral and mild pharyngeal dysphagia. Oral stage is marked by asymmetry of the Right palate, prolonged preparation, fatigue during mastication of solids, and presence of oral residue on posterior lingual blade that was cleared with a cued liquid wash. Pharyngeal stage is characterized by repeat swallow with trial of thin liquid via cup suggestive of pharyngeal residue and intermittent reduced hyolaryngeal elevation. Pt found consuming lunch tray at onset of this assessment. Pt noted to cough 1x prior to the onset of the assessment; however, Pt with a H/O cough secondary to dx of COPD due to heavy smoking. Episodes of dysphonia noted during assessment.

## 2019-05-20 NOTE — SWALLOW BEDSIDE ASSESSMENT ADULT - SLP PERTINENT HISTORY OF CURRENT PROBLEM
86y Male with h/o copd, PE, DVT RLE, ON XARELTO AND ASA, anemia low h/h, bib ems on 5/19/19, after daughter got a recorded message on phone about Pt's h/h being low and he should go to ER. Per daughter, Pt has been more lethargic for the last 2 days, has had blood tranfusion 1 yr back for anemia, had endoscopy about 1 y back but could not drink the prep for colonoscopy, also noted to have a chronic cough from copd 2/2 smoking cigarettes. 5/19: s/p blood transfusion for low h/h.

## 2019-05-20 NOTE — PROGRESS NOTE ADULT - ASSESSMENT
85 yo male with h.o COPD, PE/DVT on Xarelto here with anemia-iron deficiency.  Received 2 iu PRBC overnight with appropriate bump in Hgb.

## 2019-05-20 NOTE — SWALLOW BEDSIDE ASSESSMENT ADULT - ASR SWALLOW ASPIRATION MONITOR
gurgly voice/change of breathing pattern/cough/fever/throat clearing/pneumonia/upper respiratory infection

## 2019-05-20 NOTE — CONSULT NOTE ADULT - ATTENDING COMMENTS
Patient seen and examined with Vilma Birmingham NP.  Agree with assessment and plan as above.    Elderly male now admitted with symptomatic anemia.  He did undergo both EGD & colonoscopy in the recent past for workup, however colonoscopy was a poor bowel prep.  He denies any acute complaints at present time.  VSS.  Abdomen soft, nontender    Will re-attempt colonoscopy later this week after extended bowel prep  Clear liquid diet

## 2019-05-20 NOTE — SWALLOW BEDSIDE ASSESSMENT ADULT - SLP PRECAUTIONS/LIMITATIONS: HEARING
impaired/Mildly to Moderately Impaired: difficulty hearing in some environments or speaker may need to increase volume or speak distinctly. Has bilateral hearing aids.

## 2019-05-20 NOTE — SWALLOW BEDSIDE ASSESSMENT ADULT - ORAL PHASE
Delayed oral transit time Within functional limits Decreased anterior-posterior movement of the bolus/Lingual stasis/stasis on posterior lingual blade

## 2019-05-20 NOTE — PROGRESS NOTE ADULT - PROBLEM SELECTOR PLAN 5
due to poor dentition  Worked with S+S today and rec mechanical soft diet  S+S therapist noted assymmetry of the mouth (SELINA dennis mart than L)--will suggest outpatient ENT consult     DVT ppx: kyle--Xarelto held

## 2019-05-20 NOTE — PROGRESS NOTE ADULT - SUBJECTIVE AND OBJECTIVE BOX
Patient is a 86y old  Male who presents with a chief complaint of low h/h (19 May 2019 03:49). Received 2 iu PRBC overnight with appropriate bump of Hgb.  Feels like he has more energy this AM      Patient seen and examined at bedside.    ALLERGIES:  No Known Allergies    MEDICATIONS  (STANDING):  ALBUTerol    90 MICROgram(s) HFA Inhaler 1 Puff(s) Inhalation every 4 hours  ALBUTerol/ipratropium for Nebulization 3 milliLiter(s) Nebulizer every 6 hours  atorvastatin 40 milliGRAM(s) Oral at bedtime  buDESOnide   0.5 milliGRAM(s) Respule 0.5 milliGRAM(s) Inhalation every 12 hours  furosemide   Injectable 20 milliGRAM(s) IV Push once  gabapentin 300 milliGRAM(s) Oral at bedtime  pantoprazole    Tablet 40 milliGRAM(s) Oral before breakfast  tamsulosin 0.4 milliGRAM(s) Oral at bedtime  tiotropium 18 MICROgram(s) Capsule 1 Capsule(s) Inhalation daily    MEDICATIONS  (PRN):  acetaminophen   Tablet .. 650 milliGRAM(s) Oral every 6 hours PRN Mild Pain (1 - 3)    Vital Signs Last 24 Hrs  T(F): 97.8 (20 May 2019 05:21), Max: 99.1 (19 May 2019 19:45)  HR: 73 (20 May 2019 09:57) (73 - 88)  BP: 121/47 (20 May 2019 05:21) (102/58 - 124/58)  RR: 15 (20 May 2019 05:21) (15 - 16)  SpO2: 98% (20 May 2019 09:57) (94% - 99%)  I&O's Summary    19 May 2019 07:01  -  20 May 2019 07:00  --------------------------------------------------------  IN: 272 mL / OUT: 550 mL / NET: -278 mL      BMI (kg/m2): 21.6 (05-19-19 @ 04:40)  PHYSICAL EXAM:  General: NAD, elderly male A/O x 3  ENT: MMM  Neck: Supple, No JVD  Lungs: crackles at bases bilaterally  Cardio: RRR, S1/S2,   Abdomen: Soft, Nontender, Nondistended; Bowel sounds present  Extremities: No calf tenderness,     LABS:                        8.3    10.16 )-----------( 462      ( 20 May 2019 06:55 )             26.9       05-20    140  |  104  |  23  ----------------------------<  102  4.0   |  24  |  1.36    Ca    8.7      20 May 2019 06:55    TPro  7.2  /  Alb  3.0  /  TBili  0.8  /  DBili  x   /  AST  21  /  ALT  27  /  AlkPhos  125  05-20     eGFR if Non African American: 47 mL/min/1.73M2 (05-20-19 @ 06:55)  eGFR if African American: 54 mL/min/1.73M2 (05-20-19 @ 06:55)    PT/INR - ( 19 May 2019 02:35 )   PT: 16.7 sec;   INR: 1.47 ratio         PTT - ( 19 May 2019 02:35 )  PTT:33.0 sec     CARDIAC MARKERS ( 19 May 2019 02:35 )  .026 ng/mL / x     / x     / x     / x          03-20 Chol 113 mg/dL LDL 57 mg/dL HDL 39 mg/dL Trig 84 mg/dL        CAPILLARY BLOOD GLUCOSE        03-19 [id number] 4.9          RADIOLOGY & ADDITIONAL TESTS:    Care Discussed with Consultants/Other Providers:

## 2019-05-20 NOTE — CONSULT NOTE ADULT - SUBJECTIVE AND OBJECTIVE BOX
INTERVAL HPI/OVERNIGHT EVENTS:  HPI:  87 y/o male PMH PE ( on xarelto), DVT, COPD, ETOH, anemia, and s/p recent CVA who is admitted to Tele. GI consulted to see patient due to drop in hemoglobin. Patient know from prior admission in rehab. He was brought to the ER by his daughter due to drop in hemoglobin and lethargy. He is S/P EGD and COL (3/2019) as he had drop in hemoglobin at that time and + FOB. There was no source of beelding seen on EGD and Colonoscopy could not be completed due to poor prep. Patient seen and examined at bedside. He denies abdominal pain at this time. No notation of BRBPR or melena.       MEDICATIONS  (STANDING):  ALBUTerol    90 MICROgram(s) HFA Inhaler 1 Puff(s) Inhalation every 4 hours  ALBUTerol/ipratropium for Nebulization 3 milliLiter(s) Nebulizer every 6 hours  atorvastatin 40 milliGRAM(s) Oral at bedtime  buDESOnide   0.5 milliGRAM(s) Respule 0.5 milliGRAM(s) Inhalation every 12 hours  furosemide   Injectable 20 milliGRAM(s) IV Push once  gabapentin 300 milliGRAM(s) Oral at bedtime  pantoprazole    Tablet 40 milliGRAM(s) Oral before breakfast  tamsulosin 0.4 milliGRAM(s) Oral at bedtime  tiotropium 18 MICROgram(s) Capsule 1 Capsule(s) Inhalation daily    MEDICATIONS  (PRN):  acetaminophen   Tablet .. 650 milliGRAM(s) Oral every 6 hours PRN Mild Pain (1 - 3)      Allergies    No Known Allergies    Intolerances        PHYSICAL EXAM:   Vital Signs:  Vital Signs Last 24 Hrs  T(C): 36.6 (20 May 2019 05:21), Max: 37.3 (19 May 2019 19:45)  T(F): 97.8 (20 May 2019 05:21), Max: 99.1 (19 May 2019 19:45)  HR: 77 (20 May 2019 15:22) (73 - 88)  BP: 121/47 (20 May 2019 05:21) (102/58 - 124/58)  BP(mean): --  RR: 15 (20 May 2019 05:21) (15 - 16)  SpO2: 97% (20 May 2019 15:22) (94% - 99%)  Daily     Daily Weight in k.2 (20 May 2019 05:21)I&O's Summary    19 May 2019 07:01  -  20 May 2019 07:00  --------------------------------------------------------  IN: 272 mL / OUT: 550 mL / NET: -278 mL        GENERAL:  Appears stated age, well-groomed, well-nourished, no distress  HEENT:  NC/AT,  conjunctivae clear and pink, no thyromegaly, nodules, adenopathy, no JVD, sclera -anicteric  CHEST:  Full & symmetric excursion, no increased effort, breath sounds clear  HEART:  Regular rhythm, S1, S2, no murmur/rub/S3/S4, no abdominal bruit, no edema  ABDOMEN:  Soft, non-tender, non-distended, normoactive bowel sounds,  no masses ,no hepato-splenomegaly, no signs of chronic liver disease  EXTEREMITIES:  no cyanosis,clubbing or edema  SKIN:  No rash/erythema/ecchymoses/petechiae/wounds/abscess/warm/dry  NEURO:  Alert, oriented, no asterixis, no tremor, no encephalopathy      LABS:                        8.3    10.16 )-----------( 462      ( 20 May 2019 06:55 )             26.9         140  |  104  |  23  ----------------------------<  102<H>  4.0   |  24  |  1.36<H>    Ca    8.7      20 May 2019 06:55    TPro  7.2  /  Alb  3.0<L>  /  TBili  0.8  /  DBili  x   /  AST  21  /  ALT  27  /  AlkPhos  125<H>  05-20    PT/INR - ( 19 May 2019 02:35 )   PT: 16.7 sec;   INR: 1.47 ratio         PTT - ( 19 May 2019 02:35 )  PTT:33.0 sec    amylase   lipase  RADIOLOGY & ADDITIONAL TESTS:

## 2019-05-20 NOTE — SWALLOW BEDSIDE ASSESSMENT ADULT - SLP GENERAL OBSERVATIONS
Pt noted with fatigue and questionable increased respiratory rate when consuming harder solids. Improved ease of intake exhibited with mechanical soft and puree consistencies

## 2019-05-20 NOTE — CONSULT NOTE ADULT - PROBLEM SELECTOR RECOMMENDATION 9
Clear liquid diet tomorrow 5/21/19  Will order prep tomorrow for colonoscopy on 5/22/19  Monitor stool  Monitor H/H daily Clear liquid diet tomorrow 5/21/19  Will order prep tomorrow for colonoscopy on 5/22/19  Monitor stool  Monitor H/H daily  FOB pending collection

## 2019-05-20 NOTE — SWALLOW BEDSIDE ASSESSMENT ADULT - SWALLOW EVAL: RECOMMENDED FEEDING/EATING TECHNIQUES
position upright (90 degrees)/maintain upright posture during/after eating for 30 mins/oral hygiene/Liquid washes every 2-3 bites/check mouth frequently for oral residue/pocketing

## 2019-05-20 NOTE — CONSULT NOTE ADULT - ASSESSMENT
85 y/o male with multiple comorbidities who is s/p recent CVA. He was brought to hospital by his daughter due to drop in hemoglobin and lethargy. H/H 8.3/26.9 after 2 units PRBC's. FOB pending collection.

## 2019-05-20 NOTE — SWALLOW BEDSIDE ASSESSMENT ADULT - SLP PRECAUTIONS/LIMITATIONS: VISION
within functional limits/Normal vision: sees adequately in most situations; can see medication labels, newsprint. Wears eye glasses.

## 2019-05-20 NOTE — PROGRESS NOTE ADULT - PROBLEM SELECTOR PLAN 1
iron deficient in March 2019 and guaic positive at this time  FOBT this admission pending  Cscope aborted March 2019 as pt did not tolerate prep  EGD March 2019 with no source of bleed  Appreciate GI consult  Repeat s/p 2 iu PRBC and repeat Hgb 8.3   iron deficient in March 2019 and guaic positive at this time  FOBT this admission pending  Cscope aborted March 2019 as pt did not tolerate prep  EGD March 2019 with no source of bleed  Cont to closely monitor CBC

## 2019-05-20 NOTE — SWALLOW BEDSIDE ASSESSMENT ADULT - PHARYNGEAL PHASE
Decreased laryngeal elevation/reduced hyolaryngeal elevation on 2/8 trials Multiple swallows/multiple swallows with trial via cup suggestive of pharyngeal stasis Within functional limits

## 2019-05-20 NOTE — SWALLOW BEDSIDE ASSESSMENT ADULT - ASR SWALLOW REFERRAL
2/2 asymmetry of the palate with H/O heavy smoking/ENT ENT/2/2 asymmetry of the palate with H/O heavy smoking and episodes of dysphonia.

## 2019-05-21 LAB
ALBUMIN SERPL ELPH-MCNC: 2.9 G/DL — LOW (ref 3.3–5)
ALP SERPL-CCNC: 129 U/L — HIGH (ref 40–120)
ALT FLD-CCNC: 27 U/L DA — SIGNIFICANT CHANGE UP (ref 10–45)
ANION GAP SERPL CALC-SCNC: 9 MMOL/L — SIGNIFICANT CHANGE UP (ref 5–17)
AST SERPL-CCNC: 24 U/L — SIGNIFICANT CHANGE UP (ref 10–40)
BILIRUB SERPL-MCNC: 0.8 MG/DL — SIGNIFICANT CHANGE UP (ref 0.2–1.2)
BUN SERPL-MCNC: 29 MG/DL — HIGH (ref 7–23)
CALCIUM SERPL-MCNC: 8.4 MG/DL — SIGNIFICANT CHANGE UP (ref 8.4–10.5)
CHLORIDE SERPL-SCNC: 103 MMOL/L — SIGNIFICANT CHANGE UP (ref 96–108)
CO2 SERPL-SCNC: 26 MMOL/L — SIGNIFICANT CHANGE UP (ref 22–31)
CREAT SERPL-MCNC: 1.29 MG/DL — SIGNIFICANT CHANGE UP (ref 0.5–1.3)
GLUCOSE SERPL-MCNC: 91 MG/DL — SIGNIFICANT CHANGE UP (ref 70–99)
HCT VFR BLD CALC: 27.3 % — LOW (ref 39–50)
HCT VFR BLD CALC: 30.6 % — LOW (ref 39–50)
HGB BLD-MCNC: 8.3 G/DL — LOW (ref 13–17)
HGB BLD-MCNC: 9.4 G/DL — LOW (ref 13–17)
MCHC RBC-ENTMCNC: 23.4 PG — LOW (ref 27–34)
MCHC RBC-ENTMCNC: 23.7 PG — LOW (ref 27–34)
MCHC RBC-ENTMCNC: 30.4 GM/DL — LOW (ref 32–36)
MCHC RBC-ENTMCNC: 30.7 GM/DL — LOW (ref 32–36)
MCV RBC AUTO: 76.9 FL — LOW (ref 80–100)
MCV RBC AUTO: 77.1 FL — LOW (ref 80–100)
NRBC # BLD: 0 /100 WBCS — SIGNIFICANT CHANGE UP (ref 0–0)
NRBC # BLD: 0 /100 WBCS — SIGNIFICANT CHANGE UP (ref 0–0)
PLATELET # BLD AUTO: 449 K/UL — HIGH (ref 150–400)
PLATELET # BLD AUTO: 487 K/UL — HIGH (ref 150–400)
POTASSIUM SERPL-MCNC: 3.9 MMOL/L — SIGNIFICANT CHANGE UP (ref 3.5–5.3)
POTASSIUM SERPL-SCNC: 3.9 MMOL/L — SIGNIFICANT CHANGE UP (ref 3.5–5.3)
PROT SERPL-MCNC: 6.9 G/DL — SIGNIFICANT CHANGE UP (ref 6–8.3)
RBC # BLD: 3.55 M/UL — LOW (ref 4.2–5.8)
RBC # BLD: 3.97 M/UL — LOW (ref 4.2–5.8)
RBC # FLD: 19.9 % — HIGH (ref 10.3–14.5)
RBC # FLD: 20 % — HIGH (ref 10.3–14.5)
SODIUM SERPL-SCNC: 138 MMOL/L — SIGNIFICANT CHANGE UP (ref 135–145)
WBC # BLD: 11.24 K/UL — HIGH (ref 3.8–10.5)
WBC # BLD: 8.93 K/UL — SIGNIFICANT CHANGE UP (ref 3.8–10.5)
WBC # FLD AUTO: 11.24 K/UL — HIGH (ref 3.8–10.5)
WBC # FLD AUTO: 8.93 K/UL — SIGNIFICANT CHANGE UP (ref 3.8–10.5)

## 2019-05-21 PROCEDURE — 99233 SBSQ HOSP IP/OBS HIGH 50: CPT | Mod: GC

## 2019-05-21 RX ORDER — SOD SULF/SODIUM/NAHCO3/KCL/PEG
1000 SOLUTION, RECONSTITUTED, ORAL ORAL
Refills: 0 | Status: COMPLETED | OUTPATIENT
Start: 2019-05-21 | End: 2019-05-21

## 2019-05-21 RX ORDER — FUROSEMIDE 40 MG
20 TABLET ORAL ONCE
Refills: 0 | Status: COMPLETED | OUTPATIENT
Start: 2019-05-21 | End: 2019-05-21

## 2019-05-21 RX ORDER — POTASSIUM CHLORIDE 20 MEQ
10 PACKET (EA) ORAL ONCE
Refills: 0 | Status: COMPLETED | OUTPATIENT
Start: 2019-05-21 | End: 2019-05-21

## 2019-05-21 RX ADMIN — Medication 1000 MILLILITER(S): at 16:25

## 2019-05-21 RX ADMIN — TAMSULOSIN HYDROCHLORIDE 0.4 MILLIGRAM(S): 0.4 CAPSULE ORAL at 23:28

## 2019-05-21 RX ADMIN — Medication 3 MILLILITER(S): at 15:27

## 2019-05-21 RX ADMIN — Medication 100 MILLIGRAM(S): at 13:53

## 2019-05-21 RX ADMIN — GABAPENTIN 300 MILLIGRAM(S): 400 CAPSULE ORAL at 23:28

## 2019-05-21 RX ADMIN — Medication 3 MILLILITER(S): at 21:27

## 2019-05-21 RX ADMIN — Medication 20 MILLIGRAM(S): at 10:03

## 2019-05-21 RX ADMIN — Medication 3 MILLILITER(S): at 04:40

## 2019-05-21 RX ADMIN — Medication 3 MILLILITER(S): at 09:26

## 2019-05-21 RX ADMIN — PANTOPRAZOLE SODIUM 40 MILLIGRAM(S): 20 TABLET, DELAYED RELEASE ORAL at 05:01

## 2019-05-21 RX ADMIN — Medication 0.5 MILLIGRAM(S): at 21:31

## 2019-05-21 RX ADMIN — ATORVASTATIN CALCIUM 40 MILLIGRAM(S): 80 TABLET, FILM COATED ORAL at 23:28

## 2019-05-21 RX ADMIN — Medication 10 MILLIEQUIVALENT(S): at 10:04

## 2019-05-21 RX ADMIN — Medication 0.5 MILLIGRAM(S): at 09:32

## 2019-05-21 NOTE — PROGRESS NOTE ADULT - PROBLEM SELECTOR PLAN 5
due to poor dentition  S+S recommand mechanical soft diet  S+S therapist noted assymmetry of the mouth (SELINA [place] than L)-recommand outpatient ENT consult     DVT ppx: venodynes--Xarelto held due to gi bleed

## 2019-05-21 NOTE — PROGRESS NOTE ADULT - SUBJECTIVE AND OBJECTIVE BOX
Patient is a 86y old  Male who presents with a chief complaint of low h/h .  Pt without complaints today.  H/H holding       Patient seen and examined at bedside.    ALLERGIES:  No Known Allergies    MEDICATIONS  (STANDING):  ALBUTerol    90 MICROgram(s) HFA Inhaler 1 Puff(s) Inhalation every 4 hours  ALBUTerol/ipratropium for Nebulization 3 milliLiter(s) Nebulizer every 6 hours  atorvastatin 40 milliGRAM(s) Oral at bedtime  buDESOnide   0.5 milliGRAM(s) Respule 0.5 milliGRAM(s) Inhalation every 12 hours  docusate sodium 100 milliGRAM(s) Oral daily  gabapentin 300 milliGRAM(s) Oral at bedtime  pantoprazole    Tablet 40 milliGRAM(s) Oral before breakfast  tamsulosin 0.4 milliGRAM(s) Oral at bedtime  tiotropium 18 MICROgram(s) Capsule 1 Capsule(s) Inhalation daily    MEDICATIONS  (PRN):  acetaminophen   Tablet .. 650 milliGRAM(s) Oral every 6 hours PRN Mild Pain (1 - 3)    Vital Signs Last 24 Hrs  T(F): 97.7 (21 May 2019 05:14), Max: 98.1 (20 May 2019 16:02)  HR: 65 (21 May 2019 05:14) (65 - 94)  BP: 106/45 (21 May 2019 05:14) (106/45 - 135/73)  RR: 18 (21 May 2019 05:14) (15 - 18)  SpO2: 94% (21 May 2019 05:14) (94% - 99%)  I&O's Summary    20 May 2019 07:01  -  21 May 2019 07:00  --------------------------------------------------------  IN: 200 mL / OUT: 500 mL / NET: -300 mL      PHYSICAL EXAM:  General: NAD, A/O x 3  ENT: MMM  Neck: Supple, No JVD  Lungs: Clear to auscultation bilaterally  Cardio: RRR, S1/S2, No murmurs  Abdomen: Soft, Nontender, Nondistended; Bowel sounds present  Extremities: No calf tenderness, No pitting edema    LABS:                        8.3    8.93  )-----------( 449      ( 21 May 2019 06:10 )             27.3     05-21    138  |  103  |  29  ----------------------------<  91  3.9   |  26  |  1.29    Ca    8.4      21 May 2019 06:10    TPro  6.9  /  Alb  2.9  /  TBili  0.8  /  DBili  x   /  AST  24  /  ALT  27  /  AlkPhos  129  05-21    eGFR if Non African American: 50 mL/min/1.73M2 (05-21-19 @ 06:10)  eGFR if African American: 58 mL/min/1.73M2 (05-21-19 @ 06:10)    PT/INR - ( 19 May 2019 02:35 )   PT: 16.7 sec;   INR: 1.47 ratio         PTT - ( 19 May 2019 02:35 )  PTT:33.0 sec    CARDIAC MARKERS ( 19 May 2019 02:35 )  .026 ng/mL / x     / x     / x     / x          03-20 Chol 113 mg/dL LDL 57 mg/dL HDL 39 mg/dL Trig 84 mg/dL        CAPILLARY BLOOD GLUCOSE        03-19 [id number] 4.9        RADIOLOGY & ADDITIONAL TESTS:    Care Discussed with Consultants/Other Providers:

## 2019-05-21 NOTE — CDI QUERY NOTE - NSCDIOTHERTXTBX_GEN_ALL_CORE_HH
Presents with acute GIB.    Iron Deficiency Anemia, s/p blood transfusions.      Please further clarify the type of anemia;    - Iron Deficiency Anemia due to acute blood loss;  - Acute blood loss anemia;  - Acute post hemorraghic anemia;  - other      Thank you

## 2019-05-21 NOTE — PROGRESS NOTE ADULT - PROBLEM SELECTOR PLAN 1
s/p 2 iu PRBC and repeat Hgb 8.3 will continue to follow H/H   iron deficient in March 2019 and guaic positive at this time  FOBT this admission pending  Cscope aborted March 2019 as pt did not tolerate prep  EGD March 2019 with no source of bleed  colonoscopy tomorrow s/p 2 u PRBC and repeat Hgb 8.3 will continue to follow H/H   iron deficient in March 2019 and guaic positive at this time  FOBT this admission pending  Cscope aborted March 2019 as pt did not tolerate prep  EGD March 2019 with no source of bleed  colonoscopy tomorrow

## 2019-05-21 NOTE — PROGRESS NOTE ADULT - SUBJECTIVE AND OBJECTIVE BOX
INTERVAL HPI/OVERNIGHT EVENTS:  HPI:  87 y/o male PMH PE ( on xarelto), DVT, COPD, ETOH, anemia, and s/p recent CVA who is admitted to [facility]. Patient seen and examined at bedside. No complaints at this time.     MEDICATIONS  (STANDING):  ALBUTerol    90 MICROgram(s) HFA Inhaler 1 Puff(s) Inhalation every 4 hours  ALBUTerol/ipratropium for Nebulization 3 milliLiter(s) Nebulizer every 6 hours  atorvastatin 40 milliGRAM(s) Oral at bedtime  buDESOnide   0.5 milliGRAM(s) Respule 0.5 milliGRAM(s) Inhalation every 12 hours  docusate sodium 100 milliGRAM(s) Oral daily  gabapentin 300 milliGRAM(s) Oral at bedtime  pantoprazole    Tablet 40 milliGRAM(s) Oral before breakfast  polyethylene glycol/electrolyte Solution 1000 milliLiter(s) Oral <User Schedule>  polyethylene glycol/electrolyte Solution 1000 milliLiter(s) Oral <User Schedule>  tamsulosin 0.4 milliGRAM(s) Oral at bedtime  tiotropium 18 MICROgram(s) Capsule 1 Capsule(s) Inhalation daily    MEDICATIONS  (PRN):  acetaminophen   Tablet .. 650 milliGRAM(s) Oral every 6 hours PRN Mild Pain (1 - 3)      Allergies    No Known Allergies    Intolerances        PHYSICAL EXAM:   Vital Signs:  Vital Signs Last 24 Hrs  T(C): 36.8 (21 May 2019 10:47), Max: 36.8 (21 May 2019 10:47)  T(F): 98.2 (21 May 2019 10:47), Max: 98.2 (21 May 2019 10:47)  HR: 61 (21 May 2019 10:47) (61 - 94)  BP: 125/60 (21 May 2019 10:47) (106/45 - 135/73)  BP(mean): --  RR: 18 (21 May 2019 10:47) (15 - 18)  SpO2: 98% (21 May 2019 15:28) (94% - 99%)  Daily     Daily Weight in k.6 (21 May 2019 05:14)I&O's Summary    20 May 2019 07:01  -  21 May 2019 07:00  --------------------------------------------------------  IN: 200 mL / OUT: 500 mL / NET: -300 mL    GENERAL:  Appears stated age, well-groomed, well-nourished, no distress  HEENT:  NC/AT,  conjunctivae clear and pink, jillian  CHEST:  Full & symmetric excursion, no increased effort, breath sounds clear  HEART:  Regular rhythm, S1, S2  ABDOMEN:  Soft, non-tender, non-distended,  EXTEREMITIES:  no edema  SKIN:  No rash/erythemawarm/dry  NEURO:  Alert, oriented,     LABS:                        8.3    8.93  )-----------( 449      ( 21 May 2019 06:10 )             27.3     -    138  |  103  |  29<H>  ----------------------------<  91  3.9   |  26  |  1.29    Ca    8.4      21 May 2019 06:10    TPro  6.9  /  Alb  2.9<L>  /  TBili  0.8  /  DBili  x   /  AST  24  /  ALT  27  /  AlkPhos  129<H>          amylase   lipase  RADIOLOGY & ADDITIONAL TESTS:

## 2019-05-21 NOTE — PROGRESS NOTE ADULT - ASSESSMENT
87 y/o male with multiple comorbidities who is s/p recent CVA. He was brought to hospital by his daughter due to drop in hemoglobin and lethargy. H/H stable.  FOB pending collection.

## 2019-05-21 NOTE — PROGRESS NOTE ADULT - ATTENDING COMMENTS
Patient seen and examined with Vilma Morrison NP.  Agree with assessment and plan.    Patient is known to me from prior admission.  He continues to have anemia, with suspicion of GI bleed.  VSS.  Abdomen soft, nontender BS+    Endoscopy planned for tomorrow  Bowel prep orders written  Care and plan discussed with daughter
I have personally seen and examined patient on the above date.  I discussed the case with NEL Sandra and I agree with findings and plan as detailed per note above, which I have amended where appropriate.      Patient came in with acute blood loss anemia secondary to GI bleed. His iron deficiency anemia can be due to GI bleed.    Colonoscopy tomorrow.
I have personally seen and examined patient on the above date.  I discussed the case with NEL Lyons and I agree with findings and plan as detailed per note above, which I have amended where appropriate.      Acute GI bleed of unknown etiology, s/p 2 U PRBC, now with stable hemoglobin    Plan for colonoscopy on Wed    Case d/w daughter at bedside.

## 2019-05-21 NOTE — PROGRESS NOTE ADULT - ASSESSMENT
87 yo male with h.o COPD, PE/DVT on Xarelto here with anemia-iron deficiency.  Received 2 iu PRBC overnight with appropriate bump in Hgb. 85 yo male with h.o COPD, PE/DVT on Xarelto here with anemia-iron deficiency.  Received 2 u PRBC overnight with appropriate bump in Hgb.

## 2019-05-22 LAB
ANION GAP SERPL CALC-SCNC: 8 MMOL/L — SIGNIFICANT CHANGE UP (ref 5–17)
BUN SERPL-MCNC: 28 MG/DL — HIGH (ref 7–23)
CALCIUM SERPL-MCNC: 8.6 MG/DL — SIGNIFICANT CHANGE UP (ref 8.4–10.5)
CHLORIDE SERPL-SCNC: 102 MMOL/L — SIGNIFICANT CHANGE UP (ref 96–108)
CO2 SERPL-SCNC: 27 MMOL/L — SIGNIFICANT CHANGE UP (ref 22–31)
CREAT SERPL-MCNC: 1.28 MG/DL — SIGNIFICANT CHANGE UP (ref 0.5–1.3)
GLUCOSE SERPL-MCNC: 87 MG/DL — SIGNIFICANT CHANGE UP (ref 70–99)
HCT VFR BLD CALC: 30.4 % — LOW (ref 39–50)
HGB BLD-MCNC: 9.2 G/DL — LOW (ref 13–17)
MCHC RBC-ENTMCNC: 23.5 PG — LOW (ref 27–34)
MCHC RBC-ENTMCNC: 30.3 GM/DL — LOW (ref 32–36)
MCV RBC AUTO: 77.6 FL — LOW (ref 80–100)
NRBC # BLD: 0 /100 WBCS — SIGNIFICANT CHANGE UP (ref 0–0)
PLATELET # BLD AUTO: 513 K/UL — HIGH (ref 150–400)
POTASSIUM SERPL-MCNC: 4.3 MMOL/L — SIGNIFICANT CHANGE UP (ref 3.5–5.3)
POTASSIUM SERPL-SCNC: 4.3 MMOL/L — SIGNIFICANT CHANGE UP (ref 3.5–5.3)
RBC # BLD: 3.92 M/UL — LOW (ref 4.2–5.8)
RBC # FLD: 20.6 % — HIGH (ref 10.3–14.5)
SODIUM SERPL-SCNC: 137 MMOL/L — SIGNIFICANT CHANGE UP (ref 135–145)
WBC # BLD: 8.25 K/UL — SIGNIFICANT CHANGE UP (ref 3.8–10.5)
WBC # FLD AUTO: 8.25 K/UL — SIGNIFICANT CHANGE UP (ref 3.8–10.5)

## 2019-05-22 PROCEDURE — 99232 SBSQ HOSP IP/OBS MODERATE 35: CPT

## 2019-05-22 RX ORDER — FUROSEMIDE 40 MG
20 TABLET ORAL ONCE
Refills: 0 | Status: COMPLETED | OUTPATIENT
Start: 2019-05-22 | End: 2019-05-22

## 2019-05-22 RX ADMIN — Medication 3 MILLILITER(S): at 15:41

## 2019-05-22 RX ADMIN — ATORVASTATIN CALCIUM 40 MILLIGRAM(S): 80 TABLET, FILM COATED ORAL at 21:50

## 2019-05-22 RX ADMIN — Medication 3 MILLILITER(S): at 10:33

## 2019-05-22 RX ADMIN — TAMSULOSIN HYDROCHLORIDE 0.4 MILLIGRAM(S): 0.4 CAPSULE ORAL at 21:51

## 2019-05-22 RX ADMIN — Medication 3 MILLILITER(S): at 21:35

## 2019-05-22 RX ADMIN — Medication 100 MILLIGRAM(S): at 12:25

## 2019-05-22 RX ADMIN — Medication 20 MILLIGRAM(S): at 13:18

## 2019-05-22 RX ADMIN — Medication 0.5 MILLIGRAM(S): at 10:33

## 2019-05-22 RX ADMIN — GABAPENTIN 300 MILLIGRAM(S): 400 CAPSULE ORAL at 21:51

## 2019-05-22 RX ADMIN — Medication 0.5 MILLIGRAM(S): at 21:35

## 2019-05-22 NOTE — PROGRESS NOTE ADULT - PROBLEM SELECTOR PLAN 2
pt lung exam still course   will give additional 20 IV lasix this am still with rales on exam, will give dose of IV lasix  pt had recent Echo; has normal LV function, +pulm HTN

## 2019-05-22 NOTE — PROGRESS NOTE ADULT - PROBLEM SELECTOR PLAN 5
due to poor dentition  S+S recommand mechanical soft diet  S+S therapist noted assymmetry of the mouth (SELINA [place] than L)-recommand outpatient ENT consult     DVT ppx: venodynes--Xarelto held due to gi bleed due to poor dentition; S+S recommend mechanical soft diet...S+S therapist noted asymmetry of the mouth (R [place] than L), ENT as outpt.

## 2019-05-22 NOTE — PROGRESS NOTE ADULT - SUBJECTIVE AND OBJECTIVE BOX
Patient is a 86y old  Male who presents with a chief complaint of low h/h (21 May 2019 15:42)      Patient seen and examined at bedside.    ALLERGIES:  No Known Allergies    MEDICATIONS  (STANDING):  ALBUTerol    90 MICROgram(s) HFA Inhaler 1 Puff(s) Inhalation every 4 hours  ALBUTerol/ipratropium for Nebulization 3 milliLiter(s) Nebulizer every 6 hours  atorvastatin 40 milliGRAM(s) Oral at bedtime  buDESOnide   0.5 milliGRAM(s) Respule 0.5 milliGRAM(s) Inhalation every 12 hours  docusate sodium 100 milliGRAM(s) Oral daily  furosemide   Injectable 20 milliGRAM(s) IV Push once  gabapentin 300 milliGRAM(s) Oral at bedtime  pantoprazole    Tablet 40 milliGRAM(s) Oral before breakfast  tamsulosin 0.4 milliGRAM(s) Oral at bedtime  tiotropium 18 MICROgram(s) Capsule 1 Capsule(s) Inhalation daily    MEDICATIONS  (PRN):  acetaminophen   Tablet .. 650 milliGRAM(s) Oral every 6 hours PRN Mild Pain (1 - 3)    Vital Signs Last 24 Hrs  T(F): 98.4 (22 May 2019 05:55), Max: 98.4 (22 May 2019 05:55)  HR: 67 (22 May 2019 10:36) (67 - 72)  BP: 113/61 (22 May 2019 05:55) (112/66 - 129/59)  RR: 16 (22 May 2019 05:55) (16 - 18)  SpO2: 93% (22 May 2019 10:36) (93% - 98%)  I&O's Summary    21 May 2019 07:01  -  22 May 2019 07:00  --------------------------------------------------------  IN: 300 mL / OUT: 400 mL / NET: -100 mL    22 May 2019 07:01  -  22 May 2019 12:09  --------------------------------------------------------  IN: 0 mL / OUT: 300 mL / NET: -300 mL      PHYSICAL EXAM:  General: NAD, A/O x 3  ENT: MMM  Neck: Supple, No JVD  Lungs: Clear to auscultation bilaterally  Cardio: RRR, S1/S2, No murmurs  Abdomen: Soft, Nontender, Nondistended; Bowel sounds present  Extremities: No calf tenderness, No pitting edema    LABS:                        9.2    8.25  )-----------( 513      ( 22 May 2019 08:22 )             30.4     05-22    137  |  102  |  28  ----------------------------<  87  4.3   |  27  |  1.28    Ca    8.6      22 May 2019 08:22    TPro  6.9  /  Alb  2.9  /  TBili  0.8  /  DBili  x   /  AST  24  /  ALT  27  /  AlkPhos  129  05-21    eGFR if Non African American: 50 mL/min/1.73M2 (05-22-19 @ 08:22)  eGFR if : 58 mL/min/1.73M2 (05-22-19 @ 08:22)            03-20 Chol 113 mg/dL LDL 57 mg/dL HDL 39 mg/dL Trig 84 mg/dL        CAPILLARY BLOOD GLUCOSE        03-19 [id number] 4.9          RADIOLOGY & ADDITIONAL TESTS:    Care Discussed with Consultants/Other Providers: Patient is a 86y old  Male who presents with a chief complaint of low h/h (21 May 2019 15:42)  Patient seen and examined at bedside.  Pt. c/o cough, no abd pain.    ALLERGIES:  No Known Allergies    MEDICATIONS  (STANDING):  ALBUTerol    90 MICROgram(s) HFA Inhaler 1 Puff(s) Inhalation every 4 hours  ALBUTerol/ipratropium for Nebulization 3 milliLiter(s) Nebulizer every 6 hours  atorvastatin 40 milliGRAM(s) Oral at bedtime  buDESOnide   0.5 milliGRAM(s) Respule 0.5 milliGRAM(s) Inhalation every 12 hours  docusate sodium 100 milliGRAM(s) Oral daily  furosemide   Injectable 20 milliGRAM(s) IV Push once  gabapentin 300 milliGRAM(s) Oral at bedtime  pantoprazole    Tablet 40 milliGRAM(s) Oral before breakfast  tamsulosin 0.4 milliGRAM(s) Oral at bedtime  tiotropium 18 MICROgram(s) Capsule 1 Capsule(s) Inhalation daily    MEDICATIONS  (PRN):  acetaminophen   Tablet .. 650 milliGRAM(s) Oral every 6 hours PRN Mild Pain (1 - 3)    Vital Signs Last 24 Hrs  T(F): 98.4 (22 May 2019 05:55), Max: 98.4 (22 May 2019 05:55)  HR: 67 (22 May 2019 10:36) (67 - 72)  BP: 113/61 (22 May 2019 05:55) (112/66 - 129/59)  RR: 16 (22 May 2019 05:55) (16 - 18)  SpO2: 93% (22 May 2019 10:36) (93% - 98%)  I&O's Summary    21 May 2019 07:01  -  22 May 2019 07:00  --------------------------------------------------------  IN: 300 mL / OUT: 400 mL / NET: -100 mL    22 May 2019 07:01  -  22 May 2019 12:09  --------------------------------------------------------  IN: 0 mL / OUT: 300 mL / NET: -300 mL      PHYSICAL EXAM:  General: awake and alert, coughing.  Neck: Supple, No JVD  Lungs: +rhonchi scattered, +rales at bases  Cardio: RRR, S1/S2, No murmurs  Abdomen: Soft, Nontender, Nondistended; Bowel sounds present  Extremities: No calf tenderness, No pitting edema  Neuro:  no focal deficits    LABS:                        9.2    8.25  )-----------( 513      ( 22 May 2019 08:22 )             30.4     05-22    137  |  102  |  28  ----------------------------<  87  4.3   |  27  |  1.28    Ca    8.6      22 May 2019 08:22    TPro  6.9  /  Alb  2.9  /  TBili  0.8  /  DBili  x   /  AST  24  /  ALT  27  /  AlkPhos  129  05-21    eGFR if Non African American: 50 mL/min/1.73M2 (05-22-19 @ 08:22)  eGFR if : 58 mL/min/1.73M2 (05-22-19 @ 08:22)            03-20 Chol 113 mg/dL LDL 57 mg/dL HDL 39 mg/dL Trig 84 mg/dL        CAPILLARY BLOOD GLUCOSE        03-19 [id number] 4.9          RADIOLOGY & ADDITIONAL TESTS:    Care Discussed with Consultants/Other Providers:

## 2019-05-22 NOTE — PROGRESS NOTE ADULT - SUBJECTIVE AND OBJECTIVE BOX
INTERVAL HPI/OVERNIGHT EVENTS:  HPI:  87 y/o male PMH PE ( on xarelto), DVT, COPD, ETOH, anemia, and s/p recent CVA . Patient seen and examined at bedside. "I did not like the taste of the prep so I didn't drink it." Per documentation and patient he refused movi prep. No other complaints from patient at this time.         MEDICATIONS  (STANDING):  ALBUTerol    90 MICROgram(s) HFA Inhaler 1 Puff(s) Inhalation every 4 hours  ALBUTerol/ipratropium for Nebulization 3 milliLiter(s) Nebulizer every 6 hours  atorvastatin 40 milliGRAM(s) Oral at bedtime  buDESOnide   0.5 milliGRAM(s) Respule 0.5 milliGRAM(s) Inhalation every 12 hours  docusate sodium 100 milliGRAM(s) Oral daily  gabapentin 300 milliGRAM(s) Oral at bedtime  pantoprazole    Tablet 40 milliGRAM(s) Oral before breakfast  tamsulosin 0.4 milliGRAM(s) Oral at bedtime  tiotropium 18 MICROgram(s) Capsule 1 Capsule(s) Inhalation daily    MEDICATIONS  (PRN):  acetaminophen   Tablet .. 650 milliGRAM(s) Oral every 6 hours PRN Mild Pain (1 - 3)      Allergies    No Known Allergies    Intolerances        PHYSICAL EXAM:   Vital Signs:  Vital Signs Last 24 Hrs  T(C): 37 (22 May 2019 16:15), Max: 37 (22 May 2019 16:15)  T(F): 98.6 (22 May 2019 16:15), Max: 98.6 (22 May 2019 16:15)  HR: 76 (22 May 2019 16:15) (67 - 76)  BP: 127/61 (22 May 2019 16:15) (112/66 - 127/61)  BP(mean): --  RR: 16 (22 May 2019 16:15) (16 - 17)  SpO2: 96% (22 May 2019 16:15) (93% - 96%)  Daily     Daily I&O's Summary    21 May 2019 07:01  -  22 May 2019 07:00  --------------------------------------------------------  IN: 300 mL / OUT: 400 mL / NET: -100 mL    22 May 2019 07:01  -  22 May 2019 16:36  --------------------------------------------------------  IN: 400 mL / OUT: 800 mL / NET: -400 mL    GENERAL:  Appears stated age, well-groomed, well-nourished, no distress  HEENT:  NC/AT,  conjunctivae clear and pink,  CHEST:  Full & symmetric excursion, no increased effort, breath sounds clear  HEART:  Regular rhythm, S1, S2  ABDOMEN:  Soft, non-tender, non-distended,  EXTEREMITIES:  no edema  SKIN:  No rash/erythema,warm/dry  NEURO:  Alert, oriented,         LABS:                        9.2    8.25  )-----------( 513      ( 22 May 2019 08:22 )             30.4     05-22    137  |  102  |  28<H>  ----------------------------<  87  4.3   |  27  |  1.28    Ca    8.6      22 May 2019 08:22    TPro  6.9  /  Alb  2.9<L>  /  TBili  0.8  /  DBili  x   /  AST  24  /  ALT  27  /  AlkPhos  129<H>  05-21        amylase   lipase  RADIOLOGY & ADDITIONAL TESTS:

## 2019-05-22 NOTE — PROGRESS NOTE ADULT - PROBLEM SELECTOR PLAN 1
s/p 2 u PRBC and repeat Hgb 8.3 will continue to follow H/H   iron deficient in March 2019 and guaic positive at this time  FOBT this admission pending  Cscope aborted March 2019 as pt did not tolerate prep  EGD March 2019 with no source of bleed  colonoscopy tomorrow -s/p 2 u PRBC;  Hgb 9.2 this am;  will continue to follow H/H   -Iron deficient in March 2019 and guaic positive; FOBT this admission pending  -Colonoscopy cancelled this morning; pt did not tolerate prep; EGD March 2019 with no source of bleed

## 2019-05-22 NOTE — PROGRESS NOTE ADULT - PROBLEM SELECTOR PLAN 3
ho PE/DVT  Xarelto on hold in setting of bleed-  will continue to hold pending Colonoscopy h/o PE/DVT; Xarelto on hold in setting of bleed-  will continue to hold pending GI rec

## 2019-05-22 NOTE — PROGRESS NOTE ADULT - ASSESSMENT
85 y/o male with multiple comorbidities who is s/p recent CVA. He was brought to hospital by his daughter due to drop in hemoglobin and lethargy. H/H stable.  FOB pending collection. Lengthy discussion had with daughter, patient and wife. I advised daughter to bring in lemon flavored gatorade to mix with golytely. Will attempt to re prep patient starting early tomorrow. Patient and family understand that if he refuses prep tomorrow we will not proceed with colonoscopy. Patient and family understand only clear liquid diet from not until Friday.

## 2019-05-23 ENCOUNTER — TRANSCRIPTION ENCOUNTER (OUTPATIENT)
Age: 84
End: 2019-05-23

## 2019-05-23 LAB — OB PNL STL: NEGATIVE — SIGNIFICANT CHANGE UP

## 2019-05-23 PROCEDURE — 99232 SBSQ HOSP IP/OBS MODERATE 35: CPT

## 2019-05-23 RX ORDER — SOD SULF/SODIUM/NAHCO3/KCL/PEG
4000 SOLUTION, RECONSTITUTED, ORAL ORAL
Refills: 0 | Status: DISCONTINUED | OUTPATIENT
Start: 2019-05-23 | End: 2019-05-24

## 2019-05-23 RX ADMIN — Medication 3 MILLILITER(S): at 21:54

## 2019-05-23 RX ADMIN — Medication 3 MILLILITER(S): at 09:33

## 2019-05-23 RX ADMIN — PANTOPRAZOLE SODIUM 40 MILLIGRAM(S): 20 TABLET, DELAYED RELEASE ORAL at 05:53

## 2019-05-23 RX ADMIN — Medication 4000 MILLILITER(S): at 10:06

## 2019-05-23 RX ADMIN — ATORVASTATIN CALCIUM 40 MILLIGRAM(S): 80 TABLET, FILM COATED ORAL at 21:07

## 2019-05-23 RX ADMIN — GABAPENTIN 300 MILLIGRAM(S): 400 CAPSULE ORAL at 21:07

## 2019-05-23 RX ADMIN — TAMSULOSIN HYDROCHLORIDE 0.4 MILLIGRAM(S): 0.4 CAPSULE ORAL at 21:07

## 2019-05-23 RX ADMIN — Medication 0.5 MILLIGRAM(S): at 21:54

## 2019-05-23 RX ADMIN — Medication 0.5 MILLIGRAM(S): at 09:33

## 2019-05-23 RX ADMIN — Medication 100 MILLIGRAM(S): at 11:30

## 2019-05-23 RX ADMIN — Medication 10 MILLIGRAM(S): at 18:42

## 2019-05-23 RX ADMIN — Medication 3 MILLILITER(S): at 15:31

## 2019-05-23 NOTE — PROGRESS NOTE ADULT - PROBLEM SELECTOR PLAN 5
due to poor dentition; S+S recommend mechanical soft diet...S+S therapist noted asymmetry of the mouth (R [place] mart than L), ENT as outpt.

## 2019-05-23 NOTE — PROGRESS NOTE ADULT - PROBLEM SELECTOR PLAN 1
-s/p 2 u PRBC;  Hgb 9.2, will continue to follow H/H   -Iron deficient in March 2019 and guaic positive; FOBT this admission pending  -Colonoscopy cancelled this morning; pt did not tolerate prep; EGD March 2019 with no source of bleed -s/p 2 u PRBC;  Hgb 9.2, will continue to follow H/H   -Iron deficient in March 2019 and guaic positive; FOBT this admission pending  -for Colonoscopy in the am, pt. attempting to do the prep today, if he can't tolerate will D/C home and do as outpatient;  EGD March 2019 with no source of bleed

## 2019-05-23 NOTE — PROGRESS NOTE ADULT - PROBLEM SELECTOR PLAN 2
still with rales on exam, will give dose of IV lasix  pt had recent Echo; has normal LV function, +pulm HTN pt improved, has Lasix yesterday  pt had recent Echo; has normal LV function, +pulm HTN

## 2019-05-23 NOTE — PROGRESS NOTE ADULT - SUBJECTIVE AND OBJECTIVE BOX
INTERVAL HPI/OVERNIGHT EVENTS:  HPI:    85 y/o male PMH PE ( on xarelto), DVT, COPD, ETOH, anemia, and s/p recent CVA . Patient seen and examined at bedside. Golytely prep started. Patient states he is able to tolerate the taste of the prep. Denies nausea, vomiting. No BM yet.     MEDICATIONS  (STANDING):  ALBUTerol    90 MICROgram(s) HFA Inhaler 1 Puff(s) Inhalation every 4 hours  ALBUTerol/ipratropium for Nebulization 3 milliLiter(s) Nebulizer every 6 hours  atorvastatin 40 milliGRAM(s) Oral at bedtime  buDESOnide   0.5 milliGRAM(s) Respule 0.5 milliGRAM(s) Inhalation every 12 hours  docusate sodium 100 milliGRAM(s) Oral daily  gabapentin 300 milliGRAM(s) Oral at bedtime  pantoprazole    Tablet 40 milliGRAM(s) Oral before breakfast  polyethylene glycol/electrolyte Solution. 4000 milliLiter(s) Oral <User Schedule>  tamsulosin 0.4 milliGRAM(s) Oral at bedtime  tiotropium 18 MICROgram(s) Capsule 1 Capsule(s) Inhalation daily    MEDICATIONS  (PRN):  acetaminophen   Tablet .. 650 milliGRAM(s) Oral every 6 hours PRN Mild Pain (1 - 3)      Allergies    No Known Allergies    Intolerances        PHYSICAL EXAM:   Vital Signs:  Vital Signs Last 24 Hrs  T(C): 36.4 (23 May 2019 06:54), Max: 37 (22 May 2019 16:15)  T(F): 97.6 (23 May 2019 06:54), Max: 98.6 (22 May 2019 16:15)  HR: 60 (23 May 2019 15:32) (53 - 77)  BP: 117/73 (23 May 2019 06:54) (117/73 - 127/61)  BP(mean): --  RR: 15 (23 May 2019 06:54) (15 - 16)  SpO2: 96% (23 May 2019 15:32) (95% - 100%)  Daily     Daily Weight in k.6 (23 May 2019 06:54)I&O's Summary    22 May 2019 07:01  -  23 May 2019 07:00  --------------------------------------------------------  IN: 760 mL / OUT: 1100 mL / NET: -340 mL    23 May 2019 07:01  -  23 May 2019 15:59  --------------------------------------------------------  IN: 0 mL / OUT: 400 mL / NET: -400 mL    GENERAL:  Appears stated age, well-groomed, well-nourished, no distress  HEENT:  NC/AT,  conjunctivae clear and pink,  CHEST:  Full & symmetric excursion, no increased effort, breath sounds clear  HEART:  Regular rhythm, S1, S2  ABDOMEN:  Soft, non-tender, non-distended,  EXTEREMITIES:  no edema  SKIN:  No rash/erythema,warm/dry  NEURO:  Alert, oriented,           LABS:                        9.2    8.25  )-----------( 513      ( 22 May 2019 08:22 )             30.4     05-22    137  |  102  |  28<H>  ----------------------------<  87  4.3   |  27  |  1.28    Ca    8.6      22 May 2019 08:22          amylase   lipase  RADIOLOGY & ADDITIONAL TESTS:

## 2019-05-23 NOTE — PROGRESS NOTE ADULT - ASSESSMENT
85 y/o male with multiple comorbidities who is s/p recent CVA. He was brought to hospital by his daughter due to drop in hemoglobin and lethargy. H/H stable. Patient started prep this morning, no BM.

## 2019-05-23 NOTE — PROGRESS NOTE ADULT - SUBJECTIVE AND OBJECTIVE BOX
Patient is a 86y old  Male who presents with a chief complaint of low h/h (22 May 2019 16:35)      Patient seen and examined at bedside.    ALLERGIES:  No Known Allergies    MEDICATIONS  (STANDING):  ALBUTerol    90 MICROgram(s) HFA Inhaler 1 Puff(s) Inhalation every 4 hours  ALBUTerol/ipratropium for Nebulization 3 milliLiter(s) Nebulizer every 6 hours  atorvastatin 40 milliGRAM(s) Oral at bedtime  buDESOnide   0.5 milliGRAM(s) Respule 0.5 milliGRAM(s) Inhalation every 12 hours  docusate sodium 100 milliGRAM(s) Oral daily  gabapentin 300 milliGRAM(s) Oral at bedtime  pantoprazole    Tablet 40 milliGRAM(s) Oral before breakfast  polyethylene glycol/electrolyte Solution. 4000 milliLiter(s) Oral <User Schedule>  tamsulosin 0.4 milliGRAM(s) Oral at bedtime  tiotropium 18 MICROgram(s) Capsule 1 Capsule(s) Inhalation daily    MEDICATIONS  (PRN):  acetaminophen   Tablet .. 650 milliGRAM(s) Oral every 6 hours PRN Mild Pain (1 - 3)    Vital Signs Last 24 Hrs  T(F): 97.6 (23 May 2019 06:54), Max: 98.6 (22 May 2019 16:15)  HR: 59 (23 May 2019 06:54) (59 - 77)  BP: 117/73 (23 May 2019 06:54) (117/73 - 127/61)  RR: 15 (23 May 2019 06:54) (15 - 16)  SpO2: 98% (23 May 2019 06:54) (93% - 100%)  I&O's Summary    22 May 2019 07:01  -  23 May 2019 07:00  --------------------------------------------------------  IN: 760 mL / OUT: 1100 mL / NET: -340 mL      PHYSICAL EXAM:  General: NAD, A/O x 3  ENT: MMM  Neck: Supple, No JVD  Lungs: Clear to auscultation bilaterally  Cardio: RRR, S1/S2, No murmurs  Abdomen: Soft, Nontender, Nondistended; Bowel sounds present  Extremities: No calf tenderness, No pitting edema    LABS:                        9.2    8.25  )-----------( 513      ( 22 May 2019 08:22 )             30.4     05-22    137  |  102  |  28  ----------------------------<  87  4.3   |  27  |  1.28    Ca    8.6      22 May 2019 08:22    TPro  6.9  /  Alb  2.9  /  TBili  0.8  /  DBili  x   /  AST  24  /  ALT  27  /  AlkPhos  129  05-21    eGFR if Non African American: 50 mL/min/1.73M2 (05-22-19 @ 08:22)  eGFR if : 58 mL/min/1.73M2 (05-22-19 @ 08:22)            03-20 Chol 113 mg/dL LDL 57 mg/dL HDL 39 mg/dL Trig 84 mg/dL        CAPILLARY BLOOD GLUCOSE        03-19 [id number] 4.9          RADIOLOGY & ADDITIONAL TESTS:    Care Discussed with Consultants/Other Providers: Patient is a 86y old  Male who presents with a chief complaint of low h/h (22 May 2019 16:35)  Patient seen and examined at bedside.  Pt. with no events overnight.    ALLERGIES:  No Known Allergies    MEDICATIONS  (STANDING):  ALBUTerol    90 MICROgram(s) HFA Inhaler 1 Puff(s) Inhalation every 4 hours  ALBUTerol/ipratropium for Nebulization 3 milliLiter(s) Nebulizer every 6 hours  atorvastatin 40 milliGRAM(s) Oral at bedtime  buDESOnide   0.5 milliGRAM(s) Respule 0.5 milliGRAM(s) Inhalation every 12 hours  docusate sodium 100 milliGRAM(s) Oral daily  gabapentin 300 milliGRAM(s) Oral at bedtime  pantoprazole    Tablet 40 milliGRAM(s) Oral before breakfast  polyethylene glycol/electrolyte Solution. 4000 milliLiter(s) Oral <User Schedule>  tamsulosin 0.4 milliGRAM(s) Oral at bedtime  tiotropium 18 MICROgram(s) Capsule 1 Capsule(s) Inhalation daily    MEDICATIONS  (PRN):  acetaminophen   Tablet .. 650 milliGRAM(s) Oral every 6 hours PRN Mild Pain (1 - 3)    Vital Signs Last 24 Hrs  T(F): 97.6 (23 May 2019 06:54), Max: 98.6 (22 May 2019 16:15)  HR: 59 (23 May 2019 06:54) (59 - 77)  BP: 117/73 (23 May 2019 06:54) (117/73 - 127/61)  RR: 15 (23 May 2019 06:54) (15 - 16)  SpO2: 98% (23 May 2019 06:54) (93% - 100%)  I&O's Summary    22 May 2019 07:01  -  23 May 2019 07:00  --------------------------------------------------------  IN: 760 mL / OUT: 1100 mL / NET: -340 mL      PHYSICAL EXAM:  General: NAD, A/O x 3  ENT: MMM  Neck: Supple, No JVD  Lungs: +rhonchi b/l; chronic  Cardio: RRR, S1/S2, No murmurs  Abdomen: Soft, Nontender, Nondistended; Bowel sounds present  Extremities: No calf tenderness, No pitting edema  Neuro: no focal deficits    LABS:                        9.2    8.25  )-----------( 513      ( 22 May 2019 08:22 )             30.4     05-22    137  |  102  |  28  ----------------------------<  87  4.3   |  27  |  1.28    Ca    8.6      22 May 2019 08:22    TPro  6.9  /  Alb  2.9  /  TBili  0.8  /  DBili  x   /  AST  24  /  ALT  27  /  AlkPhos  129  05-21    eGFR if Non African American: 50 mL/min/1.73M2 (05-22-19 @ 08:22)  eGFR if : 58 mL/min/1.73M2 (05-22-19 @ 08:22)            03-20 Chol 113 mg/dL LDL 57 mg/dL HDL 39 mg/dL Trig 84 mg/dL        CAPILLARY BLOOD GLUCOSE        03-19 [id number] 4.9          RADIOLOGY & ADDITIONAL TESTS:    Care Discussed with Consultants/Other Providers:

## 2019-05-24 LAB
ALLERGY+IMMUNOLOGY DIAG STUDY NOTE: SIGNIFICANT CHANGE UP
ANTIBODY INTERPRETATION 2: SIGNIFICANT CHANGE UP
BLD GP AB SCN SERPL QL: SIGNIFICANT CHANGE UP
DIR ANTIGLOB POLYSPECIFIC INTERPRETATION: SIGNIFICANT CHANGE UP

## 2019-05-24 PROCEDURE — 99232 SBSQ HOSP IP/OBS MODERATE 35: CPT

## 2019-05-24 RX ORDER — TAMSULOSIN HYDROCHLORIDE 0.4 MG/1
0.4 CAPSULE ORAL AT BEDTIME
Refills: 0 | Status: DISCONTINUED | OUTPATIENT
Start: 2019-05-24 | End: 2019-05-25

## 2019-05-24 RX ORDER — BUDESONIDE, MICRONIZED 100 %
0.5 POWDER (GRAM) MISCELLANEOUS EVERY 12 HOURS
Refills: 0 | Status: DISCONTINUED | OUTPATIENT
Start: 2019-05-24 | End: 2019-05-25

## 2019-05-24 RX ORDER — ATORVASTATIN CALCIUM 80 MG/1
40 TABLET, FILM COATED ORAL AT BEDTIME
Refills: 0 | Status: DISCONTINUED | OUTPATIENT
Start: 2019-05-24 | End: 2019-05-25

## 2019-05-24 RX ORDER — GABAPENTIN 400 MG/1
300 CAPSULE ORAL AT BEDTIME
Refills: 0 | Status: DISCONTINUED | OUTPATIENT
Start: 2019-05-24 | End: 2019-05-25

## 2019-05-24 RX ORDER — SODIUM CHLORIDE 9 MG/ML
1000 INJECTION, SOLUTION INTRAVENOUS
Refills: 0 | Status: DISCONTINUED | OUTPATIENT
Start: 2019-05-24 | End: 2019-05-25

## 2019-05-24 RX ORDER — MULTIVIT WITH MIN/MFOLATE/K2 340-15/3 G
1 POWDER (GRAM) ORAL ONCE
Refills: 0 | Status: COMPLETED | OUTPATIENT
Start: 2019-05-24 | End: 2019-05-24

## 2019-05-24 RX ORDER — ALBUTEROL 90 UG/1
1 AEROSOL, METERED ORAL EVERY 4 HOURS
Refills: 0 | Status: DISCONTINUED | OUTPATIENT
Start: 2019-05-24 | End: 2019-05-25

## 2019-05-24 RX ORDER — ACETAMINOPHEN 500 MG
650 TABLET ORAL EVERY 6 HOURS
Refills: 0 | Status: DISCONTINUED | OUTPATIENT
Start: 2019-05-24 | End: 2019-05-25

## 2019-05-24 RX ORDER — PANTOPRAZOLE SODIUM 20 MG/1
40 TABLET, DELAYED RELEASE ORAL
Refills: 0 | Status: DISCONTINUED | OUTPATIENT
Start: 2019-05-24 | End: 2019-05-25

## 2019-05-24 RX ORDER — TIOTROPIUM BROMIDE 18 UG/1
1 CAPSULE ORAL; RESPIRATORY (INHALATION) DAILY
Refills: 0 | Status: DISCONTINUED | OUTPATIENT
Start: 2019-05-24 | End: 2019-05-25

## 2019-05-24 RX ORDER — DOCUSATE SODIUM 100 MG
100 CAPSULE ORAL DAILY
Refills: 0 | Status: DISCONTINUED | OUTPATIENT
Start: 2019-05-24 | End: 2019-05-25

## 2019-05-24 RX ADMIN — PANTOPRAZOLE SODIUM 40 MILLIGRAM(S): 20 TABLET, DELAYED RELEASE ORAL at 08:33

## 2019-05-24 RX ADMIN — TAMSULOSIN HYDROCHLORIDE 0.4 MILLIGRAM(S): 0.4 CAPSULE ORAL at 22:12

## 2019-05-24 RX ADMIN — Medication 1 BOTTLE: at 08:29

## 2019-05-24 RX ADMIN — Medication 3 MILLILITER(S): at 09:18

## 2019-05-24 RX ADMIN — Medication 0.5 MILLIGRAM(S): at 22:19

## 2019-05-24 RX ADMIN — Medication 0.5 MILLIGRAM(S): at 09:17

## 2019-05-24 RX ADMIN — ATORVASTATIN CALCIUM 40 MILLIGRAM(S): 80 TABLET, FILM COATED ORAL at 22:12

## 2019-05-24 RX ADMIN — SODIUM CHLORIDE 75 MILLILITER(S): 9 INJECTION, SOLUTION INTRAVENOUS at 18:47

## 2019-05-24 RX ADMIN — GABAPENTIN 300 MILLIGRAM(S): 400 CAPSULE ORAL at 22:12

## 2019-05-24 RX ADMIN — Medication 3 MILLILITER(S): at 16:03

## 2019-05-24 NOTE — PHYSICAL THERAPY INITIAL EVALUATION ADULT - ADDITIONAL COMMENTS
Pt lives in house with wife, 4 LUCY with 1 rail.  Pt states he uses RW at home and wife assists him with ADLs.

## 2019-05-24 NOTE — DIETITIAN INITIAL EVALUATION ADULT. - PROBLEM SELECTOR PLAN 1
symptomatic anemia, r/o  gi bleed  stool guiac  admit to [facility],   2 units prb's   lasix and tylenol after 1st unit  h/h 4 hr after 2nd unit  hold a/c

## 2019-05-24 NOTE — CHART NOTE - NSCHARTNOTEFT_GEN_A_CORE
Problem: Patient Care Overview (Adult)  Goal: Plan of Care Review  Outcome: Ongoing (interventions implemented as appropriate)    06/10/17 1647   Coping/Psychosocial Response Interventions   Plan Of Care Reviewed With patient;spouse   Outcome Evaluation   Outcome Summary/Follow up Plan Pt down to 6L N/C. Moderate amount of thick secretions. Pt unable to void and last timed straight cathed, blood tinged urine d/t pt being on eliquis. F/C placed. Hopefully able to work with PT tomorrow and move out of ICU.   Patient Care Overview   Progress improving         Problem: Fall Risk (Adult)  Goal: Identify Related Risk Factors and Signs and Symptoms  Outcome: Ongoing (interventions implemented as appropriate)    06/10/17 1647   Fall Risk   Fall Risk: Related Risk Factors bladder function altered;confusion/agitation   Fall Risk: Signs and Symptoms presence of risk factors       Goal: Absence of Falls  Outcome: Ongoing (interventions implemented as appropriate)    Problem: Skin Integrity Impairment, Risk/Actual (Adult)  Goal: Skin Integrity/Wound Healing  Outcome: Ongoing (interventions implemented as appropriate)    06/10/17 1647   Skin Integrity Impairment, Risk/Actual (Adult)   Skin Integrity/Wound Healing making progress toward outcome         Problem: Respiratory Insufficiency (Adult)  Goal: Acid/Base Balance  Outcome: Ongoing (interventions implemented as appropriate)    06/10/17 1647   Respiratory Insufficiency (Adult)   Acid/Base Balance making progress toward outcome       Goal: Effective Ventilation  Outcome: Ongoing (interventions implemented as appropriate)       Upon Nutritional Assessment by the Registered Dietitian your patient was determined to meet criteria / has evidence of the following diagnosis/diagnoses:          [ ]  Mild Protein Calorie Malnutrition        [X]  Moderate Protein Calorie Malnutrition        [ ] Severe Protein Calorie Malnutrition        [ ] Unspecified Protein Calorie Malnutrition        [ ] Underweight / BMI <19        [ ] Morbid Obesity / BMI > 40      Findings as based on:  [X] Comprehensive nutrition assessment   [x] Nutrition Focused Physical Exam  [ ] Other:       Nutrition Plan/Recommendations:  When medically feasible, advance diet to mechanical soft, low Na + Ensure Enlive 8oz BID        PROVIDER Section:     By signing this assessment you are acknowledging and agree with the diagnosis/diagnoses assigned by the Registered Dietitian    Comments:

## 2019-05-24 NOTE — DIETITIAN INITIAL EVALUATION ADULT. - OTHER INFO
Patient is a 86y old  Male who presents with a chief complaint of low h/h. NPO for colonoscopy today. Has been on clear liquid diet since 5/22. Pt report UBW between 140-150's. Pt with evidence of moderate malnutrition as per NFPE. GI following to assess for source of bleed.

## 2019-05-24 NOTE — PROGRESS NOTE ADULT - PROBLEM SELECTOR PLAN 1
-pt to go for Colonoscopy today  -s/p 2 u PRBC;  Hgb 9.2, will continue to follow H/H   -Iron deficient in March 2019 and guaic positive; FOBT this admission pending  -he had an EGD March 2019 with no source of bleed

## 2019-05-24 NOTE — PROGRESS NOTE ADULT - SUBJECTIVE AND OBJECTIVE BOX
Patient is a 86y old  Male who presents with a chief complaint of low h/h (23 May 2019 15:55)  Patient seen and examined at bedside.  No events overnight.  ALLERGIES:  No Known Allergies    MEDICATIONS  (STANDING):  ALBUTerol    90 MICROgram(s) HFA Inhaler 1 Puff(s) Inhalation every 4 hours  ALBUTerol/ipratropium for Nebulization 3 milliLiter(s) Nebulizer every 6 hours  atorvastatin 40 milliGRAM(s) Oral at bedtime  buDESOnide   0.5 milliGRAM(s) Respule 0.5 milliGRAM(s) Inhalation every 12 hours  docusate sodium 100 milliGRAM(s) Oral daily  gabapentin 300 milliGRAM(s) Oral at bedtime  pantoprazole    Tablet 40 milliGRAM(s) Oral before breakfast  polyethylene glycol/electrolyte Solution. 4000 milliLiter(s) Oral <User Schedule>  tamsulosin 0.4 milliGRAM(s) Oral at bedtime  tiotropium 18 MICROgram(s) Capsule 1 Capsule(s) Inhalation daily    MEDICATIONS  (PRN):  acetaminophen   Tablet .. 650 milliGRAM(s) Oral every 6 hours PRN Mild Pain (1 - 3)    Vital Signs Last 24 Hrs  T(F): 98 (24 May 2019 06:38), Max: 98.4 (23 May 2019 21:05)  HR: 65 (24 May 2019 06:38) (53 - 107)  BP: 106/59 (24 May 2019 06:38) (106/59 - 151/72)  RR: 16 (24 May 2019 06:38) (16 - 16)  SpO2: 94% (24 May 2019 06:38) (94% - 98%)  I&O's Summary    23 May 2019 07:01  -  24 May 2019 07:00  --------------------------------------------------------  IN: 480 mL / OUT: 403 mL / NET: 77 mL      PHYSICAL EXAM:  General: NAD, A/O x 3  ENT: MMM  Neck: Supple, No JVD  Lungs: +rhonchi  Cardio: RRR, S1/S2, No murmurs  Abdomen: Soft, Nontender, Nondistended; Bowel sounds present  Extremities: No calf tenderness, No pitting edema    LABS:                        9.2    8.25  )-----------( 513      ( 22 May 2019 08:22 )             30.4     05-22    137  |  102  |  28  ----------------------------<  87  4.3   |  27  |  1.28    Ca    8.6      22 May 2019 08:22      eGFR if Non African American: 50 mL/min/1.73M2 (05-22-19 @ 08:22)  eGFR if : 58 mL/min/1.73M2 (05-22-19 @ 08:22)            03-20 Chol 113 mg/dL LDL 57 mg/dL HDL 39 mg/dL Trig 84 mg/dL        CAPILLARY BLOOD GLUCOSE        03-19 [id number] 4.9          RADIOLOGY & ADDITIONAL TESTS:    Care Discussed with Consultants/Other Providers:

## 2019-05-25 ENCOUNTER — TRANSCRIPTION ENCOUNTER (OUTPATIENT)
Age: 84
End: 2019-05-25

## 2019-05-25 VITALS — OXYGEN SATURATION: 98 %

## 2019-05-25 PROCEDURE — 83880 ASSAY OF NATRIURETIC PEPTIDE: CPT

## 2019-05-25 PROCEDURE — P9016: CPT

## 2019-05-25 PROCEDURE — 94640 AIRWAY INHALATION TREATMENT: CPT

## 2019-05-25 PROCEDURE — 99232 SBSQ HOSP IP/OBS MODERATE 35: CPT

## 2019-05-25 PROCEDURE — 94760 N-INVAS EAR/PLS OXIMETRY 1: CPT

## 2019-05-25 PROCEDURE — 86902 BLOOD TYPE ANTIGEN DONOR EA: CPT

## 2019-05-25 PROCEDURE — 86850 RBC ANTIBODY SCREEN: CPT

## 2019-05-25 PROCEDURE — 97161 PT EVAL LOW COMPLEX 20 MIN: CPT

## 2019-05-25 PROCEDURE — 36415 COLL VENOUS BLD VENIPUNCTURE: CPT

## 2019-05-25 PROCEDURE — 86870 RBC ANTIBODY IDENTIFICATION: CPT

## 2019-05-25 PROCEDURE — 86900 BLOOD TYPING SEROLOGIC ABO: CPT

## 2019-05-25 PROCEDURE — 86901 BLOOD TYPING SEROLOGIC RH(D): CPT

## 2019-05-25 PROCEDURE — 36430 TRANSFUSION BLD/BLD COMPNT: CPT

## 2019-05-25 PROCEDURE — 92526 ORAL FUNCTION THERAPY: CPT

## 2019-05-25 PROCEDURE — 71045 X-RAY EXAM CHEST 1 VIEW: CPT

## 2019-05-25 PROCEDURE — 85610 PROTHROMBIN TIME: CPT

## 2019-05-25 PROCEDURE — 86880 COOMBS TEST DIRECT: CPT

## 2019-05-25 PROCEDURE — 92610 EVALUATE SWALLOWING FUNCTION: CPT

## 2019-05-25 PROCEDURE — 86922 COMPATIBILITY TEST ANTIGLOB: CPT

## 2019-05-25 PROCEDURE — 80048 BASIC METABOLIC PNL TOTAL CA: CPT

## 2019-05-25 PROCEDURE — 85027 COMPLETE CBC AUTOMATED: CPT

## 2019-05-25 PROCEDURE — 82272 OCCULT BLD FECES 1-3 TESTS: CPT

## 2019-05-25 PROCEDURE — 80053 COMPREHEN METABOLIC PANEL: CPT

## 2019-05-25 PROCEDURE — 85730 THROMBOPLASTIN TIME PARTIAL: CPT

## 2019-05-25 PROCEDURE — 96374 THER/PROPH/DIAG INJ IV PUSH: CPT

## 2019-05-25 PROCEDURE — 84484 ASSAY OF TROPONIN QUANT: CPT

## 2019-05-25 PROCEDURE — 93005 ELECTROCARDIOGRAM TRACING: CPT

## 2019-05-25 PROCEDURE — 99285 EMERGENCY DEPT VISIT HI MDM: CPT | Mod: 25

## 2019-05-25 RX ORDER — ACETAMINOPHEN 500 MG
2 TABLET ORAL
Qty: 0 | Refills: 0 | DISCHARGE
Start: 2019-05-25

## 2019-05-25 RX ORDER — RIVAROXABAN 15 MG-20MG
10 KIT ORAL EVERY 24 HOURS
Refills: 0 | Status: DISCONTINUED | OUTPATIENT
Start: 2019-05-25 | End: 2019-05-25

## 2019-05-25 RX ORDER — PANTOPRAZOLE SODIUM 20 MG/1
1 TABLET, DELAYED RELEASE ORAL
Qty: 60 | Refills: 0
Start: 2019-05-25 | End: 2019-06-23

## 2019-05-25 RX ORDER — RIVAROXABAN 15 MG-20MG
20 KIT ORAL EVERY 24 HOURS
Refills: 0 | Status: DISCONTINUED | OUTPATIENT
Start: 2019-05-25 | End: 2019-05-25

## 2019-05-25 RX ADMIN — SODIUM CHLORIDE 75 MILLILITER(S): 9 INJECTION, SOLUTION INTRAVENOUS at 06:38

## 2019-05-25 RX ADMIN — Medication 100 MILLIGRAM(S): at 12:25

## 2019-05-25 RX ADMIN — Medication 0.5 MILLIGRAM(S): at 09:13

## 2019-05-25 RX ADMIN — PANTOPRAZOLE SODIUM 40 MILLIGRAM(S): 20 TABLET, DELAYED RELEASE ORAL at 06:42

## 2019-05-25 NOTE — DISCHARGE NOTE PROVIDER - NSDCCPCAREPLAN_GEN_ALL_CORE_FT
PRINCIPAL DISCHARGE DIAGNOSIS  Diagnosis: Acute anemia  Assessment and Plan of Treatment:       SECONDARY DISCHARGE DIAGNOSES  Diagnosis: COPD exacerbation  Assessment and Plan of Treatment:

## 2019-05-25 NOTE — PROGRESS NOTE ADULT - PROVIDER SPECIALTY LIST ADULT
Gastroenterology
Gastroenterology
Hospitalist
Gastroenterology

## 2019-05-25 NOTE — DISCHARGE NOTE PROVIDER - HOSPITAL COURSE
86M from home HO DVT/PE    Came to ED 86M from home Tobacco use Disorder History of PE one year ago    Came to ED for low H&H seen on routine labs    Vitals stable in ED    PE normal except for Guic positive stools    Labs normal except for microcytic anemia    Admitted for GI bleed    Colonoscopy performed.     Internal hemorrohoids    Repeat guic afterwards negative    H&H was stable throughout admission    Xarelto restarted    Gi cleared for discharge and patient will FU in clinic for capsule endoscopy    Of note, family reported that patient was not taking protonix regularly at home    Called new protonix prescription in and ephasized the importance of taking the antacid

## 2019-05-25 NOTE — PROGRESS NOTE ADULT - SUBJECTIVE AND OBJECTIVE BOX
Subjective  Comfortable  No events overnight  No new developments  Objective  Vitals reviewed  Gen NAD  Pulm CTA  CVS RRR  Abdo Soft  Ext Pulses Palp Subjective  Comfortable  No events overnight  No new developments  Objective  Vitals reviewed  Gen NAD  Pulm CTA  CVS RRR  Abdo Soft  Ext Pulses Palp  Assessment and plan  		  86M COPD, PE/DVT on Xarelto  Admit for FE Def Anemia, GI Bleed    1)Anemia Subjective  Comfortable  No events overnight  No new developments  Objective  Vitals reviewed  Gen NAD  Pulm CTA  CVS RRR  Abdo Soft  Ext Pulses Palp  Assessment and plan  		  86M COPD, PE/DVT on Xarelto  Admit for FE Def Anemia, GI Bleed    1)Anemia  Colonoscopy with internal hemorrhoids  FOBT now negative  Will need capsule endoscopy  GI will arrange as outpatient  DC today  Discussed with GI

## 2019-05-25 NOTE — DISCHARGE NOTE PROVIDER - CARE PROVIDER_API CALL
Jean Paul Huizar (MD)  Gastroenterology; Internal Medicine  [address] [place] [address]  Phone: [phone]  Fax: [phone]  Follow Up Time:

## 2019-05-25 NOTE — DISCHARGE NOTE NURSING/CASE MANAGEMENT/SOCIAL WORK - NSDCDPATPORTLINK_GEN_ALL_CORE
You can access the [facility] Patient Portal, offered by [facility], by registering with the following website: [email or web address]

## 2019-06-09 ENCOUNTER — EMERGENCY (EMERGENCY)
Facility: HOSPITAL | Age: 84
LOS: 1 days | Discharge: HOME | End: 2019-06-09
Attending: EMERGENCY MEDICINE | Admitting: EMERGENCY MEDICINE
Payer: MEDICARE

## 2019-06-09 VITALS
TEMPERATURE: 98 F | SYSTOLIC BLOOD PRESSURE: 147 MMHG | DIASTOLIC BLOOD PRESSURE: 74 MMHG | OXYGEN SATURATION: 96 % | HEART RATE: 70 BPM | RESPIRATION RATE: 16 BRPM

## 2019-06-09 VITALS
HEART RATE: 67 BPM | WEIGHT: 145.06 LBS | RESPIRATION RATE: 18 BRPM | DIASTOLIC BLOOD PRESSURE: 69 MMHG | HEIGHT: 66 IN | OXYGEN SATURATION: 95 % | TEMPERATURE: 98 F | SYSTOLIC BLOOD PRESSURE: 152 MMHG

## 2019-06-09 PROBLEM — H91.90 UNSPECIFIED HEARING LOSS, UNSPECIFIED EAR: Chronic | Status: ACTIVE | Noted: 2019-05-19

## 2019-06-09 PROBLEM — I26.99 OTHER PULMONARY EMBOLISM WITHOUT ACUTE COR PULMONALE: Chronic | Status: ACTIVE | Noted: 2019-05-19

## 2019-06-09 PROBLEM — Z87.09 PERSONAL HISTORY OF OTHER DISEASES OF THE RESPIRATORY SYSTEM: Chronic | Status: ACTIVE | Noted: 2019-05-19

## 2019-06-09 LAB
ALBUMIN SERPL ELPH-MCNC: 3.3 G/DL — SIGNIFICANT CHANGE UP (ref 3.3–5)
ALP SERPL-CCNC: 274 U/L — HIGH (ref 40–120)
ALT FLD-CCNC: 29 U/L DA — SIGNIFICANT CHANGE UP (ref 10–45)
ANION GAP SERPL CALC-SCNC: 9 MMOL/L — SIGNIFICANT CHANGE UP (ref 5–17)
APTT BLD: 29.5 SEC — SIGNIFICANT CHANGE UP (ref 27.5–36.3)
AST SERPL-CCNC: 20 U/L — SIGNIFICANT CHANGE UP (ref 10–40)
BASOPHILS # BLD AUTO: 0.02 K/UL — SIGNIFICANT CHANGE UP (ref 0–0.2)
BASOPHILS NFR BLD AUTO: 0.3 % — SIGNIFICANT CHANGE UP (ref 0–2)
BILIRUB SERPL-MCNC: 0.7 MG/DL — SIGNIFICANT CHANGE UP (ref 0.2–1.2)
BUN SERPL-MCNC: 22 MG/DL — SIGNIFICANT CHANGE UP (ref 7–23)
CALCIUM SERPL-MCNC: 9.3 MG/DL — SIGNIFICANT CHANGE UP (ref 8.4–10.5)
CHLORIDE SERPL-SCNC: 105 MMOL/L — SIGNIFICANT CHANGE UP (ref 96–108)
CK SERPL-CCNC: 81 U/L — SIGNIFICANT CHANGE UP (ref 30–200)
CO2 SERPL-SCNC: 28 MMOL/L — SIGNIFICANT CHANGE UP (ref 22–31)
CREAT SERPL-MCNC: 1.37 MG/DL — HIGH (ref 0.5–1.3)
ELLIPTOCYTES BLD QL SMEAR: SLIGHT — SIGNIFICANT CHANGE UP
EOSINOPHIL # BLD AUTO: 0.24 K/UL — SIGNIFICANT CHANGE UP (ref 0–0.5)
EOSINOPHIL NFR BLD AUTO: 4.1 % — SIGNIFICANT CHANGE UP (ref 0–6)
GLUCOSE SERPL-MCNC: 88 MG/DL — SIGNIFICANT CHANGE UP (ref 70–99)
HCT VFR BLD CALC: 34.2 % — LOW (ref 39–50)
HGB BLD-MCNC: 10.2 G/DL — LOW (ref 13–17)
HYPOCHROMIA BLD QL: SLIGHT — SIGNIFICANT CHANGE UP
IMM GRANULOCYTES NFR BLD AUTO: 0.3 % — SIGNIFICANT CHANGE UP (ref 0–1.5)
INR BLD: 1.08 RATIO — SIGNIFICANT CHANGE UP (ref 0.88–1.16)
LYMPHOCYTES # BLD AUTO: 1.35 K/UL — SIGNIFICANT CHANGE UP (ref 1–3.3)
LYMPHOCYTES # BLD AUTO: 22.9 % — SIGNIFICANT CHANGE UP (ref 13–44)
MANUAL SMEAR VERIFICATION: SIGNIFICANT CHANGE UP
MCHC RBC-ENTMCNC: 22.8 PG — LOW (ref 27–34)
MCHC RBC-ENTMCNC: 29.8 GM/DL — LOW (ref 32–36)
MCV RBC AUTO: 76.3 FL — LOW (ref 80–100)
MICROCYTES BLD QL: SIGNIFICANT CHANGE UP
MONOCYTES # BLD AUTO: 0.73 K/UL — SIGNIFICANT CHANGE UP (ref 0–0.9)
MONOCYTES NFR BLD AUTO: 12.4 % — SIGNIFICANT CHANGE UP (ref 2–14)
NEUTROPHILS # BLD AUTO: 3.53 K/UL — SIGNIFICANT CHANGE UP (ref 1.8–7.4)
NEUTROPHILS NFR BLD AUTO: 60 % — SIGNIFICANT CHANGE UP (ref 43–77)
NRBC # BLD: 0 /100 WBCS — SIGNIFICANT CHANGE UP (ref 0–0)
PLAT MORPH BLD: NORMAL — SIGNIFICANT CHANGE UP
PLATELET # BLD AUTO: 449 K/UL — HIGH (ref 150–400)
POTASSIUM SERPL-MCNC: 4.1 MMOL/L — SIGNIFICANT CHANGE UP (ref 3.5–5.3)
POTASSIUM SERPL-SCNC: 4.1 MMOL/L — SIGNIFICANT CHANGE UP (ref 3.5–5.3)
PROT SERPL-MCNC: 8 G/DL — SIGNIFICANT CHANGE UP (ref 6–8.3)
PROTHROM AB SERPL-ACNC: 12.1 SEC — SIGNIFICANT CHANGE UP (ref 10–12.9)
RBC # BLD: 4.48 M/UL — SIGNIFICANT CHANGE UP (ref 4.2–5.8)
RBC # FLD: 20.6 % — HIGH (ref 10.3–14.5)
RBC BLD AUTO: ABNORMAL
SODIUM SERPL-SCNC: 142 MMOL/L — SIGNIFICANT CHANGE UP (ref 135–145)
WBC # BLD: 5.89 K/UL — SIGNIFICANT CHANGE UP (ref 3.8–10.5)
WBC # FLD AUTO: 5.89 K/UL — SIGNIFICANT CHANGE UP (ref 3.8–10.5)

## 2019-06-09 PROCEDURE — 71101 X-RAY EXAM UNILAT RIBS/CHEST: CPT | Mod: 26

## 2019-06-09 PROCEDURE — 99283 EMERGENCY DEPT VISIT LOW MDM: CPT

## 2019-06-09 PROCEDURE — 36415 COLL VENOUS BLD VENIPUNCTURE: CPT

## 2019-06-09 PROCEDURE — 85610 PROTHROMBIN TIME: CPT

## 2019-06-09 PROCEDURE — 85730 THROMBOPLASTIN TIME PARTIAL: CPT

## 2019-06-09 PROCEDURE — 82550 ASSAY OF CK (CPK): CPT

## 2019-06-09 PROCEDURE — 99284 EMERGENCY DEPT VISIT MOD MDM: CPT

## 2019-06-09 PROCEDURE — 93005 ELECTROCARDIOGRAM TRACING: CPT

## 2019-06-09 PROCEDURE — 80053 COMPREHEN METABOLIC PANEL: CPT

## 2019-06-09 PROCEDURE — 71101 X-RAY EXAM UNILAT RIBS/CHEST: CPT

## 2019-06-09 PROCEDURE — 93010 ELECTROCARDIOGRAM REPORT: CPT

## 2019-06-09 PROCEDURE — 85027 COMPLETE CBC AUTOMATED: CPT

## 2019-06-09 RX ORDER — SODIUM CHLORIDE 9 MG/ML
1000 INJECTION INTRAMUSCULAR; INTRAVENOUS; SUBCUTANEOUS ONCE
Refills: 0 | Status: COMPLETED | OUTPATIENT
Start: 2019-06-09 | End: 2019-06-09

## 2019-06-09 RX ADMIN — SODIUM CHLORIDE 1000 MILLILITER(S): 9 INJECTION INTRAMUSCULAR; INTRAVENOUS; SUBCUTANEOUS at 08:10

## 2019-06-09 NOTE — ED PROVIDER NOTE - CLINICAL SUMMARY MEDICAL DECISION MAKING FREE TEXT BOX
86y Male with h/o copd, PE, DVT RLE, ON XARELTO AND ASA,  anemia low h/h, bib ems after wife noted that he locked her out of the house. It was a "dometic" call acc to EMS. Police accompanied. The patient is not under arrest. Patient states he did not want her in the house because she is mean. He was outside walking (with his cane) andhe fell. He spent 1 hour on the ground, across the street from his home and his wife watched him from the window. He was upset that she did not call for help. He said some people driving by, stopped and helped him up. He went home and told his wife to get out. He is a chronic cigarette smoker. It isnoted that he was just here for anemia. The patient reports generalized weakness and frequent falls. He states that at baseline, every day, he has trouble walking. He uses his cane. no belly pain, no n/v, no dysuria, has not noted any blood in stool, or dark stools lately. Plan to check labs and chest xray. There is left sided anterior chest wall contusion. Patient with baseline gait difficulty. Given recent stay , will trend lab

## 2019-06-09 NOTE — ED PROVIDER NOTE - SKIN, MLM
Skin normal color for race, warm, dry and intact. No evidence of rash. Skin normal color for race, warm, dry and intact. No evidence of rash. Left sided anterior chest wall contusion.

## 2019-06-09 NOTE — ED PROVIDER NOTE - OBJECTIVE STATEMENT
86y Male with h/o copd, PE, DVT RLE, ON XARELTO AND ASA,  anemia low h/h, bib ems after wife noted that he locked her out of the house. It was a "dometic" call acc to EMS. Police accompanied. The patient is not under arrest. Patient states he did not want her in the house because she is mean. He was outside walking (with his cane) andhe fell. He spent 1 hour on the ground, across the street from his home and his wife watched him from the window. He was upset that she did not call for help. He said some people driving by, stopped and helped him up. He went home and told his wife to get out. He is a chronic cigarette smoker. It isnoted that he was just here for anemia. The patient reports generalized weakness and frequent falls. He states that at baseline, every day, he has trouble walking. He uses his cane. no belly pain, no n/v, no dysuria, has not noted any blood in stool, or dark stools lately,

## 2019-06-09 NOTE — ED ADULT NURSE NOTE - INTERVENTIONS DEFINITIONS
Room bathroom lighting operational/Stretcher in lowest position, wheels locked, appropriate side rails in place/Monitor gait and stability/Reinforce activity limits and safety measures with patient and family/Call bell, personal items and telephone within reach/Monitor for mental status changes and reorient to person, place, and time/[place] to call system/Instruct patient to call for assistance/Non-slip footwear when patient is off stretcher/Physically safe environment: no spills, clutter or unnecessary equipment/Provide visual cue, wrist band, yellow gown, etc./Provide visual clues: red socks/Review medications for side effects contributing to fall risk

## 2019-06-09 NOTE — ED PROVIDER NOTE - PMH
CAD (coronary artery disease)    Deep vein thrombosis (DVT)    Dyslipidemia    History of COPD    [place] (hard of hearing)    PE (pulmonary thromboembolism)    Peripheral vascular disease

## 2019-06-09 NOTE — ED PROVIDER NOTE - ENMT, MLM
Airway patent, Nasal mucosa clear. Mouth with normal mucosa. Throat has no vesicles, no oropharyngeal exudates and uvula is midline. Atraumatic head.

## 2019-06-09 NOTE — ED ADULT NURSE NOTE - PMH
CAD (coronary artery disease)    Deep vein thrombosis (DVT)    Dyslipidemia    History of COPD    [place] (hard of hearing)    PE (pulmonary thromboembolism)    Peripheral vascular disease

## 2019-06-09 NOTE — ED PROVIDER NOTE - NSFOLLOWUPINSTRUCTIONS_ED_ALL_ED_FT
Worsening, continued or ANY new concerning symptoms return to the emergency department.       Medically cleared for discharge.     Any lawful proceedings can be carried out outside of the hospital and with law enforcement.     Patient poses no danger to self or others. He denies suicidal or homicidal ideation.      Thank you

## 2019-07-12 ENCOUNTER — INPATIENT (INPATIENT)
Facility: HOSPITAL | Age: 84
LOS: 5 days | Discharge: NURSING, REHAB OR LONG-TERM CARE | DRG: 535 | End: 2019-07-18
Attending: STUDENT IN AN ORGANIZED HEALTH CARE EDUCATION/TRAINING PROGRAM | Admitting: HOSPITALIST
Payer: MEDICARE

## 2019-07-12 VITALS
HEIGHT: 70 IN | RESPIRATION RATE: 15 BRPM | DIASTOLIC BLOOD PRESSURE: 68 MMHG | HEART RATE: 62 BPM | WEIGHT: 145.06 LBS | SYSTOLIC BLOOD PRESSURE: 141 MMHG | OXYGEN SATURATION: 96 % | TEMPERATURE: 98 F

## 2019-07-12 DIAGNOSIS — D63.8 ANEMIA IN OTHER CHRONIC DISEASES CLASSIFIED ELSEWHERE: ICD-10-CM

## 2019-07-12 DIAGNOSIS — T14.8XXA OTHER INJURY OF UNSPECIFIED BODY REGION, INITIAL ENCOUNTER: ICD-10-CM

## 2019-07-12 DIAGNOSIS — N18.3 CHRONIC KIDNEY DISEASE, STAGE 3 (MODERATE): ICD-10-CM

## 2019-07-12 DIAGNOSIS — S32.9XXA FRACTURE OF UNSPECIFIED PARTS OF LUMBOSACRAL SPINE AND PELVIS, INITIAL ENCOUNTER FOR CLOSED FRACTURE: ICD-10-CM

## 2019-07-12 DIAGNOSIS — R26.2 DIFFICULTY IN WALKING, NOT ELSEWHERE CLASSIFIED: ICD-10-CM

## 2019-07-12 LAB
ALBUMIN SERPL ELPH-MCNC: 3.1 G/DL — LOW (ref 3.3–5)
ALP SERPL-CCNC: 131 U/L — HIGH (ref 40–120)
ALT FLD-CCNC: 16 U/L DA — SIGNIFICANT CHANGE UP (ref 10–45)
ANION GAP SERPL CALC-SCNC: 9 MMOL/L — SIGNIFICANT CHANGE UP (ref 5–17)
APTT BLD: 27.3 SEC — LOW (ref 27.5–36.3)
AST SERPL-CCNC: 17 U/L — SIGNIFICANT CHANGE UP (ref 10–40)
BASOPHILS # BLD AUTO: 0.02 K/UL — SIGNIFICANT CHANGE UP (ref 0–0.2)
BASOPHILS NFR BLD AUTO: 0.3 % — SIGNIFICANT CHANGE UP (ref 0–2)
BILIRUB SERPL-MCNC: 1.6 MG/DL — HIGH (ref 0.2–1.2)
BUN SERPL-MCNC: 16 MG/DL — SIGNIFICANT CHANGE UP (ref 7–23)
CALCIUM SERPL-MCNC: 8.7 MG/DL — SIGNIFICANT CHANGE UP (ref 8.4–10.5)
CHLORIDE SERPL-SCNC: 105 MMOL/L — SIGNIFICANT CHANGE UP (ref 96–108)
CO2 SERPL-SCNC: 25 MMOL/L — SIGNIFICANT CHANGE UP (ref 22–31)
CREAT SERPL-MCNC: 1.34 MG/DL — HIGH (ref 0.5–1.3)
EOSINOPHIL # BLD AUTO: 0.07 K/UL — SIGNIFICANT CHANGE UP (ref 0–0.5)
EOSINOPHIL NFR BLD AUTO: 1 % — SIGNIFICANT CHANGE UP (ref 0–6)
GLUCOSE SERPL-MCNC: 92 MG/DL — SIGNIFICANT CHANGE UP (ref 70–99)
HCT VFR BLD CALC: 31.2 % — LOW (ref 39–50)
HGB BLD-MCNC: 9.5 G/DL — LOW (ref 13–17)
HYPOCHROMIA BLD QL: SIGNIFICANT CHANGE UP
IMM GRANULOCYTES NFR BLD AUTO: 0.3 % — SIGNIFICANT CHANGE UP (ref 0–1.5)
INR BLD: 1.14 RATIO — SIGNIFICANT CHANGE UP (ref 0.88–1.16)
LYMPHOCYTES # BLD AUTO: 1.28 K/UL — SIGNIFICANT CHANGE UP (ref 1–3.3)
LYMPHOCYTES # BLD AUTO: 18.7 % — SIGNIFICANT CHANGE UP (ref 13–44)
MANUAL SMEAR VERIFICATION: SIGNIFICANT CHANGE UP
MCHC RBC-ENTMCNC: 22.4 PG — LOW (ref 27–34)
MCHC RBC-ENTMCNC: 30.4 GM/DL — LOW (ref 32–36)
MCV RBC AUTO: 73.4 FL — LOW (ref 80–100)
MICROCYTES BLD QL: SIGNIFICANT CHANGE UP
MONOCYTES # BLD AUTO: 0.9 K/UL — SIGNIFICANT CHANGE UP (ref 0–0.9)
MONOCYTES NFR BLD AUTO: 13.1 % — SIGNIFICANT CHANGE UP (ref 2–14)
NEUTROPHILS # BLD AUTO: 4.57 K/UL — SIGNIFICANT CHANGE UP (ref 1.8–7.4)
NEUTROPHILS NFR BLD AUTO: 66.6 % — SIGNIFICANT CHANGE UP (ref 43–77)
NRBC # BLD: 0 /100 WBCS — SIGNIFICANT CHANGE UP (ref 0–0)
PLAT MORPH BLD: NORMAL — SIGNIFICANT CHANGE UP
PLATELET # BLD AUTO: 296 K/UL — SIGNIFICANT CHANGE UP (ref 150–400)
POTASSIUM SERPL-MCNC: 4.4 MMOL/L — SIGNIFICANT CHANGE UP (ref 3.5–5.3)
POTASSIUM SERPL-SCNC: 4.4 MMOL/L — SIGNIFICANT CHANGE UP (ref 3.5–5.3)
PROT SERPL-MCNC: 7.1 G/DL — SIGNIFICANT CHANGE UP (ref 6–8.3)
PROTHROM AB SERPL-ACNC: 12.8 SEC — SIGNIFICANT CHANGE UP (ref 10–12.9)
RBC # BLD: 4.25 M/UL — SIGNIFICANT CHANGE UP (ref 4.2–5.8)
RBC # FLD: 20.5 % — HIGH (ref 10.3–14.5)
RBC BLD AUTO: ABNORMAL
SODIUM SERPL-SCNC: 139 MMOL/L — SIGNIFICANT CHANGE UP (ref 135–145)
WBC # BLD: 6.86 K/UL — SIGNIFICANT CHANGE UP (ref 3.8–10.5)
WBC # FLD AUTO: 6.86 K/UL — SIGNIFICANT CHANGE UP (ref 3.8–10.5)

## 2019-07-12 PROCEDURE — 71045 X-RAY EXAM CHEST 1 VIEW: CPT | Mod: 26

## 2019-07-12 PROCEDURE — 72100 X-RAY EXAM L-S SPINE 2/3 VWS: CPT | Mod: 26

## 2019-07-12 PROCEDURE — 93010 ELECTROCARDIOGRAM REPORT: CPT

## 2019-07-12 PROCEDURE — 99285 EMERGENCY DEPT VISIT HI MDM: CPT

## 2019-07-12 PROCEDURE — 72192 CT PELVIS W/O DYE: CPT | Mod: 26

## 2019-07-12 PROCEDURE — 73502 X-RAY EXAM HIP UNI 2-3 VIEWS: CPT | Mod: 26,LT

## 2019-07-12 PROCEDURE — 99223 1ST HOSP IP/OBS HIGH 75: CPT

## 2019-07-12 RX ORDER — PANTOPRAZOLE SODIUM 20 MG/1
40 TABLET, DELAYED RELEASE ORAL
Refills: 0 | Status: DISCONTINUED | OUTPATIENT
Start: 2019-07-12 | End: 2019-07-18

## 2019-07-12 RX ORDER — GABAPENTIN 400 MG/1
300 CAPSULE ORAL AT BEDTIME
Refills: 0 | Status: DISCONTINUED | OUTPATIENT
Start: 2019-07-12 | End: 2019-07-18

## 2019-07-12 RX ORDER — PREGABALIN 225 MG/1
1000 CAPSULE ORAL DAILY
Refills: 0 | Status: DISCONTINUED | OUTPATIENT
Start: 2019-07-12 | End: 2019-07-18

## 2019-07-12 RX ORDER — TAMSULOSIN HYDROCHLORIDE 0.4 MG/1
0.4 CAPSULE ORAL AT BEDTIME
Refills: 0 | Status: DISCONTINUED | OUTPATIENT
Start: 2019-07-12 | End: 2019-07-18

## 2019-07-12 RX ORDER — MORPHINE SULFATE 50 MG/1
2 CAPSULE, EXTENDED RELEASE ORAL EVERY 6 HOURS
Refills: 0 | Status: DISCONTINUED | OUTPATIENT
Start: 2019-07-12 | End: 2019-07-13

## 2019-07-12 RX ORDER — OXYCODONE AND ACETAMINOPHEN 5; 325 MG/1; MG/1
1 TABLET ORAL EVERY 6 HOURS
Refills: 0 | Status: DISCONTINUED | OUTPATIENT
Start: 2019-07-12 | End: 2019-07-18

## 2019-07-12 RX ORDER — TRAMADOL HYDROCHLORIDE 50 MG/1
50 TABLET ORAL ONCE
Refills: 0 | Status: DISCONTINUED | OUTPATIENT
Start: 2019-07-12 | End: 2019-07-12

## 2019-07-12 RX ORDER — ASPIRIN/CALCIUM CARB/MAGNESIUM 324 MG
81 TABLET ORAL DAILY
Refills: 0 | Status: DISCONTINUED | OUTPATIENT
Start: 2019-07-12 | End: 2019-07-18

## 2019-07-12 RX ORDER — KETOROLAC TROMETHAMINE 30 MG/ML
30 SYRINGE (ML) INJECTION ONCE
Refills: 0 | Status: DISCONTINUED | OUTPATIENT
Start: 2019-07-12 | End: 2019-07-12

## 2019-07-12 RX ORDER — ATORVASTATIN CALCIUM 80 MG/1
40 TABLET, FILM COATED ORAL AT BEDTIME
Refills: 0 | Status: DISCONTINUED | OUTPATIENT
Start: 2019-07-12 | End: 2019-07-18

## 2019-07-12 RX ORDER — ACETAMINOPHEN 500 MG
650 TABLET ORAL EVERY 6 HOURS
Refills: 0 | Status: DISCONTINUED | OUTPATIENT
Start: 2019-07-12 | End: 2019-07-18

## 2019-07-12 RX ADMIN — TRAMADOL HYDROCHLORIDE 50 MILLIGRAM(S): 50 TABLET ORAL at 11:23

## 2019-07-12 RX ADMIN — ATORVASTATIN CALCIUM 40 MILLIGRAM(S): 80 TABLET, FILM COATED ORAL at 21:08

## 2019-07-12 RX ADMIN — GABAPENTIN 300 MILLIGRAM(S): 400 CAPSULE ORAL at 21:08

## 2019-07-12 RX ADMIN — TAMSULOSIN HYDROCHLORIDE 0.4 MILLIGRAM(S): 0.4 CAPSULE ORAL at 21:08

## 2019-07-12 RX ADMIN — Medication 30 MILLIGRAM(S): at 11:23

## 2019-07-12 RX ADMIN — OXYCODONE AND ACETAMINOPHEN 1 TABLET(S): 5; 325 TABLET ORAL at 17:36

## 2019-07-12 RX ADMIN — OXYCODONE AND ACETAMINOPHEN 1 TABLET(S): 5; 325 TABLET ORAL at 19:02

## 2019-07-12 RX ADMIN — PANTOPRAZOLE SODIUM 40 MILLIGRAM(S): 20 TABLET, DELAYED RELEASE ORAL at 17:36

## 2019-07-12 NOTE — ED ADULT NURSE NOTE - OBJECTIVE STATEMENT
85 y/o male came in via ems s/p fall. pt states he was walking to the bathroom when he tripped and fell. pt denies hitting his head no loc. pt states he has l hip pain. dec rom. no obvious deformities. no ecchymosis no reddness. + cap refill + pulses. full rom with ankle and knee. dec rom with l hip. pt on ASA no other blood thinners.

## 2019-07-12 NOTE — H&P ADULT - NSICDXPASTMEDICALHX_GEN_ALL_CORE_FT
PAST MEDICAL HISTORY:  CAD (coronary artery disease)     Deep vein thrombosis (DVT)     Dyslipidemia     History of COPD     [place] (hard of hearing)     PE (pulmonary thromboembolism)     Peripheral vascular disease

## 2019-07-12 NOTE — H&P ADULT - ASSESSMENT
86 y o m with h/o copd, PE, dvt, c/o low h/h, requiring 2 units prb's in ed, chronic cough 2/2 copd    CAPRINI SCORE [CLOT]    AGE RELATED RISK FACTORS                                                       MOBILITY RELATED FACTORS  [ ] Age 41-60 years                                            (1 Point)                  [ ] Bed rest                                                        (1 Point)  [ ] Age: 61-74 years                                           (2 Points)                 [ ] Plaster cast                                                   (2 Points)  [X ] Age= 75 years                                              (3 Points)                 [ ] Bed bound for more than 72 hours                 (2 Points)    DISEASE RELATED RISK FACTORS                                               GENDER SPECIFIC FACTORS  [ ] Edema in the lower extremities                       (1 Point)                  [ ] Pregnancy                                                     (1 Point)  [ ] Varicose veins                                               (1 Point)                  [ ] Post-partum < 6 weeks                                   (1 Point)             [ ] BMI > 25 Kg/m2                                            (1 Point)                  [ ] Hormonal therapy  or oral contraception          (1 Point)                 [ ] Sepsis (in the previous month)                        (1 Point)                  [ ] History of pregnancy complications                 (1 point)  [ ] Pneumonia or serious lung disease                                               [ ] Unexplained or recurrent                     (1 Point)           (in the previous month)                               (1 Point)  [ ] Abnormal pulmonary function test                     (1 Point)                 SURGERY RELATED RISK FACTORS  [ ] Acute myocardial infarction                              (1 Point)                 [ ]  Section                                             (1 Point)  [ ] Congestive heart failure (in the previous month)  (1 Point)               [ ] Minor surgery                                                  (1 Point)   [ ] Inflammatory bowel disease                             (1 Point)                 [ ] Arthroscopic surgery                                        (2 Points)  [ ] Central venous access                                      (2 Points)                [ ] General surgery lasting more than 45 minutes   (2 Points)       [ ] Stroke (in the previous month)                          (5 Points)               [ ] Elective arthroplasty                                         (5 Points)                                                                                                                                               HEMATOLOGY RELATED FACTORS                                                 TRAUMA RELATED RISK FACTORS  [X ] Prior episodes of VTE                                     (3 Points)                [X ] Fracture of the hip, pelvis, or leg                       (5 Points)  [ ] Positive family history for VTE                         (3 Points)                 [ ] Acute spinal cord injury (in the previous month)  (5 Points)  [ ] Prothrombin 96032 A                                     (3 Points)                 [ ] Paralysis  (less than 1 month)                             (5 Points)  [ ] Factor V Leiden                                             (3 Points)                  [ ] Multiple Trauma within 1 month                        (5 Points)  [ ] Lupus anticoagulants                                     (3 Points)                                                           [ ] Anticardiolipin antibodies                               (3 Points)                                                       [ ] High homocysteine in the blood                      (3 Points)                                             [ ] Other congenital or acquired thrombophilia      (3 Points)                                                [ ] Heparin induced thrombocytopenia                  (3 Points)                                          Total Score [      11    ]

## 2019-07-12 NOTE — ED PROVIDER NOTE - PROGRESS NOTE DETAILS
d/w daughter and wife. aware of diagnosis and need for admission as pt cannot tolerte the pain enough to walk. I d/w Dr Marin, who states weight bear as tolerated and pain control as needed.

## 2019-07-12 NOTE — H&P ADULT - NSHPLABSRESULTS_GEN_ALL_CORE
LABS:                        9.5    6.86  )-----------( 296      ( 12 Jul 2019 11:50 )             31.2     07-12    139  |  105  |  16  ----------------------------<  92  4.4   |  25  |  1.34<H>    Ca    8.7      12 Jul 2019 11:50    TPro  7.1  /  Alb  3.1<L>  /  TBili  1.6<H>  /  DBili  x   /  AST  17  /  ALT  16  /  AlkPhos  131<H>  07-12    PT/INR - ( 12 Jul 2019 11:50 )   PT: 12.8 sec;   INR: 1.14 ratio         PTT - ( 12 Jul 2019 11:50 )  PTT:27.3 sec    CAPILLARY BLOOD GLUCOSE    RADIOLOGY & ADDITIONAL TESTS:    Consultant(s) Notes Reviewed:  [x ] YES  [ ] NO  Care Discussed with Consultants/Other Providers [ x] YES  [ ] NO  Imaging Personally Reviewed:  [ ] YES  [ ] NO

## 2019-07-12 NOTE — H&P ADULT - NSHPPHYSICALEXAM_GEN_ALL_CORE
T(C): 36.3 (07-12-19 @ 11:55), Max: 36.8 (07-12-19 @ 10:45)  HR: 59 (07-12-19 @ 11:55) (59 - 62)  BP: 130/78 (07-12-19 @ 11:55) (130/78 - 141/68)  RR: 18 (07-12-19 @ 11:55) (15 - 18)  SpO2: 98% (07-12-19 @ 11:55) (96% - 98%)  Wt(kg): --Vital Signs Last 24 Hrs  T(C): 36.3 (12 Jul 2019 11:55), Max: 36.8 (12 Jul 2019 10:45)  T(F): 97.4 (12 Jul 2019 11:55), Max: 98.2 (12 Jul 2019 10:45)  HR: 59 (12 Jul 2019 11:55) (59 - 62)  BP: 130/78 (12 Jul 2019 11:55) (130/78 - 141/68)  BP(mean): --  RR: 18 (12 Jul 2019 11:55) (15 - 18)  SpO2: 98% (12 Jul 2019 11:55) (96% - 98%)    PHYSICAL EXAM:  GENERAL: NAD, well-groomed, well-developed  HEAD:  Atraumatic, Normocephalic  EYES: EOMI, PERRLA, conjunctiva and sclera clear  ENMT: No tonsillar erythema, exudates, or enlargement; Moist mucous membranes, Good dentition, No lesions  NECK: Supple, No JVD, Normal thyroid  NERVOUS SYSTEM:  Alert & Oriented X3, Good concentration; Motor Strength 5/5 B/L upper and lower extremities; DTRs 2+ intact and symmetric  CHEST/LUNG: Clear to percussion bilaterally; No rales, rhonchi, wheezing, or rubs  HEART: Regular rate and rhythm; No murmurs, rubs, or gallops  ABDOMEN: Soft, Nontender, Nondistended; Bowel sounds present  EXTREMITIES:  2+ Peripheral Pulses, No clubbing, cyanosis, or edema, tenderness to palpation of left lateral buttock area, no erythema or edema noted   LYMPH: No lymphadenopathy noted  SKIN: No rashes or lesions

## 2019-07-12 NOTE — ED ADULT NURSE NOTE - NSIMPLEMENTINTERV_GEN_ALL_ED
Implemented All Fall with Harm Risk Interventions:  [place] to call system. Call bell, personal items and telephone within reach. Instruct patient to call for assistance. Room bathroom lighting operational. Non-slip footwear when patient is off stretcher. Physically safe environment: no spills, clutter or unnecessary equipment. Stretcher in lowest position, wheels locked, appropriate side rails in place. Provide visual cue, wrist band, yellow gown, etc. Monitor gait and stability. Monitor for mental status changes and reorient to person, place, and time. Review medications for side effects contributing to fall risk. Reinforce activity limits and safety measures with patient and family. Provide visual clues: red socks.

## 2019-07-12 NOTE — H&P ADULT - HISTORY OF PRESENT ILLNESS
86M PMH tobacco user, COPD, DVT/PE was on xarelto, anemia of chronic disease, BPH, HLD presents for fall.  Patient was at home and fell onto his left side.  Started having severe pain in left buttock area.    Denies chest pain, nausea, vomiting, diarrhea.    Has chronic SOB which is not new and at baseline.    Denies melena or blood in stool.    Denies LOC

## 2019-07-12 NOTE — ED PROVIDER NOTE - OBJECTIVE STATEMENT
Patient presents with reports of fall. He says he tripped over the saddle that borders the doorway. It has a slight lip and he tripped over that. He stumbled and fell. Ultimately, fell onto his buttock and is c/o severe pain to the left buttock and back/left hip. He could not get up. He called his wife who then called her daughter , who called 911. Pt denies head injury or LOC. He has h/o chronic arthritis and knee pain. H/O right hip replacement.

## 2019-07-12 NOTE — ED PROVIDER NOTE - PMH
CAD (coronary artery disease)    Deep vein thrombosis (DVT)    Dyslipidemia    History of COPD    [place] (hard of hearing)    PE (pulmonary thromboembolism)    Peripheral vascular disease

## 2019-07-13 LAB
ANION GAP SERPL CALC-SCNC: 8 MMOL/L — SIGNIFICANT CHANGE UP (ref 5–17)
BASOPHILS # BLD AUTO: 0.02 K/UL — SIGNIFICANT CHANGE UP (ref 0–0.2)
BASOPHILS NFR BLD AUTO: 0.2 % — SIGNIFICANT CHANGE UP (ref 0–2)
BUN SERPL-MCNC: 27 MG/DL — HIGH (ref 7–23)
CALCIUM SERPL-MCNC: 8.5 MG/DL — SIGNIFICANT CHANGE UP (ref 8.4–10.5)
CHLORIDE SERPL-SCNC: 107 MMOL/L — SIGNIFICANT CHANGE UP (ref 96–108)
CO2 SERPL-SCNC: 24 MMOL/L — SIGNIFICANT CHANGE UP (ref 22–31)
CREAT SERPL-MCNC: 1.63 MG/DL — HIGH (ref 0.5–1.3)
EOSINOPHIL # BLD AUTO: 0.23 K/UL — SIGNIFICANT CHANGE UP (ref 0–0.5)
EOSINOPHIL NFR BLD AUTO: 2.7 % — SIGNIFICANT CHANGE UP (ref 0–6)
GLUCOSE SERPL-MCNC: 97 MG/DL — SIGNIFICANT CHANGE UP (ref 70–99)
HCT VFR BLD CALC: 29.8 % — LOW (ref 39–50)
HGB BLD-MCNC: 8.9 G/DL — LOW (ref 13–17)
IMM GRANULOCYTES NFR BLD AUTO: 0.3 % — SIGNIFICANT CHANGE UP (ref 0–1.5)
LYMPHOCYTES # BLD AUTO: 2.08 K/UL — SIGNIFICANT CHANGE UP (ref 1–3.3)
LYMPHOCYTES # BLD AUTO: 24.2 % — SIGNIFICANT CHANGE UP (ref 13–44)
MCHC RBC-ENTMCNC: 22.5 PG — LOW (ref 27–34)
MCHC RBC-ENTMCNC: 29.9 GM/DL — LOW (ref 32–36)
MCV RBC AUTO: 75.3 FL — LOW (ref 80–100)
MONOCYTES # BLD AUTO: 0.94 K/UL — HIGH (ref 0–0.9)
MONOCYTES NFR BLD AUTO: 11 % — SIGNIFICANT CHANGE UP (ref 2–14)
NEUTROPHILS # BLD AUTO: 5.28 K/UL — SIGNIFICANT CHANGE UP (ref 1.8–7.4)
NEUTROPHILS NFR BLD AUTO: 61.6 % — SIGNIFICANT CHANGE UP (ref 43–77)
NRBC # BLD: 0 /100 WBCS — SIGNIFICANT CHANGE UP (ref 0–0)
PLATELET # BLD AUTO: 283 K/UL — SIGNIFICANT CHANGE UP (ref 150–400)
POTASSIUM SERPL-MCNC: 4.7 MMOL/L — SIGNIFICANT CHANGE UP (ref 3.5–5.3)
POTASSIUM SERPL-SCNC: 4.7 MMOL/L — SIGNIFICANT CHANGE UP (ref 3.5–5.3)
RBC # BLD: 3.96 M/UL — LOW (ref 4.2–5.8)
RBC # FLD: 20.5 % — HIGH (ref 10.3–14.5)
SODIUM SERPL-SCNC: 139 MMOL/L — SIGNIFICANT CHANGE UP (ref 135–145)
WBC # BLD: 8.58 K/UL — SIGNIFICANT CHANGE UP (ref 3.8–10.5)
WBC # FLD AUTO: 8.58 K/UL — SIGNIFICANT CHANGE UP (ref 3.8–10.5)

## 2019-07-13 PROCEDURE — 99233 SBSQ HOSP IP/OBS HIGH 50: CPT

## 2019-07-13 PROCEDURE — 71045 X-RAY EXAM CHEST 1 VIEW: CPT | Mod: 26

## 2019-07-13 RX ORDER — FUROSEMIDE 40 MG
20 TABLET ORAL ONCE
Refills: 0 | Status: COMPLETED | OUTPATIENT
Start: 2019-07-13 | End: 2019-07-13

## 2019-07-13 RX ORDER — SODIUM CHLORIDE 9 MG/ML
1000 INJECTION, SOLUTION INTRAVENOUS
Refills: 0 | Status: DISCONTINUED | OUTPATIENT
Start: 2019-07-13 | End: 2019-07-13

## 2019-07-13 RX ORDER — IPRATROPIUM/ALBUTEROL SULFATE 18-103MCG
3 AEROSOL WITH ADAPTER (GRAM) INHALATION ONCE
Refills: 0 | Status: COMPLETED | OUTPATIENT
Start: 2019-07-13 | End: 2019-07-13

## 2019-07-13 RX ORDER — CHOLECALCIFEROL (VITAMIN D3) 125 MCG
2000 CAPSULE ORAL DAILY
Refills: 0 | Status: DISCONTINUED | OUTPATIENT
Start: 2019-07-13 | End: 2019-07-18

## 2019-07-13 RX ADMIN — Medication 2000 UNIT(S): at 12:43

## 2019-07-13 RX ADMIN — PREGABALIN 1000 MICROGRAM(S): 225 CAPSULE ORAL at 12:12

## 2019-07-13 RX ADMIN — OXYCODONE AND ACETAMINOPHEN 1 TABLET(S): 5; 325 TABLET ORAL at 12:12

## 2019-07-13 RX ADMIN — GABAPENTIN 300 MILLIGRAM(S): 400 CAPSULE ORAL at 22:15

## 2019-07-13 RX ADMIN — TAMSULOSIN HYDROCHLORIDE 0.4 MILLIGRAM(S): 0.4 CAPSULE ORAL at 22:15

## 2019-07-13 RX ADMIN — OXYCODONE AND ACETAMINOPHEN 1 TABLET(S): 5; 325 TABLET ORAL at 13:00

## 2019-07-13 RX ADMIN — Medication 20 MILLIGRAM(S): at 22:11

## 2019-07-13 RX ADMIN — ATORVASTATIN CALCIUM 40 MILLIGRAM(S): 80 TABLET, FILM COATED ORAL at 22:15

## 2019-07-13 RX ADMIN — PANTOPRAZOLE SODIUM 40 MILLIGRAM(S): 20 TABLET, DELAYED RELEASE ORAL at 05:28

## 2019-07-13 RX ADMIN — OXYCODONE AND ACETAMINOPHEN 1 TABLET(S): 5; 325 TABLET ORAL at 06:47

## 2019-07-13 RX ADMIN — Medication 81 MILLIGRAM(S): at 12:43

## 2019-07-13 RX ADMIN — Medication 3 MILLILITER(S): at 21:17

## 2019-07-13 RX ADMIN — OXYCODONE AND ACETAMINOPHEN 1 TABLET(S): 5; 325 TABLET ORAL at 22:17

## 2019-07-13 RX ADMIN — OXYCODONE AND ACETAMINOPHEN 1 TABLET(S): 5; 325 TABLET ORAL at 22:35

## 2019-07-13 RX ADMIN — OXYCODONE AND ACETAMINOPHEN 1 TABLET(S): 5; 325 TABLET ORAL at 07:30

## 2019-07-13 NOTE — PROGRESS NOTE ADULT - ASSESSMENT
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    - monitor h/h   - baseline seems to be around 10     #Ambulatory dysfunction  - secondary to pelvic fracture   - pt consult pending     # Anemia of chronic disease  - monitor h/h  - imaging as above- hematoma seen on ct scan     #Hx of COPD  - budesonide.     #Hx of Deep vein thrombosis (DVT) of both lower extremities and PE  - off xarelto after 1 year of treatment.    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    - monitor h/h   - baseline seems to be around 10     #Ambulatory dysfunction  - secondary to pelvic fracture   - pt consult pending     # Anemia of chronic disease  - monitor h/h  - imaging as above- hematoma seen on ct scan     #Hx of COPD  - budesonide.     #Hx of Deep vein thrombosis (DVT) of both lower extremities and PE  - off xarelto after 1 year of treatment.    #Dyslipidemia  - statin  - dash diet     #Acute kidney injury w/ probably CKD   - monitor cr   - start lactated ringers- 75cc/hr  - encourage po hydration  - baseline creatinine 1.08 April 2019

## 2019-07-13 NOTE — PROGRESS NOTE ADULT - SUBJECTIVE AND OBJECTIVE BOX
Patient is a 86y old  Male who presents with a chief complaint of fall, pain, unable to ambulate (12 Jul 2019 14:08)      Patient seen and examined at bedside. No overnight events reported. pain medications adjusted     ALLERGIES:  No Known Allergies    MEDICATIONS  (STANDING):  aspirin  chewable 81 milliGRAM(s) Oral daily  atorvastatin 40 milliGRAM(s) Oral at bedtime  cholecalciferol 2000 Unit(s) Oral daily  cyanocobalamin 1000 MICROGram(s) Oral daily  gabapentin 300 milliGRAM(s) Oral at bedtime  lactated ringers. 1000 milliLiter(s) (75 mL/Hr) IV Continuous <Continuous>  pantoprazole    Tablet 40 milliGRAM(s) Oral before breakfast  tamsulosin 0.4 milliGRAM(s) Oral at bedtime    MEDICATIONS  (PRN):  acetaminophen   Tablet .. 650 milliGRAM(s) Oral every 6 hours PRN Mild Pain (1 - 3)  oxyCODONE    5 mG/acetaminophen 325 mG 1 Tablet(s) Oral every 6 hours PRN Moderate Pain (4 - 6)    Vital Signs Last 24 Hrs  T(F): 97.7 (13 Jul 2019 05:22), Max: 99.2 (12 Jul 2019 16:35)  HR: 60 (13 Jul 2019 05:22) (58 - 80)  BP: 142/56 (13 Jul 2019 05:22) (125/55 - 142/56)  RR: 14 (13 Jul 2019 05:22) (14 - 18)  SpO2: 97% (13 Jul 2019 05:22) (90% - 98%)  I&O's Summary    12 Jul 2019 07:01  -  13 Jul 2019 07:00  --------------------------------------------------------  IN: 300 mL / OUT: 0 mL / NET: 300 mL    13 Jul 2019 07:01  -  13 Jul 2019 11:08  --------------------------------------------------------  IN: 240 mL / OUT: 0 mL / NET: 240 mL      PHYSICAL EXAM:  General: NAD, A/O x 3  ENT: MMM, no thrush  Neck: Supple, No JVD  Lungs: Clear to auscultation bilaterally, good air entry, non-labored breathing  Cardio: RRR, S1/S2, No murmur  Abdomen: Soft, Nontender, Nondistended; Bowel sounds present  Extremities: No calf tenderness, No pitting edema    LABS:                        8.9    8.58  )-----------( 283      ( 13 Jul 2019 05:30 )             29.8     07-13    139  |  107  |  27  ----------------------------<  97  4.7   |  24  |  1.63    Ca    8.5      13 Jul 2019 05:30    TPro  7.1  /  Alb  3.1  /  TBili  1.6  /  DBili  x   /  AST  17  /  ALT  16  /  AlkPhos  131  07-12        eGFR if Non African American: 38 mL/min/1.73M2 (07-13-19 @ 05:30)  eGFR if African American: 44 mL/min/1.73M2 (07-13-19 @ 05:30)    PT/INR - ( 12 Jul 2019 11:50 )   PT: 12.8 sec;   INR: 1.14 ratio         PTT - ( 12 Jul 2019 11:50 )  PTT:27.3 sec    Radiology  < from: CT Pelvis Bony Only No Cont (07.12.19 @ 11:45) >  Impression:  Comminuted fracture of the left ischial tuberosity with intramuscular   hematoma/edema of the obturator internist and externus.  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  < end of copied text >    < from: Xray Lumbar Spine AP + Lateral; Hip w/ Pelvis Left (07.12.19 @ 11:24) >  IMPRESSION: Fracture extending laterally from the left ischial tuberosity   is new since March 25 of this year.  Other findings appear stable as above.  < end of copied text >    < from: Xray Chest 1 View AP/PA (07.12.19 @ 11:22) >  IMPRESSION: Scattered lung scarring particularly on the right and ectatic   possibly aneurysmal aorta again noted. Other findings stable as above.  < end of copied text >    DVT prophylais  - venodynes                           RADIOLOGY & ADDITIONAL TESTS:    Care Discussed with Consultants/Other Providers:

## 2019-07-13 NOTE — PHYSICAL THERAPY INITIAL EVALUATION ADULT - ADDITIONAL COMMENTS
pt lives in private home with wife. pt used Sac inside home and scooter for long distance outside home.

## 2019-07-14 LAB
ANION GAP SERPL CALC-SCNC: 10 MMOL/L — SIGNIFICANT CHANGE UP (ref 5–17)
BUN SERPL-MCNC: 23 MG/DL — SIGNIFICANT CHANGE UP (ref 7–23)
CALCIUM SERPL-MCNC: 8.7 MG/DL — SIGNIFICANT CHANGE UP (ref 8.4–10.5)
CHLORIDE SERPL-SCNC: 102 MMOL/L — SIGNIFICANT CHANGE UP (ref 96–108)
CO2 SERPL-SCNC: 23 MMOL/L — SIGNIFICANT CHANGE UP (ref 22–31)
CREAT SERPL-MCNC: 1.4 MG/DL — HIGH (ref 0.5–1.3)
GLUCOSE SERPL-MCNC: 92 MG/DL — SIGNIFICANT CHANGE UP (ref 70–99)
HCT VFR BLD CALC: 31.8 % — LOW (ref 39–50)
HGB BLD-MCNC: 9.6 G/DL — LOW (ref 13–17)
MCHC RBC-ENTMCNC: 22.5 PG — LOW (ref 27–34)
MCHC RBC-ENTMCNC: 30.2 GM/DL — LOW (ref 32–36)
MCV RBC AUTO: 74.5 FL — LOW (ref 80–100)
NRBC # BLD: 0 /100 WBCS — SIGNIFICANT CHANGE UP (ref 0–0)
PLATELET # BLD AUTO: 275 K/UL — SIGNIFICANT CHANGE UP (ref 150–400)
POTASSIUM SERPL-MCNC: 4.8 MMOL/L — SIGNIFICANT CHANGE UP (ref 3.5–5.3)
POTASSIUM SERPL-SCNC: 4.8 MMOL/L — SIGNIFICANT CHANGE UP (ref 3.5–5.3)
RBC # BLD: 4.27 M/UL — SIGNIFICANT CHANGE UP (ref 4.2–5.8)
RBC # FLD: 20.6 % — HIGH (ref 10.3–14.5)
SODIUM SERPL-SCNC: 135 MMOL/L — SIGNIFICANT CHANGE UP (ref 135–145)
WBC # BLD: 7.61 K/UL — SIGNIFICANT CHANGE UP (ref 3.8–10.5)
WBC # FLD AUTO: 7.61 K/UL — SIGNIFICANT CHANGE UP (ref 3.8–10.5)

## 2019-07-14 PROCEDURE — 71045 X-RAY EXAM CHEST 1 VIEW: CPT | Mod: 26

## 2019-07-14 PROCEDURE — 99233 SBSQ HOSP IP/OBS HIGH 50: CPT

## 2019-07-14 RX ORDER — FUROSEMIDE 40 MG
40 TABLET ORAL ONCE
Refills: 0 | Status: COMPLETED | OUTPATIENT
Start: 2019-07-14 | End: 2019-07-14

## 2019-07-14 RX ORDER — FUROSEMIDE 40 MG
40 TABLET ORAL DAILY
Refills: 0 | Status: DISCONTINUED | OUTPATIENT
Start: 2019-07-15 | End: 2019-07-15

## 2019-07-14 RX ADMIN — OXYCODONE AND ACETAMINOPHEN 1 TABLET(S): 5; 325 TABLET ORAL at 22:31

## 2019-07-14 RX ADMIN — Medication 2000 UNIT(S): at 11:23

## 2019-07-14 RX ADMIN — Medication 81 MILLIGRAM(S): at 11:23

## 2019-07-14 RX ADMIN — ATORVASTATIN CALCIUM 40 MILLIGRAM(S): 80 TABLET, FILM COATED ORAL at 21:32

## 2019-07-14 RX ADMIN — GABAPENTIN 300 MILLIGRAM(S): 400 CAPSULE ORAL at 21:31

## 2019-07-14 RX ADMIN — OXYCODONE AND ACETAMINOPHEN 1 TABLET(S): 5; 325 TABLET ORAL at 21:31

## 2019-07-14 RX ADMIN — PANTOPRAZOLE SODIUM 40 MILLIGRAM(S): 20 TABLET, DELAYED RELEASE ORAL at 05:45

## 2019-07-14 RX ADMIN — PREGABALIN 1000 MICROGRAM(S): 225 CAPSULE ORAL at 11:23

## 2019-07-14 RX ADMIN — OXYCODONE AND ACETAMINOPHEN 1 TABLET(S): 5; 325 TABLET ORAL at 11:23

## 2019-07-14 RX ADMIN — TAMSULOSIN HYDROCHLORIDE 0.4 MILLIGRAM(S): 0.4 CAPSULE ORAL at 21:32

## 2019-07-14 RX ADMIN — Medication 40 MILLIGRAM(S): at 09:40

## 2019-07-14 RX ADMIN — OXYCODONE AND ACETAMINOPHEN 1 TABLET(S): 5; 325 TABLET ORAL at 12:30

## 2019-07-14 NOTE — PROGRESS NOTE ADULT - SUBJECTIVE AND OBJECTIVE BOX
86y old  Male who presents with a chief complaint of fall, pain, unable to ambulate due to pelvic fracture      Patient seen and examined at bedside, c/o severe pain in the pelvic area on movements, 8/10, aching, at times radiates to the LE, NO N/V, no chest pain.    ALLERGIES:  No Known Allergies    MEDICATIONS  (STANDING):  aspirin  chewable 81 milliGRAM(s) Oral daily  atorvastatin 40 milliGRAM(s) Oral at bedtime  cholecalciferol 2000 Unit(s) Oral daily  cyanocobalamin 1000 MICROGram(s) Oral daily  gabapentin 300 milliGRAM(s) Oral at bedtime  lactated ringers. 1000 milliLiter(s) (75 mL/Hr) IV Continuous <Continuous>  pantoprazole    Tablet 40 milliGRAM(s) Oral before breakfast  tamsulosin 0.4 milliGRAM(s) Oral at bedtime    MEDICATIONS  (PRN):  acetaminophen   Tablet .. 650 milliGRAM(s) Oral every 6 hours PRN Mild Pain (1 - 3)  oxyCODONE    5 mG/acetaminophen 325 mG 1 Tablet(s) Oral every 6 hours PRN Moderate Pain (4 - 6)    Vital Signs Last 24 Hrs  T(F): 97.7 (13 Jul 2019 05:22), Max: 99.2 (12 Jul 2019 16:35)  HR: 60 (13 Jul 2019 05:22) (58 - 80)  BP: 142/56 (13 Jul 2019 05:22) (125/55 - 142/56)  RR: 14 (13 Jul 2019 05:22) (14 - 18)  SpO2: 97% (13 Jul 2019 05:22) (90% - 98%)  I&O's Summary    12 Jul 2019 07:01  -  13 Jul 2019 07:00  --------------------------------------------------------  IN: 300 mL / OUT: 0 mL / NET: 300 mL    13 Jul 2019 07:01  -  13 Jul 2019 11:08  --------------------------------------------------------  IN: 240 mL / OUT: 0 mL / NET: 240 mL      PHYSICAL EXAM:  General: NAD, A/O x 3  ENT: MMM, no thrush  Neck: Supple, No JVD  Lungs: Clear to auscultation bilaterally, good air entry, non-labored breathing  Cardio: RRR, S1/S2, No murmur  Abdomen: Soft, Nontender, Nondistended; Bowel sounds present  Extremities: No calf tenderness, No pitting edema    LABS:                        8.9    8.58  )-----------( 283      ( 13 Jul 2019 05:30 )             29.8     07-13    139  |  107  |  27  ----------------------------<  97  4.7   |  24  |  1.63    Ca    8.5      13 Jul 2019 05:30    TPro  7.1  /  Alb  3.1  /  TBili  1.6  /  DBili  x   /  AST  17  /  ALT  16  /  AlkPhos  131  07-12        eGFR if Non African American: 38 mL/min/1.73M2 (07-13-19 @ 05:30)  eGFR if African American: 44 mL/min/1.73M2 (07-13-19 @ 05:30)    PT/INR - ( 12 Jul 2019 11:50 )   PT: 12.8 sec;   INR: 1.14 ratio         PTT - ( 12 Jul 2019 11:50 )  PTT:27.3 sec    Radiology  < from: CT Pelvis Bony Only No Cont (07.12.19 @ 11:45) >  Impression:  Comminuted fracture of the left ischial tuberosity with intramuscular   hematoma/edema of the obturator internist and externus.  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  < end of copied text >    < from: Xray Lumbar Spine AP + Lateral; Hip w/ Pelvis Left (07.12.19 @ 11:24) >  IMPRESSION: Fracture extending laterally from the left ischial tuberosity   is new since March 25 of this year.  Other findings appear stable as above.  < end of copied text >    < from: Xray Chest 1 View AP/PA (07.12.19 @ 11:22) >  IMPRESSION: Scattered lung scarring particularly on the right and ectatic   possibly aneurysmal aorta again noted. Other findings stable as above.  < end of copied text >    DVT prophylais  - venodynes                           RADIOLOGY & ADDITIONAL TESTS:    Care Discussed with Consultants/Other Providers: 86y old  Male who presents with a chief complaint of fall, pain, unable to ambulate due to pelvic fracture      Patient seen and examined at bedside, c/o severe pain in the pelvic area on movements, 8/10, aching, at times radiates to the LE, also with sob this am recieved 1 dose of lasix, will repeat cxr, NO N/V, no chest pain    Vital Signs Last 24 Hrs  T(C): 36.5 (14 Jul 2019 05:42), Max: 36.9 (13 Jul 2019 15:31)  T(F): 97.7 (14 Jul 2019 05:42), Max: 98.4 (13 Jul 2019 15:31)  HR: 74 (14 Jul 2019 05:42) (59 - 106)  BP: 143/62 (14 Jul 2019 05:42) (140/58 - 147/61)  BP(mean): --  RR: 1 (14 Jul 2019 05:42) (1 - 17)  SpO2: 97% (14 Jul 2019 05:42) (90% - 97%)    NAD, ROHIT,MMM,NCAT  SUPPLE  CLEAR  S1S2, RRR  SOFT NT BS PRESENT  NO PEDAL EDEMA  AAO X 3  NO GROSS NEURO DEFICITS  ROM- DIMINISHED TO BOTH LE DUE TO PAIN /PELVIC FRACTURE                9.6                  135  | 23   | 23           7.61  >-----------< 275     ------------------------< 92                    31.8                 4.8  | 102  | 1.40                                         Ca 8.7   Mg x     Ph x        Labs reviewed:     CXR personally reviewed: < from: Xray Chest 1 View-PORTABLE IMMEDIATE (07.13.19 @ 21:14) >    EXAM:  XR CHEST PORTABLE IMMED 1V      PROCEDURE DATE:  07/13/2019        INTERPRETATION:  Portable chest x-ray    Indication: Shortness of breath.    Portable chest x-ray is compared to a previous examination dated   7/12/2019.    Impression: New mild left pleural effusion with adjacent left basilar   atelectasis or pulmonary infiltrate.  New platelike density adjacent to   the right minor fissure may represent atelectasis or small pleural fluid.   Follow-up chest x-rays until resolution are recommended.    Apparent small calcified granulomas in the right lower lung zone and the   left lung apex.    No evidence for pneumothorax.    Stable cardiac silhouette.        ECG reviewed and interpreted: 86y old  Male who presents with a chief complaint of fall, pain, unable to ambulate due to pelvic fracture      Patient seen and examined at bedside, c/o severe pain in the pelvic area on movements, 8/10, aching, at times radiates to the LE, also with sob this am recieved 1 dose of lasix, will repeat cxr, NO N/V, no chest pain    Vital Signs Last 24 Hrs  T(C): 36.5 (14 Jul 2019 05:42), Max: 36.9 (13 Jul 2019 15:31)  T(F): 97.7 (14 Jul 2019 05:42), Max: 98.4 (13 Jul 2019 15:31)  HR: 74 (14 Jul 2019 05:42) (59 - 106)  BP: 143/62 (14 Jul 2019 05:42) (140/58 - 147/61)  BP(mean): --  RR: 1 (14 Jul 2019 05:42) (1 - 17)  SpO2: 97% (14 Jul 2019 05:42) (90% - 97%)    NAD, ROHIT,MMM,NCAT  SUPPLE  CLEAR  S1S2, RRR  SOFT NT BS PRESENT  NO PEDAL EDEMA  AAO X 3  NO GROSS NEURO DEFICITS  ROM- DIMINISHED TO BOTH LE DUE TO PAIN /PELVIC FRACTURE                9.6                  135  | 23   | 23           7.61  >-----------< 275     ------------------------< 92                    31.8                 4.8  | 102  | 1.40                                         Ca 8.7   Mg x     Ph x        Labs reviewed:     CXR personally reviewed: < from: Xray Chest 1 View-PORTABLE IMMEDIATE (07.13.19 @ 21:14) >    EXAM:  XR CHEST PORTABLE IMMED 1V      PROCEDURE DATE:  07/13/2019        INTERPRETATION:  Portable chest x-ray    Indication: Shortness of breath.    Portable chest x-ray is compared to a previous examination dated   7/12/2019.    Impression: New mild left pleural effusion with adjacent left basilar   atelectasis or pulmonary infiltrate.  New platelike density adjacent to   the right minor fissure may represent atelectasis or small pleural fluid.   Follow-up chest x-rays until resolution are recommended.    Apparent small calcified granulomas in the right lower lung zone and the   left lung apex.    No evidence for pneumothorax.    Stable cardiac silhouette.    CXR personally reviewed: < from: Xray Chest 1 View-PORTABLE IMMEDIATE (7/14/19)  pleural effusion

## 2019-07-14 NOTE — PROGRESS NOTE ADULT - ASSESSMENT
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    - monitor h/h   - baseline seems to be around 10     #Ambulatory dysfunction  - secondary to pelvic fracture   - pt consult pending     # Anemia of chronic disease  - monitor h/h  - imaging as above- hematoma seen on ct scan     #Hx of COPD  - budesonide.     #Hx of Deep vein thrombosis (DVT) of both lower extremities and PE  - off xarelto after 1 year of treatment.    #Dyslipidemia  - statin  - dash diet     #Acute kidney injury w/ probably CKD   - monitor cr   - start lactated ringers- 75cc/hr  - encourage po hydration  - baseline creatinine 1.08 April 2019 86y old  Male who presents with a chief complaint of fall, pain, unable to ambulate due to pelvic fracture    sob- repeat cxr, received Lasix 40 ivp x 1 this am.      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    - monitor h/h   - baseline seems to be around 10     #Ambulatory dysfunction  - secondary to pelvic fracture   - pt consult pending     # Anemia of chronic disease  - monitor h/h  - imaging as above- hematoma seen on ct scan     #Hx of COPD  - budesonide.     #Hx of Deep vein thrombosis (DVT) of both lower extremities and PE  - off xarelto after 1 year of treatment.    #Dyslipidemia  - statin  - dash diet     #Acute kidney injury w/ probably CKD   - monitor cr   - start lactated ringers- 75cc/hr  - encourage po hydration  - baseline creatinine 1.08 April 2019    await ДМИТРИЙ placement. 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    - monitor h/h   - baseline seems to be around 10     #Ambulatory dysfunction  - secondary to pelvic fracture   - pt consult pending     # Anemia of chronic disease  - monitor h/h  - imaging as above- hematoma seen on ct scan     #Hx of COPD  - budesonide.     #Hx of Deep vein thrombosis (DVT) of both lower extremities and PE  - off xarelto after 1 year of treatment.    #Dyslipidemia  - statin  - dash diet     #Acute kidney injury w/ probably CKD   - monitor cr   - start lactated ringers- 75cc/hr  - encourage po hydration  - baseline creatinine 1.08 April 2019    await ДМИТРИЙ placement.

## 2019-07-15 LAB
ALBUMIN SERPL ELPH-MCNC: 3.1 G/DL — LOW (ref 3.3–5)
ALP SERPL-CCNC: 310 U/L — HIGH (ref 40–120)
ALT FLD-CCNC: 75 U/L DA — HIGH (ref 10–45)
ANION GAP SERPL CALC-SCNC: 10 MMOL/L — SIGNIFICANT CHANGE UP (ref 5–17)
AST SERPL-CCNC: 80 U/L — HIGH (ref 10–40)
BASOPHILS # BLD AUTO: 0.02 K/UL — SIGNIFICANT CHANGE UP (ref 0–0.2)
BASOPHILS NFR BLD AUTO: 0.2 % — SIGNIFICANT CHANGE UP (ref 0–2)
BILIRUB SERPL-MCNC: 2.2 MG/DL — HIGH (ref 0.2–1.2)
BUN SERPL-MCNC: 32 MG/DL — HIGH (ref 7–23)
CALCIUM SERPL-MCNC: 8.6 MG/DL — SIGNIFICANT CHANGE UP (ref 8.4–10.5)
CHLORIDE SERPL-SCNC: 99 MMOL/L — SIGNIFICANT CHANGE UP (ref 96–108)
CO2 SERPL-SCNC: 27 MMOL/L — SIGNIFICANT CHANGE UP (ref 22–31)
CREAT SERPL-MCNC: 1.9 MG/DL — HIGH (ref 0.5–1.3)
D DIMER BLD IA.RAPID-MCNC: 1283 NG/ML DDU — HIGH
EOSINOPHIL # BLD AUTO: 0.07 K/UL — SIGNIFICANT CHANGE UP (ref 0–0.5)
EOSINOPHIL NFR BLD AUTO: 0.7 % — SIGNIFICANT CHANGE UP (ref 0–6)
GLUCOSE SERPL-MCNC: 110 MG/DL — HIGH (ref 70–99)
HCT VFR BLD CALC: 32.1 % — LOW (ref 39–50)
HGB BLD-MCNC: 9.9 G/DL — LOW (ref 13–17)
IMM GRANULOCYTES NFR BLD AUTO: 0.4 % — SIGNIFICANT CHANGE UP (ref 0–1.5)
LYMPHOCYTES # BLD AUTO: 1.21 K/UL — SIGNIFICANT CHANGE UP (ref 1–3.3)
LYMPHOCYTES # BLD AUTO: 12.1 % — LOW (ref 13–44)
MCHC RBC-ENTMCNC: 22.6 PG — LOW (ref 27–34)
MCHC RBC-ENTMCNC: 30.8 GM/DL — LOW (ref 32–36)
MCV RBC AUTO: 73.1 FL — LOW (ref 80–100)
MONOCYTES # BLD AUTO: 0.85 K/UL — SIGNIFICANT CHANGE UP (ref 0–0.9)
MONOCYTES NFR BLD AUTO: 8.5 % — SIGNIFICANT CHANGE UP (ref 2–14)
NEUTROPHILS # BLD AUTO: 7.83 K/UL — HIGH (ref 1.8–7.4)
NEUTROPHILS NFR BLD AUTO: 78.1 % — HIGH (ref 43–77)
NRBC # BLD: 0 /100 WBCS — SIGNIFICANT CHANGE UP (ref 0–0)
PLATELET # BLD AUTO: 339 K/UL — SIGNIFICANT CHANGE UP (ref 150–400)
POTASSIUM SERPL-MCNC: 4.8 MMOL/L — SIGNIFICANT CHANGE UP (ref 3.5–5.3)
POTASSIUM SERPL-SCNC: 4.8 MMOL/L — SIGNIFICANT CHANGE UP (ref 3.5–5.3)
PROT SERPL-MCNC: 7.7 G/DL — SIGNIFICANT CHANGE UP (ref 6–8.3)
RBC # BLD: 4.39 M/UL — SIGNIFICANT CHANGE UP (ref 4.2–5.8)
RBC # FLD: 20.7 % — HIGH (ref 10.3–14.5)
SODIUM SERPL-SCNC: 136 MMOL/L — SIGNIFICANT CHANGE UP (ref 135–145)
WBC # BLD: 10.02 K/UL — SIGNIFICANT CHANGE UP (ref 3.8–10.5)
WBC # FLD AUTO: 10.02 K/UL — SIGNIFICANT CHANGE UP (ref 3.8–10.5)

## 2019-07-15 PROCEDURE — 71250 CT THORAX DX C-: CPT | Mod: 26

## 2019-07-15 PROCEDURE — 99233 SBSQ HOSP IP/OBS HIGH 50: CPT | Mod: GC

## 2019-07-15 PROCEDURE — 99223 1ST HOSP IP/OBS HIGH 75: CPT

## 2019-07-15 RX ORDER — NICOTINE POLACRILEX 2 MG
1 GUM BUCCAL DAILY
Refills: 0 | Status: DISCONTINUED | OUTPATIENT
Start: 2019-07-15 | End: 2019-07-18

## 2019-07-15 RX ORDER — HEPARIN SODIUM 5000 [USP'U]/ML
5000 INJECTION INTRAVENOUS; SUBCUTANEOUS EVERY 8 HOURS
Refills: 0 | Status: DISCONTINUED | OUTPATIENT
Start: 2019-07-15 | End: 2019-07-18

## 2019-07-15 RX ORDER — IPRATROPIUM/ALBUTEROL SULFATE 18-103MCG
3 AEROSOL WITH ADAPTER (GRAM) INHALATION ONCE
Refills: 0 | Status: COMPLETED | OUTPATIENT
Start: 2019-07-15 | End: 2019-07-15

## 2019-07-15 RX ORDER — FUROSEMIDE 40 MG
20 TABLET ORAL DAILY
Refills: 0 | Status: DISCONTINUED | OUTPATIENT
Start: 2019-07-16 | End: 2019-07-18

## 2019-07-15 RX ORDER — ONDANSETRON 8 MG/1
4 TABLET, FILM COATED ORAL ONCE
Refills: 0 | Status: COMPLETED | OUTPATIENT
Start: 2019-07-15 | End: 2019-07-15

## 2019-07-15 RX ADMIN — GABAPENTIN 300 MILLIGRAM(S): 400 CAPSULE ORAL at 21:59

## 2019-07-15 RX ADMIN — Medication 40 MILLIGRAM(S): at 06:06

## 2019-07-15 RX ADMIN — Medication 81 MILLIGRAM(S): at 09:25

## 2019-07-15 RX ADMIN — ONDANSETRON 4 MILLIGRAM(S): 8 TABLET, FILM COATED ORAL at 02:48

## 2019-07-15 RX ADMIN — Medication 2000 UNIT(S): at 09:25

## 2019-07-15 RX ADMIN — ATORVASTATIN CALCIUM 40 MILLIGRAM(S): 80 TABLET, FILM COATED ORAL at 21:59

## 2019-07-15 RX ADMIN — PREGABALIN 1000 MICROGRAM(S): 225 CAPSULE ORAL at 09:25

## 2019-07-15 RX ADMIN — TAMSULOSIN HYDROCHLORIDE 0.4 MILLIGRAM(S): 0.4 CAPSULE ORAL at 21:59

## 2019-07-15 RX ADMIN — Medication 3 MILLILITER(S): at 11:09

## 2019-07-15 RX ADMIN — Medication 40 MILLIGRAM(S): at 21:56

## 2019-07-15 RX ADMIN — PANTOPRAZOLE SODIUM 40 MILLIGRAM(S): 20 TABLET, DELAYED RELEASE ORAL at 06:05

## 2019-07-15 RX ADMIN — HEPARIN SODIUM 5000 UNIT(S): 5000 INJECTION INTRAVENOUS; SUBCUTANEOUS at 18:28

## 2019-07-15 NOTE — CONSULT NOTE ADULT - ASSESSMENT
Pt is a 87yo M admitted to hospital s/p fall found to have pelvic fracture   Pulm:  probable component of copd exacebation with underlying pulm fibrosis.  Steroids added.  *Pelvic fracture   Pain management   ДМИТРИЙ placement pending once RONNIE and pul back to base line   Vit D, paget disease per radiology   out pt Bisphosphonate therapy after out pt dexa    *Ambulatory dysfunction   PT consult      *RONNIE w ?CKD  Crt increased to 1.9 today likely due to increased lasix yesterday   will decrease Lasix to PO 20mg tomorrow received lasix PO 40 today)  encourage PO fluid  no IVF due to recent fluid overload w SOB  avoid nephrotoxic meds  f/u BMP in the am     *COPD   stat Duoneb today   avoid IV Fluid supplement     *Intramuscular Hematoma  HH 9.9   Baseline 10  stable     *DVT/PE  cont xarelto     *HLD  Cont statin

## 2019-07-15 NOTE — GOALS OF CARE CONVERSATION - PERSONAL ADVANCE DIRECTIVE - CONVERSATION DETAILS
Met with patient at bedside. A&Ox3 but [place]. Asked me to call daughter Ella. Ella said she would come in later and we met in pts room with his wife Vilma (who does not speak English fluently) regarding advanced directives including HCP. Daughter declined to have pt. designate HCP. She stated they make all decisions together.

## 2019-07-15 NOTE — PROGRESS NOTE ADULT - SUBJECTIVE AND OBJECTIVE BOX
HPI  Pt is a 87yo M admitted to hospital s/p fall found to have pelvic fracture   Pt was seen and examined at bedside. Pt states he is feeling weak, overnight vomited x2 episode. Pt states he still have SOB but slight better than yesterday.     Vital Signs Last 24 Hrs  T(C): 37 (15 Jul 2019 04:53), Max: 37 (15 Jul 2019 04:53)  T(F): 98.6 (15 Jul 2019 04:53), Max: 98.6 (15 Jul 2019 04:53)  HR: 87 (15 Jul 2019 04:53) (79 - 94)  BP: 120/64 (15 Jul 2019 04:53) (100/53 - 133/72)  BP(mean): --  RR: 17 (15 Jul 2019 04:53) (17 - 18)  SpO2: 97% (15 Jul 2019 04:53) (95% - 97%)    I&O's Summary    14 Jul 2019 07:01  -  15 Jul 2019 07:00  --------------------------------------------------------  IN: 480 mL / OUT: 901 mL / NET: -421 mL    15 Jul 2019 07:01  -  15 Jul 2019 11:00  --------------------------------------------------------  IN: 240 mL / OUT: 0 mL / NET: 240 mL        CAPILLARY BLOOD GLUCOSE          PHYSICAL EXAM:    Constitutional: NAD, awake and alert,  HEENT: PERR, EOMI  Respiratory: Breath sounds generalized rhonchi noted, no wheezing or crackles noted   Cardiovascular: S1 and S2, regular rate and rhythm, no Murmurs, gallops or rubs  Gastrointestinal: Bowel Sounds present, soft, nontender, nondistended, no guarding, no rebound  Extremities: No peripheral edema  Vascular: 2+ peripheral pulses  Neurological: A/O x 3, no focal deficits  Musculoskeletal: 3/5 strength b/l  lower extremities due to pain, able to move legs and toes   Skin: No rashes    MEDICATIONS:  MEDICATIONS  (STANDING):  ALBUTerol/ipratropium for Nebulization. 3 milliLiter(s) Nebulizer once  aspirin  chewable 81 milliGRAM(s) Oral daily  atorvastatin 40 milliGRAM(s) Oral at bedtime  cholecalciferol 2000 Unit(s) Oral daily  cyanocobalamin 1000 MICROGram(s) Oral daily  gabapentin 300 milliGRAM(s) Oral at bedtime  pantoprazole    Tablet 40 milliGRAM(s) Oral before breakfast  tamsulosin 0.4 milliGRAM(s) Oral at bedtime      LABS: All Labs Reviewed:                        9.9    10.02 )-----------( 339      ( 15 Jul 2019 07:14 )             32.1     07-15    136  |  99  |  32<H>  ----------------------------<  110<H>  4.8   |  27  |  1.90<H>    Ca    8.6      15 Jul 2019 07:14    TPro  7.7  /  Alb  3.1<L>  /  TBili  2.2<H>  /  DBili  x   /  AST  80<H>  /  ALT  75<H>  /  AlkPhos  310<H>  07-15          Blood Culture:     RADIOLOGY/EKG:    DVT PPX:    ADVANCED DIRECTIVE:    DISPOSITION:

## 2019-07-15 NOTE — CONSULT NOTE ADULT - SUBJECTIVE AND OBJECTIVE BOX
HPI:HPI:  86M PMH tobacco user, COPD, DVT/PE was on xarelto, anemia of chronic disease, BPH, HLD presents for fall.  Patient was at home and fell onto his left side.  Started having severe pain in left buttock area.    Denies chest pain, nausea, vomiting, diarrhea.    Has chronic SOB which is not new and at baseline.    Denies melena or blood in stool.    Denies LOC (2019 14:08)      PAST MEDICAL & SURGICAL HISTORY:  PE (pulmonary thromboembolism)  History of COPD  [place] (hard of hearing)  Deep vein thrombosis (DVT)  Dyslipidemia  CAD (coronary artery disease)  Peripheral vascular disease  No significant past surgical history      FAMILY HISTORY:  No pertinent family history in first degree relatives      SOCIAL HISTORY:  Smokinppd x 60 years  active smoker      Allergies    No Known Allergies    Intolerances        HOME  MEDICATIONS:Home Medications:  acetaminophen 325 mg oral tablet: 2 tab(s) orally every 6 hours, As needed, Mild Pain (1 - 3) (25 May 2019 11:01)      REVIEW OF SYSTEMS:  Constitutional: No fevers or chills. No weight loss. .  Eyes: No itching or discharge from the eyes  ENT: . No nasal congestion. No post nasal drip  CV: No chest pain. No palpitations. No lightheadedness or dizziness.   Resp: dyspnea at rest, dyspnea on exertion. .  GI: No nausea. No vomiting. No diarrhea.  MSK: No joint pain or pain in any extremities  Integumentary: No skin lesions. No pedal edema.  Neurological: No gross motor weakness. No sensory changes.        OBJECTIVE:  ICU Vital Signs Last 24 Hrs  T(C): 36.6 (15 Jul 2019 16:22), Max: 37 (15 Jul 2019 04:53)  T(F): 97.9 (15 Jul 2019 16:22), Max: 98.6 (15 Jul 2019 04:53)  HR: 86 (15 Jul 2019 16:22) (79 - 87)  BP: 110/59 (15 Jul 2019 16:22) (110/59 - 133/72)  BP(mean): --  ABP: --  ABP(mean): --  RR: 17 (15 Jul 2019 16:22) (17 - 18)  SpO2: 92% (15 Jul 2019 16:22) (92% - 97%)        07-14 @ 07:01  -  15 @ 07:00  --------------------------------------------------------  IN: 480 mL / OUT: 901 mL / NET: -421 mL    07-15 @ 07:01  -  07-15 @ 17:35  --------------------------------------------------------  IN: 480 mL / OUT: 200 mL / NET: 280 mL      CAPILLARY BLOOD GLUCOSE          PHYSICAL EXAM:  General: Awake, alert, oriented X 2   HEENT: Atraumatic, normocephalic.                            .  Lymph Nodes: No palpable lymphadenopathy  Neck: No JVD. No carotid bruit.   Respiratory:                          Scattered, rhonchi.  Some wheezing  Cardiovascular: S1 S2 normal. No murmurs, rubs or gallops.   Abdomen: Soft, non-tender,   Extremities: Warm to touch.    Skin: No rashes or skin lesions  Neurological: Motor and sensory examination equal and normal in all four extremities.  Psychiatry: Appropriate mood and affect.    HOSPITAL MEDICATIONS:  MEDICATIONS  (STANDING):  aspirin  chewable 81 milliGRAM(s) Oral daily  atorvastatin 40 milliGRAM(s) Oral at bedtime  cholecalciferol 2000 Unit(s) Oral daily  cyanocobalamin 1000 MICROGram(s) Oral daily  gabapentin 300 milliGRAM(s) Oral at bedtime  methylPREDNISolone sodium succinate Injectable 40 milliGRAM(s) IV Push every 8 hours  nicotine - 21 mG/24Hr(s) Patch 1 patch Transdermal daily  pantoprazole    Tablet 40 milliGRAM(s) Oral before breakfast  tamsulosin 0.4 milliGRAM(s) Oral at bedtime    MEDICATIONS  (PRN):  acetaminophen   Tablet .. 650 milliGRAM(s) Oral every 6 hours PRN Mild Pain (1 - 3)  oxyCODONE    5 mG/acetaminophen 325 mG 1 Tablet(s) Oral every 6 hours PRN Moderate Pain (4 - 6)      LABS:                        9.9    10.02 )-----------( 339      ( 15 Jul 2019 07:14 )             32.1     07-15    136  |  99  |  32<H>  ----------------------------<  110<H>  4.8   |  27  |  1.90<H>    Ca    8.6      15 Jul 2019 07:14    TPro  7.7  /  Alb  3.1<L>  /  TBili  2.2<H>  /  DBili  x   /  AST  80<H>  /  ALT  75<H>  /  AlkPhos  310<H>  07-15              MICROBIOLOGY:     RADIOLOGY:  [ ] Reviewed and interpreted by me  < from: CT Chest No Cont (07.15.19 @ 15:52) >    PROCEDURE DATE:  07/15/2019        INTERPRETATION:  CLINICAL INDICATION: 86 years  Male with rales. COPD.   Worsening shortness of breath.    COMPARISON: Contrast-enhanced CT dated 10/25/2018    PROCEDURE:   CT of the Chest wasperformed without intravenous contrast.  Sagittal and coronal reformats were performed.      FINDINGS:    LUNGS AND AIRWAYS: Patent central airways.  Left upper lobe groundglass infiltrate. Stable biapical fibrosis. 6 mm   right apical nodule unchanged. Left upper lobe pneumatoceles measuring up   to 8 mm unchanged. Bullous disease and fibrosis in the anterior right   upper lobe and posterior right lower lobe stable. Bilateral lower lobe   dependent atelectasis. Stable right lower lobe honeycombing. New Right   lower lobe mucus impacted bronchi.  Millimeter right lower lobe calcified granuloma.    PLEURA: No pleural effusion.    MEDIASTINUM AND DEQUAN: No lymphadenopathy.    VESSELS: The aorta is atherosclerotic but not aneurysmal.    HEART: Heart size is normal. No pericardial effusion.    CHEST WALL AND LOWER NECK: Within normal limits.    VISUALIZED UPPER ABDOMEN: Within normal limits.    BONES: Thoracic degenerative changes.    IMPRESSION:     New left upper lobe groundglass infiltratessuggestive of pneumonia.    Right lower lobe mucus impacted bronchi are new.    Otherwise stable bilateral upper and lower pulmonary fibrotic changes and   honeycombing.                  AIDEN GREGG M.D., ATTENDING RADIOLOGIST    < end of copied text >     EKG:

## 2019-07-15 NOTE — PROGRESS NOTE ADULT - ASSESSMENT
Pt is a 87yo M admitted to hospital s/p fall found to have pelvic fracture     *Pelvic fracture   Pain management   ДМИТРИЙ placement pending once RONNIE and pul back to base line   Vit D, paget disease per radiology   out pt Bisphosphonate therapy after out pt dexa    *Ambulatory dysfunction   PT consult      *RONNIE w ?CKD  Crt increased to 1.9 today likely due to increased lasix yesterday   will decrease Lasix to PO 20mg tomorrow received lasix PO 40 today)  encourage PO fluid  no IVF due to recent fluid overload w SOB  avoid nephrotoxic meds  f/u BMP in the am     *COPD   stat Duoneb today   avoid IV Fluid supplement     *Intramuscular Hematoma  HH 9.9   Baseline 10  stable     *DVT/PE  cont xarelto     *HLD  Cont statin

## 2019-07-16 LAB
ANION GAP SERPL CALC-SCNC: 9 MMOL/L — SIGNIFICANT CHANGE UP (ref 5–17)
BUN SERPL-MCNC: 38 MG/DL — HIGH (ref 7–23)
CALCIUM SERPL-MCNC: 8.6 MG/DL — SIGNIFICANT CHANGE UP (ref 8.4–10.5)
CHLORIDE SERPL-SCNC: 99 MMOL/L — SIGNIFICANT CHANGE UP (ref 96–108)
CO2 SERPL-SCNC: 27 MMOL/L — SIGNIFICANT CHANGE UP (ref 22–31)
CREAT SERPL-MCNC: 1.8 MG/DL — HIGH (ref 0.5–1.3)
GLUCOSE SERPL-MCNC: 133 MG/DL — HIGH (ref 70–99)
POTASSIUM SERPL-MCNC: 3.8 MMOL/L — SIGNIFICANT CHANGE UP (ref 3.5–5.3)
POTASSIUM SERPL-SCNC: 3.8 MMOL/L — SIGNIFICANT CHANGE UP (ref 3.5–5.3)
SODIUM SERPL-SCNC: 135 MMOL/L — SIGNIFICANT CHANGE UP (ref 135–145)

## 2019-07-16 PROCEDURE — 99233 SBSQ HOSP IP/OBS HIGH 50: CPT

## 2019-07-16 PROCEDURE — 99223 1ST HOSP IP/OBS HIGH 75: CPT

## 2019-07-16 PROCEDURE — 93970 EXTREMITY STUDY: CPT | Mod: 26

## 2019-07-16 PROCEDURE — 93306 TTE W/DOPPLER COMPLETE: CPT | Mod: 26

## 2019-07-16 PROCEDURE — 99233 SBSQ HOSP IP/OBS HIGH 50: CPT | Mod: GC

## 2019-07-16 RX ORDER — PIPERACILLIN AND TAZOBACTAM 4; .5 G/20ML; G/20ML
3.38 INJECTION, POWDER, LYOPHILIZED, FOR SOLUTION INTRAVENOUS EVERY 8 HOURS
Refills: 0 | Status: DISCONTINUED | OUTPATIENT
Start: 2019-07-16 | End: 2019-07-17

## 2019-07-16 RX ORDER — IPRATROPIUM/ALBUTEROL SULFATE 18-103MCG
3 AEROSOL WITH ADAPTER (GRAM) INHALATION EVERY 6 HOURS
Refills: 0 | Status: DISCONTINUED | OUTPATIENT
Start: 2019-07-16 | End: 2019-07-18

## 2019-07-16 RX ORDER — PIPERACILLIN AND TAZOBACTAM 4; .5 G/20ML; G/20ML
3.38 INJECTION, POWDER, LYOPHILIZED, FOR SOLUTION INTRAVENOUS ONCE
Refills: 0 | Status: COMPLETED | OUTPATIENT
Start: 2019-07-16 | End: 2019-07-16

## 2019-07-16 RX ADMIN — Medication 40 MILLIGRAM(S): at 21:22

## 2019-07-16 RX ADMIN — Medication 1 PATCH: at 07:01

## 2019-07-16 RX ADMIN — HEPARIN SODIUM 5000 UNIT(S): 5000 INJECTION INTRAVENOUS; SUBCUTANEOUS at 21:22

## 2019-07-16 RX ADMIN — Medication 20 MILLIGRAM(S): at 05:58

## 2019-07-16 RX ADMIN — Medication 3 MILLILITER(S): at 22:22

## 2019-07-16 RX ADMIN — TAMSULOSIN HYDROCHLORIDE 0.4 MILLIGRAM(S): 0.4 CAPSULE ORAL at 21:22

## 2019-07-16 RX ADMIN — Medication 2000 UNIT(S): at 13:33

## 2019-07-16 RX ADMIN — GABAPENTIN 300 MILLIGRAM(S): 400 CAPSULE ORAL at 21:22

## 2019-07-16 RX ADMIN — Medication 3 MILLILITER(S): at 15:12

## 2019-07-16 RX ADMIN — PIPERACILLIN AND TAZOBACTAM 25 GRAM(S): 4; .5 INJECTION, POWDER, LYOPHILIZED, FOR SOLUTION INTRAVENOUS at 22:22

## 2019-07-16 RX ADMIN — PIPERACILLIN AND TAZOBACTAM 25 GRAM(S): 4; .5 INJECTION, POWDER, LYOPHILIZED, FOR SOLUTION INTRAVENOUS at 13:35

## 2019-07-16 RX ADMIN — PIPERACILLIN AND TAZOBACTAM 200 GRAM(S): 4; .5 INJECTION, POWDER, LYOPHILIZED, FOR SOLUTION INTRAVENOUS at 13:46

## 2019-07-16 RX ADMIN — Medication 81 MILLIGRAM(S): at 13:34

## 2019-07-16 RX ADMIN — PREGABALIN 1000 MICROGRAM(S): 225 CAPSULE ORAL at 13:34

## 2019-07-16 RX ADMIN — ATORVASTATIN CALCIUM 40 MILLIGRAM(S): 80 TABLET, FILM COATED ORAL at 21:22

## 2019-07-16 RX ADMIN — Medication 3 MILLILITER(S): at 08:52

## 2019-07-16 RX ADMIN — PANTOPRAZOLE SODIUM 40 MILLIGRAM(S): 20 TABLET, DELAYED RELEASE ORAL at 05:58

## 2019-07-16 RX ADMIN — Medication 40 MILLIGRAM(S): at 13:35

## 2019-07-16 RX ADMIN — Medication 40 MILLIGRAM(S): at 05:57

## 2019-07-16 RX ADMIN — Medication 1 PATCH: at 13:35

## 2019-07-16 RX ADMIN — HEPARIN SODIUM 5000 UNIT(S): 5000 INJECTION INTRAVENOUS; SUBCUTANEOUS at 13:34

## 2019-07-16 RX ADMIN — HEPARIN SODIUM 5000 UNIT(S): 5000 INJECTION INTRAVENOUS; SUBCUTANEOUS at 05:57

## 2019-07-16 NOTE — CONSULT NOTE ADULT - SUBJECTIVE AND OBJECTIVE BOX
Chief Complaint:   86M PMH tobacco user, COPD, DVT/PE was on xarelto, anemia of chronic disease, BPH, HLD presents for fall.  Patient was at home and fell onto his left side.  Started having severe pain in left buttock area.Denies chest pain, nausea, vomiting, diarrhea.Has chronic SOB which is not new and at baseline.Denies melena or blood in stool. Denies LOC he was last seen by dr Watkins in 2014. he has non obstructive cad. he suffered a pelvic fracture has sob was on Lasix however concern are raised regarding pneumonia diagnostic testing to exclude a pulmonary embolism given renal failure  and an abnormal pulmonary status.     HPI:    PMH:   PE (pulmonary thromboembolism)  History of COPD  [place])  Deep vein thrombosis (DVT)  HTN (hypertension)  Dyslipidemia  CAD (coronary artery disease)  Peripheral vascular disease  Parkinson disease    PSH:   No significant past surgical history    Family History:  FAMILY HISTORY:  No pertinent family history in first degree relatives      Social History:  Smoking:  Alcohol:  Drugs:    Allergies:  No Known Allergies      Medications:  acetaminophen   Tablet .. 650 milliGRAM(s) Oral every 6 hours PRN  ALBUTerol/ipratropium for Nebulization 3 milliLiter(s) Nebulizer every 6 hours  aspirin  chewable 81 milliGRAM(s) Oral daily  atorvastatin 40 milliGRAM(s) Oral at bedtime  cholecalciferol 2000 Unit(s) Oral daily  cyanocobalamin 1000 MICROGram(s) Oral daily  furosemide    Tablet 20 milliGRAM(s) Oral daily  gabapentin 300 milliGRAM(s) Oral at bedtime  heparin  Injectable 5000 Unit(s) SubCutaneous every 8 hours  methylPREDNISolone sodium succinate Injectable 40 milliGRAM(s) IV Push every 8 hours  nicotine - 21 mG/24Hr(s) Patch 1 patch Transdermal daily  oxyCODONE    5 mG/acetaminophen 325 mG 1 Tablet(s) Oral every 6 hours PRN  pantoprazole    Tablet 40 milliGRAM(s) Oral before breakfast  piperacillin/tazobactam IVPB.. 3.375 Gram(s) IV Intermittent every 8 hours  tamsulosin 0.4 milliGRAM(s) Oral at bedtime      REVIEW OF SYSTEMS:  CONSTITUTIONAL: No fever, weight loss, or fatigue  EYES: No eye pain, visual disturbances, or discharge  ENMT:  No difficulty hearing, tinnitus, vertigo; No sinus or throat pain  NECK: No pain or stiffness  BREASTS: No pain, masses, or nipple discharge  RESPIRATORY: No cough, wheezing, chills or hemoptysis; No shortness of breath  CARDIOVASCULAR: No chest pain, palpitations, dizziness, or leg swelling  GASTROINTESTINAL: No abdominal or epigastric pain. No nausea, vomiting, or hematemesis; No diarrhea or constipation. No melena or hematochezia.  GENITOURINARY: No dysuria, frequency, hematuria, or incontinence  NEUROLOGICAL: No headaches, memory loss, loss of strength, numbness, or tremors  SKIN: No itching, burning, rashes, or lesions   LYMPH NODES: No enlarged glands  ENDOCRINE: No heat or cold intolerance; No hair loss  MUSCULOSKELETAL: No joint pain or swelling; No muscle, back, or extremity pain  PSYCHIATRIC: No depression, anxiety, mood swings, or difficulty sleeping  HEME/LYMPH: No easy bruising, or bleeding gums  ALLERY AND IMMUNOLOGIC: No hives or eczema    Physical Exam:  T(C): 36.7 (07-16-19 @ 15:21), Max: 37.1 (07-15-19 @ 21:37)  HR: 77 (07-16-19 @ 15:21) (75 - 85)  BP: 121/53 (07-16-19 @ 15:21) (105/45 - 128/65)  RR: 17 (07-16-19 @ 15:21) (16 - 17)  SpO2: 94% (07-16-19 @ 15:21) (94% - 95%)  Wt(kg): --    GENERAL: NAD, well-groomed, well-developed  HEAD:  Atraumatic, Normocephalic  EYES: EOMI, conjunctiva and sclera clear  ENT: Moist mucous membranes,  NECK: Supple, No JVD, no bruits  CHEST/LUNG: Clear to percussion bilaterally; No rales, rhonchi, wheezing, or rubs  HEART: Regular rate and rhythm; No murmurs, rubs, or gallops PMI non displaced.  ABDOMEN: Soft, Nontender, Nondistended; Bowel sounds present  EXTREMITIES:  2+ Peripheral Pulses, No clubbing, cyanosis, or edema  SKIN: No rashes or lesions  NERVOUS SYSTEM:  Cranial Nerves II-XII intact     Cardiovascular Diagnostic Testing:  ECG:    < from: 12 Lead ECG (07.12.19 @ 11:48) >  Ventricular Rate 61 BPM    Atrial Rate 61 BPM    P-R Interval 156 ms    QRS Duration 76 ms    Q-T Interval 436 ms    QTC Calculation(Bezet) 438 ms    P Axis 46 degrees    R Axis -24 degrees    T Axis 49 degrees    Diagnosis Line Normal sinus rhythm  Normal ECG  When compared with ECG of 09-JUN-2019 07:48,  No significant change was found  Confirmed by APOLINAR TRIMBLE, ROSA VALDEZ ([id number]) on 7/13/2019 7:58:45 AM    < end of copied text >    ECHO:    < from: TTE Echo Complete w/Doppler (07.16.19 @ 09:03) >  Summary:   1. Left ventricular ejection fraction, by visual estimation, is 65%.   2. Normal global left ventricular systolic function.   3. Spectral Doppler shows impaired relaxation pattern of left   ventricular myocardial filling (Grade I diastolic dysfunction).   4. Mild thickening and calcification of the anterior and posterior   mitral valve leaflets.   5. Mild aortic regurgitation.   6. Estimated pulmonary artery systolic pressure is 39.4 mmHg assuming a   right atrial pressure of 10 mmHg, which is consistent with borderline   pulmonary hypertension.   7. LA volume Index is 15.5 ml/m² ml/m2.   8. Peak transaortic gradient equals 14.0 mmHg, mean transaortic gradient   equals 7.1 mmHg, the calculated aortic valve area equals 1.97 cm² by the   continuity equation consistent with mild aortic stenosis.    [id number] John Anand , Electronically signed on 7/16/2019 at   1:11:03 PM       *** Final ***      JOHN ANAND   This document has been electronically signed. Jul 16 2019  9:03AM    < end of copied text >      Labs:                        9.9    10.02 )-----------( 339      ( 15 Jul 2019 07:14 )             32.1     07-16    135  |  99  |  38<H>  ----------------------------<  133<H>  3.8   |  27  |  1.80<H>    Ca    8.6      16 Jul 2019 06:30    TPro  7.7  /  Alb  3.1<L>  /  TBili  2.2<H>  /  DBili  x   /  AST  80<H>  /  ALT  75<H>  /  AlkPhos  310<H>  07-15      Imaging:    < from: Xray Chest 1 View-PORTABLE IMMEDIATE (07.14.19 @ 09:41) >  EXAM:  XR CHEST PORTABLE IMMED 1V      PROCEDURE DATE:  07/14/2019        INTERPRETATION:  Portable chest x-ray    Indication: Cough.    Portable chest x-ray is compared to a previous examination dated   7/13/2019.    Impression: Previously noted small pleural effusion at the left   costophrenic angle has resolved. Apparent small left basilar atelectasis,   improved.    Possible small pleural effusion at the right costophrenic angle. Small   right basilar atelectasis.    No evidence for pulmonary consolidation or pneumothorax.    Stable cardiac silhouette.      JAREN BROWN M.D., ATTENDING RADIOLOGIST  This document has been electronically signed. 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  Patient was at home and fell onto his left side.  Started having severe pain in left buttock area.Denies chest pain, nausea, vomiting, diarrhea.Has chronic SOB which is not new and at baseline.Denies melena or blood in stool. Denies LOC he was last seen by dr Watkins in 2014. he has non obstructive cad. he suffered a pelvic fracture has sob was on Lasix. However concerns are raised regarding pneumonia and an abnormal ct scan rendering diagnostic testing to exclude a pulmonary embolism wit cta or vq problematic  given renal failure  and an abnormal pulmonary status.     HPI:    PMH:   PE (pulmonary thromboembolism)  History of COPD  [place] (hard of hearing)  Deep vein thrombosis (DVT)  HTN (hypertension)  Dyslipidemia  CAD (coronary artery disease)  Peripheral vascular disease  Parkinson disease    PSH:   No significant past surgical history    Family History:  FAMILY HISTORY:  No pertinent family history in first degree relatives      Social History:  Smoking:  Alcohol:  Drugs:    Allergies:  No Known Allergies      Medications:  acetaminophen   Tablet .. 650 milliGRAM(s) Oral every 6 hours PRN  ALBUTerol/ipratropium for Nebulization 3 milliLiter(s) Nebulizer every 6 hours  aspirin  chewable 81 milliGRAM(s) Oral daily  atorvastatin 40 milliGRAM(s) Oral at bedtime  cholecalciferol 2000 Unit(s) Oral daily  cyanocobalamin 1000 MICROGram(s) Oral daily  furosemide    Tablet 20 milliGRAM(s) Oral daily  gabapentin 300 milliGRAM(s) Oral at bedtime  heparin  Injectable 5000 Unit(s) SubCutaneous every 8 hours  methylPREDNISolone sodium succinate Injectable 40 milliGRAM(s) IV Push every 8 hours  nicotine - 21 mG/24Hr(s) Patch 1 patch Transdermal daily  oxyCODONE    5 mG/acetaminophen 325 mG 1 Tablet(s) Oral every 6 hours PRN  pantoprazole    Tablet 40 milliGRAM(s) Oral before breakfast  piperacillin/tazobactam IVPB.. 3.375 Gram(s) IV Intermittent every 8 hours  tamsulosin 0.4 milliGRAM(s) Oral at bedtime      REVIEW OF SYSTEMS:  CONSTITUTIONAL: No fever, weight loss, or fatigue  EYES: No eye pain, visual disturbances, or discharge  ENMT:  No difficulty hearing, tinnitus, vertigo; No sinus or throat pain  NECK: No pain or stiffness  BREASTS: No pain, masses, or nipple discharge  RESPIRATORY: No cough, wheezing, chills or hemoptysis; No shortness of breath  CARDIOVASCULAR: No chest pain, palpitations, dizziness, or leg swelling  GASTROINTESTINAL: No abdominal or epigastric pain. No nausea, vomiting, or hematemesis; No diarrhea or constipation. No melena or hematochezia.  GENITOURINARY: No dysuria, frequency, hematuria, or incontinence  NEUROLOGICAL: No headaches, memory loss, loss of strength, numbness, or tremors  SKIN: No itching, burning, rashes, or lesions   LYMPH NODES: No enlarged glands  ENDOCRINE: No heat or cold intolerance; No hair loss  MUSCULOSKELETAL: No joint pain or swelling; No muscle, back, or extremity pain  PSYCHIATRIC: No depression, anxiety, mood swings, or difficulty sleeping  HEME/LYMPH: No easy bruising, or bleeding gums  ALLERY AND IMMUNOLOGIC: No hives or eczema    Physical Exam:  T(C): 36.7 (07-16-19 @ 15:21), Max: 37.1 (07-15-19 @ 21:37)  HR: 77 (07-16-19 @ 15:21) (75 - 85)  BP: 121/53 (07-16-19 @ 15:21) (105/45 - 128/65)  RR: 17 (07-16-19 @ 15:21) (16 - 17)  SpO2: 94% (07-16-19 @ 15:21) (94% - 95%)  Wt(kg): --    GENERAL: NAD, well-groomed, well-developed  HEAD:  Atraumatic, Normocephalic  EYES: EOMI, conjunctiva and sclera clear  ENT: Moist mucous membranes,  NECK: Supple, No JVD, no bruits  CHEST/LUNG: Clear to percussion bilaterally; No rales, rhonchi, wheezing, or rubs  HEART: Regular rate and rhythm; No murmurs, rubs, or gallops PMI non displaced.  ABDOMEN: Soft, Nontender, Nondistended; Bowel sounds present  EXTREMITIES:  2+ Peripheral Pulses, No clubbing, cyanosis, or edema  SKIN: No rashes or lesions  NERVOUS SYSTEM:  Cranial Nerves II-XII intact     Cardiovascular Diagnostic Testing:  ECG:    < from: 12 Lead ECG (07.12.19 @ 11:48) >  Ventricular Rate 61 BPM    Atrial Rate 61 BPM    P-R Interval 156 ms    QRS Duration 76 ms    Q-T Interval 436 ms    QTC Calculation(Bezet) 438 ms    P Axis 46 degrees    R Axis -24 degrees    T Axis 49 degrees    Diagnosis Line Normal sinus rhythm  Normal ECG  When compared with ECG of 09-JUN-2019 07:48,  No significant change was found  Confirmed by APOLINAR TRIMBLE, ROSA VALDEZ ([id number]) on 7/13/2019 7:58:45 AM    < end of copied text >    ECHO:    < from: TTE Echo Complete w/Doppler (07.16.19 @ 09:03) >  Summary:   1. Left ventricular ejection fraction, by visual estimation, is 65%.   2. Normal global left ventricular systolic function.   3. Spectral Doppler shows impaired relaxation pattern of left   ventricular myocardial filling (Grade I diastolic dysfunction).   4. Mild thickening and calcification of the anterior and posterior   mitral valve leaflets.   5. Mild aortic regurgitation.   6. Estimated pulmonary artery systolic pressure is 39.4 mmHg assuming a   right atrial pressure of 10 mmHg, which is consistent with borderline   pulmonary hypertension.   7. LA volume Index is 15.5 ml/m² ml/m2.   8. Peak transaortic gradient equals 14.0 mmHg, mean transaortic gradient   equals 7.1 mmHg, the calculated aortic valve area equals 1.97 cm² by the   continuity equation consistent with mild aortic stenosis.    [id number] John Anand , Electronically signed on 7/16/2019 at   1:11:03 PM       *** Final ***      JOHN ANAND   This document has been electronically signed. Jul 16 2019  9:03AM    < end of copied text >      Labs:                        9.9    10.02 )-----------( 339      ( 15 Jul 2019 07:14 )             32.1     07-16    135  |  99  |  38<H>  ----------------------------<  133<H>  3.8   |  27  |  1.80<H>    Ca    8.6      16 Jul 2019 06:30    TPro  7.7  /  Alb  3.1<L>  /  TBili  2.2<H>  /  DBili  x   /  AST  80<H>  /  ALT  75<H>  /  AlkPhos  310<H>  07-15      Imaging:    < from: Xray Chest 1 View-PORTABLE IMMEDIATE (07.14.19 @ 09:41) >  EXAM:  XR CHEST PORTABLE IMMED 1V      PROCEDURE DATE:  07/14/2019        INTERPRETATION:  Portable chest x-ray    Indication: Cough.    Portable chest x-ray is compared to a previous examination dated   7/13/2019.    Impression: Previously noted small pleural effusion at the left   costophrenic angle has resolved. Apparent small left basilar atelectasis,   improved.    Possible small pleural effusion at the right costophrenic angle. Small   right basilar atelectasis.    No evidence for pulmonary consolidation or pneumothorax.    Stable cardiac silhouette.      JAREN BROWN M.D., ATTENDING RADIOLOGIST  This document has been electronically signed. Jul 14 2019 10:50AM    < end of copied text >

## 2019-07-16 NOTE — PROGRESS NOTE ADULT - SUBJECTIVE AND OBJECTIVE BOX
Follow-up Pulm Progress Note    James Womack MD  [phone]    No new respiratory events overnight.  Denies SOB/CP.     Medications:  Vital Signs Last 24 Hrs  T(C): 36.4 (16 Jul 2019 09:02), Max: 37.1 (15 Jul 2019 21:37)  T(F): 97.5 (16 Jul 2019 09:02), Max: 98.7 (15 Jul 2019 21:37)  HR: 82 (16 Jul 2019 09:02) (75 - 86)  BP: 105/45 (16 Jul 2019 09:02) (105/45 - 128/65)  BP(mean): --  RR: 17 (16 Jul 2019 09:02) (16 - 17)  SpO2: 95% (16 Jul 2019 09:02) (92% - 95%)          07-15 @ 07:01  -  07-16 @ 07:00  --------------------------------------------------------  IN: 480 mL / OUT: 200 mL / NET: 280 mL        LABS:                        9.9    10.02 )-----------( 339      ( 15 Jul 2019 07:14 )             32.1     07-16    135  |  99  |  38<H>  ----------------------------<  133<H>  3.8   |  27  |  1.80<H>    Ca    8.6      16 Jul 2019 06:30    TPro  7.7  /  Alb  3.1<L>  /  TBili  2.2<H>  /  DBili  x   /  AST  80<H>  /  ALT  75<H>  /  AlkPhos  310<H>  07-15              CULTURES:        Physical Examination:  PULM: scattered rhonchi.  No wheezing  CVS: Regular rate and rhythm, no murmurs, rubs, or gallops  ABD: Soft, non-tender  EXT:  No clubbing, cyanosis, or edema

## 2019-07-16 NOTE — PROGRESS NOTE ADULT - ASSESSMENT
Pt is a 87yo M admitted to hospital s/p fall found to have pelvic fracture     *Pelvic fracture   Pain management   ДМИТРИЙ placement pending once RONNIE and pul back to base line   Vit D, paget disease per radiology   out pt Bisphosphonate therapy after out pt dexa    *Ambulatory dysfunction   PT consult      *Elevated D-Dimer   Unable to r/o PE due to RONNIE not able to tolerate contrast   V/Q not reliable due to baseline fibrotic changes w COPD and new Pna   F/u bilateral lower ext doppler     *Left upper lobe pna and right lower lobe mucus w stable bilateral upper lower pulmonary fibrosis   Pulmonary consult appreciate   Given location, start Zosyn cover aspiration pna   Incentive spirometry   Cont solumedrol q8hr   Duoneb q6hr     *increase difficulty breathing  f/u TTE  cardiology consult   ?CHF     *RONNIE w ?CKD  Crt 1.8 today   Decrease Lasix to PO 20mg starting today   encourage PO fluid  no IVF due to recent fluid overload w SOB  avoid nephrotoxic meds  f/u BMP in the am     *COPD   stat Duoneb today   avoid IV Fluid supplement     *Intramuscular Hematoma  HH stable     *Hx of DVT/PE 2018   hx of DVT, xarelto stopped due to pt does not like feeling     *HLD  Cont statin     *DVT ppx  Cont Heparin SQ

## 2019-07-16 NOTE — PROGRESS NOTE ADULT - SUBJECTIVE AND OBJECTIVE BOX
HPI  Pt is a 85yo M admitted to hospital s/p fall found to have pelvic fracture   Pt was seen and examined at bedside. Pt states he is feeling weak,    Vital Signs Last 24 Hrs  T(C): 36.5 (16 Jul 2019 05:53), Max: 37.1 (15 Jul 2019 21:37)  T(F): 97.7 (16 Jul 2019 05:53), Max: 98.7 (15 Jul 2019 21:37)  HR: 75 (16 Jul 2019 05:53) (75 - 86)  BP: 128/65 (16 Jul 2019 05:53) (110/59 - 128/65)  BP(mean): --  RR: 16 (16 Jul 2019 05:53) (16 - 17)  SpO2: 94% (16 Jul 2019 05:53) (92% - 95%)    I&O's Summary    15 Jul 2019 07:01  -  16 Jul 2019 07:00  --------------------------------------------------------  IN: 480 mL / OUT: 200 mL / NET: 280 mL        CAPILLARY BLOOD GLUCOSE          PHYSICAL EXAM:    Constitutional: NAD, awake and alert,  HEENT: PERR, EOMI  Respiratory: Breath sounds generalized rhonchi noted, no wheezing or crackles noted   Cardiovascular: S1 and S2, regular rate and rhythm, no Murmurs, gallops or rubs  Gastrointestinal: Bowel Sounds present, soft, nontender, nondistended, no guarding, no rebound  Extremities: No peripheral edema  Vascular: 2+ peripheral pulses  Neurological: A/O x 3, no focal deficits  Musculoskeletal: 3/5 strength b/l  lower extremities due to pain, able to move legs and toes   Skin: No rashes    MEDICATIONS:  MEDICATIONS  (STANDING):  ALBUTerol/ipratropium for Nebulization 3 milliLiter(s) Nebulizer every 6 hours  aspirin  chewable 81 milliGRAM(s) Oral daily  atorvastatin 40 milliGRAM(s) Oral at bedtime  cholecalciferol 2000 Unit(s) Oral daily  cyanocobalamin 1000 MICROGram(s) Oral daily  furosemide    Tablet 20 milliGRAM(s) Oral daily  gabapentin 300 milliGRAM(s) Oral at bedtime  heparin  Injectable 5000 Unit(s) SubCutaneous every 8 hours  methylPREDNISolone sodium succinate Injectable 40 milliGRAM(s) IV Push every 8 hours  nicotine - 21 mG/24Hr(s) Patch 1 patch Transdermal daily  pantoprazole    Tablet 40 milliGRAM(s) Oral before breakfast  piperacillin/tazobactam IVPB. 3.375 Gram(s) IV Intermittent once  piperacillin/tazobactam IVPB.. 3.375 Gram(s) IV Intermittent every 8 hours  tamsulosin 0.4 milliGRAM(s) Oral at bedtime      LABS: All Labs Reviewed:                        9.9    10.02 )-----------( 339      ( 15 Jul 2019 07:14 )             32.1     07-16    135  |  99  |  38<H>  ----------------------------<  133<H>  3.8   |  27  |  1.80<H>    Ca    8.6      16 Jul 2019 06:30    TPro  7.7  /  Alb  3.1<L>  /  TBili  2.2<H>  /  DBili  x   /  AST  80<H>  /  ALT  75<H>  /  AlkPhos  310<H>  07-15          Blood Culture:     RADIOLOGY/EKG:    DVT PPX:    ADVANCED DIRECTIVE:    DISPOSITION:

## 2019-07-16 NOTE — PROGRESS NOTE ADULT - SUBJECTIVE AND OBJECTIVE BOX
Patient is a 86y old  Male who presents with a chief complaint of fall, pain, unable to ambulate (15 Jul 2019 17:34)      BRIEF HOSPITAL COURSE: 87 yo male with PMHx pulmonary fibrosis, COPD, prior DVT/PE no longer on AC, CAD, diastolic heart failure, admitted with pelvic fracture s/p fall.       Events last 24 hours: Called by RN with critical lab value: d-dimer 1283. Patient evaluated, still feels short of breath, not improving. SpO2 89-94% on room air in no distress. Chart reviewed, being gently diuresed as limited by RONNIE and started on steroids today. Underwent CT chest with findings of new JAGDEEP groundglass opacities c/w pneumonia, and RL bronchus with mucous impaction; stable fibrotic changes.      PAST MEDICAL & SURGICAL HISTORY:  PE (pulmonary thromboembolism)  History of COPD  [place] (hard of hearing)  Deep vein thrombosis (DVT)  Dyslipidemia  CAD (coronary artery disease)  Peripheral vascular disease  No significant past surgical history      SOCIAL HISTORY: active smoker 1 ppd      Review of Systems: due to lethargy, full ROS could not be obtained at this time.  RESPIRATORY:  +cough, +shortness of breath  CARDIOVASCULAR: No chest pain      Medications:  furosemide    Tablet 20 milliGRAM(s) Oral daily  tamsulosin 0.4 milliGRAM(s) Oral at bedtime  acetaminophen   Tablet .. 650 milliGRAM(s) Oral every 6 hours PRN  gabapentin 300 milliGRAM(s) Oral at bedtime  oxyCODONE    5 mG/acetaminophen 325 mG 1 Tablet(s) Oral every 6 hours PRN  aspirin  chewable 81 milliGRAM(s) Oral daily  heparin  Injectable 5000 Unit(s) SubCutaneous every 8 hours  pantoprazole    Tablet 40 milliGRAM(s) Oral before breakfast  atorvastatin 40 milliGRAM(s) Oral at bedtime  methylPREDNISolone sodium succinate Injectable 40 milliGRAM(s) IV Push every 8 hours  cholecalciferol 2000 Unit(s) Oral daily  cyanocobalamin 1000 MICROGram(s) Oral daily  nicotine - 21 mG/24Hr(s) Patch 1 patch Transdermal daily      ICU Vital Signs Last 24 Hrs  T(C): 37.1 (15 Jul 2019 21:37), Max: 37.1 (15 Jul 2019 21:37)  T(F): 98.7 (15 Jul 2019 21:37), Max: 98.7 (15 Jul 2019 21:37)  HR: 85 (15 Jul 2019 21:37) (85 - 87)  BP: 124/59 (15 Jul 2019 21:37) (110/59 - 124/59)  BP(mean): --  ABP: --  ABP(mean): --  RR: 17 (15 Jul 2019 21:37) (17 - 17)  SpO2: 95% (15 Jul 2019 21:37) (92% - 97%)      I&O's Detail    14 Jul 2019 07:01  -  15 Jul 2019 07:00  --------------------------------------------------------  IN:    Oral Fluid: 480 mL  Total IN: 480 mL    OUT:    Voided: 901 mL  Total OUT: 901 mL    Total NET: -421 mL      15 Jul 2019 07:01  -  16 Jul 2019 03:08  --------------------------------------------------------  IN:    Oral Fluid: 480 mL  Total IN: 480 mL    OUT:    Voided: 200 mL  Total OUT: 200 mL    Total NET: 280 mL            LABS:                        9.9    10.02 )-----------( 339      ( 15 Jul 2019 07:14 )             32.1     07-15    136  |  99  |  32<H>  ----------------------------<  110<H>  4.8   |  27  |  1.90<H>    Ca    8.6      15 Jul 2019 07:14    TPro  7.7  /  Alb  3.1<L>  /  TBili  2.2<H>  /  DBili  x   /  AST  80<H>  /  ALT  75<H>  /  AlkPhos  310<H>  07-15          CAPILLARY BLOOD GLUCOSE            CULTURES:        Physical Examination:    General: Frail and ill appearing, in no acute distress.      HEENT: Pupils equal, reactive to light.  Symmetric.    PULM: Diffuse ronchi to auscultation bilaterally with rales at left base    CVS: Regular rate and rhythm    ABD: Soft, nondistended, nontender    EXT: No edema, nontender    SKIN: Warm and well perfused, no rashes noted.    NEURO: Alert, oriented, interactive, nonfocal        RADIOLOGY: < from: CT Chest No Cont (07.15.19 @ 15:52) >  EXAM:  CT CHEST      PROCEDURE DATE:  07/15/2019        INTERPRETATION:  CLINICAL INDICATION: 86 years  Male with rales. COPD.   Worsening shortness of breath.    COMPARISON: Contrast-enhanced CT dated 10/25/2018    PROCEDURE:   CT of the Chest wasperformed without intravenous contrast.  Sagittal and coronal reformats were performed.      FINDINGS:    LUNGS AND AIRWAYS: Patent central airways.  Left upper lobe groundglass infiltrate. Stable biapical fibrosis. 6 mm   right apical nodule unchanged. Left upper lobe pneumatoceles measuring up   to 8 mm unchanged. Bullous disease and fibrosis in the anterior right   upper lobe and posterior right lower lobe stable. Bilateral lower lobe   dependent atelectasis. Stable right lower lobe honeycombing. New Right   lower lobe mucus impacted bronchi.  Millimeter right lower lobe calcified granuloma.    PLEURA: No pleural effusion.    MEDIASTINUM AND DEQUAN: No lymphadenopathy.    VESSELS: The aorta is atherosclerotic but not aneurysmal.    HEART: Heart size is normal. No pericardial effusion.    CHEST WALL AND LOWER NECK: Within normal limits.    VISUALIZED UPPER ABDOMEN: Within normal limits.    BONES: Thoracic degenerative changes.    IMPRESSION:     New left upper lobe groundglass infiltratessuggestive of pneumonia.    Right lower lobe mucus impacted bronchi are new.    Otherwise stable bilateral upper and lower pulmonary fibrotic changes and   honeycombing.    AIDEN GREGG M.D., ATTENDING RADIOLOGIST  This document has been electronically signed. Jul 15 2019  4:22PM        DVT PROPHYLAXIS: Heparin SC   CODE STATUS: FULL    TIME SPENT: 35 minutes spent performing frequent bedside reassessments and augmenting plan of care to address problems of acute illness that pose high probability of life threatening deterioration and/or end organ damage/dysfunction.

## 2019-07-16 NOTE — CONSULT NOTE ADULT - ATTENDING COMMENTS
elevation in d dimer without an ability to exclude pulmonary embolism in this patient with multiple risk factors for same is problematic. will discuss with pulmonary dr stuart. unfortunately echocardiography is not helpful in excluding or making a diagnosis of pulmonary embolism.

## 2019-07-16 NOTE — PROGRESS NOTE ADULT - ASSESSMENT
Pt is a 85yo M admitted to hospital s/p fall found to have pelvic fracture     *Pelvic fracture   Pain management   ДМИТРИЙ placement pending once RONNIE and pul back to base line   Vit D, paget disease per radiology   out pt Bisphosphonate therapy after out pt dexa    *Ambulatory dysfunction   PT consult      *Elevated D-Dimer   Unable to r/o PE due to RONNIE not able to tolerate contrast   V/Q not reliable due to baseline fibrotic changes w COPD and new Pna   F/u bilateral lower ext doppler     *Left upper lobe pna and right lower lobe mucus w stable bilateral upper lower pulmonary fibrosis   Pulmonary consult appreciate   Given location, start Zosyn cover aspiration pna   Incentive spirometry   Cont solumedrol q8hr   Duoneb q6hr     *increase difficulty breathing  f/u TTE  cardiology consult   ?CHF     *RONNIE w ?CKD  Crt 1.8 today   Decrease Lasix to PO 20mg starting today   encourage PO fluid  no IVF due to recent fluid overload w SOB  avoid nephrotoxic meds  f/u BMP in the am     *COPD   stat Duoneb today   avoid IV Fluid supplement     *Intramuscular Hematoma  HH stable     *Hx of DVT/PE 2018   hx of DVT, xarelto stopped due to pt does not like feeling     *HLD  Cont statin     *DVT ppx  Cont Heparin SQ

## 2019-07-16 NOTE — PROGRESS NOTE ADULT - ASSESSMENT
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   Found to have an elevated d-dimer of uncertain significance given that patient has known pulmonary hypertension and RONNIE.     - Due to RONNIE, cannot undergo CTA to r/o PE. Can consider transfer for VQ scan, will be abnormal given COPD/pulm fibrosis but can eval V-Q mismatch suggestive of PE.  - Check LE duplex to r/o DVT (had R post tibial DVT 8/2018) now off AC per RN, only on aspirin  - Last TTE 3/2019 with dHFpEF, pulm HTN, and mild-mod MR. Would repeat TTE to further assess role of heart failure in current respiratory compromise  - Pulm input appreciated, continue steroids and will add duonebs for component of exacerbation of COPD/pulm fibrosis 87 yo male with PMHx pulmonary fibrosis, COPD, prior DVT/PE no longer on AC, CAD, diastolic heart failure, admitted with pelvic fracture s/p fall.   Found to have an elevated d-dimer of uncertain significance given that patient has known pulmonary hypertension and RONNIE.     - Due to RONNIE, cannot undergo CTA to r/o PE. Can consider transfer for VQ scan, will be abnormal given COPD/pulm fibrosis but can eval V-Q mismatch suggestive of PE.  - Check LE duplex to r/o DVT (had R post tibial DVT 8/2018) now off AC per RN, only on aspirin  - Last TTE 3/2019 with dHFpEF, pulm HTN, and mild-mod MR. Would repeat TTE to further assess role of heart failure in current respiratory compromise. Continue diuresis, watch for worsening RONNIE, monitor I&Os  - Pulm input appreciated, continue steroids and will add duonebs for component of exacerbation of COPD/pulm fibrosis

## 2019-07-17 DIAGNOSIS — S32.9XXA FRACTURE OF UNSPECIFIED PARTS OF LUMBOSACRAL SPINE AND PELVIS, INITIAL ENCOUNTER FOR CLOSED FRACTURE: ICD-10-CM

## 2019-07-17 DIAGNOSIS — J18.1 LOBAR PNEUMONIA, UNSPECIFIED ORGANISM: ICD-10-CM

## 2019-07-17 DIAGNOSIS — D50.0 IRON DEFICIENCY ANEMIA SECONDARY TO BLOOD LOSS (CHRONIC): ICD-10-CM

## 2019-07-17 DIAGNOSIS — J44.9 CHRONIC OBSTRUCTIVE PULMONARY DISEASE, UNSPECIFIED: ICD-10-CM

## 2019-07-17 DIAGNOSIS — Z02.9 ENCOUNTER FOR ADMINISTRATIVE EXAMINATIONS, UNSPECIFIED: ICD-10-CM

## 2019-07-17 DIAGNOSIS — R79.89 OTHER SPECIFIED ABNORMAL FINDINGS OF BLOOD CHEMISTRY: ICD-10-CM

## 2019-07-17 DIAGNOSIS — N17.9 ACUTE KIDNEY FAILURE, UNSPECIFIED: ICD-10-CM

## 2019-07-17 LAB
ANION GAP SERPL CALC-SCNC: 12 MMOL/L — SIGNIFICANT CHANGE UP (ref 5–17)
BUN SERPL-MCNC: 51 MG/DL — HIGH (ref 7–23)
CALCIUM SERPL-MCNC: 8.9 MG/DL — SIGNIFICANT CHANGE UP (ref 8.4–10.5)
CHLORIDE SERPL-SCNC: 97 MMOL/L — SIGNIFICANT CHANGE UP (ref 96–108)
CO2 SERPL-SCNC: 25 MMOL/L — SIGNIFICANT CHANGE UP (ref 22–31)
CREAT SERPL-MCNC: 1.73 MG/DL — HIGH (ref 0.5–1.3)
GLUCOSE SERPL-MCNC: 146 MG/DL — HIGH (ref 70–99)
HCT VFR BLD CALC: 30.9 % — LOW (ref 39–50)
HGB BLD-MCNC: 9.6 G/DL — LOW (ref 13–17)
MAGNESIUM SERPL-MCNC: 2.1 MG/DL — SIGNIFICANT CHANGE UP (ref 1.6–2.6)
MCHC RBC-ENTMCNC: 22.3 PG — LOW (ref 27–34)
MCHC RBC-ENTMCNC: 31.1 GM/DL — LOW (ref 32–36)
MCV RBC AUTO: 71.7 FL — LOW (ref 80–100)
NRBC # BLD: 0 /100 WBCS — SIGNIFICANT CHANGE UP (ref 0–0)
PHOSPHATE SERPL-MCNC: 3.1 MG/DL — SIGNIFICANT CHANGE UP (ref 2.5–4.5)
PLATELET # BLD AUTO: 366 K/UL — SIGNIFICANT CHANGE UP (ref 150–400)
POTASSIUM SERPL-MCNC: 3.6 MMOL/L — SIGNIFICANT CHANGE UP (ref 3.5–5.3)
POTASSIUM SERPL-SCNC: 3.6 MMOL/L — SIGNIFICANT CHANGE UP (ref 3.5–5.3)
RBC # BLD: 4.31 M/UL — SIGNIFICANT CHANGE UP (ref 4.2–5.8)
RBC # FLD: 20.9 % — HIGH (ref 10.3–14.5)
SODIUM SERPL-SCNC: 134 MMOL/L — LOW (ref 135–145)
WBC # BLD: 16.07 K/UL — HIGH (ref 3.8–10.5)
WBC # FLD AUTO: 16.07 K/UL — HIGH (ref 3.8–10.5)

## 2019-07-17 PROCEDURE — 99233 SBSQ HOSP IP/OBS HIGH 50: CPT

## 2019-07-17 RX ORDER — POLYETHYLENE GLYCOL 3350 17 G/17G
17 POWDER, FOR SOLUTION ORAL DAILY
Refills: 0 | Status: DISCONTINUED | OUTPATIENT
Start: 2019-07-17 | End: 2019-07-18

## 2019-07-17 RX ORDER — SENNA PLUS 8.6 MG/1
2 TABLET ORAL AT BEDTIME
Refills: 0 | Status: DISCONTINUED | OUTPATIENT
Start: 2019-07-17 | End: 2019-07-18

## 2019-07-17 RX ADMIN — Medication 40 MILLIGRAM(S): at 05:25

## 2019-07-17 RX ADMIN — Medication 40 MILLIGRAM(S): at 17:10

## 2019-07-17 RX ADMIN — OXYCODONE AND ACETAMINOPHEN 1 TABLET(S): 5; 325 TABLET ORAL at 18:00

## 2019-07-17 RX ADMIN — OXYCODONE AND ACETAMINOPHEN 1 TABLET(S): 5; 325 TABLET ORAL at 17:14

## 2019-07-17 RX ADMIN — ATORVASTATIN CALCIUM 40 MILLIGRAM(S): 80 TABLET, FILM COATED ORAL at 22:21

## 2019-07-17 RX ADMIN — Medication 2000 UNIT(S): at 13:37

## 2019-07-17 RX ADMIN — Medication 1 PATCH: at 13:38

## 2019-07-17 RX ADMIN — PANTOPRAZOLE SODIUM 40 MILLIGRAM(S): 20 TABLET, DELAYED RELEASE ORAL at 06:33

## 2019-07-17 RX ADMIN — PREGABALIN 1000 MICROGRAM(S): 225 CAPSULE ORAL at 13:37

## 2019-07-17 RX ADMIN — Medication 20 MILLIGRAM(S): at 05:26

## 2019-07-17 RX ADMIN — Medication 1 PATCH: at 13:00

## 2019-07-17 RX ADMIN — HEPARIN SODIUM 5000 UNIT(S): 5000 INJECTION INTRAVENOUS; SUBCUTANEOUS at 22:21

## 2019-07-17 RX ADMIN — Medication 1 PATCH: at 07:01

## 2019-07-17 RX ADMIN — HEPARIN SODIUM 5000 UNIT(S): 5000 INJECTION INTRAVENOUS; SUBCUTANEOUS at 13:38

## 2019-07-17 RX ADMIN — Medication 3 MILLILITER(S): at 21:06

## 2019-07-17 RX ADMIN — Medication 1 PATCH: at 19:00

## 2019-07-17 RX ADMIN — Medication 3 MILLILITER(S): at 16:11

## 2019-07-17 RX ADMIN — Medication 81 MILLIGRAM(S): at 13:37

## 2019-07-17 RX ADMIN — PIPERACILLIN AND TAZOBACTAM 25 GRAM(S): 4; .5 INJECTION, POWDER, LYOPHILIZED, FOR SOLUTION INTRAVENOUS at 05:26

## 2019-07-17 RX ADMIN — GABAPENTIN 300 MILLIGRAM(S): 400 CAPSULE ORAL at 22:21

## 2019-07-17 RX ADMIN — Medication 3 MILLILITER(S): at 09:06

## 2019-07-17 RX ADMIN — HEPARIN SODIUM 5000 UNIT(S): 5000 INJECTION INTRAVENOUS; SUBCUTANEOUS at 05:26

## 2019-07-17 RX ADMIN — Medication 650 MILLIGRAM(S): at 22:16

## 2019-07-17 RX ADMIN — TAMSULOSIN HYDROCHLORIDE 0.4 MILLIGRAM(S): 0.4 CAPSULE ORAL at 22:21

## 2019-07-17 RX ADMIN — SENNA PLUS 2 TABLET(S): 8.6 TABLET ORAL at 22:21

## 2019-07-17 RX ADMIN — Medication 650 MILLIGRAM(S): at 23:00

## 2019-07-17 NOTE — PROGRESS NOTE ADULT - SUBJECTIVE AND OBJECTIVE BOX
Follow-up Pulm Progress Note    James Womack MD  [phone]    No new respiratory events overnight.  Denies SOB/CP. No distress.  Dyspnea resolved.    Medications:  Vital Signs Last 24 Hrs  T(C): 36.6 (17 Jul 2019 10:47), Max: 36.9 (17 Jul 2019 05:00)  T(F): 97.8 (17 Jul 2019 10:47), Max: 98.5 (17 Jul 2019 05:00)  HR: 95 (17 Jul 2019 10:47) (77 - 95)  BP: 128/84 (17 Jul 2019 10:47) (116/53 - 128/84)  BP(mean): --  RR: 16 (17 Jul 2019 10:47) (16 - 17)  SpO2: 97% (17 Jul 2019 10:47) (92% - 97%)          07-16 @ 07:01  -  07-17 @ 07:00  --------------------------------------------------------  IN: 645 mL / OUT: 200 mL / NET: 445 mL        LABS:                        9.6    16.07 )-----------( 366      ( 17 Jul 2019 07:12 )             30.9     07-17    134<L>  |  97  |  51<H>  ----------------------------<  146<H>  3.6   |  25  |  1.73<H>    Ca    8.9      17 Jul 2019 07:12  Phos  3.1     07-17  Mg     2.1     07-17                CULTURES:        Physical Examination:  PULM: Clear to auscultation bilaterally, no significant sputum production  CVS: Regular rate and rhythm, no murmurs, rubs, or gallops  ABD: Soft, non-tender  EXT:  No clubbing, cyanosis, or edema    RADIOLOGY REVIEWED  CXR:    CT chest:    TTE:

## 2019-07-17 NOTE — PROGRESS NOTE ADULT - PROBLEM SELECTOR PLAN 8
unable to rule out pe due to risa, v/q not reliable due to underlying fibrosis/copd,  f/u bilateral lower ext dopplers

## 2019-07-17 NOTE — PROGRESS NOTE ADULT - PROBLEM SELECTOR PLAN 3
risa on ckd, creatinine downtrending, cr 1.73 today, continue lasix 20, encourage po fluids, avoid nephrotoxic meds, continue to monitor renal indices

## 2019-07-17 NOTE — PROGRESS NOTE ADULT - SUBJECTIVE AND OBJECTIVE BOX
Patient is a 86y old  Male who presents with a chief complaint of fall, pain, unable to ambulate (17 Jul 2019 09:54)  No acute events      Patient seen and examined at bedside.    ALLERGIES:  No Known Allergies        Vital Signs Last 24 Hrs  T(F): 98.5 (17 Jul 2019 05:00), Max: 98.5 (17 Jul 2019 05:00)  HR: 94 (17 Jul 2019 05:00) (77 - 94)  BP: 116/53 (17 Jul 2019 05:00) (116/53 - 121/53)  RR: 16 (17 Jul 2019 05:00) (16 - 17)  SpO2: 92% (17 Jul 2019 05:00) (92% - 96%)  I&O's Summary    16 Jul 2019 07:01  -  17 Jul 2019 07:00  --------------------------------------------------------  IN: 645 mL / OUT: 200 mL / NET: 445 mL    17 Jul 2019 07:01  -  17 Jul 2019 10:19  --------------------------------------------------------  IN: 0 mL / OUT: 200 mL / NET: -200 mL      MEDICATIONS:  acetaminophen   Tablet .. 650 milliGRAM(s) Oral every 6 hours PRN  ALBUTerol/ipratropium for Nebulization 3 milliLiter(s) Nebulizer every 6 hours  aspirin  chewable 81 milliGRAM(s) Oral daily  atorvastatin 40 milliGRAM(s) Oral at bedtime  cholecalciferol 2000 Unit(s) Oral daily  cyanocobalamin 1000 MICROGram(s) Oral daily  furosemide    Tablet 20 milliGRAM(s) Oral daily  gabapentin 300 milliGRAM(s) Oral at bedtime  heparin  Injectable 5000 Unit(s) SubCutaneous every 8 hours  methylPREDNISolone sodium succinate Injectable 40 milliGRAM(s) IV Push every 8 hours  nicotine - 21 mG/24Hr(s) Patch 1 patch Transdermal daily  oxyCODONE    5 mG/acetaminophen 325 mG 1 Tablet(s) Oral every 6 hours PRN  pantoprazole    Tablet 40 milliGRAM(s) Oral before breakfast  piperacillin/tazobactam IVPB.. 3.375 Gram(s) IV Intermittent every 8 hours  polyethylene glycol 3350 17 Gram(s) Oral daily PRN  senna 2 Tablet(s) Oral at bedtime  tamsulosin 0.4 milliGRAM(s) Oral at bedtime      PHYSICAL EXAM:  General: NAD, A/O x 3  ENT: MMM  Neck: Supple, No JVD  Lungs: Mostly clear, good air entry  Cardio: S1S2 regular  Abdomen: Soft, Nontender, Nondistended; Bowel sounds present  Extremities: No cyanosis, No edema    LABS:                        9.6    16.07 )-----------( 366      ( 17 Jul 2019 07:12 )             30.9     07-17    134  |  97  |  51  ----------------------------<  146  3.6   |  25  |  1.73    Ca    8.9      17 Jul 2019 07:12  Phos  3.1     07-17  Mg     2.1     07-17    TPro  7.7  /  Alb  3.1  /  TBili  2.2  /  DBili  x   /  AST  80  /  ALT  75  /  AlkPhos  310  07-15    eGFR if Non African American: 35 mL/min/1.73M2 (07-17-19 @ 07:12)  eGFR if African American: 41 mL/min/1.73M2 (07-17-19 @ 07:12)                                    RADIOLOGY & ADDITIONAL TESTS:    < from: TTE Echo Complete w/Doppler (07.16.19 @ 09:03) >  Summary:   1. Left ventricular ejection fraction, by visual estimation, is 65%.   2. Normal global left ventricular systolic function.   3. Spectral Doppler shows impaired relaxation pattern of left   ventricular myocardial filling (Grade I diastolic dysfunction).   4. Mild thickening and calcification of the anterior and posterior   mitral valve leaflets.   5. Mild aortic regurgitation.   6. Estimated pulmonary artery systolic pressure is 39.4 mmHg assuming a   right atrial pressure of 10 mmHg, which is consistent with borderline   pulmonary hypertension.   7. LA volume Index is 15.5 ml/m² ml/m2.   8. Peak transaortic gradient equals 14.0 mmHg, mean transaortic gradient   equals 7.1 mmHg, the calculated aortic valve area equals 1.97 cm² by the   continuity equation consistent with mild aortic stenosis.    [id number] [place] , Electronically signed on 7/16/2019 at   1:11:03 PM      < from: CT Pelvis Bony Only No Cont (07.12.19 @ 11:45) >    Impression:    Comminuted fracture of the left ischial tuberosity with intramuscular   hematoma/edema of the obturator internist and externus.    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    < end of copied text >  < from: CT Chest No Cont (07.15.19 @ 15:52) >  IMPRESSION:     New left upper lobe groundglass infiltratessuggestive of pneumonia.    Right lower lobe mucus impacted bronchi are new.    Otherwise stable bilateral upper and lower pulmonary fibrotic changes and   honeycombing.                  AIDEN GREGG M.D., ATTENDING RADIOLOGIST  This document has been electronically signed. Jul 15 2019  4:22PM    < end of copied text >        Care Discussed with Consultants/Other Providers:

## 2019-07-17 NOTE — PROGRESS NOTE ADULT - PROBLEM SELECTOR PLAN 2
no surgery indicated, pain management, vitamin d, pagets dz as per imaging, will require tea once risa resolves, outpatient dexa scan and biphosphonate therapy, wbat pt presents s/p fall found with acute pelvic fx, pt consultation, will require rehab

## 2019-07-17 NOTE — PROGRESS NOTE ADULT - PROBLEM SELECTOR PLAN 4
imaging revealed new groundglass opacity left upper lobe suggestive of pneumonia, continue iv zosyn for now, consider pct imaging revealed new groundglass opacity left upper lobe suggestive of pneumonia, likely community acquired, plan to stop zosyn, start po levaquin, consider pct

## 2019-07-17 NOTE — PROGRESS NOTE ADULT - ASSESSMENT
86 year old admitted with difficulty ambulating... pelvic fracture.  Elevated d dimer - history of DVT/PE, not currently on a/c  Baseline pulmonary fibrosis and COPD, ? pneumonia  Echo demonstrates normal LV function, no significant valvular or pericardial disesase    Plan  - followup LE Dopplers  - CTA on hold due to renal insuffiencey  - continue current care as per ICU and Medicine

## 2019-07-17 NOTE — PROGRESS NOTE ADULT - SUBJECTIVE AND OBJECTIVE BOX
SUBJ:  Patient is a 86y old  Male who presents with a chief complaint of fall, pain, unable to ambulate (16 Jul 2019 20:05)  patient seen and examined  case discussed with Dr. Pryor   patient sitting in chair, appears comfortable     PAST MEDICAL & SURGICAL HISTORY:  PE (pulmonary thromboembolism)  History of COPD  [place] (hard of hearing)  Deep vein thrombosis (DVT)  Dyslipidemia  CAD (coronary artery disease)  Peripheral vascular disease  No significant past surgical history      MEDICATIONS  (STANDING):  ALBUTerol/ipratropium for Nebulization 3 milliLiter(s) Nebulizer every 6 hours  aspirin  chewable 81 milliGRAM(s) Oral daily  atorvastatin 40 milliGRAM(s) Oral at bedtime  cholecalciferol 2000 Unit(s) Oral daily  cyanocobalamin 1000 MICROGram(s) Oral daily  furosemide    Tablet 20 milliGRAM(s) Oral daily  gabapentin 300 milliGRAM(s) Oral at bedtime  heparin  Injectable 5000 Unit(s) SubCutaneous every 8 hours  methylPREDNISolone sodium succinate Injectable 40 milliGRAM(s) IV Push every 8 hours  nicotine - 21 mG/24Hr(s) Patch 1 patch Transdermal daily  pantoprazole    Tablet 40 milliGRAM(s) Oral before breakfast  piperacillin/tazobactam IVPB.. 3.375 Gram(s) IV Intermittent every 8 hours  tamsulosin 0.4 milliGRAM(s) Oral at bedtime    MEDICATIONS  (PRN):  acetaminophen   Tablet .. 650 milliGRAM(s) Oral every 6 hours PRN Mild Pain (1 - 3)  oxyCODONE    5 mG/acetaminophen 325 mG 1 Tablet(s) Oral every 6 hours PRN Moderate Pain (4 - 6)          Vital Signs Last 24 Hrs  T(C): 36.9 (17 Jul 2019 05:00), Max: 36.9 (17 Jul 2019 05:00)  T(F): 98.5 (17 Jul 2019 05:00), Max: 98.5 (17 Jul 2019 05:00)  HR: 94 (17 Jul 2019 05:00) (77 - 94)  BP: 116/53 (17 Jul 2019 05:00) (116/53 - 121/53)  BP(mean): --  RR: 16 (17 Jul 2019 05:00) (16 - 17)  SpO2: 92% (17 Jul 2019 05:00) (92% - 96%)    REVIEW OF SYSTEMS:  CONSTITUTIONAL:  fatigue  RESPIRATORY: No cough, wheezing, chills or hemoptysis;   CARDIOVASCULAR: No chest pain or chest pressure.      No palpitations, dizziness, light headedness, syncope or near syncope.  No edema, no orthopnea.   NEUROLOGICAL: No headaches, memory loss, loss of strength, numbness, or tremors      PHYSICAL EXAM  Constitutional:  WDWN. No acute distress  HEENT: normocephalic, atraumatic.  PERRLA. EOMI. tracheostomy   Neck : No JVD. no carotid bruits  Lungs:  decreased breath sounds bilaterally   Heart:  S1 and S2. No S3, S4. II/VI systolic murmur.  Abdomen:  soft, non tender.  Extremities: No clubbing, cyanoisis or edema  Nuerologic:  A+O x 3. No focal deficits  Skin:  no rashes        LABS:                        9.6    16.07 )-----------( 366      ( 17 Jul 2019 07:12 )             30.9     07-17    134<L>  |  97  |  51<H>  ----------------------------<  146<H>  3.6   |  25  |  1.73<H>    Ca    8.9      17 Jul 2019 07:12  Phos  3.1     07-17  Mg     2.1     07-17              acetaminophen   Tablet .. 650 milliGRAM(s) Oral every 6 hours PRN  ALBUTerol/ipratropium for Nebulization 3 milliLiter(s) Nebulizer every 6 hours  aspirin  chewable 81 milliGRAM(s) Oral daily  atorvastatin 40 milliGRAM(s) Oral at bedtime  cholecalciferol 2000 Unit(s) Oral daily  cyanocobalamin 1000 MICROGram(s) Oral daily  furosemide    Tablet 20 milliGRAM(s) Oral daily  gabapentin 300 milliGRAM(s) Oral at bedtime  heparin  Injectable 5000 Unit(s) SubCutaneous every 8 hours  methylPREDNISolone sodium succinate Injectable 40 milliGRAM(s) IV Push every 8 hours  nicotine - 21 mG/24Hr(s) Patch 1 patch Transdermal daily  oxyCODONE    5 mG/acetaminophen 325 mG 1 Tablet(s) Oral every 6 hours PRN  pantoprazole    Tablet 40 milliGRAM(s) Oral before breakfast  piperacillin/tazobactam IVPB.. 3.375 Gram(s) IV Intermittent every 8 hours  tamsulosin 0.4 milliGRAM(s) Oral at bedtime    I&O's Summary    16 Jul 2019 07:01  -  17 Jul 2019 07:00  --------------------------------------------------------  IN: 645 mL / OUT: 200 mL / NET: 445 mL    17 Jul 2019 07:01  -  17 Jul 2019 09:56  --------------------------------------------------------  IN: 0 mL / OUT: 200 mL / NET: -200 mL    < from: 12 Lead ECG (07.12.19 @ 11:48) >  Diagnosis Line Normal sinus rhythm  Normal ECG  When compared with ECG of 09-JUN-2019 07:48,  No significant change was found    < end of copied text >          < from: TTE Echo Complete w/Doppler (07.16.19 @ 09:03) >   1. Left ventricular ejection fraction, by visual estimation, is 65%.   2. Normal global left ventricular systolic function.   3. Spectral Doppler shows impaired relaxation pattern of left   ventricular myocardial filling (Grade I diastolic dysfunction).   4. Mild thickening and calcification of the anterior and posterior   mitral valve leaflets.   5. Mild aortic regurgitation.   6. Estimated pulmonary artery systolic pressure is 39.4 mmHg assuming a   right atrial pressure of 10 mmHg, which is consistent with borderline   pulmonary hypertension.   7. LA volume Index is 15.5 ml/m² ml/m2.   8. Peak transaortic gradient equals 14.0 mmHg, mean transaortic gradient   equals 7.1 mmHg, the calculated aortic valve area equals 1.97 cm² by the   continuity equation consistent with mild aortic stenosis.    < end of copied text >

## 2019-07-17 NOTE — PROGRESS NOTE ADULT - PROBLEM SELECTOR PLAN 1
pt presents s/p fall found with acute pelvic fx, pt consultation, will require rehab no surgery indicated, pain management, vitamin d, pagets dz as per imaging, will require tea once risa resolves, outpatient dexa scan and biphosphonate therapy, wbat

## 2019-07-17 NOTE — PROGRESS NOTE ADULT - PROBLEM SELECTOR PLAN 5
imaging revealed Comminuted fracture of the left ischial tuberosity with intramuscular   hematoma/edema of the obturator internist and externus, pt s/p prbcs in ED, cbc appears stable at this time, no signs of active bleeding, pt with underlying anemia of chronic dz.

## 2019-07-17 NOTE — PROGRESS NOTE ADULT - ASSESSMENT
86 male pmh tobacco user, copd, dvt/pe currently off ac, anemia of chronic dz, bph, hld presents s/p fall found with acute pelvic fracture, risa and pneumonia

## 2019-07-18 ENCOUNTER — TRANSCRIPTION ENCOUNTER (OUTPATIENT)
Age: 84
End: 2019-07-18

## 2019-07-18 VITALS
TEMPERATURE: 98 F | DIASTOLIC BLOOD PRESSURE: 85 MMHG | SYSTOLIC BLOOD PRESSURE: 135 MMHG | HEART RATE: 52 BPM | RESPIRATION RATE: 16 BRPM | OXYGEN SATURATION: 95 %

## 2019-07-18 LAB
ANION GAP SERPL CALC-SCNC: 11 MMOL/L — SIGNIFICANT CHANGE UP (ref 5–17)
BUN SERPL-MCNC: 48 MG/DL — HIGH (ref 7–23)
CALCIUM SERPL-MCNC: 8.5 MG/DL — SIGNIFICANT CHANGE UP (ref 8.4–10.5)
CHLORIDE SERPL-SCNC: 98 MMOL/L — SIGNIFICANT CHANGE UP (ref 96–108)
CO2 SERPL-SCNC: 26 MMOL/L — SIGNIFICANT CHANGE UP (ref 22–31)
CREAT SERPL-MCNC: 1.51 MG/DL — HIGH (ref 0.5–1.3)
GLUCOSE SERPL-MCNC: 118 MG/DL — HIGH (ref 70–99)
HCT VFR BLD CALC: 30.6 % — LOW (ref 39–50)
HGB BLD-MCNC: 9.6 G/DL — LOW (ref 13–17)
MCHC RBC-ENTMCNC: 22.6 PG — LOW (ref 27–34)
MCHC RBC-ENTMCNC: 31.4 GM/DL — LOW (ref 32–36)
MCV RBC AUTO: 72 FL — LOW (ref 80–100)
NRBC # BLD: 0 /100 WBCS — SIGNIFICANT CHANGE UP (ref 0–0)
PLATELET # BLD AUTO: 374 K/UL — SIGNIFICANT CHANGE UP (ref 150–400)
POTASSIUM SERPL-MCNC: 3.4 MMOL/L — LOW (ref 3.5–5.3)
POTASSIUM SERPL-SCNC: 3.4 MMOL/L — LOW (ref 3.5–5.3)
RBC # BLD: 4.25 M/UL — SIGNIFICANT CHANGE UP (ref 4.2–5.8)
RBC # FLD: 20.9 % — HIGH (ref 10.3–14.5)
SODIUM SERPL-SCNC: 135 MMOL/L — SIGNIFICANT CHANGE UP (ref 135–145)
WBC # BLD: 12.42 K/UL — HIGH (ref 3.8–10.5)
WBC # FLD AUTO: 12.42 K/UL — HIGH (ref 3.8–10.5)

## 2019-07-18 PROCEDURE — 96374 THER/PROPH/DIAG INJ IV PUSH: CPT

## 2019-07-18 PROCEDURE — 72100 X-RAY EXAM L-S SPINE 2/3 VWS: CPT

## 2019-07-18 PROCEDURE — 84100 ASSAY OF PHOSPHORUS: CPT

## 2019-07-18 PROCEDURE — 94640 AIRWAY INHALATION TREATMENT: CPT

## 2019-07-18 PROCEDURE — 36415 COLL VENOUS BLD VENIPUNCTURE: CPT

## 2019-07-18 PROCEDURE — 71250 CT THORAX DX C-: CPT

## 2019-07-18 PROCEDURE — 85730 THROMBOPLASTIN TIME PARTIAL: CPT

## 2019-07-18 PROCEDURE — 93005 ELECTROCARDIOGRAM TRACING: CPT

## 2019-07-18 PROCEDURE — 71045 X-RAY EXAM CHEST 1 VIEW: CPT

## 2019-07-18 PROCEDURE — 99239 HOSP IP/OBS DSCHRG MGMT >30: CPT

## 2019-07-18 PROCEDURE — 99232 SBSQ HOSP IP/OBS MODERATE 35: CPT

## 2019-07-18 PROCEDURE — 93970 EXTREMITY STUDY: CPT

## 2019-07-18 PROCEDURE — 99285 EMERGENCY DEPT VISIT HI MDM: CPT | Mod: 25

## 2019-07-18 PROCEDURE — 80048 BASIC METABOLIC PNL TOTAL CA: CPT

## 2019-07-18 PROCEDURE — 85027 COMPLETE CBC AUTOMATED: CPT

## 2019-07-18 PROCEDURE — 97116 GAIT TRAINING THERAPY: CPT

## 2019-07-18 PROCEDURE — 93306 TTE W/DOPPLER COMPLETE: CPT

## 2019-07-18 PROCEDURE — 80053 COMPREHEN METABOLIC PANEL: CPT

## 2019-07-18 PROCEDURE — 73502 X-RAY EXAM HIP UNI 2-3 VIEWS: CPT

## 2019-07-18 PROCEDURE — 85610 PROTHROMBIN TIME: CPT

## 2019-07-18 PROCEDURE — 85379 FIBRIN DEGRADATION QUANT: CPT

## 2019-07-18 PROCEDURE — 83735 ASSAY OF MAGNESIUM: CPT

## 2019-07-18 PROCEDURE — 72192 CT PELVIS W/O DYE: CPT

## 2019-07-18 RX ORDER — CHOLECALCIFEROL (VITAMIN D3) 125 MCG
2000 CAPSULE ORAL
Qty: 0 | Refills: 0 | DISCHARGE
Start: 2019-07-18

## 2019-07-18 RX ORDER — POLYETHYLENE GLYCOL 3350 17 G/17G
17 POWDER, FOR SOLUTION ORAL
Qty: 0 | Refills: 0 | DISCHARGE
Start: 2019-07-18

## 2019-07-18 RX ORDER — SENNA PLUS 8.6 MG/1
2 TABLET ORAL
Qty: 0 | Refills: 0 | DISCHARGE
Start: 2019-07-18

## 2019-07-18 RX ORDER — POTASSIUM CHLORIDE 20 MEQ
40 PACKET (EA) ORAL ONCE
Refills: 0 | Status: COMPLETED | OUTPATIENT
Start: 2019-07-18 | End: 2019-07-18

## 2019-07-18 RX ORDER — FUROSEMIDE 40 MG
1 TABLET ORAL
Qty: 0 | Refills: 0 | DISCHARGE
Start: 2019-07-18

## 2019-07-18 RX ADMIN — Medication 20 MILLIGRAM(S): at 05:37

## 2019-07-18 RX ADMIN — Medication 1 PATCH: at 07:10

## 2019-07-18 RX ADMIN — Medication 1 PATCH: at 12:10

## 2019-07-18 RX ADMIN — PANTOPRAZOLE SODIUM 40 MILLIGRAM(S): 20 TABLET, DELAYED RELEASE ORAL at 06:12

## 2019-07-18 RX ADMIN — OXYCODONE AND ACETAMINOPHEN 1 TABLET(S): 5; 325 TABLET ORAL at 05:37

## 2019-07-18 RX ADMIN — HEPARIN SODIUM 5000 UNIT(S): 5000 INJECTION INTRAVENOUS; SUBCUTANEOUS at 13:26

## 2019-07-18 RX ADMIN — OXYCODONE AND ACETAMINOPHEN 1 TABLET(S): 5; 325 TABLET ORAL at 06:00

## 2019-07-18 RX ADMIN — HEPARIN SODIUM 5000 UNIT(S): 5000 INJECTION INTRAVENOUS; SUBCUTANEOUS at 05:36

## 2019-07-18 RX ADMIN — Medication 40 MILLIGRAM(S): at 05:37

## 2019-07-18 RX ADMIN — PREGABALIN 1000 MICROGRAM(S): 225 CAPSULE ORAL at 12:07

## 2019-07-18 RX ADMIN — Medication 81 MILLIGRAM(S): at 12:08

## 2019-07-18 RX ADMIN — Medication 1 PATCH: at 12:09

## 2019-07-18 RX ADMIN — Medication 3 MILLILITER(S): at 08:28

## 2019-07-18 RX ADMIN — Medication 3 MILLILITER(S): at 04:41

## 2019-07-18 RX ADMIN — Medication 40 MILLIEQUIVALENT(S): at 12:06

## 2019-07-18 RX ADMIN — Medication 2000 UNIT(S): at 12:08

## 2019-07-18 NOTE — PROGRESS NOTE ADULT - PROVIDER SPECIALTY LIST ADULT
Cardiology
Critical Care
Hospitalist
Pulmonology
Pulmonology
Cardiology
Hospitalist

## 2019-07-18 NOTE — PROGRESS NOTE ADULT - PROBLEM SELECTOR PLAN 1
no surgery indicated, pain management, vitamin d, pagets dz as per imaging, tea dc planning for today, outpatient dexa scan and biphosphonate therapy, wbat

## 2019-07-18 NOTE — DISCHARGE NOTE PROVIDER - NSDCCPCAREPLAN_GEN_ALL_CORE_FT
PRINCIPAL DISCHARGE DIAGNOSIS  Diagnosis: Pelvis fracture  Assessment and Plan of Treatment:       SECONDARY DISCHARGE DIAGNOSES  Diagnosis: RONNIE (acute kidney injury)  Assessment and Plan of Treatment:

## 2019-07-18 NOTE — PROGRESS NOTE ADULT - ATTENDING COMMENTS
stable hemodynamics  cardiac status no new cardiac recommendations.
I have personally seen and examined patient on the above date.  I discussed the case with Natan Aimee and I agree with findings and plan as detailed per note above, which I have amended where appropriate.
I have personally seen and examined patient on the above date.  I discussed the case with Chinmay Yu NP and I agree with findings and plan as detailed per note above, which I have amended where appropriate.    Awaiting ДМИТРИЙ placement  RONNIE stabilized    Cont levaquin for CAP  oral steroids for COPD  Leukocytosis likely secondary to steroids

## 2019-07-18 NOTE — DISCHARGE NOTE NURSING/CASE MANAGEMENT/SOCIAL WORK - NSDCPEWEB_GEN_ALL_CORE
[place] website --- [email or web address] for [email or web address] website --- [email or web address]

## 2019-07-18 NOTE — PROGRESS NOTE ADULT - REASON FOR ADMISSION
fall, pain, unable to ambulate

## 2019-07-18 NOTE — DISCHARGE NOTE NURSING/CASE MANAGEMENT/SOCIAL WORK - NSDCPEEMAIL_GEN_ALL_CORE
[facility] email [email or web address]

## 2019-07-18 NOTE — DISCHARGE NOTE PROVIDER - HOSPITAL COURSE
86 male tobacco user, copd, dvt/pe off ac, anemia of chronic dz, bph, hld, presents s/p fall admitted for acute pelvic fx, also with new left upper lobe opacity suggestive of pneumonia and risa, completed course of abx, pain mangement, risa improved, pt evaluation, plan for tea. 86 male tobacco user, copd, dvt/pe off ac, anemia of chronic dz, bph, hld, presents s/p fall admitted for acute pelvic fx, also with new left upper lobe opacity suggestive of pneumonia and risa, completed course of abx, pain mangement, risa improved, pt evaluation, plan for tea - spoke with Dr. De La Cruz and Dr. Day regarding patient's hospitalization course.

## 2019-07-18 NOTE — DISCHARGE NOTE PROVIDER - CARE PROVIDER_API CALL
Natanael Day)  Medicine  [address], [place] [address]  Phone: [phone]  Fax: [phone]  Follow Up Time:

## 2019-07-18 NOTE — PROGRESS NOTE ADULT - SUBJECTIVE AND OBJECTIVE BOX
Patient is a 86y old  Male who presents with a chief complaint of fall, pain, unable to ambulate (17 Jul 2019 12:15)  No acute events      Patient seen and examined at bedside.    ALLERGIES:  No Known Allergies        Vital Signs Last 24 Hrs  T(F): 97.7 (18 Jul 2019 04:40), Max: 98.1 (17 Jul 2019 15:16)  HR: 84 (18 Jul 2019 04:40) (68 - 120)  BP: 107/67 (18 Jul 2019 04:40) (107/67 - 128/84)  RR: 16 (18 Jul 2019 04:40) (16 - 18)  SpO2: 98% (18 Jul 2019 08:29) (93% - 99%)  I&O's Summary    17 Jul 2019 07:01  -  18 Jul 2019 07:00  --------------------------------------------------------  IN: 540 mL / OUT: 400 mL / NET: 140 mL      MEDICATIONS:  acetaminophen   Tablet .. 650 milliGRAM(s) Oral every 6 hours PRN  ALBUTerol/ipratropium for Nebulization 3 milliLiter(s) Nebulizer every 6 hours  aspirin  chewable 81 milliGRAM(s) Oral daily  atorvastatin 40 milliGRAM(s) Oral at bedtime  cholecalciferol 2000 Unit(s) Oral daily  cyanocobalamin 1000 MICROGram(s) Oral daily  furosemide    Tablet 20 milliGRAM(s) Oral daily  gabapentin 300 milliGRAM(s) Oral at bedtime  heparin  Injectable 5000 Unit(s) SubCutaneous every 8 hours  levoFLOXacin  Tablet 250 milliGRAM(s) Oral every 24 hours  nicotine - 21 mG/24Hr(s) Patch 1 patch Transdermal daily  oxyCODONE    5 mG/acetaminophen 325 mG 1 Tablet(s) Oral every 6 hours PRN  pantoprazole    Tablet 40 milliGRAM(s) Oral before breakfast  polyethylene glycol 3350 17 Gram(s) Oral daily PRN  potassium chloride    Tablet ER 40 milliEquivalent(s) Oral once  predniSONE   Tablet 40 milliGRAM(s) Oral daily  senna 2 Tablet(s) Oral at bedtime  tamsulosin 0.4 milliGRAM(s) Oral at bedtime      PHYSICAL EXAM:  General: NAD, A/O x 3  ENT: MMM  Neck: Supple, No JVD  Lungs: Clear to auscultation bilaterally, good air entry  Cardio: S1S2 regular  Abdomen: Soft, Nontender, Nondistended; Bowel sounds present  Extremities: No cyanosis, No edema    LABS:                        9.6    12.42 )-----------( 374      ( 18 Jul 2019 06:37 )             30.6     07-18    135  |  98  |  48  ----------------------------<  118  3.4   |  26  |  1.51    Ca    8.5      18 Jul 2019 06:37  Phos  3.1     07-17  Mg     2.1     07-17      eGFR if Non African American: 41 mL/min/1.73M2 (07-18-19 @ 06:37)  eGFR if : 48 mL/min/1.73M2 (07-18-19 @ 06:37)                                    RADIOLOGY & ADDITIONAL TESTS:    Care Discussed with Consultants/Other Providers:

## 2019-07-18 NOTE — PROGRESS NOTE ADULT - SUBJECTIVE AND OBJECTIVE BOX
Follow up for  SUBJ:    no chest pain "sore throat"    PMH  PE (pulmonary thromboembolism)  History of COPD  [place] (hard of hearing)  Deep vein thrombosis (DVT)  HTN (hypertension)  Dyslipidemia  CAD (coronary artery disease)  Peripheral vascular disease  Parkinson disease      MEDICATIONS  (STANDING):  ALBUTerol/ipratropium for Nebulization 3 milliLiter(s) Nebulizer every 6 hours  aspirin  chewable 81 milliGRAM(s) Oral daily  atorvastatin 40 milliGRAM(s) Oral at bedtime  cholecalciferol 2000 Unit(s) Oral daily  cyanocobalamin 1000 MICROGram(s) Oral daily  furosemide    Tablet 20 milliGRAM(s) Oral daily  gabapentin 300 milliGRAM(s) Oral at bedtime  heparin  Injectable 5000 Unit(s) SubCutaneous every 8 hours  levoFLOXacin  Tablet 250 milliGRAM(s) Oral every 24 hours  nicotine - 21 mG/24Hr(s) Patch 1 patch Transdermal daily  pantoprazole    Tablet 40 milliGRAM(s) Oral before breakfast  predniSONE   Tablet 40 milliGRAM(s) Oral daily  senna 2 Tablet(s) Oral at bedtime  tamsulosin 0.4 milliGRAM(s) Oral at bedtime    MEDICATIONS  (PRN):  acetaminophen   Tablet .. 650 milliGRAM(s) Oral every 6 hours PRN Mild Pain (1 - 3)  oxyCODONE    5 mG/acetaminophen 325 mG 1 Tablet(s) Oral every 6 hours PRN Moderate Pain (4 - 6)  polyethylene glycol 3350 17 Gram(s) Oral daily PRN Constipation        PHYSICAL EXAM:  Vital Signs Last 24 Hrs  T(C): 36.7 (18 Jul 2019 10:07), Max: 36.7 (17 Jul 2019 15:16)  T(F): 98 (18 Jul 2019 10:07), Max: 98.1 (17 Jul 2019 15:16)  HR: 52 (18 Jul 2019 10:07) (52 - 120)  BP: 135/85 (18 Jul 2019 10:07) (107/67 - 135/85)  BP(mean): 76 (17 Jul 2019 21:00) (76 - 76)  RR: 16 (18 Jul 2019 10:07) (16 - 18)  SpO2: 95% (18 Jul 2019 10:07) (93% - 99%)    GENERAL: NAD, thin chronically ill appearaing  HEAD:  Atraumatic, Normocephalic  EYES: EOMI, PERRLA, conjunctiva and sclera clear  ENT: Moist mucous membranes,  NECK: Supple, No JVD, no bruits  CHEST/LUNG: Clear to percussion bilaterally; No rales, rhonchi, wheezing, or rubs  HEART: Regular rate and rhythm; No murmurs, rubs, or gallops PMI non displaced.  ABDOMEN: Soft, Nontender, Nondistended; Bowel sounds present  EXTREMITIES:  2+ Peripheral Pulses, No clubbing, cyanosis, or edema  SKIN: No rashes or lesions  NERVOUS SYSTEM:  Cranial Nerves II-XII intact      TELEMETRY:        ECG:    < from: 12 Lead ECG (06.09.19 @ 07:48) >  Ventricular Rate 62 BPM    Atrial Rate 62 BPM    P-R Interval 158 ms    QRS Duration 76 ms    Q-T Interval 424 ms    QTC Calculation(Bezet) 430 ms    P Axis 47 degrees    R Axis -11 degrees    T Axis 44 degrees    Diagnosis Line Normal sinus rhythm  Septal infarct , age undetermined  Abnormal ECG  When compared with ECG of 19-MAY-2019 02:28,  Nonspecific T wave abnormality no longer evident in Anterior leads    Confirmed by JOHN ANAND MD ([id number]) on 6/9/2019 9:04:16 AM    < end of copied text >    ECHO:    < from: TTE Echo Complete w/Doppler (07.16.19 @ 09:03) >  ummary:   1. Left ventricular ejection fraction, by visual estimation, is 65%.   2. Normal global left ventricular systolic function.   3. Spectral Doppler shows impaired relaxation pattern of left   ventricular myocardial filling (Grade I diastolic dysfunction).   4. Mild thickening and calcification of the anterior and posterior   mitral valve leaflets.   5. Mild aortic regurgitation.   6. Estimated pulmonary artery systolic pressure is 39.4 mmHg assuming a   right atrial pressure of 10 mmHg, which is consistent with borderline   pulmonary hypertension.   7. LA volume Index is 15.5 ml/m² ml/m2.   8. Peak transaortic gradient equals 14.0 mmHg, mean transaortic gradient   equals 7.1 mmHg, the calculated aortic valve area equals 1.97 cm² by the   continuity equation consistent with mild aortic stenosis.    [id number] John Anand , Electronically signed on 7/16/2019 at   1:11:03 PM       *** Final ***      JOHN ANAND   This document has been electronically signed. Jul 16 2019  9:03AM    < end of copied text >      LABS:                        9.6    12.42 )-----------( 374      ( 18 Jul 2019 06:37 )             30.6     07-18    135  |  98  |  48<H>  ----------------------------<  118<H>  3.4<L>   |  26  |  1.51<H>    Ca    8.5      18 Jul 2019 06:37  Phos  3.1     07-17  Mg     2.1     07-17        I&O's Summary    17 Jul 2019 07:01  -  18 Jul 2019 07:00  --------------------------------------------------------  IN: 540 mL / OUT: 400 mL / NET: 140 mL    18 Jul 2019 07:01  -  18 Jul 2019 14:20  --------------------------------------------------------  IN: 240 mL / OUT: 200 mL / NET: 40 mL      BNP    RADIOLOGY & ADDITIONAL STUDIES:    < from: Xray Chest 1 View-PORTABLE IMMEDIATE (07.14.19 @ 09:41) >  EXAM:  XR CHEST PORTABLE IMMED 1V      PROCEDURE DATE:  07/14/2019        INTERPRETATION:  Portable chest x-ray    Indication: Cough.    Portable chest x-ray is compared to a previous examination dated   7/13/2019.    Impression: Previously noted small pleural effusion at the left   costophrenic angle has resolved. Apparent small left basilar atelectasis,   improved.    Possible small pleural effusion at the right costophrenic angle. Small   right basilar atelectasis.    No evidence for pulmonary consolidation or pneumothorax.    Stable cardiac silhouette.        JAREN BROWN M.D., ATTENDING RADIOLOGIST  This document has been electronically signed. Jul 14 2019 10:50AM    < end of copied text >      ECHO:

## 2019-07-18 NOTE — PROGRESS NOTE ADULT - PROBLEM SELECTOR PLAN 3
risa on ckd, creatinine downtrending, cr improved again today, continue lasix 20, encourage po fluids, avoid nephrotoxic meds, continue to monitor renal indices

## 2019-11-18 NOTE — PHYSICAL THERAPY INITIAL EVALUATION ADULT - PHYSICAL ASSIST/NONPHYSICAL ASSIST: SIT/STAND, REHAB EVAL
Fatigue is a very vague symptoms  Since all the labs were negative I think she should participate in sports as tolerated  I recommend a rheumatology referral for the ongoing fatigue  Please refer  1 person assist

## 2019-11-19 ENCOUNTER — APPOINTMENT (OUTPATIENT)
Dept: FAMILY MEDICINE | Facility: HOME HEALTH | Age: 84
End: 2019-11-19
Payer: MEDICARE

## 2019-11-19 PROCEDURE — 99349 HOME/RES VST EST MOD MDM 40: CPT

## 2019-11-22 ENCOUNTER — OTHER (OUTPATIENT)
Age: 84
End: 2019-11-22

## 2019-11-22 ENCOUNTER — LABORATORY RESULT (OUTPATIENT)
Age: 84
End: 2019-11-22

## 2019-11-26 ENCOUNTER — INPATIENT (INPATIENT)
Facility: HOSPITAL | Age: 84
LOS: 5 days | Discharge: HOME | DRG: 197 | End: 2019-12-02
Attending: INTERNAL MEDICINE | Admitting: INTERNAL MEDICINE
Payer: MEDICARE

## 2019-11-26 VITALS — WEIGHT: 148.15 LBS

## 2019-11-26 DIAGNOSIS — Z98.890 OTHER SPECIFIED POSTPROCEDURAL STATES: Chronic | ICD-10-CM

## 2019-11-26 DIAGNOSIS — R41.82 ALTERED MENTAL STATUS, UNSPECIFIED: ICD-10-CM

## 2019-11-26 LAB
ALBUMIN SERPL ELPH-MCNC: 3.1 G/DL — LOW (ref 3.3–5)
ALP SERPL-CCNC: 125 U/L — HIGH (ref 40–120)
ALT FLD-CCNC: 10 U/L — SIGNIFICANT CHANGE UP (ref 10–45)
AMMONIA BLD-MCNC: <10 UMOL/L — LOW (ref 11–55)
ANION GAP SERPL CALC-SCNC: 12 MMOL/L — SIGNIFICANT CHANGE UP (ref 5–17)
APPEARANCE UR: CLEAR — SIGNIFICANT CHANGE UP
APTT BLD: 26.3 SEC — LOW (ref 27.5–36.3)
AST SERPL-CCNC: 13 U/L — SIGNIFICANT CHANGE UP (ref 10–40)
BACTERIA # UR AUTO: ABNORMAL /HPF
BASOPHILS # BLD AUTO: 0.02 K/UL — SIGNIFICANT CHANGE UP (ref 0–0.2)
BASOPHILS NFR BLD AUTO: 0.3 % — SIGNIFICANT CHANGE UP (ref 0–2)
BILIRUB SERPL-MCNC: 0.4 MG/DL — SIGNIFICANT CHANGE UP (ref 0.2–1.2)
BILIRUB UR-MCNC: NEGATIVE — SIGNIFICANT CHANGE UP
BUN SERPL-MCNC: 26 MG/DL — HIGH (ref 7–23)
CALCIUM SERPL-MCNC: 8.6 MG/DL — SIGNIFICANT CHANGE UP (ref 8.4–10.5)
CHLORIDE SERPL-SCNC: 107 MMOL/L — SIGNIFICANT CHANGE UP (ref 96–108)
CK SERPL-CCNC: 68 U/L — SIGNIFICANT CHANGE UP (ref 30–200)
CO2 BLDA-SCNC: 21 MMOL/L — LOW (ref 22–30)
CO2 SERPL-SCNC: 21 MMOL/L — LOW (ref 22–31)
COLOR SPEC: YELLOW — SIGNIFICANT CHANGE UP
CREAT SERPL-MCNC: 1.46 MG/DL — HIGH (ref 0.5–1.3)
DIFF PNL FLD: NEGATIVE — SIGNIFICANT CHANGE UP
EOSINOPHIL # BLD AUTO: 0.17 K/UL — SIGNIFICANT CHANGE UP (ref 0–0.5)
EOSINOPHIL NFR BLD AUTO: 2.6 % — SIGNIFICANT CHANGE UP (ref 0–6)
EPI CELLS # UR: SIGNIFICANT CHANGE UP
ETHANOL SERPL-MCNC: 75 MG/DL — HIGH (ref 0–3)
GLUCOSE SERPL-MCNC: 90 MG/DL — SIGNIFICANT CHANGE UP (ref 70–99)
GLUCOSE UR QL: NEGATIVE — SIGNIFICANT CHANGE UP
HCT VFR BLD CALC: 39.1 % — SIGNIFICANT CHANGE UP (ref 39–50)
HGB BLD-MCNC: 12 G/DL — LOW (ref 13–17)
HOROWITZ INDEX BLDA+IHG-RTO: SIGNIFICANT CHANGE UP
IMM GRANULOCYTES NFR BLD AUTO: 0.3 % — SIGNIFICANT CHANGE UP (ref 0–1.5)
INR BLD: 1.06 RATIO — SIGNIFICANT CHANGE UP (ref 0.88–1.16)
KETONES UR-MCNC: NEGATIVE — SIGNIFICANT CHANGE UP
LEUKOCYTE ESTERASE UR-ACNC: NEGATIVE — SIGNIFICANT CHANGE UP
LYMPHOCYTES # BLD AUTO: 1.84 K/UL — SIGNIFICANT CHANGE UP (ref 1–3.3)
LYMPHOCYTES # BLD AUTO: 28.2 % — SIGNIFICANT CHANGE UP (ref 13–44)
MCHC RBC-ENTMCNC: 25.1 PG — LOW (ref 27–34)
MCHC RBC-ENTMCNC: 30.7 GM/DL — LOW (ref 32–36)
MCV RBC AUTO: 81.8 FL — SIGNIFICANT CHANGE UP (ref 80–100)
MONOCYTES # BLD AUTO: 0.78 K/UL — SIGNIFICANT CHANGE UP (ref 0–0.9)
MONOCYTES NFR BLD AUTO: 11.9 % — SIGNIFICANT CHANGE UP (ref 2–14)
NEUTROPHILS # BLD AUTO: 3.7 K/UL — SIGNIFICANT CHANGE UP (ref 1.8–7.4)
NEUTROPHILS NFR BLD AUTO: 56.7 % — SIGNIFICANT CHANGE UP (ref 43–77)
NITRITE UR-MCNC: NEGATIVE — SIGNIFICANT CHANGE UP
NRBC # BLD: 0 /100 WBCS — SIGNIFICANT CHANGE UP (ref 0–0)
NT-PROBNP SERPL-SCNC: 318 PG/ML — HIGH (ref 0–300)
PCO2 BLDA: 35 MMHG — SIGNIFICANT CHANGE UP (ref 32–46)
PH BLDA: 7.38 — SIGNIFICANT CHANGE UP (ref 7.35–7.45)
PH UR: 5 — SIGNIFICANT CHANGE UP (ref 5–8)
PLATELET # BLD AUTO: 290 K/UL — SIGNIFICANT CHANGE UP (ref 150–400)
PO2 BLDA: 167 MMHG — HIGH (ref 74–108)
POTASSIUM SERPL-MCNC: 4.4 MMOL/L — SIGNIFICANT CHANGE UP (ref 3.5–5.3)
POTASSIUM SERPL-SCNC: 4.4 MMOL/L — SIGNIFICANT CHANGE UP (ref 3.5–5.3)
PROCALCITONIN SERPL-MCNC: 0.04 NG/ML — SIGNIFICANT CHANGE UP
PROT SERPL-MCNC: 7.5 G/DL — SIGNIFICANT CHANGE UP (ref 6–8.3)
PROT UR-MCNC: 15
PROTHROM AB SERPL-ACNC: 11.9 SEC — SIGNIFICANT CHANGE UP (ref 10–12.9)
RBC # BLD: 4.78 M/UL — SIGNIFICANT CHANGE UP (ref 4.2–5.8)
RBC # FLD: 17.6 % — HIGH (ref 10.3–14.5)
RBC CASTS # UR COMP ASSIST: SIGNIFICANT CHANGE UP /HPF (ref 0–4)
SAO2 % BLDA: 99 % — HIGH (ref 92–96)
SODIUM SERPL-SCNC: 140 MMOL/L — SIGNIFICANT CHANGE UP (ref 135–145)
SP GR SPEC: 1.01 — SIGNIFICANT CHANGE UP (ref 1.01–1.02)
TROPONIN I SERPL-MCNC: <.017 NG/ML — LOW (ref 0.02–0.06)
TROPONIN I SERPL-MCNC: <.017 NG/ML — LOW (ref 0.02–0.06)
UROBILINOGEN FLD QL: NEGATIVE — SIGNIFICANT CHANGE UP
WBC # BLD: 6.53 K/UL — SIGNIFICANT CHANGE UP (ref 3.8–10.5)
WBC # FLD AUTO: 6.53 K/UL — SIGNIFICANT CHANGE UP (ref 3.8–10.5)
WBC UR QL: SIGNIFICANT CHANGE UP /HPF (ref 0–5)

## 2019-11-26 PROCEDURE — 93010 ELECTROCARDIOGRAM REPORT: CPT

## 2019-11-26 PROCEDURE — 70496 CT ANGIOGRAPHY HEAD: CPT | Mod: 26

## 2019-11-26 PROCEDURE — 71045 X-RAY EXAM CHEST 1 VIEW: CPT | Mod: 26

## 2019-11-26 PROCEDURE — 71250 CT THORAX DX C-: CPT | Mod: 26

## 2019-11-26 PROCEDURE — 70450 CT HEAD/BRAIN W/O DYE: CPT | Mod: 26,59

## 2019-11-26 PROCEDURE — 99223 1ST HOSP IP/OBS HIGH 75: CPT

## 2019-11-26 PROCEDURE — 70498 CT ANGIOGRAPHY NECK: CPT | Mod: 26

## 2019-11-26 PROCEDURE — 99285 EMERGENCY DEPT VISIT HI MDM: CPT

## 2019-11-26 RX ORDER — ATORVASTATIN CALCIUM 80 MG/1
40 TABLET, FILM COATED ORAL AT BEDTIME
Refills: 0 | Status: DISCONTINUED | OUTPATIENT
Start: 2019-11-26 | End: 2019-12-02

## 2019-11-26 RX ORDER — PANTOPRAZOLE SODIUM 20 MG/1
40 TABLET, DELAYED RELEASE ORAL
Refills: 0 | Status: DISCONTINUED | OUTPATIENT
Start: 2019-11-26 | End: 2019-12-02

## 2019-11-26 RX ORDER — TAMSULOSIN HYDROCHLORIDE 0.4 MG/1
0.4 CAPSULE ORAL AT BEDTIME
Refills: 0 | Status: DISCONTINUED | OUTPATIENT
Start: 2019-11-26 | End: 2019-11-27

## 2019-11-26 RX ORDER — SODIUM CHLORIDE 9 MG/ML
1000 INJECTION INTRAMUSCULAR; INTRAVENOUS; SUBCUTANEOUS ONCE
Refills: 0 | Status: COMPLETED | OUTPATIENT
Start: 2019-11-26 | End: 2019-11-26

## 2019-11-26 RX ORDER — BUDESONIDE AND FORMOTEROL FUMARATE DIHYDRATE 160; 4.5 UG/1; UG/1
2 AEROSOL RESPIRATORY (INHALATION)
Refills: 0 | Status: DISCONTINUED | OUTPATIENT
Start: 2019-11-26 | End: 2019-12-02

## 2019-11-26 RX ORDER — TIOTROPIUM BROMIDE 18 UG/1
1 CAPSULE ORAL; RESPIRATORY (INHALATION) DAILY
Refills: 0 | Status: DISCONTINUED | OUTPATIENT
Start: 2019-11-26 | End: 2019-12-02

## 2019-11-26 RX ORDER — IPRATROPIUM/ALBUTEROL SULFATE 18-103MCG
3 AEROSOL WITH ADAPTER (GRAM) INHALATION ONCE
Refills: 0 | Status: COMPLETED | OUTPATIENT
Start: 2019-11-26 | End: 2019-11-26

## 2019-11-26 RX ORDER — HEPARIN SODIUM 5000 [USP'U]/ML
5000 INJECTION INTRAVENOUS; SUBCUTANEOUS EVERY 8 HOURS
Refills: 0 | Status: DISCONTINUED | OUTPATIENT
Start: 2019-11-26 | End: 2019-11-30

## 2019-11-26 RX ORDER — ASPIRIN/CALCIUM CARB/MAGNESIUM 324 MG
81 TABLET ORAL DAILY
Refills: 0 | Status: DISCONTINUED | OUTPATIENT
Start: 2019-11-26 | End: 2019-12-02

## 2019-11-26 RX ORDER — ACETAMINOPHEN 500 MG
650 TABLET ORAL EVERY 6 HOURS
Refills: 0 | Status: DISCONTINUED | OUTPATIENT
Start: 2019-11-26 | End: 2019-12-02

## 2019-11-26 RX ORDER — IPRATROPIUM/ALBUTEROL SULFATE 18-103MCG
3 AEROSOL WITH ADAPTER (GRAM) INHALATION EVERY 6 HOURS
Refills: 0 | Status: DISCONTINUED | OUTPATIENT
Start: 2019-11-26 | End: 2019-12-02

## 2019-11-26 RX ORDER — FUROSEMIDE 40 MG
20 TABLET ORAL ONCE
Refills: 0 | Status: COMPLETED | OUTPATIENT
Start: 2019-11-26 | End: 2019-11-26

## 2019-11-26 RX ORDER — FOLIC ACID 0.8 MG
1 TABLET ORAL DAILY
Refills: 0 | Status: DISCONTINUED | OUTPATIENT
Start: 2019-11-26 | End: 2019-12-02

## 2019-11-26 RX ORDER — THIAMINE MONONITRATE (VIT B1) 100 MG
100 TABLET ORAL DAILY
Refills: 0 | Status: COMPLETED | OUTPATIENT
Start: 2019-11-26 | End: 2019-11-29

## 2019-11-26 RX ADMIN — BUDESONIDE AND FORMOTEROL FUMARATE DIHYDRATE 2 PUFF(S): 160; 4.5 AEROSOL RESPIRATORY (INHALATION) at 22:30

## 2019-11-26 RX ADMIN — TAMSULOSIN HYDROCHLORIDE 0.4 MILLIGRAM(S): 0.4 CAPSULE ORAL at 23:42

## 2019-11-26 RX ADMIN — SODIUM CHLORIDE 1000 MILLILITER(S): 9 INJECTION INTRAMUSCULAR; INTRAVENOUS; SUBCUTANEOUS at 18:16

## 2019-11-26 RX ADMIN — HEPARIN SODIUM 5000 UNIT(S): 5000 INJECTION INTRAVENOUS; SUBCUTANEOUS at 23:43

## 2019-11-26 RX ADMIN — Medication 20 MILLIGRAM(S): at 20:09

## 2019-11-26 RX ADMIN — Medication 3 MILLILITER(S): at 22:30

## 2019-11-26 RX ADMIN — ATORVASTATIN CALCIUM 40 MILLIGRAM(S): 80 TABLET, FILM COATED ORAL at 23:43

## 2019-11-26 RX ADMIN — Medication 3 MILLILITER(S): at 18:14

## 2019-11-26 NOTE — STROKE CODE NOTE - NIH STROKE SCALE: 1A. LEVEL OF CONSCIOUSNESS, QM
(1) Not alert; but arousable by minor stimulation to obey, answer, or respond (0) Alert; keenly responsive

## 2019-11-26 NOTE — ED PROVIDER NOTE - PROGRESS NOTE DETAILS
5:58 call from radiology, CT head, no acute findings  .     Typo noted on report, states acute hemorrhage instead of NO acute.   Multiple attempts made to reach radiology to correct, pending CB 5:58 call from radiology, CT head, no acute findings  .     Typo noted on report in impression, states acute hemorrhage instead of NO acute ( seen in body and verbal from radiology)  . Multiple attempts to reach radiology to correct, unable to find Dr. Soto

## 2019-11-26 NOTE — H&P ADULT - HISTORY OF PRESENT ILLNESS
87M current smoker, hx of COPD, PVD, HLD, DVT/PE off Xarelto, hx of CVA pw AMS. Pt was last seen well at home at 330PM. Wife and daughter found him at home at about 5PM on the couch with his legs crossed comfortably but family was unable to arouse him. Pt would open his eyes briefly and stare at them and go back to sleep. Pt was BIBA. Initial FS was 97. On arrival to ED, pt while lethargic was able to answer some questions and follow commands. CT head neg for acute infarct. CT angio with R carotid stenosis 45-55% and 55-65% on L. Currently 87M current smoker, hx of COPD, PVD, HLD, DVT/PE off Xarelto, hx of CVA pw AMS. Pt was last seen well at home at 330PM. Wife and daughter found him at home at about 5PM on the couch with his legs crossed comfortably but family was unable to arouse him. Pt would open his eyes briefly and stare at them and go back to sleep. Pt was BIBA. Initial FS was 97. On arrival to ED, pt while lethargic was able to answer some questions and follow commands. CT head neg for acute infarct. CT angio with R carotid stenosis 45-55% and 55-65% on L. Currently oriented to self, family members, place but not to date or . Pt can not recall sequence of events. He recalled some difficulty with breathing and some chest pain but later retracted chest pain hx. +chronic cough. Family did not noted any recent fevers, chills, SOB, new change in cough, NVD or decrease in appetite. At baseline, very limited in terms of ambulation sec to PVD. He is seen by Dr Day at home and last seen a week ago. Pt had stopped all meds on his own for over 5 months now despite PMD insistence on maintaining ASA 81, neurontin 300mg, Flomax and lipitor.

## 2019-11-26 NOTE — ED PROVIDER NOTE - CLINICAL SUMMARY MEDICAL DECISION MAKING FREE TEXT BOX
88 yo male, hx copd, pvd, high cholesterol, CAD, DVT/PE,  comes from home via EMS .  Pt found slumped over in a chair, not responding by wife.   Last known well was 1530 today. FS with EMS was 97  Check labs, CT head ng as per radiology, 88 yo male, hx copd, pvd, high cholesterol, CAD, DVT/PE, comes from home via EMS .  Pt found slumped over in a chair, not responding by wife.   Last known well was 1530 today. FS with EMS was 97  Check labs, CT head neg as per radiology.   Patient abg normal. However, due to abnormal labs and etoh presence, consider admission to follow. TPA not needed. CT head neg. CTA performed. No transfer necessary. Tele Stroke neuro not required for input as NIH is 0.

## 2019-11-26 NOTE — ED ADULT NURSE NOTE - OBJECTIVE STATEMENT
arrives, pursed breathing, PMH COPD, as per family they left house at 2:30pm and when they returned he was sitting up in a chair with AMS

## 2019-11-26 NOTE — H&P ADULT - NSHPPHYSICALEXAM_GEN_ALL_CORE
Vital Signs Last 24 Hrs  T(C): 36.7 (26 Nov 2019 18:00), Max: 36.7 (26 Nov 2019 18:00)  T(F): 98 (26 Nov 2019 18:00), Max: 98 (26 Nov 2019 18:00)  HR: 60 (26 Nov 2019 18:30) (60 - 65)  BP: 132/60 (26 Nov 2019 18:30) (132/60 - 137/54)  BP(mean): 78 (26 Nov 2019 18:30) (78 - 78)  RR: 20 (26 Nov 2019 18:30) (16 - 21)  SpO2: 100% (26 Nov 2019 18:30) (100% - 100%)  Daily     Daily   CAPILLARY BLOOD GLUCOSE      POCT Blood Glucose.: 97 mg/dL (26 Nov 2019 17:36)    I&O's Summary      GENERAL: NAD, LETHARGIC but easily arousable and answers some simple questions and follows some commands.   HEAD:  Normocephalic  EYES: EOMI, PERRLA, conjunctiva and sclera clear  ENMT: No tonsillar erythema, exudates, or enlargement; Moist mucous membranes but BLACK tongue. No lesions  NECK: Supple, No JVD, no bruit, normal thyroid  NERVOUS SYSTEM:  Alert & Oriented X2, no facial droop, tongue midline, grossly moves all fours easily, neg pronator drift. neg Bab  CHEST/LUNG: coarse rhonchi from upper airway  HEART: Regular rate and rhythm; No murmurs, rubs, or gallops  ABDOMEN: Soft, Nontender, Nondistended; Bowel sounds present  EXTREMITIES:  2+ Peripheral Pulses, No clubbing, cyanosis, or edema  LYMPH: No lymphadenopathy noted  SKIN: No rashes or lesions Vital Signs Last 24 Hrs  T(C): 36.7 (26 Nov 2019 18:00), Max: 36.7 (26 Nov 2019 18:00)  T(F): 98 (26 Nov 2019 18:00), Max: 98 (26 Nov 2019 18:00)  HR: 60 (26 Nov 2019 18:30) (60 - 65)  BP: 132/60 (26 Nov 2019 18:30) (132/60 - 137/54)  BP(mean): 78 (26 Nov 2019 18:30) (78 - 78)  RR: 20 (26 Nov 2019 18:30) (16 - 21)  SpO2: 100% (26 Nov 2019 18:30) (100% - 100%)  Daily     Daily   CAPILLARY BLOOD GLUCOSE      POCT Blood Glucose.: 97 mg/dL (26 Nov 2019 17:36)    I&O's Summary      GENERAL: NAD, LETHARGIC but easily arousable and answers some simple questions and follows some commands. +ETOH ON BREATH  HEAD:  Normocephalic  EYES: EOMI, PERRLA, conjunctiva and sclera clear  ENMT: No tonsillar erythema, exudates, or enlargement; Moist mucous membranes but BLACK tongue. No lesions  NECK: Supple, No JVD, no bruit, normal thyroid  NERVOUS SYSTEM:  Alert & Oriented X2, no facial droop, tongue midline, grossly moves all fours easily, neg pronator drift. neg Bab  CHEST/LUNG: coarse rhonchi from upper airway, trace crackles at bases, no expiratory wheezes.   HEART: Regular rate and rhythm; No murmurs, rubs, or gallops  ABDOMEN: Soft, Nontender, Nondistended; Bowel sounds present  EXTREMITIES:  1+ Peripheral Pulses, No clubbing, cyanosis, or edema  LYMPH: No lymphadenopathy noted  SKIN: No rashes or lesions

## 2019-11-26 NOTE — H&P ADULT - NSHPSOCIALHISTORY_GEN_ALL_CORE
current smoker 2 cig a day over 65 years, current ETOH use 2 glasses of wine daily with lunch and dinner.

## 2019-11-26 NOTE — H&P ADULT - NSICDXPASTMEDICALHX_GEN_ALL_CORE_FT
PAST MEDICAL HISTORY:  CAD (coronary artery disease)     Deep vein thrombosis (DVT)     Dyslipidemia     History of COPD     [place] (hard of hearing)     PE (pulmonary thromboembolism)     Peripheral vascular disease

## 2019-11-26 NOTE — ED ADULT NURSE NOTE - PMH
CAD (coronary artery disease)    Deep vein thrombosis (DVT)    Dyslipidemia    History of COPD    [place] (hard of hearing)    PE (pulmonary thromboembolism)    Peripheral vascular disease

## 2019-11-26 NOTE — ED PROVIDER NOTE - ATTENDING CONTRIBUTION TO CARE
Vipul with NEL Neely. 86 yo male, hx copd, pvd, high cholesterol, CAD, DVT/PE, comes from home via EMS .  Pt found slumped over in a chair, not responding by wife.   Last known well was 1530 today. FS with EMS was 97  Check labs, CT head neg as per radiology.   Patient abg normal. However, due to abnormal labs and etoh presence, consider admission to follow. TPA not needed. CT head neg. CTA performed. No transfer necessary. Tele Stroke neuro not required for input as NIH is 0.   I performed a face to face bedside interview with patient regarding history of present illness, review of symptoms and past medical history. I completed an independent physical exam.  I have discussed the patient's plan of care with Physician Assistant (PA). I agree with note as stated above, having amended the EMR as needed to reflect my findings.   This includes History of Present Illness, HIV, Past Medical/Surgical/Family/Social History, Allergies and Home Medications, Review of Systems, Physical Exam, and any Progress Notes during the time I functioned as the attending physician for this patient.

## 2019-11-26 NOTE — ED ADULT NURSE REASSESSMENT NOTE - NS ED NURSE REASSESS COMMENT FT1
Received report from Julienne Mccauley RN. Pt asleep, however arousable. On 2L nasal cannula, vitals stable. Pt currently getting ABG drawn. Family at bedside, safety maintained & will continue to monitor.

## 2019-11-26 NOTE — H&P ADULT - ASSESSMENT
87M current smoker, hx of COPD, PVD, HLD, DVT/PE off Xarelto, hx of CVA pw AMS    #AMS  No overt CNS events. currently nonfocal but still lethargic.   No overt infection. Pt afebrile, neg wbc, neg procalcitonin, CXR, UA negative. Check Chest CT to be complete.   had +ETOH levels - no hx of ETOH withdrawals - family suspect pt may have drank extra wine.   check ammonia levels.   trend trops to be complete - given unclear hx of SOB/CP    #COPD  appears stable.   duonebs, spiriva, advair.   was given 1 dose of Lasix in ED for some CHF like findings on initial CXR read. BNP low would hold additional lasix.     # RONNIE  hold additional lasix.     # Hx of PVD and CVA  restart asa, statin.     # hx of BPH, In ED s/p 400cc after straight cath  check PVRs.  restart Flomax.     # DVT/GI proph  hx of DVTs and GIB  Sq hep and PPI.     CAPRINI SCORE [CLOT]    AGE RELATED RISK FACTORS                                                       MOBILITY RELATED FACTORS  [ ] Age 41-60 years                                            (1 Point)                  [ ] Bed rest                                                        (1 Point)  [ ] Age: 61-74 years                                           (2 Points)                 [ ] Plaster cast                                                   (2 Points)  [ 3] Age= 75 years                                              (3 Points)                 [2 ] Bed bound for more than 72 hours                 (2 Points)    DISEASE RELATED RISK FACTORS                                               GENDER SPECIFIC FACTORS  [ ] Edema in the lower extremities                       (1 Point)                  [ ] Pregnancy                                                     (1 Point)  [ ] Varicose veins                                               (1 Point)                  [ ] Post-partum < 6 weeks                                   (1 Point)             [ ] BMI > 25 Kg/m2                                            (1 Point)                  [ ] Hormonal therapy  or oral contraception          (1 Point)                 [ ] Sepsis (in the previous month)                        (1 Point)                  [ ] History of pregnancy complications                 (1 point)  [ ] Pneumonia or serious lung disease                                               [ ] Unexplained or recurrent                     (1 Point)           (in the previous month)                               (1 Point)  [ ] Abnormal pulmonary function test                     (1 Point)                 SURGERY RELATED RISK FACTORS  [ ] Acute myocardial infarction                              (1 Point)                 [ ]  Section                                             (1 Point)  [ ] Congestive heart failure (in the previous month)  (1 Point)               [ ] Minor surgery                                                  (1 Point)   [ ] Inflammatory bowel disease                             (1 Point)                 [ ] Arthroscopic surgery                                        (2 Points)  [ ] Central venous access                                      (2 Points)                [ ] General surgery lasting more than 45 minutes   (2 Points)       [ ] Stroke (in the previous month)                          (5 Points)               [ ] Elective arthroplasty                                         (5 Points)                                                                                                                                               HEMATOLOGY RELATED FACTORS                                                 TRAUMA RELATED RISK FACTORS  [5 ] Prior episodes of VTE                                     (3 Points)                 [ ] Fracture of the hip, pelvis, or leg                       (5 Points)  [ ] Positive family history for VTE                         (3 Points)                 [ ] Acute spinal cord injury (in the previous month)  (5 Points)  [ ] Prothrombin 19105 A                                     (3 Points)                 [ ] Paralysis  (less than 1 month)                             (5 Points)  [ ] Factor V Leiden                                             (3 Points)                  [ ] Multiple Trauma within 1 month                        (5 Points)  [ ] Lupus anticoagulants                                     (3 Points)                                                           [ ] Anticardiolipin antibodies                               (3 Points)                                                       [ ] High homocysteine in the blood                      (3 Points)                                             [ ] Other congenital or acquired thrombophilia      (3 Points)                                                [ ] Heparin induced thrombocytopenia                  (3 Points)                                          Total Score [  10        ] 87M current smoker, hx of COPD, PVD, HLD, DVT/PE off Xarelto, hx of CVA pw AMS    #AMS  No overt CNS events. currently nonfocal but still lethargic.   No overt infection. Pt afebrile, neg wbc, neg procalcitonin, CXR, UA negative. Check Chest CT to be complete.   had +ETOH levels - no hx of ETOH withdrawals - family suspect pt may have drank extra wine.   check ammonia levels.   trend trops to be complete - given unclear hx of SOB/CP. did have neg NM stress in 2018.     #COPD  appears stable.   duonebs, spiriva, advair.   was given 1 dose of Lasix in ED for some CHF like findings on initial CXR read. BNP low would hold additional lasix.     # RONNIE  hold additional lasix.     # Hx of PVD and CVA  restart asa, statin.     # hx of BPH, In ED s/p 400cc after straight cath  check PVRs.  restart Flomax.     # DVT/GI proph  hx of DVTs and GIB  Sq hep and PPI.     CAPRINI SCORE [CLOT]    AGE RELATED RISK FACTORS                                                       MOBILITY RELATED FACTORS  [ ] Age 41-60 years                                            (1 Point)                  [ ] Bed rest                                                        (1 Point)  [ ] Age: 61-74 years                                           (2 Points)                 [ ] Plaster cast                                                   (2 Points)  [ 3] Age= 75 years                                              (3 Points)                 [2 ] Bed bound for more than 72 hours                 (2 Points)    DISEASE RELATED RISK FACTORS                                               GENDER SPECIFIC FACTORS  [ ] Edema in the lower extremities                       (1 Point)                  [ ] Pregnancy                                                     (1 Point)  [ ] Varicose veins                                               (1 Point)                  [ ] Post-partum < 6 weeks                                   (1 Point)             [ ] BMI > 25 Kg/m2                                            (1 Point)                  [ ] Hormonal therapy  or oral contraception          (1 Point)                 [ ] Sepsis (in the previous month)                        (1 Point)                  [ ] History of pregnancy complications                 (1 point)  [ ] Pneumonia or serious lung disease                                               [ ] Unexplained or recurrent                     (1 Point)           (in the previous month)                               (1 Point)  [ ] Abnormal pulmonary function test                     (1 Point)                 SURGERY RELATED RISK FACTORS  [ ] Acute myocardial infarction                              (1 Point)                 [ ]  Section                                             (1 Point)  [ ] Congestive heart failure (in the previous month)  (1 Point)               [ ] Minor surgery                                                  (1 Point)   [ ] Inflammatory bowel disease                             (1 Point)                 [ ] Arthroscopic surgery                                        (2 Points)  [ ] Central venous access                                      (2 Points)                [ ] General surgery lasting more than 45 minutes   (2 Points)       [ ] Stroke (in the previous month)                          (5 Points)               [ ] Elective arthroplasty                                         (5 Points)                                                                                                                                               HEMATOLOGY RELATED FACTORS                                                 TRAUMA RELATED RISK FACTORS  [5 ] Prior episodes of VTE                                     (3 Points)                 [ ] Fracture of the hip, pelvis, or leg                       (5 Points)  [ ] Positive family history for VTE                         (3 Points)                 [ ] Acute spinal cord injury (in the previous month)  (5 Points)  [ ] Prothrombin 34948 A                                     (3 Points)                 [ ] Paralysis  (less than 1 month)                             (5 Points)  [ ] Factor V Leiden                                             (3 Points)                  [ ] Multiple Trauma within 1 month                        (5 Points)  [ ] Lupus anticoagulants                                     (3 Points)                                                           [ ] Anticardiolipin antibodies                               (3 Points)                                                       [ ] High homocysteine in the blood                      (3 Points)                                             [ ] Other congenital or acquired thrombophilia      (3 Points)                                                [ ] Heparin induced thrombocytopenia                  (3 Points)                                          Total Score [  10        ] 87M current smoker, hx of COPD, PVD, HLD, DVT/PE off Xarelto, hx of CVA pw AMS    #AMS  No overt CNS events. currently nonfocal but still lethargic.   No overt infection. Pt afebrile, neg wbc, neg procalcitonin, CXR, UA negative. Check Chest CT to be complete.   had +ETOH levels - no hx of ETOH withdrawals - family suspect pt may have drank extra wine. CIWA  check ammonia levels.   trend trops to be complete - given unclear hx of SOB/CP. did have neg NM stress in 2018.   neurology consult.     #COPD  appears stable.   duonebs, spiriva, advair.   was given 1 dose of Lasix in ED for some CHF like findings on initial CXR read. BNP low would hold additional lasix.     # RONNIE  hold additional lasix.     # Hx of PVD and CVA  restart asa, statin.     # hx of BPH, In ED s/p 400cc after straight cath  check PVRs.  restart Flomax.     # DVT/GI proph  hx of DVTs and GIB  Sq hep and PPI.     CAPRINI SCORE [CLOT]    AGE RELATED RISK FACTORS                                                       MOBILITY RELATED FACTORS  [ ] Age 41-60 years                                            (1 Point)                  [ ] Bed rest                                                        (1 Point)  [ ] Age: 61-74 years                                           (2 Points)                 [ ] Plaster cast                                                   (2 Points)  [ 3] Age= 75 years                                              (3 Points)                 [2 ] Bed bound for more than 72 hours                 (2 Points)    DISEASE RELATED RISK FACTORS                                               GENDER SPECIFIC FACTORS  [ ] Edema in the lower extremities                       (1 Point)                  [ ] Pregnancy                                                     (1 Point)  [ ] Varicose veins                                               (1 Point)                  [ ] Post-partum < 6 weeks                                   (1 Point)             [ ] BMI > 25 Kg/m2                                            (1 Point)                  [ ] Hormonal therapy  or oral contraception          (1 Point)                 [ ] Sepsis (in the previous month)                        (1 Point)                  [ ] History of pregnancy complications                 (1 point)  [ ] Pneumonia or serious lung disease                                               [ ] Unexplained or recurrent                     (1 Point)           (in the previous month)                               (1 Point)  [ ] Abnormal pulmonary function test                     (1 Point)                 SURGERY RELATED RISK FACTORS  [ ] Acute myocardial infarction                              (1 Point)                 [ ]  Section                                             (1 Point)  [ ] Congestive heart failure (in the previous month)  (1 Point)               [ ] Minor surgery                                                  (1 Point)   [ ] Inflammatory bowel disease                             (1 Point)                 [ ] Arthroscopic surgery                                        (2 Points)  [ ] Central venous access                                      (2 Points)                [ ] General surgery lasting more than 45 minutes   (2 Points)       [ ] Stroke (in the previous month)                          (5 Points)               [ ] Elective arthroplasty                                         (5 Points)                                                                                                                                               HEMATOLOGY RELATED FACTORS                                                 TRAUMA RELATED RISK FACTORS  [5 ] Prior episodes of VTE                                     (3 Points)                 [ ] Fracture of the hip, pelvis, or leg                       (5 Points)  [ ] Positive family history for VTE                         (3 Points)                 [ ] Acute spinal cord injury (in the previous month)  (5 Points)  [ ] Prothrombin 70767 A                                     (3 Points)                 [ ] Paralysis  (less than 1 month)                             (5 Points)  [ ] Factor V Leiden                                             (3 Points)                  [ ] Multiple Trauma within 1 month                        (5 Points)  [ ] Lupus anticoagulants                                     (3 Points)                                                           [ ] Anticardiolipin antibodies                               (3 Points)                                                       [ ] High homocysteine in the blood                      (3 Points)                                             [ ] Other congenital or acquired thrombophilia      (3 Points)                                                [ ] Heparin induced thrombocytopenia                  (3 Points)                                          Total Score [  10        ]

## 2019-11-26 NOTE — ED ADULT TRIAGE NOTE - CHIEF COMPLAINT QUOTE
"Found slumped over in a chair not responding at home by family, last known well 1530 today"+  F/S 97

## 2019-11-26 NOTE — H&P ADULT - NSHPLABSRESULTS_GEN_ALL_CORE
12.0   6.53  )-----------( 290      ( 2019 17:50 )             39.1           140  |  107  |  26<H>  ----------------------------<  90  4.4   |  21<L>  |  1.46<H>    Ca    8.6      2019 17:50    TPro  7.5  /  Alb  3.1<L>  /  TBili  0.4  /  DBili  x   /  AST  13  /  ALT  10  /  AlkPhos  125<H>           LIVER FUNCTIONS - ( 2019 17:50 )  Alb: 3.1 g/dL / Pro: 7.5 g/dL / ALK PHOS: 125 U/L / ALT: 10 U/L / AST: 13 U/L / GGT: x               PT/INR - ( 2019 17:50 )   PT: 11.9 sec;   INR: 1.06 ratio         PTT - ( 2019 17:50 )  PTT:26.3 sec    CARDIAC MARKERS ( 2019 17:50 )  <.017 ng/mL / x     / x     / x     / x          Serum Pro-Brain Natriuretic Peptide: 318 pg/mL (19 @ 17:50)    Urinalysis Basic - ( 2019 20:15 )    Color: Yellow / Appearance: Clear / S.010 / pH: x  Gluc: x / Ketone: Negative  / Bili: Negative / Urobili: Negative   Blood: x / Protein: 15 / Nitrite: Negative   Leuk Esterase: Negative / RBC: 0-4 /HPF / WBC 0-2 /HPF   Sq Epi: x / Non Sq Epi: Neg.-Few / Bacteria: Trace /HPF      POCT Blood Glucose.: 97 mg/dL (2019 17:36)      EKG:    < from: CT Brain Stroke Protocol (19 @ 17:53) >    There is no acute intracranial hemorrhage, mass effect, shift of the   midline structures, herniation, extra-axial fluid collection, or   hydrocephalus.    There is a chronic lacunar infarction within the right side of the harper.    There is diffuse cerebral volume loss with prominence of the sulci,   fissures, and cisternal spaces which is normal for the patient's age.   There is moderate patchy confluent deep and periventricular white matter   hypoattenuation statistically compatible with microvascular changes given   calcific atherosclerotic disease of the intracranial arteries.    Dystrophic calcifications are again noted within the bilateral basal   ganglia and thalami.    The paranasal sinuses and mastoid air cells are clear. The calvarium is   intact. The imaged orbits are unremarkable.    < end of copied text >    < from: CT Angio Head w/ IV Cont (19 @ 19:11) >    IMPRESSION:    CTA neck: Atherosclerosis of the bilateral carotid bifurcations with   approximately 45-55% stenosis of the right and 55-65% stenosis of the   left internal carotid artery origins by NASCET criteria, respectively.    CTA head: Motion limited examination.    No large vessel occlusion or major stenosis.    < end of copied text >    < from: Xray Chest 1 View AP/PA (19 @ 18:39) >    Increased markings in the lower lung fields with small right base   granuloma again seen.    Bilateral apical thickening again noted.    Chest is similar to  of this year.    IMPRESSION: Unchanged chest as above.    < end of copied text > 12.0   6.53  )-----------( 290      ( 2019 17:50 )             39.1           140  |  107  |  26<H>  ----------------------------<  90  4.4   |  21<L>  |  1.46<H>    Ca    8.6      2019 17:50    TPro  7.5  /  Alb  3.1<L>  /  TBili  0.4  /  DBili  x   /  AST  13  /  ALT  10  /  AlkPhos  125<H>           LIVER FUNCTIONS - ( 2019 17:50 )  Alb: 3.1 g/dL / Pro: 7.5 g/dL / ALK PHOS: 125 U/L / ALT: 10 U/L / AST: 13 U/L / GGT: x               PT/INR - ( 2019 17:50 )   PT: 11.9 sec;   INR: 1.06 ratio         PTT - ( 2019 17:50 )  PTT:26.3 sec    CARDIAC MARKERS ( 2019 17:50 )  <.017 ng/mL / x     / x     / x     / x          Serum Pro-Brain Natriuretic Peptide: 318 pg/mL (19 @ 17:50)    Urinalysis Basic - ( 2019 20:15 )    Color: Yellow / Appearance: Clear / S.010 / pH: x  Gluc: x / Ketone: Negative  / Bili: Negative / Urobili: Negative   Blood: x / Protein: 15 / Nitrite: Negative   Leuk Esterase: Negative / RBC: 0-4 /HPF / WBC 0-2 /HPF   Sq Epi: x / Non Sq Epi: Neg.-Few / Bacteria: Trace /HPF      POCT Blood Glucose.: 97 mg/dL (2019 17:36)      EKG: NSR at 61bpm, no st changes.     < from: CT Brain Stroke Protocol (19 @ 17:53) >    There is no acute intracranial hemorrhage, mass effect, shift of the   midline structures, herniation, extra-axial fluid collection, or   hydrocephalus.    There is a chronic lacunar infarction within the right side of the harper.    There is diffuse cerebral volume loss with prominence of the sulci,   fissures, and cisternal spaces which is normal for the patient's age.   There is moderate patchy confluent deep and periventricular white matter   hypoattenuation statistically compatible with microvascular changes given   calcific atherosclerotic disease of the intracranial arteries.    Dystrophic calcifications are again noted within the bilateral basal   ganglia and thalami.    The paranasal sinuses and mastoid air cells are clear. The calvarium is   intact. The imaged orbits are unremarkable.    < end of copied text >    < from: CT Angio Head w/ IV Cont (19 @ 19:11) >    IMPRESSION:    CTA neck: Atherosclerosis of the bilateral carotid bifurcations with   approximately 45-55% stenosis of the right and 55-65% stenosis of the   left internal carotid artery origins by NASCET criteria, respectively.    CTA head: Motion limited examination.    No large vessel occlusion or major stenosis.    < end of copied text >    < from: Xray Chest 1 View AP/PA (19 @ 18:39) >    Increased markings in the lower lung fields with small right base   granuloma again seen.    Bilateral apical thickening again noted.    Chest is similar to  of this year.    IMPRESSION: Unchanged chest as above.    < end of copied text >

## 2019-11-26 NOTE — ED PROVIDER NOTE - PMH
CAD (coronary artery disease)    Deep vein thrombosis (DVT)    Dyslipidemia    History of COPD    [place] (hard of hearing)    PE (pulmonary thromboembolism)    Peripheral vascular disease

## 2019-11-27 LAB
ALBUMIN SERPL ELPH-MCNC: 3.1 G/DL — LOW (ref 3.3–5)
ALP SERPL-CCNC: 122 U/L — HIGH (ref 40–120)
ALT FLD-CCNC: 9 U/L — LOW (ref 10–45)
ANION GAP SERPL CALC-SCNC: 11 MMOL/L — SIGNIFICANT CHANGE UP (ref 5–17)
AST SERPL-CCNC: 19 U/L — SIGNIFICANT CHANGE UP (ref 10–40)
BILIRUB SERPL-MCNC: 0.6 MG/DL — SIGNIFICANT CHANGE UP (ref 0.2–1.2)
BUN SERPL-MCNC: 22 MG/DL — SIGNIFICANT CHANGE UP (ref 7–23)
CALCIUM SERPL-MCNC: 8.5 MG/DL — SIGNIFICANT CHANGE UP (ref 8.4–10.5)
CHLORIDE SERPL-SCNC: 105 MMOL/L — SIGNIFICANT CHANGE UP (ref 96–108)
CHOLEST SERPL-MCNC: 141 MG/DL — SIGNIFICANT CHANGE UP (ref 10–199)
CHOLEST SERPL-MCNC: 153 MG/DL — SIGNIFICANT CHANGE UP (ref 10–199)
CK SERPL-CCNC: 75 U/L — SIGNIFICANT CHANGE UP (ref 30–200)
CO2 SERPL-SCNC: 24 MMOL/L — SIGNIFICANT CHANGE UP (ref 22–31)
CREAT SERPL-MCNC: 1.33 MG/DL — HIGH (ref 0.5–1.3)
CULTURE RESULTS: NO GROWTH — SIGNIFICANT CHANGE UP
GLUCOSE SERPL-MCNC: 93 MG/DL — SIGNIFICANT CHANGE UP (ref 70–99)
HCT VFR BLD CALC: 34.5 % — LOW (ref 39–50)
HDLC SERPL-MCNC: 47 MG/DL — SIGNIFICANT CHANGE UP
HDLC SERPL-MCNC: 49 MG/DL — SIGNIFICANT CHANGE UP
HGB BLD-MCNC: 10.6 G/DL — LOW (ref 13–17)
LIPID PNL WITH DIRECT LDL SERPL: 69 MG/DL — SIGNIFICANT CHANGE UP
LIPID PNL WITH DIRECT LDL SERPL: 81 MG/DL — SIGNIFICANT CHANGE UP
MCHC RBC-ENTMCNC: 24.6 PG — LOW (ref 27–34)
MCHC RBC-ENTMCNC: 30.7 GM/DL — LOW (ref 32–36)
MCV RBC AUTO: 80 FL — SIGNIFICANT CHANGE UP (ref 80–100)
NRBC # BLD: 0 /100 WBCS — SIGNIFICANT CHANGE UP (ref 0–0)
PLATELET # BLD AUTO: 281 K/UL — SIGNIFICANT CHANGE UP (ref 150–400)
POTASSIUM SERPL-MCNC: 4.1 MMOL/L — SIGNIFICANT CHANGE UP (ref 3.5–5.3)
POTASSIUM SERPL-SCNC: 4.1 MMOL/L — SIGNIFICANT CHANGE UP (ref 3.5–5.3)
PROT SERPL-MCNC: 7.2 G/DL — SIGNIFICANT CHANGE UP (ref 6–8.3)
RBC # BLD: 4.31 M/UL — SIGNIFICANT CHANGE UP (ref 4.2–5.8)
RBC # FLD: 17.4 % — HIGH (ref 10.3–14.5)
SODIUM SERPL-SCNC: 140 MMOL/L — SIGNIFICANT CHANGE UP (ref 135–145)
SPECIMEN SOURCE: SIGNIFICANT CHANGE UP
TOTAL CHOLESTEROL/HDL RATIO MEASUREMENT: 3 RATIO — LOW (ref 3.4–9.6)
TOTAL CHOLESTEROL/HDL RATIO MEASUREMENT: 3.1 RATIO — LOW (ref 3.4–9.6)
TRIGL SERPL-MCNC: 117 MG/DL — SIGNIFICANT CHANGE UP (ref 10–149)
TRIGL SERPL-MCNC: 123 MG/DL — SIGNIFICANT CHANGE UP (ref 10–149)
TROPONIN I SERPL-MCNC: <.017 NG/ML — LOW (ref 0.02–0.06)
TSH SERPL-MCNC: 2.31 UIU/ML — SIGNIFICANT CHANGE UP (ref 0.27–4.2)
WBC # BLD: 6.24 K/UL — SIGNIFICANT CHANGE UP (ref 3.8–10.5)
WBC # FLD AUTO: 6.24 K/UL — SIGNIFICANT CHANGE UP (ref 3.8–10.5)

## 2019-11-27 PROCEDURE — 99233 SBSQ HOSP IP/OBS HIGH 50: CPT

## 2019-11-27 RX ORDER — LANOLIN ALCOHOL/MO/W.PET/CERES
3 CREAM (GRAM) TOPICAL AT BEDTIME
Refills: 0 | Status: COMPLETED | OUTPATIENT
Start: 2019-11-27 | End: 2019-11-27

## 2019-11-27 RX ADMIN — Medication 3 MILLILITER(S): at 04:55

## 2019-11-27 RX ADMIN — TIOTROPIUM BROMIDE 1 CAPSULE(S): 18 CAPSULE ORAL; RESPIRATORY (INHALATION) at 09:41

## 2019-11-27 RX ADMIN — BUDESONIDE AND FORMOTEROL FUMARATE DIHYDRATE 2 PUFF(S): 160; 4.5 AEROSOL RESPIRATORY (INHALATION) at 09:40

## 2019-11-27 RX ADMIN — Medication 1 TABLET(S): at 11:34

## 2019-11-27 RX ADMIN — HEPARIN SODIUM 5000 UNIT(S): 5000 INJECTION INTRAVENOUS; SUBCUTANEOUS at 21:04

## 2019-11-27 RX ADMIN — Medication 81 MILLIGRAM(S): at 11:38

## 2019-11-27 RX ADMIN — ATORVASTATIN CALCIUM 40 MILLIGRAM(S): 80 TABLET, FILM COATED ORAL at 21:04

## 2019-11-27 RX ADMIN — Medication 100 MILLIGRAM(S): at 11:34

## 2019-11-27 RX ADMIN — Medication 3 MILLILITER(S): at 09:40

## 2019-11-27 RX ADMIN — Medication 1 MILLIGRAM(S): at 11:34

## 2019-11-27 RX ADMIN — Medication 3 MILLIGRAM(S): at 22:45

## 2019-11-27 RX ADMIN — PANTOPRAZOLE SODIUM 40 MILLIGRAM(S): 20 TABLET, DELAYED RELEASE ORAL at 05:46

## 2019-11-27 RX ADMIN — HEPARIN SODIUM 5000 UNIT(S): 5000 INJECTION INTRAVENOUS; SUBCUTANEOUS at 05:46

## 2019-11-27 RX ADMIN — HEPARIN SODIUM 5000 UNIT(S): 5000 INJECTION INTRAVENOUS; SUBCUTANEOUS at 15:47

## 2019-11-27 NOTE — CONSULT NOTE ADULT - SUBJECTIVE AND OBJECTIVE BOX
Patient is an 87 year old man admitted 11/26/19 with lethargy. He was found by family the day of admission in the late afternoon on the couch and he would open his eyes and stare at family when they would try to awaken him and then close his eyes. In the ER he was noted to be lethargic but  would answer a few questions and follow a few commands. He complained of SOB and chest pain.   He complains of feeling generally unwell. No history of seizures.  No history of fever, trauma.     PMH: History of CVA          COPD          HLD          PVD          History of DVT/PE         S/P Hip surgery           SH: No allergy. Smoking history of 2 cig/day/65 years. History of 2 glasses of wine per day.       Lives with wife.    PFMH: Unknown    Exam: Awake, slowed mentation, appropriate           Pupils 2.5mm reactive, EOM intact, no nystagmus, VFF           CN II-XII intact           Motor tone and strength normal           Sensation intact                          10.6   6.24  )-----------( 281      ( 27 Nov 2019 05:40 )             34.5   11-27    140  |  105  |  22  ----------------------------<  93  4.1   |  24  |  1.33<H>    Ca    8.5      27 Nov 2019 05:40    TPro  7.2  /  Alb  3.1<L>  /  TBili  0.6  /  DBili  x   /  AST  19  /  ALT  9<L>  /  AlkPhos  122<H>  11-27  Troponin I, Serum (11.27.19 @ 05:40)    Troponin I, Serum: <.017: The new reference range for Troponin-I performed on the Siemens EXL  system is 0.017-0.056 ng/mL which includes the 99th percentile of a  healthy reference population. Studies have shown that elevated troponin  levels above the cutoff are associated with an increased risk for adverse  cardiac events, with the risk increasing as troponin levels increase.  As  per a joint committee of the American College of Cardiology and   Society of Cardiology, diagnosis of classic MI is based upon the  detection of a rise or fall of cardiac troponin values, with at least one  value above the 99th percentile upper reference limit, in the appropriate  clinical context.  · Troponin I (ng/mL) Interpretation  · 0.017-0.056  Normal range (includes the 99th percentile of a healthy  reference population)  · >0.056  Elevated troponin level indicating increased risk  · Note: Troponin-I and Troponin T cannot be used interchangeably in  serial measurements.  Minimally elevated Troponin results should be  interpreted in the context of clinical findings and risk factors. ng/mL    Ammonia, Serum (11.26.19 @ 21:00)    Ammonia, Serum: <10 umol/L    Lipid Profile in AM (11.27.19 @ 05:40)    Total Cholesterol/HDL Ratio Measurement: 3.0 RATIO    Cholesterol, Serum: 141 mg/dL    Triglycerides, Serum: 123 mg/dL    HDL Cholesterol, Serum: 47: HDL Levels >/= 60 mg/dL are considered beneficial and a "negative" risk  factor.  Effective 08/15/2018: New reference range and interpretive comment. mg/dL    Direct LDL: 69: LDL Cholesterol (mg/dL) --- Interpretive Comment (for adults 18 and over)  Optimal LDL Level may vary based on clinical situation  Below 70                   Ideal for people at very high risk of heart  disease  Below 100                  Ideal for people at risk of heart disease  100 - 129                    [place]  130 - 159                    Borderline high  160 - 189                    High  190 and Above           Very high mg/dL    Alcohol, Blood: 75: TOXIC CONCENTRATIONS (mg/dL):  Flushing, Slowing of  Reflexes, Impaired Visual Acuity:     Depression of CNS:    > 100  Fatalities Reported:       > 400  Results reported as a "< number" are below reliably detectable limits and  considered negative  These ranges are intended as general guidelines.  Alcohol metabolism can vary widely among individuals.  This test  is approved for clinical and not for forensic purposes. mg/dL (11.26.19 @ 17:50)    Thyroid Stimulating Hormone, Serum in AM (11.27.19 @ 05:40)    Thyroid Stimulating Hormone, Serum: 2.31 uIU/mL      < from: CT Brain Stroke Protocol (11.26.19 @ 17:53) >    COMPARISON: Prior brain MRI study from 3/22/2019. Prior CT study of the   head from 3/19/2019.    FINDINGS: Motion artifact limits evaluation.    There is no acute intracranial hemorrhage, mass effect, shift of the   midline structures, herniation, extra-axial fluid collection, or   hydrocephalus.    There is a chronic lacunar infarction within the right side of the harper.    There is diffuse cerebral volume loss with prominence of the sulci,   fissures, and cisternal spaces which is normal for the patient's age.   There is moderate patchy confluent deep and periventricular white matter   hypoattenuation statistically compatible with microvascular changes given   calcific atherosclerotic disease of the intracranial arteries.    Dystrophic calcifications are again noted within the bilateral basal   ganglia and thalami.    The paranasal sinuses and mastoid air cells are clear. The calvarium is   intact. The imaged orbits are unremarkable.    IMPRESSION: Motion limited study.    acute intracranial hemorrhage, mass effect, or shift of the midline   structures.    Similar-appearing microvascular disease and chronic lacunar infarcts as   discussed.    Dr. Primo Soto discussed findings with NEL Hammond on 11/26/2019 at 5:58 PM       < from: CT Angio Head w/ IV Cont (11.26.19 @ 19:11) >    FINDINGS:    CTA neck: There is atherosclerosis of the aortic arch and origins of the   left subclavian, left commoncarotid, and right innominate arteries   without flow-limiting stenosis.    The bilateral common carotid arteries appear unremarkable. There is   atherosclerosis of the bilateral carotid bifurcations with 45-55%   stenosis of the right and 55-65% stenosis of the left internal carotid   artery origins by NASCET criteria. The mid and distal portions of the   bilateral cervical internal carotid arteries are patent extending to the   skull base.    There is atherosclerosis of the origins of the bilateral arteries without   flow-limiting stenosis. The bilateral cervical vertebral arteries are   codominant and are patent extending to the skull base.    There is biapical pleural thickening and/or scarring. Fibrotic changes   are noted within the bilateral upper lobes    CTA head: Examination is motion limited.    The posterior circulation appears intact.    The anterior and right posterior communicate arteries are noted. The left   posterior communicating artery is not well resolved.    There isatherosclerosis of the bilateral carotid siphons without   flow-limiting stenosis. Otherwise, the bilateral intracranial internal   carotid, anterior, and middle cerebral arteries appear within normal   limits.    IMPRESSION:    CTA neck: Atherosclerosis of the bilateral carotid bifurcations with   approximately 45-55% stenosis of the right and 55-65% stenosis of the       left internal carotid artery origins by NASCET criteria, respectively.    CTA head: Motion limited examination.    No large vessel occlusion or major stenosis.

## 2019-11-27 NOTE — PROGRESS NOTE ADULT - SUBJECTIVE AND OBJECTIVE BOX
Patient is a 87y old  Male who presents with a chief complaint of AMS (2019 20:11)  HPI:  87M current smoker, hx of COPD, PVD, HLD, DVT/PE off Xarelto, hx of CVA pw AMS. Pt was last seen well at home at 330PM. Wife and daughter found him at home at about 5PM on the couch with his legs crossed comfortably but family was unable to arouse him. Pt would open his eyes briefly and stare at them and go back to sleep. Pt was BIBA. Initial FS was 97. On arrival to ED, pt while lethargic was able to answer some questions and follow commands. CT head neg for acute infarct. CT angio with R carotid stenosis 45-55% and 55-65% on L. Currently oriented to self, family members, place but not to date or . Pt can not recall sequence of events. He recalled some difficulty with breathing and some chest pain but later retracted chest pain hx. +chronic cough. Family did not noted any recent fevers, chills, SOB, new change in cough, NVD or decrease in appetite. At baseline, very limited in terms of ambulation sec to PVD. He is seen by Dr Day at home and last seen a week ago. Pt had stopped all meds on his own for over 5 months now despite PMD insistence on maintaining ASA 81, neurontin 300mg, Flomax and lipitor. (2019 20:11)      Patient seen and examined at bedside.  Patient denies any chest pain or shortness of breath.    ALLERGIES:  No Known Allergies    MEDICATIONS:  acetaminophen   Tablet .. 650 milliGRAM(s) Oral every 6 hours PRN  albuterol/ipratropium for Nebulization 3 milliLiter(s) Nebulizer every 6 hours  aspirin enteric coated 81 milliGRAM(s) Oral daily  atorvastatin 40 milliGRAM(s) Oral at bedtime  budesonide 160 MICROgram(s)/formoterol 4.5 MICROgram(s) Inhaler 2 Puff(s) Inhalation two times a day  folic acid 1 milliGRAM(s) Oral daily  heparin  Injectable 5000 Unit(s) SubCutaneous every 8 hours  multivitamin 1 Tablet(s) Oral daily  pantoprazole    Tablet 40 milliGRAM(s) Oral before breakfast  tamsulosin 0.4 milliGRAM(s) Oral at bedtime  thiamine 100 milliGRAM(s) Oral daily  tiotropium 18 MICROgram(s) Capsule 1 Capsule(s) Inhalation daily    Vital Signs Last 24 Hrs  T(F): 97.8 (2019 17:22), Max: 98.3 (2019 21:19)  HR: 71 (2019 17:22) (60 - 78)  BP: 121/56 (2019 17:22) (118/48 - 137/54)  RR: 16 (2019 17:22) (16 - 21)  SpO2: 100% (2019 17:22) (97% - 100%)  I&O's Summary    2019 07:01  -  2019 17:58  --------------------------------------------------------  IN: 440 mL / OUT: 0 mL / NET: 440 mL        PHYSICAL EXAM:  General: NAD, A/O x 3  ENT: MMM  Neck: Supple, No JVD  Lungs: Clear to auscultation bilaterally  Cardio: RRR, S1/S2, No murmurs  Abdomen: Soft, Nontender, Nondistended; Bowel sounds present  Extremities: No cyanosis, No edema  Neuro: CN 2-12 grossly intact, no focal indicators     LABS:                        10.6   6.24  )-----------( 281      ( 2019 05:40 )             34.5         140  |  105  |  22  ----------------------------<  93  4.1   |  24  |  1.33    Ca    8.5      2019 05:40    TPro  7.2  /  Alb  3.1  /  TBili  0.6  /  DBili  x   /  AST  19  /  ALT  9   /  AlkPhos  122      eGFR if Non African American: 48 mL/min/1.73M2 (19 @ 05:40)  eGFR if African American: 56 mL/min/1.73M2 (19 @ 05:40)    PT/INR - ( 2019 17:50 )   PT: 11.9 sec;   INR: 1.06 ratio         PTT - ( 2019 17:50 )  PTT:26.3 sec    CARDIAC MARKERS ( 2019 05:40 )  <.017 ng/mL / x     / 75 U/L / x     / x      CARDIAC MARKERS ( 2019 21:00 )  <.017 ng/mL / x     / 68 U/L / x     / x      CARDIAC MARKERS ( 2019 17:50 )  <.017 ng/mL / x     / x     / x     / x          11-27 Chol 141 mg/dL LDL 69 mg/dL HDL 47 mg/dL Trig 123 mg/dL  TSH 2.31   TSH with FT4 reflex --  Total T3 --      ABG - ( 2019 18:25 )  pH, Arterial: 7.38  pH, Blood: x     /  pCO2: 35    /  pO2: 167   / HCO3: x     / Base Excess: x     /  SaO2: 99                          Urinalysis Basic - ( 2019 20:15 )    Color: Yellow / Appearance: Clear / S.010 / pH: x  Gluc: x / Ketone: Negative  / Bili: Negative / Urobili: Negative   Blood: x / Protein: 15 / Nitrite: Negative   Leuk Esterase: Negative / RBC: 0-4 /HPF / WBC 0-2 /HPF   Sq Epi: x / Non Sq Epi: Neg.-Few / Bacteria: Trace /HPF          RADIOLOGY & ADDITIONAL TESTS:    Care Discussed with Consultants/Other Providers:

## 2019-11-27 NOTE — CONSULT NOTE ADULT - ASSESSMENT
Patient is an 87 year old man admitted 11/26/19 with lethargy. He was found by family the day of admission in the late afternoon on the couch and he would open his eyes and stare at family when they would try to awaken him and then close his eyes. In the ER he was noted to be lethargic but  would answer a few questions and follow a few commands. He complained of SOB and chest pain.   He complains of feeling generally unwell. No history of seizures.  No history of fever, trauma. Neurological exam as above. Would be concerned that contributing to lethargy now resolved was ETOH. No history to suggest seizures.     1) EEG  2) CT Head as above no hemorrhage or acute infarct, chronic lacunar infarct right harper, microvascular ischemic changes, dystrophic calcification bilaterally basal ganglia and thalami unchanged 3/2019.  3) B12, Folate  4) As per Medicine

## 2019-11-27 NOTE — PROGRESS NOTE ADULT - ASSESSMENT
87M current smoker, hx of COPD, PVD, HLD, DVT/PE off Xarelto, hx of CVA pw AMS    #AMS  Patient had alcohol on board at time of AMS   neurology consult appreciated  CT head no acute findings  CTA neck -55-60%- will need outpatient referral to vascular  Discharge pending EEG and neuro (Dr. Franco) ok     #COPD  appears stable.   duonebs, spiriva, advair.     # RONNIE  Improving    # Hx of PVD and CVA  restart asa, statin.     # hx of BPH, In ED s/p 400cc after straight cath  check PVRs.  restart Flomax.     # DVT/GI proph  hx of DVTs and GIB  Sq hep and PPI.

## 2019-11-28 LAB
ANION GAP SERPL CALC-SCNC: 8 MMOL/L — SIGNIFICANT CHANGE UP (ref 5–17)
BUN SERPL-MCNC: 25 MG/DL — HIGH (ref 7–23)
CALCIUM SERPL-MCNC: 8.7 MG/DL — SIGNIFICANT CHANGE UP (ref 8.4–10.5)
CHLORIDE SERPL-SCNC: 101 MMOL/L — SIGNIFICANT CHANGE UP (ref 96–108)
CO2 SERPL-SCNC: 27 MMOL/L — SIGNIFICANT CHANGE UP (ref 22–31)
CREAT SERPL-MCNC: 1.28 MG/DL — SIGNIFICANT CHANGE UP (ref 0.5–1.3)
FOLATE SERPL-MCNC: 14.7 NG/ML — SIGNIFICANT CHANGE UP
GLUCOSE SERPL-MCNC: 96 MG/DL — SIGNIFICANT CHANGE UP (ref 70–99)
HCT VFR BLD CALC: 34.2 % — LOW (ref 39–50)
HGB BLD-MCNC: 10.5 G/DL — LOW (ref 13–17)
IRON SATN MFR SERPL: 17 % — SIGNIFICANT CHANGE UP (ref 16–55)
IRON SATN MFR SERPL: 61 UG/DL — SIGNIFICANT CHANGE UP (ref 45–165)
MCHC RBC-ENTMCNC: 24.4 PG — LOW (ref 27–34)
MCHC RBC-ENTMCNC: 30.7 GM/DL — LOW (ref 32–36)
MCV RBC AUTO: 79.4 FL — LOW (ref 80–100)
NRBC # BLD: 0 /100 WBCS — SIGNIFICANT CHANGE UP (ref 0–0)
PLATELET # BLD AUTO: 256 K/UL — SIGNIFICANT CHANGE UP (ref 150–400)
POTASSIUM SERPL-MCNC: 4.3 MMOL/L — SIGNIFICANT CHANGE UP (ref 3.5–5.3)
POTASSIUM SERPL-SCNC: 4.3 MMOL/L — SIGNIFICANT CHANGE UP (ref 3.5–5.3)
RBC # BLD: 4.31 M/UL — SIGNIFICANT CHANGE UP (ref 4.2–5.8)
RBC # FLD: 17.1 % — HIGH (ref 10.3–14.5)
SODIUM SERPL-SCNC: 136 MMOL/L — SIGNIFICANT CHANGE UP (ref 135–145)
TIBC SERPL-MCNC: 357 UG/DL — SIGNIFICANT CHANGE UP (ref 220–430)
UIBC SERPL-MCNC: 296 UG/DL — SIGNIFICANT CHANGE UP (ref 110–370)
VIT B12 SERPL-MCNC: 389 PG/ML — SIGNIFICANT CHANGE UP (ref 232–1245)
WBC # BLD: 7.12 K/UL — SIGNIFICANT CHANGE UP (ref 3.8–10.5)
WBC # FLD AUTO: 7.12 K/UL — SIGNIFICANT CHANGE UP (ref 3.8–10.5)

## 2019-11-28 PROCEDURE — 99233 SBSQ HOSP IP/OBS HIGH 50: CPT

## 2019-11-28 RX ADMIN — HEPARIN SODIUM 5000 UNIT(S): 5000 INJECTION INTRAVENOUS; SUBCUTANEOUS at 05:22

## 2019-11-28 RX ADMIN — Medication 1 MILLIGRAM(S): at 12:37

## 2019-11-28 RX ADMIN — PANTOPRAZOLE SODIUM 40 MILLIGRAM(S): 20 TABLET, DELAYED RELEASE ORAL at 05:22

## 2019-11-28 RX ADMIN — HEPARIN SODIUM 5000 UNIT(S): 5000 INJECTION INTRAVENOUS; SUBCUTANEOUS at 14:04

## 2019-11-28 RX ADMIN — Medication 3 MILLILITER(S): at 10:08

## 2019-11-28 RX ADMIN — TIOTROPIUM BROMIDE 1 CAPSULE(S): 18 CAPSULE ORAL; RESPIRATORY (INHALATION) at 10:07

## 2019-11-28 RX ADMIN — ATORVASTATIN CALCIUM 40 MILLIGRAM(S): 80 TABLET, FILM COATED ORAL at 21:36

## 2019-11-28 RX ADMIN — BUDESONIDE AND FORMOTEROL FUMARATE DIHYDRATE 2 PUFF(S): 160; 4.5 AEROSOL RESPIRATORY (INHALATION) at 21:15

## 2019-11-28 RX ADMIN — Medication 40 MILLIGRAM(S): at 12:37

## 2019-11-28 RX ADMIN — Medication 100 MILLIGRAM(S): at 12:37

## 2019-11-28 RX ADMIN — HEPARIN SODIUM 5000 UNIT(S): 5000 INJECTION INTRAVENOUS; SUBCUTANEOUS at 21:36

## 2019-11-28 RX ADMIN — Medication 3 MILLILITER(S): at 04:10

## 2019-11-28 RX ADMIN — Medication 1 TABLET(S): at 12:37

## 2019-11-28 RX ADMIN — Medication 81 MILLIGRAM(S): at 12:37

## 2019-11-28 RX ADMIN — Medication 3 MILLILITER(S): at 16:01

## 2019-11-28 RX ADMIN — BUDESONIDE AND FORMOTEROL FUMARATE DIHYDRATE 2 PUFF(S): 160; 4.5 AEROSOL RESPIRATORY (INHALATION) at 10:08

## 2019-11-28 RX ADMIN — Medication 3 MILLILITER(S): at 21:17

## 2019-11-28 NOTE — PROGRESS NOTE ADULT - ASSESSMENT
Patient is an 87 year old man admitted 11/26/19 with lethargy. He was found by family the day of admission in the late afternoon on the couch and he would open his eyes and stare at family when they would try to awaken him and then close his eyes. In the ER he was noted to be lethargic but  would answer a few questions and follow a few commands. He complained of SOB and chest pain.   He complains of feeling generally unwell. No history of seizures.  No history of fever, trauma. Neurological exam as above. Would be concerned that contributing to lethargy now resolved was ETOH. No history to suggest seizures.     1) EEG pending.  2) CT Head  no hemorrhage or acute infarct, chronic lacunar infarct right harper, microvascular ischemic changes, dystrophic calcification bilaterally basal ganglia and thalami unchanged 3/2019.  3) B12  Folate as above normal.  4) As per Medicine

## 2019-11-28 NOTE — PROGRESS NOTE ADULT - SUBJECTIVE AND OBJECTIVE BOX
Patient is an 87 year old man admitted 11/26/19 with lethargy. He was found by family the day of admission in the late afternoon on the couch and he would open his eyes and stare at family when they would try to awaken him and then close his eyes. In the ER he was noted to be lethargic but  would answer a few questions and follow a few commands. He complained of SOB and chest pain.   He complains of feeling generally unwell. No history of seizures.  No history of fever, trauma. Patient states he feels fatigued. He denies other complaints.    PMH: History of CVA          COPD          HLD          PVD          History of DVT/PE         S/P Hip surgery           SH: No allergy. Smoking history of 2 cig/day/65 years. History of 2 glasses of wine per day.       Lives with wife.      Exam: Awake, slowed mentation, appropriate           CN II-XII intact           Motor tone and strength normal              Vitamin B12, Serum: 389 pg/mL (11.28.19 @ 06:10)    Folate, Serum: 14.7 ng/mL (11.28.19 @ 06:10)

## 2019-11-28 NOTE — PROGRESS NOTE ADULT - SUBJECTIVE AND OBJECTIVE BOX
Patient is a 87y old  Male who presents with a chief complaint of AMS (2019 17:57)      Patient seen and examined at bedside. No overnight events reported. Pt reports worsening SOB started one week ago, mild worsening of productive cough, pt feels that sputum is stuck in the throat. No other complaints. no cp, palpitations, abd pain, headache, dizziness.     ALLERGIES:  No Known Allergies    MEDICATIONS  (STANDING):  albuterol/ipratropium for Nebulization 3 milliLiter(s) Nebulizer every 6 hours  aspirin enteric coated 81 milliGRAM(s) Oral daily  atorvastatin 40 milliGRAM(s) Oral at bedtime  budesonide 160 MICROgram(s)/formoterol 4.5 MICROgram(s) Inhaler 2 Puff(s) Inhalation two times a day  folic acid 1 milliGRAM(s) Oral daily  heparin  Injectable 5000 Unit(s) SubCutaneous every 8 hours  multivitamin 1 Tablet(s) Oral daily  pantoprazole    Tablet 40 milliGRAM(s) Oral before breakfast  thiamine 100 milliGRAM(s) Oral daily  tiotropium 18 MICROgram(s) Capsule 1 Capsule(s) Inhalation daily    MEDICATIONS  (PRN):  acetaminophen   Tablet .. 650 milliGRAM(s) Oral every 6 hours PRN Temp greater or equal to 38C (100.4F), Mild Pain (1 - 3)    Vital Signs Last 24 Hrs  T(F): 98.1 (2019 05:12), Max: 98.1 (2019 05:12)  HR: 74 (2019 10:09) (60 - 78)  BP: 130/93 (2019 05:12) (121/56 - 164/53)  RR: 16 (2019 05:12) (16 - 16)  SpO2: 96% (2019 10:09) (94% - 100%)  I&O's Summary    2019 07:01  -  2019 07:00  --------------------------------------------------------  IN: 640 mL / OUT: 3 mL / NET: 637 mL      GENERAL: NAD. slow mentation, but appropriate  HEAD:  Atraumatic, Normocephalic  EYES: EOMI, PERRLA, sclera clear  NECK: Supple  CHEST/LUNG: Clear to auscultation bilaterally; No wheeze  HEART: Regular rate and rhythm; No murmurs, rubs, or gallops  ABDOMEN: Soft, Nontender, Nondistended; Bowel sounds present  EXTREMITIES:  no edema  NEUROLOGY: non-focal  SKIN: No rashes or lesions    LABS:                        10.5   7.12  )-----------( 256      ( 2019 06:10 )             34.2         136  |  101  |  25  ----------------------------<  96  4.3   |  27  |  1.28    Ca    8.7      2019 06:10    TPro  7.2  /  Alb  3.1  /  TBili  0.6  /  DBili  x   /  AST  19  /  ALT  9   /  AlkPhos  122          eGFR if Non African American: 50 mL/min/1.73M2 (19 @ 06:10)  eGFR if African American: 58 mL/min/1.73M2 (19 @ 06:10)    PT/INR - ( 2019 17:50 )   PT: 11.9 sec;   INR: 1.06 ratio         PTT - ( 2019 17:50 )  PTT:26.3 sec    Procalcitonin, Serum: 0.04 ng/mL (19 @ 17:50)    CARDIAC MARKERS ( 2019 05:40 )  <.017 ng/mL / x     / 75 U/L / x     / x      CARDIAC MARKERS ( 2019 21:00 )  <.017 ng/mL / x     / 68 U/L / x     / x      CARDIAC MARKERS ( 2019 17:50 )  <.017 ng/mL / x     / x     / x     / x           Chol 141 mg/dL LDL 69 mg/dL HDL 47 mg/dL Trig 123 mg/dL  TSH 2.31   TSH with FT4 reflex --  Total T3 --      ABG - ( 2019 18:25 )  pH, Arterial: 7.38  pH, Blood: x     /  pCO2: 35    /  pO2: 167   / HCO3: x     / Base Excess: x     /  SaO2: 99          Urinalysis Basic - ( 2019 20:15 )    Color: Yellow / Appearance: Clear / S.010 / pH: x  Gluc: x / Ketone: Negative  / Bili: Negative / Urobili: Negative   Blood: x / Protein: 15 / Nitrite: Negative   Leuk Esterase: Negative / RBC: 0-4 /HPF / WBC 0-2 /HPF   Sq Epi: x / Non Sq Epi: Neg.-Few / Bacteria: Trace /HPF        Culture - Urine (collected 2019 20:15)  Source: .Urine Clean Catch (Midstream)  Final Report (2019 20:58):    No growth      RADIOLOGY & ADDITIONAL TESTS:    Care Discussed with Consultants/Other Providers:

## 2019-11-28 NOTE — PROGRESS NOTE ADULT - ASSESSMENT
87M current smoker, hx of COPD, PVD, HLD, DVT/PE off Xarelto, hx of CVA pw AMS. Pt c/o worsening SOB for one week with mild increased in productive cough.     #AMS  Patient had alcohol on board at time of AMS   neurology consult appreciated  CT head no acute findings  CTA neck -55-60%- will need outpatient referral to vascular  Discharge pending EEG reading   PT eval     # dyspnea possible acute on chronic copd exacerbation  no wheezing on exam, however, pt c/o worsening sob and cough  prednisone 40mg qd x5 days  duonebs q6 and prn, spiriva, advair.   taper down oxygen     # RONNIE on CKD 3  best Cr baseline in 2019: 1.08-1.14  resolved     # Hx of PVD and CVA  cont asa, statin.     # hx of BPH, In ED s/p 400cc after straight cath  urinary retention resolved. PVR 16 yesterday.  cont Flomax.     # DVT/GI proph  hx of DVTs and GIB  Sq hep and PPI. 87M current smoker, hx of COPD, PVD, HLD, DVT/PE off Xarelto, hx of CVA pw AMS. Pt c/o worsening SOB for one week with mild increased in productive cough.     #AMS  Patient had alcohol on board at time of AMS   neurology consult appreciated  CT head no acute findings  CTA neck -55-60%- will need outpatient referral to vascular  Discharge pending EEG reading   PT eval     # dyspnea possible acute on chronic copd exacerbation  no wheezing on exam, however, pt c/o worsening sob and cough  prednisone 40mg qd x5 days  duonebs q6 and prn, spiriva, advair.   taper down oxygen     # Chronic microcytic anemia  - check iron studies  - H/h stable    # RONNIE on CKD 3  best Cr baseline in 2019: 1.08-1.14  resolved     # Hx of PVD and CVA  cont asa, statin.     # hx of BPH, In ED s/p 400cc after straight cath  urinary retention resolved. PVR 16 yesterday.  cont Flomax.     # DVT/GI proph  hx of DVTs and GIB  Sq hep and PPI.

## 2019-11-29 LAB
ALBUMIN SERPL ELPH-MCNC: 3.2 G/DL — LOW (ref 3.3–5)
ALP SERPL-CCNC: 133 U/L — HIGH (ref 40–120)
ALT FLD-CCNC: 29 U/L — SIGNIFICANT CHANGE UP (ref 10–45)
ANION GAP SERPL CALC-SCNC: 6 MMOL/L — SIGNIFICANT CHANGE UP (ref 5–17)
AST SERPL-CCNC: 31 U/L — SIGNIFICANT CHANGE UP (ref 10–40)
BASOPHILS # BLD AUTO: 0.01 K/UL — SIGNIFICANT CHANGE UP (ref 0–0.2)
BASOPHILS NFR BLD AUTO: 0.1 % — SIGNIFICANT CHANGE UP (ref 0–2)
BILIRUB SERPL-MCNC: 0.7 MG/DL — SIGNIFICANT CHANGE UP (ref 0.2–1.2)
BUN SERPL-MCNC: 26 MG/DL — HIGH (ref 7–23)
CALCIUM SERPL-MCNC: 8.9 MG/DL — SIGNIFICANT CHANGE UP (ref 8.4–10.5)
CHLORIDE SERPL-SCNC: 103 MMOL/L — SIGNIFICANT CHANGE UP (ref 96–108)
CO2 SERPL-SCNC: 28 MMOL/L — SIGNIFICANT CHANGE UP (ref 22–31)
CREAT SERPL-MCNC: 1.28 MG/DL — SIGNIFICANT CHANGE UP (ref 0.5–1.3)
EOSINOPHIL # BLD AUTO: 0.13 K/UL — SIGNIFICANT CHANGE UP (ref 0–0.5)
EOSINOPHIL NFR BLD AUTO: 1.7 % — SIGNIFICANT CHANGE UP (ref 0–6)
FERRITIN SERPL-MCNC: 28 NG/ML — LOW (ref 30–400)
GLUCOSE SERPL-MCNC: 101 MG/DL — HIGH (ref 70–99)
HCT VFR BLD CALC: 33.6 % — LOW (ref 39–50)
HGB BLD-MCNC: 10.6 G/DL — LOW (ref 13–17)
IMM GRANULOCYTES NFR BLD AUTO: 0.3 % — SIGNIFICANT CHANGE UP (ref 0–1.5)
IRON SATN MFR SERPL: 30 UG/DL — LOW (ref 45–165)
IRON SATN MFR SERPL: 8 % — LOW (ref 16–55)
LYMPHOCYTES # BLD AUTO: 1.84 K/UL — SIGNIFICANT CHANGE UP (ref 1–3.3)
LYMPHOCYTES # BLD AUTO: 24.6 % — SIGNIFICANT CHANGE UP (ref 13–44)
MAGNESIUM SERPL-MCNC: 1.8 MG/DL — SIGNIFICANT CHANGE UP (ref 1.6–2.6)
MCHC RBC-ENTMCNC: 25 PG — LOW (ref 27–34)
MCHC RBC-ENTMCNC: 31.5 GM/DL — LOW (ref 32–36)
MCV RBC AUTO: 79.2 FL — LOW (ref 80–100)
MONOCYTES # BLD AUTO: 0.65 K/UL — SIGNIFICANT CHANGE UP (ref 0–0.9)
MONOCYTES NFR BLD AUTO: 8.7 % — SIGNIFICANT CHANGE UP (ref 2–14)
NEUTROPHILS # BLD AUTO: 4.83 K/UL — SIGNIFICANT CHANGE UP (ref 1.8–7.4)
NEUTROPHILS NFR BLD AUTO: 64.6 % — SIGNIFICANT CHANGE UP (ref 43–77)
NRBC # BLD: 0 /100 WBCS — SIGNIFICANT CHANGE UP (ref 0–0)
PLATELET # BLD AUTO: 277 K/UL — SIGNIFICANT CHANGE UP (ref 150–400)
POTASSIUM SERPL-MCNC: 4.5 MMOL/L — SIGNIFICANT CHANGE UP (ref 3.5–5.3)
POTASSIUM SERPL-SCNC: 4.5 MMOL/L — SIGNIFICANT CHANGE UP (ref 3.5–5.3)
PROT SERPL-MCNC: 7.4 G/DL — SIGNIFICANT CHANGE UP (ref 6–8.3)
RBC # BLD: 4.24 M/UL — SIGNIFICANT CHANGE UP (ref 4.2–5.8)
RBC # FLD: 16.9 % — HIGH (ref 10.3–14.5)
SODIUM SERPL-SCNC: 137 MMOL/L — SIGNIFICANT CHANGE UP (ref 135–145)
TIBC SERPL-MCNC: 362 UG/DL — SIGNIFICANT CHANGE UP (ref 220–430)
TRANSFERRIN SERPL-MCNC: 274 MG/DL — SIGNIFICANT CHANGE UP (ref 200–360)
UIBC SERPL-MCNC: 332 UG/DL — SIGNIFICANT CHANGE UP (ref 110–370)
WBC # BLD: 7.48 K/UL — SIGNIFICANT CHANGE UP (ref 3.8–10.5)
WBC # FLD AUTO: 7.48 K/UL — SIGNIFICANT CHANGE UP (ref 3.8–10.5)

## 2019-11-29 PROCEDURE — 99233 SBSQ HOSP IP/OBS HIGH 50: CPT

## 2019-11-29 RX ORDER — IRON SUCROSE 20 MG/ML
100 INJECTION, SOLUTION INTRAVENOUS ONCE
Refills: 0 | Status: COMPLETED | OUTPATIENT
Start: 2019-11-29 | End: 2019-11-29

## 2019-11-29 RX ADMIN — TIOTROPIUM BROMIDE 1 CAPSULE(S): 18 CAPSULE ORAL; RESPIRATORY (INHALATION) at 09:47

## 2019-11-29 RX ADMIN — Medication 81 MILLIGRAM(S): at 14:14

## 2019-11-29 RX ADMIN — BUDESONIDE AND FORMOTEROL FUMARATE DIHYDRATE 2 PUFF(S): 160; 4.5 AEROSOL RESPIRATORY (INHALATION) at 09:47

## 2019-11-29 RX ADMIN — Medication 40 MILLIGRAM(S): at 05:38

## 2019-11-29 RX ADMIN — PANTOPRAZOLE SODIUM 40 MILLIGRAM(S): 20 TABLET, DELAYED RELEASE ORAL at 05:38

## 2019-11-29 RX ADMIN — Medication 3 MILLILITER(S): at 09:48

## 2019-11-29 RX ADMIN — Medication 3 MILLILITER(S): at 16:12

## 2019-11-29 RX ADMIN — Medication 100 MILLIGRAM(S): at 14:14

## 2019-11-29 RX ADMIN — BUDESONIDE AND FORMOTEROL FUMARATE DIHYDRATE 2 PUFF(S): 160; 4.5 AEROSOL RESPIRATORY (INHALATION) at 20:54

## 2019-11-29 RX ADMIN — Medication 1 TABLET(S): at 14:14

## 2019-11-29 RX ADMIN — Medication 3 MILLILITER(S): at 21:33

## 2019-11-29 RX ADMIN — IRON SUCROSE 210 MILLIGRAM(S): 20 INJECTION, SOLUTION INTRAVENOUS at 17:21

## 2019-11-29 RX ADMIN — Medication 1 MILLIGRAM(S): at 14:14

## 2019-11-29 NOTE — PHYSICAL THERAPY INITIAL EVALUATION ADULT - ADDITIONAL COMMENTS
pt lives in private home with wife and daughter. pt recalled limited history. pt uses RW at home and requires assistance for ADLs.

## 2019-11-29 NOTE — PROGRESS NOTE ADULT - ASSESSMENT
Patient is an 87 year old man admitted 11/26/19 with lethargy. He was found by family the day of admission in the late afternoon on the couch and he would open his eyes and stare at family when they would try to awaken him and then close his eyes. In the ER he was noted to be lethargic but  would answer a few questions and follow a few commands. He complained of SOB and chest pain.   He complains of feeling generally unwell. No history of seizures.  No history of fever, trauma. Neurological exam as above. Would be concerned that contributing to lethargy now resolved was ETOH. No history to suggest seizures.     1) EEG pending.  2) CT Head  no hemorrhage or acute infarct, chronic lacunar infarct right harper, microvascular ischemic changes, dystrophic calcification bilaterally basal ganglia and thalami unchanged 3/2019.  3) B12  Folate  normal.  4) As per Medicine

## 2019-11-29 NOTE — PHYSICAL THERAPY INITIAL EVALUATION ADULT - GAIT DEVIATIONS NOTED, PT EVAL
decreased step length/increased time in double stance/decreased weight-shifting ability/shuffling/decreased warren

## 2019-11-29 NOTE — PROGRESS NOTE ADULT - SUBJECTIVE AND OBJECTIVE BOX
Patient is a 87y old  Male who presents with a chief complaint of AMS (2019 11:39)      Patient seen and examined at bedside. No overnight events reported. Pt reports feeling weak and tired, mild SOB. coughs stable. no CP, palpitations, dizziness.     ALLERGIES:  No Known Allergies    MEDICATIONS  (STANDING):  albuterol/ipratropium for Nebulization 3 milliLiter(s) Nebulizer every 6 hours  aspirin enteric coated 81 milliGRAM(s) Oral daily  atorvastatin 40 milliGRAM(s) Oral at bedtime  budesonide 160 MICROgram(s)/formoterol 4.5 MICROgram(s) Inhaler 2 Puff(s) Inhalation two times a day  folic acid 1 milliGRAM(s) Oral daily  heparin  Injectable 5000 Unit(s) SubCutaneous every 8 hours  multivitamin 1 Tablet(s) Oral daily  pantoprazole    Tablet 40 milliGRAM(s) Oral before breakfast  predniSONE   Tablet 40 milliGRAM(s) Oral daily  tiotropium 18 MICROgram(s) Capsule 1 Capsule(s) Inhalation daily    MEDICATIONS  (PRN):  acetaminophen   Tablet .. 650 milliGRAM(s) Oral every 6 hours PRN Temp greater or equal to 38C (100.4F), Mild Pain (1 - 3)    Vital Signs Last 24 Hrs  T(F): 97.2 (2019 14:22), Max: 98.3 (2019 16:30)  HR: 90 (2019 14:22) (14 - 798)  BP: 135/80 (2019 14:22) (123/66 - 135/80)  RR: 16 (2019 14:22) (16 - 16)  SpO2: 96% (2019 14:22) (94% - 100%)  I&O's Summary    2019 07:01  -  2019 07:00  --------------------------------------------------------  IN: 180 mL / OUT: 0 mL / NET: 180 mL      GENERAL: NAD  HEAD:  Atraumatic  EYES: EOMI, PERRLA, sclera clear  NECK: Supple  CHEST/LUNG: Clear to auscultation bilaterally; No wheeze  HEART: Regular rate and rhythm; No murmurs, rubs, or gallops  ABDOMEN: Soft, Nontender, Nondistended; Bowel sounds present  EXTREMITIES:  no edema  NEUROLOGY: non-focal  SKIN: No rashes or lesions    LABS:                        10.6   7.48  )-----------( 277      ( 2019 06:20 )             33.6         137  |  103  |  26  ----------------------------<  101  4.5   |  28  |  1.28    Ca    8.9      2019 06:20  Mg     1.8         TPro  7.4  /  Alb  3.2  /  TBili  0.7  /  DBili  x   /  AST  31  /  ALT    /  AlkPhos  133        eGFR if Non African American: 50 mL/min/1.73M2 (19 @ 06:20)  eGFR if African American: 58 mL/min/1.73M2 (19 @ 06:20)    PT/INR - ( 2019 17:50 )   PT: 11.9 sec;   INR: 1.06 ratio         PTT - ( 2019 17:50 )  PTT:26.3 sec    Procalcitonin, Serum: 0.04 ng/mL (19 @ 17:50)    CARDIAC MARKERS ( 2019 05:40 )  <.017 ng/mL / x     / 75 U/L / x     / x      CARDIAC MARKERS ( 2019 21:00 )  <.017 ng/mL / x     / 68 U/L / x     / x      CARDIAC MARKERS ( 2019 17:50 )  <.017 ng/mL / x     / x     / x     / x           Chol 141 mg/dL LDL 69 mg/dL HDL 47 mg/dL Trig 123 mg/dL  TSH 2.31   TSH with FT4 reflex --  Total T3 --    ABG - ( 2019 18:25 )  pH, Arterial: 7.38  pH, Blood: x     /  pCO2: 35    /  pO2: 167   / HCO3: x     / Base Excess: x     /  SaO2: 99          Urinalysis Basic - ( 2019 20:15 )    Color: Yellow / Appearance: Clear / S.010 / pH: x  Gluc: x / Ketone: Negative  / Bili: Negative / Urobili: Negative   Blood: x / Protein: 15 / Nitrite: Negative   Leuk Esterase: Negative / RBC: 0-4 /HPF / WBC 0-2 /HPF   Sq Epi: x / Non Sq Epi: Neg.-Few / Bacteria: Trace /HPF        Culture - Urine (collected 2019 20:15)  Source: .Urine Clean Catch (Midstream)  Final Report (2019 20:58):    No growth      RADIOLOGY & ADDITIONAL TESTS:    Care Discussed with Consultants/Other Providers:

## 2019-11-29 NOTE — PROGRESS NOTE ADULT - SUBJECTIVE AND OBJECTIVE BOX
Patient is an 87 year old man admitted 11/26/19 with lethargy. He was found by family the day of admission in the late afternoon on the couch and he would open his eyes and stare at family when they would try to awaken him and then close his eyes. In the ER he was noted to be lethargic but  would answer a few questions and follow a few commands. He complained of SOB and chest pain.   He complains of feeling generally unwell. No history of seizures.  No history of fever, trauma. Patient states he continues to  feel fatigued. He denies other complaints.    PMH: History of CVA          COPD          HLD          PVD          History of DVT/PE         S/P Hip surgery           SH: No allergy. Smoking history of 2 cig/day/65 years. History of 2 glasses of wine per day.       Lives with wife.      Exam: Awake, slowed mentation, appropriate           CN II-XII intact           Motor tone and strength normal

## 2019-11-29 NOTE — PROGRESS NOTE ADULT - ASSESSMENT
87M current smoker, hx of COPD, PVD, HLD, DVT/PE off Xarelto, hx of CVA pw AMS. Pt c/o worsening SOB for one week with mild increased in productive cough.     #AMS  Patient had alcohol on board at time of AMS   neurology consult appreciated  CT head no acute findings  CTA neck -55-60%- will need outpatient referral to vascular  Discharge pending EEG reading   PT eval     # dyspnea possible acute on chronic copd exacerbation  no wheezing on exam, however, pt c/o worsening sob and cough  finish prednisone 40mg qd x5 days (last day 12/2/19)  duonebs q6 and prn, spiriva, symbicort   taper down oxygen if possible    # Chronic microcytic anemia 2/2 NOAH  - ferritin 28  - venofer 100mg x1. start ferrous sulfate  - H/h stable    # RONNIE on CKD 3  best Cr baseline in 2019: 1.08-1.14  resolved     # Hx of PVD and CVA  cont asa, statin.     # hx of BPH  urinary retention resolved.   cont Flomax.     # DVT/GI proph  hx of DVTs and GIB  Sq hep and PPI.     DISPO: ДМИТРИЙ after EEG reading

## 2019-11-30 LAB
ANION GAP SERPL CALC-SCNC: 9 MMOL/L — SIGNIFICANT CHANGE UP (ref 5–17)
BASOPHILS # BLD AUTO: 0.02 K/UL — SIGNIFICANT CHANGE UP (ref 0–0.2)
BASOPHILS NFR BLD AUTO: 0.3 % — SIGNIFICANT CHANGE UP (ref 0–2)
BUN SERPL-MCNC: 31 MG/DL — HIGH (ref 7–23)
CALCIUM SERPL-MCNC: 8.5 MG/DL — SIGNIFICANT CHANGE UP (ref 8.4–10.5)
CHLORIDE SERPL-SCNC: 104 MMOL/L — SIGNIFICANT CHANGE UP (ref 96–108)
CO2 SERPL-SCNC: 26 MMOL/L — SIGNIFICANT CHANGE UP (ref 22–31)
CREAT SERPL-MCNC: 1.25 MG/DL — SIGNIFICANT CHANGE UP (ref 0.5–1.3)
EOSINOPHIL # BLD AUTO: 0.02 K/UL — SIGNIFICANT CHANGE UP (ref 0–0.5)
EOSINOPHIL NFR BLD AUTO: 0.3 % — SIGNIFICANT CHANGE UP (ref 0–6)
GLUCOSE SERPL-MCNC: 93 MG/DL — SIGNIFICANT CHANGE UP (ref 70–99)
HCT VFR BLD CALC: 32.6 % — LOW (ref 39–50)
HGB BLD-MCNC: 10 G/DL — LOW (ref 13–17)
IMM GRANULOCYTES NFR BLD AUTO: 0.3 % — SIGNIFICANT CHANGE UP (ref 0–1.5)
LYMPHOCYTES # BLD AUTO: 2.17 K/UL — SIGNIFICANT CHANGE UP (ref 1–3.3)
LYMPHOCYTES # BLD AUTO: 27.3 % — SIGNIFICANT CHANGE UP (ref 13–44)
MCHC RBC-ENTMCNC: 24.5 PG — LOW (ref 27–34)
MCHC RBC-ENTMCNC: 30.7 GM/DL — LOW (ref 32–36)
MCV RBC AUTO: 79.9 FL — LOW (ref 80–100)
MONOCYTES # BLD AUTO: 0.72 K/UL — SIGNIFICANT CHANGE UP (ref 0–0.9)
MONOCYTES NFR BLD AUTO: 9.1 % — SIGNIFICANT CHANGE UP (ref 2–14)
NEUTROPHILS # BLD AUTO: 5 K/UL — SIGNIFICANT CHANGE UP (ref 1.8–7.4)
NEUTROPHILS NFR BLD AUTO: 62.7 % — SIGNIFICANT CHANGE UP (ref 43–77)
NRBC # BLD: 0 /100 WBCS — SIGNIFICANT CHANGE UP (ref 0–0)
PLATELET # BLD AUTO: 264 K/UL — SIGNIFICANT CHANGE UP (ref 150–400)
POTASSIUM SERPL-MCNC: 3.9 MMOL/L — SIGNIFICANT CHANGE UP (ref 3.5–5.3)
POTASSIUM SERPL-SCNC: 3.9 MMOL/L — SIGNIFICANT CHANGE UP (ref 3.5–5.3)
RBC # BLD: 4.08 M/UL — LOW (ref 4.2–5.8)
RBC # FLD: 17.4 % — HIGH (ref 10.3–14.5)
SODIUM SERPL-SCNC: 139 MMOL/L — SIGNIFICANT CHANGE UP (ref 135–145)
WBC # BLD: 7.95 K/UL — SIGNIFICANT CHANGE UP (ref 3.8–10.5)
WBC # FLD AUTO: 7.95 K/UL — SIGNIFICANT CHANGE UP (ref 3.8–10.5)

## 2019-11-30 PROCEDURE — 99232 SBSQ HOSP IP/OBS MODERATE 35: CPT

## 2019-11-30 RX ORDER — SENNA PLUS 8.6 MG/1
2 TABLET ORAL AT BEDTIME
Refills: 0 | Status: DISCONTINUED | OUTPATIENT
Start: 2019-11-30 | End: 2019-12-02

## 2019-11-30 RX ORDER — ENOXAPARIN SODIUM 100 MG/ML
40 INJECTION SUBCUTANEOUS ONCE
Refills: 0 | Status: COMPLETED | OUTPATIENT
Start: 2019-11-30 | End: 2019-11-30

## 2019-11-30 RX ORDER — FERROUS SULFATE 325(65) MG
325 TABLET ORAL DAILY
Refills: 0 | Status: DISCONTINUED | OUTPATIENT
Start: 2019-11-30 | End: 2019-12-02

## 2019-11-30 RX ORDER — POLYETHYLENE GLYCOL 3350 17 G/17G
17 POWDER, FOR SOLUTION ORAL DAILY
Refills: 0 | Status: DISCONTINUED | OUTPATIENT
Start: 2019-11-30 | End: 2019-12-02

## 2019-11-30 RX ADMIN — Medication 81 MILLIGRAM(S): at 12:18

## 2019-11-30 RX ADMIN — POLYETHYLENE GLYCOL 3350 17 GRAM(S): 17 POWDER, FOR SOLUTION ORAL at 15:02

## 2019-11-30 RX ADMIN — Medication 40 MILLIGRAM(S): at 08:26

## 2019-11-30 RX ADMIN — Medication 1 TABLET(S): at 12:18

## 2019-11-30 RX ADMIN — ATORVASTATIN CALCIUM 40 MILLIGRAM(S): 80 TABLET, FILM COATED ORAL at 20:33

## 2019-11-30 RX ADMIN — Medication 3 MILLILITER(S): at 15:38

## 2019-11-30 RX ADMIN — BUDESONIDE AND FORMOTEROL FUMARATE DIHYDRATE 2 PUFF(S): 160; 4.5 AEROSOL RESPIRATORY (INHALATION) at 10:00

## 2019-11-30 RX ADMIN — Medication 1 MILLIGRAM(S): at 12:18

## 2019-11-30 RX ADMIN — PANTOPRAZOLE SODIUM 40 MILLIGRAM(S): 20 TABLET, DELAYED RELEASE ORAL at 08:26

## 2019-11-30 RX ADMIN — Medication 3 MILLILITER(S): at 09:58

## 2019-11-30 RX ADMIN — Medication 325 MILLIGRAM(S): at 15:02

## 2019-11-30 RX ADMIN — Medication 3 MILLILITER(S): at 21:34

## 2019-11-30 RX ADMIN — TIOTROPIUM BROMIDE 1 CAPSULE(S): 18 CAPSULE ORAL; RESPIRATORY (INHALATION) at 10:01

## 2019-11-30 RX ADMIN — BUDESONIDE AND FORMOTEROL FUMARATE DIHYDRATE 2 PUFF(S): 160; 4.5 AEROSOL RESPIRATORY (INHALATION) at 21:34

## 2019-11-30 RX ADMIN — ENOXAPARIN SODIUM 40 MILLIGRAM(S): 100 INJECTION SUBCUTANEOUS at 15:02

## 2019-11-30 RX ADMIN — SENNA PLUS 2 TABLET(S): 8.6 TABLET ORAL at 20:33

## 2019-11-30 NOTE — PROGRESS NOTE ADULT - ASSESSMENT
Patient is an 87 year old man admitted 11/26/19 with lethargy. He was found by family the day of admission in the late afternoon on the couch and he would open his eyes and stare at family when they would try to awaken him and then close his eyes. In the ER he was noted to be lethargic but  would answer a few questions and follow a few commands. He complained of SOB and chest pain.   He complains of feeling generally unwell. No history of seizures.  No history of fever, trauma. Neurological exam as above. Would be concerned that contributing to lethargy now resolved was ETOH. No history to suggest seizures.     1) EEG as above, no epileptiform discharges.  2) CT Head  no hemorrhage or acute infarct, chronic lacunar infarct right harper, microvascular ischemic changes, dystrophic calcification bilaterally basal ganglia and thalami unchanged 3/2019.  3) B12  Folate  normal.  4) As per Medicine

## 2019-11-30 NOTE — PROGRESS NOTE ADULT - SUBJECTIVE AND OBJECTIVE BOX
Patient is an 87 year old man admitted 11/26/19 with lethargy. He was found by family the day of admission in the late afternoon on the couch and he would open his eyes and stare at family when they would try to awaken him and then close his eyes. In the ER he was noted to be lethargic but  would answer a few questions and follow a few commands. He complained of SOB and chest pain.   He complains of feeling generally unwell. No history of seizures.  No history of fever, trauma. Patient states he continues to  feel fatigued. He denies other complaints.    PMH: History of CVA          COPD          HLD          PVD          History of DVT/PE         S/P Hip surgery           SH: No allergy. Smoking history of 2 cig/day/65 years. History of 2 glasses of wine per day.       Lives with wife.      Exam: Awake, slowed mentation, appropriate           CN II-XII intact           Motor tone and strength normal              < from: EEG Extended Monitoring 41-60 Min (11.27.19 @ 15:41) >      INTERPRETATION:  Background Pattern and Specific Features: The background   is fairly well-organized and symmetric consisting of diffuse beta   activity and posterior dominant rhythm of 7 cps theta activity. There is   bifrontal eye movement artifact. There are no focal or epileptogenic   features seen during this recording.    Stimulation: Photic stimulation was unremarkable hyperventilation was not   performed.    Impression: Borderline record on the basis of mild slowing of the   posterior dominant rhythm which could be consistent with a mild   nonspecific bilateral cerebral dysfunction. The record is without focal   findings persistent asymmetries or epileptiform discharges.

## 2019-11-30 NOTE — PROGRESS NOTE ADULT - ASSESSMENT
87M current smoker, hx of COPD, PVD, HLD, DVT/PE off Xarelto, hx of CVA pw AMS. Pt c/o worsening SOB for one week with mild increased in productive cough.     #AMS - resolved  Patient had alcohol on board at time of AMS   neurology consult appreciated  CT head no acute findings  CTA neck -55-60%- will need outpatient referral to vascular  Spoke to Dr. Nye who read the EEG: no seizure activities. mild bilateral slowing.  PT eval: ДМИТРИЙ     # dyspnea possible acute on chronic copd exacerbation - improved   finish prednisone 40mg qd x5 days (last day 12/2/19)  duonebs q6 and prn, spiriva, symbicort   taper down oxygen if possible    # Chronic microcytic anemia 2/2 NOAH  - ferritin 28  - venofer 100mg x1. start ferrous sulfate  - H/h stable    # CKD 3 - stable  best Cr baseline in 2019: 1.08-1.14    # Hx of PVD and CVA  cont asa, statin.     # hx of BPH  urinary retention resolved.   cont Flomax.     # DVT/GI proph  hx of DVTs and GIB  Sq hep and PPI.     DISPO: pending ДМИТРИЙ

## 2019-11-30 NOTE — PROGRESS NOTE ADULT - SUBJECTIVE AND OBJECTIVE BOX
Patient is a 87y old  Male who presents with a chief complaint of AMS (29 Nov 2019 14:36)      Patient seen and examined at bedside. No overnight events reported. Pt reports SOB improved.    ALLERGIES:  No Known Allergies    MEDICATIONS  (STANDING):  albuterol/ipratropium for Nebulization 3 milliLiter(s) Nebulizer every 6 hours  aspirin enteric coated 81 milliGRAM(s) Oral daily  atorvastatin 40 milliGRAM(s) Oral at bedtime  budesonide 160 MICROgram(s)/formoterol 4.5 MICROgram(s) Inhaler 2 Puff(s) Inhalation two times a day  folic acid 1 milliGRAM(s) Oral daily  heparin  Injectable 5000 Unit(s) SubCutaneous every 8 hours  multivitamin 1 Tablet(s) Oral daily  pantoprazole    Tablet 40 milliGRAM(s) Oral before breakfast  predniSONE   Tablet 40 milliGRAM(s) Oral daily  tiotropium 18 MICROgram(s) Capsule 1 Capsule(s) Inhalation daily    MEDICATIONS  (PRN):  acetaminophen   Tablet .. 650 milliGRAM(s) Oral every 6 hours PRN Temp greater or equal to 38C (100.4F), Mild Pain (1 - 3)    Vital Signs Last 24 Hrs  T(F): 97.7 (30 Nov 2019 09:46), Max: 98.5 (30 Nov 2019 06:07)  HR: 71 (30 Nov 2019 09:46) (63 - 90)  BP: 119/49 (30 Nov 2019 09:46) (119/49 - 140/61)  RR: 18 (30 Nov 2019 09:46) (16 - 18)  SpO2: 97% (30 Nov 2019 09:46) (93% - 100%)  I&O's Summary    29 Nov 2019 07:01  -  30 Nov 2019 07:00  --------------------------------------------------------  IN: 0 mL / OUT: 900 mL / NET: -900 mL      GENERAL: NAD, frail  HEAD:  Atraumatic, Normocephalic  EYES: EOMI, PERRLA, sclera clear  NECK: Supple  CHEST/LUNG: no wheezing, CTA B/L  HEART: Regular rate and rhythm; No murmurs  ABDOMEN: Soft, Nontender, Nondistended; Bowel sounds present  EXTREMITIES: no edema  NEUROLOGY: non-focal  SKIN: No rashes or lesions    LABS:                        10.0   7.95  )-----------( 264      ( 30 Nov 2019 06:40 )             32.6     11-30    139  |  104  |  31  ----------------------------<  93  3.9   |  26  |  1.25    Ca    8.5      30 Nov 2019 06:40  Mg     1.8     11-29    TPro  7.4  /  Alb  3.2  /  TBili  0.7  /  DBili  x   /  AST  31  /  ALT  29  /  AlkPhos  133  11-29    eGFR if Non African American: 52 mL/min/1.73M2 (11-30-19 @ 06:40)  eGFR if African American: 60 mL/min/1.73M2 (11-30-19 @ 06:40)      11-27 Chol 141 mg/dL LDL 69 mg/dL HDL 47 mg/dL Trig 123 mg/dL    Culture - Urine (collected 26 Nov 2019 20:15)  Source: .Urine Clean Catch (Midstream)  Final Report (27 Nov 2019 20:58):    No growth

## 2019-12-01 LAB — DRUG SCREEN, SERUM: SIGNIFICANT CHANGE UP

## 2019-12-01 PROCEDURE — 99233 SBSQ HOSP IP/OBS HIGH 50: CPT

## 2019-12-01 PROCEDURE — 71275 CT ANGIOGRAPHY CHEST: CPT | Mod: 26

## 2019-12-01 RX ORDER — AZITHROMYCIN 500 MG/1
TABLET, FILM COATED ORAL
Refills: 0 | Status: DISCONTINUED | OUTPATIENT
Start: 2019-12-01 | End: 2019-12-02

## 2019-12-01 RX ORDER — AZITHROMYCIN 500 MG/1
500 TABLET, FILM COATED ORAL ONCE
Refills: 0 | Status: COMPLETED | OUTPATIENT
Start: 2019-12-01 | End: 2019-12-01

## 2019-12-01 RX ORDER — ENOXAPARIN SODIUM 100 MG/ML
40 INJECTION SUBCUTANEOUS DAILY
Refills: 0 | Status: DISCONTINUED | OUTPATIENT
Start: 2019-12-01 | End: 2019-12-02

## 2019-12-01 RX ORDER — AZITHROMYCIN 500 MG/1
500 TABLET, FILM COATED ORAL EVERY 24 HOURS
Refills: 0 | Status: DISCONTINUED | OUTPATIENT
Start: 2019-12-02 | End: 2019-12-02

## 2019-12-01 RX ORDER — NICOTINE POLACRILEX 2 MG
1 GUM BUCCAL DAILY
Refills: 0 | Status: DISCONTINUED | OUTPATIENT
Start: 2019-12-01 | End: 2019-12-02

## 2019-12-01 RX ADMIN — Medication 81 MILLIGRAM(S): at 11:04

## 2019-12-01 RX ADMIN — BUDESONIDE AND FORMOTEROL FUMARATE DIHYDRATE 2 PUFF(S): 160; 4.5 AEROSOL RESPIRATORY (INHALATION) at 21:16

## 2019-12-01 RX ADMIN — POLYETHYLENE GLYCOL 3350 17 GRAM(S): 17 POWDER, FOR SOLUTION ORAL at 11:05

## 2019-12-01 RX ADMIN — PANTOPRAZOLE SODIUM 40 MILLIGRAM(S): 20 TABLET, DELAYED RELEASE ORAL at 05:34

## 2019-12-01 RX ADMIN — AZITHROMYCIN 255 MILLIGRAM(S): 500 TABLET, FILM COATED ORAL at 16:41

## 2019-12-01 RX ADMIN — Medication 600 MILLIGRAM(S): at 11:05

## 2019-12-01 RX ADMIN — Medication 1 PATCH: at 21:54

## 2019-12-01 RX ADMIN — SENNA PLUS 2 TABLET(S): 8.6 TABLET ORAL at 21:54

## 2019-12-01 RX ADMIN — Medication 3 MILLILITER(S): at 04:23

## 2019-12-01 RX ADMIN — BUDESONIDE AND FORMOTEROL FUMARATE DIHYDRATE 2 PUFF(S): 160; 4.5 AEROSOL RESPIRATORY (INHALATION) at 10:18

## 2019-12-01 RX ADMIN — Medication 3 MILLILITER(S): at 10:18

## 2019-12-01 RX ADMIN — Medication 40 MILLIGRAM(S): at 05:34

## 2019-12-01 RX ADMIN — Medication 325 MILLIGRAM(S): at 11:04

## 2019-12-01 RX ADMIN — Medication 1 TABLET(S): at 11:04

## 2019-12-01 RX ADMIN — Medication 600 MILLIGRAM(S): at 21:54

## 2019-12-01 RX ADMIN — TIOTROPIUM BROMIDE 1 CAPSULE(S): 18 CAPSULE ORAL; RESPIRATORY (INHALATION) at 10:18

## 2019-12-01 RX ADMIN — Medication 1 MILLIGRAM(S): at 11:04

## 2019-12-01 RX ADMIN — Medication 3 MILLILITER(S): at 21:16

## 2019-12-01 RX ADMIN — ENOXAPARIN SODIUM 40 MILLIGRAM(S): 100 INJECTION SUBCUTANEOUS at 11:05

## 2019-12-01 RX ADMIN — Medication 3 MILLILITER(S): at 15:48

## 2019-12-01 NOTE — CONSULT NOTE ADULT - SUBJECTIVE AND OBJECTIVE BOX
PULMONARY CONSULT  Location of Patient :  [facility] ( TELE)  Attending requesting Consult:Edsno Man  Chief Complaint :  AMS  Reason For consult : Dyspnea      Initial HPI on admission:  HPI:  87 year old male with h/o COPD - Active nicotine use, h/o DVT/PE s/p Xarelto, PVD, High Chol, ? IPF on CT h/o CVA who p/w AMS at home on 11/26/19. He was admited for AMS and further management. + ETOH on arrival Alcohol, Blood (11.26.19 @ 17:50):   Alcohol, Blood: 75:      as per H&P pt stopped his meds prior to arrival for past few months.     Pulmonary Evaluation was called today for further evaluation as patient complaining of dyspnea and difficulty breathing - with some heaviness   Pt was treated for possible COPD exac with steroids with out improvement of dyspnea  denies any pleuritic chest pain, CP, f/c, cough, secretions    pt is poor historian and unable to name his home medication PMD, and ROS.    PAST MEDICAL & SURGICAL HISTORY:  PE (pulmonary thromboembolism)  History of COPD  [place] (hard of hearing)  Deep vein thrombosis (DVT)  Dyslipidemia  CAD (coronary artery disease)  Peripheral vascular disease  History of hip surgery  History of mandibular surgery    Allergies    No Known Allergies    Intolerances      FAMILY HISTORY:    Social history:      Smoking: Active      Drinking: +      Drug use: denies    Review of Systems: as stated above    Due to current medical status patient unable to provide medical, social, family history.  History obtained from available medical record.       Medications:  MEDICATIONS  (STANDING):  albuterol/ipratropium for Nebulization 3 milliLiter(s) Nebulizer every 6 hours  aspirin enteric coated 81 milliGRAM(s) Oral daily  atorvastatin 40 milliGRAM(s) Oral at bedtime  budesonide 160 MICROgram(s)/formoterol 4.5 MICROgram(s) Inhaler 2 Puff(s) Inhalation two times a day  enoxaparin Injectable 40 milliGRAM(s) SubCutaneous daily  ferrous    sulfate 325 milliGRAM(s) Oral daily  folic acid 1 milliGRAM(s) Oral daily  guaiFENesin  milliGRAM(s) Oral every 12 hours  multivitamin 1 Tablet(s) Oral daily  pantoprazole    Tablet 40 milliGRAM(s) Oral before breakfast  polyethylene glycol 3350 17 Gram(s) Oral daily  predniSONE   Tablet 40 milliGRAM(s) Oral daily  senna 2 Tablet(s) Oral at bedtime  tiotropium 18 MICROgram(s) Capsule 1 Capsule(s) Inhalation daily    MEDICATIONS  (PRN):  acetaminophen   Tablet .. 650 milliGRAM(s) Oral every 6 hours PRN Temp greater or equal to 38C (100.4F), Mild Pain (1 - 3)      Home Medications:  Last Order Reconciliation Date: 11-26-19 @ 20:11 (Admission Reconciliation)      LABS:                        10.0   7.95  )-----------( 264      ( 30 Nov 2019 06:40 )             32.6     11-30    139  |  104  |  31<H>  ----------------------------<  93  3.9   |  26  |  1.25    Ca    8.5      30 Nov 2019 06:40      CULTURES: (if applicable)    Culture - Urine (collected 11-26-19 @ 20:15)  Source: .Urine Clean Catch (Midstream)  Final Report (11-27-19 @ 20:58):    No growth    Trend Bun/Cr  11-30-19 @ 06:40  BUN/CR -  31<H> / 1.25  11-29-19 @ 06:20  BUN/CR -  26<H> / 1.28    WBC Trend  11-30-19 @ 06:40   -  7.95  11-29-19 @ 06:20   -  7.48    H/H Trend  11-30-19 @ 06:40   -  10.0<L> / 32.6<L>  11-29-19 @ 06:20   -  10.6<L> / 33.6<L>    Platelet Trend  11-30-19 @ 06:40   -  264  11-29-19 @ 06:20   -  277          CAPILLARY BLOOD GLUCOSE          RADIOLOGY    CT:  < from: CT Chest No Cont (11.26.19 @ 20:30) >    EXAM:  CT CHEST      PROCEDURE DATE:  11/26/2019        INTERPRETATION:  Chest CT without IV contrast    Indication: Abnormal chest x-ray.    Technique: Axial multidetector CT images of the chest are acquired   without IV contrast.    Comparison: 7/15/2019.    Findings: No pleural or pericardial effusion. Aortic and coronary artery calcifications are present. Ectasia of the ascending aorta at 3.9 cm in diameter. 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 Allowing for   the noncontrasttechnique, there is no grossly enlarged mediastinal, hilar or axillary lymph node. Small dependent aerosolized densities in the trachea, likely due to mucous or aspirated material.    No evidence for pneumothorax. Previously noted infiltrate in the left upper lobe and lingula has resolved. Grossly stable fibrotic changes with honeycomb appearance in the periphery of both lungs.    Stable small calcified granulomas in the right lower lobe and the left upper lobe. Mild bilateral atelectasis. 2 stable nonspecific noncalcified right apical lung nodule measuring 9 mm and 6 mm (image 22 and image 18   series 2 respectively), unchanged since the previous chest CT dated 4/24/2017; given more then 2 years of stability, these are likely benign.    Limited sections through the upper abdomen demonstrate small nonobstructive calculi in the left kidney. Possible focal mural thickening at the gastric fundus.     Impression: Small dependent aerosolized densities in the trachea, likely due to mucous or aspirated material.    Previously noted infiltrate in the left upper lobe and lingula has resolved.    Grossly stable fibrotic changes with honeycomb appearance in the periphery of both lungs.    Possible focal mural thickening at the gastric fundus. If clinically indicated, EGD may be pursued for further evaluation.    Other findings as above.          < end of copied text >    ECHO:  < from: TTE Echo Complete w/Doppler (07.16.19 @ 09:03) >    Summary:   1. Left ventricular ejection fraction, by visual estimation, is 65%.   2. Normal global left ventricular systolic function.   3. Spectral Doppler shows impaired relaxation pattern of left ventricular myocardial filling (Grade I diastolic dysfunction).   4. Mild thickening and calcification of the anterior and posterior  mitral valve leaflets.   5. Mild aortic regurgitation.   6. Estimated pulmonary artery systolic pressure is 39.4 mmHg assuming a right atrial pressure of 10 mmHg, which is consistent with borderline pulmonary hypertension.   7. LA volume Index is 15.5 ml/m² ml/m2.   8. Peak transaortic gradient equals 14.0 mmHg, mean transaortic gradient equals 7.1 mmHg, the calculated aortic valve area equals 1.97 cm² by the continuity equation consistent with mild aortic stenosis.    < end of copied text >      VITALS:  T(C): 36.3 (12-01-19 @ 08:12), Max: 36.9 (11-30-19 @ 15:40)  T(F): 97.4 (12-01-19 @ 08:12), Max: 98.4 (11-30-19 @ 15:40)  HR: 72 (12-01-19 @ 10:19) (65 - 72)  BP: 147/81 (12-01-19 @ 08:12) (124/47 - 147/81)  BP(mean): --  ABP: --  ABP(mean): --  RR: 16 (12-01-19 @ 08:12) (16 - 16)  SpO2: 96% (12-01-19 @ 10:19) (95% - 98%)  CVP(mm Hg): --  CVP(cm H2O): --    Ins and Outs     11-30-19 @ 07:01  -  12-01-19 @ 07:00  --------------------------------------------------------  IN: 360 mL / OUT: 350 mL / NET: 10 mL                I&O's Detail    30 Nov 2019 07:01  -  01 Dec 2019 07:00  --------------------------------------------------------  IN:    Oral Fluid: 360 mL  Total IN: 360 mL    OUT:    Voided: 350 mL  Total OUT: 350 mL    Total NET: 10 mL          Physical Examination:  GENERAL:               Alert, Confuesed, No acute distress.    HEENT:                  No JVD, dry MM, temporal waisting   PULM:                     Bilateral air entry, course auscultation bilaterally, no significant sputum production, + Rales, No Rhonchi, No Wheezing  CVS:                         S1, S2,  + Murmur  ABD:                        Soft, nondistended, nontender, normoactive bowel sounds,   EXT:                         +edema, nontender, No Cyanosis or Clubbing   Vascular:                Warm Extremities,   SKIN:                       Warm and well perfused, no rashes noted.   NEURO:                  Alert, confused, interactive, nonfocal, follows commands  PSYC:                      Calm, Limited Insight.

## 2019-12-01 NOTE — PROGRESS NOTE ADULT - SUBJECTIVE AND OBJECTIVE BOX
Patient is a 87y old  Male who presents with a chief complaint of AMS (01 Dec 2019 12:13)      Patient seen and examined at bedside. No overnight events reported. Pt continues to c/o dyspnea and coughs with sputum stuck in throat.    ALLERGIES:  No Known Allergies    MEDICATIONS  (STANDING):  albuterol/ipratropium for Nebulization 3 milliLiter(s) Nebulizer every 6 hours  aspirin enteric coated 81 milliGRAM(s) Oral daily  atorvastatin 40 milliGRAM(s) Oral at bedtime  budesonide 160 MICROgram(s)/formoterol 4.5 MICROgram(s) Inhaler 2 Puff(s) Inhalation two times a day  enoxaparin Injectable 40 milliGRAM(s) SubCutaneous daily  ferrous    sulfate 325 milliGRAM(s) Oral daily  folic acid 1 milliGRAM(s) Oral daily  guaiFENesin  milliGRAM(s) Oral every 12 hours  multivitamin 1 Tablet(s) Oral daily  pantoprazole    Tablet 40 milliGRAM(s) Oral before breakfast  polyethylene glycol 3350 17 Gram(s) Oral daily  predniSONE   Tablet 40 milliGRAM(s) Oral daily  senna 2 Tablet(s) Oral at bedtime  tiotropium 18 MICROgram(s) Capsule 1 Capsule(s) Inhalation daily    MEDICATIONS  (PRN):  acetaminophen   Tablet .. 650 milliGRAM(s) Oral every 6 hours PRN Temp greater or equal to 38C (100.4F), Mild Pain (1 - 3)    Vital Signs Last 24 Hrs  T(F): 97.4 (01 Dec 2019 08:12), Max: 98.4 (30 Nov 2019 15:40)  HR: 72 (01 Dec 2019 10:19) (65 - 72)  BP: 147/81 (01 Dec 2019 08:12) (124/47 - 147/81)  RR: 16 (01 Dec 2019 08:12) (16 - 16)  SpO2: 96% (01 Dec 2019 10:19) (95% - 98%)  I&O's Summary    30 Nov 2019 07:01  -  01 Dec 2019 07:00  --------------------------------------------------------  IN: 360 mL / OUT: 350 mL / NET: 10 mL          GENERAL: frail  HEAD:  Atraumatic, Normocephalic  EYES: EOMI, PERRLA, sclera clear  NECK: Supple  CHEST/LUNG: Clear to auscultation bilaterally; No wheeze  HEART: Regular rate and rhythm; No murmurs, rubs, or gallops  ABDOMEN: Soft, Nontender, Nondistended; Bowel sounds present  EXTREMITIES:  no edema  NEUROLOGY: non-focal  SKIN: No rashes or lesions    LABS:                        10.0   7.95  )-----------( 264      ( 30 Nov 2019 06:40 )             32.6     11-30    139  |  104  |  31  ----------------------------<  93  3.9   |  26  |  1.25    Ca    8.5      30 Nov 2019 06:40  Mg     1.8     11-29    TPro  7.4  /  Alb  3.2  /  TBili  0.7  /  DBili  x   /  AST  31  /  ALT  29  /  AlkPhos  133  11-29    eGFR if Non African American: 52 mL/min/1.73M2 (11-30-19 @ 06:40)  eGFR if African American: 60 mL/min/1.73M2 (11-30-19 @ 06:40)    11-27 Chol 141 mg/dL LDL 69 mg/dL HDL 47 mg/dL Trig 123 mg/dL    Culture - Urine (collected 26 Nov 2019 20:15)  Source: .Urine Clean Catch (Midstream)  Final Report (27 Nov 2019 20:58):    No growth    RADIOLOGY & ADDITIONAL TESTS:    Care Discussed with Consultants/Other Providers:

## 2019-12-01 NOTE — PROGRESS NOTE ADULT - ASSESSMENT
87M current smoker, hx of COPD, PVD, HLD, DVT/PE off Xarelto, hx of CVA pw AMS. Pt c/o worsening SOB for one week with mild increased in productive cough.     #AMS - resolved  Patient had alcohol on board at time of AMS   neurology consult appreciated  CT head no acute findings  CTA neck -55-60%- will need outpatient referral to vascular  EEG: no seizure activities. mild bilateral slowing.  PT eval: ДМИТРИЙ     # dyspnea possible acute on chronic copd exacerbation vs IPF. Pt also has a history of PE/DVT. r/o PE  - c/o persist dyspnea  - CT chest showed chronic fibrosis with honeycombing at peripheral of both lungs  - finish prednisone 40mg qd x5 days (last day 12/2/19)  - duonebs q6 and prn, spiriva, symbicort   - mucinex q12  - f/u BNP, D- dimer, TTE  - f/u CTA chest to r/o PE  - taper down oxygen if possible  - smoking cessation   - pulmonary following    # Chronic microcytic anemia 2/2 NOAH  - ferritin 28  - s/p venofer 100mg x1. start ferrous sulfate  - H/h stable    # chronic right harper lacunar infarct  - c/w ASA and statin    # CKD 3 - stable  best Cr baseline in 2019: 1.08-1.14    # Hx of PVD and CVA  cont asa, statin.     # Hx of PE/DVT - OFF AC currently    # hx of BPH  urinary retention resolved.   cont Flomax.     # DVT ppx- lovenox subq    DISPO: ДМИТРИЙ once medically stable

## 2019-12-01 NOTE — CONSULT NOTE ADULT - ASSESSMENT
Assessment  Dyspnea ? From underlying IPF vs COPD, as pt has h/o of VTE/PE underlying PE can not be ruled out.  Active Nicotine use  AMS - Resolved- pt had ETOH on board on time  Underlying PVD, HTN, High chol, BPH    Plan  Primary team already placed CTA chest to r/o PE  Will check Ddimer, BNP, Cardiac enzymes   R/O Cardiac causes of dyspnea  Agree with Symbicort, Spiriva, Duonebs,   Finish steroid taper - unsure of COPD exac  will follow  case d/w Dr. Mendes.

## 2019-12-02 ENCOUNTER — TRANSCRIPTION ENCOUNTER (OUTPATIENT)
Age: 84
End: 2019-12-02

## 2019-12-02 VITALS
RESPIRATION RATE: 16 BRPM | SYSTOLIC BLOOD PRESSURE: 145 MMHG | DIASTOLIC BLOOD PRESSURE: 57 MMHG | HEART RATE: 72 BPM | TEMPERATURE: 98 F | OXYGEN SATURATION: 96 %

## 2019-12-02 LAB
ANION GAP SERPL CALC-SCNC: 7 MMOL/L — SIGNIFICANT CHANGE UP (ref 5–17)
BUN SERPL-MCNC: 32 MG/DL — HIGH (ref 7–23)
CALCIUM SERPL-MCNC: 8.4 MG/DL — SIGNIFICANT CHANGE UP (ref 8.4–10.5)
CHLORIDE SERPL-SCNC: 103 MMOL/L — SIGNIFICANT CHANGE UP (ref 96–108)
CO2 SERPL-SCNC: 26 MMOL/L — SIGNIFICANT CHANGE UP (ref 22–31)
CREAT SERPL-MCNC: 1.41 MG/DL — HIGH (ref 0.5–1.3)
D DIMER BLD IA.RAPID-MCNC: 239 NG/ML DDU — HIGH
GLUCOSE SERPL-MCNC: 92 MG/DL — SIGNIFICANT CHANGE UP (ref 70–99)
HCT VFR BLD CALC: 35 % — LOW (ref 39–50)
HGB BLD-MCNC: 10.8 G/DL — LOW (ref 13–17)
MCHC RBC-ENTMCNC: 25 PG — LOW (ref 27–34)
MCHC RBC-ENTMCNC: 30.9 GM/DL — LOW (ref 32–36)
MCV RBC AUTO: 81 FL — SIGNIFICANT CHANGE UP (ref 80–100)
NRBC # BLD: 0 /100 WBCS — SIGNIFICANT CHANGE UP (ref 0–0)
NT-PROBNP SERPL-SCNC: 200 PG/ML — SIGNIFICANT CHANGE UP (ref 0–300)
PLATELET # BLD AUTO: 312 K/UL — SIGNIFICANT CHANGE UP (ref 150–400)
POTASSIUM SERPL-MCNC: 4.3 MMOL/L — SIGNIFICANT CHANGE UP (ref 3.5–5.3)
POTASSIUM SERPL-SCNC: 4.3 MMOL/L — SIGNIFICANT CHANGE UP (ref 3.5–5.3)
RBC # BLD: 4.32 M/UL — SIGNIFICANT CHANGE UP (ref 4.2–5.8)
RBC # FLD: 17.7 % — HIGH (ref 10.3–14.5)
SODIUM SERPL-SCNC: 136 MMOL/L — SIGNIFICANT CHANGE UP (ref 135–145)
TROPONIN I SERPL-MCNC: 0.03 NG/ML — SIGNIFICANT CHANGE UP (ref 0.02–0.06)
WBC # BLD: 9.15 K/UL — SIGNIFICANT CHANGE UP (ref 3.8–10.5)
WBC # FLD AUTO: 9.15 K/UL — SIGNIFICANT CHANGE UP (ref 3.8–10.5)

## 2019-12-02 PROCEDURE — 84145 PROCALCITONIN (PCT): CPT

## 2019-12-02 PROCEDURE — 82140 ASSAY OF AMMONIA: CPT

## 2019-12-02 PROCEDURE — 85730 THROMBOPLASTIN TIME PARTIAL: CPT

## 2019-12-02 PROCEDURE — 93005 ELECTROCARDIOGRAM TRACING: CPT

## 2019-12-02 PROCEDURE — 99285 EMERGENCY DEPT VISIT HI MDM: CPT | Mod: 25

## 2019-12-02 PROCEDURE — 82803 BLOOD GASES ANY COMBINATION: CPT

## 2019-12-02 PROCEDURE — 87086 URINE CULTURE/COLONY COUNT: CPT

## 2019-12-02 PROCEDURE — 82607 VITAMIN B-12: CPT

## 2019-12-02 PROCEDURE — 70450 CT HEAD/BRAIN W/O DYE: CPT

## 2019-12-02 PROCEDURE — 80061 LIPID PANEL: CPT

## 2019-12-02 PROCEDURE — 71275 CT ANGIOGRAPHY CHEST: CPT

## 2019-12-02 PROCEDURE — 70496 CT ANGIOGRAPHY HEAD: CPT

## 2019-12-02 PROCEDURE — 71045 X-RAY EXAM CHEST 1 VIEW: CPT

## 2019-12-02 PROCEDURE — 84466 ASSAY OF TRANSFERRIN: CPT

## 2019-12-02 PROCEDURE — 84443 ASSAY THYROID STIM HORMONE: CPT

## 2019-12-02 PROCEDURE — 85379 FIBRIN DEGRADATION QUANT: CPT

## 2019-12-02 PROCEDURE — 71250 CT THORAX DX C-: CPT

## 2019-12-02 PROCEDURE — 83550 IRON BINDING TEST: CPT

## 2019-12-02 PROCEDURE — 82746 ASSAY OF FOLIC ACID SERUM: CPT

## 2019-12-02 PROCEDURE — 99239 HOSP IP/OBS DSCHRG MGMT >30: CPT

## 2019-12-02 PROCEDURE — 85027 COMPLETE CBC AUTOMATED: CPT

## 2019-12-02 PROCEDURE — 80048 BASIC METABOLIC PNL TOTAL CA: CPT

## 2019-12-02 PROCEDURE — 95812 EEG 41-60 MINUTES: CPT

## 2019-12-02 PROCEDURE — 83880 ASSAY OF NATRIURETIC PEPTIDE: CPT

## 2019-12-02 PROCEDURE — 36415 COLL VENOUS BLD VENIPUNCTURE: CPT

## 2019-12-02 PROCEDURE — 83540 ASSAY OF IRON: CPT

## 2019-12-02 PROCEDURE — 80053 COMPREHEN METABOLIC PANEL: CPT

## 2019-12-02 PROCEDURE — 70498 CT ANGIOGRAPHY NECK: CPT

## 2019-12-02 PROCEDURE — 36000 PLACE NEEDLE IN VEIN: CPT

## 2019-12-02 PROCEDURE — 85610 PROTHROMBIN TIME: CPT

## 2019-12-02 PROCEDURE — 82550 ASSAY OF CK (CPK): CPT

## 2019-12-02 PROCEDURE — 94640 AIRWAY INHALATION TREATMENT: CPT

## 2019-12-02 PROCEDURE — 83735 ASSAY OF MAGNESIUM: CPT

## 2019-12-02 PROCEDURE — 82962 GLUCOSE BLOOD TEST: CPT

## 2019-12-02 PROCEDURE — 81001 URINALYSIS AUTO W/SCOPE: CPT

## 2019-12-02 PROCEDURE — 97162 PT EVAL MOD COMPLEX 30 MIN: CPT

## 2019-12-02 PROCEDURE — 82728 ASSAY OF FERRITIN: CPT

## 2019-12-02 PROCEDURE — 84484 ASSAY OF TROPONIN QUANT: CPT

## 2019-12-02 PROCEDURE — 80307 DRUG TEST PRSMV CHEM ANLYZR: CPT

## 2019-12-02 RX ORDER — SENNA PLUS 8.6 MG/1
2 TABLET ORAL
Qty: 0 | Refills: 0 | DISCHARGE
Start: 2019-12-02

## 2019-12-02 RX ORDER — FOLIC ACID 0.8 MG
1 TABLET ORAL
Qty: 0 | Refills: 0 | DISCHARGE
Start: 2019-12-02

## 2019-12-02 RX ORDER — POLYETHYLENE GLYCOL 3350 17 G/17G
17 POWDER, FOR SOLUTION ORAL
Qty: 0 | Refills: 0 | DISCHARGE
Start: 2019-12-02

## 2019-12-02 RX ORDER — ASPIRIN/CALCIUM CARB/MAGNESIUM 324 MG
1 TABLET ORAL
Qty: 0 | Refills: 0 | DISCHARGE
Start: 2019-12-02

## 2019-12-02 RX ORDER — ATORVASTATIN CALCIUM 80 MG/1
1 TABLET, FILM COATED ORAL
Qty: 0 | Refills: 0 | DISCHARGE
Start: 2019-12-02

## 2019-12-02 RX ORDER — LANOLIN ALCOHOL/MO/W.PET/CERES
3 CREAM (GRAM) TOPICAL AT BEDTIME
Refills: 0 | Status: COMPLETED | OUTPATIENT
Start: 2019-12-02 | End: 2019-12-02

## 2019-12-02 RX ORDER — NICOTINE POLACRILEX 2 MG
0 GUM BUCCAL
Qty: 0 | Refills: 0 | DISCHARGE
Start: 2019-12-02

## 2019-12-02 RX ORDER — FERROUS SULFATE 325(65) MG
1 TABLET ORAL
Qty: 0 | Refills: 0 | DISCHARGE
Start: 2019-12-02

## 2019-12-02 RX ORDER — TIOTROPIUM BROMIDE 18 UG/1
1 CAPSULE ORAL; RESPIRATORY (INHALATION)
Qty: 0 | Refills: 0 | DISCHARGE
Start: 2019-12-02

## 2019-12-02 RX ORDER — BUDESONIDE AND FORMOTEROL FUMARATE DIHYDRATE 160; 4.5 UG/1; UG/1
0 AEROSOL RESPIRATORY (INHALATION)
Qty: 0 | Refills: 0 | DISCHARGE
Start: 2019-12-02

## 2019-12-02 RX ORDER — BUDESONIDE AND FORMOTEROL FUMARATE DIHYDRATE 160; 4.5 UG/1; UG/1
1 AEROSOL RESPIRATORY (INHALATION)
Qty: 0 | Refills: 0 | DISCHARGE
Start: 2019-12-02

## 2019-12-02 RX ORDER — IPRATROPIUM/ALBUTEROL SULFATE 18-103MCG
3 AEROSOL WITH ADAPTER (GRAM) INHALATION
Qty: 0 | Refills: 0 | DISCHARGE
Start: 2019-12-02

## 2019-12-02 RX ADMIN — Medication 3 MILLILITER(S): at 04:56

## 2019-12-02 RX ADMIN — POLYETHYLENE GLYCOL 3350 17 GRAM(S): 17 POWDER, FOR SOLUTION ORAL at 08:56

## 2019-12-02 RX ADMIN — Medication 325 MILLIGRAM(S): at 08:57

## 2019-12-02 RX ADMIN — Medication 3 MILLILITER(S): at 15:32

## 2019-12-02 RX ADMIN — BUDESONIDE AND FORMOTEROL FUMARATE DIHYDRATE 2 PUFF(S): 160; 4.5 AEROSOL RESPIRATORY (INHALATION) at 09:37

## 2019-12-02 RX ADMIN — Medication 1 PATCH: at 01:05

## 2019-12-02 RX ADMIN — ENOXAPARIN SODIUM 40 MILLIGRAM(S): 100 INJECTION SUBCUTANEOUS at 08:57

## 2019-12-02 RX ADMIN — AZITHROMYCIN 255 MILLIGRAM(S): 500 TABLET, FILM COATED ORAL at 16:11

## 2019-12-02 RX ADMIN — Medication 1 MILLIGRAM(S): at 08:57

## 2019-12-02 RX ADMIN — Medication 81 MILLIGRAM(S): at 08:57

## 2019-12-02 RX ADMIN — Medication 600 MILLIGRAM(S): at 08:57

## 2019-12-02 RX ADMIN — Medication 3 MILLIGRAM(S): at 01:02

## 2019-12-02 RX ADMIN — TIOTROPIUM BROMIDE 1 CAPSULE(S): 18 CAPSULE ORAL; RESPIRATORY (INHALATION) at 09:37

## 2019-12-02 RX ADMIN — Medication 1 PATCH: at 08:56

## 2019-12-02 RX ADMIN — PANTOPRAZOLE SODIUM 40 MILLIGRAM(S): 20 TABLET, DELAYED RELEASE ORAL at 08:57

## 2019-12-02 RX ADMIN — Medication 1 TABLET(S): at 08:57

## 2019-12-02 RX ADMIN — Medication 40 MILLIGRAM(S): at 08:57

## 2019-12-02 RX ADMIN — Medication 3 MILLILITER(S): at 09:37

## 2019-12-02 NOTE — DISCHARGE NOTE PROVIDER - CARE PROVIDER_API CALL
Cuong De La Cruz (DO)  [address], [address]  [place] [address]  Phone: [phone]  Fax: [phone]  Follow Up Time:

## 2019-12-02 NOTE — DISCHARGE NOTE PROVIDER - HOSPITAL COURSE
87M current smoker, hx of COPD, PVD, HLD, DVT/PE off Xarelto, hx of CVA pw AMS. Pt had alcohol on board at the time of AMS. AMS resolved and neurological workup negative. No events on tele. Pt c/o worsening SOB for one week with mild increased in productive cough. SOB likely 2/2 acute on chronic cOPD exacerbation complicated by underlying IPF. Pt is stable for discharge.        spend 33 min on charge today.

## 2019-12-02 NOTE — PROGRESS NOTE ADULT - ASSESSMENT
Physical Examination:  GENERAL:               Alert, Confuesed, No acute distress.    HEENT:                  No JVD, dry MM, temporal waisting   PULM:                     Bilateral air entry, course auscultation bilaterally, no significant sputum production, + Rales, No Rhonchi, No Wheezing  CVS:                         S1, S2,  + Murmur  ABD:                        Soft, nondistended, nontender, normoactive bowel sounds,   EXT:                         +edema, nontender, No Cyanosis or Clubbing   PSYC:                      Calm, Limited Insight.      Assessment  Dyspnea ? From underlying IPF vs COPD,   Doubt VTE/PE as cause of Dyspnea   Active Nicotine use  AMS - Resolved- pt had ETOH on board on time  Underlying PVD, HTN, High chol, BPH    Plan  Consider to R/O Cardiac causes of dyspnea  Agree with Symbicort, Spiriva, Duonebs,   Finish steroid taper - Doubt of COPD exac  suspect if cardiac workup negative then likely underlying ILD/COPD cause of dyspnea.    case d/w Dr. Mendes.

## 2019-12-02 NOTE — DISCHARGE NOTE PROVIDER - NSDCMRMEDTOKEN_GEN_ALL_CORE_FT
aspirin 81 mg oral delayed release tablet: 1 tab(s) orally once a day  atorvastatin 40 mg oral tablet: 1 tab(s) orally once a day (at bedtime)  azithromycin 250 mg oral tablet: 1 tab once daily until 12/5/19  budesonide-formoterol 160 mcg-4.5 mcg/inh inhalation aerosol: 1 puff anthony 12 hours  ferrous sulfate 325 mg (65 mg elemental iron) oral tablet: 1 tab(s) orally once a day  folic acid 1 mg oral tablet: 1 tab(s) orally once a day  guaiFENesin 600 mg oral tablet, extended release: 1 tab(s) orally every 12 hours  ipratropium-albuterol 0.5 mg-2.5 mg/3 mLinhalation solution: 3 milliliter(s) inhaled every 6 hours  Multiple Vitamins oral tablet: 1 tab(s) orally once a day  nicotine 7 mg/24 hr transdermal film, extended release:  1 patch transdermal qdaily  polyethylene glycol 3350 oral powder for reconstitution: 17 gram(s) orally once a day  senna oral tablet: 2 tab(s) orally once a day (at bedtime)  tiotropium 18 mcg inhalation capsule: 1 cap(s) inhaled once a day

## 2019-12-02 NOTE — DISCHARGE NOTE NURSING/CASE MANAGEMENT/SOCIAL WORK - PATIENT PORTAL LINK FT
You can access the FollowMyHealth Patient Portal offered by [facility] by registering at the following website: [email or web address]. By joining [facility]’s FollowMyHealth portal, you will also be able to view your health information using other applications (apps) compatible with our system.

## 2019-12-02 NOTE — PROGRESS NOTE ADULT - SUBJECTIVE AND OBJECTIVE BOX
Patient is a 87y old  Male who presents with a chief complaint of AMS (02 Dec 2019 13:14)      Patient seen and examined at bedside. No overnight events reported. Pt reports persistent coughing, dyspnea stable.    ALLERGIES:  No Known Allergies    MEDICATIONS  (STANDING):  albuterol/ipratropium for Nebulization 3 milliLiter(s) Nebulizer every 6 hours  aspirin enteric coated 81 milliGRAM(s) Oral daily  atorvastatin 40 milliGRAM(s) Oral at bedtime  azithromycin  IVPB      azithromycin  IVPB 500 milliGRAM(s) IV Intermittent every 24 hours  budesonide 160 MICROgram(s)/formoterol 4.5 MICROgram(s) Inhaler 2 Puff(s) Inhalation two times a day  enoxaparin Injectable 40 milliGRAM(s) SubCutaneous daily  ferrous    sulfate 325 milliGRAM(s) Oral daily  folic acid 1 milliGRAM(s) Oral daily  guaiFENesin  milliGRAM(s) Oral every 12 hours  multivitamin 1 Tablet(s) Oral daily  nicotine -   7 mG/24Hr(s) Patch 1 patch Transdermal daily  pantoprazole    Tablet 40 milliGRAM(s) Oral before breakfast  polyethylene glycol 3350 17 Gram(s) Oral daily  senna 2 Tablet(s) Oral at bedtime  tiotropium 18 MICROgram(s) Capsule 1 Capsule(s) Inhalation daily    MEDICATIONS  (PRN):  acetaminophen   Tablet .. 650 milliGRAM(s) Oral every 6 hours PRN Temp greater or equal to 38C (100.4F), Mild Pain (1 - 3)    Vital Signs Last 24 Hrs  T(F): 97.8 (02 Dec 2019 08:54), Max: 97.9 (01 Dec 2019 21:00)  HR: 60 (02 Dec 2019 09:40) (60 - 82)  BP: 133/56 (02 Dec 2019 08:54) (130/62 - 133/56)  RR: 16 (02 Dec 2019 08:54) (16 - 16)  SpO2: 95% (02 Dec 2019 09:40) (95% - 100%)  I&O's Summary    01 Dec 2019 07:01  -  02 Dec 2019 07:00  --------------------------------------------------------  IN: 200 mL / OUT: 0 mL / NET: 200 mL      GENERAL: NAD. frail.   HEAD:  Atraumatic, Normocephalic  EYES: EOMI, PERRLA, sclera clear  NECK: Supple  CHEST/LUNG: Clear to auscultation bilaterally; No wheeze  HEART: Regular rate and rhythm; No murmurs  ABDOMEN: Soft, Nontender, Nondistended; Bowel sounds present  EXTREMITIES: no edema  NEUROLOGY: non-focal  SKIN: No rashes or lesions    LABS:                        10.8   9.15  )-----------( 312      ( 02 Dec 2019 11:30 )             35.0     12-02    136  |  103  |  32  ----------------------------<  92  4.3   |  26  |  1.41    Ca    8.4      02 Dec 2019 11:35    eGFR if African American: 52 mL/min/1.73M2 (12-02-19 @ 11:35)  eGFR if Non African American: 45 mL/min/1.73M2 (12-02-19 @ 11:35)    CARDIAC MARKERS ( 02 Dec 2019 11:35 )  .033 ng/mL / x     / x     / x     / x        11-27 Chol 141 mg/dL LDL 69 mg/dL HDL 47 mg/dL Trig 123 mg/dL    Culture - Urine (collected 26 Nov 2019 20:15)  Source: .Urine Clean Catch (Midstream)  Final Report (27 Nov 2019 20:58):    No growth      RADIOLOGY & ADDITIONAL TESTS:    Care Discussed with Consultants/Other Providers:

## 2019-12-02 NOTE — PROGRESS NOTE ADULT - SUBJECTIVE AND OBJECTIVE BOX
Follow-up Pulmonary Progress Note  Chief Complaint : Altered mental status      pt seen and examined  clinical status unchanged  pt continues to complain of SOB / heavy breathing    Allergies :No Known Allergies      PAST MEDICAL & SURGICAL HISTORY:  PE (pulmonary thromboembolism)  History of COPD  [place] (hard of hearing)  Deep vein thrombosis (DVT)  Dyslipidemia  CAD (coronary artery disease)  Peripheral vascular disease  History of hip surgery  History of mandibular surgery      Medications:  MEDICATIONS  (STANDING):  albuterol/ipratropium for Nebulization 3 milliLiter(s) Nebulizer every 6 hours  aspirin enteric coated 81 milliGRAM(s) Oral daily  atorvastatin 40 milliGRAM(s) Oral at bedtime  azithromycin  IVPB      azithromycin  IVPB 500 milliGRAM(s) IV Intermittent every 24 hours  budesonide 160 MICROgram(s)/formoterol 4.5 MICROgram(s) Inhaler 2 Puff(s) Inhalation two times a day  enoxaparin Injectable 40 milliGRAM(s) SubCutaneous daily  ferrous    sulfate 325 milliGRAM(s) Oral daily  folic acid 1 milliGRAM(s) Oral daily  guaiFENesin  milliGRAM(s) Oral every 12 hours  multivitamin 1 Tablet(s) Oral daily  nicotine -   7 mG/24Hr(s) Patch 1 patch Transdermal daily  pantoprazole    Tablet 40 milliGRAM(s) Oral before breakfast  polyethylene glycol 3350 17 Gram(s) Oral daily  senna 2 Tablet(s) Oral at bedtime  tiotropium 18 MICROgram(s) Capsule 1 Capsule(s) Inhalation daily    MEDICATIONS  (PRN):  acetaminophen   Tablet .. 650 milliGRAM(s) Oral every 6 hours PRN Temp greater or equal to 38C (100.4F), Mild Pain (1 - 3)      LABS:                        10.8   9.15  )-----------( 312      ( 02 Dec 2019 11:30 )             35.0     12-02    136  |  103  |  32<H>  ----------------------------<  92  4.3   |  26  |  1.41<H>    Ca    8.4      02 Dec 2019 11:35      HIT ab -- 12-02 @ 11:30  HIT ab EIA --  D Dimer -239    CARDIAC MARKERS ( 02 Dec 2019 11:35 )  .033 ng/mL / x     / x     / x     / x                  Serum Pro-Brain Natriuretic Peptide: 200 pg/mL (12-02-19 @ 11:35)        CULTURES: (if applicable)    Culture - Urine (collected 11-26-19 @ 20:15)  Source: .Urine Clean Catch (Midstream)  Final Report (11-27-19 @ 20:58):    No growth      < from: CT Angio Chest w/ IV Cont (12.01.19 @ 19:18) >    COMPARISON:   CT ANGIO CHEST WITHOUT AND OR WITH IV CONTRAST 8/16/2018 4:19 PM     FINDINGS:   Pulmonary arteries: There is no evidence of filling defects within the pulmonary arterial circulation to suggest pulmonary embolism.   Aorta: No aneurysmal dilatation of the thoracic aorta or dissection. Ascending aorta measures 3.7 cm AP. Descending aorta measures 3 cm AP. Atherosclerosis.   Lungs: See Pleural SpaceFinding.   Pleural space: Pleural-parenchymal scarring at the lung apices. Calcified 1 cm lesion medially the left lung apex likely due to old granulomatous disease. 6.5 mm calcified lesion superior segment right lower lobe likely of the sameetiology. Bilateral subsegmental atelectasis and interstitial prominence without focal lung consolidation. Diffuse bronchial wall thickening most prominent in the right infrahilar region.   Heart: Borderline cardiomegaly. No pericardial effusion.   Lymph nodes: No significant mediastinal or hilar lymphadenopathy with several small calcified left hilar nodes likely due to old granulomatous disease.   Bones/joints: Diffuse osteopenia and skeletal degeneration. Multiple fused right upper ribs possibly post traumatic.   Soft tissues: Unremarkable.   Other findings: No significant findings in the upper abdomen.     IMPRESSION:   1. No evidence of pulmonary emboli.   2. No evidence of pneumonia or congestive heart failure.   3. Diffuse bronchial wall thickening and interstitial prominence as well as evidence of old granulomatous disease.    < end of copied text >        VITALS:  T(C): 36.6 (12-02-19 @ 08:54), Max: 36.6 (12-01-19 @ 16:00)  T(F): 97.8 (12-02-19 @ 08:54), Max: 97.9 (12-01-19 @ 21:00)  HR: 60 (12-02-19 @ 09:40) (60 - 82)  BP: 133/56 (12-02-19 @ 08:54) (130/62 - 133/56)  BP(mean): 74 (12-02-19 @ 08:54) (74 - 74)  ABP: --  ABP(mean): --  RR: 16 (12-02-19 @ 08:54) (16 - 16)  SpO2: 95% (12-02-19 @ 09:40) (95% - 100%)  CVP(mm Hg): --  CVP(cm H2O): --    Ins and Outs     12-01-19 @ 07:01  -  12-02-19 @ 07:00  --------------------------------------------------------  IN: 200 mL / OUT: 0 mL / NET: 200 mL                I&O's Detail    01 Dec 2019 07:01  -  02 Dec 2019 07:00  --------------------------------------------------------  IN:    Oral Fluid: 200 mL  Total IN: 200 mL    OUT:  Total OUT: 0 mL    Total NET: 200 mL

## 2019-12-02 NOTE — PROGRESS NOTE ADULT - ASSESSMENT
87M current smoker, hx of COPD, PVD, HLD, DVT/PE off Xarelto, hx of CVA pw AMS. Pt c/o worsening SOB for one week with mild increased in productive cough. CT chest showed pulmonary fibrosis. CTA negative of PE.     #AMS - resolved  Patient had alcohol on board at time of AMS   neurology consult appreciated  CT head no acute findings  CTA neck -55-60%- will need outpatient referral to vascular  EEG: no seizure activities. mild bilateral slowing.  PT eval: ДМИТРИЙ     # dyspnea possible acute on chronic copd exacerbation with underlying IPF.   - c/o persist dyspnea.   - CT chest showed chronic fibrosis with honeycombing at peripheral of both lungs  - finish prednisone 40mg qd x5 days (last day 12/2/19)  - finish azithromycin 5 days total  - duonebs q6 and prn, spiriva, symbicort   - mucinex q12  - CTA negative PE  - TTE 7/2019: EF 65%. grade I DD. mild AR, AS. borderline pulm HTN.  - pt saturating 95% on RA  - smoking cessation   - Pt should follow up with pulm as outpatient for pulmonary fibrosis and COPD    # Chronic microcytic anemia 2/2 NOAH  - ferritin 28  - s/p venofer 100mg x1. cont ferrous sulfate  - H/h stable    # chronic right harper lacunar infarct  - c/w ASA and statin    # CKD 3 - stable  best Cr baseline in 2019: 1.08-1.14    # Hx of PVD and CVA  cont asa, statin.     # Hx of PE/DVT - OFF AC currently    # hx of BPH  urinary retention resolved.   cont Flomax.     # DVT ppx- lovenox subq    DISPO: ДМИТРИЙ today

## 2020-01-18 ENCOUNTER — EMERGENCY (EMERGENCY)
Facility: HOSPITAL | Age: 85
LOS: 1 days | Discharge: HOME | End: 2020-01-18
Attending: EMERGENCY MEDICINE | Admitting: EMERGENCY MEDICINE
Payer: MEDICARE

## 2020-01-18 VITALS
WEIGHT: 139.99 LBS | TEMPERATURE: 97 F | HEIGHT: 67 IN | OXYGEN SATURATION: 100 % | RESPIRATION RATE: 24 BRPM | SYSTOLIC BLOOD PRESSURE: 154 MMHG | DIASTOLIC BLOOD PRESSURE: 72 MMHG | HEART RATE: 76 BPM

## 2020-01-18 DIAGNOSIS — Z98.890 OTHER SPECIFIED POSTPROCEDURAL STATES: Chronic | ICD-10-CM

## 2020-01-18 PROCEDURE — 99284 EMERGENCY DEPT VISIT MOD MDM: CPT

## 2020-01-18 PROCEDURE — 93971 EXTREMITY STUDY: CPT

## 2020-01-18 PROCEDURE — 73552 X-RAY EXAM OF FEMUR 2/>: CPT

## 2020-01-18 PROCEDURE — 73552 X-RAY EXAM OF FEMUR 2/>: CPT | Mod: 26,RT

## 2020-01-18 PROCEDURE — 71045 X-RAY EXAM CHEST 1 VIEW: CPT

## 2020-01-18 PROCEDURE — 71045 X-RAY EXAM CHEST 1 VIEW: CPT | Mod: 26

## 2020-01-18 PROCEDURE — 99284 EMERGENCY DEPT VISIT MOD MDM: CPT | Mod: 25

## 2020-01-18 PROCEDURE — 73502 X-RAY EXAM HIP UNI 2-3 VIEWS: CPT

## 2020-01-18 PROCEDURE — 73502 X-RAY EXAM HIP UNI 2-3 VIEWS: CPT | Mod: 26,RT

## 2020-01-18 PROCEDURE — 93971 EXTREMITY STUDY: CPT | Mod: 26,RT

## 2020-01-18 RX ORDER — OXYCODONE AND ACETAMINOPHEN 5; 325 MG/1; MG/1
1 TABLET ORAL ONCE
Refills: 0 | Status: DISCONTINUED | OUTPATIENT
Start: 2020-01-18 | End: 2020-01-18

## 2020-01-18 RX ORDER — AZITHROMYCIN 500 MG/1
0 TABLET, FILM COATED ORAL
Qty: 0 | Refills: 0 | DISCHARGE

## 2020-01-18 RX ORDER — ACETAMINOPHEN 500 MG
650 TABLET ORAL ONCE
Refills: 0 | Status: COMPLETED | OUTPATIENT
Start: 2020-01-18 | End: 2020-01-18

## 2020-01-18 RX ADMIN — OXYCODONE AND ACETAMINOPHEN 1 TABLET(S): 5; 325 TABLET ORAL at 16:52

## 2020-01-18 RX ADMIN — Medication 650 MILLIGRAM(S): at 14:31

## 2020-01-18 RX ADMIN — Medication 650 MILLIGRAM(S): at 13:40

## 2020-01-18 NOTE — ED PROVIDER NOTE - OBJECTIVE STATEMENT
87M current smoker, hx of COPD, PVD, HLD, DVT/PE off Xarelto, hx of CVA pw AMS. Pt was last seen well at home at 330PM. Wife and daughter found him at home at about 5PM on the couch with his legs crossed comfortably but family was unable to arouse him. Pt would open his eyes briefly and stare at them and go back to sleep. Pt was BIBA. Initial FS was 97. On arrival to ED, pt while lethargic was able to answer some questions and follow commands. CT head neg for acute infarct. CT angio with R carotid stenosis 45-55% and 55-65% on L. Currently oriented to self, family members, place but not to date or . Pt can not recall sequence of events. He recalled some difficulty with breathing and some chest pain but later retracted chest pain hx. +chronic cough. Family did not noted any recent fevers, chills, SOB, new change in cough, NVD or decrease in appetite. At baseline, very limited in terms of ambulation sec to PVD. He is seen by Dr Day at home and last seen a week ago. Pt had stopped all meds on his own for over 5 months now despite PMD insistence on maintaining ASA 81, neurontin 300mg, Flomax and lipitor. 87M h/o COPD, PVD, HLD, DVT/PE off Xarelto, CVA, chronic RLE pain pw worsening pain x 2 days localized to the groin area with movement resolves at rest. Patient currently bed bound- only takes 1-2 steps to move from bed to chair. Patient Alert, oriented answers questions appropriately- denies recent fall, trauma, numbness, tingling, fever, chills. Lives with wife. Currently accompanied with wife and daughter. Pain improves with rest and ASA.

## 2020-01-18 NOTE — ED ADULT NURSE REASSESSMENT NOTE - NS ED NURSE REASSESS COMMENT FT1
Patient still in department while the provider writes a prescription, has already been discharged and given paperwork

## 2020-01-18 NOTE — ED PROVIDER NOTE - NSFOLLOWUPINSTRUCTIONS_ED_ALL_ED_FT
Follow up with your PMD within 48-72 hrs.  Show copies of your reports given to you.  Take all of your medications as previously prescribed.  Take Tylenol 650mg every 4-6 hours as needed for pain   Worsening, continued or ANY new concerning symptoms return to the Emergency Department.       Musculoskeletal Pain    WHAT YOU NEED TO KNOW:    Muscle pain can be dull, achy, or sharp. You may have pain and tenderness to the touch as well. It can occur anywhere on your body and is often brought on by exercise. Muscle pain may occur from an injury, such as a sprain, tendonitis, or bone fracture. Muscle pain can also be the result of medical conditions, such as polymyositis, fibromyalgia, and connective tissue disorders.     DISCHARGE INSTRUCTIONS:    Self care:     Rest as directed and avoid activity that causes pain. You may be able to return to normal activity when you can move without pain. Follow directions for rest and activity. You are at risk for injury for 3 weeks after your symptoms go away.       Ice your painful muscle area to decrease pain and swelling. Use an ice pack, or put ice in a plastic bag and cover it with a towel. Always put a cloth between the ice and your skin. Apply the ice as often as directed for the first 24 to 48 hours.       Compression with a splint, brace, or elastic bandage helps decrease pain and swelling. This may be needed for muscle pain in arms or legs. A splint, brace, or bandage will also help protect the painful area when you move around.       Elevate a painful arm or leg to reduce swelling and pain. Elevate your limb while you are sitting or lying down. Prop a painful leg on pillows to keep it above the level of your heart.    Medicines:     NSAIDs help decrease swelling and pain or fever. This medicine is available with or without a doctor's order. NSAIDs can cause stomach bleeding or kidney problems in certain people. If you take blood thinner medicine, always ask your healthcare provider if NSAIDs are safe for you. Always read the medicine label and follow directions.      Acetaminophen is used to decrease pain. It is available without a doctor's order. Ask your healthcare provider how much to take and when to take it. Follow directions. Acetaminophen can cause liver damage if not taken correctly. Do not take more than one medicine that contains acetaminophen unless directed.       Muscle relaxers help relax your muscles to decrease pain and muscle spasms.       Steroids may be given to decrease redness, pain, and swelling.      Take your medicine as directed. Contact your healthcare provider if you think your medicine is not helping or if you have side effects. Tell him if you are allergic to any medicine. Keep a list of the medicines, vitamins, and herbs you take. Include the amounts, and when and why you take them. Bring the list or the pill bottles to follow-up visits. Carry your medicine list with you in case of an emergency.    Follow up with your healthcare provider as directed: You may need more tests to help healthcare providers find the cause of your muscle pain. You may need physical therapy to learn muscle strengthening exercises. Write down your questions so you remember to ask them during your visits.     Contact your healthcare provider if:     You have a fever.       You have trouble sleeping because of your pain.       Your painful area becomes more tender, red, and warm to the touch.       You have decreased movement of the painful area.       You have questions or concerns about your condition or care.    Return to the emergency department if:     You have increased severe pain when you move the painful muscle area.       You lose feeling in your painful muscle area.       You have new or worse swelling in the painful area. Your skin may feel tight.       You have increased muscle pain or pain that does not improve with treatment. Follow up with your PMD within 48-72 hrs.  Show copies of your reports given to you.  Take all of your medications as previously prescribed.  Take Tylenol 650mg every 4-6 hours as needed for pain   Worsening, continued or ANY new concerning symptoms return to the Emergency Department.     Musculoskeletal Pain    WHAT YOU NEED TO KNOW:    Muscle pain can be dull, achy, or sharp. You may have pain and tenderness to the touch as well. It can occur anywhere on your body and is often brought on by exercise. Muscle pain may occur from an injury, such as a sprain, tendonitis, or bone fracture. Muscle pain can also be the result of medical conditions, such as polymyositis, fibromyalgia, and connective tissue disorders.     DISCHARGE INSTRUCTIONS:    Self care:     Rest as directed and avoid activity that causes pain. You may be able to return to normal activity when you can move without pain. Follow directions for rest and activity. You are at risk for injury for 3 weeks after your symptoms go away.       Ice your painful muscle area to decrease pain and swelling. Use an ice pack, or put ice in a plastic bag and cover it with a towel. Always put a cloth between the ice and your skin. Apply the ice as often as directed for the first 24 to 48 hours.       Compression with a splint, brace, or elastic bandage helps decrease pain and swelling. This may be needed for muscle pain in arms or legs. A splint, brace, or bandage will also help protect the painful area when you move around.       Elevate a painful arm or leg to reduce swelling and pain. Elevate your limb while you are sitting or lying down. Prop a painful leg on pillows to keep it above the level of your heart.    Medicines:     NSAIDs help decrease swelling and pain or fever. This medicine is available with or without a doctor's order. NSAIDs can cause stomach bleeding or kidney problems in certain people. If you take blood thinner medicine, always ask your healthcare provider if NSAIDs are safe for you. Always read the medicine label and follow directions.      Acetaminophen is used to decrease pain. It is available without a doctor's order. Ask your healthcare provider how much to take and when to take it. Follow directions. Acetaminophen can cause liver damage if not taken correctly. Do not take more than one medicine that contains acetaminophen unless directed.       Muscle relaxers help relax your muscles to decrease pain and muscle spasms.       Steroids may be given to decrease redness, pain, and swelling.      Take your medicine as directed. Contact your healthcare provider if you think your medicine is not helping or if you have side effects. Tell him if you are allergic to any medicine. Keep a list of the medicines, vitamins, and herbs you take. Include the amounts, and when and why you take them. Bring the list or the pill bottles to follow-up visits. Carry your medicine list with you in case of an emergency.    Follow up with your healthcare provider as directed: You may need more tests to help healthcare providers find the cause of your muscle pain. You may need physical therapy to learn muscle strengthening exercises. Write down your questions so you remember to ask them during your visits.     Contact your healthcare provider if:     You have a fever.       You have trouble sleeping because of your pain.       Your painful area becomes more tender, red, and warm to the touch.       You have decreased movement of the painful area.       You have questions or concerns about your condition or care.    Return to the emergency department if:     You have increased severe pain when you move the painful muscle area.       You lose feeling in your painful muscle area.       You have new or worse swelling in the painful area. Your skin may feel tight.       You have increased muscle pain or pain that does not improve with treatment.

## 2020-01-18 NOTE — ED PROVIDER NOTE - ATTENDING CONTRIBUTION TO CARE
Dr. Kwon: I performed a face to face bedside interview with patient regarding history of present illness, review of symptoms and past medical history. I completed an independent physical exam.  I have discussed patient's plan of care with PA.   I agree with note as stated above, having amended the EMR as needed to reflect my findings.   This includes HISTORY OF PRESENT ILLNESS, HIV, PAST MEDICAL/SURGICAL/FAMILY/SOCIAL HISTORY, ALLERGIES AND HOME MEDICATIONS, REVIEW OF SYSTEMS, PHYSICAL EXAM, and any PROGRESS NOTES during the time I functioned as the attending physician for this patient.    see mdm

## 2020-01-18 NOTE — ED PROVIDER NOTE - CLINICAL SUMMARY MEDICAL DECISION MAKING FREE TEXT BOX
Dr. Kwon: 87M h/o COPD, PVD, HTD, DVT/PE off Xarelto, CVA, chronic RLE pain p/w 2 days of atraumatic right group and upper thigh pain, worse with movement. Pt is bed bound. No chest pain or sob. Pain improved with rest and ASA. On exam pt is well appearing, nad, rrr, ctab, abdo soft/nt/nd, pelvis stable, normal ROM right hip, no shortening or rotation of hip, +DP, no pedal edema or calf ttp, no rashes. Will get xray r/o fx, duplex r/o DVT. Dr. Kwon: 87M h/o COPD, PVD, HTD, DVT/PE off Xarelto, CVA, chronic RLE pain p/w 2 days of atraumatic right group and upper thigh pain, worse with movement. Pt is bed bound. No chest pain or sob. Pain improved with rest and ASA. On exam pt is well appearing, nad, rrr, ctab, abdo soft/nt/nd, pelvis stable, normal ROM right hip, no shortening or rotation of hip, +DP, no pedal edema or calf ttp, no rashes. Will get xray r/o fx, duplex r/o DVT.  PA Tiberio- no acute fractures, no DVT on US, pain improved with Tylenol. Will DC with PMD follow up

## 2020-01-18 NOTE — ED PROVIDER NOTE - PROGRESS NOTE DETAILS
NEL Cassidy Pt requested percocet at DC-  Percocet 1 tablet every 6 hrs as needed for breakthrough discomfort- caution drowsiness while taking this medication- do not drive or operate heavy machinery.

## 2020-01-18 NOTE — ED PROVIDER NOTE - LOWER EXTREMITY EXAM, RIGHT
FROM, states pain to the anterior groin area with movement, unable to reproduce with palpation, no obvious deformity, swelling, ecchymosis, massess

## 2020-01-18 NOTE — ED PROVIDER NOTE - EXTREMITY EXAM
Per Elieser Cantu @ [facility] ([place]), Patient wants to wait until August to get set up on machine. He will call their office to schedule. If any updated info is needed from us to set patient up, I asked that they call us to let us know ahead of time as she anticipates us needing to send a new order. right lower extremity findings

## 2020-01-18 NOTE — ED PROVIDER NOTE - PMH
CAD (coronary artery disease)    Deep vein thrombosis (DVT)    Dyslipidemia    History of COPD    [place] (hard of hearing)    PE (pulmonary thromboembolism)    Peripheral vascular disease

## 2020-01-18 NOTE — ED ADULT NURSE NOTE - NSIMPLEMENTINTERV_GEN_ALL_ED
Implemented All Fall with Harm Risk Interventions:  [place] to call system. Call bell, personal items and telephone within reach. Instruct patient to call for assistance. Room bathroom lighting operational. Non-slip footwear when patient is off stretcher. Physically safe environment: no spills, clutter or unnecessary equipment. Stretcher in lowest position, wheels locked, appropriate side rails in place. Provide visual cue, wrist band, yellow gown, etc. Monitor gait and stability. Monitor for mental status changes and reorient to person, place, and time. Review medications for side effects contributing to fall risk. Reinforce activity limits and safety measures with patient and family. Provide visual clues: red socks.

## 2020-01-18 NOTE — ED ADULT NURSE NOTE - NS ED NURSE DISCH DISPOSITION
NURSE NOTES:

Dr. Austin made aware that Dr. Moon called back since messages left for Dr. Leon and he 
is out, Dr. Jerry covering but she is pulmo, Dr. Moon said to refer back to him (Dr. Austin). Levophed initiated for patient. MD acknowledged, gave order to consult Dr. Conte. 
Will endorse to noc shift RN. Discharged

## 2020-01-18 NOTE — ED PROVIDER NOTE - PATIENT PORTAL LINK FT
You can access the FollowMyHealth Patient Portal offered by [facility] by registering at the following website: [email or web address]. By joining [facility]’s FollowMyHealth portal, you will also be able to view your health information using other applications (apps) compatible with our system.

## 2020-01-26 DIAGNOSIS — M79.659 PAIN IN UNSPECIFIED THIGH: ICD-10-CM

## 2020-01-27 ENCOUNTER — INPATIENT (INPATIENT)
Facility: HOSPITAL | Age: 85
LOS: 2 days | Discharge: NURSING, REHAB OR LONG-TERM CARE | DRG: 556 | End: 2020-01-30
Attending: HOSPITALIST | Admitting: HOSPITALIST
Payer: MEDICARE

## 2020-01-27 VITALS
DIASTOLIC BLOOD PRESSURE: 76 MMHG | HEART RATE: 63 BPM | WEIGHT: 160.06 LBS | TEMPERATURE: 97 F | RESPIRATION RATE: 16 BRPM | SYSTOLIC BLOOD PRESSURE: 143 MMHG | OXYGEN SATURATION: 100 % | HEIGHT: 67 IN

## 2020-01-27 DIAGNOSIS — S09.90XA UNSPECIFIED INJURY OF HEAD, INITIAL ENCOUNTER: ICD-10-CM

## 2020-01-27 DIAGNOSIS — Z98.890 OTHER SPECIFIED POSTPROCEDURAL STATES: Chronic | ICD-10-CM

## 2020-01-27 LAB
ALBUMIN SERPL ELPH-MCNC: 3.8 G/DL — SIGNIFICANT CHANGE UP (ref 3.3–5)
ALP SERPL-CCNC: 114 U/L — SIGNIFICANT CHANGE UP (ref 40–120)
ALT FLD-CCNC: 26 U/L — SIGNIFICANT CHANGE UP (ref 10–45)
ANION GAP SERPL CALC-SCNC: 10 MMOL/L — SIGNIFICANT CHANGE UP (ref 5–17)
APPEARANCE UR: CLEAR — SIGNIFICANT CHANGE UP
AST SERPL-CCNC: 26 U/L — SIGNIFICANT CHANGE UP (ref 10–40)
BACTERIA # UR AUTO: ABNORMAL /HPF
BASOPHILS # BLD AUTO: 0.03 K/UL — SIGNIFICANT CHANGE UP (ref 0–0.2)
BASOPHILS NFR BLD AUTO: 0.5 % — SIGNIFICANT CHANGE UP (ref 0–2)
BILIRUB SERPL-MCNC: 0.7 MG/DL — SIGNIFICANT CHANGE UP (ref 0.2–1.2)
BILIRUB UR-MCNC: NEGATIVE — SIGNIFICANT CHANGE UP
BUN SERPL-MCNC: 25 MG/DL — HIGH (ref 7–23)
CALCIUM SERPL-MCNC: 9 MG/DL — SIGNIFICANT CHANGE UP (ref 8.4–10.5)
CHLORIDE SERPL-SCNC: 103 MMOL/L — SIGNIFICANT CHANGE UP (ref 96–108)
CO2 SERPL-SCNC: 26 MMOL/L — SIGNIFICANT CHANGE UP (ref 22–31)
COLOR SPEC: YELLOW — SIGNIFICANT CHANGE UP
CREAT SERPL-MCNC: 1.43 MG/DL — HIGH (ref 0.5–1.3)
DIFF PNL FLD: NEGATIVE — SIGNIFICANT CHANGE UP
EOSINOPHIL # BLD AUTO: 0.07 K/UL — SIGNIFICANT CHANGE UP (ref 0–0.5)
EOSINOPHIL NFR BLD AUTO: 1.1 % — SIGNIFICANT CHANGE UP (ref 0–6)
EPI CELLS # UR: SIGNIFICANT CHANGE UP
GLUCOSE SERPL-MCNC: 95 MG/DL — SIGNIFICANT CHANGE UP (ref 70–99)
GLUCOSE UR QL: NEGATIVE — SIGNIFICANT CHANGE UP
HCT VFR BLD CALC: 43.3 % — SIGNIFICANT CHANGE UP (ref 39–50)
HGB BLD-MCNC: 13.8 G/DL — SIGNIFICANT CHANGE UP (ref 13–17)
IMM GRANULOCYTES NFR BLD AUTO: 0.3 % — SIGNIFICANT CHANGE UP (ref 0–1.5)
KETONES UR-MCNC: NEGATIVE — SIGNIFICANT CHANGE UP
LEUKOCYTE ESTERASE UR-ACNC: NEGATIVE — SIGNIFICANT CHANGE UP
LIDOCAIN IGE QN: 95 U/L — SIGNIFICANT CHANGE UP (ref 73–393)
LYMPHOCYTES # BLD AUTO: 1.74 K/UL — SIGNIFICANT CHANGE UP (ref 1–3.3)
LYMPHOCYTES # BLD AUTO: 27 % — SIGNIFICANT CHANGE UP (ref 13–44)
MCHC RBC-ENTMCNC: 27.3 PG — SIGNIFICANT CHANGE UP (ref 27–34)
MCHC RBC-ENTMCNC: 31.9 GM/DL — LOW (ref 32–36)
MCV RBC AUTO: 85.6 FL — SIGNIFICANT CHANGE UP (ref 80–100)
MONOCYTES # BLD AUTO: 0.53 K/UL — SIGNIFICANT CHANGE UP (ref 0–0.9)
MONOCYTES NFR BLD AUTO: 8.2 % — SIGNIFICANT CHANGE UP (ref 2–14)
NEUTROPHILS # BLD AUTO: 4.06 K/UL — SIGNIFICANT CHANGE UP (ref 1.8–7.4)
NEUTROPHILS NFR BLD AUTO: 62.9 % — SIGNIFICANT CHANGE UP (ref 43–77)
NITRITE UR-MCNC: NEGATIVE — SIGNIFICANT CHANGE UP
NRBC # BLD: 0 /100 WBCS — SIGNIFICANT CHANGE UP (ref 0–0)
PH UR: 5 — SIGNIFICANT CHANGE UP (ref 5–8)
PLATELET # BLD AUTO: 323 K/UL — SIGNIFICANT CHANGE UP (ref 150–400)
POTASSIUM SERPL-MCNC: 4.5 MMOL/L — SIGNIFICANT CHANGE UP (ref 3.5–5.3)
POTASSIUM SERPL-SCNC: 4.5 MMOL/L — SIGNIFICANT CHANGE UP (ref 3.5–5.3)
PROT SERPL-MCNC: 8 G/DL — SIGNIFICANT CHANGE UP (ref 6–8.3)
PROT UR-MCNC: 15
RBC # BLD: 5.06 M/UL — SIGNIFICANT CHANGE UP (ref 4.2–5.8)
RBC # FLD: 19.6 % — HIGH (ref 10.3–14.5)
RBC CASTS # UR COMP ASSIST: NEGATIVE /HPF — SIGNIFICANT CHANGE UP (ref 0–4)
SODIUM SERPL-SCNC: 139 MMOL/L — SIGNIFICANT CHANGE UP (ref 135–145)
SP GR SPEC: 1.02 — SIGNIFICANT CHANGE UP (ref 1.01–1.02)
TROPONIN I SERPL-MCNC: <.017 NG/ML — LOW (ref 0.02–0.06)
UROBILINOGEN FLD QL: NEGATIVE — SIGNIFICANT CHANGE UP
WBC # BLD: 6.45 K/UL — SIGNIFICANT CHANGE UP (ref 3.8–10.5)
WBC # FLD AUTO: 6.45 K/UL — SIGNIFICANT CHANGE UP (ref 3.8–10.5)
WBC UR QL: NEGATIVE /HPF — SIGNIFICANT CHANGE UP (ref 0–5)

## 2020-01-27 PROCEDURE — 70450 CT HEAD/BRAIN W/O DYE: CPT | Mod: 26

## 2020-01-27 PROCEDURE — 93010 ELECTROCARDIOGRAM REPORT: CPT

## 2020-01-27 PROCEDURE — 99285 EMERGENCY DEPT VISIT HI MDM: CPT

## 2020-01-27 PROCEDURE — 71045 X-RAY EXAM CHEST 1 VIEW: CPT | Mod: 26

## 2020-01-27 PROCEDURE — 72170 X-RAY EXAM OF PELVIS: CPT | Mod: 26

## 2020-01-27 PROCEDURE — 99223 1ST HOSP IP/OBS HIGH 75: CPT

## 2020-01-27 PROCEDURE — 72125 CT NECK SPINE W/O DYE: CPT | Mod: 26

## 2020-01-27 RX ORDER — ATORVASTATIN CALCIUM 80 MG/1
40 TABLET, FILM COATED ORAL AT BEDTIME
Refills: 0 | Status: DISCONTINUED | OUTPATIENT
Start: 2020-01-27 | End: 2020-01-30

## 2020-01-27 RX ORDER — SENNA PLUS 8.6 MG/1
1 TABLET ORAL AT BEDTIME
Refills: 0 | Status: DISCONTINUED | OUTPATIENT
Start: 2020-01-27 | End: 2020-01-30

## 2020-01-27 RX ORDER — FERROUS SULFATE 325(65) MG
325 TABLET ORAL DAILY
Refills: 0 | Status: DISCONTINUED | OUTPATIENT
Start: 2020-01-27 | End: 2020-01-30

## 2020-01-27 RX ORDER — ENOXAPARIN SODIUM 100 MG/ML
40 INJECTION SUBCUTANEOUS DAILY
Refills: 0 | Status: DISCONTINUED | OUTPATIENT
Start: 2020-01-27 | End: 2020-01-30

## 2020-01-27 RX ORDER — TIOTROPIUM BROMIDE 18 UG/1
1 CAPSULE ORAL; RESPIRATORY (INHALATION) DAILY
Refills: 0 | Status: DISCONTINUED | OUTPATIENT
Start: 2020-01-27 | End: 2020-01-30

## 2020-01-27 RX ORDER — SODIUM CHLORIDE 9 MG/ML
1000 INJECTION INTRAMUSCULAR; INTRAVENOUS; SUBCUTANEOUS ONCE
Refills: 0 | Status: COMPLETED | OUTPATIENT
Start: 2020-01-27 | End: 2020-01-27

## 2020-01-27 RX ORDER — ASPIRIN/CALCIUM CARB/MAGNESIUM 324 MG
81 TABLET ORAL DAILY
Refills: 0 | Status: DISCONTINUED | OUTPATIENT
Start: 2020-01-27 | End: 2020-01-30

## 2020-01-27 RX ORDER — FOLIC ACID 0.8 MG
1 TABLET ORAL DAILY
Refills: 0 | Status: DISCONTINUED | OUTPATIENT
Start: 2020-01-27 | End: 2020-01-30

## 2020-01-27 RX ORDER — ACETAMINOPHEN 500 MG
650 TABLET ORAL EVERY 6 HOURS
Refills: 0 | Status: DISCONTINUED | OUTPATIENT
Start: 2020-01-27 | End: 2020-01-30

## 2020-01-27 RX ORDER — BUDESONIDE AND FORMOTEROL FUMARATE DIHYDRATE 160; 4.5 UG/1; UG/1
2 AEROSOL RESPIRATORY (INHALATION)
Refills: 0 | Status: DISCONTINUED | OUTPATIENT
Start: 2020-01-27 | End: 2020-01-30

## 2020-01-27 RX ADMIN — SODIUM CHLORIDE 1000 MILLILITER(S): 9 INJECTION INTRAMUSCULAR; INTRAVENOUS; SUBCUTANEOUS at 20:35

## 2020-01-27 NOTE — ED ADULT NURSE NOTE - OBJECTIVE STATEMENT
Pt presents after fall tonight. Pt presents with swelling above left eye. Per wife pt falls almost every day. Pt presents with foul odor and unkempt clothing.

## 2020-01-27 NOTE — ED PROVIDER NOTE - OBJECTIVE STATEMENT
pt s/p frequent falls at home over the past 3 weeks, lives alone with his wife, daughter states that pt needs SNF placement as pt cannot get care at home.  pt denies any pain, fever, chills, n/v/d.  is [place] and daughter states has some dementia.

## 2020-01-27 NOTE — H&P ADULT - NSHPPHYSICALEXAM_GEN_ALL_CORE
Vital Signs (24 Hrs):  T(C): 37.2 (01-28-20 @ 01:08), Max: 37.2 (01-28-20 @ 01:08)  HR: 67 (01-28-20 @ 01:08) (63 - 88)  BP: 155/78 (01-28-20 @ 01:08) (135/79 - 155/78)  RR: 18 (01-28-20 @ 01:08) (16 - 24)  SpO2: 98% (01-28-20 @ 01:08) (98% - 100%)  Daily Height in cm: 175.26 (28 Jan 2020 01:08)

## 2020-01-27 NOTE — H&P ADULT - ASSESSMENT
87 y/po male with COPD, chronic bronchitis, dementia, arthritis, presents with frequent falls at home, worsened dementia and family unable to manage and care for patient at home.    Admit  Continue current meds: ASA, statin  Cont Broncholdilators- Duoneb via neb, Symbicort, Spiriva, Iron supp, Folic acid  Mucinex  Will initiate Donapezil  Hold off antibx for now - will check procalcitonin  Social Work, Case Mgt eval for SNF placement  PT Eval  DVT prophylaxis    CAPRINI SCORE [CLOT]  [ x] Age= 75 years                                              (3 Points)                                                                                                                                               Total Score [ 3   ]  Caprini Score 0 - 2:  Low Risk, No VTE Prophylaxis required for most patients, encourage ambulation  Caprini Score 3 - 6:  At Risk, pharmacologic VTE prophylaxis is indicated for most patients (in the absence of a contraindication)  Caprini Score Greater than or = 7:  High Risk, pharmacologic VTE prophylaxis is indicated for most patients (in the absence of a contraindication) 87 y/po male with COPD, chronic bronchitis, dementia, arthritis, presents with frequent falls at home, worsened dementia and family unable to manage and care for patient at home.    Admit  Continue current meds: ASA, statin  Cont Broncholdilators- Duoneb via neb, Symbicort, Spiriva, Iron supp, Folic acid  Mucinex  Will initiate Donapezil  Hold off antibx for now - will check procalcitonin  Social Work, Case Mgt eval for SNF placement  PT Eval  DVT prophylaxis  PCP Dr Day notified    CAPRINI SCORE [CLOT]  [ x] Age= 75 years                                              (3 Points)                                                                                                                                               Total Score [ 3   ]  Caprini Score 0 - 2:  Low Risk, No VTE Prophylaxis required for most patients, encourage ambulation  Caprini Score 3 - 6:  At Risk, pharmacologic VTE prophylaxis is indicated for most patients (in the absence of a contraindication)  Caprini Score Greater than or = 7:  High Risk, pharmacologic VTE prophylaxis is indicated for most patients (in the absence of a contraindication)

## 2020-01-27 NOTE — H&P ADULT - NSICDXPASTMEDICALHX_GEN_ALL_CORE_FT
PAST MEDICAL HISTORY:  CAD (coronary artery disease)     Deep vein thrombosis (DVT)     Dyslipidemia     History of COPD     [place] (hard of hearing)     PE (pulmonary thromboembolism)     Peripheral vascular disease

## 2020-01-27 NOTE — ED ADULT NURSE NOTE - PMH
CAD (coronary artery disease)    Deep vein thrombosis (DVT)    Dyslipidemia    History of COPD    [place] (hard of hearing)    PE (pulmonary thromboembolism)    Peripheral vascular disease

## 2020-01-27 NOTE — ED ADULT NURSE NOTE - NSIMPLEMENTINTERV_GEN_ALL_ED
Implemented All Fall Risk Interventions:  [place] to call system. Call bell, personal items and telephone within reach. Instruct patient to call for assistance. Room bathroom lighting operational. Non-slip footwear when patient is off stretcher. Physically safe environment: no spills, clutter or unnecessary equipment. Stretcher in lowest position, wheels locked, appropriate side rails in place. Provide visual cue, wrist band, yellow gown, etc. Monitor gait and stability. Monitor for mental status changes and reorient to person, place, and time. Review medications for side effects contributing to fall risk. Reinforce activity limits and safety measures with patient and family.

## 2020-01-27 NOTE — ED PROVIDER NOTE - PMH
CAD (coronary artery disease)    Deep vein thrombosis (DVT)    Dyslipidemia    History of COPD    [place] (hard of hearing)    PE (pulmonary thromboembolism)    Peripheral vascular disease

## 2020-01-27 NOTE — H&P ADULT - HISTORY OF PRESENT ILLNESS
pt s/p frequent falls at home over the past 3 weeks, lives alone with his wife, daughter states that pt needs SNF placement as pt cannot get care at home.  pt denies any pain, fever, chills, n/v/d.  is [place] and daughter states has some dementia. 87 y/po male with COPD, chronic bronchitis, dementia, arthritis, brought in by family because  of frequent falls at home over the past 3 weeks, lives with his wife, but apparently, wife unable to manage pt at home. As per daughter, pt needs SNF placement, family unable to manage pt at home anymore.  Pt is [place] and dementia has worsened over the past few months.  Pt always attempts to get up on his own, but ahs poor balance and difficulty ambulating, falling most of the time.  Pt denies any pain, fever, chills, n/v/d. Pt states he has chronic cough.  CT head neg for acute CVA or fractures.  Cervical spine and hip imaging neg for fractures.

## 2020-01-27 NOTE — ED ADULT TRIAGE NOTE - CHIEF COMPLAINT QUOTE
Pt BIB EMS after fall.  Pt has laceration over left eye and some bruising on his back.   He states "my whole body hurts".  His family reports he has had multiple falls.   Denies LOC, pt on aspirin.

## 2020-01-27 NOTE — ED PROVIDER NOTE - PHYSICAL EXAMINATION
Gen:  alert, awake, no acute distress, disheveled, [place]  HEENT:  atraumatic head, airway clear, pupils equal and round  CV:  rrr, nl S1, S2, no m/r/g  Pulm:  lungs CTA b/l  Abd: s/nt/nd, +BS  Ext:  moving all extremities  Neuro:  grossly intact, no focal deficits  Skin:  clear, dry, intact  Psych: AOx2, flat affect, no apparent risk to self or others

## 2020-01-28 LAB — PROCALCITONIN SERPL-MCNC: 0.05 NG/ML — SIGNIFICANT CHANGE UP

## 2020-01-28 PROCEDURE — 99233 SBSQ HOSP IP/OBS HIGH 50: CPT

## 2020-01-28 RX ORDER — BUDESONIDE AND FORMOTEROL FUMARATE DIHYDRATE 160; 4.5 UG/1; UG/1
2 AEROSOL RESPIRATORY (INHALATION)
Refills: 0 | Status: DISCONTINUED | OUTPATIENT
Start: 2020-01-28 | End: 2020-01-28

## 2020-01-28 RX ORDER — DEXTROSE 50 % IN WATER 50 %
15 SYRINGE (ML) INTRAVENOUS ONCE
Refills: 0 | Status: DISCONTINUED | OUTPATIENT
Start: 2020-01-28 | End: 2020-01-28

## 2020-01-28 RX ORDER — INSULIN LISPRO 100/ML
VIAL (ML) SUBCUTANEOUS
Refills: 0 | Status: DISCONTINUED | OUTPATIENT
Start: 2020-01-28 | End: 2020-01-28

## 2020-01-28 RX ORDER — IPRATROPIUM/ALBUTEROL SULFATE 18-103MCG
3 AEROSOL WITH ADAPTER (GRAM) INHALATION THREE TIMES A DAY
Refills: 0 | Status: DISCONTINUED | OUTPATIENT
Start: 2020-01-28 | End: 2020-01-28

## 2020-01-28 RX ORDER — TIOTROPIUM BROMIDE 18 UG/1
1 CAPSULE ORAL; RESPIRATORY (INHALATION) DAILY
Refills: 0 | Status: DISCONTINUED | OUTPATIENT
Start: 2020-01-28 | End: 2020-01-28

## 2020-01-28 RX ORDER — DEXTROSE 50 % IN WATER 50 %
12.5 SYRINGE (ML) INTRAVENOUS ONCE
Refills: 0 | Status: DISCONTINUED | OUTPATIENT
Start: 2020-01-28 | End: 2020-01-28

## 2020-01-28 RX ORDER — SODIUM CHLORIDE 9 MG/ML
1000 INJECTION, SOLUTION INTRAVENOUS
Refills: 0 | Status: DISCONTINUED | OUTPATIENT
Start: 2020-01-28 | End: 2020-01-28

## 2020-01-28 RX ORDER — GLUCAGON INJECTION, SOLUTION 0.5 MG/.1ML
1 INJECTION, SOLUTION SUBCUTANEOUS ONCE
Refills: 0 | Status: DISCONTINUED | OUTPATIENT
Start: 2020-01-28 | End: 2020-01-28

## 2020-01-28 RX ORDER — ALBUTEROL 90 UG/1
1 AEROSOL, METERED ORAL EVERY 4 HOURS
Refills: 0 | Status: DISCONTINUED | OUTPATIENT
Start: 2020-01-28 | End: 2020-01-28

## 2020-01-28 RX ORDER — IPRATROPIUM/ALBUTEROL SULFATE 18-103MCG
3 AEROSOL WITH ADAPTER (GRAM) INHALATION EVERY 4 HOURS
Refills: 0 | Status: DISCONTINUED | OUTPATIENT
Start: 2020-01-28 | End: 2020-01-28

## 2020-01-28 RX ORDER — DEXTROSE 50 % IN WATER 50 %
25 SYRINGE (ML) INTRAVENOUS ONCE
Refills: 0 | Status: DISCONTINUED | OUTPATIENT
Start: 2020-01-28 | End: 2020-01-28

## 2020-01-28 RX ORDER — IPRATROPIUM/ALBUTEROL SULFATE 18-103MCG
3 AEROSOL WITH ADAPTER (GRAM) INHALATION EVERY 12 HOURS
Refills: 0 | Status: DISCONTINUED | OUTPATIENT
Start: 2020-01-28 | End: 2020-01-29

## 2020-01-28 RX ORDER — INSULIN LISPRO 100/ML
VIAL (ML) SUBCUTANEOUS AT BEDTIME
Refills: 0 | Status: DISCONTINUED | OUTPATIENT
Start: 2020-01-28 | End: 2020-01-28

## 2020-01-28 RX ADMIN — Medication 81 MILLIGRAM(S): at 11:38

## 2020-01-28 RX ADMIN — SENNA PLUS 1 TABLET(S): 8.6 TABLET ORAL at 21:41

## 2020-01-28 RX ADMIN — ATORVASTATIN CALCIUM 40 MILLIGRAM(S): 80 TABLET, FILM COATED ORAL at 21:40

## 2020-01-28 RX ADMIN — Medication 600 MILLIGRAM(S): at 17:08

## 2020-01-28 RX ADMIN — TIOTROPIUM BROMIDE 1 CAPSULE(S): 18 CAPSULE ORAL; RESPIRATORY (INHALATION) at 08:22

## 2020-01-28 RX ADMIN — ENOXAPARIN SODIUM 40 MILLIGRAM(S): 100 INJECTION SUBCUTANEOUS at 11:37

## 2020-01-28 RX ADMIN — Medication 600 MILLIGRAM(S): at 09:29

## 2020-01-28 RX ADMIN — Medication 3 MILLILITER(S): at 08:16

## 2020-01-28 RX ADMIN — Medication 1 MILLIGRAM(S): at 11:38

## 2020-01-28 RX ADMIN — BUDESONIDE AND FORMOTEROL FUMARATE DIHYDRATE 2 PUFF(S): 160; 4.5 AEROSOL RESPIRATORY (INHALATION) at 08:20

## 2020-01-28 RX ADMIN — Medication 325 MILLIGRAM(S): at 11:37

## 2020-01-28 NOTE — PATIENT PROFILE ADULT - INFLUENZA IMMUNIZATION DATE (APPROXIMATE)
12-Nov-2019 EMS states "he was at [facility] today for seizure and got discharged. Then, he had 2 episodes of seizure after he went home. He is now complaining LUQ pain and nausea, vomiting."

## 2020-01-28 NOTE — PROGRESS NOTE ADULT - ASSESSMENT
87M with dementia, former smoker, hx COPD, PVD, HLD, hx CVA admitted with functional decline, frequent falls, ambulatory dysfunction, and inability to be cared for at home    #Ambulatory dysfunction  -PT note reviewed: recommends ДМИТРИЙ  -Assistance with ADLs    #COPD without exacerbation  -c/w nebs, inhalers, O2 prn    #History of CVA  -secondary ppx with ASA, statin    #CKD3: Stable Cr (~1.3 baseline)    #DVT ppx: Lovenox    Dispo: Plan for ДМИТРИЙ     Spoke to daughter Ella, [phone], patient is FULL CODE. She prefers to not start patient on any meds for dementia unless this diagnosis was confirmed (at this point, its presumptive). Will d/c the Donepezil. Agrees with plan for ДМИТРИЙ 87M with questionable recent onset dementia, former smoker, hx COPD, PVD, HLD, hx CVA admitted with functional decline, frequent falls, ambulatory dysfunction, and inability to be cared for at home    #Ambulatory dysfunction  -PT note reviewed: recommends ДМИТРИЙ  -Assistance with ADLs    #COPD without exacerbation  -c/w nebs, inhalers, O2 prn    #History of CVA  -secondary ppx with ASA, statin    #CKD3: Stable Cr (~1.3 baseline)    #DVT ppx: Lovenox    Dispo: Plan for ДМИТРИЙ     Spoke to daughter Ella, [phone], patient is FULL CODE. She prefers to not start patient on any meds for dementia unless this diagnosis was confirmed (at this point, its presumptive). 	Will d/c the Donepezil. Agrees with plan for ДМИТРИЙ

## 2020-01-28 NOTE — PROGRESS NOTE ADULT - SUBJECTIVE AND OBJECTIVE BOX
Patient is a 87y old  Male who presents with a chief complaint of Frequent falls, difficulty ambulating (2020 23:35)    Patient seen and examined at bedside. No overnight events reported.     ALLERGIES:  No Known Allergies    MEDICATIONS  (STANDING):  albuterol/ipratropium for Nebulization 3 milliLiter(s) Nebulizer every 12 hours  aspirin enteric coated 81 milliGRAM(s) Oral daily  atorvastatin 40 milliGRAM(s) Oral at bedtime  budesonide 160 MICROgram(s)/formoterol 4.5 MICROgram(s) Inhaler 2 Puff(s) Inhalation two times a day  enoxaparin Injectable 40 milliGRAM(s) SubCutaneous daily  ferrous    sulfate 325 milliGRAM(s) Oral daily  folic acid 1 milliGRAM(s) Oral daily  guaiFENesin  milliGRAM(s) Oral every 12 hours  senna 1 Tablet(s) Oral at bedtime  tiotropium 18 MICROgram(s) Capsule 1 Capsule(s) Inhalation daily    MEDICATIONS  (PRN):  acetaminophen   Tablet .. 650 milliGRAM(s) Oral every 6 hours PRN Temp greater or equal to 38C (100.4F), Mild Pain (1 - 3)    Vital Signs Last 24 Hrs  T(F): 97.7 (2020 10:47), Max: 99 (2020 01:08)  HR: 69 (2020 10:47) (63 - 88)  BP: 139/53 (2020 10:47) (135/79 - 155/78)  RR: 16 (2020 10:47) (16 - 24)  SpO2: 100% (2020 10:47) (98% - 100%)  I&O's Summary    PHYSICAL EXAM:  General: NAD, A/O x 1  ENT: MMM, no thrush  Neck: Supple, No JVD  Lungs: Scattered coarse breath sounds noted, good air entry, non-labored breathing  Cardio: RRR, S1/S2, No murmur  Abdomen: Soft, Nontender, Nondistended; Bowel sounds present  Extremities: No calf tenderness, No pitting edema    LABS:                        13.8   6.45  )-----------( 323      ( 2020 20:30 )             43.3         139  |  103  |  25  ----------------------------<  95  4.5   |  26  |  1.43    Ca    9.0      2020 20:30    TPro  8.0  /  Alb  3.8  /  TBili  0.7  /  DBili  x   /  AST  26  /  ALT  26  /  AlkPhos  114        Lipase, Serum: 95 U/L (20 @ 20:30)    eGFR if Non African American: 44 mL/min/1.73M2 (20 @ 20:30)  eGFR if : 51 mL/min/1.73M2 (20 @ 20:30)        Procalcitonin, Serum: 0.05 ng/mL (20 @ 08:15)    CARDIAC MARKERS ( 2020 20:30 )  <.017 ng/mL / x     / x     / x     / x          11- Chol 141 mg/dL LDL 69 mg/dL HDL 47 mg/dL Trig 123 mg/dL      Urinalysis Basic - ( 2020 22:00 )    Color: Yellow / Appearance: Clear / S.025 / pH: x  Gluc: x / Ketone: Negative  / Bili: Negative / Urobili: Negative   Blood: x / Protein: 15 / Nitrite: Negative   Leuk Esterase: Negative / RBC: Negative /HPF / WBC Negative /HPF   Sq Epi: x / Non Sq Epi: Neg.-Few / Bacteria: Trace /HPF    RADIOLOGY & ADDITIONAL TESTS:  < from: Xray Pelvis AP only (20 @ 21:28) >  IMPRESSION: Stable exam without acute finding.    < end of copied text >    < from: 12 Lead ECG (20 @ 20:49) >  Diagnosis Line Normal sinus rhythm  Low voltage in limb leads.  Otherwise normal ECG  When compared with ECG of 2019 17:51,  No significant change was found    < end of copied text >    OLD RECORDS REVIEWED: BASELINE Cr ~ 1.25

## 2020-01-28 NOTE — PHYSICAL THERAPY INITIAL EVALUATION ADULT - ADDITIONAL COMMENTS
pt lives in private home with wife. used RW before admission.  pt reports fall happened during change in direction.

## 2020-01-29 LAB
CULTURE RESULTS: SIGNIFICANT CHANGE UP
SPECIMEN SOURCE: SIGNIFICANT CHANGE UP

## 2020-01-29 PROCEDURE — 99232 SBSQ HOSP IP/OBS MODERATE 35: CPT

## 2020-01-29 RX ORDER — POLYETHYLENE GLYCOL 3350 17 G/17G
17 POWDER, FOR SOLUTION ORAL DAILY
Refills: 0 | Status: DISCONTINUED | OUTPATIENT
Start: 2020-01-29 | End: 2020-01-30

## 2020-01-29 RX ORDER — IPRATROPIUM/ALBUTEROL SULFATE 18-103MCG
3 AEROSOL WITH ADAPTER (GRAM) INHALATION EVERY 6 HOURS
Refills: 0 | Status: DISCONTINUED | OUTPATIENT
Start: 2020-01-29 | End: 2020-01-30

## 2020-01-29 RX ADMIN — Medication 600 MILLIGRAM(S): at 17:06

## 2020-01-29 RX ADMIN — ENOXAPARIN SODIUM 40 MILLIGRAM(S): 100 INJECTION SUBCUTANEOUS at 17:06

## 2020-01-29 RX ADMIN — ATORVASTATIN CALCIUM 40 MILLIGRAM(S): 80 TABLET, FILM COATED ORAL at 22:50

## 2020-01-29 RX ADMIN — Medication 650 MILLIGRAM(S): at 11:45

## 2020-01-29 RX ADMIN — Medication 1 MILLIGRAM(S): at 17:05

## 2020-01-29 RX ADMIN — BUDESONIDE AND FORMOTEROL FUMARATE DIHYDRATE 2 PUFF(S): 160; 4.5 AEROSOL RESPIRATORY (INHALATION) at 09:29

## 2020-01-29 RX ADMIN — Medication 650 MILLIGRAM(S): at 10:45

## 2020-01-29 RX ADMIN — TIOTROPIUM BROMIDE 1 CAPSULE(S): 18 CAPSULE ORAL; RESPIRATORY (INHALATION) at 09:29

## 2020-01-29 RX ADMIN — Medication 600 MILLIGRAM(S): at 05:41

## 2020-01-29 RX ADMIN — Medication 650 MILLIGRAM(S): at 22:48

## 2020-01-29 RX ADMIN — Medication 3 MILLILITER(S): at 09:30

## 2020-01-29 RX ADMIN — SENNA PLUS 1 TABLET(S): 8.6 TABLET ORAL at 22:50

## 2020-01-29 RX ADMIN — BUDESONIDE AND FORMOTEROL FUMARATE DIHYDRATE 2 PUFF(S): 160; 4.5 AEROSOL RESPIRATORY (INHALATION) at 21:41

## 2020-01-29 RX ADMIN — Medication 81 MILLIGRAM(S): at 17:05

## 2020-01-29 RX ADMIN — Medication 325 MILLIGRAM(S): at 17:05

## 2020-01-29 RX ADMIN — Medication 650 MILLIGRAM(S): at 23:42

## 2020-01-29 NOTE — PROGRESS NOTE ADULT - SUBJECTIVE AND OBJECTIVE BOX
Patient is a 87y old  Male who presents with a chief complaint of Frequent falls, difficulty ambulating (2020 11:05)      Interval HPI/ Overnight events: No overnight events    Patient seen and examined at bedside, no complaints    REVIEW OF SYSTEMS:    CONSTITUTIONAL: No fevers or chills  EYES/ENT: No visual changes;  No vertigo or throat pain   NECK: No pain or stiffness  RESPIRATORY: No cough, wheezing, hemoptysis; No shortness of breath  CARDIOVASCULAR: No chest pain or palpitations  GASTROINTESTINAL: No abdominal or epigastric pain. No nausea, vomiting, or hematemesis; No diarrhea or constipation. No melena or hematochezia.  GENITOURINARY: No dysuria, frequency or hematuria  NEUROLOGICAL: No numbness  MSK: no joint pain, no joint swelling  All other review of systems is negative unless indicated above.      ALLERGIES:  No Known Allergies    MEDICATIONS  (STANDING):  albuterol/ipratropium for Nebulization 3 milliLiter(s) Nebulizer every 12 hours  aspirin enteric coated 81 milliGRAM(s) Oral daily  atorvastatin 40 milliGRAM(s) Oral at bedtime  budesonide 160 MICROgram(s)/formoterol 4.5 MICROgram(s) Inhaler 2 Puff(s) Inhalation two times a day  enoxaparin Injectable 40 milliGRAM(s) SubCutaneous daily  ferrous    sulfate 325 milliGRAM(s) Oral daily  folic acid 1 milliGRAM(s) Oral daily  guaiFENesin  milliGRAM(s) Oral every 12 hours  senna 1 Tablet(s) Oral at bedtime  tiotropium 18 MICROgram(s) Capsule 1 Capsule(s) Inhalation daily    MEDICATIONS  (PRN):  acetaminophen   Tablet .. 650 milliGRAM(s) Oral every 6 hours PRN Temp greater or equal to 38C (100.4F), Mild Pain (1 - 3)    Vital Signs Last 24 Hrs  T(F): 97.3 (2020 10:11), Max: 98.2 (2020 05:02)  HR: 70 (2020 10:11) (63 - 72)  BP: 116/- (2020 10:11) (116/- - 162/79)  RR: 16 (2020 10:11) (16 - 18)  SpO2: 99% (2020 10:11) (93% - 99%)  I&O's Summary    2020 07:01  -  2020 07:00  --------------------------------------------------------  IN: 720 mL / OUT: 1250 mL / NET: -530 mL    2020 07:  -  2020 14:08  --------------------------------------------------------  IN: 600 mL / OUT: 0 mL / NET: 600 mL        PHYSICAL EXAM:  General: elderly male sitting in chair in NAD, well dressed, well nourished  Head: NT/AC  ENT: MMM, no oropharyngeal erythema or exudates  Neck: Supple, No JVD  Lungs: Clear to auscultation bilaterally, no wheezing, rales, or rhonchi, no accessory muscle use  Cardio: RRR, normal S1/S2, No M/R/G  Abdomen: Normoactive BS, Abdomen soft, nontender, nondistended; no organomegaly  Extremities: No calf tenderness, no clubbing or  cyanosis  Vascular: trace LE edema b/l  Skin: warm and dry  Neuro: AAOx1, answers all questions appropriately, moves all extremities    LABS:                        13.8   6.45  )-----------( 323      ( 2020 20:30 )             43.3         139  |  103  |  25  ----------------------------<  95  4.5   |  26  |  1.43    Ca    9.0      2020 20:30    TPro  8.0  /  Alb  3.8  /  TBili  0.7  /  DBili  x   /  AST  26  /  ALT  26  /  AlkPhos  114      eGFR if Non African American: 44 mL/min/1.73M2 (20 @ 20:30)  eGFR if : 51 mL/min/1.73M2 (20 @ 20:30)        CARDIAC MARKERS ( 2020 20:30 )  <.017 ng/mL / x     / x     / x     / x          11-27 Chol 141 mg/dL LDL 69 mg/dL HDL 47 mg/dL Trig 123 mg/dL                  Urinalysis Basic - ( 2020 22:00 )    Color: Yellow / Appearance: Clear / S.025 / pH: x  Gluc: x / Ketone: Negative  / Bili: Negative / Urobili: Negative   Blood: x / Protein: 15 / Nitrite: Negative   Leuk Esterase: Negative / RBC: Negative /HPF / WBC Negative /HPF   Sq Epi: x / Non Sq Epi: Neg.-Few / Bacteria: Trace /HPF        Culture - Urine (collected 2020 22:00)  Source: .Urine Clean Catch (Midstream)  Final Report (2020 06:29):    <10,000 CFU/mL Normal Urogenital Keri        RADIOLOGY & ADDITIONAL TESTS:    Care Discussed with Consultants/Other Providers:

## 2020-01-29 NOTE — PROGRESS NOTE ADULT - ASSESSMENT
87M with questionable recent onset dementia, former smoker, hx COPD, PVD, HLD, hx CVA admitted with functional decline, frequent falls, ambulatory dysfunction, and inability to be cared for at home    #Ambulatory dysfunction  -PT note reviewed: recommends ДМИТРИЙ  -Assistance with ADLs    #COPD without exacerbation  - continue Symbicort and Spiriva  - nebs PRN    #History of CVA  - continue secondary ppx with ASA, statin    #CKD3: Stable Cr (~1.3-1.4 baseline)    #DVT ppx: Lovenox    Dispo: Plan for ДМИТРИЙ , needs 3 night stay, dc tomorrow    Spoke to daughter Ella, [phone], patient is FULL CODE. She prefers to not start patient on any meds for dementia unless this diagnosis was confirmed (at this point, its presumptive). 	Will d/c the Donepezil. Agrees with plan for ДМИТРИЙ          CAPRINI SCORE [CLOT]    AGE RELATED RISK FACTORS                                                       MOBILITY RELATED FACTORS  [ ] Age 41-60 years                                            (1 Point)                  [ ] Bed rest                                                        (1 Point)  [ ] Age: 61-74 years                                           (2 Points)                 [ ] Plaster cast                                                   (2 Points)  [x ] Age= 75 years                                              (3 Points)                 [ ] Bed bound for more than 72 hours                 (2 Points)    DISEASE RELATED RISK FACTORS                                               GENDER SPECIFIC FACTORS  [ ] Edema in the lower extremities                       (1 Point)                  [ ] Pregnancy                                                     (1 Point)  [ ] Varicose veins                                               (1 Point)                  [ ] Post-partum < 6 weeks                                   (1 Point)             [ ] BMI > 25 Kg/m2                                            (1 Point)                  [ ] Hormonal therapy  or oral contraception          (1 Point)                 [ ] Sepsis (in the previous month)                        (1 Point)                  [ ] History of pregnancy complications                 (1 point)  [ ] Pneumonia or serious lung disease                                               [ ] Unexplained or recurrent                     (1 Point)           (in the previous month)                               (1 Point)  [ ] Abnormal pulmonary function test                     (1 Point)                 SURGERY RELATED RISK FACTORS  [ ] Acute myocardial infarction                              (1 Point)                 [ ]  Section                                             (1 Point)  [ ] Congestive heart failure (in the previous month)  (1 Point)               [ ] Minor surgery                                                  (1 Point)   [ ] Inflammatory bowel disease                             (1 Point)                 [ ] Arthroscopic surgery                                        (2 Points)  [ ] Central venous access                                      (2 Points)                [ ] General surgery lasting more than 45 minutes   (2 Points)       [ ] Stroke (in the previous month)                          (5 Points)               [ ] Elective arthroplasty                                         (5 Points)                                                                                                                                               HEMATOLOGY RELATED FACTORS                                                 TRAUMA RELATED RISK FACTORS  [ ] Prior episodes of VTE                                     (3 Points)                [ ] Fracture of the hip, pelvis, or leg                       (5 Points)  [ ] Positive family history for VTE                         (3 Points)                 [ ] Acute spinal cord injury (in the previous month)  (5 Points)  [ ] Prothrombin 21215 A                                     (3 Points)                 [ ] Paralysis  (less than 1 month)                             (5 Points)  [ ] Factor V Leiden                                             (3 Points)                  [ ] Multiple Trauma within 1 month                        (5 Points)  [ ] Lupus anticoagulants                                     (3 Points)                                                           [ ] Anticardiolipin antibodies                               (3 Points)                                                       [ ] High homocysteine in the blood                      (3 Points)                                             [ ] Other congenital or acquired thrombophilia      (3 Points)                                                [ ] Heparin induced thrombocytopenia                  (3 Points)                                          Total Score [    3    ]    Caprini Score 0 - 2:  Low Risk, No VTE Prophylaxis required for most patients, encourage ambulation  Caprini Score 3 - 6:  At Risk, pharmacologic VTE prophylaxis is indicated for most patients (in the absence of a contraindication)  Caprini Score Greater than or = 7:  High Risk, pharmacologic VTE prophylaxis is indicated for most patients (in the absence of a contraindication)

## 2020-01-30 ENCOUNTER — TRANSCRIPTION ENCOUNTER (OUTPATIENT)
Age: 85
End: 2020-01-30

## 2020-01-30 VITALS — OXYGEN SATURATION: 96 %

## 2020-01-30 PROCEDURE — 87086 URINE CULTURE/COLONY COUNT: CPT

## 2020-01-30 PROCEDURE — 84484 ASSAY OF TROPONIN QUANT: CPT

## 2020-01-30 PROCEDURE — 36415 COLL VENOUS BLD VENIPUNCTURE: CPT

## 2020-01-30 PROCEDURE — 85027 COMPLETE CBC AUTOMATED: CPT

## 2020-01-30 PROCEDURE — 97162 PT EVAL MOD COMPLEX 30 MIN: CPT

## 2020-01-30 PROCEDURE — 72170 X-RAY EXAM OF PELVIS: CPT

## 2020-01-30 PROCEDURE — 93005 ELECTROCARDIOGRAM TRACING: CPT

## 2020-01-30 PROCEDURE — 71045 X-RAY EXAM CHEST 1 VIEW: CPT

## 2020-01-30 PROCEDURE — 72125 CT NECK SPINE W/O DYE: CPT

## 2020-01-30 PROCEDURE — 70450 CT HEAD/BRAIN W/O DYE: CPT

## 2020-01-30 PROCEDURE — 36000 PLACE NEEDLE IN VEIN: CPT

## 2020-01-30 PROCEDURE — 83690 ASSAY OF LIPASE: CPT

## 2020-01-30 PROCEDURE — 99285 EMERGENCY DEPT VISIT HI MDM: CPT | Mod: 25

## 2020-01-30 PROCEDURE — 99239 HOSP IP/OBS DSCHRG MGMT >30: CPT

## 2020-01-30 PROCEDURE — 80053 COMPREHEN METABOLIC PANEL: CPT

## 2020-01-30 PROCEDURE — 84145 PROCALCITONIN (PCT): CPT

## 2020-01-30 PROCEDURE — 94640 AIRWAY INHALATION TREATMENT: CPT

## 2020-01-30 PROCEDURE — 81001 URINALYSIS AUTO W/SCOPE: CPT

## 2020-01-30 RX ORDER — ASPIRIN/CALCIUM CARB/MAGNESIUM 324 MG
1 TABLET ORAL
Qty: 0 | Refills: 0 | DISCHARGE
Start: 2020-01-30

## 2020-01-30 RX ORDER — POLYETHYLENE GLYCOL 3350 17 G/17G
17 POWDER, FOR SOLUTION ORAL
Qty: 0 | Refills: 0 | DISCHARGE
Start: 2020-01-30

## 2020-01-30 RX ORDER — ACETAMINOPHEN 500 MG
2 TABLET ORAL
Qty: 0 | Refills: 0 | DISCHARGE
Start: 2020-01-30

## 2020-01-30 RX ORDER — TIOTROPIUM BROMIDE 18 UG/1
1 CAPSULE ORAL; RESPIRATORY (INHALATION)
Qty: 0 | Refills: 0 | DISCHARGE
Start: 2020-01-30

## 2020-01-30 RX ORDER — ATORVASTATIN CALCIUM 80 MG/1
1 TABLET, FILM COATED ORAL
Qty: 0 | Refills: 0 | DISCHARGE
Start: 2020-01-30

## 2020-01-30 RX ORDER — SENNA PLUS 8.6 MG/1
1 TABLET ORAL
Qty: 0 | Refills: 0 | DISCHARGE
Start: 2020-01-30

## 2020-01-30 RX ORDER — IPRATROPIUM/ALBUTEROL SULFATE 18-103MCG
3 AEROSOL WITH ADAPTER (GRAM) INHALATION
Qty: 0 | Refills: 0 | DISCHARGE
Start: 2020-01-30

## 2020-01-30 RX ADMIN — Medication 600 MILLIGRAM(S): at 06:42

## 2020-01-30 RX ADMIN — TIOTROPIUM BROMIDE 1 CAPSULE(S): 18 CAPSULE ORAL; RESPIRATORY (INHALATION) at 10:06

## 2020-01-30 RX ADMIN — BUDESONIDE AND FORMOTEROL FUMARATE DIHYDRATE 2 PUFF(S): 160; 4.5 AEROSOL RESPIRATORY (INHALATION) at 10:07

## 2020-01-30 NOTE — DISCHARGE NOTE PROVIDER - NSDCPNSUBOBJ_GEN_ALL_CORE
Patient is a 87y old  Male who presents with a chief complaint of Frequent falls, difficulty ambulating (2020 14:07)        Patient seen and examined at bedside.         ALLERGIES:    No Known Allergies        MEDICATIONS  (STANDING):    aspirin enteric coated 81 milliGRAM(s) Oral daily    atorvastatin 40 milliGRAM(s) Oral at bedtime    budesonide 160 MICROgram(s)/formoterol 4.5 MICROgram(s) Inhaler 2 Puff(s) Inhalation two times a day    enoxaparin Injectable 40 milliGRAM(s) SubCutaneous daily    ferrous    sulfate 325 milliGRAM(s) Oral daily    folic acid 1 milliGRAM(s) Oral daily    guaiFENesin  milliGRAM(s) Oral every 12 hours    senna 1 Tablet(s) Oral at bedtime    tiotropium 18 MICROgram(s) Capsule 1 Capsule(s) Inhalation daily        MEDICATIONS  (PRN):    acetaminophen   Tablet .. 650 milliGRAM(s) Oral every 6 hours PRN Temp greater or equal to 38C (100.4F), Mild Pain (1 - 3)    albuterol/ipratropium for Nebulization 3 milliLiter(s) Nebulizer every 6 hours PRN Shortness of Breath and/or Wheezing    polyethylene glycol 3350 17 Gram(s) Oral daily PRN Constipation        Vital Signs Last 24 Hrs    T(F): 97.2 (2020 05:03), Max: 98.1 (2020 16:16)    HR: 70 (2020 10:14) (57 - 72)    BP: 137/61 (2020 05:03) (137/61 - 160/67)    RR: 14 (2020 05:03) (14 - 16)    SpO2: 96% (2020 10:14) (95% - 98%)    I&O's Summary        2020 07:01  -  2020 07:00    --------------------------------------------------------    IN: 960 mL / OUT: 502 mL / NET: 458 mL            PHYSICAL EXAM:    General: NAD, alert; elderly    ENT: MMM, no thrush    Neck: Supple, No JVD    Lungs: Clear to auscultation bilaterally, good air entry, non-labored breathing    Cardio: +S1/S2, No pitting edema    Abdomen: Soft, Nontender, Nondistended; Bowel sounds present    Extremities: No calf tenderness        LABS:                            13.8     6.45  )-----------( 323      ( 2020 20:30 )               43.3                 139  |  103  |  25    ----------------------------<  95    4.5   |  26  |  1.43        Ca    9.0      2020 20:30        TPro  8.0  /  Alb  3.8  /  TBili  0.7  /  DBili  x   /  AST  26  /  ALT  26  /  AlkPhos  114              Lipase, Serum: 95 U/L (20 @ 20:30)        eGFR if Non African American: 44 mL/min/1.73M2 (20 @ 20:30)    eGFR if : 51 mL/min/1.73M2 (20 @ 20:30)                Procalcitonin, Serum: 0.05 ng/mL (20 @ 08:15)        CARDIAC MARKERS ( 2020 20:30 )    <.017 ng/mL / x     / x     / x     / x                11- Chol 141 mg/dL LDL 69 mg/dL HDL 47 mg/dL Trig 123 mg/dL        Urinalysis Basic - ( 2020 22:00 )    Color: Yellow / Appearance: Clear / S.025 / pH: x    Gluc: x / Ketone: Negative  / Bili: Negative / Urobili: Negative     Blood: x / Protein: 15 / Nitrite: Negative     Leuk Esterase: Negative / RBC: Negative /HPF / WBC Negative /HPF     Sq Epi: x / Non Sq Epi: Neg.-Few / Bacteria: Trace /HPF        Culture - Urine (collected 2020 22:00)    Source: .Urine Clean Catch (Midstream)    Final Report (2020 06:29):      <10,000 CFU/mL Normal Urogenital Keri        RADIOLOGY & ADDITIONAL TESTS:    no new tests            Assessment and Plan    87M with questionable recent onset dementia, former smoker, hx COPD, PVD, HLD, hx CVA admitted with functional decline, frequent falls, ambulatory dysfunction, and inability to be cared for at home        #Ambulatory dysfunction    - PT note reviewed: recommends ДМИТРИЙ    - Assistance with ADLs        #COPD without exacerbation    - Symbicort and Spiriva    - nebs PRN        #History of CVA    - ASA, statin        #CKD3    - Stable Cr (~1.3-1.4 baseline)        #DVT ppx    - Lovenox SC

## 2020-01-30 NOTE — DISCHARGE NOTE PROVIDER - NSDCMRMEDTOKEN_GEN_ALL_CORE_FT
acetaminophen 325 mg oral tablet: 2 tab(s) orally every 6 hours, As needed, Temp greater or equal to 38C (100.4F), Mild Pain (1 - 3)  aspirin 81 mg oral delayed release tablet: 1 tab(s) orally once a day  atorvastatin 40 mg oral tablet: 1 tab(s) orally once a day (at bedtime)  budesonide-formoterol 160 mcg-4.5 mcg/inh inhalation aerosol: 1 puff(s) inhaled every 12 hours  ferrous sulfate 325 mg (65 mg elemental iron) oral tablet: 1 tab(s) orally once a day  folic acid 1 mg oral tablet: 1 tab(s) orally once a day  guaiFENesin 600 mg oral tablet, extended release: 1 tab(s) orally every 12 hours  ipratropium-albuterol 0.5 mg-2.5 mg/3 mLinhalation solution: 3 milliliter(s) inhaled every 6 hours, As needed, Shortness of Breath and/or Wheezing  polyethylene glycol 3350 oral powder for reconstitution: 17 gram(s) orally once a day, As needed, Constipation  senna oral tablet: 1 tab(s) orally once a day (at bedtime)  tiotropium 18 mcg inhalation capsule: 1 cap(s) inhaled once a day

## 2020-01-30 NOTE — DISCHARGE NOTE PROVIDER - CARE PROVIDER_API CALL
Natanael Day)  Medicine  [address], [place] [address]  Phone: [phone]  Fax: [phone]  Follow Up Time:

## 2020-01-30 NOTE — DISCHARGE NOTE NURSING/CASE MANAGEMENT/SOCIAL WORK - PATIENT PORTAL LINK FT
You can access the FollowMyHealth Patient Portal offered by [facility] by registering at the following website: [email or web address]. By joining [employer]’s FollowMyHealth portal, you will also be able to view your health information using other applications (apps) compatible with our system.

## 2020-01-30 NOTE — DISCHARGE NOTE PROVIDER - HOSPITAL COURSE
87M with questionable recent onset dementia, former smoker, hx COPD, PVD, HLD, hx CVA admitted with functional decline, frequent falls, ambulatory dysfunction, and inability to be cared for at home        #Ambulatory dysfunction    - PT note reviewed: recommends ДМИТРИЙ    - Assistance with ADLs        #COPD without exacerbation    - Symbicort and Spiriva    - nebs PRN        #History of CVA    - ASA, statin        #CKD3    - Stable Cr (~1.3-1.4 baseline)        #DVT ppx    - Lovenox SC            Time spent on patients discharge 32 minutes     Patient hospital course and pending d/c d/w Dr Oakley patient pcp

## 2020-01-31 NOTE — ED ADULT NURSE NOTE - NS ED PATIENT SAFETY CONCERN
Called patient and he reports he had an eye exam May 2019 at [facility] when he was pre-diabetic.  Pt reminded to get another exam scheduled and the report sent to Dr. Yee  
No

## 2020-03-03 ENCOUNTER — EMERGENCY (EMERGENCY)
Facility: HOSPITAL | Age: 85
LOS: 1 days | Discharge: HOME | End: 2020-03-03
Attending: EMERGENCY MEDICINE | Admitting: EMERGENCY MEDICINE
Payer: MEDICARE

## 2020-03-03 VITALS
HEART RATE: 68 BPM | SYSTOLIC BLOOD PRESSURE: 155 MMHG | HEIGHT: 67 IN | WEIGHT: 145.06 LBS | TEMPERATURE: 98 F | OXYGEN SATURATION: 99 % | DIASTOLIC BLOOD PRESSURE: 80 MMHG | RESPIRATION RATE: 20 BRPM

## 2020-03-03 VITALS
SYSTOLIC BLOOD PRESSURE: 149 MMHG | HEART RATE: 70 BPM | OXYGEN SATURATION: 96 % | DIASTOLIC BLOOD PRESSURE: 67 MMHG | RESPIRATION RATE: 18 BRPM

## 2020-03-03 DIAGNOSIS — Z98.890 OTHER SPECIFIED POSTPROCEDURAL STATES: Chronic | ICD-10-CM

## 2020-03-03 DIAGNOSIS — S09.90XA UNSPECIFIED INJURY OF HEAD, INITIAL ENCOUNTER: ICD-10-CM

## 2020-03-03 LAB
ALBUMIN SERPL ELPH-MCNC: 3.1 G/DL — LOW (ref 3.3–5)
ALP SERPL-CCNC: 138 U/L — HIGH (ref 40–120)
ALT FLD-CCNC: 22 U/L — SIGNIFICANT CHANGE UP (ref 10–45)
ANION GAP SERPL CALC-SCNC: 8 MMOL/L — SIGNIFICANT CHANGE UP (ref 5–17)
APPEARANCE UR: CLEAR — SIGNIFICANT CHANGE UP
AST SERPL-CCNC: 20 U/L — SIGNIFICANT CHANGE UP (ref 10–40)
BACTERIA # UR AUTO: ABNORMAL /HPF
BASOPHILS # BLD AUTO: 0.03 K/UL — SIGNIFICANT CHANGE UP (ref 0–0.2)
BASOPHILS NFR BLD AUTO: 0.3 % — SIGNIFICANT CHANGE UP (ref 0–2)
BILIRUB SERPL-MCNC: 0.6 MG/DL — SIGNIFICANT CHANGE UP (ref 0.2–1.2)
BILIRUB UR-MCNC: NEGATIVE — SIGNIFICANT CHANGE UP
BUN SERPL-MCNC: 30 MG/DL — HIGH (ref 7–23)
CALCIUM SERPL-MCNC: 8.9 MG/DL — SIGNIFICANT CHANGE UP (ref 8.4–10.5)
CHLORIDE SERPL-SCNC: 106 MMOL/L — SIGNIFICANT CHANGE UP (ref 96–108)
CO2 SERPL-SCNC: 23 MMOL/L — SIGNIFICANT CHANGE UP (ref 22–31)
COLOR SPEC: YELLOW — SIGNIFICANT CHANGE UP
CREAT SERPL-MCNC: 1.32 MG/DL — HIGH (ref 0.5–1.3)
DIFF PNL FLD: ABNORMAL
EOSINOPHIL # BLD AUTO: 0.19 K/UL — SIGNIFICANT CHANGE UP (ref 0–0.5)
EOSINOPHIL NFR BLD AUTO: 2 % — SIGNIFICANT CHANGE UP (ref 0–6)
EPI CELLS # UR: SIGNIFICANT CHANGE UP
GLUCOSE SERPL-MCNC: 102 MG/DL — HIGH (ref 70–99)
GLUCOSE UR QL: NEGATIVE — SIGNIFICANT CHANGE UP
HCT VFR BLD CALC: 42.7 % — SIGNIFICANT CHANGE UP (ref 39–50)
HGB BLD-MCNC: 13.8 G/DL — SIGNIFICANT CHANGE UP (ref 13–17)
IMM GRANULOCYTES NFR BLD AUTO: 0.3 % — SIGNIFICANT CHANGE UP (ref 0–1.5)
KETONES UR-MCNC: NEGATIVE — SIGNIFICANT CHANGE UP
LEUKOCYTE ESTERASE UR-ACNC: ABNORMAL
LYMPHOCYTES # BLD AUTO: 1.91 K/UL — SIGNIFICANT CHANGE UP (ref 1–3.3)
LYMPHOCYTES # BLD AUTO: 19.7 % — SIGNIFICANT CHANGE UP (ref 13–44)
MCHC RBC-ENTMCNC: 28.3 PG — SIGNIFICANT CHANGE UP (ref 27–34)
MCHC RBC-ENTMCNC: 32.3 GM/DL — SIGNIFICANT CHANGE UP (ref 32–36)
MCV RBC AUTO: 87.5 FL — SIGNIFICANT CHANGE UP (ref 80–100)
MONOCYTES # BLD AUTO: 1.03 K/UL — HIGH (ref 0–0.9)
MONOCYTES NFR BLD AUTO: 10.6 % — SIGNIFICANT CHANGE UP (ref 2–14)
NEUTROPHILS # BLD AUTO: 6.5 K/UL — SIGNIFICANT CHANGE UP (ref 1.8–7.4)
NEUTROPHILS NFR BLD AUTO: 67.1 % — SIGNIFICANT CHANGE UP (ref 43–77)
NITRITE UR-MCNC: NEGATIVE — SIGNIFICANT CHANGE UP
NRBC # BLD: 0 /100 WBCS — SIGNIFICANT CHANGE UP (ref 0–0)
PH UR: 5 — SIGNIFICANT CHANGE UP (ref 5–8)
PLATELET # BLD AUTO: 263 K/UL — SIGNIFICANT CHANGE UP (ref 150–400)
POTASSIUM SERPL-MCNC: 4.5 MMOL/L — SIGNIFICANT CHANGE UP (ref 3.5–5.3)
POTASSIUM SERPL-SCNC: 4.5 MMOL/L — SIGNIFICANT CHANGE UP (ref 3.5–5.3)
PROT SERPL-MCNC: 7.3 G/DL — SIGNIFICANT CHANGE UP (ref 6–8.3)
PROT UR-MCNC: NEGATIVE — SIGNIFICANT CHANGE UP
RBC # BLD: 4.88 M/UL — SIGNIFICANT CHANGE UP (ref 4.2–5.8)
RBC # FLD: 16.3 % — HIGH (ref 10.3–14.5)
RBC CASTS # UR COMP ASSIST: SIGNIFICANT CHANGE UP /HPF (ref 0–4)
SODIUM SERPL-SCNC: 137 MMOL/L — SIGNIFICANT CHANGE UP (ref 135–145)
SP GR SPEC: 1.02 — SIGNIFICANT CHANGE UP (ref 1.01–1.02)
UROBILINOGEN FLD QL: NEGATIVE — SIGNIFICANT CHANGE UP
WBC # BLD: 9.69 K/UL — SIGNIFICANT CHANGE UP (ref 3.8–10.5)
WBC # FLD AUTO: 9.69 K/UL — SIGNIFICANT CHANGE UP (ref 3.8–10.5)
WBC UR QL: SIGNIFICANT CHANGE UP /HPF (ref 0–5)

## 2020-03-03 PROCEDURE — 72192 CT PELVIS W/O DYE: CPT

## 2020-03-03 PROCEDURE — 99284 EMERGENCY DEPT VISIT MOD MDM: CPT | Mod: 25

## 2020-03-03 PROCEDURE — 72125 CT NECK SPINE W/O DYE: CPT

## 2020-03-03 PROCEDURE — 71045 X-RAY EXAM CHEST 1 VIEW: CPT

## 2020-03-03 PROCEDURE — 81001 URINALYSIS AUTO W/SCOPE: CPT

## 2020-03-03 PROCEDURE — 70450 CT HEAD/BRAIN W/O DYE: CPT

## 2020-03-03 PROCEDURE — 85027 COMPLETE CBC AUTOMATED: CPT

## 2020-03-03 PROCEDURE — 80053 COMPREHEN METABOLIC PANEL: CPT

## 2020-03-03 PROCEDURE — 76376 3D RENDER W/INTRP POSTPROCES: CPT

## 2020-03-03 PROCEDURE — 36415 COLL VENOUS BLD VENIPUNCTURE: CPT

## 2020-03-03 PROCEDURE — 71045 X-RAY EXAM CHEST 1 VIEW: CPT | Mod: 26

## 2020-03-03 PROCEDURE — 99284 EMERGENCY DEPT VISIT MOD MDM: CPT

## 2020-03-03 PROCEDURE — 72192 CT PELVIS W/O DYE: CPT | Mod: 26

## 2020-03-03 PROCEDURE — 76376 3D RENDER W/INTRP POSTPROCES: CPT | Mod: 26

## 2020-03-03 PROCEDURE — 73552 X-RAY EXAM OF FEMUR 2/>: CPT

## 2020-03-03 PROCEDURE — 73521 X-RAY EXAM HIPS BI 2 VIEWS: CPT | Mod: 26

## 2020-03-03 PROCEDURE — 73521 X-RAY EXAM HIPS BI 2 VIEWS: CPT

## 2020-03-03 PROCEDURE — 73562 X-RAY EXAM OF KNEE 3: CPT

## 2020-03-03 PROCEDURE — 70450 CT HEAD/BRAIN W/O DYE: CPT | Mod: 26

## 2020-03-03 PROCEDURE — 72125 CT NECK SPINE W/O DYE: CPT | Mod: 26

## 2020-03-03 PROCEDURE — 94640 AIRWAY INHALATION TREATMENT: CPT

## 2020-03-03 PROCEDURE — 73562 X-RAY EXAM OF KNEE 3: CPT | Mod: 26,50

## 2020-03-03 PROCEDURE — 73552 X-RAY EXAM OF FEMUR 2/>: CPT | Mod: 26,RT

## 2020-03-03 RX ORDER — POLYETHYLENE GLYCOL 3350 17 G/17G
0 POWDER, FOR SOLUTION ORAL
Qty: 0 | Refills: 0 | DISCHARGE

## 2020-03-03 RX ORDER — SENNA PLUS 8.6 MG/1
1 TABLET ORAL
Qty: 0 | Refills: 0 | DISCHARGE

## 2020-03-03 RX ORDER — MAGNESIUM HYDROXIDE 400 MG/1
0 TABLET, CHEWABLE ORAL
Qty: 0 | Refills: 0 | DISCHARGE

## 2020-03-03 RX ORDER — ASPIRIN/CALCIUM CARB/MAGNESIUM 324 MG
1 TABLET ORAL
Qty: 0 | Refills: 0 | DISCHARGE

## 2020-03-03 RX ORDER — TRAMADOL HYDROCHLORIDE 50 MG/1
0 TABLET ORAL
Qty: 0 | Refills: 0 | DISCHARGE

## 2020-03-03 RX ORDER — IPRATROPIUM/ALBUTEROL SULFATE 18-103MCG
3 AEROSOL WITH ADAPTER (GRAM) INHALATION ONCE
Refills: 0 | Status: COMPLETED | OUTPATIENT
Start: 2020-03-03 | End: 2020-03-03

## 2020-03-03 RX ORDER — AMOXICILLIN 250 MG/5ML
6 SUSPENSION, RECONSTITUTED, ORAL (ML) ORAL
Qty: 84 | Refills: 0
Start: 2020-03-03 | End: 2020-03-09

## 2020-03-03 RX ADMIN — Medication 3 MILLILITER(S): at 18:20

## 2020-03-03 NOTE — ED PROVIDER NOTE - PMH
CAD (coronary artery disease)    Deep vein thrombosis (DVT)    Dyslipidemia    History of COPD    [place] (hard of hearing)    PE (pulmonary thromboembolism)    Peripheral vascular disease

## 2020-03-03 NOTE — ED PROVIDER NOTE - ATTENDING CONTRIBUTION TO CARE
Eval with NEL Neely. 86 yo male hx DVT/PE, htn, mild dementia, COPD,  from [facility] BIB EMS Sp unwitnessed fall at 3 pm co head and neck pain.  Pt states he was in his wheelchair entering his bathroom the door swung back hitting into his right knee. Had mild pain at the time but when got up shortly after his knees gave out causing him to fall ; Denies hitting his head or LOC.  States he has had knee pain for a long time but since the fall it is bothering him more.   Denies any n/v, vision change, weakness, sensation changes to the extremities, abd pain, c pain, sob, urinary symptoms or any other complaints.  b/l knee ttp, right > left as well as right ttp femur, will obtain xrays knee, hip/pelvis, femur , obtain CT head/neck  , check labs, and re-eval.     I performed a face to face bedside interview with patient regarding history of present illness, review of symptoms and past medical history. I completed an independent physical exam.  I have discussed the patient's plan of care with Physician Assistant (PA). I agree with note as stated above, having amended the EMR as needed to reflect my findings.   This includes History of Present Illness, HIV, Past Medical/Surgical/Family/Social History, Allergies and Home Medications, Review of Systems, Physical Exam, and any Progress Notes during the time I functioned as the attending physician for this patient.

## 2020-03-03 NOTE — ED PROVIDER NOTE - OBJECTIVE STATEMENT
88 yo male hx DVT/PE, htn, mild dementia, COPD,  from [facility] BIB EMS Sp unwitnessed fall at 3 pm co head and neck pain.  Pt states he was in his wheelchair entering his bathroom the door swung back hitting into his left knee. Had mild pain at the time but when got up shortly after his knees gave out causing him to fall ; Denies hitting his head or LOC.  States he has had knee pain for a long time but since the fall the left keten is bothering him more.   Denies any n/v, vision change, weakness, sensation changes to the extremities, abd pain, c pain, sob, urinary symptoms or any other complaints. 86 yo male hx DVT/PE, htn, mild dementia, COPD,  from [facility] BIB EMS Sp unwitnessed fall at 3 pm co head and neck pain.  Pt states he was in his wheelchair entering his bathroom the door swung back hitting into his right knee. Had mild pain at the time but when got up shortly after his knees gave out causing him to fall ; Denies hitting his head or LOC.  States he has had knee pain for a long time but since the fall it is bothering him more.   Denies any n/v, vision change, weakness, sensation changes to the extremities, abd pain, c pain, sob, urinary symptoms or any other complaints.

## 2020-03-03 NOTE — ED ADULT NURSE NOTE - NSIMPLEMENTINTERV_GEN_ALL_ED
Implemented All Fall with Harm Risk Interventions:  [place] to call system. Call bell, personal items and telephone within reach. Instruct patient to call for assistance. Room bathroom lighting operational. Non-slip footwear when patient is off stretcher. Physically safe environment: no spills, clutter or unnecessary equipment. Stretcher in lowest position, wheels locked, appropriate side rails in place. Provide visual cue, wrist band, yellow gown, etc. Monitor gait and stability. Monitor for mental status changes and reorient to person, place, and time. Review medications for side effects contributing to fall risk. Reinforce activity limits and safety measures with patient and family. Provide visual clues: red socks.

## 2020-03-03 NOTE — ED ADULT NURSE NOTE - PMH
CAD (coronary artery disease)    Deep vein thrombosis (DVT)    Dyslipidemia    History of COPD    [place] (hard of hearing)    PE (pulmonary thromboembolism)    Peripheral vascular disease

## 2020-03-03 NOTE — ED PROVIDER NOTE - CLINICAL SUMMARY MEDICAL DECISION MAKING FREE TEXT BOX
88 yo male hx DVT/PE, htn, mild dementia, COPD,  from [facility] BIB EMS Sp unwitnessed fall at 3 pm co head and neck pain.  Pt states he was in his wheelchair entering his bathroom the door swung back hitting into his left knee. Had mild pain at the time but when got up shortly after his knees gave out causing him to fall ; Denies hitting his head or LOC.  States he has had knee pain for a long time but since the fall the left keten is bothering him more.   Denies any n/v, vision change, weakness, sensation changes to the extremities, abd pain, c pain, sob, urinary symptoms or any other complaints.  bl knee ttp, right > left as well as right ttp femur, will obtain xrays knee, hip/pelvis, femur , obtain CT head/neck  , check labs, and re-eval 88 yo male hx DVT/PE, htn, mild dementia, COPD,  from [facility] BIB EMS Sp unwitnessed fall at 3 pm co head and neck pain.  Pt states he was in his wheelchair entering his bathroom the door swung back hitting into his right knee. Had mild pain at the time but when got up shortly after his knees gave out causing him to fall ; Denies hitting his head or LOC.  States he has had knee pain for a long time but since the fall it is bothering him more.   Denies any n/v, vision change, weakness, sensation changes to the extremities, abd pain, c pain, sob, urinary symptoms or any other complaints.  b/l knee ttp, right > left as well as right ttp femur, will obtain xrays knee, hip/pelvis, femur , obtain CT head/neck  , check labs, and re-eval.

## 2020-03-03 NOTE — ED ADULT NURSE NOTE - OBJECTIVE STATEMENT
Patient presents to ED following a fall at SNF. Patient states he got up to go to the bathroom, when he opened the door his knees "gave out" (patient verbalizes this happens from time to time) and fell. He states to me that he was on the floor for 5 minutes, per EMS, patient was not going to be sent to ED for evaluation however he then started complaining of head and neck pain. Patient takes ASA daily, upon exam he is c/o right hip and right leg pain. Sensation in tact and patient able to exhibit full ROM. Patient presents to ED following a fall at SNF. Patient states he got up to go to the bathroom, when he opened the door his knees "gave out" (patient verbalizes this happens from time to time) and fell. He states to me that he was on the floor for 5 minutes, per EMS, patient was not going to be sent to ED for evaluation however he then started complaining of head and neck pain. Patient takes ASA daily, upon exam he is c/o right hip and right leg pain. Sensation in tact and patient able to exhibit full ROM. Patient presents with C-collar in place.

## 2020-03-03 NOTE — ED PROVIDER NOTE - PROGRESS NOTE DETAILS
CT head and neck ng  pre correa xrays ng for fx ( too with attending jonathan)  CT pelvis, no actue fx    As dw att chavda will dc home / pcp fu

## 2020-03-03 NOTE — ED PROVIDER NOTE - NSFOLLOWUPINSTRUCTIONS_ED_ALL_ED_FT
Follow up with your primary physician for a post hospital visit within 48 hours, taking all results from the ER to be reviewed.   For pain control if needed you can take Tylenol 650 mg 1 tab every 4-6 hours. If any headaches, dizziness, n/v , weakness, or any worsening, concerning or new signs or symptoms return to the ER    Leg Pain    WHAT YOU NEED TO KNOW:    Leg pain may be caused by a variety of health conditions. Your tests did not show any broken bones or blood clots.    DISCHARGE INSTRUCTIONS:    Return to the emergency department if:     You have a fever.      Your leg starts to swell.      Your leg pain gets worse.      You have numbness or tingling in your leg or toes.      You cannot put any weight on or move your leg.    Contact your healthcare provider if:     Your pain does not decrease, even after treatment.      You have questions or concerns about your condition or care.    Medicines:     NSAIDs, such as ibuprofen, help decrease swelling, pain, and fever. This medicine is available with or without a doctor's order. NSAIDs can cause stomach bleeding or kidney problems in certain people. If you take blood thinner medicine, always ask your healthcare provider if NSAIDs are safe for you. Always read the medicine label and follow directions.      Take your medicine as directed. Contact your healthcare provider if you think your medicine is not helping or if you have side effects. Tell him of her if you are allergic to any medicine. Keep a list of the medicines, vitamins, and herbs you take. Include the amounts, and when and why you take them. Bring the list or the pill bottles to follow-up visits. Carry your medicine list with you in case of an emergency.    Follow up with your healthcare provider as directed: You may need more tests to find the cause of your leg pain. You may need to see an orthopedic specialist or a physical therapist. Write down your questions so you remember to ask them during your visits.    Manage your leg pain:     Rest your injured leg so that it can heal. You may need an immobilizer, brace, or splint to limit the movement of your leg. You may need to avoid putting any weight on your leg for at least 48 hours. Return to normal activities as directed.      Ice the injury for 20 minutes every 4 hours for up to 24 hours, or as directed. Use an ice pack, or put crushed ice in a plastic bag. Cover it with a towel to protect your skin. Ice helps prevent tissue damage and decreases swelling and pain.      Elevate your injured leg above the level of your heart as often as you can. This will help decrease swelling and pain. If possible, prop your leg on pillows or blankets to keep the area elevated comfortably.       Use assistive devices as directed. You may need to use a cane or crutches. Assistive devices help decrease pain and pressure on your leg when you walk. Ask your healthcare provider for more information about assistive devices and how to use them correctly.      Maintain a healthy weight. Extra body weight can cause pressure and pain in your hip, knee, and ankle joints. Ask your healthcare provider how much you should weigh. Ask him to help you create a weight loss plan if you are overweight.

## 2020-03-03 NOTE — ED ADULT TRIAGE NOTE - CHIEF COMPLAINT QUOTE
Pt presents to the ER BIB EMS, as per EMS pt  had an unwitnessed fall at 3pm and it was found on the floor. Pt has been complaining of a headache and neck pain. pain level 7/10. Pt is taking Aspirin.

## 2020-03-03 NOTE — ED PROVIDER NOTE - PATIENT PORTAL LINK FT
You can access the FollowMyHealth Patient Portal offered by [facility] by registering at the following website: [email or web address]. By joining [employer]’s FollowMyHealth portal, you will also be able to view your health information using other applications (apps) compatible with our system.

## 2020-03-09 NOTE — PATIENT PROFILE ADULT - NUMBER OF YRS
60 Mucosal Advancement Flap Text: Given the location of the defect, shape of the defect and the proximity to free margins a mucosal advancement flap was deemed most appropriate. Incisions were made with a 15 blade scalpel in the appropriate fashion along the cutaneous vermilion border and the mucosal lip. The remaining actinically damaged mucosal tissue was excised.  The mucosal advancement flap was then elevated to the gingival sulcus with care taken to preserve the neurovascular structures and advanced into the primary defect. Care was taken to ensure that precise realignment of the vermilion border was achieved.

## 2020-04-10 NOTE — DISCHARGE NOTE ADULT - NS AS ACTIVITY OBS
see md note
No Heavy lifting/straining/Do not drive or operate machinery/Walking-Outdoors allowed/Bathing allowed/Showering allowed/Walking-Indoors allowed

## 2020-04-21 ENCOUNTER — INPATIENT (INPATIENT)
Facility: HOSPITAL | Age: 85
LOS: 13 days | Discharge: HOME | DRG: 177 | End: 2020-05-05
Attending: HOSPITALIST | Admitting: INTERNAL MEDICINE
Payer: MEDICARE

## 2020-04-21 VITALS
OXYGEN SATURATION: 96 % | HEART RATE: 92 BPM | DIASTOLIC BLOOD PRESSURE: 67 MMHG | SYSTOLIC BLOOD PRESSURE: 124 MMHG | HEIGHT: 65 IN | WEIGHT: 143.96 LBS | RESPIRATION RATE: 24 BRPM

## 2020-04-21 DIAGNOSIS — Z98.890 OTHER SPECIFIED POSTPROCEDURAL STATES: Chronic | ICD-10-CM

## 2020-04-21 DIAGNOSIS — R68.89 OTHER GENERAL SYMPTOMS AND SIGNS: ICD-10-CM

## 2020-04-21 LAB
ALBUMIN SERPL ELPH-MCNC: 2.3 G/DL — LOW (ref 3.3–5)
ALP SERPL-CCNC: 94 U/L — SIGNIFICANT CHANGE UP (ref 40–120)
ALT FLD-CCNC: 19 U/L — SIGNIFICANT CHANGE UP (ref 10–45)
ANION GAP SERPL CALC-SCNC: 10 MMOL/L — SIGNIFICANT CHANGE UP (ref 5–17)
APPEARANCE UR: ABNORMAL
AST SERPL-CCNC: 43 U/L — HIGH (ref 10–40)
BACTERIA # UR AUTO: ABNORMAL /HPF
BASOPHILS # BLD AUTO: 0.02 K/UL — SIGNIFICANT CHANGE UP (ref 0–0.2)
BASOPHILS NFR BLD AUTO: 0.2 % — SIGNIFICANT CHANGE UP (ref 0–2)
BILIRUB SERPL-MCNC: 1.2 MG/DL — SIGNIFICANT CHANGE UP (ref 0.2–1.2)
BILIRUB UR-MCNC: NEGATIVE — SIGNIFICANT CHANGE UP
BUN SERPL-MCNC: 20 MG/DL — SIGNIFICANT CHANGE UP (ref 7–23)
CALCIUM SERPL-MCNC: 8.3 MG/DL — LOW (ref 8.4–10.5)
CHLORIDE SERPL-SCNC: 103 MMOL/L — SIGNIFICANT CHANGE UP (ref 96–108)
CO2 BLDA-SCNC: 20 MMOL/L — LOW (ref 22–30)
CO2 SERPL-SCNC: 22 MMOL/L — SIGNIFICANT CHANGE UP (ref 22–31)
COLOR SPEC: YELLOW — SIGNIFICANT CHANGE UP
COMMENT - URINE: SIGNIFICANT CHANGE UP
CREAT SERPL-MCNC: 1.18 MG/DL — SIGNIFICANT CHANGE UP (ref 0.5–1.3)
CRP SERPL-MCNC: 23.96 MG/DL — HIGH (ref 0–0.4)
D DIMER BLD IA.RAPID-MCNC: 6788 NG/ML DDU — SIGNIFICANT CHANGE UP
DIFF PNL FLD: ABNORMAL
EOSINOPHIL # BLD AUTO: 0.01 K/UL — SIGNIFICANT CHANGE UP (ref 0–0.5)
EOSINOPHIL NFR BLD AUTO: 0.1 % — SIGNIFICANT CHANGE UP (ref 0–6)
EPI CELLS # UR: SIGNIFICANT CHANGE UP
GAS PNL BLDA: SIGNIFICANT CHANGE UP
GLUCOSE SERPL-MCNC: 99 MG/DL — SIGNIFICANT CHANGE UP (ref 70–99)
GLUCOSE UR QL: NEGATIVE — SIGNIFICANT CHANGE UP
HCT VFR BLD CALC: 40.1 % — SIGNIFICANT CHANGE UP (ref 39–50)
HGB BLD-MCNC: 13.9 G/DL — SIGNIFICANT CHANGE UP (ref 13–17)
HOROWITZ INDEX BLDA+IHG-RTO: SIGNIFICANT CHANGE UP
IMM GRANULOCYTES NFR BLD AUTO: 0.6 % — SIGNIFICANT CHANGE UP (ref 0–1.5)
KETONES UR-MCNC: ABNORMAL
LACTATE SERPL-SCNC: 1.6 MMOL/L — SIGNIFICANT CHANGE UP (ref 0.7–2)
LEUKOCYTE ESTERASE UR-ACNC: NEGATIVE — SIGNIFICANT CHANGE UP
LYMPHOCYTES # BLD AUTO: 0.79 K/UL — LOW (ref 1–3.3)
LYMPHOCYTES # BLD AUTO: 9.3 % — LOW (ref 13–44)
MCHC RBC-ENTMCNC: 29.7 PG — SIGNIFICANT CHANGE UP (ref 27–34)
MCHC RBC-ENTMCNC: 34.7 GM/DL — SIGNIFICANT CHANGE UP (ref 32–36)
MCV RBC AUTO: 85.7 FL — SIGNIFICANT CHANGE UP (ref 80–100)
MONOCYTES # BLD AUTO: 0.33 K/UL — SIGNIFICANT CHANGE UP (ref 0–0.9)
MONOCYTES NFR BLD AUTO: 3.9 % — SIGNIFICANT CHANGE UP (ref 2–14)
NEUTROPHILS # BLD AUTO: 7.28 K/UL — SIGNIFICANT CHANGE UP (ref 1.8–7.4)
NEUTROPHILS NFR BLD AUTO: 85.9 % — HIGH (ref 43–77)
NITRITE UR-MCNC: NEGATIVE — SIGNIFICANT CHANGE UP
NRBC # BLD: 0 /100 WBCS — SIGNIFICANT CHANGE UP (ref 0–0)
NT-PROBNP SERPL-SCNC: 876 PG/ML — HIGH (ref 0–300)
PCO2 BLDA: 26 MMHG — LOW (ref 32–46)
PH BLDA: 7.49 — HIGH (ref 7.35–7.45)
PH UR: 5 — SIGNIFICANT CHANGE UP (ref 5–8)
PLATELET # BLD AUTO: 191 K/UL — SIGNIFICANT CHANGE UP (ref 150–400)
PO2 BLDA: 60 MMHG — LOW (ref 74–108)
POTASSIUM SERPL-MCNC: 4 MMOL/L — SIGNIFICANT CHANGE UP (ref 3.5–5.3)
POTASSIUM SERPL-SCNC: 4 MMOL/L — SIGNIFICANT CHANGE UP (ref 3.5–5.3)
PROCALCITONIN SERPL-MCNC: 0.15 NG/ML — HIGH
PROT SERPL-MCNC: 7 G/DL — SIGNIFICANT CHANGE UP (ref 6–8.3)
PROT UR-MCNC: 100
RBC # BLD: 4.68 M/UL — SIGNIFICANT CHANGE UP (ref 4.2–5.8)
RBC # FLD: 13.5 % — SIGNIFICANT CHANGE UP (ref 10.3–14.5)
RBC CASTS # UR COMP ASSIST: SIGNIFICANT CHANGE UP /HPF (ref 0–4)
SAO2 % BLDA: 91 % — LOW (ref 92–96)
SODIUM SERPL-SCNC: 135 MMOL/L — SIGNIFICANT CHANGE UP (ref 135–145)
SP GR SPEC: 1.02 — SIGNIFICANT CHANGE UP (ref 1.01–1.02)
TROPONIN I SERPL-MCNC: <.017 NG/ML — LOW (ref 0.02–0.06)
UROBILINOGEN FLD QL: NEGATIVE — SIGNIFICANT CHANGE UP
WBC # BLD: 8.48 K/UL — SIGNIFICANT CHANGE UP (ref 3.8–10.5)
WBC # FLD AUTO: 8.48 K/UL — SIGNIFICANT CHANGE UP (ref 3.8–10.5)
WBC UR QL: NEGATIVE /HPF — SIGNIFICANT CHANGE UP (ref 0–5)

## 2020-04-21 PROCEDURE — 93010 ELECTROCARDIOGRAM REPORT: CPT

## 2020-04-21 PROCEDURE — 71045 X-RAY EXAM CHEST 1 VIEW: CPT | Mod: 26

## 2020-04-21 PROCEDURE — 99223 1ST HOSP IP/OBS HIGH 75: CPT | Mod: GC,AI

## 2020-04-21 PROCEDURE — 99284 EMERGENCY DEPT VISIT MOD MDM: CPT | Mod: CS

## 2020-04-21 RX ORDER — SODIUM CHLORIDE 9 MG/ML
500 INJECTION INTRAMUSCULAR; INTRAVENOUS; SUBCUTANEOUS ONCE
Refills: 0 | Status: COMPLETED | OUTPATIENT
Start: 2020-04-21 | End: 2020-04-21

## 2020-04-21 RX ORDER — ALBUTEROL 90 UG/1
2 AEROSOL, METERED ORAL EVERY 4 HOURS
Refills: 0 | Status: DISCONTINUED | OUTPATIENT
Start: 2020-04-21 | End: 2020-05-05

## 2020-04-21 RX ORDER — ENOXAPARIN SODIUM 100 MG/ML
40 INJECTION SUBCUTANEOUS DAILY
Refills: 0 | Status: DISCONTINUED | OUTPATIENT
Start: 2020-04-21 | End: 2020-04-21

## 2020-04-21 RX ORDER — FOLIC ACID 0.8 MG
1 TABLET ORAL DAILY
Refills: 0 | Status: DISCONTINUED | OUTPATIENT
Start: 2020-04-21 | End: 2020-05-05

## 2020-04-21 RX ORDER — ACETAMINOPHEN 500 MG
650 TABLET ORAL ONCE
Refills: 0 | Status: COMPLETED | OUTPATIENT
Start: 2020-04-21 | End: 2020-04-21

## 2020-04-21 RX ORDER — MAGNESIUM HYDROXIDE 400 MG/1
5 TABLET, CHEWABLE ORAL DAILY
Refills: 0 | Status: DISCONTINUED | OUTPATIENT
Start: 2020-04-21 | End: 2020-05-05

## 2020-04-21 RX ORDER — TRAMADOL HYDROCHLORIDE 50 MG/1
50 TABLET ORAL EVERY 12 HOURS
Refills: 0 | Status: DISCONTINUED | OUTPATIENT
Start: 2020-04-21 | End: 2020-04-27

## 2020-04-21 RX ORDER — BUDESONIDE AND FORMOTEROL FUMARATE DIHYDRATE 160; 4.5 UG/1; UG/1
2 AEROSOL RESPIRATORY (INHALATION)
Refills: 0 | Status: DISCONTINUED | OUTPATIENT
Start: 2020-04-21 | End: 2020-05-05

## 2020-04-21 RX ORDER — FAMOTIDINE 10 MG/ML
1 INJECTION INTRAVENOUS
Qty: 0 | Refills: 0 | DISCHARGE

## 2020-04-21 RX ORDER — FERROUS SULFATE 325(65) MG
325 TABLET ORAL DAILY
Refills: 0 | Status: DISCONTINUED | OUTPATIENT
Start: 2020-04-21 | End: 2020-05-05

## 2020-04-21 RX ORDER — THIAMINE MONONITRATE (VIT B1) 100 MG
100 TABLET ORAL DAILY
Refills: 0 | Status: DISCONTINUED | OUTPATIENT
Start: 2020-04-21 | End: 2020-05-05

## 2020-04-21 RX ORDER — TIOTROPIUM BROMIDE 18 UG/1
1 CAPSULE ORAL; RESPIRATORY (INHALATION) DAILY
Refills: 0 | Status: DISCONTINUED | OUTPATIENT
Start: 2020-04-21 | End: 2020-05-05

## 2020-04-21 RX ORDER — BUDESONIDE AND FORMOTEROL FUMARATE DIHYDRATE 160; 4.5 UG/1; UG/1
2 AEROSOL RESPIRATORY (INHALATION)
Qty: 0 | Refills: 0 | DISCHARGE

## 2020-04-21 RX ORDER — HYDROXYCHLOROQUINE SULFATE 200 MG
400 TABLET ORAL DAILY
Refills: 0 | Status: COMPLETED | OUTPATIENT
Start: 2020-04-21 | End: 2020-04-25

## 2020-04-21 RX ORDER — FAMOTIDINE 10 MG/ML
20 INJECTION INTRAVENOUS DAILY
Refills: 0 | Status: COMPLETED | OUTPATIENT
Start: 2020-04-21 | End: 2020-05-03

## 2020-04-21 RX ORDER — ACETAMINOPHEN 500 MG
650 TABLET ORAL EVERY 4 HOURS
Refills: 0 | Status: DISCONTINUED | OUTPATIENT
Start: 2020-04-21 | End: 2020-05-05

## 2020-04-21 RX ORDER — ENOXAPARIN SODIUM 100 MG/ML
60 INJECTION SUBCUTANEOUS
Refills: 0 | Status: DISCONTINUED | OUTPATIENT
Start: 2020-04-21 | End: 2020-05-05

## 2020-04-21 RX ORDER — THIAMINE MONONITRATE (VIT B1) 100 MG
1 TABLET ORAL
Qty: 0 | Refills: 0 | DISCHARGE

## 2020-04-21 RX ORDER — LANOLIN ALCOHOL/MO/W.PET/CERES
3 CREAM (GRAM) TOPICAL AT BEDTIME
Refills: 0 | Status: DISCONTINUED | OUTPATIENT
Start: 2020-04-21 | End: 2020-05-05

## 2020-04-21 RX ORDER — POLYETHYLENE GLYCOL 3350 17 G/17G
17 POWDER, FOR SOLUTION ORAL DAILY
Refills: 0 | Status: DISCONTINUED | OUTPATIENT
Start: 2020-04-21 | End: 2020-05-05

## 2020-04-21 RX ORDER — SENNA PLUS 8.6 MG/1
2 TABLET ORAL AT BEDTIME
Refills: 0 | Status: DISCONTINUED | OUTPATIENT
Start: 2020-04-21 | End: 2020-05-05

## 2020-04-21 RX ORDER — AZITHROMYCIN 500 MG/1
500 TABLET, FILM COATED ORAL ONCE
Refills: 0 | Status: COMPLETED | OUTPATIENT
Start: 2020-04-21 | End: 2020-04-21

## 2020-04-21 RX ORDER — CEFEPIME 1 G/1
1000 INJECTION, POWDER, FOR SOLUTION INTRAMUSCULAR; INTRAVENOUS ONCE
Refills: 0 | Status: COMPLETED | OUTPATIENT
Start: 2020-04-21 | End: 2020-04-21

## 2020-04-21 RX ORDER — GUAIFENESIN/DEXTROMETHORPHAN 600MG-30MG
10 TABLET, EXTENDED RELEASE 12 HR ORAL EVERY 4 HOURS
Refills: 0 | Status: DISCONTINUED | OUTPATIENT
Start: 2020-04-21 | End: 2020-05-05

## 2020-04-21 RX ORDER — FERROUS SULFATE 325(65) MG
1 TABLET ORAL
Qty: 0 | Refills: 0 | DISCHARGE

## 2020-04-21 RX ORDER — ASCORBIC ACID 60 MG
2 TABLET,CHEWABLE ORAL
Qty: 0 | Refills: 0 | DISCHARGE

## 2020-04-21 RX ORDER — ASPIRIN/CALCIUM CARB/MAGNESIUM 324 MG
81 TABLET ORAL DAILY
Refills: 0 | Status: DISCONTINUED | OUTPATIENT
Start: 2020-04-21 | End: 2020-05-05

## 2020-04-21 RX ADMIN — SODIUM CHLORIDE 500 MILLILITER(S): 9 INJECTION INTRAMUSCULAR; INTRAVENOUS; SUBCUTANEOUS at 16:40

## 2020-04-21 RX ADMIN — AZITHROMYCIN 500 MILLIGRAM(S): 500 TABLET, FILM COATED ORAL at 17:16

## 2020-04-21 RX ADMIN — CEFEPIME 100 MILLIGRAM(S): 1 INJECTION, POWDER, FOR SOLUTION INTRAMUSCULAR; INTRAVENOUS at 15:45

## 2020-04-21 RX ADMIN — Medication 650 MILLIGRAM(S): at 15:45

## 2020-04-21 RX ADMIN — BUDESONIDE AND FORMOTEROL FUMARATE DIHYDRATE 2 PUFF(S): 160; 4.5 AEROSOL RESPIRATORY (INHALATION) at 21:15

## 2020-04-21 RX ADMIN — Medication 40 MILLIGRAM(S): at 22:10

## 2020-04-21 RX ADMIN — SODIUM CHLORIDE 500 MILLILITER(S): 9 INJECTION INTRAMUSCULAR; INTRAVENOUS; SUBCUTANEOUS at 15:40

## 2020-04-21 RX ADMIN — AZITHROMYCIN 255 MILLIGRAM(S): 500 TABLET, FILM COATED ORAL at 16:16

## 2020-04-21 RX ADMIN — CEFEPIME 1000 MILLIGRAM(S): 1 INJECTION, POWDER, FOR SOLUTION INTRAMUSCULAR; INTRAVENOUS at 16:15

## 2020-04-21 NOTE — H&P ADULT - NSICDXPASTMEDICALHX_GEN_ALL_CORE_FT
PAST MEDICAL HISTORY:  CAD (coronary artery disease)     Deep vein thrombosis (DVT)     Dementia     Dyslipidemia     History of COPD     [place] (hard of hearing)     PE (pulmonary thromboembolism)     Peripheral vascular disease

## 2020-04-21 NOTE — H&P ADULT - NSHPLABSRESULTS_GEN_ALL_CORE
LABS:                        13.9   8.48  )-----------( 191      ( 2020 15:35 )             40.1         135  |  103  |  20  ----------------------------<  99  4.0   |  22  |  1.18    Ca    8.3      2020 15:35    TPro  7.0  /  Alb  2.3  /  TBili  1.2  /  DBili  x   /  AST  43  /  ALT  19  /  AlkPhos  94          eGFR if Non African American: 55 mL/min/1.73M2 (20 @ 15:35)  eGFR if : 64 mL/min/1.73M2 (20 @ 15:35)      Lactate, Blood: 1.6 mmol/L ( @ 16:27)    Procalcitonin, Serum: 0.15 ng/mL (20 @ 15:35)    CARDIAC MARKERS ( 2020 15:35 )  <.017 ng/mL / x     / x     / x     / x                ABG - ( 2020 16:15 )  pH, Arterial: 7.49  pH, Blood: x     /  pCO2: 26    /  pO2: 60    / HCO3: x     / Base Excess: x     /  SaO2: 91          Urinalysis Basic - ( 2020 15:59 )    Color: Yellow / Appearance: Slightly Turbid / S.020 / pH: x  Gluc: x / Ketone: Trace  / Bili: Negative / Urobili: Negative   Blood: x / Protein: 100 / Nitrite: Negative   Leuk Esterase: Negative / RBC: 0-4 /HPF / WBC Negative /HPF   Sq Epi: x / Non Sq Epi: Neg.-Few / Bacteria: Few /HPF        RADIOLOGY & ADDITIONAL TESTS: < from: Xray Chest 1 View-PORTABLE IMMEDIATE (20 @ 15:46) >    IMPRESSION: Right hilar enlargement identified, increased from prior evaluation, indeterminate. Airspace opacities are identified bilaterally, representing interval change. Findings most consistent with bilateral pneumonia.      DISCRETE X-RAY DATA:  Percent of LEFT lung opacification: 34-66%  Percent of RIGHT lung opacification: 1-33%  Change in lung opacification from most recent x-ray (<=3 days): No Prior  Change from prior dated 3 or more days (same admission): No Prior    < end of copied text >        Care Discussed with Consultants/Other Providers:

## 2020-04-21 NOTE — H&P ADULT - ASSESSMENT
86 yo male hx DVT/PE, htn, mild dementia, COPD, pulmonary fibrosis sent  from [facility] by EMS due to shortness of breath and b/l pneumonia. Known positive COVID contacts at [facility]    Acute hypoxic respiratory failure 2/2 BL PNA  r/o Viral Pneumonia secondary to Novel Coronavirus Infection  -COVID PCR PENDING  - Maintain on airborne isolation.  - Continue with O2 as needed via nasal cannula and up-titrate as needed. If on non-rebreather mask, start continuous oximetry monitoring.  - Obtain daily room air O2 saturations once O2 requirements stabilize.  - Continue hydroxychloroquine, with plan to complete total 5 days ( 4 more days remaining). EKG reviewed on admission and QTc 458. No need for further cardiac monitoring.  -Continue Doxy started at NH for 5 more days  - Acetaminophen 650 mg PO q4h PRN fever. Limit use of NSAIDs.  - HFA albuterol Q6 hour PRN via MDI. Would avoid nebulized preparations to limit risk of aerosol formation.  -Will start short course IV steroids   -Check inflammatory markers  -Start pharmacologic DVT PPx: with therapeutic Lovenox in light of Ddimer 6600 and previous hx of VTE ( Improve score 5)  -Goals of Care discussion had with patient's daughter by ED and he is FULL CODE    COPD  Pulmonary Fibrosis  -Will start IV steroids Solumedrol 40 IV BID  -Continue Symbocort/ Albuterol MDI  -Spiriva while in house  -Oxygen support as needed to maintain O2 >92%    Hx DVT/PE  -Unclear how long ago this was, pt not currently on therapeutic AC  -Left message with Dr. De La Cruz for further clarification at [phone]    HTN  -Monitor, BP currently acceptable    Dementia  -A &O x 2 at baseline  -Continue to monitor, without agitation currently.    [facility]  Pt is full code as per discussion with daughter by ED    DVT ppx  -Lovenox 60 mg BID    IMPROVE VTE Individual Risk Assessment          RISK                                                          Points  [  ] Previous VTE                                                3  [  ] Thrombophilia                                             2  [  ] Lower limb paralysis                                   2        (unable to hold up >15 seconds)    [  ] Current Cancer                                             2         (within 6 months)  [  ] Immobilization > 24 hrs                              1  [  ] ICU/CCU stay > 24 hours                             1  [  ] Age > 60                                                         1    IMPROVE VTE Score:         [     5    ]    Total Risk Factor Score:    0 - 1:   Consider IPC  >2 - 3:  Thromboprophylaxis required (enoxaparin or SQ heparin)        >4:   High Risk: Thromboprophylaxis required (enoxaparin or SQ heparin), optional add IPC  **If CONTRAINDICATION to enoxaparin or SQ heparin, USE IPCs** 86 yo male hx DVT/PE, htn, mild dementia, COPD, pulmonary fibrosis sent  from [facility] by EMS due to shortness of breath and b/l pneumonia. Known positive COVID contacts at [facility]    Acute hypoxic respiratory failure 2/2 BL PNA  r/o Viral Pneumonia secondary to Novel Coronavirus Infection  -COVID PCR PENDING  - Maintain on airborne isolation.  - Continue with O2 as needed via nasal cannula and up-titrate as needed. If on non-rebreather mask, start continuous oximetry monitoring.  - Obtain daily room air O2 saturations once O2 requirements stabilize.  - Continue hydroxychloroquine, with plan to complete total 5 days ( 4 more days remaining). EKG reviewed on admission and QTc 458. No need for further cardiac monitoring.  - Continue Doxy started at NH for 5 more days  - Acetaminophen 650 mg PO q4h PRN fever. Limit use of NSAIDs.  - HFA albuterol Q6 hour PRN via MDI. Would avoid nebulized preparations to limit risk of aerosol formation.  -Will start short course IV steroids due to underlying pulm fibrosis and COPD  -Check inflammatory markers  -Start pharmacologic DVT PPx: with therapeutic Lovenox in light of Ddimer 6600 and previous hx of VTE ( Improve score 5)  -Goals of Care discussion had with patient's daughter by ED and he is FULL CODE    COPD  Pulmonary Fibrosis  -Will start IV steroids Solumedrol 40 IV BID  -Continue Symbocort/ Albuterol MDI  -Spiriva while in house  -Oxygen support as needed to maintain O2 >92%    Hx DVT/PE  -Unclear how long ago this was, pt not currently on therapeutic AC  -Left message with Dr. De La Cruz for further clarification at [phone]    HTN  -Monitor, BP currently acceptable    Dementia  -A &O x 2 at baseline  -Continue to monitor, without agitation currently.    [facility]  Pt is full code as per discussion with daughter by ED    DVT ppx  -Lovenox 60 mg BID    IMPROVE VTE Individual Risk Assessment          RISK                                                          Points  [  ] Previous VTE                                                3  [  ] Thrombophilia                                             2  [  ] Lower limb paralysis                                   2        (unable to hold up >15 seconds)    [  ] Current Cancer                                             2         (within 6 months)  [  ] Immobilization > 24 hrs                              1  [  ] ICU/CCU stay > 24 hours                             1  [  ] Age > 60                                                         1    IMPROVE VTE Score:         [     5    ]    Total Risk Factor Score:    0 - 1:   Consider IPC  >2 - 3:  Thromboprophylaxis required (enoxaparin or SQ heparin)        >4:   High Risk: Thromboprophylaxis required (enoxaparin or SQ heparin), optional add IPC  **If CONTRAINDICATION to enoxaparin or SQ heparin, USE IPCs**

## 2020-04-21 NOTE — ED PROVIDER NOTE - CLINICAL SUMMARY MEDICAL DECISION MAKING FREE TEXT BOX
88 yo male hx DVT/PE, htn, mild dementia, COPD,  from [facility] by EMS due to shortness of breath and b/l pneumonia. Pt was on zosyn and zpack over past week.  Pt conditions worsened, with increased respiratory rate and pneumonia on x-ray. Staff unable to swab for covid, due to pt combative. 86 yo male hx DVT/PE, htn, mild dementia, COPD,  from [facility] by EMS due to shortness of breath and b/l pneumonia. Pt was on zosyn and zpack over past week.  Pt conditions worsened, with increased respiratory rate and pneumonia on x-ray. Staff unable to swab for covid, due to pt combative.   b/l crackles at bases, alert. 02- dropped to 89 RA, placed on 2L NC with improvement. pna on xray, covid labs sent   plan to admit to medicine

## 2020-04-21 NOTE — H&P ADULT - NSHPPHYSICALEXAM_GEN_ALL_CORE
Vital Signs Last 24 Hrs  T(F): 102 (21 Apr 2020 15:35), Max: 102 (21 Apr 2020 15:35)  HR: 88 (21 Apr 2020 17:45) (78 - 92)  BP: 108/48 (21 Apr 2020 17:45) (108/48 - 160/72)  RR: 20 (21 Apr 2020 17:45) (19 - 30)  SpO2: 95% (21 Apr 2020 17:45) (92% - 97%)    PHYSICAL EXAM:  GENERAL: Lethargic  HEAD:  Atraumatic, Normocephalic  EYES: EOMI, conjunctiva and sclera clear  ENMT: Moist mucous membranes, Good dentition, no thrush  NECK: Supple, No JVD  CHEST/LUNG: Bibasilar crackles. Non labored breathing  HEART: RRR; S1/S2, No murmur  ABDOMEN: Soft, Nontender, Nondistended; Bowel sounds present  VASCULAR: Normal pulses, Normal capillary refill  EXTREMITIES: No calf tenderness, No cyanosis, No edema  LYMPH: Normal; No lymphadenopathy noted  SKIN: Warm, Intact  PSYCH: Normal mood, Normal affect  NERVOUS SYSTEM:  A/O x2,  CN 2-12 intact, No focal deficits

## 2020-04-21 NOTE — ED ADULT NURSE NOTE - PMH
CAD (coronary artery disease)    Deep vein thrombosis (DVT)    Dementia    Dyslipidemia    History of COPD    [place] (hard of hearing)    PE (pulmonary thromboembolism)    Peripheral vascular disease

## 2020-04-21 NOTE — ED PROVIDER NOTE - OBJECTIVE STATEMENT
86 yo male hx DVT/PE, htn, mild dementia, COPD,  from [facility] by EMS due to shortness of breath and b/l pneumonia. Pt was on zosyn and zpack over past week.  Pt conditions worsened, with increased respiratory rate and pneumonia on x-ray. Staff unable to swab for covid, due to pt combative.

## 2020-04-21 NOTE — ED ADULT NURSE NOTE - OBJECTIVE STATEMENT
Pt BIBA from [facility] by EMS due to shortness of breath and b/l pneumonia. As per EMS, pt was unable to be swabbed for COVID by nurse at [facility], became combative so they sent him here for swab to be done. Pt was seen here in ED, started on zosyn and zpack over past week.  Pt conditions worsened, with increased respiratory rate and pneumonia on x-ray.  Pt with PMH of VT/PE, htn, mild dementia, COPD. Pt presents with low O2 saturation at 88% on room air. Pt now with 5LNC saturation at 95%. pt denies any current pain at this time. pt also presents with rectal temp 102, MD gomes made aware.

## 2020-04-21 NOTE — ED ADULT NURSE NOTE - CHIEF COMPLAINT QUOTE
Pt BIB EMS for SOB and bilateral pneumonia.  Pt on zosyn and zpack over past week.  Pt conditions worsened, with increased respiratory rate and pneumonia on x-ray.

## 2020-04-21 NOTE — ED PROVIDER NOTE - CARE PLAN
Principal Discharge DX:	Suspected COVID-19 virus infection  Secondary Diagnosis:	Pneumonia  Secondary Diagnosis:	Hypoxia

## 2020-04-21 NOTE — ED PROVIDER NOTE - PMH
CAD (coronary artery disease)    Deep vein thrombosis (DVT)    Dyslipidemia    History of COPD    [place] (hard of hearing)    PE (pulmonary thromboembolism)    Peripheral vascular disease

## 2020-04-21 NOTE — ED ADULT NURSE REASSESSMENT NOTE - NS ED NURSE REASSESS COMMENT FT1
pt to be admitted to med surg. PA at bedside for admission at this time. pt remains on cardiac monitor, will continue to monitor closely.

## 2020-04-21 NOTE — H&P ADULT - HISTORY OF PRESENT ILLNESS
86 yo male hx DVT/PE, htn, mild dementia, COPD, pulmonary fibrosis sent  from [facility] by EMS due to shortness of breath and b/l pneumonia.   Pt was on zosyn and zpack over past week for BL PNA noted on CXR.  The patient's clinical status worsened and was more hypoxic with temp 100 ( O2-89%) and patient send to ED.   Pt was very combative and NH staff was unable to swab for covid however was more calm in ED and COVID swab obtained.

## 2020-04-21 NOTE — ED PROVIDER NOTE - ATTENDING CONTRIBUTION TO CARE
88 yo male hx DVT/PE, htn, mild dementia, COPD,  from [facility] by EMS due to shortness of breath and b/l pneumonia. Pt was on zosyn and zpack over past week.  Pt conditions worsened, with increased respiratory rate and pneumonia on x-ray. Staff unable to swab for covid, due to pt combative.   b/l crackles at bases, alert. 02- dropped to 89 RA, placed on 2L NC with improvement. pna on xray, covid labs sent   plan to admit to medicine  Dr. Lopez:  I have reviewed and discussed with the PA/ resident the case specifics, including the history, physical assessment, evaluation, conclusion, laboratory results, and medical plan. I agree with the contents, and conclusions. I have personally examined, and interviewed the patient.

## 2020-04-22 LAB
ALBUMIN SERPL ELPH-MCNC: 2.1 G/DL — LOW (ref 3.3–5)
ALP SERPL-CCNC: 85 U/L — SIGNIFICANT CHANGE UP (ref 40–120)
ALT FLD-CCNC: 18 U/L — SIGNIFICANT CHANGE UP (ref 10–45)
ANION GAP SERPL CALC-SCNC: 11 MMOL/L — SIGNIFICANT CHANGE UP (ref 5–17)
AST SERPL-CCNC: 35 U/L — SIGNIFICANT CHANGE UP (ref 10–40)
BILIRUB SERPL-MCNC: 1.2 MG/DL — SIGNIFICANT CHANGE UP (ref 0.2–1.2)
BUN SERPL-MCNC: 19 MG/DL — SIGNIFICANT CHANGE UP (ref 7–23)
CALCIUM SERPL-MCNC: 8.2 MG/DL — LOW (ref 8.4–10.5)
CHLORIDE SERPL-SCNC: 104 MMOL/L — SIGNIFICANT CHANGE UP (ref 96–108)
CO2 SERPL-SCNC: 22 MMOL/L — SIGNIFICANT CHANGE UP (ref 22–31)
CREAT SERPL-MCNC: 1.06 MG/DL — SIGNIFICANT CHANGE UP (ref 0.5–1.3)
CULTURE RESULTS: NO GROWTH — SIGNIFICANT CHANGE UP
FERRITIN SERPL-MCNC: 894 NG/ML — HIGH (ref 30–400)
GLUCOSE SERPL-MCNC: 114 MG/DL — HIGH (ref 70–99)
HCT VFR BLD CALC: 39.4 % — SIGNIFICANT CHANGE UP (ref 39–50)
HGB BLD-MCNC: 12.9 G/DL — LOW (ref 13–17)
MCHC RBC-ENTMCNC: 28.3 PG — SIGNIFICANT CHANGE UP (ref 27–34)
MCHC RBC-ENTMCNC: 32.7 GM/DL — SIGNIFICANT CHANGE UP (ref 32–36)
MCV RBC AUTO: 86.4 FL — SIGNIFICANT CHANGE UP (ref 80–100)
NRBC # BLD: 0 /100 WBCS — SIGNIFICANT CHANGE UP (ref 0–0)
PLATELET # BLD AUTO: 175 K/UL — SIGNIFICANT CHANGE UP (ref 150–400)
POTASSIUM SERPL-MCNC: 3.9 MMOL/L — SIGNIFICANT CHANGE UP (ref 3.5–5.3)
POTASSIUM SERPL-SCNC: 3.9 MMOL/L — SIGNIFICANT CHANGE UP (ref 3.5–5.3)
PROT SERPL-MCNC: 6.9 G/DL — SIGNIFICANT CHANGE UP (ref 6–8.3)
RBC # BLD: 4.56 M/UL — SIGNIFICANT CHANGE UP (ref 4.2–5.8)
RBC # FLD: 13.7 % — SIGNIFICANT CHANGE UP (ref 10.3–14.5)
SARS-COV-2 RNA SPEC QL NAA+PROBE: DETECTED
SARS-COV-2 RNA SPEC QL NAA+PROBE: SIGNIFICANT CHANGE UP
SODIUM SERPL-SCNC: 137 MMOL/L — SIGNIFICANT CHANGE UP (ref 135–145)
SPECIMEN SOURCE: SIGNIFICANT CHANGE UP
WBC # BLD: 6.61 K/UL — SIGNIFICANT CHANGE UP (ref 3.8–10.5)
WBC # FLD AUTO: 6.61 K/UL — SIGNIFICANT CHANGE UP (ref 3.8–10.5)

## 2020-04-22 PROCEDURE — 99233 SBSQ HOSP IP/OBS HIGH 50: CPT

## 2020-04-22 RX ADMIN — Medication 400 MILLIGRAM(S): at 17:01

## 2020-04-22 RX ADMIN — ENOXAPARIN SODIUM 60 MILLIGRAM(S): 100 INJECTION SUBCUTANEOUS at 17:04

## 2020-04-22 RX ADMIN — SENNA PLUS 2 TABLET(S): 8.6 TABLET ORAL at 23:22

## 2020-04-22 RX ADMIN — Medication 100 MILLIGRAM(S): at 17:03

## 2020-04-22 RX ADMIN — FAMOTIDINE 20 MILLIGRAM(S): 10 INJECTION INTRAVENOUS at 17:04

## 2020-04-22 RX ADMIN — Medication 81 MILLIGRAM(S): at 17:03

## 2020-04-22 RX ADMIN — Medication 100 MILLIGRAM(S): at 06:35

## 2020-04-22 RX ADMIN — Medication 40 MILLIGRAM(S): at 06:34

## 2020-04-22 RX ADMIN — Medication 325 MILLIGRAM(S): at 17:04

## 2020-04-22 RX ADMIN — Medication 1 MILLIGRAM(S): at 17:04

## 2020-04-22 RX ADMIN — BUDESONIDE AND FORMOTEROL FUMARATE DIHYDRATE 2 PUFF(S): 160; 4.5 AEROSOL RESPIRATORY (INHALATION) at 09:21

## 2020-04-22 RX ADMIN — Medication 100 MILLIGRAM(S): at 17:04

## 2020-04-22 RX ADMIN — ENOXAPARIN SODIUM 60 MILLIGRAM(S): 100 INJECTION SUBCUTANEOUS at 06:34

## 2020-04-22 RX ADMIN — Medication 40 MILLIGRAM(S): at 17:11

## 2020-04-22 NOTE — SWALLOW BEDSIDE ASSESSMENT ADULT - PHARYNGEAL PHASE
Delayed pharyngeal swallow/Cough post oral intake Decreased laryngeal elevation/Delayed pharyngeal swallow Delayed pharyngeal swallow/Decreased laryngeal elevation

## 2020-04-22 NOTE — SWALLOW BEDSIDE ASSESSMENT ADULT - SWALLOW EVAL: DIAGNOSIS
oropharyngeal dysphagia superimposed by respiratory status. Patient on nasal cannula saturating at 5 LPM. also at baseline, patient with closed stoma noted. Patient given trials of puree, nectar thickened liquids, and thin liquids. Chewables were not administered due to edentulous dentition, Patient with adequate oral prep, delayed oral transit time, pharyngeal trigger delayed, palpable hyolaryngeal elevation, incoordination between respiratory-swallow pattern noted requiring intermittent breaks between PO trials, clinical s/s of penetration/aspiration (cough) for thin liquids only.

## 2020-04-22 NOTE — SWALLOW BEDSIDE ASSESSMENT ADULT - ASR SWALLOW ASPIRATION MONITOR
oral hygiene/change of breathing pattern/position upright (90Y)/cough/gurgly voice/fever/pneumonia/throat clearing/upper respiratory infection

## 2020-04-22 NOTE — PROGRESS NOTE ADULT - SUBJECTIVE AND OBJECTIVE BOX
Eliot Nunez M.D. Pager Number [phone]    Patient is a 87y old  Male who presents with a chief complaint of Hypoxia (2020 17:29)      SUBJECTIVE / OVERNIGHT EVENTS:  Pt seen and examined at bedside in the presence of pt's RN. No acute events overnight. Pt with complaints of dysuria   Pt denies cp, palpitations, sob, abd pain, N/V, fever, chills.    ROS:  All other review of systems negative    Allergies    No Known Allergies    Intolerances        MEDICATIONS  (STANDING):  aspirin enteric coated 81 milliGRAM(s) Oral daily  budesonide 160 MICROgram(s)/formoterol 4.5 MICROgram(s) Inhaler 2 Puff(s) Inhalation two times a day  doxycycline hyclate Capsule 100 milliGRAM(s) Oral every 12 hours  enoxaparin Injectable 60 milliGRAM(s) SubCutaneous two times a day  famotidine    Tablet 20 milliGRAM(s) Oral daily  ferrous    sulfate 325 milliGRAM(s) Oral daily  folic acid 1 milliGRAM(s) Oral daily  hydroxychloroquine 400 milliGRAM(s) Oral daily  methylPREDNISolone sodium succinate Injectable 40 milliGRAM(s) IV Push two times a day  senna 2 Tablet(s) Oral at bedtime  thiamine 100 milliGRAM(s) Oral daily  tiotropium 18 MICROgram(s) Capsule 1 Capsule(s) Inhalation daily    MEDICATIONS  (PRN):  acetaminophen   Tablet .. 650 milliGRAM(s) Oral every 4 hours PRN Temp greater or equal to 38.5C (101.3F)  ALBUTerol    90 MICROgram(s) HFA Inhaler 2 Puff(s) Inhalation every 4 hours PRN Shortness of Breath and/or Wheezing  guaifenesin/dextromethorphan  Syrup 10 milliLiter(s) Oral every 4 hours PRN Cough  magnesium hydroxide Suspension 5 milliLiter(s) Oral daily PRN Constipation  melatonin 3 milliGRAM(s) Oral at bedtime PRN Insomnia  polyethylene glycol 3350 17 Gram(s) Oral daily PRN Constipation  traMADol 50 milliGRAM(s) Oral every 12 hours PRN Moderate Pain (4 - 6)      Vital Signs Last 24 Hrs  T(C): 36.4 (2020 06:20), Max: 38.9 (2020 15:35)  T(F): 97.6 (2020 06:20), Max: 102 (2020 15:35)  HR: 84 (2020 09:22) (55 - 92)  BP: 148/65 (2020 06:20) (108/48 - 160/72)  BP(mean): 61 (2020 17:45) (61 - 95)  RR: 18 (2020 06:20) (18 - 30)  SpO2: 94% (2020 09:22) (92% - 97%)  CAPILLARY BLOOD GLUCOSE        I&O's Summary    2020 07:01  -  2020 14:39  --------------------------------------------------------  IN: 120 mL / OUT: 0 mL / NET: 120 mL        PHYSICAL EXAM:  GENERAL: NAD, elderly male  HEAD:  Atraumatic, Normocephalic  EYES: EOMI, PERRLA, conjunctiva and sclera clear  NECK: Supple, No JVD  CHEST/LUNG: + Rales Rhonchi; No wheeze  HEART: Regular rate and rhythm; No murmurs, rubs, or gallops  ABDOMEN: Soft, Nontender, Nondistended; Bowel sounds present  EXTREMITIES:  2+ Peripheral Pulses, No clubbing, cyanosis, or edema  NEUROLOGY: AAOx2, non-focal  PSYCH: calm  SKIN: No rashes or lesions    LABS:                        12.9   6.61  )-----------( 175      ( 2020 07:00 )             39.4     04-22    137  |  104  |  19  ----------------------------<  114<H>  3.9   |  22  |  1.06    Ca    8.2<L>      2020 07:00    TPro  6.9  /  Alb  2.1<L>  /  TBili  1.2  /  DBili  x   /  AST  35  /  ALT  18  /  AlkPhos  85  04-22      CARDIAC MARKERS ( 2020 15:35 )  <.017 ng/mL / x     / x     / x     / x          Urinalysis Basic - ( 2020 15:59 )    Color: Yellow / Appearance: Slightly Turbid / S.020 / pH: x  Gluc: x / Ketone: Trace  / Bili: Negative / Urobili: Negative   Blood: x / Protein: 100 / Nitrite: Negative   Leuk Esterase: Negative / RBC: 0-4 /HPF / WBC Negative /HPF   Sq Epi: x / Non Sq Epi: Neg.-Few / Bacteria: Few /HPF        RADIOLOGY & ADDITIONAL TESTS:  Results Reviewed:   Imaging Personally Reviewed:  Electrocardiogram Personally Reviewed:    COORDINATION OF CARE:  Care Discussed with Consultants/Other Providers [Y/N]:  Prior or Outpatient Records Reviewed [Y/N]:

## 2020-04-22 NOTE — PROGRESS NOTE ADULT - ASSESSMENT
86 yo male hx DVT/PE, htn, mild dementia, COPD, pulmonary fibrosis sent  from [facility] by EMS due to shortness of breath and b/l pneumonia. Known positive COVID contacts at [facility]    Acute hypoxic respiratory failure 2/2 BL PNA  r/o Viral Pneumonia secondary to Novel Coronavirus Infection  -COVID-19 Negative x 1. Follow up with Reswab 4/22  - inflammatory markers elevated  - Maintain on airborne isolation.  - Continue with O2 as needed via nasal cannula and up-titrate as needed. If on non-rebreather mask, start continuous oximetry monitoring.  - Obtain daily room air O2 saturations once O2 requirements stabilize.  - Continue hydroxychloroquine, with plan to complete total 5 days ( 3 more days remaining). EKG reviewed on admission and QTc 458. No need for further cardiac monitoring.  - Continue Doxy started at NH for 4 more days  - Acetaminophen 650 mg PO q4h PRN fever. Limit use of NSAIDs.  - HFA albuterol Q6 hour PRN via MDI. Would avoid nebulized preparations to limit risk of aerosol formation.  -Will continue short course IV steroids due to underlying pulm fibrosis and COPD  -Start pharmacologic DVT PPx: with therapeutic Lovenox in light of Ddimer 6600 and previous hx of VTE ( Improve score 5)  -Goals of Care discussion had with patient's daughter by ED and he is FULL CODE    COPD  Pulmonary Fibrosis  -Will continue IV steroids Solumedrol 40 IV BID  -Continue Symbocort/ Albuterol MDI  -Spiriva while in house  -Oxygen support as needed to maintain O2 >92%    DYSURIA  -Follow up with UA/Urine cx     Hx DVT/PE  -Unclear how long ago this was, pt not currently on therapeutic AC  -Left message with Dr. De La Cruz for further clarification at [phone]    HTN  -Monitor, BP currently acceptable    Dementia  -A &O x 2 at baseline  -Continue to monitor, without agitation currently.    [facility]  Pt is full code as per discussion with daughter by ED    DVT ppx  -Lovenox 60 mg BID    IMPROVE VTE Individual Risk Assessment          RISK                                                          Points  [  ] Previous VTE                                                3  [  ] Thrombophilia                                             2  [  ] Lower limb paralysis                                   2        (unable to hold up >15 seconds)    [  ] Current Cancer                                             2         (within 6 months)  [  ] Immobilization > 24 hrs                              1  [  ] ICU/CCU stay > 24 hours                             1  [  ] Age > 60                                                         1    IMPROVE VTE Score:         [     5    ]    Total Risk Factor Score:    0 - 1:   Consider IPC  >2 - 3:  Thromboprophylaxis required (enoxaparin or SQ heparin)        >4:   High Risk: Thromboprophylaxis required (enoxaparin or SQ heparin), optional add IPC  **If CONTRAINDICATION to enoxaparin or SQ heparin, USE IPCs**

## 2020-04-22 NOTE — SWALLOW BEDSIDE ASSESSMENT ADULT - COMMENTS
Patient is an 88 yo male hx DVT/PE, htn, mild dementia, COPD, pulmonary fibrosis sent  from [facility] by EMS due to shortness of breath and b/l pneumonia. Pt was on zosyn and zpack over past week for BL PNA noted on CXR.  The patient's clinical status worsened and was more hypoxic with temp 100 ( O2-89%) and patient send to ED.       WBC: 4/22: 6.61  Chest X-ray:  4/21/20: Right hilar enlargement identified, increased from prior evaluation, indeterminate. Airspace opacities are identified bilaterally, representing interval change. Findings most consistent with bilateral pneumonia.  Head CT: 3/3/20: No evidence of mass effect midline shift epidural or subdural collection. No sign of acute or recent hemorrhage. Dense bilateral basal ganglion calcifications present. 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

## 2020-04-22 NOTE — SWALLOW BEDSIDE ASSESSMENT ADULT - SLP GENERAL OBSERVATIONS
alert, verbal-able to answer simple questions, at baseline noted with closed stoma and nasal cannula saturating at 5LPM.

## 2020-04-23 LAB
ALBUMIN SERPL ELPH-MCNC: 2.5 G/DL — LOW (ref 3.3–5)
ALP SERPL-CCNC: 107 U/L — SIGNIFICANT CHANGE UP (ref 40–120)
ALT FLD-CCNC: 20 U/L — SIGNIFICANT CHANGE UP (ref 10–45)
ANION GAP SERPL CALC-SCNC: 12 MMOL/L — SIGNIFICANT CHANGE UP (ref 5–17)
AST SERPL-CCNC: 36 U/L — SIGNIFICANT CHANGE UP (ref 10–40)
BASOPHILS # BLD AUTO: 0.02 K/UL — SIGNIFICANT CHANGE UP (ref 0–0.2)
BASOPHILS NFR BLD AUTO: 0.1 % — SIGNIFICANT CHANGE UP (ref 0–2)
BILIRUB SERPL-MCNC: 0.9 MG/DL — SIGNIFICANT CHANGE UP (ref 0.2–1.2)
BUN SERPL-MCNC: 30 MG/DL — HIGH (ref 7–23)
CALCIUM SERPL-MCNC: 9.1 MG/DL — SIGNIFICANT CHANGE UP (ref 8.4–10.5)
CHLORIDE SERPL-SCNC: 107 MMOL/L — SIGNIFICANT CHANGE UP (ref 96–108)
CO2 SERPL-SCNC: 23 MMOL/L — SIGNIFICANT CHANGE UP (ref 22–31)
CREAT SERPL-MCNC: 1.18 MG/DL — SIGNIFICANT CHANGE UP (ref 0.5–1.3)
EOSINOPHIL # BLD AUTO: 0 K/UL — SIGNIFICANT CHANGE UP (ref 0–0.5)
EOSINOPHIL NFR BLD AUTO: 0 % — SIGNIFICANT CHANGE UP (ref 0–6)
GLUCOSE SERPL-MCNC: 122 MG/DL — HIGH (ref 70–99)
HCT VFR BLD CALC: 45.3 % — SIGNIFICANT CHANGE UP (ref 39–50)
HGB BLD-MCNC: 14.8 G/DL — SIGNIFICANT CHANGE UP (ref 13–17)
IMM GRANULOCYTES NFR BLD AUTO: 0.7 % — SIGNIFICANT CHANGE UP (ref 0–1.5)
LYMPHOCYTES # BLD AUTO: 0.99 K/UL — LOW (ref 1–3.3)
LYMPHOCYTES # BLD AUTO: 5.7 % — LOW (ref 13–44)
MCHC RBC-ENTMCNC: 28.2 PG — SIGNIFICANT CHANGE UP (ref 27–34)
MCHC RBC-ENTMCNC: 32.7 GM/DL — SIGNIFICANT CHANGE UP (ref 32–36)
MCV RBC AUTO: 86.3 FL — SIGNIFICANT CHANGE UP (ref 80–100)
MONOCYTES # BLD AUTO: 0.45 K/UL — SIGNIFICANT CHANGE UP (ref 0–0.9)
MONOCYTES NFR BLD AUTO: 2.6 % — SIGNIFICANT CHANGE UP (ref 2–14)
NEUTROPHILS # BLD AUTO: 15.87 K/UL — HIGH (ref 1.8–7.4)
NEUTROPHILS NFR BLD AUTO: 90.9 % — HIGH (ref 43–77)
NRBC # BLD: 0 /100 WBCS — SIGNIFICANT CHANGE UP (ref 0–0)
PLATELET # BLD AUTO: 229 K/UL — SIGNIFICANT CHANGE UP (ref 150–400)
POTASSIUM SERPL-MCNC: 4.2 MMOL/L — SIGNIFICANT CHANGE UP (ref 3.5–5.3)
POTASSIUM SERPL-SCNC: 4.2 MMOL/L — SIGNIFICANT CHANGE UP (ref 3.5–5.3)
PROT SERPL-MCNC: 7.8 G/DL — SIGNIFICANT CHANGE UP (ref 6–8.3)
RBC # BLD: 5.25 M/UL — SIGNIFICANT CHANGE UP (ref 4.2–5.8)
RBC # FLD: 13.9 % — SIGNIFICANT CHANGE UP (ref 10.3–14.5)
SODIUM SERPL-SCNC: 142 MMOL/L — SIGNIFICANT CHANGE UP (ref 135–145)
WBC # BLD: 17.46 K/UL — HIGH (ref 3.8–10.5)
WBC # FLD AUTO: 17.46 K/UL — HIGH (ref 3.8–10.5)

## 2020-04-23 PROCEDURE — 99233 SBSQ HOSP IP/OBS HIGH 50: CPT | Mod: CS,GC

## 2020-04-23 RX ORDER — METOPROLOL TARTRATE 50 MG
12.5 TABLET ORAL
Refills: 0 | Status: DISCONTINUED | OUTPATIENT
Start: 2020-04-23 | End: 2020-05-05

## 2020-04-23 RX ADMIN — ENOXAPARIN SODIUM 60 MILLIGRAM(S): 100 INJECTION SUBCUTANEOUS at 17:02

## 2020-04-23 RX ADMIN — Medication 40 MILLIGRAM(S): at 05:13

## 2020-04-23 RX ADMIN — Medication 400 MILLIGRAM(S): at 12:20

## 2020-04-23 RX ADMIN — BUDESONIDE AND FORMOTEROL FUMARATE DIHYDRATE 2 PUFF(S): 160; 4.5 AEROSOL RESPIRATORY (INHALATION) at 10:57

## 2020-04-23 RX ADMIN — FAMOTIDINE 20 MILLIGRAM(S): 10 INJECTION INTRAVENOUS at 12:20

## 2020-04-23 RX ADMIN — Medication 81 MILLIGRAM(S): at 12:33

## 2020-04-23 RX ADMIN — Medication 100 MILLIGRAM(S): at 12:20

## 2020-04-23 RX ADMIN — ENOXAPARIN SODIUM 60 MILLIGRAM(S): 100 INJECTION SUBCUTANEOUS at 05:13

## 2020-04-23 RX ADMIN — Medication 1 MILLIGRAM(S): at 12:20

## 2020-04-23 RX ADMIN — Medication 40 MILLIGRAM(S): at 17:02

## 2020-04-23 RX ADMIN — Medication 12.5 MILLIGRAM(S): at 17:02

## 2020-04-23 RX ADMIN — Medication 325 MILLIGRAM(S): at 12:20

## 2020-04-23 NOTE — PROGRESS NOTE ADULT - SUBJECTIVE AND OBJECTIVE BOX
Patient is a 87y old  Male who presents with a chief complaint of Hypoxia.        Patient seen and examined at bedside.    ALLERGIES:  No Known Allergies    MEDICATIONS  (STANDING):  aspirin enteric coated 81 milliGRAM(s) Oral daily  budesonide 160 MICROgram(s)/formoterol 4.5 MICROgram(s) Inhaler 2 Puff(s) Inhalation two times a day  enoxaparin Injectable 60 milliGRAM(s) SubCutaneous two times a day  famotidine    Tablet 20 milliGRAM(s) Oral daily  ferrous    sulfate 325 milliGRAM(s) Oral daily  folic acid 1 milliGRAM(s) Oral daily  hydroxychloroquine 400 milliGRAM(s) Oral daily  methylPREDNISolone sodium succinate Injectable 40 milliGRAM(s) IV Push two times a day  senna 2 Tablet(s) Oral at bedtime  thiamine 100 milliGRAM(s) Oral daily  tiotropium 18 MICROgram(s) Capsule 1 Capsule(s) Inhalation daily    MEDICATIONS  (PRN):  acetaminophen   Tablet .. 650 milliGRAM(s) Oral every 4 hours PRN Temp greater or equal to 38.5C (101.3F)  ALBUTerol    90 MICROgram(s) HFA Inhaler 2 Puff(s) Inhalation every 4 hours PRN Shortness of Breath and/or Wheezing  guaifenesin/dextromethorphan  Syrup 10 milliLiter(s) Oral every 4 hours PRN Cough  magnesium hydroxide Suspension 5 milliLiter(s) Oral daily PRN Constipation  melatonin 3 milliGRAM(s) Oral at bedtime PRN Insomnia  polyethylene glycol 3350 17 Gram(s) Oral daily PRN Constipation  traMADol 50 milliGRAM(s) Oral every 12 hours PRN Moderate Pain (4 - 6)    Vital Signs Last 24 Hrs  T(F): 97.3 (2020 07:42), Max: 97.3 (2020 07:42)  HR: 75 (2020 07:42) (75 - 75)  BP: 137/59 (2020 07:42) (137/59 - 137/59)  RR: 18 (2020 07:42) (18 - 18)  SpO2: 89% (2020 07:42) (89% - 89%)  I&O's Summary    2020 07:01  -  2020 07:00  --------------------------------------------------------  IN: 120 mL / OUT: 0 mL / NET: 120 mL      PHYSICAL EXAM:  General: NAD, A/O x 3  ENT: MMM  Neck: Supple, No JVD  Lungs: Clear to auscultation bilaterally, Non labored breathing   Cardio: RRR, S1/S2, No murmurs  Abdomen: Soft, Nontender, Nondistended; Bowel sounds present  Extremities: No calf tenderness, No pitting edema    LABS:                        12.9   6.61  )-----------( 175      ( 2020 07:00 )             39.4     -    137  |  104  |  19  ----------------------------<  114  3.9   |  22  |  1.06    Ca    8.2      2020 07:00    TPro  6.9  /  Alb  2.1  /  TBili  1.2  /  DBili  x   /  AST  35  /  ALT  18  /  AlkPhos  85      eGFR if Non African American: 63 mL/min/1.73M2 (20 @ 07:00)  eGFR if : 73 mL/min/1.73M2 (20 @ 07:00)      Lactate, Blood: 1.6 mmol/L ( @ 16:27)    CARDIAC MARKERS ( 2020 15:35 )  <.017 ng/mL / x     / x     / x     / x                ABG - ( 2020 16:15 )  pH, Arterial: 7.49  pH, Blood: x     /  pCO2: 26    /  pO2: 60    / HCO3: x     / Base Excess: x     /  SaO2: 91                          Urinalysis Basic - ( 2020 15:59 )    Color: Yellow / Appearance: Slightly Turbid / S.020 / pH: x  Gluc: x / Ketone: Trace  / Bili: Negative / Urobili: Negative   Blood: x / Protein: 100 / Nitrite: Negative   Leuk Esterase: Negative / RBC: 0-4 /HPF / WBC Negative /HPF   Sq Epi: x / Non Sq Epi: Neg.-Few / Bacteria: Few /HPF        Culture - Urine (collected 2020 15:59)  Source: .Urine Clean Catch (Midstream)  Final Report (2020 17:03):    No growth    Culture - Blood (collected 2020 15:35)  Source: .Blood Blood-Peripheral  Preliminary Report (2020 22:02):    No growth to date.    Culture - Blood (collected 2020 15:35)  Source: .Blood Blood-Peripheral  Preliminary Report (2020 22:02):    No growth to date.      RADIOLOGY & ADDITIONAL TESTS:    Care Discussed with Consultants/Other Providers: Patient is a 87y old  Male who presents with a chief complaint of Hypoxia.  on6L NC - 94 %      Patient seen and examined at bedside.    ALLERGIES:  No Known Allergies    MEDICATIONS  (STANDING):  aspirin enteric coated 81 milliGRAM(s) Oral daily  budesonide 160 MICROgram(s)/formoterol 4.5 MICROgram(s) Inhaler 2 Puff(s) Inhalation two times a day  enoxaparin Injectable 60 milliGRAM(s) SubCutaneous two times a day  famotidine    Tablet 20 milliGRAM(s) Oral daily  ferrous    sulfate 325 milliGRAM(s) Oral daily  folic acid 1 milliGRAM(s) Oral daily  hydroxychloroquine 400 milliGRAM(s) Oral daily  methylPREDNISolone sodium succinate Injectable 40 milliGRAM(s) IV Push two times a day  senna 2 Tablet(s) Oral at bedtime  thiamine 100 milliGRAM(s) Oral daily  tiotropium 18 MICROgram(s) Capsule 1 Capsule(s) Inhalation daily    MEDICATIONS  (PRN):  acetaminophen   Tablet .. 650 milliGRAM(s) Oral every 4 hours PRN Temp greater or equal to 38.5C (101.3F)  ALBUTerol    90 MICROgram(s) HFA Inhaler 2 Puff(s) Inhalation every 4 hours PRN Shortness of Breath and/or Wheezing  guaifenesin/dextromethorphan  Syrup 10 milliLiter(s) Oral every 4 hours PRN Cough  magnesium hydroxide Suspension 5 milliLiter(s) Oral daily PRN Constipation  melatonin 3 milliGRAM(s) Oral at bedtime PRN Insomnia  polyethylene glycol 3350 17 Gram(s) Oral daily PRN Constipation  traMADol 50 milliGRAM(s) Oral every 12 hours PRN Moderate Pain (4 - 6)    Vital Signs Last 24 Hrs  T(F): 97.3 (2020 07:42), Max: 97.3 (2020 07:42)  HR: 75 (2020 07:42) (75 - 75)  BP: 137/59 (2020 07:42) (137/59 - 137/59)  RR: 18 (2020 07:42) (18 - 18)  SpO2: 89% (2020 07:42) (89% - 89%)  I&O's Summary    2020 07:01  -  2020 07:00  --------------------------------------------------------  IN: 120 mL / OUT: 0 mL / NET: 120 mL      PHYSICAL EXAM:  General: NAD, Dementia  ENT: MMM  Neck: Supple, No JVD  Lungs: Course BS bilaterally, Non labored breathing   Cardio: RRR, S1/S2, No murmurs episode of afib wRVR but back in normal sinus   Abdomen: Soft, Nontender, Nondistended; Bowel sounds present  Extremities: No calf tenderness, No pitting edema    LABS:                        12.9   6.61  )-----------( 175      ( 2020 07:00 )             39.4     -    137  |  104  |  19  ----------------------------<  114  3.9   |  22  |  1.06    Ca    8.2      2020 07:00    TPro  6.9  /  Alb  2.1  /  TBili  1.2  /  DBili  x   /  AST  35  /  ALT  18  /  AlkPhos  85      eGFR if Non African American: 63 mL/min/1.73M2 (20 @ 07:00)  eGFR if : 73 mL/min/1.73M2 (20 @ 07:00)      Lactate, Blood: 1.6 mmol/L ( @ 16:27)    CARDIAC MARKERS ( 2020 15:35 )  <.017 ng/mL / x     / x     / x     / x                ABG - ( 2020 16:15 )  pH, Arterial: 7.49  pH, Blood: x     /  pCO2: 26    /  pO2: 60    / HCO3: x     / Base Excess: x     /  SaO2: 91                          Urinalysis Basic - ( 2020 15:59 )    Color: Yellow / Appearance: Slightly Turbid / S.020 / pH: x  Gluc: x / Ketone: Trace  / Bili: Negative / Urobili: Negative   Blood: x / Protein: 100 / Nitrite: Negative   Leuk Esterase: Negative / RBC: 0-4 /HPF / WBC Negative /HPF   Sq Epi: x / Non Sq Epi: Neg.-Few / Bacteria: Few /HPF        Culture - Urine (collected 2020 15:59)  Source: .Urine Clean Catch (Midstream)  Final Report (2020 17:03):    No growth    Culture - Blood (collected 2020 15:35)  Source: .Blood Blood-Peripheral  Preliminary Report (2020 22:02):    No growth to date.    Culture - Blood (collected 2020 15:35)  Source: .Blood Blood-Peripheral  Preliminary Report (2020 22:02):    No growth to date.      RADIOLOGY & ADDITIONAL TESTS:    Care Discussed with Consultants/Other Providers:

## 2020-04-23 NOTE — PROGRESS NOTE ADULT - ASSESSMENT
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 Known positive COVID contacts at [facility]    Acute hypoxic respiratory failure 2/2 BL PNA  r/o Viral Pneumonia secondary to Novel Coronavirus Infection  -COVID-19 Negative x 1. Follow up with Reswab 4/22- covid positive  - inflammatory markers elevated  - Maintain on airborne isolation.  - Continue with O2 as needed via nasal cannula and up-titrate as needed. If on non-rebreather mask, start continuous oximetry monitoring.  - Obtain daily room air O2 saturations once O2 requirements stabilize.  - Continue hydroxychloroquine, with plan to complete total 5 days ( 3 more days remaining). EKG reviewed on admission and QTc 458. No need for further cardiac monitoring.  - Continue Doxy started at NH for 4 more days  - Acetaminophen 650 mg PO q4h PRN fever. Limit use of NSAIDs.  - HFA albuterol Q6 hour PRN via MDI. Would avoid nebulized preparations to limit risk of aerosol formation.  -Will continue short course IV steroids due to underlying pulm fibrosis and COPD  -Start pharmacologic DVT PPx: with therapeutic Lovenox in light of Ddimer 6600 and previous hx of VTE ( Improve score 5)  -Goals of Care discussion had with patient's daughter by ED and he is FULL CODE    COPD  Pulmonary Fibrosis  -Will continue IV steroids Solumedrol 40 IV BID  -Continue Symbocort/ Albuterol MDI  -Spiriva while in house  -Oxygen support as needed to maintain O2 >92%    DYSURIA  -Follow up with UA/Urine cx     Hx DVT/PE  -Unclear how long ago this was, pt not currently on therapeutic AC  -Left message with Dr. De La Cruz for further clarification at [phone]    HTN  -Monitor, BP currently acceptable    Dementia  -A &O x 2 at baseline  -Continue to monitor, without agitation currently.    [facility]  Pt is full code as per discussion with daughter by ED    DVT ppx  -Lovenox 60 mg BID    IMPROVE VTE Individual Risk Assessment          RISK                                                          Points  [  ] Previous VTE                                                3  [  ] Thrombophilia                                             2  [  ] Lower limb paralysis                                   2        (unable to hold up >15 seconds)    [  ] Current Cancer                                             2         (within 6 months)  [  ] Immobilization > 24 hrs                              1  [  ] ICU/CCU stay > 24 hours                             1  [  ] Age > 60                                                         1    IMPROVE VTE Score:         [     5    ]    Total Risk Factor Score:    0 - 1:   Consider IPC  >2 - 3:  Thromboprophylaxis required (enoxaparin or SQ heparin)        >4:   High Risk: Thromboprophylaxis required (enoxaparin or SQ heparin), optional add IPC  **If CONTRAINDICATION to enoxaparin or SQ heparin, USE IPCs** 88 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 Known positive COVID contacts at [facility]    Acute hypoxic respiratory failure 2/2 BL PNA  r/o Viral Pneumonia secondary to Novel Coronavirus Infection  -COVID-19 Negative x 1. Follow up with Reswab 4/22- covid positive  - inflammatory markers elevated  - Maintain on airborne isolation.  - Continue with O2 as needed via nasal cannula and up-titrate as needed. Currently on 6LNC-94%  - Continue hydroxychloroquine, with plan to complete total 5 days ( 2 more days remaining). EKG reviewed on admission and QTc 458. No need for further cardiac monitoring.  - Continue Doxy started at NH for 3 more days  - Acetaminophen 650 mg PO q4h PRN fever. Limit use of NSAIDs.  - HFA albuterol Q6 hour PRN via MDI. Would avoid nebulized preparations to limit risk of aerosol formation.  -Will continue short course IV steroids due to underlying pulm fibrosis and COPD  -Start pharmacologic DVT PPx: with therapeutic Lovenox in light of Ddimer 6600 and previous hx of VTE ( Improve score 5)  -Goals of Care discussion had with patient's daughter by ED and he is FULL CODE    COPD  Pulmonary Fibrosis  -Will continue IV steroids Solumedrol 40 IV BID  -Continue Symbocort/ Albuterol MDI  -Spiriva while in house  -Oxygen support as needed to maintain O2 >92%    Leukocytosis  - most likely seconday to steroids     DYSURIA  -Follow up with UA/Urine cx - NG 4/21    Hx DVT/PE  -Unclear how long ago this was, pt not currently on therapeutic AC  -Left message with Dr. De La Cruz for further clarification at [phone]    HTN  -Monitor, BP currently acceptable    Dementia  -A &O x 2 at baseline  -Continue to monitor, without agitation currently.    [facility]  Pt is full code as per discussion with daughter by ED    DVT ppx  -Lovenox 60 mg BID

## 2020-04-24 LAB — D DIMER BLD IA.RAPID-MCNC: 2321 NG/ML DDU — HIGH

## 2020-04-24 PROCEDURE — 99233 SBSQ HOSP IP/OBS HIGH 50: CPT | Mod: CS,GC

## 2020-04-24 RX ADMIN — ENOXAPARIN SODIUM 60 MILLIGRAM(S): 100 INJECTION SUBCUTANEOUS at 16:17

## 2020-04-24 RX ADMIN — Medication 400 MILLIGRAM(S): at 16:15

## 2020-04-24 RX ADMIN — Medication 100 MILLIGRAM(S): at 19:04

## 2020-04-24 RX ADMIN — Medication 1 MILLIGRAM(S): at 16:15

## 2020-04-24 RX ADMIN — FAMOTIDINE 20 MILLIGRAM(S): 10 INJECTION INTRAVENOUS at 16:16

## 2020-04-24 RX ADMIN — Medication 81 MILLIGRAM(S): at 16:16

## 2020-04-24 RX ADMIN — Medication 40 MILLIGRAM(S): at 23:39

## 2020-04-24 RX ADMIN — Medication 325 MILLIGRAM(S): at 16:15

## 2020-04-24 RX ADMIN — BUDESONIDE AND FORMOTEROL FUMARATE DIHYDRATE 2 PUFF(S): 160; 4.5 AEROSOL RESPIRATORY (INHALATION) at 09:20

## 2020-04-24 RX ADMIN — SENNA PLUS 2 TABLET(S): 8.6 TABLET ORAL at 02:50

## 2020-04-24 RX ADMIN — Medication 12.5 MILLIGRAM(S): at 19:03

## 2020-04-24 NOTE — PROGRESS NOTE ADULT - SUBJECTIVE AND OBJECTIVE BOX
Patient is a 87y old  Male who presents with a chief complaint of Hypoxia (2020 09:54)      SUBJECTIVE:  Patient seen and examined at bedside.      See HPI for pertinent ROS.    All other ROS reviewed and negative except as otherwise stated above.      ALLERGIES:  No Known Allergies      MEDICATIONS:  MEDICATIONS  (STANDING):  aspirin enteric coated 81 milliGRAM(s) Oral daily  budesonide 160 MICROgram(s)/formoterol 4.5 MICROgram(s) Inhaler 2 Puff(s) Inhalation two times a day  enoxaparin Injectable 60 milliGRAM(s) SubCutaneous two times a day  famotidine    Tablet 20 milliGRAM(s) Oral daily  ferrous    sulfate 325 milliGRAM(s) Oral daily  folic acid 1 milliGRAM(s) Oral daily  hydroxychloroquine 400 milliGRAM(s) Oral daily  methylPREDNISolone sodium succinate Injectable 40 milliGRAM(s) IV Push two times a day  metoprolol tartrate 12.5 milliGRAM(s) Oral two times a day  senna 2 Tablet(s) Oral at bedtime  thiamine 100 milliGRAM(s) Oral daily  tiotropium 18 MICROgram(s) Capsule 1 Capsule(s) Inhalation daily    MEDICATIONS  (PRN):  acetaminophen   Tablet .. 650 milliGRAM(s) Oral every 4 hours PRN Temp greater or equal to 38.5C (101.3F)  ALBUTerol    90 MICROgram(s) HFA Inhaler 2 Puff(s) Inhalation every 4 hours PRN Shortness of Breath and/or Wheezing  guaifenesin/dextromethorphan  Syrup 10 milliLiter(s) Oral every 4 hours PRN Cough  magnesium hydroxide Suspension 5 milliLiter(s) Oral daily PRN Constipation  melatonin 3 milliGRAM(s) Oral at bedtime PRN Insomnia  polyethylene glycol 3350 17 Gram(s) Oral daily PRN Constipation  traMADol 50 milliGRAM(s) Oral every 12 hours PRN Moderate Pain (4 - 6)      VITALS:  Vital Signs Last 24 Hrs  T(F): 98.4 (2020 18:51), Max: 98.4 (2020 18:51)  HR: 63 (2020 05:22) (63 - 63)  BP: 135/54 (2020 05:22) (135/54 - 160/61)  RR: 24 (2020 05:22) (18 - 24)  SpO2: 92% (2020 09:25) (92% - 96%)  I&O's Summary        PHYSICAL EXAM:  General: NAD, A/O x 3  Abdomen: Soft, Nontender, Nondistended; Bowel sounds present  Extremities: No calf tenderness, No pitting edema      LABS:                        14.8   17.46 )-----------( 229      ( 2020 09:54 )             45.3         142  |  107  |  30  ----------------------------<  122  4.2   |  23  |  1.18    Ca    9.1      2020 09:54    TPro  7.8  /  Alb  2.5  /  TBili  0.9  /  DBili  x   /  AST  36  /  ALT  20  /  AlkPhos  107      eGFR if Non African American: 55 mL/min/1.73M2 (20 @ 09:54)  eGFR if : 64 mL/min/1.73M2 (20 @ 09:54)      Lactate, Blood: 1.6 mmol/L ( @ 16:27)    CARDIAC MARKERS ( 2020 15:35 )  <.017 ng/mL / x     / x     / x     / x        ABG - ( 2020 16:15 )  pH, Arterial: 7.49  pH, Blood: x     /  pCO2: 26    /  pO2: 60    / HCO3: x     / Base Excess: x     /  SaO2: 91          Urinalysis Basic - ( 2020 15:59 )    Color: Yellow / Appearance: Slightly Turbid / S.020 / pH: x  Gluc: x / Ketone: Trace  / Bili: Negative / Urobili: Negative   Blood: x / Protein: 100 / Nitrite: Negative   Leuk Esterase: Negative / RBC: 0-4 /HPF / WBC Negative /HPF   Sq Epi: x / Non Sq Epi: Neg.-Few / Bacteria: Few /HPF    Culture - Urine (collected 2020 15:59)  Source: .Urine Clean Catch (Midstream)  Final Report (2020 17:03):    No growth    Culture - Blood (collected 2020 15:35)  Source: .Blood Blood-Peripheral  Preliminary Report (2020 22:02):    No growth to date.    Culture - Blood (collected 2020 15:35)  Source: .Blood Blood-Peripheral  Preliminary Report (2020 22:02):    No growth to date.          COVID-19 PCR: Detected (20 @ 08:40)  COVID-19 PCR: NotDetec (20 @ 15:35)      RADIOLOGY & ADDITIONAL TESTS:        Care Discussed with Attending. Patient is a 87y old  Male who presents with a chief complaint of Hypoxia (2020 09:54)      SUBJECTIVE:  Patient seen and examined at bedside.      See HPI for pertinent ROS.    All other ROS reviewed and negative except as otherwise stated above.      ALLERGIES:  No Known Allergies      MEDICATIONS:  MEDICATIONS  (STANDING):  aspirin enteric coated 81 milliGRAM(s) Oral daily  budesonide 160 MICROgram(s)/formoterol 4.5 MICROgram(s) Inhaler 2 Puff(s) Inhalation two times a day  enoxaparin Injectable 60 milliGRAM(s) SubCutaneous two times a day  famotidine    Tablet 20 milliGRAM(s) Oral daily  ferrous    sulfate 325 milliGRAM(s) Oral daily  folic acid 1 milliGRAM(s) Oral daily  hydroxychloroquine 400 milliGRAM(s) Oral daily  methylPREDNISolone sodium succinate Injectable 40 milliGRAM(s) IV Push two times a day  metoprolol tartrate 12.5 milliGRAM(s) Oral two times a day  senna 2 Tablet(s) Oral at bedtime  thiamine 100 milliGRAM(s) Oral daily  tiotropium 18 MICROgram(s) Capsule 1 Capsule(s) Inhalation daily    MEDICATIONS  (PRN):  acetaminophen   Tablet .. 650 milliGRAM(s) Oral every 4 hours PRN Temp greater or equal to 38.5C (101.3F)  ALBUTerol    90 MICROgram(s) HFA Inhaler 2 Puff(s) Inhalation every 4 hours PRN Shortness of Breath and/or Wheezing  guaifenesin/dextromethorphan  Syrup 10 milliLiter(s) Oral every 4 hours PRN Cough  magnesium hydroxide Suspension 5 milliLiter(s) Oral daily PRN Constipation  melatonin 3 milliGRAM(s) Oral at bedtime PRN Insomnia  polyethylene glycol 3350 17 Gram(s) Oral daily PRN Constipation  traMADol 50 milliGRAM(s) Oral every 12 hours PRN Moderate Pain (4 - 6)      VITALS:  Vital Signs Last 24 Hrs  T(F): 98.4 (2020 18:51), Max: 98.4 (2020 18:51)  HR: 63 (2020 05:22) (63 - 63)  BP: 135/54 (2020 05:22) (135/54 - 160/61)  RR: 24 (2020 05:22) (18 - 24)  SpO2: 92% (2020 09:25) (92% - 96%)  I&O's Summary        PHYSICAL EXAM:  General: NAD, Dementia  ENT: MMM  Neck: Supple, No JVD  Lungs: Course BS bilaterally, Non labored breathing   Cardio: RRR, S1/S2, No murmurs episode of afib wRVR but back in normal sinus   Abdomen: Soft, Nontender, Nondistended; Bowel sounds present  Extremities: No calf tenderness, No pitting edema      LABS:                        14.8   17.46 )-----------( 229      ( 2020 09:54 )             45.3         142  |  107  |  30  ----------------------------<  122  4.2   |  23  |  1.18    Ca    9.1      2020 09:54    TPro  7.8  /  Alb  2.5  /  TBili  0.9  /  DBili  x   /  AST  36  /  ALT  20  /  AlkPhos  107      eGFR if Non African American: 55 mL/min/1.73M2 (20 @ 09:54)  eGFR if : 64 mL/min/1.73M2 (20 @ 09:54)      Lactate, Blood: 1.6 mmol/L ( @ 16:27)    CARDIAC MARKERS ( 2020 15:35 )  <.017 ng/mL / x     / x     / x     / x        ABG - ( 2020 16:15 )  pH, Arterial: 7.49  pH, Blood: x     /  pCO2: 26    /  pO2: 60    / HCO3: x     / Base Excess: x     /  SaO2: 91          Urinalysis Basic - ( 2020 15:59 )    Color: Yellow / Appearance: Slightly Turbid / S.020 / pH: x  Gluc: x / Ketone: Trace  / Bili: Negative / Urobili: Negative   Blood: x / Protein: 100 / Nitrite: Negative   Leuk Esterase: Negative / RBC: 0-4 /HPF / WBC Negative /HPF   Sq Epi: x / Non Sq Epi: Neg.-Few / Bacteria: Few /HPF    Culture - Urine (collected 2020 15:59)  Source: .Urine Clean Catch (Midstream)  Final Report (2020 17:03):    No growth    Culture - Blood (collected 2020 15:35)  Source: .Blood Blood-Peripheral  Preliminary Report (2020 22:02):    No growth to date.    Culture - Blood (collected 2020 15:35)  Source: .Blood Blood-Peripheral  Preliminary Report (2020 22:02):    No growth to date.          COVID-19 PCR: Detected (20 @ 08:40)  COVID-19 PCR: NotDetec (20 @ 15:35)      RADIOLOGY & ADDITIONAL TESTS:        Care Discussed with Attending.

## 2020-04-24 NOTE — PROGRESS NOTE ADULT - ASSESSMENT
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 Known positive COVID contacts at [facility]    Acute hypoxic respiratory failure 2/2 BL PNA  r/o Viral Pneumonia secondary to Novel Coronavirus Infection  -COVID-19 Negative x 1. Follow up with Reswab 4/22- covid positive  - inflammatory markers elevated  - Maintain on airborne isolation.  - Continue with O2 as needed via nasal cannula and up-titrate as needed. Currently on 6LNC-92%  - Continue hydroxychloroquine, with plan to complete total 5 days ( 1 more day remaining). EKG reviewed on admission and QTc 458. No need for further cardiac monitoring.  - Continue Doxy started at NH for 2 more days  - Acetaminophen 650 mg PO q4h PRN fever. Limit use of NSAIDs.  - HFA albuterol Q6 hour PRN via MDI. Would avoid nebulized preparations to limit risk of aerosol formation.  -Will continue short course IV steroids due to underlying pulm fibrosis and COPD (started 04/21)  - DVT PPx: with therapeutic Lovenox in light of Ddimer 6600 and previous hx of VTE ( Improve score 5)  -Goals of Care discussion had with patient's daughter by ED and he is FULL CODE    COPD  Pulmonary Fibrosis  -Will continue IV steroids Solumedrol 40 IV BID  -Continue Symbocort/ Albuterol MDI  -Spiriva while in house  -Oxygen support as needed to maintain O2 >92%    Leukocytosis  - most likely seconday to steroids   -Repeat CBC in AM 04/25 (patient refused labs today)    DYSURIA  -Follow up with UA/Urine cx - NG 4/21    Hx DVT/PE  -Unclear how long ago this was, pt not currently on therapeutic AC  -Left message with Dr. De La Cruz for further clarification at [phone]    HTN  -Monitor, BP currently acceptable    Dementia  -A &O x 2 at baseline  -Continue to monitor, without agitation currently.    [facility]  Pt is full code as per discussion with daughter by ED    DVT ppx  -Lovenox 60 mg BID

## 2020-04-25 LAB
ANION GAP SERPL CALC-SCNC: 9 MMOL/L — SIGNIFICANT CHANGE UP (ref 5–17)
BASOPHILS # BLD AUTO: 0.01 K/UL — SIGNIFICANT CHANGE UP (ref 0–0.2)
BASOPHILS NFR BLD AUTO: 0.1 % — SIGNIFICANT CHANGE UP (ref 0–2)
BUN SERPL-MCNC: 30 MG/DL — HIGH (ref 7–23)
CALCIUM SERPL-MCNC: 8.2 MG/DL — LOW (ref 8.4–10.5)
CHLORIDE SERPL-SCNC: 107 MMOL/L — SIGNIFICANT CHANGE UP (ref 96–108)
CO2 SERPL-SCNC: 26 MMOL/L — SIGNIFICANT CHANGE UP (ref 22–31)
CREAT SERPL-MCNC: 1.04 MG/DL — SIGNIFICANT CHANGE UP (ref 0.5–1.3)
CRP SERPL-MCNC: 6.69 MG/DL — HIGH (ref 0–0.4)
D DIMER BLD IA.RAPID-MCNC: 1584 NG/ML DDU — HIGH
EOSINOPHIL # BLD AUTO: 0 K/UL — SIGNIFICANT CHANGE UP (ref 0–0.5)
EOSINOPHIL NFR BLD AUTO: 0 % — SIGNIFICANT CHANGE UP (ref 0–6)
FERRITIN SERPL-MCNC: 615 NG/ML — HIGH (ref 30–400)
GLUCOSE SERPL-MCNC: 98 MG/DL — SIGNIFICANT CHANGE UP (ref 70–99)
HCT VFR BLD CALC: 41.8 % — SIGNIFICANT CHANGE UP (ref 39–50)
HGB BLD-MCNC: 13.9 G/DL — SIGNIFICANT CHANGE UP (ref 13–17)
IMM GRANULOCYTES NFR BLD AUTO: 0.4 % — SIGNIFICANT CHANGE UP (ref 0–1.5)
LYMPHOCYTES # BLD AUTO: 0.43 K/UL — LOW (ref 1–3.3)
LYMPHOCYTES # BLD AUTO: 4.8 % — LOW (ref 13–44)
MCHC RBC-ENTMCNC: 28.7 PG — SIGNIFICANT CHANGE UP (ref 27–34)
MCHC RBC-ENTMCNC: 33.3 GM/DL — SIGNIFICANT CHANGE UP (ref 32–36)
MCV RBC AUTO: 86.4 FL — SIGNIFICANT CHANGE UP (ref 80–100)
MONOCYTES # BLD AUTO: 0.07 K/UL — SIGNIFICANT CHANGE UP (ref 0–0.9)
MONOCYTES NFR BLD AUTO: 0.8 % — LOW (ref 2–14)
NEUTROPHILS # BLD AUTO: 8.37 K/UL — HIGH (ref 1.8–7.4)
NEUTROPHILS NFR BLD AUTO: 93.9 % — HIGH (ref 43–77)
NRBC # BLD: 0 /100 WBCS — SIGNIFICANT CHANGE UP (ref 0–0)
PLATELET # BLD AUTO: 244 K/UL — SIGNIFICANT CHANGE UP (ref 150–400)
POTASSIUM SERPL-MCNC: 4.3 MMOL/L — SIGNIFICANT CHANGE UP (ref 3.5–5.3)
POTASSIUM SERPL-SCNC: 4.3 MMOL/L — SIGNIFICANT CHANGE UP (ref 3.5–5.3)
PROCALCITONIN SERPL-MCNC: 0.08 NG/ML — SIGNIFICANT CHANGE UP
RBC # BLD: 4.84 M/UL — SIGNIFICANT CHANGE UP (ref 4.2–5.8)
RBC # FLD: 13.8 % — SIGNIFICANT CHANGE UP (ref 10.3–14.5)
SODIUM SERPL-SCNC: 142 MMOL/L — SIGNIFICANT CHANGE UP (ref 135–145)
WBC # BLD: 8.92 K/UL — SIGNIFICANT CHANGE UP (ref 3.8–10.5)
WBC # FLD AUTO: 8.92 K/UL — SIGNIFICANT CHANGE UP (ref 3.8–10.5)

## 2020-04-25 PROCEDURE — 99497 ADVNCD CARE PLAN 30 MIN: CPT | Mod: CS

## 2020-04-25 PROCEDURE — 99233 SBSQ HOSP IP/OBS HIGH 50: CPT | Mod: CS,GC

## 2020-04-25 RX ADMIN — Medication 81 MILLIGRAM(S): at 13:28

## 2020-04-25 RX ADMIN — Medication 325 MILLIGRAM(S): at 13:28

## 2020-04-25 RX ADMIN — Medication 1 MILLIGRAM(S): at 13:28

## 2020-04-25 RX ADMIN — ENOXAPARIN SODIUM 60 MILLIGRAM(S): 100 INJECTION SUBCUTANEOUS at 17:49

## 2020-04-25 RX ADMIN — ENOXAPARIN SODIUM 60 MILLIGRAM(S): 100 INJECTION SUBCUTANEOUS at 05:55

## 2020-04-25 RX ADMIN — SENNA PLUS 2 TABLET(S): 8.6 TABLET ORAL at 21:20

## 2020-04-25 RX ADMIN — Medication 40 MILLIGRAM(S): at 17:47

## 2020-04-25 RX ADMIN — Medication 40 MILLIGRAM(S): at 05:54

## 2020-04-25 RX ADMIN — FAMOTIDINE 20 MILLIGRAM(S): 10 INJECTION INTRAVENOUS at 13:28

## 2020-04-25 RX ADMIN — Medication 400 MILLIGRAM(S): at 13:28

## 2020-04-25 RX ADMIN — BUDESONIDE AND FORMOTEROL FUMARATE DIHYDRATE 2 PUFF(S): 160; 4.5 AEROSOL RESPIRATORY (INHALATION) at 22:17

## 2020-04-25 RX ADMIN — Medication 12.5 MILLIGRAM(S): at 17:48

## 2020-04-25 RX ADMIN — BUDESONIDE AND FORMOTEROL FUMARATE DIHYDRATE 2 PUFF(S): 160; 4.5 AEROSOL RESPIRATORY (INHALATION) at 09:21

## 2020-04-25 RX ADMIN — Medication 12.5 MILLIGRAM(S): at 05:55

## 2020-04-25 RX ADMIN — Medication 100 MILLIGRAM(S): at 13:27

## 2020-04-25 NOTE — PROGRESS NOTE ADULT - ASSESSMENT
86 yo male hx DVT/PE, htn, mild dementia, COPD, pulmonary fibrosis sent  from [facility] by EMS due to shortness of breath and b/l pneumonia. Known positive COVID contacts at [facility]    Acute hypoxic respiratory failure 2/2 BL PNA  r/o Viral Pneumonia secondary to Novel Coronavirus Infection  -COVID-19 Negative x 1. Follow up with Reswab 4/22- covid positive  - inflammatory markers elevated, labs today (pending): d-dimer trending down.  - Maintain on airborne isolation.  - Continue with O2 as needed via nasal cannula and up-titrate as needed. Currently on 6LNC-96%  - Completed hydroxychloroquine 04/24. EKG reviewed on admission and QTc 458. No need for further cardiac monitoring.  - Continue Doxy started at NH for 1 more days  - Acetaminophen 650 mg PO q4h PRN fever. Limit use of NSAIDs.  - HFA albuterol Q6 hour PRN via MDI. Would avoid nebulized preparations to limit risk of aerosol formation.  -Will continue short course IV steroids due to underlying pulm fibrosis and COPD (started 04/21)  - DVT PPx: with therapeutic Lovenox in light of Ddimer 6600 and previous hx of VTE ( Improve score 5)  -Goals of Care discussion had with patient's daughter by ED and he is FULL CODE    COPD  Pulmonary Fibrosis  -Will continue IV steroids Solumedrol 40 IV BID  -Continue Symbocort/ Albuterol MDI  -Spiriva while in house  -Oxygen support as needed to maintain O2 >92%    Leukocytosis    -Improved today, WNR.  Likely was 2/2 to steroids.     DYSURIA  -Follow up with UA/Urine cx - NG 4/21    Hx DVT/PE  -Unclear how long ago this was, pt not currently on therapeutic AC  -Left message with Dr. De La Cruz for further clarification at [phone]    HTN  -Monitor, BP currently acceptable    Dementia  -A &O x 2 at baseline  -Continue to monitor, without agitation currently.    [facility]  Pt is full code as per discussion with daughter by ED    DVT ppx  -Lovenox 60 mg BID 86 yo male hx DVT/PE, htn, mild dementia, COPD, pulmonary fibrosis sent  from [facility] by EMS due to shortness of breath and b/l pneumonia. Known positive COVID contacts at [place] [facility]    Acute hypoxic respiratory failure 2/2 BL PNA  r/o Viral Pneumonia secondary to Novel Coronavirus Infection  - Reswab 4/22- covid positive  - d-dimer trending down.  - Maintain on airborne isolation.  - Continue with O2 as needed via nasal cannula and up-titrate as needed. Able to wean back down to 6LNC-96%  - Completed hydroxychloroquine 04/24. EKG reviewed on admission and QTc 458. No need for further cardiac monitoring.  - Acetaminophen 650 mg PO q4h PRN fever. Limit use of NSAIDs.  - HFA albuterol Q6 hour PRN via MDI. Would avoid nebulized preparations to limit risk of aerosol formation.  - Will IV steroids tomorrow for a 5 day course due to underlying pulm fibrosis and COPD   - DVT PPx: with therapeutic Lovenox in light of Ddimer 6600 and previous hx of VTE ( Improve score 5)  - Goals of Care discussion had with patient's daughter Ella; FULL CODE    COPD  Pulmonary Fibrosis  -Will continue IV steroids Solumedrol 40 IV BID  -Continue Symbocort/ Albuterol MDI  -Spiriva while in house  -Oxygen support as needed to maintain O2 >92%    Leukocytosis  -Resolved  -2/2 to steroids.     DYSURIA  -Resolved  -UA/Urine cx - NG 4/21    Hx DVT/PE  -Unclear how long ago this was, pt not currently on therapeutic AC  -Left message with Dr. De La Cruz for further clarification at [phone]    HTN  -Monitor, BP currently acceptable    Dementia  -A &O x 2 at baseline  -Continue to monitor, without agitation currently.    [facility]  Pt is full code as per discussion with daughter, Ella    DVT ppx  -Lovenox 60 mg BID

## 2020-04-25 NOTE — PROGRESS NOTE ADULT - SUBJECTIVE AND OBJECTIVE BOX
Patient is a 87y old  Male who presents with a chief complaint of Hypoxia (24 Apr 2020 09:44)      SUBJECTIVE:  Patient seen and examined at bedside.        See HPI for pertinent ROS.    All other ROS reviewed and negative except as otherwise stated above.    ALLERGIES:  No Known Allergies      MEDICATIONS:  MEDICATIONS  (STANDING):  aspirin enteric coated 81 milliGRAM(s) Oral daily  budesonide 160 MICROgram(s)/formoterol 4.5 MICROgram(s) Inhaler 2 Puff(s) Inhalation two times a day  enoxaparin Injectable 60 milliGRAM(s) SubCutaneous two times a day  famotidine    Tablet 20 milliGRAM(s) Oral daily  ferrous    sulfate 325 milliGRAM(s) Oral daily  folic acid 1 milliGRAM(s) Oral daily  hydroxychloroquine 400 milliGRAM(s) Oral daily  methylPREDNISolone sodium succinate Injectable 40 milliGRAM(s) IV Push two times a day  metoprolol tartrate 12.5 milliGRAM(s) Oral two times a day  senna 2 Tablet(s) Oral at bedtime  thiamine 100 milliGRAM(s) Oral daily  tiotropium 18 MICROgram(s) Capsule 1 Capsule(s) Inhalation daily    MEDICATIONS  (PRN):  acetaminophen   Tablet .. 650 milliGRAM(s) Oral every 4 hours PRN Temp greater or equal to 38.5C (101.3F)  ALBUTerol    90 MICROgram(s) HFA Inhaler 2 Puff(s) Inhalation every 4 hours PRN Shortness of Breath and/or Wheezing  guaifenesin/dextromethorphan  Syrup 10 milliLiter(s) Oral every 4 hours PRN Cough  magnesium hydroxide Suspension 5 milliLiter(s) Oral daily PRN Constipation  melatonin 3 milliGRAM(s) Oral at bedtime PRN Insomnia  polyethylene glycol 3350 17 Gram(s) Oral daily PRN Constipation  traMADol 50 milliGRAM(s) Oral every 12 hours PRN Moderate Pain (4 - 6)      VITALS:  Vital Signs Last 24 Hrs  T(F): 97.1 (25 Apr 2020 05:18), Max: 97.7 (24 Apr 2020 18:56)  HR: 74 (25 Apr 2020 05:18) (63 - 84)  BP: 142/64 (25 Apr 2020 05:18) (142/64 - 164/72)  RR: --  SpO2: 100% (25 Apr 2020 08:19) (86% - 100%)  I&O's Summary    24 Apr 2020 07:01  -  25 Apr 2020 07:00  --------------------------------------------------------  IN: 240 mL / OUT: 0 mL / NET: 240 mL          PHYSICAL EXAM:  General: NAD, mouth breathing, mild labored breathing.   Abdomen: Soft, Nontender, Nondistended; Bowel sounds present  Extremities: No calf tenderness, No pitting edema      LABS:                        13.9   8.92  )-----------( 244      ( 25 Apr 2020 07:21 )             41.8     04-25    142  |  107  |  30  ----------------------------<  98  4.3   |  26  |  1.04    Ca    8.2      25 Apr 2020 07:21    TPro  7.8  /  Alb  2.5  /  TBili  0.9  /  DBili  x   /  AST  36  /  ALT  20  /  AlkPhos  107  04-23    eGFR if Non African American: 65 mL/min/1.73M2 (04-25-20 @ 07:21)  eGFR if African American: 75 mL/min/1.73M2 (04-25-20 @ 07:21)      Culture - Urine (collected 21 Apr 2020 15:59)  Source: .Urine Clean Catch (Midstream)  Final Report (22 Apr 2020 17:03):    No growth    Culture - Blood (collected 21 Apr 2020 15:35)  Source: .Blood Blood-Peripheral  Preliminary Report (22 Apr 2020 22:02):    No growth to date.    Culture - Blood (collected 21 Apr 2020 15:35)  Source: .Blood Blood-Peripheral  Preliminary Report (22 Apr 2020 22:02):    No growth to date.        D-Dimer Assay, Quantitative: 1584 ng/mL DDU (04-25-20 @ 07:21)  D-Dimer Assay, Quantitative: 2321 ng/mL DDU (04-24-20 @ 11:30)    COVID-19 PCR: Detected (04-22-20 @ 08:40)  COVID-19 PCR: NotDetec (04-21-20 @ 15:35)      RADIOLOGY & ADDITIONAL TESTS:        Care Discussed with Attending. Patient is a 87y old  Male who presents with a chief complaint of Hypoxia (24 Apr 2020 09:44)      SUBJECTIVE:  Patient seen and examined at bedside.        See HPI for pertinent ROS.    All other ROS reviewed and negative except as otherwise stated above.    ALLERGIES:  No Known Allergies      MEDICATIONS:  MEDICATIONS  (STANDING):  aspirin enteric coated 81 milliGRAM(s) Oral daily  budesonide 160 MICROgram(s)/formoterol 4.5 MICROgram(s) Inhaler 2 Puff(s) Inhalation two times a day  enoxaparin Injectable 60 milliGRAM(s) SubCutaneous two times a day  famotidine    Tablet 20 milliGRAM(s) Oral daily  ferrous    sulfate 325 milliGRAM(s) Oral daily  folic acid 1 milliGRAM(s) Oral daily  hydroxychloroquine 400 milliGRAM(s) Oral daily  methylPREDNISolone sodium succinate Injectable 40 milliGRAM(s) IV Push two times a day  metoprolol tartrate 12.5 milliGRAM(s) Oral two times a day  senna 2 Tablet(s) Oral at bedtime  thiamine 100 milliGRAM(s) Oral daily  tiotropium 18 MICROgram(s) Capsule 1 Capsule(s) Inhalation daily    MEDICATIONS  (PRN):  acetaminophen   Tablet .. 650 milliGRAM(s) Oral every 4 hours PRN Temp greater or equal to 38.5C (101.3F)  ALBUTerol    90 MICROgram(s) HFA Inhaler 2 Puff(s) Inhalation every 4 hours PRN Shortness of Breath and/or Wheezing  guaifenesin/dextromethorphan  Syrup 10 milliLiter(s) Oral every 4 hours PRN Cough  magnesium hydroxide Suspension 5 milliLiter(s) Oral daily PRN Constipation  melatonin 3 milliGRAM(s) Oral at bedtime PRN Insomnia  polyethylene glycol 3350 17 Gram(s) Oral daily PRN Constipation  traMADol 50 milliGRAM(s) Oral every 12 hours PRN Moderate Pain (4 - 6)      VITALS:  Vital Signs Last 24 Hrs  T(F): 97.1 (25 Apr 2020 05:18), Max: 97.7 (24 Apr 2020 18:56)  HR: 74 (25 Apr 2020 05:18) (63 - 84)  BP: 142/64 (25 Apr 2020 05:18) (142/64 - 164/72)  RR: --  SpO2: 100% (25 Apr 2020 08:19) (86% - 100%)  I&O's Summary    24 Apr 2020 07:01  -  25 Apr 2020 07:00  --------------------------------------------------------  IN: 240 mL / OUT: 0 mL / NET: 240 mL          PHYSICAL EXAM:  General: NAD, Dementia  ENT: MMM  Neck: Supple, No JVD  Lungs: Course BS bilaterally, Non labored breathing   Cardio: RRR, S1/S2, No murmurs  Abdomen: Soft, Nontender, Nondistended; Bowel sounds present  Extremities: No calf tenderness, No pitting edema      LABS:                        13.9   8.92  )-----------( 244      ( 25 Apr 2020 07:21 )             41.8     04-25    142  |  107  |  30  ----------------------------<  98  4.3   |  26  |  1.04    Ca    8.2      25 Apr 2020 07:21    TPro  7.8  /  Alb  2.5  /  TBili  0.9  /  DBili  x   /  AST  36  /  ALT  20  /  AlkPhos  107  04-23    eGFR if Non African American: 65 mL/min/1.73M2 (04-25-20 @ 07:21)  eGFR if African American: 75 mL/min/1.73M2 (04-25-20 @ 07:21)      Culture - Urine (collected 21 Apr 2020 15:59)  Source: .Urine Clean Catch (Midstream)  Final Report (22 Apr 2020 17:03):    No growth    Culture - Blood (collected 21 Apr 2020 15:35)  Source: .Blood Blood-Peripheral  Preliminary Report (22 Apr 2020 22:02):    No growth to date.    Culture - Blood (collected 21 Apr 2020 15:35)  Source: .Blood Blood-Peripheral  Preliminary Report (22 Apr 2020 22:02):    No growth to date.        D-Dimer Assay, Quantitative: 1584 ng/mL DDU (04-25-20 @ 07:21)  D-Dimer Assay, Quantitative: 2321 ng/mL DDU (04-24-20 @ 11:30)    COVID-19 PCR: Detected (04-22-20 @ 08:40)  COVID-19 PCR: NotDetec (04-21-20 @ 15:35)      RADIOLOGY & ADDITIONAL TESTS:        Care Discussed with Attending.

## 2020-04-26 LAB
CRP SERPL-MCNC: 7.73 MG/DL — HIGH (ref 0–0.4)
CULTURE RESULTS: SIGNIFICANT CHANGE UP
CULTURE RESULTS: SIGNIFICANT CHANGE UP
D DIMER BLD IA.RAPID-MCNC: 988 NG/ML DDU — HIGH
FERRITIN SERPL-MCNC: 760 NG/ML — HIGH (ref 30–400)
LDH SERPL L TO P-CCNC: 901 U/L — HIGH (ref 50–242)
SPECIMEN SOURCE: SIGNIFICANT CHANGE UP
SPECIMEN SOURCE: SIGNIFICANT CHANGE UP

## 2020-04-26 PROCEDURE — 99233 SBSQ HOSP IP/OBS HIGH 50: CPT | Mod: CS

## 2020-04-26 RX ADMIN — BUDESONIDE AND FORMOTEROL FUMARATE DIHYDRATE 2 PUFF(S): 160; 4.5 AEROSOL RESPIRATORY (INHALATION) at 10:05

## 2020-04-26 RX ADMIN — Medication 81 MILLIGRAM(S): at 14:19

## 2020-04-26 RX ADMIN — Medication 40 MILLIGRAM(S): at 04:40

## 2020-04-26 RX ADMIN — FAMOTIDINE 20 MILLIGRAM(S): 10 INJECTION INTRAVENOUS at 14:20

## 2020-04-26 RX ADMIN — Medication 100 MILLIGRAM(S): at 14:19

## 2020-04-26 RX ADMIN — Medication 12.5 MILLIGRAM(S): at 19:04

## 2020-04-26 RX ADMIN — ENOXAPARIN SODIUM 60 MILLIGRAM(S): 100 INJECTION SUBCUTANEOUS at 19:04

## 2020-04-26 RX ADMIN — Medication 1 MILLIGRAM(S): at 14:19

## 2020-04-26 RX ADMIN — Medication 325 MILLIGRAM(S): at 14:19

## 2020-04-26 RX ADMIN — ENOXAPARIN SODIUM 60 MILLIGRAM(S): 100 INJECTION SUBCUTANEOUS at 04:40

## 2020-04-26 RX ADMIN — Medication 12.5 MILLIGRAM(S): at 04:40

## 2020-04-26 NOTE — PROGRESS NOTE ADULT - SUBJECTIVE AND OBJECTIVE BOX
Eliot Nunez M.D. Pager Number [phone]    Patient is a 87y old  Male who presents with a chief complaint of Hypoxia (25 Apr 2020 08:55)      SUBJECTIVE / OVERNIGHT EVENTS:  Pt seen and examined at bedside. No acute events overnight.    ROS:  Unable to assess due to dementia    Allergies    No Known Allergies    Intolerances        MEDICATIONS  (STANDING):  aspirin enteric coated 81 milliGRAM(s) Oral daily  budesonide 160 MICROgram(s)/formoterol 4.5 MICROgram(s) Inhaler 2 Puff(s) Inhalation two times a day  enoxaparin Injectable 60 milliGRAM(s) SubCutaneous two times a day  famotidine    Tablet 20 milliGRAM(s) Oral daily  ferrous    sulfate 325 milliGRAM(s) Oral daily  folic acid 1 milliGRAM(s) Oral daily  methylPREDNISolone sodium succinate Injectable 40 milliGRAM(s) IV Push two times a day  metoprolol tartrate 12.5 milliGRAM(s) Oral two times a day  senna 2 Tablet(s) Oral at bedtime  thiamine 100 milliGRAM(s) Oral daily  tiotropium 18 MICROgram(s) Capsule 1 Capsule(s) Inhalation daily    MEDICATIONS  (PRN):  acetaminophen   Tablet .. 650 milliGRAM(s) Oral every 4 hours PRN Temp greater or equal to 38.5C (101.3F)  ALBUTerol    90 MICROgram(s) HFA Inhaler 2 Puff(s) Inhalation every 4 hours PRN Shortness of Breath and/or Wheezing  guaifenesin/dextromethorphan  Syrup 10 milliLiter(s) Oral every 4 hours PRN Cough  magnesium hydroxide Suspension 5 milliLiter(s) Oral daily PRN Constipation  melatonin 3 milliGRAM(s) Oral at bedtime PRN Insomnia  polyethylene glycol 3350 17 Gram(s) Oral daily PRN Constipation  traMADol 50 milliGRAM(s) Oral every 12 hours PRN Moderate Pain (4 - 6)      Vital Signs Last 24 Hrs  T(C): 36.4 (25 Apr 2020 21:21), Max: 36.6 (25 Apr 2020 16:31)  T(F): 97.5 (25 Apr 2020 21:21), Max: 97.9 (25 Apr 2020 16:31)  HR: 97 (26 Apr 2020 05:46) (59 - 97)  BP: 150/87 (26 Apr 2020 05:46) (150/87 - 171/85)  BP(mean): --  RR: 16 (26 Apr 2020 05:46) (16 - 18)  SpO2: 92% (26 Apr 2020 05:46) (91% - 95%)  CAPILLARY BLOOD GLUCOSE        I&O's Summary      PHYSICAL EXAM:  General: NAD, Elderly male, pleasantly demented   ENT: MMM  Neck: Supple, No JVD  Lungs: Course BS bilaterally, Non labored breathing   Cardio: RRR, S1/S2, No murmurs  Abdomen: Soft, Nontender, Nondistended; Bowel sounds present  Extremities: No calf tenderness, No pitting edema      LABS:                        13.9   8.92  )-----------( 244      ( 25 Apr 2020 07:21 )             41.8     04-25    142  |  107  |  30<H>  ----------------------------<  98  4.3   |  26  |  1.04    Ca    8.2<L>      25 Apr 2020 07:21                RADIOLOGY & ADDITIONAL TESTS:  Results Reviewed:   Imaging Personally Reviewed:  Electrocardiogram Personally Reviewed:    COORDINATION OF CARE:  Care Discussed with Consultants/Other Providers [Y/N]:  Prior or Outpatient Records Reviewed [Y/N]:

## 2020-04-26 NOTE — PROGRESS NOTE ADULT - ATTENDING COMMENTS
Pt seen and examined at bedside in the presence of pt's RN. No acute events overnight. Pt resting comfortably/sleeping in bed this AM.    A fib with RVR  -Resolved  -Paroxysmal secondary to Heart strain from hypoxia secondary to COVID-19 as test positive from 4/22  -Continue Metoprolol 12.5mg BID with holding parameters  -CHADSVASC at least 4  -Continue Therapeutic Lovenox. Consider DOAC upon discharge     ARF with Hypoxia  -Currently on 15L NRB saturating 95%. Weaned him down to 6L NC with saturations 90% this AM  -Continue to wean as tolerated  -History of COPD, Okay with saturations as low as 88%  -Monitor vitals    COVID-19  -Test positive 4/22  -Discontinue abx as no concern for bacterial infection  -Completed 5 day course of Plaquenil and steroids  -If pt noted to decompensate/ become hypoxic, will repeat biomarkers and consider Tocilizumab   -Respiratory management as above    Advance care planning  Prior discussion with pt's Daughter BIBI: Explained pts poor prognosis if he was to decompensate and require CPR/intubation for mechanical ventilation. Pt will have <5% chance of survival. Pt's daughter will still like everything to be done. He is FULL CODE     DISPO: Back to [facility] once stable
Pt seen and examined at bedside in the presence of pt's RN. No acute events overnight. Pt resting comfortably/sleeping in bed this AM.    A fib with RVR  -Resolved  -Paroxysmal secondary to Heart strain from hypoxia secondary to COVID-19 as test positive from 4/22  -Continue Metoprolol 12.5mg BID with holding parameters  -CHADSVASC at least 4  -Continue Therapeutic Lovenox. Consider DOAC upon discharge     ARF with Hypoxia  -Currently on 6L NC saturating 92%.   -Continue to wean as tolerated  -Monitor vitals    COVID-19  -Test positive 4/22  -Discontinue abx as no concern for bacterial infection  -Continue Plaquenil and steroids Days 3/4  -Respiratory management as above
Pt seen and examined at bedside in the presence of pt's RN. No acute events overnight. Pt resting comfortably/sleeping in bed this AM.    A fib with RVR  -Resolved  -Paroxysmal secondary to Heart strain from hypoxia secondary to COVID-19 as test positive from 4/22  -Continue Metoprolol 12.5mg BID with holding parameters  -CHADSVASC at least 4  -Continue Therapeutic Lovenox. Consider DOAC upon discharge     ARF with Hypoxia  -Currently on 15L NRB saturating 99%. Weaned him down to 6L NC with saturations 96% this AM  -Continue to wean as tolerated  -History of COPD, Okay with saturations as low as 88%  -Monitor vitals    COVID-19  -Test positive 4/22  -Discontinue abx as no concern for bacterial infection  -Continue Plaquenil and steroids Days 4/5  -If pt noted to decompensate/ become hypoxic, will repeat biomarkers and consider Tocilizumab   -Respiratory management as above    Advance care planning  28mins spent discussing advance care planning with pt's Daughter BIBI. Explained pts poor prognosis if he was to decompensate and require CPR/intubation for mechanical ventilation. Pt will have <5% chance of survival. Pt's daughter will still like everything to be done. He is FULL CODE     DISPO: Back to [facility] once stable
Pt seen and examined at bedside this AM. Pt noted to become agitated and confused this AM. Pt noted on Tele to have New onset A fib with RVR with hypoxia, requiring escalation of supplemental O2 to NRB.    Agree with the above    A fib with RVR  -Resolved  -Paroxysmal secondary to Heart strain from hypoxia secondary to COVID-19 as test positive from 4/22  -Will start Metoprolol 12.5mg BID with holding parameters  -CHADSVASC at least 4  -Continue Therapeutic Lovenox. Consider DOAC upon discharge     ARF with Hypoxia  -Escalated to NRB 15L. Was able to wean back down to 6L NC saturating 96%  -Continue to wean as tolerated  -Monitor vitals    COVID-19  -Test positive 4/22  -Discontinue abx as no concern for bacterial infection  -Continue Plaquenil and steroids  -Respiratory management as above

## 2020-04-26 NOTE — PROGRESS NOTE ADULT - ASSESSMENT
88 yo male hx DVT/PE, htn, mild dementia, COPD, pulmonary fibrosis sent  from [facility] by EMS due to shortness of breath and b/l pneumonia. Known positive COVID contacts at [facility]    Acute hypoxic respiratory failure 2/2 BL PNA  r/o Viral Pneumonia secondary to Novel Coronavirus Infection  - Reswab 4/22- covid positive  - d-dimer trending down.  - Maintain on airborne isolation.  - Continue with O2 as needed via nasal cannula and up-titrate as needed. Able to wean back down to 6LNC-90%  - Completed hydroxychloroquine and steroids  - Acetaminophen 650 mg PO q4h PRN fever. Limit use of NSAIDs.  - HFA albuterol Q6 hour PRN via MDI. Would avoid nebulized preparations to limit risk of aerosol formation.  - Will IV steroids tomorrow for a 5 day course due to underlying pulm fibrosis and COPD   - DVT PPx: with therapeutic Lovenox in light of Ddimer 6600 and previous hx of VTE ( Improve score 5)  - Goals of Care discussion had with patient's daughter Ella; FULL CODE    COPD  Pulmonary Fibrosis  -Completed 5 day course of steroids  -Continue Symbocort/ Albuterol MDI  -Spiriva while in house  -Oxygen support as needed to maintain O2 >88%    Leukocytosis  -Resolved  -2/2 to steroids.     DYSURIA  -Resolved  -UA/Urine cx - NG 4/21    Hx DVT/PE  -Unclear how long ago this was, pt not currently on therapeutic AC  -Left message with Dr. De La Cruz for further clarification at [phone]    HTN  -Monitor, BP currently acceptable    Dementia  -A &O x 2 at baseline  -Continue to monitor, without agitation currently.    [facility]  Pt is full code as per discussion with daughter, Ella    DVT ppx  -Lovenox 60 mg BID

## 2020-04-27 RX ORDER — TRAMADOL HYDROCHLORIDE 50 MG/1
50 TABLET ORAL EVERY 12 HOURS
Refills: 0 | Status: DISCONTINUED | OUTPATIENT
Start: 2020-04-27 | End: 2020-05-04

## 2020-04-27 RX ADMIN — BUDESONIDE AND FORMOTEROL FUMARATE DIHYDRATE 2 PUFF(S): 160; 4.5 AEROSOL RESPIRATORY (INHALATION) at 21:21

## 2020-04-27 RX ADMIN — FAMOTIDINE 20 MILLIGRAM(S): 10 INJECTION INTRAVENOUS at 12:53

## 2020-04-27 RX ADMIN — Medication 12.5 MILLIGRAM(S): at 07:49

## 2020-04-27 RX ADMIN — Medication 12.5 MILLIGRAM(S): at 18:48

## 2020-04-27 RX ADMIN — ENOXAPARIN SODIUM 60 MILLIGRAM(S): 100 INJECTION SUBCUTANEOUS at 18:48

## 2020-04-27 RX ADMIN — ENOXAPARIN SODIUM 60 MILLIGRAM(S): 100 INJECTION SUBCUTANEOUS at 06:30

## 2020-04-27 RX ADMIN — Medication 81 MILLIGRAM(S): at 12:52

## 2020-04-27 NOTE — PROGRESS NOTE ADULT - ASSESSMENT
88 yo male hx DVT/PE, htn, mild dementia, COPD, pulmonary fibrosis sent  from [facility] by EMS due to shortness of breath and b/l pneumonia. Known positive COVID contacts at [facility]    Acute hypoxic respiratory failure 2/2 BL PNA  r/o Viral Pneumonia secondary to Novel Coronavirus Infection  - Reswab 4/22- covid positive  - d-dimer trending down.  - Maintain on airborne isolation.  - Continue with O2 as needed via nasal cannula and wean down as tolerated  - Completed hydroxychloroquine (completed 4/25) and steroids   - Acetaminophen 650 mg PO q4h PRN fever. Limit use of NSAIDs.  - HFA albuterol Q6 hour PRN via MDI. Would avoid nebulized preparations to limit risk of aerosol formation.  - DVT PPx: with therapeutic Lovenox in light of Ddimer 6600 and previous hx of VTE ( Improve score 5)  - Goals of Care discussion had with patient's daughter Bibi; FULL CODE    COPD  Pulmonary Fibrosis  -Completed 5 day course of steroids  -Continue Symbocort/ Albuterol MDI  -Spiriva while in house  -Oxygen support as needed to maintain O2 >88%    Leukocytosis  -Resolved  -2/2 to steroids.     DYSURIA  -Resolved  -UA/Urine cx - NG 4/21    Hx DVT/PE  -Unclear how long ago this was, pt not currently on therapeutic AC  -Left message with Dr. De La Cruz for further clarification at [phone]    HTN  -Continue metoprolol    Dementia  -A &O x 2 at baseline  -Continue to monitor, without agitation currently.    Pain:  -Tramadol PRN    DVT ppx  -Lovenox 60 mg BID    Advance care planning  Prior discussion with pt's Daughter BIBI: Explained pts poor prognosis if he was to decompensate and require CPR/intubation for mechanical ventilation. Pt will have <5% chance of survival. Pt's daughter will still like everything to be done. He is FULL CODE     DISPO: Back to [facility] once stable. 86 yo male hx DVT/PE, htn, mild dementia, COPD, pulmonary fibrosis sent  from [facility] by EMS due to shortness of breath and b/l pneumonia. Known positive COVID contacts at [facility]    Acute hypoxic respiratory failure 2/2 BL PNA  r/o Viral Pneumonia secondary to Novel Coronavirus Infection  - Reswab 4/22- covid positive  - d-dimer trending down.  - weaning down O2 supplement  - Completed hydroxychloroquine (completed 4/25) and steroids   - Acetaminophen 650 mg PO q4h PRN fever. Limit use of NSAIDs.  - HFA albuterol Q6 hour PRN via MDI. Would avoid nebulized preparations to limit risk of aerosol formation.  - DVT PPx: with therapeutic Lovenox in light of Ddimer 6600 and previous hx of VTE ( Improve score 5)  - Xarelto 10mg upon discharge  - Goals of Care discussion had with patient's daughter Bibi; FULL CODE     COPD  Pulmonary Fibrosis  -Completed 5 day course of steroids  -Continue Symbocort/ Albuterol MDI  -Spiriva while in house  -Oxygen support as needed to maintain O2 >88%    Leukocytosis  -Resolved  -2/2 to steroids.     DYSURIA  -Resolved  -UA/Urine cx - NG 4/21    Hx DVT/PE  -Unclear how long ago this was, pt not currently on therapeutic AC    HTN  -Continue metoprolol    Dementia  -A &O x 2 at baseline  -Continue to monitor, without agitation currently.    Pain:  -Tramadol PRN    DVT ppx  -Lovenox 60 mg BID    Advance care planning  Prior discussion with pt's Daughter BIBI: Explained pts poor prognosis if he was to decompensate and require CPR/intubation for mechanical ventilation. Pt will have <5% chance of survival. Pt's daughter will still like everything to be done. He is FULL CODE     DISPO: Back to [facility] once O2 requirement less than 4L NC 86 yo male hx DVT/PE, htn, mild dementia, COPD, pulmonary fibrosis sent  from [facility] by EMS due to shortness of breath and b/l pneumonia. Known positive COVID contacts at [facility]    Acute hypoxic respiratory failure 2/2 BL PNA  r/o Viral Pneumonia secondary to Novel Coronavirus Infection  - Reswab 4/22- covid positive  - d-dimer trending down.  - weaning down O2 supplement  - Completed hydroxychloroquine (completed 4/25) and steroids   - Acetaminophen 650 mg PO q4h PRN fever. Limit use of NSAIDs.  - HFA albuterol Q6 hour PRN via MDI. Would avoid nebulized preparations to limit risk of aerosol formation.  - DVT PPx: with therapeutic Lovenox in light of Ddimer 6600 and previous hx of VTE ( Improve score 5)  - Xarelto 10mg upon discharge  - Goals of Care discussion had with patient's daughter Bibi; FULL CODE     COPD  Pulmonary Fibrosis  -Completed 5 day course of steroids  -Continue Symbocort/ Albuterol MDI  -Spiriva while in house  -Oxygen support as needed to maintain O2 >88%    Leukocytosis  -Resolved  -2/2 to steroids.     DYSURIA  -Resolved  -UA/Urine cx - NG 4/21    Hx DVT/PE    HTN  -Continue metoprolol    Dementia  -A &O x 2 at baseline  -Continue to monitor, without agitation currently.    Pain:  -Tramadol PRN    DVT ppx  -Lovenox 60 mg BID    Advance care planning  Prior discussion with pt's Daughter BIBI: Explained pts poor prognosis if he was to decompensate and require CPR/intubation for mechanical ventilation. Pt will have <5% chance of survival. Pt's daughter will still like everything to be done. He is FULL CODE     DISPO: Back to [facility] once O2 requirement less than 4L NC

## 2020-04-27 NOTE — DIETITIAN INITIAL EVALUATION ADULT. - OTHER INFO
PT. w/ some dementia-unable to give hx. Eating fairly well as reported. Tolerates dysphagia 1 diet w/ nectar thicl liquids , ensure enlive bid, ensure pudding QD. Skin intact. Last Bm was 4/27. Weight hx. unknown. no reported N/V/ diarrhea. No edema noted

## 2020-04-27 NOTE — PROGRESS NOTE ADULT - SUBJECTIVE AND OBJECTIVE BOX
Patient is a 87y old  Male who presents with a chief complaint of Hypoxia (26 Apr 2020 09:40)      SUBJECTIVE:  Patient seen and examined at bedside. Currently saturating 92% on 6LNC.       ROS:  Unable to assess due to dementia      ALLERGIES:  No Known Allergies      MEDICATIONS:  MEDICATIONS  (STANDING):  aspirin enteric coated 81 milliGRAM(s) Oral daily  budesonide 160 MICROgram(s)/formoterol 4.5 MICROgram(s) Inhaler 2 Puff(s) Inhalation two times a day  enoxaparin Injectable 60 milliGRAM(s) SubCutaneous two times a day  famotidine    Tablet 20 milliGRAM(s) Oral daily  ferrous    sulfate 325 milliGRAM(s) Oral daily  folic acid 1 milliGRAM(s) Oral daily  metoprolol tartrate 12.5 milliGRAM(s) Oral two times a day  senna 2 Tablet(s) Oral at bedtime  thiamine 100 milliGRAM(s) Oral daily  tiotropium 18 MICROgram(s) Capsule 1 Capsule(s) Inhalation daily    MEDICATIONS  (PRN):  acetaminophen   Tablet .. 650 milliGRAM(s) Oral every 4 hours PRN Temp greater or equal to 38.5C (101.3F)  ALBUTerol    90 MICROgram(s) HFA Inhaler 2 Puff(s) Inhalation every 4 hours PRN Shortness of Breath and/or Wheezing  guaifenesin/dextromethorphan  Syrup 10 milliLiter(s) Oral every 4 hours PRN Cough  magnesium hydroxide Suspension 5 milliLiter(s) Oral daily PRN Constipation  melatonin 3 milliGRAM(s) Oral at bedtime PRN Insomnia  polyethylene glycol 3350 17 Gram(s) Oral daily PRN Constipation  traMADol 50 milliGRAM(s) Oral every 12 hours PRN Moderate Pain (4 - 6)      VITALS:  Vital Signs Last 24 Hrs  T(F): 98.1 (27 Apr 2020 06:32), Max: 98.1 (27 Apr 2020 06:32)  HR: 62 (27 Apr 2020 08:36) (60 - 64)  BP: 143/93 (27 Apr 2020 06:32) (134/63 - 144/65)  RR: 19 (27 Apr 2020 06:32) (19 - 22)  SpO2: 92% (27 Apr 2020 08:36) (91% - 92%)  I&O's Summary    26 Apr 2020 07:01  -  27 Apr 2020 07:00  --------------------------------------------------------  IN: 120 mL / OUT: 0 mL / NET: 120 mL          PHYSICAL EXAM:  General: NAD, Elderly male, pleasantly demented   ENT: MMM  Neck: Supple, No JVD  Lungs: Course BS bilaterally, Non labored breathing, Currently saturating 92% on 6LNC.   Cardio: RRR, S1/S2, No murmurs  Abdomen: Soft, Nontender, Nondistended; Bowel sounds present  Extremities: No calf tenderness, No pitting edema      LABS:                        13.9   8.92  )-----------( 244      ( 25 Apr 2020 07:21 )             41.8     04-25    142  |  107  |  30  ----------------------------<  98  4.3   |  26  |  1.04    Ca    8.2      25 Apr 2020 07:21      eGFR if Non African American: 65 mL/min/1.73M2 (04-25-20 @ 07:21)  eGFR if African American: 75 mL/min/1.73M2 (04-25-20 @ 07:21)  Culture - Urine (collected 21 Apr 2020 15:59)  Source: .Urine Clean Catch (Midstream)  Final Report (22 Apr 2020 17:03):    No growth    Culture - Blood (collected 21 Apr 2020 15:35)  Source: .Blood Blood-Peripheral  Final Report (26 Apr 2020 22:00):    No Growth Final    Culture - Blood (collected 21 Apr 2020 15:35)  Source: .Blood Blood-Peripheral  Final Report (26 Apr 2020 22:00):    No Growth Final        D-Dimer Assay, Quantitative: 988 ng/mL DDU (04-26-20 @ 08:40)  Ferritin, Serum: 760 ng/mL (04-26-20 @ 08:40)    COVID-19 PCR: Detected (04-22-20 @ 08:40)  COVID-19 PCR: NotDetec (04-21-20 @ 15:35)      RADIOLOGY & ADDITIONAL TESTS: No new images

## 2020-04-28 ENCOUNTER — TRANSCRIPTION ENCOUNTER (OUTPATIENT)
Age: 85
End: 2020-04-28

## 2020-04-28 LAB
ALBUMIN SERPL ELPH-MCNC: 2.3 G/DL — LOW (ref 3.3–5)
ALP SERPL-CCNC: 113 U/L — SIGNIFICANT CHANGE UP (ref 40–120)
ALT FLD-CCNC: 36 U/L — SIGNIFICANT CHANGE UP (ref 10–45)
ANION GAP SERPL CALC-SCNC: 10 MMOL/L — SIGNIFICANT CHANGE UP (ref 5–17)
AST SERPL-CCNC: 35 U/L — SIGNIFICANT CHANGE UP (ref 10–40)
BASOPHILS # BLD AUTO: 0.01 K/UL — SIGNIFICANT CHANGE UP (ref 0–0.2)
BASOPHILS NFR BLD AUTO: 0.1 % — SIGNIFICANT CHANGE UP (ref 0–2)
BILIRUB SERPL-MCNC: 1.6 MG/DL — HIGH (ref 0.2–1.2)
BUN SERPL-MCNC: 30 MG/DL — HIGH (ref 7–23)
CALCIUM SERPL-MCNC: 8.3 MG/DL — LOW (ref 8.4–10.5)
CHLORIDE SERPL-SCNC: 110 MMOL/L — HIGH (ref 96–108)
CO2 SERPL-SCNC: 27 MMOL/L — SIGNIFICANT CHANGE UP (ref 22–31)
CREAT SERPL-MCNC: 1.15 MG/DL — SIGNIFICANT CHANGE UP (ref 0.5–1.3)
CRP SERPL-MCNC: 12.71 MG/DL — HIGH (ref 0–0.4)
D DIMER BLD IA.RAPID-MCNC: 1634 NG/ML DDU — HIGH
EOSINOPHIL # BLD AUTO: 0.02 K/UL — SIGNIFICANT CHANGE UP (ref 0–0.5)
EOSINOPHIL NFR BLD AUTO: 0.2 % — SIGNIFICANT CHANGE UP (ref 0–6)
FERRITIN SERPL-MCNC: 951 NG/ML — HIGH (ref 30–400)
GLUCOSE SERPL-MCNC: 75 MG/DL — SIGNIFICANT CHANGE UP (ref 70–99)
HCT VFR BLD CALC: 45.6 % — SIGNIFICANT CHANGE UP (ref 39–50)
HGB BLD-MCNC: 14.6 G/DL — SIGNIFICANT CHANGE UP (ref 13–17)
IMM GRANULOCYTES NFR BLD AUTO: 0.6 % — SIGNIFICANT CHANGE UP (ref 0–1.5)
LDH SERPL L TO P-CCNC: 584 U/L — HIGH (ref 50–242)
LYMPHOCYTES # BLD AUTO: 0.57 K/UL — LOW (ref 1–3.3)
LYMPHOCYTES # BLD AUTO: 5.5 % — LOW (ref 13–44)
MCHC RBC-ENTMCNC: 28.3 PG — SIGNIFICANT CHANGE UP (ref 27–34)
MCHC RBC-ENTMCNC: 32 GM/DL — SIGNIFICANT CHANGE UP (ref 32–36)
MCV RBC AUTO: 88.5 FL — SIGNIFICANT CHANGE UP (ref 80–100)
MONOCYTES # BLD AUTO: 0.24 K/UL — SIGNIFICANT CHANGE UP (ref 0–0.9)
MONOCYTES NFR BLD AUTO: 2.3 % — SIGNIFICANT CHANGE UP (ref 2–14)
NEUTROPHILS # BLD AUTO: 9.38 K/UL — HIGH (ref 1.8–7.4)
NEUTROPHILS NFR BLD AUTO: 91.3 % — HIGH (ref 43–77)
NRBC # BLD: 0 /100 WBCS — SIGNIFICANT CHANGE UP (ref 0–0)
PLATELET # BLD AUTO: 291 K/UL — SIGNIFICANT CHANGE UP (ref 150–400)
POTASSIUM SERPL-MCNC: 4.1 MMOL/L — SIGNIFICANT CHANGE UP (ref 3.5–5.3)
POTASSIUM SERPL-SCNC: 4.1 MMOL/L — SIGNIFICANT CHANGE UP (ref 3.5–5.3)
PROT SERPL-MCNC: 7.3 G/DL — SIGNIFICANT CHANGE UP (ref 6–8.3)
RBC # BLD: 5.15 M/UL — SIGNIFICANT CHANGE UP (ref 4.2–5.8)
RBC # FLD: 14.1 % — SIGNIFICANT CHANGE UP (ref 10.3–14.5)
SODIUM SERPL-SCNC: 147 MMOL/L — HIGH (ref 135–145)
WBC # BLD: 10.28 K/UL — SIGNIFICANT CHANGE UP (ref 3.8–10.5)
WBC # FLD AUTO: 10.28 K/UL — SIGNIFICANT CHANGE UP (ref 3.8–10.5)

## 2020-04-28 RX ORDER — SODIUM CHLORIDE 9 MG/ML
1000 INJECTION, SOLUTION INTRAVENOUS
Refills: 0 | Status: DISCONTINUED | OUTPATIENT
Start: 2020-04-28 | End: 2020-04-29

## 2020-04-28 RX ADMIN — Medication 81 MILLIGRAM(S): at 18:24

## 2020-04-28 RX ADMIN — BUDESONIDE AND FORMOTEROL FUMARATE DIHYDRATE 2 PUFF(S): 160; 4.5 AEROSOL RESPIRATORY (INHALATION) at 21:28

## 2020-04-28 RX ADMIN — Medication 12.5 MILLIGRAM(S): at 18:25

## 2020-04-28 RX ADMIN — TRAMADOL HYDROCHLORIDE 50 MILLIGRAM(S): 50 TABLET ORAL at 18:25

## 2020-04-28 RX ADMIN — FAMOTIDINE 20 MILLIGRAM(S): 10 INJECTION INTRAVENOUS at 18:25

## 2020-04-28 RX ADMIN — ENOXAPARIN SODIUM 60 MILLIGRAM(S): 100 INJECTION SUBCUTANEOUS at 18:24

## 2020-04-28 RX ADMIN — ENOXAPARIN SODIUM 60 MILLIGRAM(S): 100 INJECTION SUBCUTANEOUS at 04:08

## 2020-04-28 RX ADMIN — BUDESONIDE AND FORMOTEROL FUMARATE DIHYDRATE 2 PUFF(S): 160; 4.5 AEROSOL RESPIRATORY (INHALATION) at 09:24

## 2020-04-28 RX ADMIN — Medication 12.5 MILLIGRAM(S): at 04:09

## 2020-04-28 NOTE — DISCHARGE NOTE PROVIDER - NSDCMRMEDTOKEN_GEN_ALL_CORE_FT
Aspirin Enteric Coated 81 mg oral delayed release tablet: 1 tab(s) orally once a day  doxycycline monohydrate 100 mg oral capsule: 1 cap(s) orally 2 times a day  DuoNeb 0.5 mg-2.5 mg/3 mL inhalation solution: 3 milliliter(s) inhaled 4 times a day, As Needed  ferrous sulfate 325 mg (65 mg elemental iron) oral tablet: orally once a day  ferrous sulfate 325 mg (65 mg elemental iron) oral tablet: 1 tab(s) orally once a day  folic acid 1 mg oral tablet: 1 tab(s) orally once a day  Milk of Magnesia 8% oral suspension: orally once a day, As Needed  MiraLax oral powder for reconstitution: orally once a day, As Needed  Pepcid 20 mg oral tablet: 1 tab(s) orally 2 times a day  Senna 8.6 mg oral tablet: 1 tab(s) orally once a day (at bedtime)  Symbicort 160 mcg-4.5 mcg/inh inhalation aerosol: 2 puff(s) inhaled 2 times a day  Symbicort 160 mcg-4.5 mcg/inh inhalation aerosol: 2 puff(s) inhaled 2 times a day  thiamine 100 mg oral tablet: 1 tab(s) orally once a day  traMADol 50 mg oral tablet: orally every 12 hours, As Needed  Tudorza Pressair 400 mcg/inh inhalation powder: inhaled every 12 hours acetaminophen 325 mg oral tablet: 2 tab(s) orally every 4 hours, As needed, Temp greater or equal to 38.5C (101.3F)  albuterol 90 mcg/inh inhalation aerosol: 2 puff(s) inhaled every 4 hours, As needed, Shortness of Breath and/or Wheezing  Aspirin Enteric Coated 81 mg oral delayed release tablet: 1 tab(s) orally once a day  ferrous sulfate 325 mg (65 mg elemental iron) oral tablet: orally once a day  ferrous sulfate 325 mg (65 mg elemental iron) oral tablet: 1 tab(s) orally once a day  folic acid 1 mg oral tablet: 1 tab(s) orally once a day  guaifenesin-dextromethorphan 100 mg-10 mg/5 mL oral liquid: 10 milliliter(s) orally every 4 hours, As needed, Cough  metoprolol: 12.5 milligram(s) orally 2 times a day  Milk of Magnesia 8% oral suspension: orally once a day, As Needed  MiraLax oral powder for reconstitution: orally once a day, As Needed  Pepcid 20 mg oral tablet: 1 tab(s) orally 2 times a day  Senna 8.6 mg oral tablet: 1 tab(s) orally once a day (at bedtime)  Symbicort 160 mcg-4.5 mcg/inh inhalation aerosol: 2 puff(s) inhaled 2 times a day  Symbicort 160 mcg-4.5 mcg/inh inhalation aerosol: 2 puff(s) inhaled 2 times a day  thiamine 100 mg oral tablet: 1 tab(s) orally once a day  tiotropium 18 mcg inhalation capsule: 1 cap(s) inhaled once a day  traMADol 50 mg oral tablet: orally every 12 hours, As Needed acetaminophen 325 mg oral tablet: 2 tab(s) orally every 4 hours, As needed, Temp greater or equal to 38.5C (101.3F)  albuterol 90 mcg/inh inhalation aerosol: 2 puff(s) inhaled every 4 hours, As needed, Shortness of Breath and/or Wheezing  Aspirin Enteric Coated 81 mg oral delayed release tablet: 1 tab(s) orally once a day  enoxaparin: 60 milligram(s) subcutaneous 2 times a day  ferrous sulfate 325 mg (65 mg elemental iron) oral tablet: 1 tab(s) orally once a day  ferrous sulfate 325 mg (65 mg elemental iron) oral tablet: orally once a day  folic acid 1 mg oral tablet: 1 tab(s) orally once a day  guaifenesin-dextromethorphan 100 mg-10 mg/5 mL oral liquid: 10 milliliter(s) orally every 4 hours, As needed, Cough  metoprolol: 12.5 milligram(s) orally 2 times a day  Milk of Magnesia 8% oral suspension: orally once a day, As Needed  MiraLax oral powder for reconstitution: orally once a day, As Needed  Pepcid 20 mg oral tablet: 1 tab(s) orally 2 times a day  Senna 8.6 mg oral tablet: 1 tab(s) orally once a day (at bedtime)  Symbicort 160 mcg-4.5 mcg/inh inhalation aerosol: 2 puff(s) inhaled 2 times a day  Symbicort 160 mcg-4.5 mcg/inh inhalation aerosol: 2 puff(s) inhaled 2 times a day  thiamine 100 mg oral tablet: 1 tab(s) orally once a day  tiotropium 18 mcg inhalation capsule: 1 cap(s) inhaled once a day  traMADol 50 mg oral tablet: orally every 12 hours, As Needed

## 2020-04-28 NOTE — DISCHARGE NOTE PROVIDER - HOSPITAL COURSE
87M with PMHx of DVT/PE, htn, mild dementia, COPD, pulmonary fibrosis who was sent from [facility] by EMS due to shortness of breath and b/l pneumonia. Pt was on zosyn and zpack over past week for bilateral PNA noted on CXR.  The patient's clinical status worsened and was more hypoxic with temp 100 ( O2-89%) and patient send to ED.         Patient was admitted to the floor for further management. Patient was found to be COVID negative on 4/21, but repeat was positive on 4/22. He was treated with hydroxychloroquine and completed course on 4/25. He was also treated with steroids and supportive care. His course was complicated by leukocytosis likely due to steroids that resolved at time of discharge. He also complained of dysuria. Urine culture was sent and negative. His course was also notable for hypernatremia that improved at time of discharge. Patient will follow up with primary care doctor upon discharge. For his chronic medical problems, including COPD, pulmonary fibrosis and dementia, patient will continue with medications and follow up with primary care doctor upon discharge.         Discharge instructions and plan reviewed with patient and family. At time of discharge, patient sating ___ on room air. Patient will follow up with primary care doctor upon discharge. Patient medically stable for discharge. Hospital Course    87M with PMHx of DVT/PE, htn, mild dementia, COPD, pulmonary fibrosis who was sent from [facility] by EMS due to shortness of breath and b/l pneumonia. Pt was on zosyn and zpack over past week for bilateral PNA noted on CXR.  The patient's clinical status worsened and was more hypoxic with temp 100 ( O2-89%) and patient send to ED.         Patient was admitted to the floor for further management. Patient was found to be COVID negative on 4/21, but repeat was positive on 4/22. He was treated with hydroxychloroquine and completed course on 4/25. He was also treated with steroids and supportive care. His course was complicated by leukocytosis likely due to steroids that resolved at time of discharge. He also complained of dysuria. Urine culture was sent and negative. His course was also notable for hypernatremia that improved at time of discharge. Patient will follow up with primary care doctor upon discharge. For his chronic medical problems, including COPD, pulmonary fibrosis and dementia, patient will continue with medications and follow up with primary care doctor upon discharge.     Discharge instructions and plan reviewed with patient and family. At time of discharge, patient sating 95% on 1-2 liters NC.  Patient will follow up with primary care doctor upon discharge. Patient medically stable for discharge.     follow up Covid test 5/4 negative     suction prn oral secretions         Source of Infection:  Covid     Antibiotic / Last Day: NA         Palliative Care / Advanced Care Planning  na    Code Status: full code     Patient/Family agreeable to Hospice/Palliative (N)    Summary of Goals of Care Conversation:        Discharging Provider:  Jocy Camarena    Contact Info: Cell [phone] - Please call with any questions or concerns.        Outpatient Provider: Dr VICTOR M Day notified         Signout given to  SNF Provider:

## 2020-04-28 NOTE — PROGRESS NOTE ADULT - ASSESSMENT
88 yo male hx DVT/PE, htn, mild dementia, COPD, pulmonary fibrosis sent  from [facility] by EMS due to shortness of breath and b/l pneumonia. Known positive COVID contacts at [facility]    Acute hypoxic respiratory failure 2/2 BL PNA  r/o Viral Pneumonia secondary to Novel Coronavirus Infection  - Reswab 4/22- covid positive  - d-dimer trending down.  - weaning down O2 supplement  - Completed hydroxychloroquine (completed 4/25) and steroids   - Acetaminophen 650 mg PO q4h PRN fever. Limit use of NSAIDs.  - HFA albuterol Q6 hour PRN via MDI. Would avoid nebulized preparations to limit risk of aerosol formation.  - DVT PPx: with therapeutic Lovenox in light of Ddimer 6600 and previous hx of VTE ( Improve score 5)  - Xarelto 10mg upon discharge  - Goals of Care discussion had with patient's daughter Bibi; FULL CODE     COPD  Pulmonary Fibrosis  -Completed 5 day course of steroids  -Continue Symbocort/ Albuterol MDI  -Spiriva while in house  -Oxygen support as needed to maintain O2 >88%    Leukocytosis  -Resolved  -2/2 to steroids.     DYSURIA  -Resolved  -UA/Urine cx - NG 4/21    Hx DVT/PE    HTN  -Continue metoprolol    Dementia  -A &O x 2 at baseline  -Continue to monitor, without agitation currently.    Pain:  -Tramadol PRN    DVT ppx  -Lovenox 60 mg BID    Advance care planning  Prior discussion with pt's Daughter BIBI: Explained pts poor prognosis if he was to decompensate and require CPR/intubation for mechanical ventilation. Pt will have <5% chance of survival. Pt's daughter will still like everything to be done. He is FULL CODE     DISPO: Back to [facility] once O2 requirement less than 4L NC 88 yo male hx DVT/PE, htn, mild dementia, COPD, pulmonary fibrosis sent  from [facility] by EMS due to shortness of breath and b/l pneumonia. Known positive COVID contacts at [facility]    Acute hypoxic respiratory failure 2/2 BL PNA  r/o Viral Pneumonia secondary to Novel Coronavirus Infection  - Reswab - covid positive  - d-dimer fluctuatin<-[id number]<-[phone]  - weaning down O2 supplement  - Completed hydroxychloroquine (completed ) and steroids   - Acetaminophen 650 mg PO q4h PRN fever. Limit use of NSAIDs. Gauifenesin PRN  - HFA albuterol Q6 hour PRN via MDI. Would avoid nebulized preparations to limit risk of aerosol formation.  - DVT PPx: with therapeutic Lovenox in light of Ddimer 6600 and previous hx of VTE ( Improve score 5)  - Xarelto 10mg upon discharge  - Goals of Care discussion had with patient's daughter Bibi; FULL CODE     COPD  Pulmonary Fibrosis  -Completed 5 day course of steroids  -Continue Symbocort/ Albuterol MDI  -Spiriva while in house  -Oxygen support as needed to maintain O2 >88%    Leukocytosis  -Resolved  -2/2 to steroids.     DYSURIA  -Resolved  -UA/Urine cx - NG     Hx DVT/PE:  -Continue with Lovenox    HTN  -Continue metoprolol    Dementia  -A &O x 2 at baseline  -Continue to monitor, without agitation currently.    Pain:  -Tramadol PRN    DVT ppx  -Lovenox 60 mg BID    Dysphagia:  -Continue modified diet: Pureed-[place]    Advance care planning  Prior discussion with pt's Daughter BIBI: Explained pts poor prognosis if he was to decompensate and require CPR/intubation for mechanical ventilation. Pt will have <5% chance of survival. Pt's daughter will still like everything to be done. He is FULL CODE     DISPO: Back to [facility] once O2 requirement less than 4L NC 88 yo male hx DVT/PE, htn, mild dementia, COPD, pulmonary fibrosis sent  from [facility] by EMS due to shortness of breath and b/l pneumonia. Known positive COVID contacts at [facility]    Acute hypoxic respiratory failure 2/2 BL PNA  r/o Viral Pneumonia secondary to Novel Coronavirus Infection  - Reswab - covid positive  - d-dimer fluctuatin<-[id number]<-[phone]  - weaning down O2 supplement  - Completed hydroxychloroquine (completed ) and steroids   - Acetaminophen 650 mg PO q4h PRN fever. Limit use of NSAIDs. Gauifenesin PRN  - HFA albuterol Q6 hour PRN via MDI. Would avoid nebulized preparations to limit risk of aerosol formation.  - DVT PPx: with therapeutic Lovenox in light of Ddimer 6600 and previous hx of VTE ( Improve score 5)  - Xarelto 10mg upon discharge  - Goals of Care discussion had with patient's daughter Bibi; FULL CODE     COPD  Pulmonary Fibrosis  -Completed 5 day course of steroids  -Continue Symbocort/ Albuterol MDI  -Spiriva while in house  -Oxygen support as needed to maintain O2 >88%    Leukocytosis  -Resolved  -2/2 to steroids.     DYSURIA  -Resolved  -UA/Urine cx - NG     Hx DVT/PE:  -Continue with Lovenox    HTN  -Continue metoprolol    Dementia  -A &O x 2 at baseline  -Continue to monitor, without agitation currently.    Hypernatremia: Na 147 <- 142   Pain:  -Tramadol PRN    DVT ppx  -Lovenox 60 mg BID    Dysphagia:  -Continue modified diet: Pureed-[place]    Advance care planning  Prior discussion with pt's Daughter BIBI: Explained pts poor prognosis if he was to decompensate and require CPR/intubation for mechanical ventilation. Pt will have <5% chance of survival. Pt's daughter will still like everything to be done. He is FULL CODE     DISPO: Back to [facility] once O2 requirement less than 4L NC 88 yo male hx DVT/PE, htn, mild dementia, COPD, pulmonary fibrosis sent  from [facility] by EMS due to shortness of breath and b/l pneumonia. Known positive COVID contacts at [facility]    Acute hypoxic respiratory failure 2/2 BL PNA  r/o Viral Pneumonia secondary to Novel Coronavirus Infection  - Reswab - covid positive  - d-dimer fluctuatin<-988<-[phone]  - weaning down O2 supplement  - Completed hydroxychloroquine (completed ) and steroids   - Acetaminophen 650 mg PO q4h PRN fever. Limit use of NSAIDs. Gauifenesin PRN  - HFA albuterol Q6 hour PRN via MDI. Would avoid nebulized preparations to limit risk of aerosol formation.  - DVT PPx: with therapeutic Lovenox in light of Ddimer 6600 and previous hx of VTE ( Improve score 5)  - Xarelto 10mg upon discharge  - Goals of Care discussion had with patient's daughter iBbi; FULL CODE     COPD  Pulmonary Fibrosis  -Completed 5 day course of steroids  -Continue Symbocort/ Albuterol MDI  -Spiriva while in house  -Oxygen support as needed to maintain O2 >88%    Leukocytosis  -Resolved  -2/2 to steroids.     DYSURIA  -Resolved  -UA/Urine cx - NG     Hx DVT/PE:  -Continue with Lovenox    HTN  -Continue metoprolol    Dementia  -A &O x 2 at baseline  -Continue to monitor, without agitation currently.    Hypernatremia: Na 147 <- 142 . Patient on modified diet.   -Encourage PO intake  -Monitor labs     Pain:  -Tramadol PRN    DVT ppx  -Lovenox 60 mg BID    Dysphagia:  -Continue modified diet: Pureed-[place]    Advance care planning  Prior discussion with pt's Daughter BIBI: Explained pts poor prognosis if he was to decompensate and require CPR/intubation for mechanical ventilation. Pt will have <5% chance of survival. Pt's daughter will still like everything to be done. He is FULL CODE     DISPO: Back to [facility] once O2 requirement less than 4L NC 88 yo male hx DVT/PE, htn, mild dementia, COPD, pulmonary fibrosis sent  from [facility] by EMS due to shortness of breath and b/l pneumonia. Known positive COVID contacts at [facility]    Acute hypoxic respiratory failure 2/2 BL PNA  r/o Viral Pneumonia secondary to Novel Coronavirus Infection  - Reswab - covid positive  - d-dimer fluctuatin<-988<-[phone]  - weaning down O2 supplement. currently saturating 93% on 6L  - encourage OOB to chair  - Completed hydroxychloroquine (completed ) and steroids   - Acetaminophen 650 mg PO q4h PRN fever. Limit use of NSAIDs. Gauifenesin PRN  - HFA albuterol Q6 hour PRN via MDI.   - DVT PPx: with therapeutic Lovenox in light of Ddimer 6600 and previous hx of VTE (Improve score 5)  - Xarelto 10mg upon discharge  - Goals of Care discussion had with patient's daughter Ella; FULL CODE     COPD  Pulmonary Fibrosis  -Completed 5 day course of steroids  -Continue Symbocort/ Albuterol MDI  -Spiriva while in house  -Oxygen support as needed to maintain O2 >88%    Leukocytosis  -Resolved  -2/2 to steroids.     DYSURIA  -Resolved  -UA/Urine cx - NG     Hx DVT/PE:  -Continue with therapeutic lovenox for elevated D-dimer and COVID    HTN  -Continue metoprolol    Dementia  -A &O x 2 at baseline  -Continue to monitor, without agitation currently.    Hypernatremia: Na 147 <- 142 . Patient on modified diet.   -Encourage PO intake  -Monitor labs     Pain:  -Tramadol PRN    DVT ppx  -Lovenox 60 mg BID    Dysphagia:  -Continue modified diet: Pureed-[place]    Advance care planning  Patient's daughter Ella insisting FULL CODE for patient  Back to [facility] once O2 requirement less than 4L NC 88 yo male hx DVT/PE, htn, mild dementia, COPD, pulmonary fibrosis sent  from [facility] by EMS due to shortness of breath and b/l pneumonia. Known positive COVID contacts at [facility]    Acute hypoxic respiratory failure 2/2 BL PNA  r/o Viral Pneumonia secondary to Novel Coronavirus Infection  - Reswab - covid positive  - d-dimer fluctuatin<-988<-1584  - weaning down O2 supplement. currently saturating 93% on 6L  - encourage OOB to chair  - Completed hydroxychloroquine (completed ) and steroids   - Acetaminophen 650 mg PO q4h PRN fever. Limit use of NSAIDs. Gauifenesin PRN  - HFA albuterol Q6 hour PRN via MDI.   - DVT PPx: with therapeutic Lovenox in light of Ddimer 6600 and previous hx of VTE (Improve score 5)  - Xarelto 10mg upon discharge  - Goals of Care discussion had with patient's daughter Ella; FULL CODE     COPD  Pulmonary Fibrosis  -Completed 5 day course of steroids  -Continue Symbocort/ Albuterol MDI  -Spiriva while in house  -Oxygen support as needed to maintain O2 >88%    Leukocytosis  -Resolved  -2/2 to steroids.     DYSURIA  -Resolved  -UA/Urine cx - NG     Hx DVT/PE:  -Continue with therapeutic lovenox for elevated D-dimer and COVID    HTN  -Continue metoprolol    Dementia  -A &O x 2 at baseline  -Continue to monitor, without agitation currently.    Hypernatremia: Na 147 <- 142 . Patient on modified diet.   -Encourage PO intake  -Monitor labs     Pain:  -Tramadol PRN    DVT ppx  -Lovenox 60 mg BID    Dysphagia:  -Continue modified diet: Pureed-[place]    Advance care planning  multiple GOC conversations held throughout hospital courss and pt's daughter would like FULL CODE  Back to [facility] once O2 requirement less than 4L NC

## 2020-04-28 NOTE — PROGRESS NOTE ADULT - SUBJECTIVE AND OBJECTIVE BOX
Patient is a 87y old  Male who presents with a chief complaint of Hypoxia (27 Apr 2020 10:17)      SUBJECTIVE:  Patient seen and examined at bedside. Currently saturating 93% on 6LNC     ROS:  Unable to assess due to dementia      ALLERGIES:  No Known Allergies      MEDICATIONS:  MEDICATIONS  (STANDING):  aspirin enteric coated 81 milliGRAM(s) Oral daily  budesonide 160 MICROgram(s)/formoterol 4.5 MICROgram(s) Inhaler 2 Puff(s) Inhalation two times a day  enoxaparin Injectable 60 milliGRAM(s) SubCutaneous two times a day  famotidine    Tablet 20 milliGRAM(s) Oral daily  ferrous    sulfate 325 milliGRAM(s) Oral daily  folic acid 1 milliGRAM(s) Oral daily  metoprolol tartrate 12.5 milliGRAM(s) Oral two times a day  senna 2 Tablet(s) Oral at bedtime  thiamine 100 milliGRAM(s) Oral daily  tiotropium 18 MICROgram(s) Capsule 1 Capsule(s) Inhalation daily    MEDICATIONS  (PRN):  acetaminophen   Tablet .. 650 milliGRAM(s) Oral every 4 hours PRN Temp greater or equal to 38.5C (101.3F)  ALBUTerol    90 MICROgram(s) HFA Inhaler 2 Puff(s) Inhalation every 4 hours PRN Shortness of Breath and/or Wheezing  guaifenesin/dextromethorphan  Syrup 10 milliLiter(s) Oral every 4 hours PRN Cough  magnesium hydroxide Suspension 5 milliLiter(s) Oral daily PRN Constipation  melatonin 3 milliGRAM(s) Oral at bedtime PRN Insomnia  polyethylene glycol 3350 17 Gram(s) Oral daily PRN Constipation  traMADol 50 milliGRAM(s) Oral every 12 hours PRN Moderate Pain (4 - 6)      VITALS:  Vital Signs Last 24 Hrs  T(F): 98.3 (28 Apr 2020 05:29), Max: 98.3 (28 Apr 2020 05:29)  HR: 63 (28 Apr 2020 09:24) (63 - 73)  BP: 133/83 (28 Apr 2020 05:29) (133/83 - 173/72)  RR: 19 (28 Apr 2020 05:29) (18 - 20)  SpO2: 93% (28 Apr 2020 09:24) (92% - 95%)  I&O's Summary    27 Apr 2020 07:01  -  28 Apr 2020 07:00  --------------------------------------------------------  IN: 460 mL / OUT: 0 mL / NET: 460 mL          PHYSICAL EXAM:  General: NAD, Elderly male, pleasantly demented   ENT: MMM  Neck: Supple, No JVD  Lungs: Course BS bilaterally, Non labored breathing, Currently saturating 93% on 6LNC.   Cardio: RRR, S1/S2, No murmurs  Abdomen: Soft, Nontender, Nondistended; Bowel sounds present  Extremities: No calf tenderness, No pitting edema    LABS:                        14.6   10.28 )-----------( 291      ( 28 Apr 2020 07:29 )             45.6     04-28    147  |  110  |  30  ----------------------------<  75  4.1   |  27  |  1.15    Ca    8.3      28 Apr 2020 07:29    TPro  7.3  /  Alb  2.3  /  TBili  1.6  /  DBili  x   /  AST  35  /  ALT  36  /  AlkPhos  113  04-28    eGFR if Non African American: 57 mL/min/1.73M2 (04-28-20 @ 07:29)  eGFR if African American: 66 mL/min/1.73M2 (04-28-20 @ 07:29)  Culture - Urine (collected 21 Apr 2020 15:59)  Source: .Urine Clean Catch (Midstream)  Final Report (22 Apr 2020 17:03):    No growth    Culture - Blood (collected 21 Apr 2020 15:35)  Source: .Blood Blood-Peripheral  Final Report (26 Apr 2020 22:00):    No Growth Final    Culture - Blood (collected 21 Apr 2020 15:35)  Source: .Blood Blood-Peripheral  Final Report (26 Apr 2020 22:00):    No Growth Final    D-Dimer Assay, Quantitative: 1634 ng/mL DDU (04-28-20 @ 07:29)    COVID-19 PCR: Detected (04-22-20 @ 08:40)  COVID-19 PCR: NotDetec (04-21-20 @ 15:35)      RADIOLOGY & ADDITIONAL TESTS: No new images Patient is a 87y old  Male who presents with a chief complaint of Hypoxia (27 Apr 2020 10:17)      SUBJECTIVE:  Patient seen and examined at bedside. Currently saturating 93% on 6LNC     ROS:  Unable to assess due to dementia      ALLERGIES:  No Known Allergies      MEDICATIONS:  MEDICATIONS  (STANDING):  aspirin enteric coated 81 milliGRAM(s) Oral daily  budesonide 160 MICROgram(s)/formoterol 4.5 MICROgram(s) Inhaler 2 Puff(s) Inhalation two times a day  enoxaparin Injectable 60 milliGRAM(s) SubCutaneous two times a day  famotidine    Tablet 20 milliGRAM(s) Oral daily  ferrous    sulfate 325 milliGRAM(s) Oral daily  folic acid 1 milliGRAM(s) Oral daily  metoprolol tartrate 12.5 milliGRAM(s) Oral two times a day  senna 2 Tablet(s) Oral at bedtime  thiamine 100 milliGRAM(s) Oral daily  tiotropium 18 MICROgram(s) Capsule 1 Capsule(s) Inhalation daily    MEDICATIONS  (PRN):  acetaminophen   Tablet .. 650 milliGRAM(s) Oral every 4 hours PRN Temp greater or equal to 38.5C (101.3F)  ALBUTerol    90 MICROgram(s) HFA Inhaler 2 Puff(s) Inhalation every 4 hours PRN Shortness of Breath and/or Wheezing  guaifenesin/dextromethorphan  Syrup 10 milliLiter(s) Oral every 4 hours PRN Cough  magnesium hydroxide Suspension 5 milliLiter(s) Oral daily PRN Constipation  melatonin 3 milliGRAM(s) Oral at bedtime PRN Insomnia  polyethylene glycol 3350 17 Gram(s) Oral daily PRN Constipation  traMADol 50 milliGRAM(s) Oral every 12 hours PRN Moderate Pain (4 - 6)      VITALS:  Vital Signs Last 24 Hrs  T(F): 98.3 (28 Apr 2020 05:29), Max: 98.3 (28 Apr 2020 05:29)  HR: 63 (28 Apr 2020 09:24) (63 - 73)  BP: 133/83 (28 Apr 2020 05:29) (133/83 - 173/72)  RR: 19 (28 Apr 2020 05:29) (18 - 20)  SpO2: 93% (28 Apr 2020 09:24) (92% - 95%)  I&O's Summary    27 Apr 2020 07:01  -  28 Apr 2020 07:00  --------------------------------------------------------  IN: 460 mL / OUT: 0 mL / NET: 460 mL          PHYSICAL EXAM:  General: NAD, Elderly male, pleasantly demented   ENT: MMM  Neck: Supple, No JVD  Lungs: Coarse BS bilaterally, Non labored breathing, Currently saturating 93% on 6LNC.   Cardio: RRR, S1/S2, No murmurs  Abdomen: Soft, Nontender, Nondistended; Bowel sounds present  Extremities: No calf tenderness, No pitting edema    LABS:                        14.6   10.28 )-----------( 291      ( 28 Apr 2020 07:29 )             45.6     04-28    147  |  110  |  30  ----------------------------<  75  4.1   |  27  |  1.15    Ca    8.3      28 Apr 2020 07:29    TPro  7.3  /  Alb  2.3  /  TBili  1.6  /  DBili  x   /  AST  35  /  ALT  36  /  AlkPhos  113  04-28    eGFR if Non African American: 57 mL/min/1.73M2 (04-28-20 @ 07:29)  eGFR if African American: 66 mL/min/1.73M2 (04-28-20 @ 07:29)  Culture - Urine (collected 21 Apr 2020 15:59)  Source: .Urine Clean Catch (Midstream)  Final Report (22 Apr 2020 17:03):    No growth    Culture - Blood (collected 21 Apr 2020 15:35)  Source: .Blood Blood-Peripheral  Final Report (26 Apr 2020 22:00):    No Growth Final    Culture - Blood (collected 21 Apr 2020 15:35)  Source: .Blood Blood-Peripheral  Final Report (26 Apr 2020 22:00):    No Growth Final    D-Dimer Assay, Quantitative: 1634 ng/mL DDU (04-28-20 @ 07:29)    COVID-19 PCR: Detected (04-22-20 @ 08:40)  COVID-19 PCR: NotDetec (04-21-20 @ 15:35)      RADIOLOGY & ADDITIONAL TESTS: No new images Patient is a 87y old  Male who presents with a chief complaint of Hypoxia (27 Apr 2020 10:17)      SUBJECTIVE:  Patient seen and examined at bedside. Currently saturating 93% on 6LNC     ROS:  Unable to assess due to dementia      ALLERGIES:  No Known Allergies      MEDICATIONS:  MEDICATIONS  (STANDING):  aspirin enteric coated 81 milliGRAM(s) Oral daily  budesonide 160 MICROgram(s)/formoterol 4.5 MICROgram(s) Inhaler 2 Puff(s) Inhalation two times a day  enoxaparin Injectable 60 milliGRAM(s) SubCutaneous two times a day  famotidine    Tablet 20 milliGRAM(s) Oral daily  ferrous    sulfate 325 milliGRAM(s) Oral daily  folic acid 1 milliGRAM(s) Oral daily  metoprolol tartrate 12.5 milliGRAM(s) Oral two times a day  senna 2 Tablet(s) Oral at bedtime  thiamine 100 milliGRAM(s) Oral daily  tiotropium 18 MICROgram(s) Capsule 1 Capsule(s) Inhalation daily    MEDICATIONS  (PRN):  acetaminophen   Tablet .. 650 milliGRAM(s) Oral every 4 hours PRN Temp greater or equal to 38.5C (101.3F)  ALBUTerol    90 MICROgram(s) HFA Inhaler 2 Puff(s) Inhalation every 4 hours PRN Shortness of Breath and/or Wheezing  guaifenesin/dextromethorphan  Syrup 10 milliLiter(s) Oral every 4 hours PRN Cough  magnesium hydroxide Suspension 5 milliLiter(s) Oral daily PRN Constipation  melatonin 3 milliGRAM(s) Oral at bedtime PRN Insomnia  polyethylene glycol 3350 17 Gram(s) Oral daily PRN Constipation  traMADol 50 milliGRAM(s) Oral every 12 hours PRN Moderate Pain (4 - 6)      VITALS:  Vital Signs Last 24 Hrs  T(F): 98.3 (28 Apr 2020 05:29), Max: 98.3 (28 Apr 2020 05:29)  HR: 63 (28 Apr 2020 09:24) (63 - 73)  BP: 133/83 (28 Apr 2020 05:29) (133/83 - 173/72)  RR: 19 (28 Apr 2020 05:29) (18 - 20)  SpO2: 93% (28 Apr 2020 09:24) (92% - 95%)  I&O's Summary    27 Apr 2020 07:01  -  28 Apr 2020 07:00  --------------------------------------------------------  IN: 460 mL / OUT: 0 mL / NET: 460 mL          PHYSICAL EXAM:  General: NAD, Elderly male, pleasantly demented. not answering questions.   ENT: MMM  Neck: Supple, No JVD  Lungs: Coarse BS bilaterally, Non labored breathing, Currently saturating 93% on 6LNC.   Cardio: RRR, S1/S2, No murmurs  Abdomen: Soft, Nontender, Nondistended; Bowel sounds present  Extremities: No calf tenderness, No pitting edema    LABS:                        14.6   10.28 )-----------( 291      ( 28 Apr 2020 07:29 )             45.6     04-28    147  |  110  |  30  ----------------------------<  75  4.1   |  27  |  1.15    Ca    8.3      28 Apr 2020 07:29    TPro  7.3  /  Alb  2.3  /  TBili  1.6  /  DBili  x   /  AST  35  /  ALT  36  /  AlkPhos  113  04-28    eGFR if Non African American: 57 mL/min/1.73M2 (04-28-20 @ 07:29)  eGFR if African American: 66 mL/min/1.73M2 (04-28-20 @ 07:29)  Culture - Urine (collected 21 Apr 2020 15:59)  Source: .Urine Clean Catch (Midstream)  Final Report (22 Apr 2020 17:03):    No growth    Culture - Blood (collected 21 Apr 2020 15:35)  Source: .Blood Blood-Peripheral  Final Report (26 Apr 2020 22:00):    No Growth Final    Culture - Blood (collected 21 Apr 2020 15:35)  Source: .Blood Blood-Peripheral  Final Report (26 Apr 2020 22:00):    No Growth Final    D-Dimer Assay, Quantitative: 1634 ng/mL DDU (04-28-20 @ 07:29)    COVID-19 PCR: Detected (04-22-20 @ 08:40)  COVID-19 PCR: NotDetec (04-21-20 @ 15:35)      RADIOLOGY & ADDITIONAL TESTS: No new images

## 2020-04-28 NOTE — DISCHARGE NOTE PROVIDER - INSTRUCTIONS
Pureed diet with Nectar Liquids  Ensure Clear Cans 2 servings a day  Ensure Pudding Cans 1 serving a day

## 2020-04-28 NOTE — DISCHARGE NOTE PROVIDER - CARE PROVIDER_API CALL
Natanael Day)  Medicine  [address], [place] [address]  Phone: [phone]  Fax: [phone]  Follow Up Time:

## 2020-04-29 LAB
ANION GAP SERPL CALC-SCNC: 8 MMOL/L — SIGNIFICANT CHANGE UP (ref 5–17)
BASOPHILS # BLD AUTO: 0.02 K/UL — SIGNIFICANT CHANGE UP (ref 0–0.2)
BASOPHILS NFR BLD AUTO: 0.2 % — SIGNIFICANT CHANGE UP (ref 0–2)
BUN SERPL-MCNC: 26 MG/DL — HIGH (ref 7–23)
CALCIUM SERPL-MCNC: 8.2 MG/DL — LOW (ref 8.4–10.5)
CHLORIDE SERPL-SCNC: 109 MMOL/L — HIGH (ref 96–108)
CO2 SERPL-SCNC: 25 MMOL/L — SIGNIFICANT CHANGE UP (ref 22–31)
CREAT SERPL-MCNC: 1.13 MG/DL — SIGNIFICANT CHANGE UP (ref 0.5–1.3)
CRP SERPL-MCNC: 10.93 MG/DL — HIGH (ref 0–0.4)
D DIMER BLD IA.RAPID-MCNC: 950 NG/ML DDU — HIGH
EOSINOPHIL # BLD AUTO: 0.11 K/UL — SIGNIFICANT CHANGE UP (ref 0–0.5)
EOSINOPHIL NFR BLD AUTO: 1.1 % — SIGNIFICANT CHANGE UP (ref 0–6)
FERRITIN SERPL-MCNC: 894 NG/ML — HIGH (ref 30–400)
GLUCOSE SERPL-MCNC: 79 MG/DL — SIGNIFICANT CHANGE UP (ref 70–99)
HCT VFR BLD CALC: 45.5 % — SIGNIFICANT CHANGE UP (ref 39–50)
HGB BLD-MCNC: 14.5 G/DL — SIGNIFICANT CHANGE UP (ref 13–17)
IMM GRANULOCYTES NFR BLD AUTO: 0.5 % — SIGNIFICANT CHANGE UP (ref 0–1.5)
LDH SERPL L TO P-CCNC: 551 U/L — HIGH (ref 50–242)
LYMPHOCYTES # BLD AUTO: 0.75 K/UL — LOW (ref 1–3.3)
LYMPHOCYTES # BLD AUTO: 7.2 % — LOW (ref 13–44)
MAGNESIUM SERPL-MCNC: 2.2 MG/DL — SIGNIFICANT CHANGE UP (ref 1.6–2.6)
MCHC RBC-ENTMCNC: 28.4 PG — SIGNIFICANT CHANGE UP (ref 27–34)
MCHC RBC-ENTMCNC: 31.9 GM/DL — LOW (ref 32–36)
MCV RBC AUTO: 89 FL — SIGNIFICANT CHANGE UP (ref 80–100)
MONOCYTES # BLD AUTO: 0.29 K/UL — SIGNIFICANT CHANGE UP (ref 0–0.9)
MONOCYTES NFR BLD AUTO: 2.8 % — SIGNIFICANT CHANGE UP (ref 2–14)
NEUTROPHILS # BLD AUTO: 9.19 K/UL — HIGH (ref 1.8–7.4)
NEUTROPHILS NFR BLD AUTO: 88.2 % — HIGH (ref 43–77)
NRBC # BLD: 0 /100 WBCS — SIGNIFICANT CHANGE UP (ref 0–0)
NT-PROBNP SERPL-SCNC: 588 PG/ML — HIGH (ref 0–300)
PLATELET # BLD AUTO: 202 K/UL — SIGNIFICANT CHANGE UP (ref 150–400)
POTASSIUM SERPL-MCNC: 4 MMOL/L — SIGNIFICANT CHANGE UP (ref 3.5–5.3)
POTASSIUM SERPL-SCNC: 4 MMOL/L — SIGNIFICANT CHANGE UP (ref 3.5–5.3)
RBC # BLD: 5.11 M/UL — SIGNIFICANT CHANGE UP (ref 4.2–5.8)
RBC # FLD: 14 % — SIGNIFICANT CHANGE UP (ref 10.3–14.5)
SODIUM SERPL-SCNC: 142 MMOL/L — SIGNIFICANT CHANGE UP (ref 135–145)
WBC # BLD: 10.41 K/UL — SIGNIFICANT CHANGE UP (ref 3.8–10.5)
WBC # FLD AUTO: 10.41 K/UL — SIGNIFICANT CHANGE UP (ref 3.8–10.5)

## 2020-04-29 PROCEDURE — 71045 X-RAY EXAM CHEST 1 VIEW: CPT | Mod: 26,CS

## 2020-04-29 RX ADMIN — Medication 12.5 MILLIGRAM(S): at 04:48

## 2020-04-29 RX ADMIN — BUDESONIDE AND FORMOTEROL FUMARATE DIHYDRATE 2 PUFF(S): 160; 4.5 AEROSOL RESPIRATORY (INHALATION) at 10:01

## 2020-04-29 RX ADMIN — ENOXAPARIN SODIUM 60 MILLIGRAM(S): 100 INJECTION SUBCUTANEOUS at 18:10

## 2020-04-29 RX ADMIN — FAMOTIDINE 20 MILLIGRAM(S): 10 INJECTION INTRAVENOUS at 13:29

## 2020-04-29 RX ADMIN — BUDESONIDE AND FORMOTEROL FUMARATE DIHYDRATE 2 PUFF(S): 160; 4.5 AEROSOL RESPIRATORY (INHALATION) at 21:56

## 2020-04-29 RX ADMIN — Medication 1 MILLIGRAM(S): at 13:29

## 2020-04-29 RX ADMIN — Medication 100 MILLIGRAM(S): at 13:29

## 2020-04-29 RX ADMIN — ENOXAPARIN SODIUM 60 MILLIGRAM(S): 100 INJECTION SUBCUTANEOUS at 04:47

## 2020-04-29 RX ADMIN — Medication 12.5 MILLIGRAM(S): at 18:11

## 2020-04-29 RX ADMIN — Medication 81 MILLIGRAM(S): at 13:29

## 2020-04-29 RX ADMIN — Medication 325 MILLIGRAM(S): at 13:29

## 2020-04-29 NOTE — PROGRESS NOTE ADULT - ASSESSMENT
88 yo male hx DVT/PE, htn, mild dementia, COPD, pulmonary fibrosis sent  from [facility] by EMS due to shortness of breath and b/l pneumonia. Known positive COVID contacts at [facility]    Acute hypoxic respiratory failure 2/2 BL PNA found to be COVID positive: Worsening hypoxia requiring NRB   - Reswab - covid positive  - d-dimer fluctuatin<-[phone]  - continue with supplemental oxygen and wean as tolerated  - prone as tolerated  - check STAT CXR  given increasing oxygen requirements  - monitor inflammatory markers  - Completed hydroxychloroquine (completed ) and steroids   - Acetaminophen 650 mg PO q4h PRN fever. Limit use of NSAIDs. Gauifenesin PRN  - HFA albuterol Q6 hour PRN via MDI.   - DVT PPx: with therapeutic Lovenox in light of Ddimer 6600 and previous hx of VTE (Improve score 5). Xarelto 10mg upon discharge  - Goals of Care discussion had with patient's daughter Ella; FULL CODE     COPD  Pulmonary Fibrosis  -Completed 5 day course of steroids  -Continue Symbocort/ Albuterol MDI  -Spiriva while in house  -Oxygen support as needed to maintain O2 >88%    Leukocytosis  -Resolved  - to steroids.     DYSURIA  -Resolved  -UA/Urine cx - NG     Hx DVT/PE:  -Continue with therapeutic lovenox 60mg BID for elevated D-dimer and COVID    HTN  -Continue metoprolol    Dementia  -A &O x 2 at baseline  -Continue to monitor, without agitation currently.    Hypernatremia: Resolved.  -Encourage PO intake  -Monitor labs  -Stop IVF      Pain:  -Tramadol PRN    DVT ppx  -Lovenox 60 mg BID    Dysphagia:  -Continue modified diet: Pureed-[place]    Advance care planning  multiple GOC conversations held throughout hospital course and pt's daughter would like FULL CODE  Back to [facility] once O2 requirement less than 4L NC 86 yo male hx DVT/PE, htn, mild dementia, COPD, pulmonary fibrosis sent  from [facility] by EMS due to shortness of breath and b/l pneumonia. Known positive COVID contacts at [facility]    Acute hypoxic respiratory failure 2/2 BL PNA found to be COVID positive: Worsening hypoxia requiring NRB   - Reswab - covid positive  - d-dimer [phone]  - will consider tocolizumab  - continue with supplemental oxygen and wean as tolerated  - prone as tolerated  - Stat CXR : stable left infiltrates, increased right infiltrates  - monitor inflammatory markers  - Completed hydroxychloroquine (completed ) and steroids   - Acetaminophen 650 mg PO q4h PRN fever. Limit use of NSAIDs. Gauifenesin PRN  - HFA albuterol Q6 hour PRN via MDI.   - DVT PPx: with therapeutic Lovenox in light of Ddimer 6600 and previous hx of VTE (Improve score 5). Xarelto 10mg upon discharge  - Dr. Looney had goals of Care discussion with patient's daughter Ella today; remains FULL CODE     COPD  Pulmonary Fibrosis  -Completed 5 day course of steroids  -Continue Symbocort/ Albuterol MDI  -Spiriva while in house  -Oxygen support as needed to maintain O2 >88%    Leukocytosis  -Resolved  -2/2 to steroids.     DYSURIA  -Resolved  -UA/Urine cx - NG     Hx DVT/PE:  -Continue with therapeutic lovenox 60mg BID for elevated D-dimer and COVID    HTN  -Continue metoprolol    Dementia  -A &O x 2 at baseline  -Continue to monitor, without agitation currently.    Hypernatremia: Resolved.  -Encourage PO intake  -Monitor labs  -Stop IVF      Pain:  -Tramadol PRN    DVT ppx  -Lovenox 60 mg BID    Dysphagia:  -Continue modified diet: Pureed-[place]    Advance care planning  multiple GOC conversations held throughout hospital course and pt's daughter would like FULL CODE    DISPO:  Back to [facility] once O2 requirement less than 4L NC

## 2020-04-29 NOTE — CHART NOTE - NSCHARTNOTEFT_GEN_A_CORE
spoke to patient's daughter Ella to discuss updates, including oxygenation requirements, and confirm code status.  Family reiterates that they would like patient to be full code, and understand risks given underlying lung disease and dementia.  They request patient be able to eat foods he likes (grilled cheese, hamburger, mac and cheese).  Patient is currently on pureed diet.   Discussed with family that we will request speech and swallow reevaluate, however patient is high risk for aspiration. Family reports frequent coughing at home after eating however they are unsure if due to aspiration vs copd.

## 2020-04-29 NOTE — PROGRESS NOTE ADULT - SUBJECTIVE AND OBJECTIVE BOX
Patient is a 87y old  Male who presents with a chief complaint of Hypoxia (28 Apr 2020 13:21)      SUBJECTIVE:  Patient seen and examined at bedside. Overnight, patient noted to have wide complex tachycardia on tele. Oxygenation worsening overnight and patient sating 96% on 13LNC. He was advanced to 15LNRB as patient continues to report SOB.       See HPI for pertinent ROS.    All other ROS reviewed and negative except as otherwise stated above.      ALLERGIES:  No Known Allergies      MEDICATIONS:  MEDICATIONS  (STANDING):  aspirin enteric coated 81 milliGRAM(s) Oral daily  budesonide 160 MICROgram(s)/formoterol 4.5 MICROgram(s) Inhaler 2 Puff(s) Inhalation two times a day  enoxaparin Injectable 60 milliGRAM(s) SubCutaneous two times a day  famotidine    Tablet 20 milliGRAM(s) Oral daily  ferrous    sulfate 325 milliGRAM(s) Oral daily  folic acid 1 milliGRAM(s) Oral daily  metoprolol tartrate 12.5 milliGRAM(s) Oral two times a day  senna 2 Tablet(s) Oral at bedtime  thiamine 100 milliGRAM(s) Oral daily  tiotropium 18 MICROgram(s) Capsule 1 Capsule(s) Inhalation daily    MEDICATIONS  (PRN):  acetaminophen   Tablet .. 650 milliGRAM(s) Oral every 4 hours PRN Temp greater or equal to 38.5C (101.3F)  ALBUTerol    90 MICROgram(s) HFA Inhaler 2 Puff(s) Inhalation every 4 hours PRN Shortness of Breath and/or Wheezing  guaifenesin/dextromethorphan  Syrup 10 milliLiter(s) Oral every 4 hours PRN Cough  magnesium hydroxide Suspension 5 milliLiter(s) Oral daily PRN Constipation  melatonin 3 milliGRAM(s) Oral at bedtime PRN Insomnia  polyethylene glycol 3350 17 Gram(s) Oral daily PRN Constipation  traMADol 50 milliGRAM(s) Oral every 12 hours PRN Moderate Pain (4 - 6)      VITALS:  Vital Signs Last 24 Hrs  T(F): 98 (29 Apr 2020 04:45), Max: 98.2 (28 Apr 2020 16:00)  HR: 79 (29 Apr 2020 09:58) (58 - 79)  BP: 152/64 (29 Apr 2020 04:45) (131/73 - 152/64)  RR: 18 (29 Apr 2020 04:45) (18 - 20)  SpO2: 96% (29 Apr 2020 10:08) (86% - 96%)  I&O's Summary    29 Apr 2020 07:01  -  29 Apr 2020 11:41  --------------------------------------------------------  IN: 240 mL / OUT: 0 mL / NET: 240 mL          PHYSICAL EXAM:  General: NAD, Elderly male, pleasantly demented. not answering questions.   ENT: MMM  Neck: Supple, No JVD  Lungs: Coarse BS bilaterally, Non labored breathing, Currently saturating 96% on 13LNC and subsequently placed on NRB   Cardio: RRR, S1/S2, No murmurs  Abdomen: Soft, Nontender, Nondistended; Bowel sounds present  Extremities: No calf tenderness, No pitting edema      LABS:                        14.5   10.41 )-----------( 202      ( 29 Apr 2020 07:03 )             45.5     04-29    142  |  109  |  26  ----------------------------<  79  4.0   |  25  |  1.13    Ca    8.2      29 Apr 2020 07:03  Mg     2.2     04-29    TPro  7.3  /  Alb  2.3  /  TBili  1.6  /  DBili  x   /  AST  35  /  ALT  36  /  AlkPhos  113  04-28    eGFR if Non African American: 58 mL/min/1.73M2 (04-29-20 @ 07:03)  eGFR if African American: 67 mL/min/1.73M2 (04-29-20 @ 07:03)    Ferritin, Serum: 951 ng/mL (04-28-20 @ 07:29)  D-Dimer Assay, Quantitative: 1634 ng/mL DDU (04-28-20 @ 07:29)    COVID-19 PCR: Detected (04-22-20 @ 08:40)  COVID-19 PCR: NotDetec (04-21-20 @ 15:35)    RADIOLOGY & ADDITIONAL TESTS: No new images Patient is a 87y old  Male who presents with a chief complaint of Hypoxia (28 Apr 2020 13:21)      SUBJECTIVE:  Patient seen and examined at bedside. Overnight, patient noted to have wide complex tachycardia on tele. Oxygenation worsening overnight and patient sating 96% on 12L NC. He was advanced to 15LNRB as patient continues to report SOB.       See HPI for pertinent ROS  All other ROS reviewed and negative except as otherwise stated above.      ALLERGIES:  No Known Allergies      MEDICATIONS:  MEDICATIONS  (STANDING):  aspirin enteric coated 81 milliGRAM(s) Oral daily  budesonide 160 MICROgram(s)/formoterol 4.5 MICROgram(s) Inhaler 2 Puff(s) Inhalation two times a day  enoxaparin Injectable 60 milliGRAM(s) SubCutaneous two times a day  famotidine    Tablet 20 milliGRAM(s) Oral daily  ferrous    sulfate 325 milliGRAM(s) Oral daily  folic acid 1 milliGRAM(s) Oral daily  metoprolol tartrate 12.5 milliGRAM(s) Oral two times a day  senna 2 Tablet(s) Oral at bedtime  thiamine 100 milliGRAM(s) Oral daily  tiotropium 18 MICROgram(s) Capsule 1 Capsule(s) Inhalation daily    MEDICATIONS  (PRN):  acetaminophen   Tablet .. 650 milliGRAM(s) Oral every 4 hours PRN Temp greater or equal to 38.5C (101.3F)  ALBUTerol    90 MICROgram(s) HFA Inhaler 2 Puff(s) Inhalation every 4 hours PRN Shortness of Breath and/or Wheezing  guaifenesin/dextromethorphan  Syrup 10 milliLiter(s) Oral every 4 hours PRN Cough  magnesium hydroxide Suspension 5 milliLiter(s) Oral daily PRN Constipation  melatonin 3 milliGRAM(s) Oral at bedtime PRN Insomnia  polyethylene glycol 3350 17 Gram(s) Oral daily PRN Constipation  traMADol 50 milliGRAM(s) Oral every 12 hours PRN Moderate Pain (4 - 6)      VITALS:  Vital Signs Last 24 Hrs  T(F): 98 (29 Apr 2020 04:45), Max: 98.2 (28 Apr 2020 16:00)  HR: 79 (29 Apr 2020 09:58) (58 - 79)  BP: 152/64 (29 Apr 2020 04:45) (131/73 - 152/64)  RR: 18 (29 Apr 2020 04:45) (18 - 20)  SpO2: 96% (29 Apr 2020 10:08) (86% - 96%)  I&O's Summary    29 Apr 2020 07:01  -  29 Apr 2020 11:41  --------------------------------------------------------  IN: 240 mL / OUT: 0 mL / NET: 240 mL          PHYSICAL EXAM:  General: NAD, Elderly male, pleasantly demented. not answering questions.   ENT: MMM  Neck: Supple, No JVD  Lungs: Coarse BS bilaterally, Non labored breathing, Currently saturating 96% on 13LNC and subsequently placed on NRB   Cardio: RRR, S1/S2, No murmurs  Abdomen: Soft, Nontender, Nondistended; Bowel sounds present  Extremities: No calf tenderness, No pitting edema      LABS:                        14.5   10.41 )-----------( 202      ( 29 Apr 2020 07:03 )             45.5     04-29    142  |  109  |  26  ----------------------------<  79  4.0   |  25  |  1.13    Ca    8.2      29 Apr 2020 07:03  Mg     2.2     04-29    TPro  7.3  /  Alb  2.3  /  TBili  1.6  /  DBili  x   /  AST  35  /  ALT  36  /  AlkPhos  113  04-28    eGFR if Non African American: 58 mL/min/1.73M2 (04-29-20 @ 07:03)  eGFR if African American: 67 mL/min/1.73M2 (04-29-20 @ 07:03)    Ferritin, Serum: 951 ng/mL (04-28-20 @ 07:29)  D-Dimer Assay, Quantitative: 1634 ng/mL DDU (04-28-20 @ 07:29)    COVID-19 PCR: Detected (04-22-20 @ 08:40)  COVID-19 PCR: NotDetec (04-21-20 @ 15:35)    RADIOLOGY & ADDITIONAL TESTS: No new images

## 2020-04-30 LAB
ALBUMIN SERPL ELPH-MCNC: 1.9 G/DL — LOW (ref 3.3–5)
ALP SERPL-CCNC: 100 U/L — SIGNIFICANT CHANGE UP (ref 40–120)
ALT FLD-CCNC: 28 U/L — SIGNIFICANT CHANGE UP (ref 10–45)
ANION GAP SERPL CALC-SCNC: 7 MMOL/L — SIGNIFICANT CHANGE UP (ref 5–17)
AST SERPL-CCNC: 27 U/L — SIGNIFICANT CHANGE UP (ref 10–40)
BASOPHILS # BLD AUTO: 0.01 K/UL — SIGNIFICANT CHANGE UP (ref 0–0.2)
BASOPHILS NFR BLD AUTO: 0.1 % — SIGNIFICANT CHANGE UP (ref 0–2)
BILIRUB SERPL-MCNC: 1.2 MG/DL — SIGNIFICANT CHANGE UP (ref 0.2–1.2)
BUN SERPL-MCNC: 27 MG/DL — HIGH (ref 7–23)
CALCIUM SERPL-MCNC: 7.9 MG/DL — LOW (ref 8.4–10.5)
CHLORIDE SERPL-SCNC: 107 MMOL/L — SIGNIFICANT CHANGE UP (ref 96–108)
CO2 SERPL-SCNC: 28 MMOL/L — SIGNIFICANT CHANGE UP (ref 22–31)
CREAT SERPL-MCNC: 1 MG/DL — SIGNIFICANT CHANGE UP (ref 0.5–1.3)
CRP SERPL-MCNC: 11.06 MG/DL — HIGH (ref 0–0.4)
D DIMER BLD IA.RAPID-MCNC: 986 NG/ML DDU — HIGH
EOSINOPHIL # BLD AUTO: 0.1 K/UL — SIGNIFICANT CHANGE UP (ref 0–0.5)
EOSINOPHIL NFR BLD AUTO: 1.1 % — SIGNIFICANT CHANGE UP (ref 0–6)
FERRITIN SERPL-MCNC: 820 NG/ML — HIGH (ref 30–400)
GLUCOSE SERPL-MCNC: 80 MG/DL — SIGNIFICANT CHANGE UP (ref 70–99)
HCT VFR BLD CALC: 37.8 % — LOW (ref 39–50)
HGB BLD-MCNC: 12.4 G/DL — LOW (ref 13–17)
IMM GRANULOCYTES NFR BLD AUTO: 0.6 % — SIGNIFICANT CHANGE UP (ref 0–1.5)
LDH SERPL L TO P-CCNC: 584 U/L — HIGH (ref 50–242)
LYMPHOCYTES # BLD AUTO: 0.83 K/UL — LOW (ref 1–3.3)
LYMPHOCYTES # BLD AUTO: 9 % — LOW (ref 13–44)
MCHC RBC-ENTMCNC: 29 PG — SIGNIFICANT CHANGE UP (ref 27–34)
MCHC RBC-ENTMCNC: 32.8 GM/DL — SIGNIFICANT CHANGE UP (ref 32–36)
MCV RBC AUTO: 88.3 FL — SIGNIFICANT CHANGE UP (ref 80–100)
MONOCYTES # BLD AUTO: 0.37 K/UL — SIGNIFICANT CHANGE UP (ref 0–0.9)
MONOCYTES NFR BLD AUTO: 4 % — SIGNIFICANT CHANGE UP (ref 2–14)
NEUTROPHILS # BLD AUTO: 7.9 K/UL — HIGH (ref 1.8–7.4)
NEUTROPHILS NFR BLD AUTO: 85.2 % — HIGH (ref 43–77)
NRBC # BLD: 0 /100 WBCS — SIGNIFICANT CHANGE UP (ref 0–0)
PLATELET # BLD AUTO: 271 K/UL — SIGNIFICANT CHANGE UP (ref 150–400)
POTASSIUM SERPL-MCNC: 3.6 MMOL/L — SIGNIFICANT CHANGE UP (ref 3.5–5.3)
POTASSIUM SERPL-SCNC: 3.6 MMOL/L — SIGNIFICANT CHANGE UP (ref 3.5–5.3)
PROCALCITONIN SERPL-MCNC: 0.1 NG/ML — HIGH
PROT SERPL-MCNC: 6.2 G/DL — SIGNIFICANT CHANGE UP (ref 6–8.3)
RBC # BLD: 4.28 M/UL — SIGNIFICANT CHANGE UP (ref 4.2–5.8)
RBC # FLD: 13.9 % — SIGNIFICANT CHANGE UP (ref 10.3–14.5)
SODIUM SERPL-SCNC: 142 MMOL/L — SIGNIFICANT CHANGE UP (ref 135–145)
WBC # BLD: 9.27 K/UL — SIGNIFICANT CHANGE UP (ref 3.8–10.5)
WBC # FLD AUTO: 9.27 K/UL — SIGNIFICANT CHANGE UP (ref 3.8–10.5)

## 2020-04-30 RX ADMIN — ENOXAPARIN SODIUM 60 MILLIGRAM(S): 100 INJECTION SUBCUTANEOUS at 05:52

## 2020-04-30 RX ADMIN — Medication 325 MILLIGRAM(S): at 14:59

## 2020-04-30 RX ADMIN — ENOXAPARIN SODIUM 60 MILLIGRAM(S): 100 INJECTION SUBCUTANEOUS at 18:18

## 2020-04-30 RX ADMIN — Medication 100 MILLIGRAM(S): at 15:01

## 2020-04-30 RX ADMIN — Medication 81 MILLIGRAM(S): at 15:00

## 2020-04-30 RX ADMIN — Medication 12.5 MILLIGRAM(S): at 05:52

## 2020-04-30 RX ADMIN — Medication 1 MILLIGRAM(S): at 15:00

## 2020-04-30 RX ADMIN — Medication 12.5 MILLIGRAM(S): at 18:18

## 2020-04-30 RX ADMIN — FAMOTIDINE 20 MILLIGRAM(S): 10 INJECTION INTRAVENOUS at 15:00

## 2020-04-30 RX ADMIN — TRAMADOL HYDROCHLORIDE 50 MILLIGRAM(S): 50 TABLET ORAL at 18:18

## 2020-04-30 RX ADMIN — SENNA PLUS 2 TABLET(S): 8.6 TABLET ORAL at 05:52

## 2020-04-30 RX ADMIN — BUDESONIDE AND FORMOTEROL FUMARATE DIHYDRATE 2 PUFF(S): 160; 4.5 AEROSOL RESPIRATORY (INHALATION) at 08:51

## 2020-04-30 NOTE — PROGRESS NOTE ADULT - SUBJECTIVE AND OBJECTIVE BOX
Patient is a 87y old  Male who presents with a chief complaint of Hypoxia (30 Apr 2020 07:48)      SUBJECTIVE:  Patient seen and examined at bedside. Currently saturating 94% on 8LNC (down from 10LNC)      ROS: Limited due to cognition      ALLERGIES:  No Known Allergies      MEDICATIONS:  MEDICATIONS  (STANDING):  aspirin enteric coated 81 milliGRAM(s) Oral daily  budesonide 160 MICROgram(s)/formoterol 4.5 MICROgram(s) Inhaler 2 Puff(s) Inhalation two times a day  enoxaparin Injectable 60 milliGRAM(s) SubCutaneous two times a day  famotidine    Tablet 20 milliGRAM(s) Oral daily  ferrous    sulfate 325 milliGRAM(s) Oral daily  folic acid 1 milliGRAM(s) Oral daily  metoprolol tartrate 12.5 milliGRAM(s) Oral two times a day  senna 2 Tablet(s) Oral at bedtime  thiamine 100 milliGRAM(s) Oral daily  tiotropium 18 MICROgram(s) Capsule 1 Capsule(s) Inhalation daily    MEDICATIONS  (PRN):  acetaminophen   Tablet .. 650 milliGRAM(s) Oral every 4 hours PRN Temp greater or equal to 38.5C (101.3F)  ALBUTerol    90 MICROgram(s) HFA Inhaler 2 Puff(s) Inhalation every 4 hours PRN Shortness of Breath and/or Wheezing  guaifenesin/dextromethorphan  Syrup 10 milliLiter(s) Oral every 4 hours PRN Cough  magnesium hydroxide Suspension 5 milliLiter(s) Oral daily PRN Constipation  melatonin 3 milliGRAM(s) Oral at bedtime PRN Insomnia  polyethylene glycol 3350 17 Gram(s) Oral daily PRN Constipation  traMADol 50 milliGRAM(s) Oral every 12 hours PRN Moderate Pain (4 - 6)      VITALS:  Vital Signs Last 24 Hrs  T(F): 98.2 (30 Apr 2020 06:35), Max: 98.8 (29 Apr 2020 17:46)  HR: 75 (30 Apr 2020 06:35) (65 - 84)  BP: 122/57 (30 Apr 2020 06:35) (112/68 - 130/59)  RR: 16 (30 Apr 2020 06:35) (16 - 18)  SpO2: 94% (30 Apr 2020 06:35) (88% - 94%)  I&O's Summary    29 Apr 2020 07:01  -  30 Apr 2020 07:00  --------------------------------------------------------  IN: 240 mL / OUT: 0 mL / NET: 240 mL          PHYSICAL EXAM:  General: NAD, Elderly male, pleasantly demented. not answering questions.   ENT: MMM  Neck: Supple, No JVD  Lungs: Coarse BS bilaterally, Non labored breathing, Currently saturating 94% on 8LNC  Cardio: RRR, S1/S2, No murmurs  Abdomen: Soft, Nontender, Nondistended; Bowel sounds present  Extremities: No calf tenderness, No pitting edema        LABS:                        12.4   9.27  )-----------( 271      ( 30 Apr 2020 07:50 )             37.8     04-30    142  |  107  |  27  ----------------------------<  80  3.6   |  28  |  1.00    Ca    7.9      30 Apr 2020 07:50  Mg     2.2     04-29    TPro  6.2  /  Alb  1.9  /  TBili  1.2  /  DBili  x   /  AST  27  /  ALT  28  /  AlkPhos  100  04-30    eGFR if Non African American: 67 mL/min/1.73M2 (04-30-20 @ 07:50)  eGFR if African American: 78 mL/min/1.73M2 (04-30-20 @ 07:50)    D-Dimer Assay, Quantitative: 986 ng/mL DDU (04-30-20 @ 07:50)  Procalcitonin, Serum: 0.10 ng/mL (04-30-20 @ 07:50)  D-Dimer Assay, Quantitative: 950 ng/mL DDU (04-29-20 @ 12:10)  Ferritin, Serum: 894 ng/mL (04-29-20 @ 12:10)    COVID-19 PCR: Detected (04-22-20 @ 08:40)  COVID-19 PCR: NotDetec (04-21-20 @ 15:35)      RADIOLOGY & ADDITIONAL TESTS:  < from: Xray Chest 1 View- PORTABLE-Urgent (04.29.20 @ 11:39) >  Impression:  1.  Stable left lung with increasing infiltrates in the right lung.    < end of copied text >

## 2020-04-30 NOTE — PHYSICAL THERAPY INITIAL EVALUATION ADULT - TRANSFER TRAINING, PT EVAL
Pt to perform stand pivot transfer with least restrictive device, minimal to mod assist of 2 in 1-3 treatment sessions

## 2020-04-30 NOTE — PROGRESS NOTE ADULT - ASSESSMENT
88 yo male hx DVT/PE, htn, mild dementia, COPD, pulmonary fibrosis sent  from [facility] by EMS due to shortness of breath and b/l pneumonia. Known positive COVID contacts at [facility]    Acute hypoxic respiratory failure 2/2 BL PNA found to be COVID positive: Hypoxia improving and now on 8LNC from NRB   - Reswab - covid positive  - d-dimer fluctuatin<-950<-1634   - ID consulted and recommended holding tocilizumab   - continue with supplemental oxygen and wean as tolerated  - prone as tolerated for ventilation  - CXR : stable left infiltrates, increased right infiltrates  - monitor inflammatory markers and trending down: CRP (10.93), Ferritn (894), LDH (551)   - Completed hydroxychloroquine (completed ) and steroids   - Acetaminophen 650 mg PO q4h PRN fever. Limit use of NSAIDs. Gauifenesin PRN  - HFA albuterol Q6 hour PRN via MDI.   - DVT PPx: with therapeutic Lovenox in light of Ddimer 6600 and previous hx of VTE (Improve score 5). Xarelto 10mg upon discharge  - Dr. Looney had goals of Care discussion with patient's daughter Ella; remains FULL CODE     COPD  Pulmonary Fibrosis  -Completed 5 day course of steroids  -Continue Symbocort/ Albuterol MDI  -Spiriva while in house  -Oxygen support as needed to maintain O2 >88%    Leukocytosis  -Resolved  -2/2 to steroids.     DYSURIA  -Resolved  -UA/Urine cx - NG     Hx DVT/PE:  -Continue with therapeutic lovenox 60mg BID for elevated D-dimer and COVID    HTN  -Continue metoprolol    Dementia  -A &O x 2 at baseline  -Continue to monitor, without agitation currently.    Hypernatremia: Resolved.  -Encourage PO intake  -Monitor labs  -Stopped IVF      Pain:  -Tramadol PRN    DVT ppx  -Lovenox 60 mg BID    Dysphagia:  -Continue modified diet: Pureed-[place]    Advance care planning  multiple GOC conversations held throughout hospital course and pt's daughter would like FULL CODE    DISPO:  Back to [facility] once O2 requirement less than 4L NC 88 yo male hx DVT/PE, htn, mild dementia, COPD, pulmonary fibrosis sent  from [facility] by EMS due to shortness of breath and b/l pneumonia. Known positive COVID contacts at [facility]    Acute hypoxic respiratory failure 2/2 BL PNA found to be COVID positive: O2 weaned from 8 L to 6 L NC, saturating 96%  - Reswab - covid positive  - d-dimer fluctuatin<-950<-1634   - Discussed with ID. holding tocilizumab   - prone as tolerated for ventilation  - CXR : stable left infiltrates, increased right infiltrates  - monitor inflammatory markers and trending down: CRP (10.93), Ferritn (894), LDH (551)   - Completed hydroxychloroquine (completed ) and steroids   - Acetaminophen 650 mg PO q4h PRN fever. Limit use of NSAIDs. Gauifenesin PRN  - HFA albuterol Q6 hour PRN via MDI.   - DVT PPx: with therapeutic Lovenox in light of Ddimer 6600 and previous hx of VTE (Improve score 5). Xarelto 10mg upon discharge  - Dr. Looney had goals of Care discussion with patient's daughter Ella; remains FULL CODE     COPD  Pulmonary Fibrosis  -Completed 5 day course of steroids  -Continue Symbocort/ Albuterol MDI  -Spiriva while in house  -Oxygen support as needed to maintain O2 >88%    Leukocytosis  -Resolved  -2/2 to steroids.     DYSURIA  -Resolved  -UA/Urine cx - NG     Hx DVT/PE:  -Continue with therapeutic lovenox 60mg BID for elevated D-dimer and COVID    HTN  -Continue metoprolol    Dementia  -A &O x 2 at baseline  -Continue to monitor  -pulling off oxygen overnight requiring wrist restrain. will DC now.    Hypernatremia: Resolved.  -Encourage PO intake  -Monitor labs       Pain:  -Tramadol PRN    DVT ppx  -Lovenox 60 mg BID    Dysphagia:  -Continue modified diet: Pureed-[place]    Advance care planning  multiple GOC conversations held throughout hospital course and pt's daughter would like FULL CODE    DISPO:  Back to [facility] once O2 requirement less than 4L NC

## 2020-04-30 NOTE — PHYSICAL THERAPY INITIAL EVALUATION ADULT - RANGE OF MOTION EXAMINATION, REHAB EVAL
deficits as listed below/Pt able to stand with max assist of 2 for transfer; pt not following commands for AROM.

## 2020-04-30 NOTE — CONSULT NOTE ADULT - ASSESSMENT
86 yo male with history of mild dementia, COPD,pulmonary fibrosis,DVT/PE, resident of a SNF who is being treated for COVID 19 pneumonia.  He was treated with zosyn and azithro at his SNF before being sent to [facility] for evaluation and testing.  He has received steroids and plaquenil and doxy.  He has  elevated inflammatory markers, CRP is 10, ferritin is 891, and d-dimer is now 950, down from peak of 2000.  It appears that his lung damage reflects pre-existing lung disease, direct effects of infection, and potentially inflammatory component of illness.  While treatment is not evidence based, with increasing O2 requirements I think a dose of tocilizumab is reasonable.  Suggest:  1.Supportive care  2. I would endorse tocilizumab, 400 mg infusion  3.Monitor O2 sats and periodic inflammatory markers  4.DVT prophylaxis as you are doing  5.Condition guarded.

## 2020-04-30 NOTE — PHYSICAL THERAPY INITIAL EVALUATION ADULT - PERTINENT HX OF CURRENT PROBLEM, REHAB EVAL
88 yo male hx DVT/PE, htn, mild dementia, COPD, pulmonary fibrosis sent  from [facility] by EMS due to shortness of breath and b/l pneumonia. Known positive COVID contacts at [facility]

## 2020-04-30 NOTE — CHART NOTE - NSCHARTNOTEFT_GEN_A_CORE
Reviewed with Dr Mendes  He is now back on 6 liters  Okay to hold off on tociluzumab for now.  ? If prone ventilation could help

## 2020-04-30 NOTE — CONSULT NOTE ADULT - SUBJECTIVE AND OBJECTIVE BOX
HPI:   Patient is a 87y male with a past history of DVT/PE, COPD, pulmonary fibrosis,mild dementia,who resides in a SNF and was admitted 4/21 with hypoxic respiratory distress.  He had been treated with zosyn and azithro at his SNF for pneumonia, not tested for COVID.He was initially negative on 4/21, tested positive via NP PCR on 4/22.He was given plaquenil x 5 days, steroids x 5 days, and doxy.His CXR showed B/L infiltrates.He has been hypoxic however over past few days he requires increased Oxygen,now 8-15 liters.He is on lovenox for DVT prophylaxis, apparently was not anticoagulated at his SNF.    REVIEW OF SYSTEMS:  All other review of systems negative (Comprehensive ROS)    PAST MEDICAL & SURGICAL HISTORY:  Dementia  PE (pulmonary thromboembolism)  History of COPD  [place] (hard of hearing)  Deep vein thrombosis (DVT)  Dyslipidemia  CAD (coronary artery disease)  Peripheral vascular disease  History of hip surgery  History of mandibular surgery      Allergies    No Known Allergies    Intolerances        Antimicrobials Day #  :s/p zosyn and azithro at his SNF, doxy and plaquenil here    Other Medications:  acetaminophen   Tablet .. 650 milliGRAM(s) Oral every 4 hours PRN  ALBUTerol    90 MICROgram(s) HFA Inhaler 2 Puff(s) Inhalation every 4 hours PRN  aspirin enteric coated 81 milliGRAM(s) Oral daily  budesonide 160 MICROgram(s)/formoterol 4.5 MICROgram(s) Inhaler 2 Puff(s) Inhalation two times a day  enoxaparin Injectable 60 milliGRAM(s) SubCutaneous two times a day  famotidine    Tablet 20 milliGRAM(s) Oral daily  ferrous    sulfate 325 milliGRAM(s) Oral daily  folic acid 1 milliGRAM(s) Oral daily  guaifenesin/dextromethorphan  Syrup 10 milliLiter(s) Oral every 4 hours PRN  magnesium hydroxide Suspension 5 milliLiter(s) Oral daily PRN  melatonin 3 milliGRAM(s) Oral at bedtime PRN  metoprolol tartrate 12.5 milliGRAM(s) Oral two times a day  polyethylene glycol 3350 17 Gram(s) Oral daily PRN  senna 2 Tablet(s) Oral at bedtime  thiamine 100 milliGRAM(s) Oral daily  tiotropium 18 MICROgram(s) Capsule 1 Capsule(s) Inhalation daily  traMADol 50 milliGRAM(s) Oral every 12 hours PRN      FAMILY HISTORY:      SOCIAL HISTORY:  Smoking:ex     ETOH:x     Drug Use: x    Single     T(F): 98.2 (04-30-20 @ 06:35), Max: 98.8 (04-29-20 @ 17:46)  HR: 75 (04-30-20 @ 06:35)  BP: 122/57 (04-30-20 @ 06:35)  RR: 16 (04-30-20 @ 06:35)  SpO2: 94% (04-30-20 @ 06:35)  Wt(kg): --    PHYSICAL EXAM:  General: alert, mild respiratory distress, chronically ill appearing  Eyes:  anicteric, no conjunctival injection, no discharge  Oropharynx: no lesions or injection 	  Neck: supple, without adenopathy  Lungs: distant BS  Heart: regular rate and rhythm; no murmur, rubs or gallops  Abdomen: soft, nondistended, nontender, without mass or organomegaly  Skin: no lesions  Extremities: no clubbing, cyanosis, or edema  Neurologic: alert, oriented, moves all extremities    LAB RESULTS:                        14.5   10.41 )-----------( 202      ( 29 Apr 2020 07:03 )             45.5     04-29    142  |  109<H>  |  26<H>  ----------------------------<  79  4.0   |  25  |  1.13    Ca    8.2<L>      29 Apr 2020 07:03  Mg     2.2     04-29            MICROBIOLOGY:  RECENT CULTURES:        RADIOLOGY REVIEWED:  < from: Xray Chest 1 View- PORTABLE-Urgent (04.29.20 @ 11:39) >  INTERPRETATION:    Examination: XR CHEST URGENT    History: ADMDIAG1: R68.89 OTHER GENERAL SYMPTOMS AND SIGNS/, SOB COVID positive patient.    Findings:  Interstitial groundglass infiltrate in the left mid and lower lung field demonstrating no significant change since 4/21/2020. There is now a much more pronounced interstitial and alveolar infiltrate in the right mid and lower lung field demonstrating significant progression since 4/21/2020. The heart is not enlarged. No hilar or mediastinal abnormality. No evidence of a pneumothorax or pleural effusion.    Impression:  1.  Stable left lung with increasing infiltrates in the right lung.      DISCRETE X-RAY DATA:  Percent of LEFT lung opacification: 34-66%  Percent of RIGHT lung opacification: 34-66%  Change in lung opacification from most recent x-ray (<=3 days): No Prior  Change from prior dated 3 or more days (same admission): Increase    < end of copied text >

## 2020-04-30 NOTE — PHYSICAL THERAPY INITIAL EVALUATION ADULT - ADDITIONAL COMMENTS
Pt poor historian and unable to give subjective. Pt transferred to [facility] from [facility]

## 2020-04-30 NOTE — PHYSICAL THERAPY INITIAL EVALUATION ADULT - FOLLOWS COMMANDS/ANSWERS QUESTIONS, REHAB EVAL
Pt was able to take steps to the chair with verbal/manual cueing, however mostly did not follow most commands.

## 2020-05-01 LAB
ALBUMIN SERPL ELPH-MCNC: 1.8 G/DL — LOW (ref 3.3–5)
ALP SERPL-CCNC: 101 U/L — SIGNIFICANT CHANGE UP (ref 40–120)
ALT FLD-CCNC: 28 U/L — SIGNIFICANT CHANGE UP (ref 10–45)
ANION GAP SERPL CALC-SCNC: 6 MMOL/L — SIGNIFICANT CHANGE UP (ref 5–17)
AST SERPL-CCNC: 28 U/L — SIGNIFICANT CHANGE UP (ref 10–40)
BASOPHILS # BLD AUTO: 0.01 K/UL — SIGNIFICANT CHANGE UP (ref 0–0.2)
BASOPHILS NFR BLD AUTO: 0.1 % — SIGNIFICANT CHANGE UP (ref 0–2)
BILIRUB SERPL-MCNC: 1.1 MG/DL — SIGNIFICANT CHANGE UP (ref 0.2–1.2)
BUN SERPL-MCNC: 24 MG/DL — HIGH (ref 7–23)
CALCIUM SERPL-MCNC: 7.8 MG/DL — LOW (ref 8.4–10.5)
CHLORIDE SERPL-SCNC: 109 MMOL/L — HIGH (ref 96–108)
CO2 SERPL-SCNC: 26 MMOL/L — SIGNIFICANT CHANGE UP (ref 22–31)
CREAT SERPL-MCNC: 1.01 MG/DL — SIGNIFICANT CHANGE UP (ref 0.5–1.3)
D DIMER BLD IA.RAPID-MCNC: 580 NG/ML DDU — HIGH
EOSINOPHIL # BLD AUTO: 0.1 K/UL — SIGNIFICANT CHANGE UP (ref 0–0.5)
EOSINOPHIL NFR BLD AUTO: 1 % — SIGNIFICANT CHANGE UP (ref 0–6)
GLUCOSE SERPL-MCNC: 83 MG/DL — SIGNIFICANT CHANGE UP (ref 70–99)
HCT VFR BLD CALC: 37.5 % — LOW (ref 39–50)
HGB BLD-MCNC: 12.2 G/DL — LOW (ref 13–17)
IMM GRANULOCYTES NFR BLD AUTO: 0.7 % — SIGNIFICANT CHANGE UP (ref 0–1.5)
LYMPHOCYTES # BLD AUTO: 0.84 K/UL — LOW (ref 1–3.3)
LYMPHOCYTES # BLD AUTO: 8.6 % — LOW (ref 13–44)
MCHC RBC-ENTMCNC: 29 PG — SIGNIFICANT CHANGE UP (ref 27–34)
MCHC RBC-ENTMCNC: 32.5 GM/DL — SIGNIFICANT CHANGE UP (ref 32–36)
MCV RBC AUTO: 89.3 FL — SIGNIFICANT CHANGE UP (ref 80–100)
MONOCYTES # BLD AUTO: 0.36 K/UL — SIGNIFICANT CHANGE UP (ref 0–0.9)
MONOCYTES NFR BLD AUTO: 3.7 % — SIGNIFICANT CHANGE UP (ref 2–14)
NEUTROPHILS # BLD AUTO: 8.44 K/UL — HIGH (ref 1.8–7.4)
NEUTROPHILS NFR BLD AUTO: 85.9 % — HIGH (ref 43–77)
NRBC # BLD: 0 /100 WBCS — SIGNIFICANT CHANGE UP (ref 0–0)
PLATELET # BLD AUTO: 197 K/UL — SIGNIFICANT CHANGE UP (ref 150–400)
POTASSIUM SERPL-MCNC: 3.7 MMOL/L — SIGNIFICANT CHANGE UP (ref 3.5–5.3)
POTASSIUM SERPL-SCNC: 3.7 MMOL/L — SIGNIFICANT CHANGE UP (ref 3.5–5.3)
PROT SERPL-MCNC: 6.2 G/DL — SIGNIFICANT CHANGE UP (ref 6–8.3)
RBC # BLD: 4.2 M/UL — SIGNIFICANT CHANGE UP (ref 4.2–5.8)
RBC # FLD: 14 % — SIGNIFICANT CHANGE UP (ref 10.3–14.5)
SODIUM SERPL-SCNC: 141 MMOL/L — SIGNIFICANT CHANGE UP (ref 135–145)
WBC # BLD: 9.82 K/UL — SIGNIFICANT CHANGE UP (ref 3.8–10.5)
WBC # FLD AUTO: 9.82 K/UL — SIGNIFICANT CHANGE UP (ref 3.8–10.5)

## 2020-05-01 RX ADMIN — ENOXAPARIN SODIUM 60 MILLIGRAM(S): 100 INJECTION SUBCUTANEOUS at 07:05

## 2020-05-01 RX ADMIN — BUDESONIDE AND FORMOTEROL FUMARATE DIHYDRATE 2 PUFF(S): 160; 4.5 AEROSOL RESPIRATORY (INHALATION) at 21:30

## 2020-05-01 RX ADMIN — SENNA PLUS 2 TABLET(S): 8.6 TABLET ORAL at 11:30

## 2020-05-01 RX ADMIN — Medication 81 MILLIGRAM(S): at 11:30

## 2020-05-01 RX ADMIN — Medication 1 MILLIGRAM(S): at 11:30

## 2020-05-01 RX ADMIN — ENOXAPARIN SODIUM 60 MILLIGRAM(S): 100 INJECTION SUBCUTANEOUS at 18:57

## 2020-05-01 RX ADMIN — BUDESONIDE AND FORMOTEROL FUMARATE DIHYDRATE 2 PUFF(S): 160; 4.5 AEROSOL RESPIRATORY (INHALATION) at 10:10

## 2020-05-01 RX ADMIN — Medication 325 MILLIGRAM(S): at 11:30

## 2020-05-01 RX ADMIN — BUDESONIDE AND FORMOTEROL FUMARATE DIHYDRATE 2 PUFF(S): 160; 4.5 AEROSOL RESPIRATORY (INHALATION) at 02:26

## 2020-05-01 RX ADMIN — FAMOTIDINE 20 MILLIGRAM(S): 10 INJECTION INTRAVENOUS at 11:30

## 2020-05-01 RX ADMIN — Medication 100 MILLIGRAM(S): at 11:30

## 2020-05-01 RX ADMIN — Medication 12.5 MILLIGRAM(S): at 07:05

## 2020-05-01 NOTE — PROGRESS NOTE ADULT - SUBJECTIVE AND OBJECTIVE BOX
CC: f/u for COVID 19    Patient reports: he has baseline dementia, appears comfortable on 5 liters NC    REVIEW OF SYSTEMS:  All other review of systems negative (Comprehensive ROS)    Antimicrobials Day #  :off  MEDICATIONS  (STANDING):  aspirin enteric coated 81 milliGRAM(s) Oral daily  budesonide 160 MICROgram(s)/formoterol 4.5 MICROgram(s) Inhaler 2 Puff(s) Inhalation two times a day  enoxaparin Injectable 60 milliGRAM(s) SubCutaneous two times a day  famotidine    Tablet 20 milliGRAM(s) Oral daily  ferrous    sulfate 325 milliGRAM(s) Oral daily  folic acid 1 milliGRAM(s) Oral daily  metoprolol tartrate 12.5 milliGRAM(s) Oral two times a day  senna 2 Tablet(s) Oral at bedtime  thiamine 100 milliGRAM(s) Oral daily  tiotropium 18 MICROgram(s) Capsule 1 Capsule(s) Inhalation daily    Other Medications Reviewed    T(F): 98.4 (05-01-20 @ 06:00), Max: 98.6 (04-30-20 @ 16:30)  HR: 68 (05-01-20 @ 06:00)  BP: 129/49 (05-01-20 @ 06:00)  RR: 18 (05-01-20 @ 06:00)  SpO2: 92% (05-01-20 @ 06:00)  Wt(kg): --    PHYSICAL EXAM:  General: alert, no acute distress  Eyes:  anicteric, no conjunctival injection, no discharge  Oropharynx: no lesions or injection , dry mucosa	  Neck: supple, without adenopathy  Lungs: clear to auscultation  Heart: regular rate and rhythm; no murmur, rubs or gallops  Abdomen: soft, nondistended, nontender, without mass or organomegaly  Skin: no lesions  Extremities: no clubbing, cyanosis, or edema  Neurologic: alert, pleasantly confused, moves all extremities    LAB RESULTS:                        12.2   9.82  )-----------( 197      ( 01 May 2020 07:50 )             37.5     05-01    141  |  109<H>  |  24<H>  ----------------------------<  83  3.7   |  26  |  1.01    Ca    7.8<L>      01 May 2020 07:50    TPro  6.2  /  Alb  1.8<L>  /  TBili  1.1  /  DBili  x   /  AST  28  /  ALT  28  /  AlkPhos  101  05-01    LIVER FUNCTIONS - ( 01 May 2020 07:50 )  Alb: 1.8 g/dL / Pro: 6.2 g/dL / ALK PHOS: 101 U/L / ALT: 28 U/L / AST: 28 U/L / GGT: x             MICROBIOLOGY:  RECENT CULTURES:      RADIOLOGY REVIEWED:  < from: Xray Chest 1 View- PORTABLE-Urgent (04.29.20 @ 11:39) >  EXAM:  XR CHEST PORTABLE URGENT 1V      PROCEDURE DATE:  04/29/2020        INTERPRETATION:    Examination: XR CHEST URGENT    History: ADMDIAG1: R68.89 OTHER GENERAL SYMPTOMS AND SIGNS/, SOB COVID positive patient.    Findings:  Interstitial groundglass infiltrate in the left mid and lower lung field demonstrating no significant change since 4/21/2020. There is now a much more pronounced interstitial and alveolar infiltrate in the right mid and lower lung field demonstrating significant progression since 4/21/2020. The heart is not enlarged. No hilar or mediastinal abnormality. No evidence of a pneumothorax or pleural effusion.    Impression:  1.  Stable left lung with increasing infiltrates in the right lung.    < end of copied text >

## 2020-05-01 NOTE — PROGRESS NOTE ADULT - ASSESSMENT
88 yo male with history of mild dementia, COPD,pulmonary fibrosis,DVT/PE, resident of a SNF who is being treated for COVID 19 pneumonia.  He was treated with zosyn and azithro at his SNF before being sent to [facility] for evaluation and testing.  He has received steroids and plaquenil and doxy.  He has  elevated inflammatory markers, CRP is 10, ferritin is 821, and d-dimer is now 980, down from peak of 2000.  It appears that his lung damage reflects pre-existing lung disease, direct effects of infection, and potentially inflammatory component of illness.  He appears to have stable O2 requirements over the past few days.  Simple observation may be reasonable  Suggest:  1.Supportive care  2. I would consider  tocilizumab, 400 mg infusion, if he worsens  3.Monitor O2 sats and periodic inflammatory markers  4.DVT prophylaxis as you are doing  5.Condition guarded.

## 2020-05-01 NOTE — PROGRESS NOTE ADULT - ASSESSMENT
86 yo male hx DVT/PE, htn, mild dementia, COPD, pulmonary fibrosis sent  from [facility] by EMS due to shortness of breath and b/l pneumonia. Known positive COVID contacts at [facility]    Acute hypoxic respiratory failure 2/2 BL PNA found to be COVID positive: O2 weaned from 8 L to 6 L NC, saturating 96%  - Reswab - covid positive  - d-dimer [phone]   - Discussed with ID. holding tocilizumab   - prone as tolerated for ventilation  - CXR : stable left infiltrates, increased right infiltrates  - monitor inflammatory markers, overall slowly trending down: CRP (11.06), Ferritin (820), LDH (584)   - Completed hydroxychloroquine (completed ) and steroids   - Acetaminophen 650 mg PO q4h PRN fever. Limit use of NSAIDs. Gauifenesin PRN  - HFA albuterol Q6 hour PRN via MDI.   - DVT PPx: with therapeutic Lovenox in light of Ddimer 6600 and previous hx of VTE (Improve score 5). Xarelto 10mg upon discharge  - Dr. Looney had goals of Care discussion with patient's daughter Ella; remains FULL CODE     COPD  Pulmonary Fibrosis  -Completed 5 day course of steroids  -Continue Symbocort/ Albuterol MDI  -Spiriva while in house  -Oxygen support as needed to maintain O2 >88%.  Currently on 6L/min.      Leukocytosis  -Resolved  -2/2 to steroids.     DYSURIA  -Resolved  -UA/Urine cx - NG     Hx DVT/PE:  -Continue with therapeutic lovenox 60mg BID for elevated D-dimer and COVID    HTN  -Continue metoprolol    Dementia  -A &O x 2 at baseline  -Continue to monitor  -No longer requiring restraints    Hypernatremia: Resolved.  -Encourage PO intake  -Monitor labs       Pain:  -Tramadol PRN    DVT ppx  -Lovenox 60 mg BID    Dysphagia:  -Continue modified diet: Pureed-[place]    Advance care planning  multiple GOC conversations held throughout hospital course and pt's daughter would like FULL CODE    DISPO:  Back to [facility] once O2 requirement less than 4L NC 86 yo male hx DVT/PE, htn, mild dementia, COPD, pulmonary fibrosis sent  from [facility] by EMS due to shortness of breath and b/l pneumonia. Known positive COVID contacts at [facility]    Plan to discharge to [facility] when on 4LNC  spO2 sat > 92%    Acute hypoxic respiratory failure 2/2 BL PNA found to be COVID positive: O2 90% 6L NC  - Reswab - covid positive  - d-dimer [phone]   - Discussed with ID. holding tocilizumab   - prone as tolerated for ventilation  - CXR : stable left infiltrates, increased right infiltrates  - monitor inflammatory markers, overall slowly trending down: CRP (11.06), Ferritin (820), LDH (584)   - Completed hydroxychloroquine (completed ) and steroids   - Acetaminophen 650 mg PO q4h PRN fever. Limit use of NSAIDs. Gauifenesin PRN  - HFA albuterol Q6 hour PRN via MDI.   - DVT PPx: with therapeutic Lovenox in light of Ddimer 6600 and previous hx of VTE (Improve score 5). Xarelto 10mg upon discharge  - Dr. Looney had goals of Care discussion with patient's daughter Ella; remains FULL CODE     COPD  Pulmonary Fibrosis  -Completed 5 day course of steroids  -Continue Symbocort/ Albuterol MDI  -Spiriva while in house  -Oxygen support as needed to maintain O2 >88%.  Currently on 6L/min.      Leukocytosis  -Resolved  -2/2 to steroids.     DYSURIA  -Resolved  -UA/Urine cx - NG     Hx DVT/PE:  -Continue with therapeutic lovenox 60mg BID for elevated D-dimer and COVID    HTN  -Continue metoprolol    Dementia  -A &O x 2 at baseline  -Continue to monitor  -No longer requiring restraints    Hypernatremia: Resolved.  -Encourage PO intake  -Monitor labs       Pain:  -Tramadol PRN    DVT ppx  -Lovenox 60 mg BID    Dysphagia:  -Continue modified diet: Pureed-[place]    Advance care planning  multiple GOC conversations held throughout hospital course and pt's daughter would like FULL CODE    DISPO:  Back to [facility] once O2 requirement less than 4L NC

## 2020-05-01 NOTE — PROGRESS NOTE ADULT - SUBJECTIVE AND OBJECTIVE BOX
Patient is a 87y old  Male who presents with a chief complaint of Hypoxia (01 May 2020 09:06)      SUBJECTIVE:  Patient seen and examined at bedside.  O2 6/min NC, 02 sats 90% in AM, 94% in PM.       See HPI for pertinent ROS.    All other ROS reviewed and negative except as otherwise stated above.      ALLERGIES:  No Known Allergies      MEDICATIONS:  MEDICATIONS  (STANDING):  aspirin enteric coated 81 milliGRAM(s) Oral daily  budesonide 160 MICROgram(s)/formoterol 4.5 MICROgram(s) Inhaler 2 Puff(s) Inhalation two times a day  enoxaparin Injectable 60 milliGRAM(s) SubCutaneous two times a day  famotidine    Tablet 20 milliGRAM(s) Oral daily  ferrous    sulfate 325 milliGRAM(s) Oral daily  folic acid 1 milliGRAM(s) Oral daily  metoprolol tartrate 12.5 milliGRAM(s) Oral two times a day  senna 2 Tablet(s) Oral at bedtime  thiamine 100 milliGRAM(s) Oral daily  tiotropium 18 MICROgram(s) Capsule 1 Capsule(s) Inhalation daily    MEDICATIONS  (PRN):  acetaminophen   Tablet .. 650 milliGRAM(s) Oral every 4 hours PRN Temp greater or equal to 38.5C (101.3F)  ALBUTerol    90 MICROgram(s) HFA Inhaler 2 Puff(s) Inhalation every 4 hours PRN Shortness of Breath and/or Wheezing  guaifenesin/dextromethorphan  Syrup 10 milliLiter(s) Oral every 4 hours PRN Cough  magnesium hydroxide Suspension 5 milliLiter(s) Oral daily PRN Constipation  melatonin 3 milliGRAM(s) Oral at bedtime PRN Insomnia  polyethylene glycol 3350 17 Gram(s) Oral daily PRN Constipation  traMADol 50 milliGRAM(s) Oral every 12 hours PRN Moderate Pain (4 - 6)      VITALS:  Vital Signs Last 24 Hrs  T(F): 98.4 (01 May 2020 16:14), Max: 98.4 (01 May 2020 06:00)  HR: 76 (01 May 2020 16:14) (62 - 76)  BP: 129/54 (01 May 2020 16:14) (123/57 - 129/54)  RR: 17 (01 May 2020 16:14) (17 - 18)  SpO2: 94% (01 May 2020 16:14) (88% - 96%)  I&O's Summary    01 May 2020 07:01  -  01 May 2020 17:12  --------------------------------------------------------  IN: 780 mL / OUT: 0 mL / NET: 780 mL          PHYSICAL EXAM:  General: NAD, A/O x 3  Abdomen: Soft, Nontender, Nondistended; Bowel sounds present  Extremities: No calf tenderness, No pitting edema      LABS:                        12.2   9.82  )-----------( 197      ( 01 May 2020 07:50 )             37.5     05-01    141  |  109  |  24  ----------------------------<  83  3.7   |  26  |  1.01    Ca    7.8      01 May 2020 07:50  Mg     2.2     04-29    TPro  6.2  /  Alb  1.8  /  TBili  1.1  /  DBili  x   /  AST  28  /  ALT  28  /  AlkPhos  101  05-01    eGFR if Non African American: 66 mL/min/1.73M2 (05-01-20 @ 07:50)  eGFR if : 77 mL/min/1.73M2 (05-01-20 @ 07:50)      D-Dimer Assay, Quantitative: 986 ng/mL DDU (04-30-20 @ 07:50)  Ferritin, Serum: 820 ng/mL (04-30-20 @ 07:50)  Procalcitonin, Serum: 0.10 ng/mL (04-30-20 @ 07:50)    COVID-19 PCR: Detected (04-22-20 @ 08:40)  COVID-19 PCR: NotDetec (04-21-20 @ 15:35)      RADIOLOGY & ADDITIONAL TESTS:        Care Discussed with Attending. Patient is a 87y old  Male who presents with a chief complaint of Hypoxia (01 May 2020 09:06)      SUBJECTIVE:  Patient seen and examined at bedside.  O2 6/min NC, 02 sats 90% in AM, 94% in PM.       See HPI for pertinent ROS.    All other ROS reviewed and negative except as otherwise stated above.      ALLERGIES:  No Known Allergies      MEDICATIONS:  MEDICATIONS  (STANDING):  aspirin enteric coated 81 milliGRAM(s) Oral daily  budesonide 160 MICROgram(s)/formoterol 4.5 MICROgram(s) Inhaler 2 Puff(s) Inhalation two times a day  enoxaparin Injectable 60 milliGRAM(s) SubCutaneous two times a day  famotidine    Tablet 20 milliGRAM(s) Oral daily  ferrous    sulfate 325 milliGRAM(s) Oral daily  folic acid 1 milliGRAM(s) Oral daily  metoprolol tartrate 12.5 milliGRAM(s) Oral two times a day  senna 2 Tablet(s) Oral at bedtime  thiamine 100 milliGRAM(s) Oral daily  tiotropium 18 MICROgram(s) Capsule 1 Capsule(s) Inhalation daily    MEDICATIONS  (PRN):  acetaminophen   Tablet .. 650 milliGRAM(s) Oral every 4 hours PRN Temp greater or equal to 38.5C (101.3F)  ALBUTerol    90 MICROgram(s) HFA Inhaler 2 Puff(s) Inhalation every 4 hours PRN Shortness of Breath and/or Wheezing  guaifenesin/dextromethorphan  Syrup 10 milliLiter(s) Oral every 4 hours PRN Cough  magnesium hydroxide Suspension 5 milliLiter(s) Oral daily PRN Constipation  melatonin 3 milliGRAM(s) Oral at bedtime PRN Insomnia  polyethylene glycol 3350 17 Gram(s) Oral daily PRN Constipation  traMADol 50 milliGRAM(s) Oral every 12 hours PRN Moderate Pain (4 - 6)      VITALS:  Vital Signs Last 24 Hrs  T(F): 98.4 (01 May 2020 16:14), Max: 98.4 (01 May 2020 06:00)  HR: 76 (01 May 2020 16:14) (62 - 76)  BP: 129/54 (01 May 2020 16:14) (123/57 - 129/54)  RR: 17 (01 May 2020 16:14) (17 - 18)  SpO2: 94% (01 May 2020 16:14) (88% - 96%)  I&O's Summary    01 May 2020 07:01  -  01 May 2020 17:12  --------------------------------------------------------  IN: 780 mL / OUT: 0 mL / NET: 780 mL        PHYSICAL EXAM:  General: NAD, Elderly male, pleasantly demented. not answering questions.   ENT: MMM  Neck: Supple, No JVD  Lungs: Coarse BS bilaterally, Non labored breathing, Currently saturating 90% on 6L NC  Cardio: RRR, S1/S2, No murmurs  Abdomen: Soft, Nontender, Nondistended; Bowel sounds present  Extremities: No calf tenderness, No pitting edema    LABS:                        12.2   9.82  )-----------( 197      ( 01 May 2020 07:50 )             37.5     05-01    141  |  109  |  24  ----------------------------<  83  3.7   |  26  |  1.01    Ca    7.8      01 May 2020 07:50  Mg     2.2     04-29    TPro  6.2  /  Alb  1.8  /  TBili  1.1  /  DBili  x   /  AST  28  /  ALT  28  /  AlkPhos  101  05-01    eGFR if Non African American: 66 mL/min/1.73M2 (05-01-20 @ 07:50)  eGFR if : 77 mL/min/1.73M2 (05-01-20 @ 07:50)      D-Dimer Assay, Quantitative: 986 ng/mL DDU (04-30-20 @ 07:50)  Ferritin, Serum: 820 ng/mL (04-30-20 @ 07:50)  Procalcitonin, Serum: 0.10 ng/mL (04-30-20 @ 07:50)    COVID-19 PCR: Detected (04-22-20 @ 08:40)  COVID-19 PCR: NotDetec (04-21-20 @ 15:35)      RADIOLOGY & ADDITIONAL TESTS:        Care Discussed with Attending.

## 2020-05-02 LAB
ANION GAP SERPL CALC-SCNC: 8 MMOL/L — SIGNIFICANT CHANGE UP (ref 5–17)
BASOPHILS # BLD AUTO: 0.02 K/UL — SIGNIFICANT CHANGE UP (ref 0–0.2)
BASOPHILS NFR BLD AUTO: 0.2 % — SIGNIFICANT CHANGE UP (ref 0–2)
BUN SERPL-MCNC: 22 MG/DL — SIGNIFICANT CHANGE UP (ref 7–23)
CALCIUM SERPL-MCNC: 8.2 MG/DL — LOW (ref 8.4–10.5)
CHLORIDE SERPL-SCNC: 106 MMOL/L — SIGNIFICANT CHANGE UP (ref 96–108)
CO2 SERPL-SCNC: 27 MMOL/L — SIGNIFICANT CHANGE UP (ref 22–31)
CREAT SERPL-MCNC: 1.09 MG/DL — SIGNIFICANT CHANGE UP (ref 0.5–1.3)
EOSINOPHIL # BLD AUTO: 0.12 K/UL — SIGNIFICANT CHANGE UP (ref 0–0.5)
EOSINOPHIL NFR BLD AUTO: 1.2 % — SIGNIFICANT CHANGE UP (ref 0–6)
GLUCOSE SERPL-MCNC: 75 MG/DL — SIGNIFICANT CHANGE UP (ref 70–99)
HCT VFR BLD CALC: 39 % — SIGNIFICANT CHANGE UP (ref 39–50)
HGB BLD-MCNC: 12.8 G/DL — LOW (ref 13–17)
IMM GRANULOCYTES NFR BLD AUTO: 0.6 % — SIGNIFICANT CHANGE UP (ref 0–1.5)
LYMPHOCYTES # BLD AUTO: 0.86 K/UL — LOW (ref 1–3.3)
LYMPHOCYTES # BLD AUTO: 8.3 % — LOW (ref 13–44)
MCHC RBC-ENTMCNC: 29.2 PG — SIGNIFICANT CHANGE UP (ref 27–34)
MCHC RBC-ENTMCNC: 32.8 GM/DL — SIGNIFICANT CHANGE UP (ref 32–36)
MCV RBC AUTO: 89 FL — SIGNIFICANT CHANGE UP (ref 80–100)
MONOCYTES # BLD AUTO: 0.52 K/UL — SIGNIFICANT CHANGE UP (ref 0–0.9)
MONOCYTES NFR BLD AUTO: 5 % — SIGNIFICANT CHANGE UP (ref 2–14)
NEUTROPHILS # BLD AUTO: 8.79 K/UL — HIGH (ref 1.8–7.4)
NEUTROPHILS NFR BLD AUTO: 84.7 % — HIGH (ref 43–77)
NRBC # BLD: 0 /100 WBCS — SIGNIFICANT CHANGE UP (ref 0–0)
PLATELET # BLD AUTO: 260 K/UL — SIGNIFICANT CHANGE UP (ref 150–400)
POTASSIUM SERPL-MCNC: 4 MMOL/L — SIGNIFICANT CHANGE UP (ref 3.5–5.3)
POTASSIUM SERPL-SCNC: 4 MMOL/L — SIGNIFICANT CHANGE UP (ref 3.5–5.3)
RBC # BLD: 4.38 M/UL — SIGNIFICANT CHANGE UP (ref 4.2–5.8)
RBC # FLD: 13.9 % — SIGNIFICANT CHANGE UP (ref 10.3–14.5)
SODIUM SERPL-SCNC: 141 MMOL/L — SIGNIFICANT CHANGE UP (ref 135–145)
WBC # BLD: 10.37 K/UL — SIGNIFICANT CHANGE UP (ref 3.8–10.5)
WBC # FLD AUTO: 10.37 K/UL — SIGNIFICANT CHANGE UP (ref 3.8–10.5)

## 2020-05-02 PROCEDURE — 99233 SBSQ HOSP IP/OBS HIGH 50: CPT

## 2020-05-02 RX ADMIN — Medication 1 MILLIGRAM(S): at 16:59

## 2020-05-02 RX ADMIN — Medication 81 MILLIGRAM(S): at 16:58

## 2020-05-02 RX ADMIN — ENOXAPARIN SODIUM 60 MILLIGRAM(S): 100 INJECTION SUBCUTANEOUS at 17:06

## 2020-05-02 RX ADMIN — BUDESONIDE AND FORMOTEROL FUMARATE DIHYDRATE 2 PUFF(S): 160; 4.5 AEROSOL RESPIRATORY (INHALATION) at 21:33

## 2020-05-02 RX ADMIN — FAMOTIDINE 20 MILLIGRAM(S): 10 INJECTION INTRAVENOUS at 16:58

## 2020-05-02 RX ADMIN — Medication 325 MILLIGRAM(S): at 16:59

## 2020-05-02 RX ADMIN — ENOXAPARIN SODIUM 60 MILLIGRAM(S): 100 INJECTION SUBCUTANEOUS at 05:21

## 2020-05-02 RX ADMIN — Medication 12.5 MILLIGRAM(S): at 17:04

## 2020-05-02 RX ADMIN — Medication 12.5 MILLIGRAM(S): at 05:21

## 2020-05-02 RX ADMIN — BUDESONIDE AND FORMOTEROL FUMARATE DIHYDRATE 2 PUFF(S): 160; 4.5 AEROSOL RESPIRATORY (INHALATION) at 09:00

## 2020-05-02 NOTE — PROGRESS NOTE ADULT - SUBJECTIVE AND OBJECTIVE BOX
Patient is a 87y old  Male who presents with a chief complaint of Hypoxia (02 May 2020 08:23)    SUBJECTIVE:  Patient seen and examined at bedside.  Weaned to 4L/min, 02 sat 94%.      See HPI for pertinent ROS.    All other ROS reviewed and negative except as otherwise stated above.      ALLERGIES:  No Known Allergies      MEDICATIONS:  MEDICATIONS  (STANDING):  aspirin enteric coated 81 milliGRAM(s) Oral daily  budesonide 160 MICROgram(s)/formoterol 4.5 MICROgram(s) Inhaler 2 Puff(s) Inhalation two times a day  enoxaparin Injectable 60 milliGRAM(s) SubCutaneous two times a day  famotidine    Tablet 20 milliGRAM(s) Oral daily  ferrous    sulfate 325 milliGRAM(s) Oral daily  folic acid 1 milliGRAM(s) Oral daily  metoprolol tartrate 12.5 milliGRAM(s) Oral two times a day  senna 2 Tablet(s) Oral at bedtime  thiamine 100 milliGRAM(s) Oral daily  tiotropium 18 MICROgram(s) Capsule 1 Capsule(s) Inhalation daily    MEDICATIONS  (PRN):  acetaminophen   Tablet .. 650 milliGRAM(s) Oral every 4 hours PRN Temp greater or equal to 38.5C (101.3F)  ALBUTerol    90 MICROgram(s) HFA Inhaler 2 Puff(s) Inhalation every 4 hours PRN Shortness of Breath and/or Wheezing  guaifenesin/dextromethorphan  Syrup 10 milliLiter(s) Oral every 4 hours PRN Cough  magnesium hydroxide Suspension 5 milliLiter(s) Oral daily PRN Constipation  melatonin 3 milliGRAM(s) Oral at bedtime PRN Insomnia  polyethylene glycol 3350 17 Gram(s) Oral daily PRN Constipation  traMADol 50 milliGRAM(s) Oral every 12 hours PRN Moderate Pain (4 - 6)      VITALS:  Vital Signs Last 24 Hrs  T(F): 98.8 (01 May 2020 20:17), Max: 98.8 (01 May 2020 20:17)  HR: 74 (02 May 2020 09:00) (74 - 86)  BP: 142/78 (01 May 2020 20:17) (129/54 - 142/78)  RR: 16 (01 May 2020 20:17) (16 - 17)  SpO2: 94% (02 May 2020 10:08) (90% - 97%)  I&O's Summary    01 May 2020 07:01  -  02 May 2020 07:00  --------------------------------------------------------  IN: 840 mL / OUT: 0 mL / NET: 840 mL    PHYSICAL EXAM:  General: NAD, A/O x 3  Abdomen: Soft, Nontender, Nondistended; Bowel sounds present  Extremities: No calf tenderness, No pitting edema      LABS:                        12.8   10.37 )-----------( 260      ( 02 May 2020 07:00 )             39.0     05-02    141  |  106  |  22  ----------------------------<  75  4.0   |  27  |  1.09    Ca    8.2      02 May 2020 07:00    TPro  6.2  /  Alb  1.8  /  TBili  1.1  /  DBili  x   /  AST  28  /  ALT  28  /  AlkPhos  101  05-01    eGFR if Non African American: 60 mL/min/1.73M2 (05-02-20 @ 07:00)  eGFR if African American: 70 mL/min/1.73M2 (05-02-20 @ 07:00)      D-Dimer Assay, Quantitative: 580 ng/mL DDU (05-01-20 @ 18:50)    COVID-19 PCR: Detected (04-22-20 @ 08:40)  COVID-19 PCR: NotDetec (04-21-20 @ 15:35)      RADIOLOGY & ADDITIONAL TESTS:        Care Discussed with Attending. Patient is a 87y old  Male who presents with a chief complaint of Hypoxia (02 May 2020 08:23)    SUBJECTIVE:  Patient seen and examined at bedside.  Weaned to 4L/min, 02 sat 94%.      See HPI for pertinent ROS.    All other ROS reviewed and negative except as otherwise stated above.      ALLERGIES:  No Known Allergies      MEDICATIONS:  MEDICATIONS  (STANDING):  aspirin enteric coated 81 milliGRAM(s) Oral daily  budesonide 160 MICROgram(s)/formoterol 4.5 MICROgram(s) Inhaler 2 Puff(s) Inhalation two times a day  enoxaparin Injectable 60 milliGRAM(s) SubCutaneous two times a day  famotidine    Tablet 20 milliGRAM(s) Oral daily  ferrous    sulfate 325 milliGRAM(s) Oral daily  folic acid 1 milliGRAM(s) Oral daily  metoprolol tartrate 12.5 milliGRAM(s) Oral two times a day  senna 2 Tablet(s) Oral at bedtime  thiamine 100 milliGRAM(s) Oral daily  tiotropium 18 MICROgram(s) Capsule 1 Capsule(s) Inhalation daily    MEDICATIONS  (PRN):  acetaminophen   Tablet .. 650 milliGRAM(s) Oral every 4 hours PRN Temp greater or equal to 38.5C (101.3F)  ALBUTerol    90 MICROgram(s) HFA Inhaler 2 Puff(s) Inhalation every 4 hours PRN Shortness of Breath and/or Wheezing  guaifenesin/dextromethorphan  Syrup 10 milliLiter(s) Oral every 4 hours PRN Cough  magnesium hydroxide Suspension 5 milliLiter(s) Oral daily PRN Constipation  melatonin 3 milliGRAM(s) Oral at bedtime PRN Insomnia  polyethylene glycol 3350 17 Gram(s) Oral daily PRN Constipation  traMADol 50 milliGRAM(s) Oral every 12 hours PRN Moderate Pain (4 - 6)      VITALS:  Vital Signs Last 24 Hrs  T(F): 98.8 (01 May 2020 20:17), Max: 98.8 (01 May 2020 20:17)  HR: 74 (02 May 2020 09:00) (74 - 86)  BP: 142/78 (01 May 2020 20:17) (129/54 - 142/78)  RR: 16 (01 May 2020 20:17) (16 - 17)  SpO2: 94% (02 May 2020 10:08) (90% - 97%)  I&O's Summary    01 May 2020 07:01  -  02 May 2020 07:00  --------------------------------------------------------  IN: 840 mL / OUT: 0 mL / NET: 840 mL    PHYSICAL EXAM:  General: NAD,  sleepy but comfortable  Abdomen: Soft, Nontender, Nondistended; Bowel sounds present  Extremities: No calf tenderness, No pitting edema      LABS:                        12.8   10.37 )-----------( 260      ( 02 May 2020 07:00 )             39.0     05-02    141  |  106  |  22  ----------------------------<  75  4.0   |  27  |  1.09    Ca    8.2      02 May 2020 07:00    TPro  6.2  /  Alb  1.8  /  TBili  1.1  /  DBili  x   /  AST  28  /  ALT  28  /  AlkPhos  101  05-01    eGFR if Non African American: 60 mL/min/1.73M2 (05-02-20 @ 07:00)  eGFR if African American: 70 mL/min/1.73M2 (05-02-20 @ 07:00)      D-Dimer Assay, Quantitative: 580 ng/mL DDU (05-01-20 @ 18:50)    COVID-19 PCR: Detected (04-22-20 @ 08:40)  COVID-19 PCR: NotDetec (04-21-20 @ 15:35)

## 2020-05-02 NOTE — PROGRESS NOTE ADULT - SUBJECTIVE AND OBJECTIVE BOX
CC: f/u for COVID 19    Patient with baseline dementia, appears comfortable on 5 liters NC    REVIEW OF SYSTEMS:  All other review of systems negative (Comprehensive ROS)    Antimicrobials Day #  :off      MEDICATIONS  (STANDING):  aspirin enteric coated 81 milliGRAM(s) Oral daily  budesonide 160 MICROgram(s)/formoterol 4.5 MICROgram(s) Inhaler 2 Puff(s) Inhalation two times a day  enoxaparin Injectable 60 milliGRAM(s) SubCutaneous two times a day  famotidine    Tablet 20 milliGRAM(s) Oral daily  ferrous    sulfate 325 milliGRAM(s) Oral daily  folic acid 1 milliGRAM(s) Oral daily  metoprolol tartrate 12.5 milliGRAM(s) Oral two times a day  senna 2 Tablet(s) Oral at bedtime  thiamine 100 milliGRAM(s) Oral daily  tiotropium 18 MICROgram(s) Capsule 1 Capsule(s) Inhalation daily    Other Medications Reviewed    Vital Signs Last 24 Hrs  T(C): 37.1 (01 May 2020 20:17), Max: 37.1 (01 May 2020 20:17)  T(F): 98.8 (01 May 2020 20:17), Max: 98.8 (01 May 2020 20:17)  HR: 86 (01 May 2020 20:17) (76 - 86)  BP: 142/78 (01 May 2020 20:17) (129/54 - 142/78)  BP(mean): --  RR: 16 (01 May 2020 20:17) (16 - 17)  SpO2: 94% (01 May 2020 21:28) (88% - 96%)    PHYSICAL EXAM:  General: alert, no acute distress  Eyes:  anicteric, no conjunctival injection, no discharge  Oropharynx: no lesions or injection , dry mucosa	  Neck: supple, without adenopathy  Lungs: clear to auscultation  Heart: regular rate and rhythm; no murmur, rubs or gallops  Abdomen: soft, nondistended, nontender, without mass or organomegaly  Skin: no lesions  Extremities: no clubbing, cyanosis, or edema  Neurologic: alert, pleasantly confused, moves all extremities    LAB RESULTS:                                   12.8   10.37 )-----------( 260      ( 02 May 2020 07:00 )             39.0   05-02    141  |  106  |  22  ----------------------------<  75  4.0   |  27  |  1.09    Ca    8.2<L>      02 May 2020 07:00    TPro  6.2  /  Alb  1.8<L>  /  TBili  1.1  /  DBili  x   /  AST  28  /  ALT  28  /  AlkPhos  101  05-01  Ferritin, Serum: 820 ng/mL (04.30.20 @ 07:50)  C-Reactive Protein, Serum: 11.06 mg/dL (04.30.20 @ 07:50)        MICROBIOLOGY:  RECENT CULTURES:      RADIOLOGY REVIEWED:  < from: Xray Chest 1 View- PORTABLE-Urgent (04.29.20 @ 11:39) >  EXAM:  XR CHEST PORTABLE URGENT 1V      PROCEDURE DATE:  04/29/2020        INTERPRETATION:    Examination: XR CHEST URGENT    History: ADMDIAG1: R68.89 OTHER GENERAL SYMPTOMS AND SIGNS/, SOB COVID positive patient.    Findings:  Interstitial groundglass infiltrate in the left mid and lower lung field demonstrating no significant change since 4/21/2020. There is now a much more pronounced interstitial and alveolar infiltrate in the right mid and lower lung field demonstrating significant progression since 4/21/2020. The heart is not enlarged. No hilar or mediastinal abnormality. No evidence of a pneumothorax or pleural effusion.    Impression:  1.  Stable left lung with increasing infiltrates in the right lung.    < end of copied text > CC: f/u for COVID 19    Patient with baseline dementia, no new events    REVIEW OF SYSTEMS:  All other review of systems negative (Comprehensive ROS)    Antimicrobials Day #  :off      MEDICATIONS  (STANDING):  aspirin enteric coated 81 milliGRAM(s) Oral daily  budesonide 160 MICROgram(s)/formoterol 4.5 MICROgram(s) Inhaler 2 Puff(s) Inhalation two times a day  enoxaparin Injectable 60 milliGRAM(s) SubCutaneous two times a day  famotidine    Tablet 20 milliGRAM(s) Oral daily  ferrous    sulfate 325 milliGRAM(s) Oral daily  folic acid 1 milliGRAM(s) Oral daily  metoprolol tartrate 12.5 milliGRAM(s) Oral two times a day  senna 2 Tablet(s) Oral at bedtime  thiamine 100 milliGRAM(s) Oral daily  tiotropium 18 MICROgram(s) Capsule 1 Capsule(s) Inhalation daily    Other Medications Reviewed    Vital Signs Last 24 Hrs  T(C): 37.1 (01 May 2020 20:17), Max: 37.1 (01 May 2020 20:17)  T(F): 98.8 (01 May 2020 20:17), Max: 98.8 (01 May 2020 20:17)  HR: 86 (01 May 2020 20:17) (76 - 86)  BP: 142/78 (01 May 2020 20:17) (129/54 - 142/78)  BP(mean): --  RR: 16 (01 May 2020 20:17) (16 - 17)  SpO2: 94% (01 May 2020 21:28) (88% - 96%)    PHYSICAL EXAM:  General: alert, no acute distress  Eyes:  anicteric, no conjunctival injection, no discharge  Oropharynx: no lesions or injection , dry mucosa	  Neck: supple, without adenopathy  Lungs: clear to auscultation  Heart: regular rate and rhythm; no murmur, rubs or gallops  Abdomen: soft, nondistended, nontender, without mass or organomegaly  Skin: no lesions  Extremities: no clubbing, cyanosis, or edema  Neurologic: alert, pleasantly confused, moves all extremities    LAB RESULTS:                                   12.8   10.37 )-----------( 260      ( 02 May 2020 07:00 )             39.0   05-02    141  |  106  |  22  ----------------------------<  75  4.0   |  27  |  1.09    Ca    8.2<L>      02 May 2020 07:00    TPro  6.2  /  Alb  1.8<L>  /  TBili  1.1  /  DBili  x   /  AST  28  /  ALT  28  /  AlkPhos  101  05-01  Ferritin, Serum: 820 ng/mL (04.30.20 @ 07:50)  C-Reactive Protein, Serum: 11.06 mg/dL (04.30.20 @ 07:50)        MICROBIOLOGY:  RECENT CULTURES:      RADIOLOGY REVIEWED:  < from: Xray Chest 1 View- PORTABLE-Urgent (04.29.20 @ 11:39) >  EXAM:  XR CHEST PORTABLE URGENT 1V      PROCEDURE DATE:  04/29/2020        INTERPRETATION:    Examination: XR CHEST URGENT    History: ADMDIAG1: R68.89 OTHER GENERAL SYMPTOMS AND SIGNS/, SOB COVID positive patient.    Findings:  Interstitial groundglass infiltrate in the left mid and lower lung field demonstrating no significant change since 4/21/2020. There is now a much more pronounced interstitial and alveolar infiltrate in the right mid and lower lung field demonstrating significant progression since 4/21/2020. The heart is not enlarged. No hilar or mediastinal abnormality. No evidence of a pneumothorax or pleural effusion.    Impression:  1.  Stable left lung with increasing infiltrates in the right lung.    < end of copied text >

## 2020-05-02 NOTE — PROGRESS NOTE ADULT - ASSESSMENT
88 yo male hx mild dementia, COPD, pulmonary fibrosis, DVT/PE, resident of a SNF who is being treated for COVID 19 pneumonia.  He was treated with zosyn and azithro at his SNF before being sent to [facility] for evaluation and testing.  He has received steroids and plaquenil and doxy.  He has  elevated inflammatory markers, CRP is 10, ferritin is 821, and d-dimer is now 980, down from peak of 2000.  It appears that his lung damage reflects pre-existing lung disease, direct effects of infection, and potentially inflammatory component of illness.  He appears to have stable O2 requirements over the past few days.    Suggest:  cont Supportive care  consider  tocilizumab, 400 mg infusion, if resp status worsens  Monitor O2 sats and periodic inflammatory markers  DVT prophylaxis 86 yo male hx mild dementia, COPD, pulmonary fibrosis, DVT/PE, resident of a SNF who is being treated for COVID 19 pneumonia.  He was treated with zosyn and azithro at his SNF before being sent to [facility] for evaluation and testing.  He has received steroids and plaquenil and doxy.  He has  elevated inflammatory markers, CRP is 10, ferritin is 821, and d-dimer is now 980, down from peak of 2000.  It appears that his lung damage reflects pre-existing lung disease, direct effects of infection, and potentially inflammatory component of illness.  He appears to have stable O2 requirements over the past few days.    Suggest:  cont Supportive care  Monitor O2 sats and periodic inflammatory markers, ? tocilizumab if worsening resp status  DVT prophylaxis

## 2020-05-02 NOTE — PROGRESS NOTE ADULT - ASSESSMENT
88 yo male hx DVT/PE, htn, mild dementia, COPD, pulmonary fibrosis sent  from [facility] by EMS due to shortness of breath and b/l pneumonia. Known positive COVID contacts at [facility]    Plan to discharge to [facility] when on 4LNC  spO2 sat > 92%    Acute hypoxic respiratory failure 2/2 BL PNA found to be COVID positive: O2 90% 6L NC  - Reswab - covid positive  - d-dimer [phone]   - Discussed with ID. holding tocilizumab   - prone as tolerated for ventilation  - CXR : stable left infiltrates, increased right infiltrates  - monitor inflammatory markers, overall slowly trending down: CRP (11.06), Ferritin (820), LDH (584)   - Completed hydroxychloroquine (completed ) and steroids   - Acetaminophen 650 mg PO q4h PRN fever. Limit use of NSAIDs. Gauifenesin PRN  - HFA albuterol Q6 hour PRN via MDI.   - DVT PPx: with therapeutic Lovenox in light of elevated d-dimer and previous hx of VTE (Improve score 5). Xarelto 10mg upon discharge  - Dr. Looney had goals of Care discussion with patient's daughter Ella; remains FULL CODE     COPD  Pulmonary Fibrosis  -Completed 5 day course of steroids  -Continue Symbocort/ Albuterol MDI  -Spiriva while in house  -Oxygen support as needed to maintain O2 >88%.  Currently weaned from 6 to 4L/min.      Leukocytosis  -Resolved  -2/2 to steroids.     DYSURIA  -Resolved  -UA/Urine cx - NG     Hx DVT/PE:  -Continue with therapeutic lovenox 60mg BID for elevated D-dimer and COVID.  -D-dimer trending down (580)    HTN  -Continue metoprolol    Dementia  -A &O x 2 at baseline  -Continue to monitor  -No longer requiring restraints    Hypernatremia: Resolved.  -Encourage PO intake  -Monitor labs       Pain:  -Tramadol PRN    DVT ppx  -Lovenox 60 mg BID    Dysphagia:  -Continue modified diet: Pureed-[place]    Advance care planning  multiple GOC conversations held throughout hospital course and pt's daughter would like FULL CODE    DISPO:  Back to [facility] once O2 requirement less than 4L NC 86 yo male hx DVT/PE, htn, mild dementia, COPD, pulmonary fibrosis sent  from [facility] by EMS due to shortness of breath and b/l pneumonia. Known positive COVID contacts at [facility]    Plan to discharge to [facility] when on 4LNC  spO2 sat > 92%    Acute hypoxic respiratory failure 2/2 BL PNA found to be COVID positive: O2 90% 6L NC  - Reswab - covid positive  - d-dimer fluctuatin>.[phone]   - Discussed with ID. holding tocilizumab   - prone as tolerated for ventilation  - CXR : stable left infiltrates, increased right infiltrates- no need for further abx per ID, likely from chronic lung disease  - monitor inflammatory markers, overall slowly trending down: CRP (11.06), Ferritin (820), LDH (584)   - Completed hydroxychloroquine (completed ) and steroids   - Acetaminophen 650 mg PO q4h PRN fever. Limit use of NSAIDs. Gauifenesin PRN  - HFA albuterol Q6 hour PRN via MDI.   - DVT PPx: with therapeutic Lovenox in light of elevated d-dimer and previous hx of VTE (Improve score 5). Xarelto 10mg upon discharge  - Dr. Looney had goals of Care discussion with patient's daughter Ella; remains FULL CODE     COPD  Pulmonary Fibrosis  -Completed 5 day course of steroids  -Continue Symbocort/ Albuterol MDI  -Spiriva while in house  -Oxygen support as needed to maintain O2 >88%.  Currently weaned from 6 to 4L/min.      Leukocytosis  -Resolved  -2/2 to steroids.     DYSURIA  -Resolved  -UA/Urine cx - NG     Hx DVT/PE:  -Continue with therapeutic lovenox 60mg BID for elevated D-dimer and COVID.  -D-dimer trending down (580)    HTN  -Continue metoprolol    Dementia  -A &O x 2 at baseline  -Continue to monitor  -No longer requiring restraints    Hypernatremia: Resolved.  -Encourage PO intake  -Monitor labs       Pain:  -Tramadol PRN    DVT ppx  -Lovenox 60 mg BID    Dysphagia:  -Continue modified diet: Pureed-[place]    Advance care planning  multiple GOC conversations held throughout hospital course and pt's daughter would like FULL CODE    DISPO:  Back to [facility] once O2 requirement less than 4L NC    Ella Jane, pts daughter updated [phone] on pts status 86 yo male hx DVT/PE, htn, mild dementia, COPD, pulmonary fibrosis sent  from [facility] by EMS due to shortness of breath and b/l pneumonia. Known positive COVID contacts at [facility]    Plan to discharge to [facility] when on 4LNC  spO2 sat > 92%    Acute hypoxic respiratory failure 2/2 BL PNA found to be COVID positive: O2 90% 6L NC  - Reswab - covid positive  - d-dimer fluctuatin>.[phone]   - Discussed with ID. holding tocilizumab   - prone as tolerated for ventilation  - CXR : stable left infiltrates, increased right infiltrates- no need for further abx per ID, likely from chronic lung disease  - monitor inflammatory markers, overall slowly trending down: CRP (11.06), Ferritin (820), LDH (584)   - Completed hydroxychloroquine (completed ) and steroids   - Acetaminophen 650 mg PO q4h PRN fever. Limit use of NSAIDs. Gauifenesin PRN  - HFA albuterol Q6 hour PRN via MDI.   - DVT PPx: with therapeutic Lovenox in light of elevated d-dimer and previous hx of VTE (Improve score 5). Xarelto 10mg upon discharge  - Dr. Looney had goals of Care discussion with patient's daughter Ella; remains FULL CODE     COPD  Pulmonary Fibrosis  -Completed 5 day course of steroids  -Continue Symbocort/ Albuterol MDI  -Spiriva while in house  -Oxygen support as needed to maintain O2 >88%.  Currently weaned from 6 to 4L/min.      Leukocytosis  -Resolved  -2/2 to steroids.     DYSURIA  -Resolved  -UA/Urine cx - NG     Hx DVT/PE:  -Continue with therapeutic lovenox 60mg BID for elevated D-dimer and COVID.  -D-dimer trending down (580)    HTN  -Continue metoprolol    Dementia  -A &O x 2 at baseline  -Continue to monitor  -No longer requiring restraints    Hypernatremia: Resolved.  -Encourage PO intake  -Monitor labs       Pain:  -Tramadol PRN    DVT ppx  -Lovenox 60 mg BID    Dysphagia:  -Continue modified diet: Pureed-[place]    Advance care planning  multiple GOC conversations held throughout hospital course and pt's daughter would like FULL CODE    DISPO:  Back to [facility] once O2 requirement less than 4L NC however daughter concerned that insurance may not cover any more--> will d/w  in am for follow up    Ella Jane, pts daughter updated [phone] on pts status

## 2020-05-03 PROCEDURE — 99233 SBSQ HOSP IP/OBS HIGH 50: CPT

## 2020-05-03 PROCEDURE — 93010 ELECTROCARDIOGRAM REPORT: CPT

## 2020-05-03 PROCEDURE — 70450 CT HEAD/BRAIN W/O DYE: CPT | Mod: 26

## 2020-05-03 RX ADMIN — TRAMADOL HYDROCHLORIDE 50 MILLIGRAM(S): 50 TABLET ORAL at 12:45

## 2020-05-03 RX ADMIN — BUDESONIDE AND FORMOTEROL FUMARATE DIHYDRATE 2 PUFF(S): 160; 4.5 AEROSOL RESPIRATORY (INHALATION) at 21:25

## 2020-05-03 RX ADMIN — Medication 81 MILLIGRAM(S): at 12:24

## 2020-05-03 RX ADMIN — BUDESONIDE AND FORMOTEROL FUMARATE DIHYDRATE 2 PUFF(S): 160; 4.5 AEROSOL RESPIRATORY (INHALATION) at 08:34

## 2020-05-03 RX ADMIN — FAMOTIDINE 20 MILLIGRAM(S): 10 INJECTION INTRAVENOUS at 12:24

## 2020-05-03 RX ADMIN — Medication 12.5 MILLIGRAM(S): at 06:51

## 2020-05-03 RX ADMIN — ENOXAPARIN SODIUM 60 MILLIGRAM(S): 100 INJECTION SUBCUTANEOUS at 06:51

## 2020-05-03 RX ADMIN — Medication 1 MILLIGRAM(S): at 12:24

## 2020-05-03 RX ADMIN — Medication 100 MILLIGRAM(S): at 12:24

## 2020-05-03 RX ADMIN — Medication 325 MILLIGRAM(S): at 12:24

## 2020-05-03 NOTE — PROGRESS NOTE ADULT - ASSESSMENT
86 yo male hx DVT/PE, htn, mild dementia, COPD, pulmonary fibrosis sent  from [facility] by EMS due to shortness of breath and b/l pneumonia. Known positive COVID contacts at [facility]    Plan to discharge to [facility] when on 4LNC  spO2 sat > 92%    Acute hypoxic respiratory failure 2/2 BL PNA found to be COVID positive:   -Sat 93 % 4LNC  - Reswab - covid positive  - d-dimer fluctuatin>.986<-950<-1634   - Discussed with ID. holding tocilizumab   - prone as tolerated for ventilation  - CXR : stable left infiltrates, increased right infiltrates- no need for further abx per ID, likely from chronic lung disease  - monitor inflammatory markers, overall slowly trending down: CRP (11.06), Ferritin (820), LDH (584)   - Completed hydroxychloroquine (completed ) and steroids   - Acetaminophen 650 mg PO q4h PRN fever. Limit use of NSAIDs. Gauifenesin PRN  - HFA albuterol Q6 hour PRN via MDI.   - DVT PPx: with therapeutic Lovenox in light of elevated d-dimer and previous hx of VTE (Improve score 5). Xarelto 10mg upon discharge  - Dr. Looney had goals of Care discussion with patient's daughter Ella; remains FULL CODE       FALL  -as stated above  -CT scan ordered today    COPD  Pulmonary Fibrosis  -Completed 5 day course of steroids  -Continue Symbocort/ Albuterol MDI  -Spiriva while in house  -Oxygen support as needed to maintain O2 >88%.  Currently weaned from 6 to 4L/min.      Leukocytosis  -Resolved  -2/2 to steroids.     DYSURIA  -Resolved  -UA/Urine cx - NG     Hx DVT/PE:  -Continue with therapeutic lovenox 60mg BID for elevated D-dimer and COVID.  -D-dimer trending down (580)    HTN  -Continue metoprolol    Dementia  -A &O x 2 at baseline  -Continue to monitor  -No longer requiring restraints    Hypernatremia: Resolved.  -Encourage PO intake  -Monitor labs       Pain:  -Tramadol PRN    DVT ppx  -Lovenox 60 mg BID    Dysphagia:  -Continue modified diet: Pureed-[place]    Advance care planning  multiple GOC conversations held throughout hospital course and pt's daughter would like FULL CODE    DISPO:  Back to [facility] once O2 requirement less than 4L NC however daughter concerned that insurance may not cover any more--> will d/w  in am for follow up    Ella Jane, pts daughter updated [phone] on pts status 86 yo male hx DVT/PE, htn, mild dementia, COPD, pulmonary fibrosis sent  from [facility] by EMS due to shortness of breath and b/l pneumonia. Known positive COVID contacts at [facility]    Plan to discharge to [facility] when on 4LNC  spO2 sat > 92%    Acute hypoxic respiratory failure 2/2 BL PNA found to be COVID positive:   -Sat 93 % 4LNC  - Reswab - covid positive  - d-dimer fluctuatin>.986<-[phone]<-[phone]   - Discussed with ID. holding tocilizumab   - prone as tolerated for ventilation  - CXR : stable left infiltrates, increased right infiltrates- no need for further abx per ID, likely from chronic lung disease  - monitor inflammatory markers, overall slowly trending down: CRP (11.06), Ferritin (820), LDH (584)   - Completed hydroxychloroquine (completed ) and steroids   - Acetaminophen 650 mg PO q4h PRN fever. Limit use of NSAIDs. Gauifenesin PRN  - HFA albuterol Q6 hour PRN via MDI.   - DVT PPx: with therapeutic Lovenox in light of elevated d-dimer and previous hx of VTE (Improve score 5). Xarelto 10mg upon discharge  - Dr. Looney had goals of Care discussion with patient's daughter Ella; remains FULL CODE       FALL  -as stated above  -CT Head  -Fall precautions  ordered, bed alarm now on    COPD  Pulmonary Fibrosis  -Completed 5 day course of steroids  -Continue Symbocort/ Albuterol MDI  -Spiriva while in house  -Oxygen support as needed to maintain O2 >88%.  Currently weaned from 6 to 4L/min.      Leukocytosis  -Resolved  -2/2 to steroids.     DYSURIA  -Resolved  -UA/Urine cx - NG     Hx DVT/PE:  -Continue with therapeutic lovenox 60mg BID for elevated D-dimer and COVID. Xarelto on dc  -D-dimer trending down (580)    HTN  -Continue metoprolol    Dementia  -A &O x 2 at baseline  -Continue to monitor  -No longer requiring restraints    Hypernatremia: Resolved.  -Encourage PO intake  -Monitor labs       Pain:  -Tramadol PRN    DVT ppx  -Lovenox 60 mg BID    Dysphagia:  -Continue modified diet: Pureed-[place]    Advance care planning  multiple GOC conversations held throughout hospital course and pt's daughter would like FULL CODE    DISPO:  Back to [facility] once O2 requirement less than 4L NC--> he is remaining > 92% on 4LNC, if remains stable overnight and CT head neg. Possible dc planning for am    however daughter concerned that insurance may not cover any more--> CM aware and will speak with daughter    Ella Jane, pts daughter updated [phone] on pts status 5/3 86 yo male hx DVT/PE, htn, mild dementia, COPD, pulmonary fibrosis sent  from [facility] by EMS due to shortness of breath and b/l pneumonia. Known positive COVID contacts at [facility]    Plan to discharge to [facility] when on 4LNC  spO2 sat > 92%    Acute hypoxic respiratory failure 2/2 BL PNA found to be COVID positive:   -Sat 93 % 4LNC  - Reswab - covid positive  - d-dimer fluctuatin>.986<-950<-[phone]   - Discussed with ID. holding tocilizumab   - prone as tolerated for ventilation  - CXR : stable left infiltrates, increased right infiltrates- no need for further abx per ID, likely from chronic lung disease  - monitor inflammatory markers, overall slowly trending down: CRP (11.06), Ferritin (820), LDH (584)   - Completed hydroxychloroquine (completed ) and steroids   - Acetaminophen 650 mg PO q4h PRN fever. Limit use of NSAIDs. Gauifenesin PRN  - HFA albuterol Q6 hour PRN via MDI.   - DVT PPx: with therapeutic Lovenox in light of elevated d-dimer and previous hx of VTE (Improve score 5). Xarelto 10mg upon discharge  - Dr. Looney had goals of Care discussion with patient's daughter Ella; remains FULL CODE       FALL  -as stated above  -CT Head  -Fall precautions  ordered, bed alarm now on    COPD  Pulmonary Fibrosis  -Completed 5 day course of steroids  -Continue Symbocort/ Albuterol MDI  -Spiriva while in house  -Oxygen support as needed to maintain O2 >88%.  Currently weaned from 6 to 4L/min.      Leukocytosis  -Resolved  -2/2 to steroids.     DYSURIA  -Resolved  -UA/Urine cx - NG     Hx DVT/PE:  -Continue with therapeutic lovenox 60mg BID for elevated D-dimer and COVID. Xarelto on dc  -D-dimer trending down (580)    HTN  -Continue metoprolol    Dementia  -A &O x 2 at baseline, more agitated today  -Continue to monitor  -No longer requiring restraints    Hypernatremia: Resolved.  -Encourage PO intake  -Monitor labs       Pain:  -Tramadol PRN    DVT ppx  -Lovenox 60 mg BID    Dysphagia:  -Continue modified diet: Pureed-[place]    Advance care planning  multiple GOC conversations held throughout hospital course and pt's daughter would like FULL CODE    DISPO:  Back to [facility] once O2 requirement less than 4L NC--> he is remaining > 92% on 4LNC, if remains stable overnight and CT head neg. Possible dc planning for am    however daughter concerned that insurance may not cover any more--> CM aware and will speak with daughter    Ella Jane, pts daughter updated [phone] on pts status 5/3

## 2020-05-03 NOTE — PROGRESS NOTE ADULT - SUBJECTIVE AND OBJECTIVE BOX
Patient is a 87y old  Male who presents with a chief complaint of Hypoxia (02 May 2020 12:46)      Patient seen and examined at bedside. SaO2  93% 4LNC. T max 99.6.  As per RN pt. found this am on floor without NRB/NC on. Unwitnessed fall. Desated to 80's off of Oxygen.  Pt assisted back into bed. sustained bruise/abrasion on forehead. Pt was agitated and telling RNs to leave room.     ALLERGIES:  No Known Allergies    MEDICATIONS  (STANDING):  aspirin enteric coated 81 milliGRAM(s) Oral daily  budesonide 160 MICROgram(s)/formoterol 4.5 MICROgram(s) Inhaler 2 Puff(s) Inhalation two times a day  enoxaparin Injectable 60 milliGRAM(s) SubCutaneous two times a day  famotidine    Tablet 20 milliGRAM(s) Oral daily  ferrous    sulfate 325 milliGRAM(s) Oral daily  folic acid 1 milliGRAM(s) Oral daily  metoprolol tartrate 12.5 milliGRAM(s) Oral two times a day  senna 2 Tablet(s) Oral at bedtime  thiamine 100 milliGRAM(s) Oral daily  tiotropium 18 MICROgram(s) Capsule 1 Capsule(s) Inhalation daily    MEDICATIONS  (PRN):  acetaminophen   Tablet .. 650 milliGRAM(s) Oral every 4 hours PRN Temp greater or equal to 38.5C (101.3F)  ALBUTerol    90 MICROgram(s) HFA Inhaler 2 Puff(s) Inhalation every 4 hours PRN Shortness of Breath and/or Wheezing  bisacodyl Suppository 10 milliGRAM(s) Rectal daily PRN Constipation  guaifenesin/dextromethorphan  Syrup 10 milliLiter(s) Oral every 4 hours PRN Cough  magnesium hydroxide Suspension 5 milliLiter(s) Oral daily PRN Constipation  melatonin 3 milliGRAM(s) Oral at bedtime PRN Insomnia  polyethylene glycol 3350 17 Gram(s) Oral daily PRN Constipation  traMADol 50 milliGRAM(s) Oral every 12 hours PRN Moderate Pain (4 - 6)    Vital Signs Last 24 Hrs  T(F): 99.6 (02 May 2020 16:32), Max: 99.6 (02 May 2020 16:32)  HR: 84 (03 May 2020 08:34) (69 - 84)  BP: 129/53 (02 May 2020 22:00) (108/58 - 129/53)  RR: 18 (02 May 2020 22:00) (16 - 18)  SpO2: 96% (03 May 2020 08:34) (93% - 96%)    I&O's Detail    02 May 2020 07:01  -  03 May 2020 07:00  --------------------------------------------------------  IN:    Oral Fluid: 118 mL  Total IN: 118 mL    OUT:  Total OUT: 0 mL    Total NET: 118 mL      PHYSICAL EXAM:       General: NAD,  sleepy but comfortable agitated this am       Chest;Coarse BS bilaterally, Non labored breathing       Cardio:RRR       Abdomen: Soft, Nontender, Nondistended; Bowel sounds present       Extremities: No calf tenderness, No pitting edema      No New LABS                  12.8   10.37 )-----------( 260      ( 02 May 2020 07:00 )             39.0     05-02    141  |  106  |  22  ----------------------------<  75  4.0   |  27  |  1.09    Ca    8.2      02 May 2020 07:00    TPro  6.2  /  Alb  1.8  /  TBili  1.1  /  DBili  x   /  AST  28  /  ALT  28  /  AlkPhos  101  05-01    eGFR if Non African American: 60 mL/min/1.73M2 (05-02-20 @ 07:00)  eGFR if African American: 70 mL/min/1.73M2 (05-02-20 @ 07:00)      RADIOLOGY & ADDITIONAL TESTS:    Care Discussed with Consultants/Other Providers: Patient is a 87y old  Male who presents with a chief complaint of Hypoxia (02 May 2020 12:46)      Patient seen and examined at bedside. SaO2  93% 4LNC. T max 99.6.  As per RN pt. found this am on floor without NRB/NC on. Unwitnessed fall. Desated to 80's off of Oxygen.  Pt assisted back into bed. sustained bruise/abrasion on forehead. Pt was agitated and telling RNs to leave room and refusing physical exam.     ALLERGIES:  No Known Allergies    MEDICATIONS  (STANDING):  aspirin enteric coated 81 milliGRAM(s) Oral daily  budesonide 160 MICROgram(s)/formoterol 4.5 MICROgram(s) Inhaler 2 Puff(s) Inhalation two times a day  enoxaparin Injectable 60 milliGRAM(s) SubCutaneous two times a day  famotidine    Tablet 20 milliGRAM(s) Oral daily  ferrous    sulfate 325 milliGRAM(s) Oral daily  folic acid 1 milliGRAM(s) Oral daily  metoprolol tartrate 12.5 milliGRAM(s) Oral two times a day  senna 2 Tablet(s) Oral at bedtime  thiamine 100 milliGRAM(s) Oral daily  tiotropium 18 MICROgram(s) Capsule 1 Capsule(s) Inhalation daily    MEDICATIONS  (PRN):  acetaminophen   Tablet .. 650 milliGRAM(s) Oral every 4 hours PRN Temp greater or equal to 38.5C (101.3F)  ALBUTerol    90 MICROgram(s) HFA Inhaler 2 Puff(s) Inhalation every 4 hours PRN Shortness of Breath and/or Wheezing  bisacodyl Suppository 10 milliGRAM(s) Rectal daily PRN Constipation  guaifenesin/dextromethorphan  Syrup 10 milliLiter(s) Oral every 4 hours PRN Cough  magnesium hydroxide Suspension 5 milliLiter(s) Oral daily PRN Constipation  melatonin 3 milliGRAM(s) Oral at bedtime PRN Insomnia  polyethylene glycol 3350 17 Gram(s) Oral daily PRN Constipation  traMADol 50 milliGRAM(s) Oral every 12 hours PRN Moderate Pain (4 - 6)    Vital Signs Last 24 Hrs  T(F): 99.6 (02 May 2020 16:32), Max: 99.6 (02 May 2020 16:32)  HR: 84 (03 May 2020 08:34) (69 - 84)  BP: 129/53 (02 May 2020 22:00) (108/58 - 129/53)  RR: 18 (02 May 2020 22:00) (16 - 18)  SpO2: 96% (03 May 2020 08:34) (93% - 96%)    I&O's Detail    02 May 2020 07:01  -  03 May 2020 07:00  --------------------------------------------------------  IN:    Oral Fluid: 118 mL  Total IN: 118 mL    OUT:  Total OUT: 0 mL    Total NET: 118 mL      PHYSICAL EXAM:       General: NAD,  awake and alert. Denies any pain.        Moving all extremties but refusing further physical exam.       No New LABS                  12.8   10.37 )-----------( 260      ( 02 May 2020 07:00 )             39.0     05-02    141  |  106  |  22  ----------------------------<  75  4.0   |  27  |  1.09    Ca    8.2      02 May 2020 07:00    TPro  6.2  /  Alb  1.8  /  TBili  1.1  /  DBili  x   /  AST  28  /  ALT  28  /  AlkPhos  101  05-01    eGFR if Non African American: 60 mL/min/1.73M2 (05-02-20 @ 07:00)  eGFR if African American: 70 mL/min/1.73M2 (05-02-20 @ 07:00)      RADIOLOGY & ADDITIONAL TESTS:    Care Discussed with Consultants/Other Providers:

## 2020-05-04 LAB
ANION GAP SERPL CALC-SCNC: 10 MMOL/L — SIGNIFICANT CHANGE UP (ref 5–17)
BUN SERPL-MCNC: 25 MG/DL — HIGH (ref 7–23)
CALCIUM SERPL-MCNC: 8.2 MG/DL — LOW (ref 8.4–10.5)
CHLORIDE SERPL-SCNC: 107 MMOL/L — SIGNIFICANT CHANGE UP (ref 96–108)
CO2 SERPL-SCNC: 24 MMOL/L — SIGNIFICANT CHANGE UP (ref 22–31)
CREAT SERPL-MCNC: 1.08 MG/DL — SIGNIFICANT CHANGE UP (ref 0.5–1.3)
D DIMER BLD IA.RAPID-MCNC: 436 NG/ML DDU — HIGH
FERRITIN SERPL-MCNC: 578 NG/ML — HIGH (ref 30–400)
GLUCOSE SERPL-MCNC: 79 MG/DL — SIGNIFICANT CHANGE UP (ref 70–99)
HCT VFR BLD CALC: 38.8 % — LOW (ref 39–50)
HGB BLD-MCNC: 12.9 G/DL — LOW (ref 13–17)
MCHC RBC-ENTMCNC: 29.5 PG — SIGNIFICANT CHANGE UP (ref 27–34)
MCHC RBC-ENTMCNC: 33.2 GM/DL — SIGNIFICANT CHANGE UP (ref 32–36)
MCV RBC AUTO: 88.6 FL — SIGNIFICANT CHANGE UP (ref 80–100)
NRBC # BLD: 0 /100 WBCS — SIGNIFICANT CHANGE UP (ref 0–0)
PLATELET # BLD AUTO: 253 K/UL — SIGNIFICANT CHANGE UP (ref 150–400)
POTASSIUM SERPL-MCNC: 4.1 MMOL/L — SIGNIFICANT CHANGE UP (ref 3.5–5.3)
POTASSIUM SERPL-SCNC: 4.1 MMOL/L — SIGNIFICANT CHANGE UP (ref 3.5–5.3)
RBC # BLD: 4.38 M/UL — SIGNIFICANT CHANGE UP (ref 4.2–5.8)
RBC # FLD: 14.1 % — SIGNIFICANT CHANGE UP (ref 10.3–14.5)
SODIUM SERPL-SCNC: 141 MMOL/L — SIGNIFICANT CHANGE UP (ref 135–145)
WBC # BLD: 10.78 K/UL — HIGH (ref 3.8–10.5)
WBC # FLD AUTO: 10.78 K/UL — HIGH (ref 3.8–10.5)

## 2020-05-04 PROCEDURE — 99233 SBSQ HOSP IP/OBS HIGH 50: CPT | Mod: CS

## 2020-05-04 RX ORDER — FUROSEMIDE 40 MG
20 TABLET ORAL ONCE
Refills: 0 | Status: COMPLETED | OUTPATIENT
Start: 2020-05-04 | End: 2020-05-04

## 2020-05-04 RX ORDER — TRAMADOL HYDROCHLORIDE 50 MG/1
50 TABLET ORAL EVERY 12 HOURS
Refills: 0 | Status: DISCONTINUED | OUTPATIENT
Start: 2020-05-04 | End: 2020-05-05

## 2020-05-04 RX ADMIN — Medication 12.5 MILLIGRAM(S): at 17:38

## 2020-05-04 RX ADMIN — TRAMADOL HYDROCHLORIDE 50 MILLIGRAM(S): 50 TABLET ORAL at 13:46

## 2020-05-04 RX ADMIN — Medication 325 MILLIGRAM(S): at 13:46

## 2020-05-04 RX ADMIN — Medication 20 MILLIGRAM(S): at 13:46

## 2020-05-04 RX ADMIN — BUDESONIDE AND FORMOTEROL FUMARATE DIHYDRATE 2 PUFF(S): 160; 4.5 AEROSOL RESPIRATORY (INHALATION) at 10:32

## 2020-05-04 RX ADMIN — BUDESONIDE AND FORMOTEROL FUMARATE DIHYDRATE 2 PUFF(S): 160; 4.5 AEROSOL RESPIRATORY (INHALATION) at 21:42

## 2020-05-04 RX ADMIN — ENOXAPARIN SODIUM 60 MILLIGRAM(S): 100 INJECTION SUBCUTANEOUS at 07:01

## 2020-05-04 RX ADMIN — Medication 100 MILLIGRAM(S): at 13:46

## 2020-05-04 RX ADMIN — Medication 1 MILLIGRAM(S): at 13:46

## 2020-05-04 RX ADMIN — ENOXAPARIN SODIUM 60 MILLIGRAM(S): 100 INJECTION SUBCUTANEOUS at 16:50

## 2020-05-04 RX ADMIN — Medication 81 MILLIGRAM(S): at 15:14

## 2020-05-04 RX ADMIN — Medication 12.5 MILLIGRAM(S): at 07:01

## 2020-05-04 NOTE — PROGRESS NOTE ADULT - SUBJECTIVE AND OBJECTIVE BOX
CC: f/u for COVID 19    Patient with baseline dementia, no new events    REVIEW OF SYSTEMS:  All other review of systems negative (Comprehensive ROS)    Antimicrobials Day #  :off      Other Medications Reviewed    Vital Signs Last 24 Hrs  T(C): 36.3 (04 May 2020 07:03), Max: 36.6 (03 May 2020 23:01)  T(F): 97.4 (04 May 2020 07:03), Max: 97.9 (03 May 2020 23:01)  HR: 84 (04 May 2020 07:03) (78 - 88)  BP: 116/54 (04 May 2020 07:03) (116/54 - 127/70)  BP(mean): 83 (03 May 2020 23:01) (83 - 83)  RR: 18 (04 May 2020 09:42) (18 - 18)  SpO2: 92% (04 May 2020 09:42) (92% - 97%)    PHYSICAL EXAM:  General: alert, no acute distress  Eyes:  anicteric, no conjunctival injection, no discharge  Oropharynx: no lesions or injection , dry mucosa	  Neck: supple, without adenopathy  Lungs: clear to auscultation  Heart: regular rate and rhythm; no murmur, rubs or gallops  Abdomen: soft, nondistended, nontender, without mass or organomegaly  Skin: no lesions  Extremities: no clubbing, cyanosis, or edema  Neurologic: alert, pleasantly confused, moves all extremities    LAB RESULTS:                                              12.9   10.78 )-----------( 253      ( 04 May 2020 06:50 )             38.8   05-04    141  |  107  |  25<H>  ----------------------------<  79  4.1   |  24  |  1.08    Ca    8.2<L>      04 May 2020 06:50      Ferritin, Serum: 820 ng/mL (04.30.20 @ 07:50)  C-Reactive Protein, Serum: 11.06 mg/dL (04.30.20 @ 07:50)  D-Dimer Assay, Quantitative: 436      MICROBIOLOGY:  RECENT CULTURES:      RADIOLOGY REVIEWED:  < from: Xray Chest 1 View- PORTABLE-Urgent (04.29.20 @ 11:39) >  EXAM:  XR CHEST PORTABLE URGENT 1V      PROCEDURE DATE:  04/29/2020        INTERPRETATION:    Examination: XR CHEST URGENT    History: ADMDIAG1: R68.89 OTHER GENERAL SYMPTOMS AND SIGNS/, SOB COVID positive patient.    Findings:  Interstitial groundglass infiltrate in the left mid and lower lung field demonstrating no significant change since 4/21/2020. There is now a much more pronounced interstitial and alveolar infiltrate in the right mid and lower lung field demonstrating significant progression since 4/21/2020. The heart is not enlarged. No hilar or mediastinal abnormality. No evidence of a pneumothorax or pleural effusion.    Impression:  1.  Stable left lung with increasing infiltrates in the right lung.    < end of copied text >

## 2020-05-04 NOTE — CHART NOTE - NSCHARTNOTEFT_GEN_A_CORE
NUTRITION FOLLOW UP    SOURCE: Patient [)   Family [ ]     other [ X] MEDICAL RECORD    DIET: DYSPHAGIA 1 W/ NECTAR THICL LIQUIDS, ensure enlive bid, ensure pudding QD    PATIENT REPORT[ ] nausea  [ ] vomiting [ ] diarrhea [ ] constipation  [ ]chewing problems [X ] swallowing issues  [ ] other: no GI distress    PO INTAKE:  < 50% [ ]   50-75%  [X ]   %  [X]  other : tolerates sips supplements/ puddings    SOURCE: for PO intake [] Patient [ ] family [X ] chart [ ] staff [ ] other    ENTERAL/PARENTERAL NUTRITION: n/a    CURRENT WEIGHT:  5/1 60.3 KG.  (loss of 5 kg. since admission)    PERTINENT LABS:  Date: 04 May 2020 06:50  Hemoglobin 12.9   Hematocrit 38.8     Date: 05-04  Sodium 141  Potassium 4.1  Glucose Serum 79  BUN 25<H>      Creatinine    ACCUCHECK      SKIN: intact, no edema noted, fecal incontinence noted    ESTIMATED NEEDS:   [X] no change since previous assessment  [ ] recalculated:     PREVIOUS NUTRITION DIAGNOSIS: addressed    NUTRITION DIAGNOSIS is   [X] ongoing    NEW NUTRITION DIAGNOSIS: [X] not applicable    MONITORING AND EVALUATION:   Current diet order is appropriate and is well tolerated, but will monitor for any changes that may be needed    [X] PO intake [X] Tolerance to diet prescription [X] weights [X] follow up per protocol    NUTRITION RECOMMENDATIONS:    RD remains available HEATHER Almanzar RD, CDE

## 2020-05-04 NOTE — PROGRESS NOTE ADULT - SUBJECTIVE AND OBJECTIVE BOX
Patient is a 87y old  Male who presents with a chief complaint of Hypoxia.  Pt seen and examined at bedside.  Pt lethargic and non verbal .  Pt on RA saturating at 92%. Pt congested        Patient seen and examined at bedside.    ALLERGIES:  No Known Allergies    MEDICATIONS  (STANDING):  aspirin enteric coated 81 milliGRAM(s) Oral daily  budesonide 160 MICROgram(s)/formoterol 4.5 MICROgram(s) Inhaler 2 Puff(s) Inhalation two times a day  enoxaparin Injectable 60 milliGRAM(s) SubCutaneous two times a day  ferrous    sulfate 325 milliGRAM(s) Oral daily  folic acid 1 milliGRAM(s) Oral daily  metoprolol tartrate 12.5 milliGRAM(s) Oral two times a day  senna 2 Tablet(s) Oral at bedtime  thiamine 100 milliGRAM(s) Oral daily  tiotropium 18 MICROgram(s) Capsule 1 Capsule(s) Inhalation daily    MEDICATIONS  (PRN):  acetaminophen   Tablet .. 650 milliGRAM(s) Oral every 4 hours PRN Temp greater or equal to 38.5C (101.3F)  ALBUTerol    90 MICROgram(s) HFA Inhaler 2 Puff(s) Inhalation every 4 hours PRN Shortness of Breath and/or Wheezing  bisacodyl Suppository 10 milliGRAM(s) Rectal daily PRN Constipation  guaifenesin/dextromethorphan  Syrup 10 milliLiter(s) Oral every 4 hours PRN Cough  magnesium hydroxide Suspension 5 milliLiter(s) Oral daily PRN Constipation  melatonin 3 milliGRAM(s) Oral at bedtime PRN Insomnia  polyethylene glycol 3350 17 Gram(s) Oral daily PRN Constipation  traMADol 50 milliGRAM(s) Oral every 12 hours PRN Moderate Pain (4 - 6)    Vital Signs Last 24 Hrs  T(F): 97.4 (04 May 2020 07:03), Max: 97.9 (03 May 2020 23:01)  HR: 84 (04 May 2020 07:03) (78 - 88)  BP: 116/54 (04 May 2020 07:03) (116/54 - 127/70)  RR: 18 (04 May 2020 09:42) (18 - 18)  SpO2: 92% (04 May 2020 09:42) (92% - 97%)  I&O's Summary    03 May 2020 07:01  -  04 May 2020 07:00  --------------------------------------------------------  IN: 200 mL / OUT: 1 mL / NET: 199 mL      PHYSICAL EXAM:  General: NAD, Awake Dementia  ENT: MMM  Neck: Supple, No JVD  Lungs: Course BS bilaterally, Non labored breathing   Cardio: RRR, S1/S2, No murmurs  Abdomen: Soft, Nontender, Nondistended; Bowel sounds present  Extremities: No calf tenderness, No pitting edema    LABS:                        12.9   10.78 )-----------( 253      ( 04 May 2020 06:50 )             38.8     05-04    141  |  107  |  25  ----------------------------<  79  4.1   |  24  |  1.08    Ca    8.2      04 May 2020 06:50      eGFR if Non African American: 62 mL/min/1.73M2 (05-04-20 @ 06:50)  eGFR if : 71 mL/min/1.73M2 (05-04-20 @ 06:50)                                  RADIOLOGY & ADDITIONAL TESTS:    Care Discussed with Consultants/Other Providers:

## 2020-05-04 NOTE — PROGRESS NOTE ADULT - ASSESSMENT
86 yo male hx DVT/PE, htn, mild dementia, COPD, pulmonary fibrosis sent  from [facility] by EMS due to shortness of breath and b/l pneumonia. Known positive COVID contacts at [facility]  Will give lasix 20 mg IVP now for congestion     Plan to discharge to [facility] when on 4LNC  spO2 sat > 92%    Acute hypoxic respiratory failure 2/2 BL PNA found to be COVID positive:   -Sat 93 % 4LNC  - Reswab - covid positive  - d-dimer fluctuatin>580>.986<-950<-1634   - Discussed with ID. holding tocilizumab   - prone as tolerated for ventilation  - CXR : stable left infiltrates, increased right infiltrates- no need for further abx per ID, likely from chronic lung disease  - monitor inflammatory markers, overall slowly trending down: CRP (11.06), Ferritin (820), LDH (584)   - Completed hydroxychloroquine (completed ) and steroids   - Acetaminophen 650 mg PO q4h PRN fever. Limit use of NSAIDs. Gauifenesin PRN  - HFA albuterol Q6 hour PRN via MDI.   - DVT PPx: with therapeutic Lovenox in light of elevated d-dimer and previous hx of VTE (Improve score 5). Xarelto 10mg upon discharge  - Dr. Looney had goals of Care discussion with patient's daughter Ella; remains FULL CODE       FALL  -CT Head negative   -Fall precautions  ordered, bed alarm now on    COPD  Pulmonary Fibrosis  -Completed 5 day course of steroids  -Continue Symbocort/ Albuterol MDI  -Spiriva while in house  -Oxygen support as needed to maintain O2 >88%.  Currently on RA with sat at 92 %  Leukocytosis  -Resolved  -2/2 to steroids.     DYSURIA  -Resolved  -UA/Urine cx - NG     Hx DVT/PE:  -Continue with therapeutic lovenox 60mg BID for elevated D-dimer and COVID. Xarelto on dc  -D-dimer trending down (436)    HTN  -Continue metoprolol    Dementia  -A &O x 2 at baseline, more lethargic today   -Continue to monitor  -No longer requiring restraints    Hypernatremia: Resolved.  -Encourage PO intake  -Monitor labs       Pain:  -Tramadol PRN    DVT ppx  -Lovenox 60 mg BID    Dysphagia:  -Continue modified diet: Pureed-[place]    Advance care planning  multiple GOC conversations held throughout hospital course and pt's daughter would like FULL CODE    DISPO:  Discharge to [facility]     Ella Jane, pts daughter updated [phone] on pts status 5

## 2020-05-04 NOTE — PROGRESS NOTE ADULT - ASSESSMENT
86 yo male hx mild dementia, COPD, pulmonary fibrosis, DVT/PE, resident of a SNF who is being treated for COVID 19 pneumonia.  He was treated with zosyn and azithro at his SNF before being sent to [facility] for evaluation and testing.  s/p steroids and plaquenil and doxy.  He had elevated inflammatory markers ,now  moderating, CRP is 10, ferritin is 820 and d-dimer is now 436, down from peak of 2000.  It appears that his lung damage reflects pre-existing lung disease, direct effects of infection, and potentially inflammatory component of illness.    Suggest:  cont Supportive care  Monitor O2 sats and periodic inflammatory markers, ? tocilizumab if worsening resp status  DVT prophylaxis

## 2020-05-05 ENCOUNTER — TRANSCRIPTION ENCOUNTER (OUTPATIENT)
Age: 85
End: 2020-05-05

## 2020-05-05 VITALS
DIASTOLIC BLOOD PRESSURE: 62 MMHG | RESPIRATION RATE: 18 BRPM | HEART RATE: 91 BPM | SYSTOLIC BLOOD PRESSURE: 134 MMHG | OXYGEN SATURATION: 91 % | TEMPERATURE: 98 F

## 2020-05-05 LAB
ANION GAP SERPL CALC-SCNC: 8 MMOL/L — SIGNIFICANT CHANGE UP (ref 5–17)
BUN SERPL-MCNC: 27 MG/DL — HIGH (ref 7–23)
CALCIUM SERPL-MCNC: 8.3 MG/DL — LOW (ref 8.4–10.5)
CHLORIDE SERPL-SCNC: 108 MMOL/L — SIGNIFICANT CHANGE UP (ref 96–108)
CO2 SERPL-SCNC: 26 MMOL/L — SIGNIFICANT CHANGE UP (ref 22–31)
CREAT SERPL-MCNC: 1.16 MG/DL — SIGNIFICANT CHANGE UP (ref 0.5–1.3)
GLUCOSE SERPL-MCNC: 91 MG/DL — SIGNIFICANT CHANGE UP (ref 70–99)
HCT VFR BLD CALC: 42.2 % — SIGNIFICANT CHANGE UP (ref 39–50)
HGB BLD-MCNC: 13.6 G/DL — SIGNIFICANT CHANGE UP (ref 13–17)
MCHC RBC-ENTMCNC: 28.9 PG — SIGNIFICANT CHANGE UP (ref 27–34)
MCHC RBC-ENTMCNC: 32.2 GM/DL — SIGNIFICANT CHANGE UP (ref 32–36)
MCV RBC AUTO: 89.6 FL — SIGNIFICANT CHANGE UP (ref 80–100)
NRBC # BLD: 0 /100 WBCS — SIGNIFICANT CHANGE UP (ref 0–0)
PLATELET # BLD AUTO: 214 K/UL — SIGNIFICANT CHANGE UP (ref 150–400)
POTASSIUM SERPL-MCNC: 3.9 MMOL/L — SIGNIFICANT CHANGE UP (ref 3.5–5.3)
POTASSIUM SERPL-SCNC: 3.9 MMOL/L — SIGNIFICANT CHANGE UP (ref 3.5–5.3)
RBC # BLD: 4.71 M/UL — SIGNIFICANT CHANGE UP (ref 4.2–5.8)
RBC # FLD: 14.2 % — SIGNIFICANT CHANGE UP (ref 10.3–14.5)
SARS-COV-2 RNA SPEC QL NAA+PROBE: SIGNIFICANT CHANGE UP
SODIUM SERPL-SCNC: 142 MMOL/L — SIGNIFICANT CHANGE UP (ref 135–145)
WBC # BLD: 10.52 K/UL — HIGH (ref 3.8–10.5)
WBC # FLD AUTO: 10.52 K/UL — HIGH (ref 3.8–10.5)

## 2020-05-05 PROCEDURE — 82803 BLOOD GASES ANY COMBINATION: CPT

## 2020-05-05 PROCEDURE — 85379 FIBRIN DEGRADATION QUANT: CPT

## 2020-05-05 PROCEDURE — 87040 BLOOD CULTURE FOR BACTERIA: CPT

## 2020-05-05 PROCEDURE — 51701 INSERT BLADDER CATHETER: CPT

## 2020-05-05 PROCEDURE — 81001 URINALYSIS AUTO W/SCOPE: CPT

## 2020-05-05 PROCEDURE — 70450 CT HEAD/BRAIN W/O DYE: CPT

## 2020-05-05 PROCEDURE — 36415 COLL VENOUS BLD VENIPUNCTURE: CPT

## 2020-05-05 PROCEDURE — 86140 C-REACTIVE PROTEIN: CPT

## 2020-05-05 PROCEDURE — 92610 EVALUATE SWALLOWING FUNCTION: CPT

## 2020-05-05 PROCEDURE — 96367 TX/PROPH/DG ADDL SEQ IV INF: CPT | Mod: XU

## 2020-05-05 PROCEDURE — 92526 ORAL FUNCTION THERAPY: CPT

## 2020-05-05 PROCEDURE — 94640 AIRWAY INHALATION TREATMENT: CPT

## 2020-05-05 PROCEDURE — 99285 EMERGENCY DEPT VISIT HI MDM: CPT | Mod: 25

## 2020-05-05 PROCEDURE — 93005 ELECTROCARDIOGRAM TRACING: CPT

## 2020-05-05 PROCEDURE — 99239 HOSP IP/OBS DSCHRG MGMT >30: CPT | Mod: CS

## 2020-05-05 PROCEDURE — 83735 ASSAY OF MAGNESIUM: CPT

## 2020-05-05 PROCEDURE — 97162 PT EVAL MOD COMPLEX 30 MIN: CPT

## 2020-05-05 PROCEDURE — 83605 ASSAY OF LACTIC ACID: CPT

## 2020-05-05 PROCEDURE — 96365 THER/PROPH/DIAG IV INF INIT: CPT | Mod: XU

## 2020-05-05 PROCEDURE — 87086 URINE CULTURE/COLONY COUNT: CPT

## 2020-05-05 PROCEDURE — 87635 SARS-COV-2 COVID-19 AMP PRB: CPT

## 2020-05-05 PROCEDURE — 80048 BASIC METABOLIC PNL TOTAL CA: CPT

## 2020-05-05 PROCEDURE — 71045 X-RAY EXAM CHEST 1 VIEW: CPT

## 2020-05-05 PROCEDURE — 84484 ASSAY OF TROPONIN QUANT: CPT

## 2020-05-05 PROCEDURE — 83880 ASSAY OF NATRIURETIC PEPTIDE: CPT

## 2020-05-05 PROCEDURE — 83615 LACTATE (LD) (LDH) ENZYME: CPT

## 2020-05-05 PROCEDURE — 80053 COMPREHEN METABOLIC PANEL: CPT

## 2020-05-05 PROCEDURE — 84145 PROCALCITONIN (PCT): CPT

## 2020-05-05 PROCEDURE — 85027 COMPLETE CBC AUTOMATED: CPT

## 2020-05-05 PROCEDURE — 82728 ASSAY OF FERRITIN: CPT

## 2020-05-05 RX ORDER — TIOTROPIUM BROMIDE 18 UG/1
1 CAPSULE ORAL; RESPIRATORY (INHALATION)
Qty: 0 | Refills: 0 | DISCHARGE
Start: 2020-05-05

## 2020-05-05 RX ORDER — ENOXAPARIN SODIUM 100 MG/ML
60 INJECTION SUBCUTANEOUS
Qty: 0 | Refills: 0 | DISCHARGE
Start: 2020-05-05

## 2020-05-05 RX ORDER — METOPROLOL TARTRATE 50 MG
12.5 TABLET ORAL
Qty: 0 | Refills: 0 | DISCHARGE
Start: 2020-05-05

## 2020-05-05 RX ORDER — IPRATROPIUM/ALBUTEROL SULFATE 18-103MCG
3 AEROSOL WITH ADAPTER (GRAM) INHALATION
Qty: 0 | Refills: 0 | DISCHARGE

## 2020-05-05 RX ORDER — ALBUTEROL 90 UG/1
2 AEROSOL, METERED ORAL
Qty: 0 | Refills: 0 | DISCHARGE
Start: 2020-05-05

## 2020-05-05 RX ORDER — ACLIDINIUM BROMIDE 400 UG/1
0 POWDER, METERED RESPIRATORY (INHALATION)
Qty: 0 | Refills: 0 | DISCHARGE

## 2020-05-05 RX ORDER — ACETAMINOPHEN 500 MG
2 TABLET ORAL
Qty: 0 | Refills: 0 | DISCHARGE
Start: 2020-05-05

## 2020-05-05 RX ORDER — GUAIFENESIN/DEXTROMETHORPHAN 600MG-30MG
10 TABLET, EXTENDED RELEASE 12 HR ORAL
Qty: 0 | Refills: 0 | DISCHARGE
Start: 2020-05-05

## 2020-05-05 RX ADMIN — SENNA PLUS 2 TABLET(S): 8.6 TABLET ORAL at 05:06

## 2020-05-05 RX ADMIN — Medication 81 MILLIGRAM(S): at 12:01

## 2020-05-05 RX ADMIN — BUDESONIDE AND FORMOTEROL FUMARATE DIHYDRATE 2 PUFF(S): 160; 4.5 AEROSOL RESPIRATORY (INHALATION) at 09:36

## 2020-05-05 RX ADMIN — Medication 100 MILLIGRAM(S): at 12:07

## 2020-05-05 RX ADMIN — Medication 1 MILLIGRAM(S): at 12:01

## 2020-05-05 RX ADMIN — Medication 12.5 MILLIGRAM(S): at 05:07

## 2020-05-05 RX ADMIN — ENOXAPARIN SODIUM 60 MILLIGRAM(S): 100 INJECTION SUBCUTANEOUS at 16:21

## 2020-05-05 RX ADMIN — Medication 325 MILLIGRAM(S): at 12:01

## 2020-05-05 RX ADMIN — ENOXAPARIN SODIUM 60 MILLIGRAM(S): 100 INJECTION SUBCUTANEOUS at 05:06

## 2020-05-05 NOTE — DISCHARGE NOTE NURSING/CASE MANAGEMENT/SOCIAL WORK - PATIENT PORTAL LINK FT
You can access the FollowMyHealth Patient Portal offered by [facility] by registering at the following website: [email or web address]. By joining [facility]’s FollowMyHealth portal, you will also be able to view your health information using other applications (apps) compatible with our system.

## 2020-05-05 NOTE — PROGRESS NOTE ADULT - ASSESSMENT
86 yo male hx mild dementia, COPD, pulmonary fibrosis, DVT/PE, resident of a SNF who is being treated for COVID 19 pneumonia.  He was treated with zosyn and azithro at his SNF before being sent to [facility] for evaluation and testing.  s/p steroids and plaquenil and doxy.  He had elevated inflammatory markers ,now  moderating, CRP is 11, ferritin is now 578 and d-dimer is now 436, down from peak of 2000.      Suggest:  cont Supportive care  Monitor O2 sats and periodic inflammatory markers--slowly improving  DVT prophylaxis 86 yo male hx mild dementia, COPD, pulmonary fibrosis, DVT/PE, resident of a SNF who is being treated for COVID 19 pneumonia.  He was treated with zosyn and azithro at his SNF before being sent to [facility] for evaluation and testing.  s/p steroids and plaquenil and doxy.  He had elevated inflammatory markers ,now  moderating, CRP is 11, ferritin is now 578 and d-dimer is now 436, down from peak of 2000.      Suggest:  cont Supportive care  Monitor O2 sats and periodic inflammatory markers--slowly improving

## 2020-05-05 NOTE — PROGRESS NOTE ADULT - SUBJECTIVE AND OBJECTIVE BOX
CC: f/u for COVID 19    Patient with baseline dementia, no new events    REVIEW OF SYSTEMS:  All other review of systems negative (Comprehensive ROS)    Antimicrobials Day #  :off      Other Medications Reviewed    Vital Signs Last 24 Hrs  T(C): 36.9 (05 May 2020 04:30), Max: 36.9 (05 May 2020 04:30)  T(F): 98.5 (05 May 2020 04:30), Max: 98.5 (05 May 2020 04:30)  HR: 80 (05 May 2020 09:36) (73 - 87)  BP: 121/76 (05 May 2020 04:30) (108/63 - 125/79)  BP(mean): --  RR: 18 (04 May 2020 21:41) (18 - 18)  SpO2: 98% (05 May 2020 09:36) (84% - 98%)    PHYSICAL EXAM:  General: alert, no acute distress  Eyes:  anicteric, no conjunctival injection, no discharge  Oropharynx: no lesions or injection , dry mucosa	  Neck: supple, without adenopathy  Lungs: clear to auscultation  Heart: regular rate and rhythm; no murmur, rubs or gallops  Abdomen: soft, nondistended, nontender, without mass or organomegaly  Skin: no lesions  Extremities: no clubbing, cyanosis, or edema  Neurologic: alert, pleasantly confused, moves all extremities    LAB RESULTS:                                              13.6   10.52 )-----------( 214      ( 05 May 2020 06:15 )             42.2   05-05    142  |  108  |  27<H>  ----------------------------<  91  3.9   |  26  |  1.16    Ca    8.3<L>      05 May 2020 06:15          Ferritin, Serum: 578  C-Reactive Protein, Serum: 11.06 mg/dL (04.30.20 @ 07:50)  D-Dimer Assay, Quantitative: 436      MICROBIOLOGY:  RECENT CULTURES:      RADIOLOGY REVIEWED:      < from: Xray Chest 1 View- PORTABLE-Urgent (04.29.20 @ 11:39) >  EXAM:  XR CHEST PORTABLE URGENT 1V      PROCEDURE DATE:  04/29/2020        INTERPRETATION:    Examination: XR CHEST URGENT    History: ADMDIAG1: R68.89 OTHER GENERAL SYMPTOMS AND SIGNS/, SOB COVID positive patient.    Findings:  Interstitial groundglass infiltrate in the left mid and lower lung field demonstrating no significant change since 4/21/2020. There is now a much more pronounced interstitial and alveolar infiltrate in the right mid and lower lung field demonstrating significant progression since 4/21/2020. The heart is not enlarged. No hilar or mediastinal abnormality. No evidence of a pneumothorax or pleural effusion.    Impression:  1.  Stable left lung with increasing infiltrates in the right lung.    < end of copied text >

## 2020-05-05 NOTE — PROGRESS NOTE ADULT - SUBJECTIVE AND OBJECTIVE BOX
Patient is a 87y old  Male who presents with a chief complaint of Hypoxia.    Pt seen and examined at bedside pt with oral secretions.  will order suction prn and oral care.  Offers no complaints      Patient seen and examined at bedside.    ALLERGIES:  No Known Allergies    MEDICATIONS  (STANDING):  aspirin enteric coated 81 milliGRAM(s) Oral daily  budesonide 160 MICROgram(s)/formoterol 4.5 MICROgram(s) Inhaler 2 Puff(s) Inhalation two times a day  enoxaparin Injectable 60 milliGRAM(s) SubCutaneous two times a day  ferrous    sulfate 325 milliGRAM(s) Oral daily  folic acid 1 milliGRAM(s) Oral daily  metoprolol tartrate 12.5 milliGRAM(s) Oral two times a day  senna 2 Tablet(s) Oral at bedtime  thiamine 100 milliGRAM(s) Oral daily  tiotropium 18 MICROgram(s) Capsule 1 Capsule(s) Inhalation daily    MEDICATIONS  (PRN):  acetaminophen   Tablet .. 650 milliGRAM(s) Oral every 4 hours PRN Temp greater or equal to 38.5C (101.3F)  ALBUTerol    90 MICROgram(s) HFA Inhaler 2 Puff(s) Inhalation every 4 hours PRN Shortness of Breath and/or Wheezing  bisacodyl Suppository 10 milliGRAM(s) Rectal daily PRN Constipation  guaifenesin/dextromethorphan  Syrup 10 milliLiter(s) Oral every 4 hours PRN Cough  magnesium hydroxide Suspension 5 milliLiter(s) Oral daily PRN Constipation  melatonin 3 milliGRAM(s) Oral at bedtime PRN Insomnia  polyethylene glycol 3350 17 Gram(s) Oral daily PRN Constipation  traMADol 50 milliGRAM(s) Oral every 12 hours PRN Moderate Pain (4 - 6)    Vital Signs Last 24 Hrs  T(F): 98.5 (05 May 2020 04:30), Max: 98.5 (05 May 2020 04:30)  HR: 80 (05 May 2020 09:36) (73 - 87)  BP: 121/76 (05 May 2020 04:30) (108/63 - 125/79)  RR: 18 (04 May 2020 21:41) (18 - 18)  SpO2: 98% (05 May 2020 09:36) (84% - 98%)  I&O's Summary    04 May 2020 07:01  -  05 May 2020 07:00  --------------------------------------------------------  IN: 420 mL / OUT: 0 mL / NET: 420 mL      PHYSICAL EXAM:  General: NAD, Awake   ENT: MMM  Neck: Supple, No JVD  Lungs: course BS bilaterally, Non labored breathing   Cardio: RRR, S1/S2, No murmurs  Abdomen: Soft, Nontender, Nondistended; Bowel sounds present  Extremities: No calf tenderness, No pitting edema    LABS:                        13.6   10.52 )-----------( 214      ( 05 May 2020 06:15 )             42.2     05-05    142  |  108  |  27  ----------------------------<  91  3.9   |  26  |  1.16    Ca    8.3      05 May 2020 06:15      eGFR if Non African American: 56 mL/min/1.73M2 (05-05-20 @ 06:15)  eGFR if African American: 65 mL/min/1.73M2 (05-05-20 @ 06:15)                                  RADIOLOGY & ADDITIONAL TESTS:    Care Discussed with Consultants/Other Providers:

## 2020-05-16 ENCOUNTER — INPATIENT (INPATIENT)
Facility: HOSPITAL | Age: 85
LOS: 4 days | Discharge: NURSING, REHAB OR LONG-TERM CARE | DRG: 177 | End: 2020-05-21
Attending: HOSPITALIST | Admitting: INTERNAL MEDICINE
Payer: MEDICARE

## 2020-05-16 VITALS
HEIGHT: 67 IN | SYSTOLIC BLOOD PRESSURE: 142 MMHG | DIASTOLIC BLOOD PRESSURE: 63 MMHG | HEART RATE: 53 BPM | RESPIRATION RATE: 20 BRPM | WEIGHT: 139.99 LBS | OXYGEN SATURATION: 98 % | TEMPERATURE: 97 F

## 2020-05-16 DIAGNOSIS — R09.02 HYPOXEMIA: ICD-10-CM

## 2020-05-16 DIAGNOSIS — R09.89 OTHER SPECIFIED SYMPTOMS AND SIGNS INVOLVING THE CIRCULATORY AND RESPIRATORY SYSTEMS: ICD-10-CM

## 2020-05-16 DIAGNOSIS — Z98.890 OTHER SPECIFIED POSTPROCEDURAL STATES: Chronic | ICD-10-CM

## 2020-05-16 PROBLEM — F03.90 UNSPECIFIED DEMENTIA WITHOUT BEHAVIORAL DISTURBANCE: Chronic | Status: ACTIVE | Noted: 2020-04-21

## 2020-05-16 LAB
ALBUMIN SERPL ELPH-MCNC: 1.8 G/DL — LOW (ref 3.3–5)
ALP SERPL-CCNC: 104 U/L — SIGNIFICANT CHANGE UP (ref 40–120)
ALT FLD-CCNC: 30 U/L — SIGNIFICANT CHANGE UP (ref 10–45)
ANION GAP SERPL CALC-SCNC: 5 MMOL/L — SIGNIFICANT CHANGE UP (ref 5–17)
APTT BLD: 36.2 SEC — SIGNIFICANT CHANGE UP (ref 27.5–36.3)
AST SERPL-CCNC: 34 U/L — SIGNIFICANT CHANGE UP (ref 10–40)
BASOPHILS # BLD AUTO: 0.01 K/UL — SIGNIFICANT CHANGE UP (ref 0–0.2)
BASOPHILS NFR BLD AUTO: 0.1 % — SIGNIFICANT CHANGE UP (ref 0–2)
BILIRUB SERPL-MCNC: 0.6 MG/DL — SIGNIFICANT CHANGE UP (ref 0.2–1.2)
BLOOD GAS COMMENTS ARTERIAL: SIGNIFICANT CHANGE UP
BUN SERPL-MCNC: 16 MG/DL — SIGNIFICANT CHANGE UP (ref 7–23)
CALCIUM SERPL-MCNC: 8.2 MG/DL — LOW (ref 8.4–10.5)
CHLORIDE SERPL-SCNC: 100 MMOL/L — SIGNIFICANT CHANGE UP (ref 96–108)
CK SERPL-CCNC: 49 U/L — SIGNIFICANT CHANGE UP (ref 30–200)
CO2 BLDA-SCNC: 25 MMOL/L — SIGNIFICANT CHANGE UP (ref 22–30)
CO2 SERPL-SCNC: 26 MMOL/L — SIGNIFICANT CHANGE UP (ref 22–31)
CREAT SERPL-MCNC: 1.02 MG/DL — SIGNIFICANT CHANGE UP (ref 0.5–1.3)
CRP SERPL-MCNC: 4.77 MG/DL — HIGH (ref 0–0.4)
D DIMER BLD IA.RAPID-MCNC: 1048 NG/ML DDU — HIGH
EOSINOPHIL # BLD AUTO: 0.21 K/UL — SIGNIFICANT CHANGE UP (ref 0–0.5)
EOSINOPHIL NFR BLD AUTO: 1.9 % — SIGNIFICANT CHANGE UP (ref 0–6)
FERRITIN SERPL-MCNC: 499 NG/ML — HIGH (ref 30–400)
GAS PNL BLDA: SIGNIFICANT CHANGE UP
GLUCOSE SERPL-MCNC: 89 MG/DL — SIGNIFICANT CHANGE UP (ref 70–99)
HCT VFR BLD CALC: 39.4 % — SIGNIFICANT CHANGE UP (ref 39–50)
HGB BLD-MCNC: 13.1 G/DL — SIGNIFICANT CHANGE UP (ref 13–17)
HOROWITZ INDEX BLDA+IHG-RTO: SIGNIFICANT CHANGE UP
IMM GRANULOCYTES NFR BLD AUTO: 0.4 % — SIGNIFICANT CHANGE UP (ref 0–1.5)
INR BLD: 1.16 RATIO — SIGNIFICANT CHANGE UP (ref 0.88–1.16)
LACTATE SERPL-SCNC: 1.4 MMOL/L — SIGNIFICANT CHANGE UP (ref 0.7–2)
LDH SERPL L TO P-CCNC: 623 U/L — HIGH (ref 50–242)
LYMPHOCYTES # BLD AUTO: 1.12 K/UL — SIGNIFICANT CHANGE UP (ref 1–3.3)
LYMPHOCYTES # BLD AUTO: 10.2 % — LOW (ref 13–44)
MCHC RBC-ENTMCNC: 29.6 PG — SIGNIFICANT CHANGE UP (ref 27–34)
MCHC RBC-ENTMCNC: 33.2 GM/DL — SIGNIFICANT CHANGE UP (ref 32–36)
MCV RBC AUTO: 89.1 FL — SIGNIFICANT CHANGE UP (ref 80–100)
MONOCYTES # BLD AUTO: 0.41 K/UL — SIGNIFICANT CHANGE UP (ref 0–0.9)
MONOCYTES NFR BLD AUTO: 3.7 % — SIGNIFICANT CHANGE UP (ref 2–14)
NEUTROPHILS # BLD AUTO: 9.23 K/UL — HIGH (ref 1.8–7.4)
NEUTROPHILS NFR BLD AUTO: 83.7 % — HIGH (ref 43–77)
NRBC # BLD: 0 /100 WBCS — SIGNIFICANT CHANGE UP (ref 0–0)
NT-PROBNP SERPL-SCNC: 751 PG/ML — HIGH (ref 0–300)
PCO2 BLDA: 29 MMHG — LOW (ref 32–46)
PH BLDA: 7.52 — HIGH (ref 7.35–7.45)
PLATELET # BLD AUTO: 545 K/UL — HIGH (ref 150–400)
PO2 BLDA: 56 MMHG — LOW (ref 74–108)
POTASSIUM SERPL-MCNC: 5.1 MMOL/L — SIGNIFICANT CHANGE UP (ref 3.5–5.3)
POTASSIUM SERPL-SCNC: 5.1 MMOL/L — SIGNIFICANT CHANGE UP (ref 3.5–5.3)
PROCALCITONIN SERPL-MCNC: 0.06 NG/ML — SIGNIFICANT CHANGE UP
PROT SERPL-MCNC: 7.4 G/DL — SIGNIFICANT CHANGE UP (ref 6–8.3)
PROTHROM AB SERPL-ACNC: 13.2 SEC — HIGH (ref 10–12.9)
RBC # BLD: 4.42 M/UL — SIGNIFICANT CHANGE UP (ref 4.2–5.8)
RBC # FLD: 15.3 % — HIGH (ref 10.3–14.5)
SAO2 % BLDA: 90 % — LOW (ref 92–96)
SODIUM SERPL-SCNC: 131 MMOL/L — LOW (ref 135–145)
TROPONIN I SERPL-MCNC: <.017 NG/ML — LOW (ref 0.02–0.06)
WBC # BLD: 11.02 K/UL — HIGH (ref 3.8–10.5)
WBC # FLD AUTO: 11.02 K/UL — HIGH (ref 3.8–10.5)

## 2020-05-16 PROCEDURE — 99285 EMERGENCY DEPT VISIT HI MDM: CPT | Mod: CS

## 2020-05-16 PROCEDURE — 93010 ELECTROCARDIOGRAM REPORT: CPT

## 2020-05-16 PROCEDURE — 71045 X-RAY EXAM CHEST 1 VIEW: CPT | Mod: 26

## 2020-05-16 RX ORDER — PANTOPRAZOLE SODIUM 20 MG/1
40 TABLET, DELAYED RELEASE ORAL
Refills: 0 | Status: DISCONTINUED | OUTPATIENT
Start: 2020-05-16 | End: 2020-05-21

## 2020-05-16 RX ORDER — SODIUM CHLORIDE 9 MG/ML
1000 INJECTION INTRAMUSCULAR; INTRAVENOUS; SUBCUTANEOUS
Refills: 0 | Status: DISCONTINUED | OUTPATIENT
Start: 2020-05-16 | End: 2020-05-18

## 2020-05-16 RX ORDER — BUDESONIDE AND FORMOTEROL FUMARATE DIHYDRATE 160; 4.5 UG/1; UG/1
2 AEROSOL RESPIRATORY (INHALATION)
Refills: 0 | Status: DISCONTINUED | OUTPATIENT
Start: 2020-05-16 | End: 2020-05-21

## 2020-05-16 RX ORDER — AZITHROMYCIN 500 MG/1
500 TABLET, FILM COATED ORAL ONCE
Refills: 0 | Status: COMPLETED | OUTPATIENT
Start: 2020-05-16 | End: 2020-05-16

## 2020-05-16 RX ORDER — FOLIC ACID 0.8 MG
1 TABLET ORAL DAILY
Refills: 0 | Status: DISCONTINUED | OUTPATIENT
Start: 2020-05-16 | End: 2020-05-21

## 2020-05-16 RX ORDER — TIOTROPIUM BROMIDE 18 UG/1
1 CAPSULE ORAL; RESPIRATORY (INHALATION) DAILY
Refills: 0 | Status: DISCONTINUED | OUTPATIENT
Start: 2020-05-16 | End: 2020-05-21

## 2020-05-16 RX ORDER — ENOXAPARIN SODIUM 100 MG/ML
60 INJECTION SUBCUTANEOUS
Refills: 0 | Status: DISCONTINUED | OUTPATIENT
Start: 2020-05-16 | End: 2020-05-20

## 2020-05-16 RX ORDER — AZITHROMYCIN 500 MG/1
500 TABLET, FILM COATED ORAL EVERY 24 HOURS
Refills: 0 | Status: COMPLETED | OUTPATIENT
Start: 2020-05-16 | End: 2020-05-20

## 2020-05-16 RX ORDER — ALBUTEROL 90 UG/1
2 AEROSOL, METERED ORAL ONCE
Refills: 0 | Status: COMPLETED | OUTPATIENT
Start: 2020-05-16 | End: 2020-05-16

## 2020-05-16 RX ORDER — ALBUTEROL 90 UG/1
1 AEROSOL, METERED ORAL EVERY 6 HOURS
Refills: 0 | Status: DISCONTINUED | OUTPATIENT
Start: 2020-05-16 | End: 2020-05-21

## 2020-05-16 RX ORDER — POLYETHYLENE GLYCOL 3350 17 G/17G
17 POWDER, FOR SOLUTION ORAL DAILY
Refills: 0 | Status: DISCONTINUED | OUTPATIENT
Start: 2020-05-16 | End: 2020-05-21

## 2020-05-16 RX ORDER — METOPROLOL TARTRATE 50 MG
12.5 TABLET ORAL
Refills: 0 | Status: DISCONTINUED | OUTPATIENT
Start: 2020-05-16 | End: 2020-05-21

## 2020-05-16 RX ORDER — PHENYLEPHRINE HCL 0.25 %
1 AEROSOL, SPRAY WITH PUMP (ML) NASAL EVERY 4 HOURS
Refills: 0 | Status: DISCONTINUED | OUTPATIENT
Start: 2020-05-16 | End: 2020-05-21

## 2020-05-16 RX ORDER — SENNA PLUS 8.6 MG/1
2 TABLET ORAL AT BEDTIME
Refills: 0 | Status: DISCONTINUED | OUTPATIENT
Start: 2020-05-16 | End: 2020-05-21

## 2020-05-16 RX ORDER — ASPIRIN/CALCIUM CARB/MAGNESIUM 324 MG
81 TABLET ORAL DAILY
Refills: 0 | Status: DISCONTINUED | OUTPATIENT
Start: 2020-05-16 | End: 2020-05-21

## 2020-05-16 RX ORDER — SODIUM CHLORIDE 9 MG/ML
500 INJECTION INTRAMUSCULAR; INTRAVENOUS; SUBCUTANEOUS ONCE
Refills: 0 | Status: COMPLETED | OUTPATIENT
Start: 2020-05-16 | End: 2020-05-16

## 2020-05-16 RX ORDER — ACETAMINOPHEN 500 MG
650 TABLET ORAL EVERY 6 HOURS
Refills: 0 | Status: DISCONTINUED | OUTPATIENT
Start: 2020-05-16 | End: 2020-05-21

## 2020-05-16 RX ADMIN — BUDESONIDE AND FORMOTEROL FUMARATE DIHYDRATE 2 PUFF(S): 160; 4.5 AEROSOL RESPIRATORY (INHALATION) at 22:56

## 2020-05-16 RX ADMIN — Medication 40 MILLIGRAM(S): at 17:50

## 2020-05-16 RX ADMIN — SODIUM CHLORIDE 500 MILLILITER(S): 9 INJECTION INTRAMUSCULAR; INTRAVENOUS; SUBCUTANEOUS at 13:34

## 2020-05-16 RX ADMIN — ALBUTEROL 2 PUFF(S): 90 AEROSOL, METERED ORAL at 13:00

## 2020-05-16 RX ADMIN — TIOTROPIUM BROMIDE 1 CAPSULE(S): 18 CAPSULE ORAL; RESPIRATORY (INHALATION) at 22:56

## 2020-05-16 RX ADMIN — Medication 125 MILLIGRAM(S): at 13:32

## 2020-05-16 RX ADMIN — AZITHROMYCIN 255 MILLIGRAM(S): 500 TABLET, FILM COATED ORAL at 12:00

## 2020-05-16 RX ADMIN — ENOXAPARIN SODIUM 60 MILLIGRAM(S): 100 INJECTION SUBCUTANEOUS at 17:51

## 2020-05-16 RX ADMIN — ALBUTEROL 1 PUFF(S): 90 AEROSOL, METERED ORAL at 22:59

## 2020-05-16 RX ADMIN — SODIUM CHLORIDE 75 MILLILITER(S): 9 INJECTION INTRAMUSCULAR; INTRAVENOUS; SUBCUTANEOUS at 15:14

## 2020-05-16 NOTE — ED PROVIDER NOTE - CLINICAL SUMMARY MEDICAL DECISION MAKING FREE TEXT BOX
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 No fevers or chills. +productive. On exam pt is chronically ill appearing, dry mucosa, +rhoncorous lungs, abdo soft/nt/nd, moving all 4 ext, alert but not oriented. Concern for hypoxia for COVID PNA, given dehydration will gently hydrate, will require admission for hypoxia.

## 2020-05-16 NOTE — SWALLOW BEDSIDE ASSESSMENT ADULT - ASR SWALLOW ASPIRATION MONITOR
pneumonia/oral hygiene/position upright (90Y)/gurgly voice/upper respiratory infection/fever/throat clearing/change of breathing pattern/cough

## 2020-05-16 NOTE — H&P ADULT - NSHPLABSRESULTS_GEN_ALL_CORE
13.1   11.02 )-----------( 545      ( 16 May 2020 10:30 )             39.4     Lactate, Blood: 1.4 mmol/L (05-16 @ 10:30)    05-16    131  |  100  |  16  ----------------------------<  89  5.1   |  26  |  1.02    Ca    8.2      16 May 2020 10:30    TPro  7.4  /  Alb  1.8  /  TBili  0.6  /  DBili  x   /  AST  34  /  ALT  30  /  AlkPhos  104  05-16    PT/INR - ( 16 May 2020 10:30 )   PT: 13.2 sec;   INR: 1.16 ratio         PTT - ( 16 May 2020 10:30 )  PTT:36.2 sec  CARDIAC MARKERS ( 16 May 2020 10:30 )  <.017 ng/mL / x     / 49 U/L / x     / x                ABG - ( 16 May 2020 10:45 )  pH, Arterial: 7.52  pH, Blood: x     /  pCO2: 29    /  pO2: 56    / HCO3: x     / Base Excess: x     /  SaO2: 90          < from: Xray Chest 1 View-PORTABLE IMMEDIATE (05.16.20 @ 11:25) >      Findings:  Interval improvement of bilateral lower lung airspace disease. Redemonstration of left upper and right lower lung calcified granulomata. Stable, magnified cardiac and mediastinal silhouette. Posttraumatic right rib deformities.    Impression:  1.  Improving airspace disease.      DISCRETE X-RAY DATA:  Percent of LEFT lung opacification: 34-66%  Percent of RIGHT lung opacification: 34-66%  Change in lung opacification from most recent x-ray (<=3 days): No Prior  Change from prior dated 3 or more days (same admission): Amina GUNN M.D.,ATTENDING RADIOLOGIST  This document has been electronically signed. May 16 2020 11:31AM    < end of copied text >

## 2020-05-16 NOTE — H&P ADULT - ASSESSMENT
87 male with pmh dvt/pe on xarelto, dementia, copd not on home oxygen, pulmonary fibrosis, recent admission [facility] 4/21 - 5/5 secondary to sob and covid pneumonia with hypoxia now returns with acute hypoxic respiratory failure.    # Acute Hypoxic Respiratory Failure with Covid pneumonia  # Acute COPD Exacerbation  # Chronic Pulmonary Fibrosis  Pt presents with acute hypoxia Spo2 80% - improved to > 90% with nasal canula  chest xray reveals interval improvement in bilateral lower lung airspace dz compared to prior  ABG reveals respiratory alkalosis  plan for pulmonary consult  continue spiriva, albuterol, symbicort, robitussin, steroids  supplemental oxygen therapy maintain sats > 90%  chest PT, incentive spirometry  follow up repeat covid pcr swab    # Elevated D-Dimer  # Hx PE/DVT  D-Dimer found to be 1048, high suspicion for dvt/pe  plan to continue treatment dose lovenox for now  follow up am labs    # Dementia  chronic stable condition  continue supportive care    # Essential Hypertension  continue metoprolol 12.5 bid  monitor BP closely    # Hyponatremia  pt appears clinically dehydrated - pt received normal saline bolus in ED  follow up bmp    #prophylactic measure  continue treatment dose lovenox and protonix 87 male with pmh dvt/pe on xarelto, dementia, copd not on home oxygen, pulmonary fibrosis, recent admission [facility] 4/21 - 5/5 secondary to sob and covid pneumonia with hypoxia now returns with acute hypoxic respiratory failure.    # Acute Hypoxic Respiratory Failure with Covid pneumonia  # Acute COPD Exacerbation  # Chronic Pulmonary Fibrosis  Pt presents with acute hypoxia Spo2 80% - improved to > 90% with nasal canula  chest xray reveals interval improvement in bilateral lower lung airspace dz compared to prior  ABG reveals respiratory alkalosis  plan for pulmonary consult  continue spiriva, albuterol, symbicort, robitussin, steroids, zithromax  supplemental oxygen therapy maintain sats > 90%  chest PT, incentive spirometry  follow up repeat covid pcr swab    # Elevated D-Dimer  # Hx PE/DVT  D-Dimer found to be 1048, high suspicion for dvt/pe  plan to continue treatment dose lovenox for now  follow up am labs    # Dementia  chronic stable condition  continue supportive care    # Essential Hypertension  continue metoprolol 12.5 bid  monitor BP closely    # Hyponatremia  pt appears clinically dehydrated - pt received normal saline bolus in ED  follow up bmp - gentle ivf hydration    #prophylactic measure  continue treatment dose lovenox and protonix 87 male with pmh dvt/pe on xarelto, dementia, copd not on home oxygen, pulmonary fibrosis, recent admission [facility] 4/21 - 5/5 secondary to sob and covid pneumonia with hypoxia now returns with acute hypoxic respiratory failure.    # Acute Hypoxic Respiratory Failure with Covid pneumonia  # Acute COPD Exacerbation  # Chronic Pulmonary Fibrosis  Pt presents with acute hypoxia Spo2 80% - improved to > 90% with nasal canula  chest xray reveals interval improvement in bilateral lower lung airspace dz compared to prior  ABG reveals respiratory alkalosis  plan for pulmonary consult  continue spiriva, albuterol, symbicort, robitussin, steroids, zithromax  supplemental oxygen therapy maintain sats > 90%  chest PT, incentive spirometry  follow up repeat covid pcr swab    # Elevated D-Dimer  # Hx PE/DVT  D-Dimer found to be 1048, high suspicion for dvt/pe  plan to continue treatment dose lovenox for now  follow up am labs    # Dementia  chronic stable condition  continue supportive care    # Essential Hypertension  continue metoprolol 12.5 bid  monitor BP closely    # Hyponatremia  pt appears clinically dehydrated - pt received normal saline bolus in ED  follow up bmp - gentle ivf hydration    # Hx of Dysphagia  started puree with nectar  follow up speech and swallow evaluation    #prophylactic measure  continue treatment dose lovenox and protonix 87 male with pmh dvt/pe on xarelto, dementia, copd not on home oxygen, pulmonary fibrosis, recent admission [facility] 4/21 - 5/5 secondary to sob and covid pneumonia with hypoxia now returns with acute hypoxic respiratory failure.    # Acute Hypoxic Respiratory Failure with Covid pneumonia  # Acute COPD Exacerbation  # Chronic Pulmonary Fibrosis  Pt presents with acute hypoxia Spo2 80% - improved to > 90% with nasal canula  chest xray reveals interval improvement in bilateral lower lung airspace dz compared to prior  ABG reveals respiratory alkalosis  plan for pulmonary consult  continue spiriva, albuterol, symbicort, robitussin, steroids, zithromax  supplemental oxygen therapy maintain sats > 90%  chest PT, incentive spirometry  follow up repeat covid pcr swab    # Elevated D-Dimer  # Hx PE/DVT on xarelto  D-Dimer found to be 1048, likely due to covid  plan to continue treatment dose lovenox for now  follow up am labs  f/u repeat covid PCR    # Dementia  chronic stable condition  continue supportive care    # Essential Hypertension  continue metoprolol 12.5 bid  monitor BP closely    # Hyponatremia  pt appears clinically dehydrated - pt received normal saline bolus in ED  follow up bmp - gentle ivf hydration    # Hx of Dysphagia  started puree with nectar  follow up speech and swallow evaluation    #prophylactic measure  continue treatment dose lovenox and protonix

## 2020-05-16 NOTE — ED PROVIDER NOTE - OBJECTIVE STATEMENT
Pt is 87M h/o DVT/PE on Xarelto, dementia, COPD not on home O2, pulm fibrosis, recently admitted for COVID PNA (diagnosed  on 4/22, retested neg on 5/4), from rehab for hypoxia. As per EMS pt has been noted to have O2 sat in mid 80's and seems to be in more distress than usually. (+) cough, unsure if at baseline.

## 2020-05-16 NOTE — SWALLOW BEDSIDE ASSESSMENT ADULT - ORAL PREPARATORY PHASE
Reduced oral grading Reduced oral grading/Anterior loss of bolus Anterior loss of bolus/Reduced oral grading/Decreased mastication ability

## 2020-05-16 NOTE — SWALLOW BEDSIDE ASSESSMENT ADULT - SLP PERTINENT HISTORY OF CURRENT PROBLEM
87 male with hx dvt/pe (on xarelto), dementia, copd (no home oxygen), pulmonary fibrosis, cad, pvd, recently admitted 2/2 covid pneumonia with hypoxia discharged to [facility] now returns secondary to hypoxia. Pt unable to report therefore history obtained from files. Pt was admitted to [facility] on 4/21 secondary to B/L pneumonia, hypoxia, found to be covid positive and discharged back to facility on 5/5.  Pt returned to [facility] secondary to acute hypoxia.  Found with Spo2 80% on room air.  Pt tested positive for covid on 4/22 then retested negative as of 5/4.  Pt provided with supplemental oxygen via nasal canula upon arrival to ED with Sp02 improving to > 90%.  As per facility patient has also been having a productive cough, is chronically ill appearing. No fever, chills, chest pain, abdominal pain, nausea or vomiting.

## 2020-05-16 NOTE — H&P ADULT - HISTORY OF PRESENT ILLNESS
87 male with hx dvt/pe on xarelto, dementia, copd not on home oxygen, pulmonary fibrosis, cad, pvd, recently admitted 2/2 covid pneumonia with hypoxia discharged to [facility] now returns secondary to hypoxia.  Pt is a poor historian, unable to provide details.  History obtained from chart notes - Pt was admitted to [facility] on 4/21 secondary to B/L pneumonia, hypoxia, found to be covid positive and discharged back to facility on 5/5.  Pt returns today secondary to acute hypoxia.  Found with Spo2 80% on room air.  Pt tested positive for covid on 4/22 then retested negative as of 5/4.  Pt provided with supplemental oxygen via nasal canula upon arrival to ED with Sp02 now > 90%.  As per facility patient has also been having a productive cough, is chronically ill appearing. No fever, chills, chest pain, abdominal pain, nausea or vomiting.

## 2020-05-16 NOTE — H&P ADULT - NSHPPHYSICALEXAM_GEN_ALL_CORE
T(C): 36.3 (05-16-20 @ 11:45), Max: 36.3 (05-16-20 @ 10:21)  HR: 58 (05-16-20 @ 13:00) (53 - 66)  BP: 142/62 (05-16-20 @ 11:45) (142/62 - 142/63)  RR: 20 (05-16-20 @ 11:45) (20 - 20)  SpO2: 98% (05-16-20 @ 11:45) (98% - 98%)  Wt(kg): --Vital Signs Last 24 Hrs  T(C): 36.3 (16 May 2020 11:45), Max: 36.3 (16 May 2020 10:21)  T(F): 97.3 (16 May 2020 11:45), Max: 97.3 (16 May 2020 10:21)  HR: 58 (16 May 2020 13:00) (53 - 66)  BP: 142/62 (16 May 2020 11:45) (142/62 - 142/63)  BP(mean): --  RR: 20 (16 May 2020 11:45) (20 - 20)  SpO2: 98% (16 May 2020 11:45) (98% - 98%)    PHYSICAL EXAM:  GENERAL: NAD, ill appearing  HEAD:  Atraumatic, Normocephalic  EYES: EOMI, PERRLA, conjunctiva and sclera clear  ENMT: No tonsillar erythema, exudates, or enlargement, Dry mucous membranes  NECK: Supple, No JVD, Normal thyroid  NERVOUS SYSTEM:  Alert but appears confused, unable to follow commands at this time  CHEST/LUNG: B/L rhonchi, diminished  HEART: S1S2 regular  ABDOMEN: Soft, Nontender, Nondistended; Bowel sounds present  EXTREMITIES:  2+ Peripheral Pulses, No clubbing, cyanosis, or edema  LYMPH: No lymphadenopathy noted  SKIN: No rashes or lesions

## 2020-05-16 NOTE — ED ADULT TRIAGE NOTE - CHIEF COMPLAINT QUOTE
[facility] from  [facility] for O2 Sat of 80% on RA. Pt hx of covid + , arrives on NRB mask with o2 Sat of 98%

## 2020-05-16 NOTE — ED PROVIDER NOTE - CARE PLAN
Principal Discharge DX:	Hypoxia  Secondary Diagnosis:	Suspected 2019 novel coronavirus infection Principal Discharge DX:	Hypoxia  Secondary Diagnosis:	Suspected 2019 novel coronavirus infection  Secondary Diagnosis:	COPD exacerbation  Secondary Diagnosis:	Hyponatremia

## 2020-05-16 NOTE — SWALLOW BEDSIDE ASSESSMENT ADULT - ORAL PHASE
Decreased anterior-posterior movement of the bolus/Delayed oral transit time Decreased anterior-posterior movement of the bolus Lingual stasis/Decreased anterior-posterior movement of the bolus/Delayed oral transit time

## 2020-05-16 NOTE — SWALLOW BEDSIDE ASSESSMENT ADULT - COMMENTS
WBC: 11.02  Chest XRay 5/16: Interval improvement of bilateral lower lung airspace disease. Redemonstration of left upper and right lower lung calcified granulomata. Stable, magnified cardiac and mediastinal silhouette. Posttraumatic right rib deformities.  Head CT 5/3/20: Moderate chronic microvascular changes without evidence of an acute transcortical infarction or hemorrhage. MRI is a more sensitive imaging modality for the evaluation of an acute infarction.   MRI: None available Trialed thin liquids secondary to increased WOB and desaturations with nectar and honey thick liquids. Suspected increased WOB may improve with clear thin liquids secondary to consistency; however pt. continued to demonstrate overt s/s of aspiration/penetration.

## 2020-05-16 NOTE — ED PROVIDER NOTE - CPE EDP SKIN NORM
Ears: no ear pain and no hearing problems.Nose: no nasal congestion and no nasal drainage.Mouth/Throat: no dysphagia, no hoarseness and no throat pain.Neck: no lumps, no pain, no stiffness and no swollen glands. normal...

## 2020-05-16 NOTE — SWALLOW BEDSIDE ASSESSMENT ADULT - PHARYNGEAL PHASE
Decreased laryngeal elevation/Delayed cough post oral intake Delayed cough post oral intake/Decreased laryngeal elevation Decreased laryngeal elevation/Delayed cough post oral intake/Cough post oral intake Cough post oral intake/Decreased laryngeal elevation/Delayed cough post oral intake/Multiple swallows

## 2020-05-16 NOTE — ED PROVIDER NOTE - QUALITY
Subjective:      Corby De Luna is a 58 y.o. female who complains of dysuria, hematuria, suprapubic pressure for 3 days. Patient also complains of -. Patient denies back pain. Patient does have a history of recurrent UTI. Patient does not have a history of pyelonephritis. Was takin bactrim for 6 doses with improvement of burning not urge and pressure to urinate. Patient's medications, allergies, past medical, surgical, social and family histories were reviewed and updated as appropriate. Review of Systems  A comprehensive review of systems was negative. Objective:      /80   Pulse 81   Temp 97.9 °F (36.6 °C)   Wt 156 lb (70.8 kg)   SpO2 99%   BMI 25.18 kg/m²   General: alert, appears stated age and cooperative   Abdomen: soft and tenderness mild in the RLQ and suprapubic area. -   Back: CVA tenderness absent   : defer exam  H: rrr without m  L: ctab      Laboratory:   Urine dipstick shows negative for nitrites. lg leukocytes, mod blood  Micro exam: not done. Assessment:      Acute cystitis      Plan:  Plan:      1. Medications: ciprofloxacin 250 mg tid x 3 days. Take last dose of Bactrim tonight. 2. Maintain adequate hydration  3. Follow up if symptoms not improving, and prn. non-productive cough

## 2020-05-16 NOTE — SWALLOW BEDSIDE ASSESSMENT ADULT - SLP GENERAL OBSERVATIONS
Pt. received asleep in bed but easily awoken and willing to sit upright in bed. Pt. unable to follow simple directives and minimally verbal. Pt. observed with 3LPM O2 via NC with O2 sats 88-90% at baseline, desaturating to 83-86% during PO intake with recovery to baseline after approximately 3 minutes.

## 2020-05-16 NOTE — ED ADULT NURSE NOTE - PMH
CAD (coronary artery disease)    Deep vein thrombosis (DVT)    Dementia    Dyslipidemia    History of COPD    [place] (hard of hearing)    PE (pulmonary thromboembolism)    Peripheral vascular disease

## 2020-05-16 NOTE — ED ADULT NURSE NOTE - OBJECTIVE STATEMENT
PT SENT TO ED FOR EVALUATION OF SHORTNESS OF BREATHE, TACHYPNEA AND LOW 02 SATURATION. PT RECENTLY TESTED POSITIVE FOR COVID-19.

## 2020-05-16 NOTE — SWALLOW BEDSIDE ASSESSMENT ADULT - SWALLOW EVAL: DIAGNOSIS
Pt presents with severe oropharyngeal dysphagia superimposed by respiratory status. Pt received on 3LPM O2 via NC with baseline O2 sats 88-90%. Pt. provided with oral care prior to PO trials. Pt. willing to accept PO trials of thin liquids, nectar thick and honey thick liquids and puree textures. Pt. with poor labial closure and clearance from teaspoon with all PO trials. Pt. with delayed oral grading and suspected delayed anterior to posterior transit of bolus. Pt. with reduced hyolaryngeal excursion/elevation via palpation. Pt. with overt s/s of aspiration/penetration with puree textures, thin liquids, nectar and honey thick liquids including delayed wet cough, increased WOB and desaturations of O2 to 83-86% intermittently.

## 2020-05-16 NOTE — SWALLOW BEDSIDE ASSESSMENT ADULT - SLP PRECAUTIONS/LIMITATIONS: HEARING
impaired/Pt made gestural cue that he was unable to hear this SLP. Compliance improved with visual cues/models

## 2020-05-16 NOTE — H&P ADULT - NSICDXPASTMEDICALHX_GEN_ALL_CORE_FT
PAST MEDICAL HISTORY:  CAD (coronary artery disease)     Deep vein thrombosis (DVT)     Dementia     Dyslipidemia     History of COPD     [place] (hard of hearing)     PE (pulmonary thromboembolism)     Peripheral vascular disease

## 2020-05-16 NOTE — SWALLOW BEDSIDE ASSESSMENT ADULT - SWALLOW EVAL: ORAL MUSCULATURE
Progress Note -    Baby Antoni Rae Jeronimo 22 hours male MRN: [id number]  Unit/Bed#: L&D 304(n) Encounter: [id number]      Assessment: Gestational Age: 36w3d male well infant born to a teenage mom with a history of depression and anxiety, who received late prenatal care  Baby is ; passing urine and stool  Weight loss is appropriate (-1 02%)  Plan: normal  care  Social work consult pending  Subjective     25 hours old live    Stable, no events noted overnight  Mom offers no concerns  Feedings (last 2 days)     Date/Time   Feeding Type   Feeding Route    19 1100  Breast milk  Breast    19 1030  Breast milk  Breast            Output: Unmeasured Urine Occurrence: 1  Unmeasured Stool Occurrence: 1    Objective   Vitals:   Temperature: 98 2 °F (36 8 °C) (after bath )  Pulse: 130  Respirations: 37  Length: 20" (50 8 cm) (Filed from Delivery Summary)  Weight: 3311 g (7 lb 4 8 oz)     Physical Exam:   General Appearance:  Alert, active, no distress  Head:  Normocephalic, AFOF                             Eyes:  Conjunctiva clear   Ears:  Normally placed, no anomalies  Nose: nares patent                           Mouth:  Palate intact  Respiratory:  No grunting, flaring, retractions, breath sounds clear and equal    Cardiovascular:  Regular rate and rhythm  No murmur  Adequate perfusion/capillary refill  Femoral pulse present  Abdomen:   Soft, non-distended, no masses, bowel sounds present, no HSM  Genitourinary:  Normal male, uncircumcised penis, testes descended bilaterally, anus patent  Spine:  No hair noelle, dimples  Musculoskeletal:  Normal hips  Skin/Hair/Nails:   Skin warm, dry, and intact, no rashes               Neurologic:   Normal tone and reflexes      Labs: Pertinent labs reviewed  Urine toxicology negative  Blood type: B+, Jillian negative  unable to assess due to poor participation/comprehension

## 2020-05-17 LAB — SARS-COV-2 RNA SPEC QL NAA+PROBE: SIGNIFICANT CHANGE UP

## 2020-05-17 PROCEDURE — 71275 CT ANGIOGRAPHY CHEST: CPT | Mod: 26

## 2020-05-17 RX ADMIN — BUDESONIDE AND FORMOTEROL FUMARATE DIHYDRATE 2 PUFF(S): 160; 4.5 AEROSOL RESPIRATORY (INHALATION) at 08:53

## 2020-05-17 RX ADMIN — Medication 40 MILLIGRAM(S): at 05:11

## 2020-05-17 RX ADMIN — BUDESONIDE AND FORMOTEROL FUMARATE DIHYDRATE 2 PUFF(S): 160; 4.5 AEROSOL RESPIRATORY (INHALATION) at 21:49

## 2020-05-17 RX ADMIN — SODIUM CHLORIDE 75 MILLILITER(S): 9 INJECTION INTRAMUSCULAR; INTRAVENOUS; SUBCUTANEOUS at 10:22

## 2020-05-17 RX ADMIN — Medication 40 MILLIGRAM(S): at 20:01

## 2020-05-17 RX ADMIN — ENOXAPARIN SODIUM 60 MILLIGRAM(S): 100 INJECTION SUBCUTANEOUS at 20:02

## 2020-05-17 RX ADMIN — TIOTROPIUM BROMIDE 1 CAPSULE(S): 18 CAPSULE ORAL; RESPIRATORY (INHALATION) at 08:53

## 2020-05-17 RX ADMIN — ENOXAPARIN SODIUM 60 MILLIGRAM(S): 100 INJECTION SUBCUTANEOUS at 05:11

## 2020-05-17 NOTE — PROGRESS NOTE ADULT - SUBJECTIVE AND OBJECTIVE BOX
Patient is a 87y old  Male who presents with a chief complaint of Hypoxia     Patient seen and examined at bedside.    ALLERGIES:  No Known Allergies    MEDICATIONS  (STANDING):  aspirin enteric coated 81 milliGRAM(s) Oral daily  azithromycin   Tablet 500 milliGRAM(s) Oral every 24 hours  budesonide 160 MICROgram(s)/formoterol 4.5 MICROgram(s) Inhaler 2 Puff(s) Inhalation two times a day  enoxaparin Injectable 60 milliGRAM(s) SubCutaneous two times a day  folic acid 1 milliGRAM(s) Oral daily  methylPREDNISolone sodium succinate Injectable 40 milliGRAM(s) IV Push every 12 hours  metoprolol tartrate 12.5 milliGRAM(s) Oral two times a day  pantoprazole    Tablet 40 milliGRAM(s) Oral before breakfast  senna 2 Tablet(s) Oral at bedtime  sodium chloride 0.9%. 1000 milliLiter(s) (75 mL/Hr) IV Continuous <Continuous>  tiotropium 18 MICROgram(s) Capsule 1 Capsule(s) Inhalation daily    MEDICATIONS  (PRN):  acetaminophen   Tablet .. 650 milliGRAM(s) Oral every 6 hours PRN Temp greater or equal to 38C (100.4F), Mild Pain (1 - 3)  ALBUTerol    90 MICROgram(s) HFA Inhaler 1 Puff(s) Inhalation every 6 hours PRN Shortness of Breath and/or Wheezing  guaiFENesin   Syrup  (Sugar-Free) 100 milliGRAM(s) Oral every 6 hours PRN Cough  phenylephrine 0.5% Nasal 1 Spray(s) Nasal every 4 hours PRN Nasal Congestion  polyethylene glycol 3350 17 Gram(s) Oral daily PRN Constipation    Vital Signs Last 24 Hrs  T(F): 97.4 (17 May 2020 05:07), Max: 97.5 (16 May 2020 21:52)  HR: 60 (17 May 2020 05:07) (53 - 84)  BP: 147/63 (17 May 2020 05:07) (131/56 - 155/75)  RR: 117 (17 May 2020 05:07) (18 - 117)  SpO2: 93% (17 May 2020 05:07) (89% - 98%)  I&O's Summary    PHYSICAL EXAM:  General: NAD, A/O x 3  ENT: MMM  Neck: Supple, No JVD  Lungs: Clear to auscultation bilaterally, Non labored breathing   Cardio: RRR, S1/S2, No murmurs  Abdomen: Soft, Nontender, Nondistended; Bowel sounds present  Extremities: No calf tenderness, No pitting edema    LABS:                        13.1   11.02 )-----------( 545      ( 16 May 2020 10:30 )             39.4     05-16    131  |  100  |  16  ----------------------------<  89  5.1   |  26  |  1.02    Ca    8.2      16 May 2020 10:30    TPro  7.4  /  Alb  1.8  /  TBili  0.6  /  DBili  x   /  AST  34  /  ALT  30  /  AlkPhos  104  05-16    eGFR if Non African American: 66 mL/min/1.73M2 (05-16-20 @ 10:30)  eGFR if : 77 mL/min/1.73M2 (05-16-20 @ 10:30)    PT/INR - ( 16 May 2020 10:30 )   PT: 13.2 sec;   INR: 1.16 ratio         PTT - ( 16 May 2020 10:30 )  PTT:36.2 sec  Lactate, Blood: 1.4 mmol/L (05-16 @ 10:30)    CARDIAC MARKERS ( 16 May 2020 10:30 )  <.017 ng/mL / x     / 49 U/L / x     / x                ABG - ( 16 May 2020 10:45 )  pH, Arterial: 7.52  pH, Blood: x     /  pCO2: 29    /  pO2: 56    / HCO3: x     / Base Excess: x     /  SaO2: 90                              RADIOLOGY & ADDITIONAL TESTS:    Care Discussed with Consultants/Other Providers: Patient is a 87y old  Male who presents with a chief complaint of Hypoxia     Patient seen and examined at bedside.  Pt confused and aggitated this am, Unable to understand pts garbled speech.  Pt on 6 liters at 92% sat     ALLERGIES:  No Known Allergies    MEDICATIONS  (STANDING):  aspirin enteric coated 81 milliGRAM(s) Oral daily  azithromycin   Tablet 500 milliGRAM(s) Oral every 24 hours  budesonide 160 MICROgram(s)/formoterol 4.5 MICROgram(s) Inhaler 2 Puff(s) Inhalation two times a day  enoxaparin Injectable 60 milliGRAM(s) SubCutaneous two times a day  folic acid 1 milliGRAM(s) Oral daily  methylPREDNISolone sodium succinate Injectable 40 milliGRAM(s) IV Push every 12 hours  metoprolol tartrate 12.5 milliGRAM(s) Oral two times a day  pantoprazole    Tablet 40 milliGRAM(s) Oral before breakfast  senna 2 Tablet(s) Oral at bedtime  sodium chloride 0.9%. 1000 milliLiter(s) (75 mL/Hr) IV Continuous <Continuous>  tiotropium 18 MICROgram(s) Capsule 1 Capsule(s) Inhalation daily    MEDICATIONS  (PRN):  acetaminophen   Tablet .. 650 milliGRAM(s) Oral every 6 hours PRN Temp greater or equal to 38C (100.4F), Mild Pain (1 - 3)  ALBUTerol    90 MICROgram(s) HFA Inhaler 1 Puff(s) Inhalation every 6 hours PRN Shortness of Breath and/or Wheezing  guaiFENesin   Syrup  (Sugar-Free) 100 milliGRAM(s) Oral every 6 hours PRN Cough  phenylephrine 0.5% Nasal 1 Spray(s) Nasal every 4 hours PRN Nasal Congestion  polyethylene glycol 3350 17 Gram(s) Oral daily PRN Constipation    Vital Signs Last 24 Hrs  T(F): 97.4 (17 May 2020 05:07), Max: 97.5 (16 May 2020 21:52)  HR: 60 (17 May 2020 05:07) (53 - 84)  BP: 147/63 (17 May 2020 05:07) (131/56 - 155/75)  RR: 117 (17 May 2020 05:07) (18 - 117)  SpO2: 93% (17 May 2020 05:07) (89% - 98%)  I&O's Summary    PHYSICAL EXAM:  General: NAD, A/O x 1 garbled speech pt confused and aggitated -  nursing says this is pts usual demenior   ENT: MMM  Neck: Supple, No JVD  Lungs: decreased BS to  auscultation bilaterally, Non labored breathing   Cardio: RRR, S1/S2, No murmurs  Abdomen: Soft, Nontender, Nondistended; Bowel sounds present  Extremities: No calf tenderness, No pitting edema    LABS:                        13.1   11.02 )-----------( 545      ( 16 May 2020 10:30 )             39.4     05-16    131  |  100  |  16  ----------------------------<  89  5.1   |  26  |  1.02    Ca    8.2      16 May 2020 10:30    TPro  7.4  /  Alb  1.8  /  TBili  0.6  /  DBili  x   /  AST  34  /  ALT  30  /  AlkPhos  104  05-16    eGFR if Non African American: 66 mL/min/1.73M2 (05-16-20 @ 10:30)  eGFR if : 77 mL/min/1.73M2 (05-16-20 @ 10:30)    PT/INR - ( 16 May 2020 10:30 )   PT: 13.2 sec;   INR: 1.16 ratio         PTT - ( 16 May 2020 10:30 )  PTT:36.2 sec  Lactate, Blood: 1.4 mmol/L (05-16 @ 10:30)    CARDIAC MARKERS ( 16 May 2020 10:30 )  <.017 ng/mL / x     / 49 U/L / x     / x                ABG - ( 16 May 2020 10:45 )  pH, Arterial: 7.52  pH, Blood: x     /  pCO2: 29    /  pO2: 56    / HCO3: x     / Base Excess: x     /  SaO2: 90                              RADIOLOGY & ADDITIONAL TESTS:    Care Discussed with Consultants/Other Providers:

## 2020-05-17 NOTE — CONSULT NOTE ADULT - SUBJECTIVE AND OBJECTIVE BOX
PULMONARY CONSULT  Location of Patient : [facility] [address] ([facility])  Attending requesting Consult:Pat Carrillo  Chief Complaint :  Hypoxia/copd  Reason For consult :Hypoxia/copd      Initial HPI on admission:  HPI:  87 year old male with h/o Dementia, Active nicotine use, COPD and history of Pulmonary Fibrosis, PVD, High chol, h/o CVA who was recently admitted with COVID PNA and discharged 5/5 to [facility].   now retured to hospital for increasing o2 requirements     As per facility patient has also been having a productive cough, is chronically ill appearing. No fever, chills, chest pain, abdominal pain, nausea or vomiting.   IN [facility] patient had  zosyn x 5 days and was  on 5 l n/c on rehab    PAST MEDICAL & SURGICAL HISTORY:  Dementia  PE (pulmonary thromboembolism)  History of COPD  [place] (hard of hearing)  Deep vein thrombosis (DVT)  Dyslipidemia  CAD (coronary artery disease)  Peripheral vascular disease  History of hip surgery  History of mandibular surgery    Allergies    No Known Allergies    Due to current medical status patient unable to provide medical, social, family history.  History obtained from available medical record.       Medications:  MEDICATIONS  (STANDING):  aspirin enteric coated 81 milliGRAM(s) Oral daily  azithromycin   Tablet 500 milliGRAM(s) Oral every 24 hours  budesonide 160 MICROgram(s)/formoterol 4.5 MICROgram(s) Inhaler 2 Puff(s) Inhalation two times a day  enoxaparin Injectable 60 milliGRAM(s) SubCutaneous two times a day  folic acid 1 milliGRAM(s) Oral daily  methylPREDNISolone sodium succinate Injectable 40 milliGRAM(s) IV Push every 12 hours  metoprolol tartrate 12.5 milliGRAM(s) Oral two times a day  pantoprazole    Tablet 40 milliGRAM(s) Oral before breakfast  senna 2 Tablet(s) Oral at bedtime  sodium chloride 0.9%. 1000 milliLiter(s) (75 mL/Hr) IV Continuous <Continuous>  tiotropium 18 MICROgram(s) Capsule 1 Capsule(s) Inhalation daily    MEDICATIONS  (PRN):  acetaminophen   Tablet .. 650 milliGRAM(s) Oral every 6 hours PRN Temp greater or equal to 38C (100.4F), Mild Pain (1 - 3)  ALBUTerol    90 MICROgram(s) HFA Inhaler 1 Puff(s) Inhalation every 6 hours PRN Shortness of Breath and/or Wheezing  guaiFENesin   Syrup  (Sugar-Free) 100 milliGRAM(s) Oral every 6 hours PRN Cough  phenylephrine 0.5% Nasal 1 Spray(s) Nasal every 4 hours PRN Nasal Congestion  polyethylene glycol 3350 17 Gram(s) Oral daily PRN Constipation      Home Medications:  Last Order Reconciliation Date: 05-16-20 @ 13:46 (Admission Reconciliation)  acetaminophen 325 mg oral tablet: 2 tab(s) orally every 4 hours, As needed, Temp greater or equal to 38.5C (101.3F) (05-05-20 @ 11:25)  albuterol 90 mcg/inh inhalation aerosol: 2 puff(s) inhaled every 4 hours, As needed, Shortness of Breath and/or Wheezing (05-05-20 @ 11:25)  Aspirin Enteric Coated 81 mg oral delayed release tablet: 1 tab(s) orally once a day (03-03-20 @ 19:49)  enoxaparin: 60 milligram(s) subcutaneous 2 times a day (05-05-20 @ 17:18)  ferrous sulfate 325 mg (65 mg elemental iron) oral tablet: 1 tab(s) orally once a day (04-21-20 @ 17:43)  ferrous sulfate 325 mg (65 mg elemental iron) oral tablet: orally once a day (03-03-20 @ 19:49)  folic acid 1 mg oral tablet: 1 tab(s) orally once a day (03-03-20 @ 19:49)  guaifenesin-dextromethorphan 100 mg-10 mg/5 mL oral liquid: 10 milliliter(s) orally every 4 hours, As needed, Cough (05-05-20 @ 11:25)  metoprolol: 12.5 milligram(s) orally 2 times a day (05-05-20 @ 11:25)  Milk of Magnesia 8% oral suspension: orally once a day, As Needed (03-03-20 @ 19:49)  MiraLax oral powder for reconstitution: orally once a day, As Needed (03-03-20 @ 19:49)  Pepcid 20 mg oral tablet: 1 tab(s) orally 2 times a day (04-21-20 @ 17:48)  Senna 8.6 mg oral tablet: 1 tab(s) orally once a day (at bedtime) (03-03-20 @ 19:49)  Symbicort 160 mcg-4.5 mcg/inh inhalation aerosol: 2 puff(s) inhaled 2 times a day (03-03-20 @ 19:49)  Symbicort 160 mcg-4.5 mcg/inh inhalation aerosol: 2 puff(s) inhaled 2 times a day (04-21-20 @ 17:46)  thiamine 100 mg oral tablet: 1 tab(s) orally once a day (04-21-20 @ 17:49)  tiotropium 18 mcg inhalation capsule: 1 cap(s) inhaled once a day (05-05-20 @ 11:25)  traMADol 50 mg oral tablet: orally every 12 hours, As Needed (03-03-20 @ 19:49)      LABS:                        13.1   11.02 )-----------( 545      ( 16 May 2020 10:30 )             39.4     05-16    131<L>  |  100  |  16  ----------------------------<  89  5.1   |  26  |  1.02    Ca    8.2<L>      16 May 2020 10:30    TPro  7.4  /  Alb  1.8<L>  /  TBili  0.6  /  DBili  x   /  AST  34  /  ALT  30  /  AlkPhos  104  05-16  Procalcitonin, Serum: 0.06 ng/mL (05-16-20 @ 10:30)  Serum Pro-Brain Natriuretic Peptide: 751 pg/mL (05-16-20 @ 10:30)    COVID  05-16-20 @ 10:30  COVID -   NotDetec  05-04-20 @ 13:15  COVID -   NotDetec  04-22-20 @ 08:40  COVID -   Detected<!!>  04-21-20 @ 15:35  COVID -   NotDetec      COVID Biomarkers    05-16-20 @ 10:30 ESR --  ---  CRP 4.77<H>  ---  DDimer  1048<H>   ---   <H>   ---   Ferritin 499<H>    05-04-20 @ 06:50 ESR --  ---  CRP --  ---  DDimer  436<H>   ---   LDH --   ---   Ferritin 578<H>    05-01-20 @ 18:50 ESR --  ---  CRP --  ---  DDimer  580<H>   ---   LDH --   ---   Ferritin --    04-30-20 @ 07:50 ESR --  ---  CRP 11.06<H>  ---  DDimer  986<H>   ---   <H>   ---   Ferritin 820<H>    04-29-20 @ 12:10 ESR --  ---  CRP 10.93<H>  ---  DDimer  950<H>   ---   <H>   ---   Ferritin 894<H>    04-28-20 @ 07:29 ESR --  ---  CRP 12.71<H>  ---  DDimer  1634<H>   ---   <H>   ---   Ferritin 951<H>    04-26-20 @ 08:40 ESR --  ---  CRP 7.73<H>  ---  DDimer  988<H>   ---   <H>   ---   Ferritin 760<H>    04-25-20 @ 07:21 ESR --  ---  CRP 6.69<H>  ---  DDimer  1584<H>   ---   LDH --   ---   Ferritin 615<H>    04-24-20 @ 11:30 ESR --  ---  CRP --  ---  DDimer  2321<H>   ---   LDH --   ---   Ferritin --    04-21-20 @ 15:35 ESR --  ---  CRP 23.96<H>  ---  DDimer  6788   ---   LDH --   ---   Ferritin 894<H>        WBC Trend  05-16-20 @ 10:30   -  11.02<H>    H/H Trend  05-16-20 @ 10:30   -  13.1 / 39.4    Platelet Trend  05-16-20 @ 10:30   -  545<H>    Trend Sodium  05-16-20 @ 10:30   -  131<L>    Trend Potassium  05-16-20 @ 10:30   -  5.1    Trend Bun/Cr  05-16-20 @ 10:30  BUN/CR -  16 / 1.02    Lactic Acid Trend  05-16-20 @ 10:30   -   1.4    ABG Trend  05-16-20 @ 10:45   - 7.52<H>/29<L>/56<L>/90<L>  04-21-20 @ 16:15   - 7.49<H>/26<L>/60<L>/91<L>  11-26-19 @ 18:25   - 7.38/35/167<H>/99<H>          RADIOLOGY  CXR:  < from: Xray Chest 1 View-PORTABLE IMMEDIATE (05.16.20 @ 11:25) >  Findings:  Interval improvement of bilateral lower lung airspace disease. Redemonstration of left upper and right lower lung calcified granulomata. Stable, magnified cardiac and mediastinal silhouette. Posttraumatic right rib deformities.    Impression:  1.  Improving airspace disease.      DISCRETE X-RAY DATA:  Percent of LEFT lung opacification: 34-66%  Percent of RIGHT lung opacification: 34-66%  Change in lung opacification from most recent x-ray (<=3 days): No Prior  Change from prior dated 3 or more days (same admission): Decrease      < end of copied text >      VITALS:  T(C): 36.1 (05-17-20 @ 06:24), Max: 36.4 (05-16-20 @ 21:52)  T(F): 96.9 (05-17-20 @ 06:24), Max: 97.5 (05-16-20 @ 21:52)  HR: 78 (05-17-20 @ 08:50) (60 - 84)  BP: 150/81 (05-17-20 @ 06:24) (132/67 - 155/75)  BP(mean): --  ABP: --  ABP(mean): --  RR: 78 (05-17-20 @ 06:24) (18 - 117)  SpO2: 93% (05-17-20 @ 08:50) (89% - 95%)  CVP(mm Hg): --  CVP(cm H2O): --    Ins and Outs       Height (cm): 170.18 (05-16-20 @ 10:21)  Weight (kg): 63.709819831 (05-16-20 @ 10:21)  BMI (kg/m2): 21.9 (05-16-20 @ 10:21)        I&O's Detail      Physical Examination:  GENERAL:               Alert, Confused, No acute distress.    HEENT:                   No JVD, dry MM  PULM:                     Bilateral air entry, Clear to auscultation bilaterally, no significant sputum production, trace Rales, No Rhonchi, No Wheezing  CVS:                         S1, S2,  + Murmur  ABD:                        Soft, nondistended, nontender, normoactive bowel sounds,   NEURO:                  Alert, oriented, interactive, nonfocal, follows commands  PSYC:                      Calm, + Insight.

## 2020-05-17 NOTE — CONSULT NOTE ADULT - ASSESSMENT
Assessment.  1. Hypoxia with Respiratory Alkalosis need to r/o PE as has elevated ddimer     As pt has had recent COVID, makes hypercoagulable state more likely   2. Chronic COPD with underlying IPF in patient who actively smokes  3. 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 Dementia, PVD, High chol, h/o CVA    Plan  Would check CTA chest to r/o PE ; underlying worsening IPF possible post viral PNA leading to increasing hypoxia.     N/C O2 to maintain O2 sat > 90  unsure of active infection as wbc, procal, normal  no objection to current steroids nebs, bronchodilators   Check Echo  continue Lovenox BID

## 2020-05-17 NOTE — PROGRESS NOTE ADULT - ASSESSMENT
87 male with pmh dvt/pe on xarelto, dementia, copd not on home oxygen, pulmonary fibrosis, recent admission Merged with [facility] 4/21 - 5/5 secondary to sob and covid pneumonia with hypoxia now returns with acute hypoxic respiratory failure.    # Acute Hypoxic Respiratory Failure with Covid pneumonia  # Acute COPD Exacerbation  # Chronic Pulmonary Fibrosis  Pt presents with acute hypoxia Spo2 80% - improved to > 90% with nasal canula  chest xray reveals interval improvement in bilateral lower lung airspace dz compared to prior  ABG reveals respiratory alkalosis  plan for pulmonary consult  continue spiriva, albuterol, symbicort, robitussin, steroids, zithromax  supplemental oxygen therapy maintain sats > 90%  chest PT, incentive spirometry  follow up repeat covid pcr swab    # Elevated D-Dimer  # Hx PE/DVT on xarelto  D-Dimer found to be 1048, likely due to covid  plan to continue treatment dose lovenox for now  follow up am labs  f/u repeat covid PCR    # Dementia  chronic stable condition  continue supportive care    # Essential Hypertension  continue metoprolol 12.5 bid  monitor BP closely    # Hyponatremia  pt appears clinically dehydrated - pt received normal saline bolus in ED  follow up bmp - gentle ivf hydration    # Hx of Dysphagia    follow up speech and swallow evaluation    #prophylactic measure  continue treatment dose lovenox and protonix 87 male with pmh dvt/pe on xarelto, dementia, copd not on home oxygen, pulmonary fibrosis, recent admission [facility] 4/21 - 5/5 secondary to sob and covid pneumonia with hypoxia now returns with acute hypoxic respiratory failure.    # Acute Hypoxic Respiratory Failure with viral  pneumonia  # Acute COPD Exacerbation  # Chronic Pulmonary Fibrosis  Pt presents with acute hypoxia Spo2 80% - improved to > 90% with nasal canula  chest xray reveals interval improvement in bilateral lower lung airspace dz compared to prior  ABG reveals respiratory alkalosis  pulmonary consult pending   continue spiriva, albuterol, symbicort, robitussin, steroids, zithromax  supplemental oxygen therapy maintain sats > 90% pt currently at 92% on 6 L NC  chest PT, incentive spirometry  follow up repeat covid pcr swab negative X2 5/16 and 5/4    # Elevated D-Dimer  # Hx PE/DVT on xarelto  D-Dimer found to be 1048,   continue treatment dose lovenox   follow up am labs    # Dementia  chronic stable condition  continue supportive care    # Essential Hypertension  continue metoprolol 12.5 bid  monitor BP closely    # Hyponatremia  pt appears clinically dehydrated - pt received normal saline bolus in ED  follow up bmp - gentle ivf hydration    # Hx of Dysphagia  nurse to preform bed side swallow eval since pt is more awake today   follow up speech and swallow evaluation    #prophylactic measure  continue treatment dose lovenox and protonix

## 2020-05-18 LAB
ANION GAP SERPL CALC-SCNC: 8 MMOL/L — SIGNIFICANT CHANGE UP (ref 5–17)
BUN SERPL-MCNC: 20 MG/DL — SIGNIFICANT CHANGE UP (ref 7–23)
CALCIUM SERPL-MCNC: 8.1 MG/DL — LOW (ref 8.4–10.5)
CHLORIDE SERPL-SCNC: 104 MMOL/L — SIGNIFICANT CHANGE UP (ref 96–108)
CO2 SERPL-SCNC: 24 MMOL/L — SIGNIFICANT CHANGE UP (ref 22–31)
CREAT SERPL-MCNC: 0.93 MG/DL — SIGNIFICANT CHANGE UP (ref 0.5–1.3)
GLUCOSE SERPL-MCNC: 102 MG/DL — HIGH (ref 70–99)
HCT VFR BLD CALC: 34.9 % — LOW (ref 39–50)
HGB BLD-MCNC: 11.5 G/DL — LOW (ref 13–17)
MCHC RBC-ENTMCNC: 29.6 PG — SIGNIFICANT CHANGE UP (ref 27–34)
MCHC RBC-ENTMCNC: 33 GM/DL — SIGNIFICANT CHANGE UP (ref 32–36)
MCV RBC AUTO: 89.7 FL — SIGNIFICANT CHANGE UP (ref 80–100)
NRBC # BLD: 0 /100 WBCS — SIGNIFICANT CHANGE UP (ref 0–0)
PLATELET # BLD AUTO: 479 K/UL — HIGH (ref 150–400)
POTASSIUM SERPL-MCNC: 4.4 MMOL/L — SIGNIFICANT CHANGE UP (ref 3.5–5.3)
POTASSIUM SERPL-SCNC: 4.4 MMOL/L — SIGNIFICANT CHANGE UP (ref 3.5–5.3)
RBC # BLD: 3.89 M/UL — LOW (ref 4.2–5.8)
RBC # FLD: 15.7 % — HIGH (ref 10.3–14.5)
SODIUM SERPL-SCNC: 136 MMOL/L — SIGNIFICANT CHANGE UP (ref 135–145)
WBC # BLD: 9.91 K/UL — SIGNIFICANT CHANGE UP (ref 3.8–10.5)
WBC # FLD AUTO: 9.91 K/UL — SIGNIFICANT CHANGE UP (ref 3.8–10.5)

## 2020-05-18 PROCEDURE — 93306 TTE W/DOPPLER COMPLETE: CPT | Mod: 26

## 2020-05-18 PROCEDURE — 99497 ADVNCD CARE PLAN 30 MIN: CPT

## 2020-05-18 PROCEDURE — 99233 SBSQ HOSP IP/OBS HIGH 50: CPT

## 2020-05-18 PROCEDURE — 99223 1ST HOSP IP/OBS HIGH 75: CPT

## 2020-05-18 RX ORDER — FUROSEMIDE 40 MG
20 TABLET ORAL ONCE
Refills: 0 | Status: COMPLETED | OUTPATIENT
Start: 2020-05-18 | End: 2020-05-18

## 2020-05-18 RX ADMIN — ENOXAPARIN SODIUM 60 MILLIGRAM(S): 100 INJECTION SUBCUTANEOUS at 05:24

## 2020-05-18 RX ADMIN — Medication 1 MILLIGRAM(S): at 12:30

## 2020-05-18 RX ADMIN — Medication 81 MILLIGRAM(S): at 12:30

## 2020-05-18 RX ADMIN — Medication 40 MILLIGRAM(S): at 05:24

## 2020-05-18 RX ADMIN — BUDESONIDE AND FORMOTEROL FUMARATE DIHYDRATE 2 PUFF(S): 160; 4.5 AEROSOL RESPIRATORY (INHALATION) at 21:02

## 2020-05-18 RX ADMIN — BUDESONIDE AND FORMOTEROL FUMARATE DIHYDRATE 2 PUFF(S): 160; 4.5 AEROSOL RESPIRATORY (INHALATION) at 10:15

## 2020-05-18 RX ADMIN — TIOTROPIUM BROMIDE 1 CAPSULE(S): 18 CAPSULE ORAL; RESPIRATORY (INHALATION) at 10:15

## 2020-05-18 RX ADMIN — Medication 20 MILLIGRAM(S): at 17:53

## 2020-05-18 RX ADMIN — Medication 40 MILLIGRAM(S): at 16:45

## 2020-05-18 RX ADMIN — AZITHROMYCIN 500 MILLIGRAM(S): 500 TABLET, FILM COATED ORAL at 12:29

## 2020-05-18 RX ADMIN — ENOXAPARIN SODIUM 60 MILLIGRAM(S): 100 INJECTION SUBCUTANEOUS at 16:45

## 2020-05-18 RX ADMIN — Medication 12.5 MILLIGRAM(S): at 16:46

## 2020-05-18 NOTE — CONSULT NOTE ADULT - SUBJECTIVE AND OBJECTIVE BOX
HPI:  87 male with hx dvt/pe on xarelto, dementia, copd not on home oxygen, pulmonary fibrosis, cad, pvd, recently admitted 2/2 covid pneumonia with hypoxia discharged to [facility] now returns secondary to hypoxia.  Pt is a poor historian, unable to provide details.  History obtained from chart notes - Pt was admitted to [facility] on 4/21 secondary to B/L pneumonia, hypoxia, found to be covid positive and discharged back to facility on 5/5.  Pt returns today secondary to acute hypoxia.  Found with Spo2 80% on room air.  Pt tested positive for covid on 4/22 then retested negative as of 5/4.  Pt provided with supplemental oxygen via nasal canula upon arrival to ED with Sp02 now > 90%.  As per facility patient has also been having a productive cough, is chronically ill appearing. No fever, chills, chest pain, abdominal pain, nausea or vomiting.     PAST MEDICAL & SURGICAL HISTORY:  Dementia  PE (pulmonary thromboembolism)  History of COPD  [place] (hard of hearing)  Deep vein thrombosis (DVT)  Dyslipidemia  CAD (coronary artery disease)  Peripheral vascular disease  History of hip surgery  History of mandibular surgery      SOCIAL HISTORY:    Admitted from:   [facility]  Substance abuse history:              Tobacco hx:                  Alcohol hx:              Home Opioid hx:  Islam:                                    Preferred Language:    Surrogate/HCP/Wife :   Vilma  and daughter Ella              Phone#:  Ella [phone]    FAMILY HISTORY:    Baseline ADLs (prior to admission):    Allergies    No Known Allergies    Intolerances      Present Symptoms:   Dyspnea: mild  Nausea/Vomiting:   Anxiety:  Depressed   Fatigue: yes  Loss of appetite:   Pain:              no                  location:          Review of Systems: [ Unable to obtain due to poor mentation]    MEDICATIONS  (STANDING):  aspirin enteric coated 81 milliGRAM(s) Oral daily  azithromycin   Tablet 500 milliGRAM(s) Oral every 24 hours  budesonide 160 MICROgram(s)/formoterol 4.5 MICROgram(s) Inhaler 2 Puff(s) Inhalation two times a day  enoxaparin Injectable 60 milliGRAM(s) SubCutaneous two times a day  folic acid 1 milliGRAM(s) Oral daily  methylPREDNISolone sodium succinate Injectable 40 milliGRAM(s) IV Push every 12 hours  metoprolol tartrate 12.5 milliGRAM(s) Oral two times a day  pantoprazole    Tablet 40 milliGRAM(s) Oral before breakfast  senna 2 Tablet(s) Oral at bedtime  sodium chloride 0.9%. 1000 milliLiter(s) (75 mL/Hr) IV Continuous <Continuous>  tiotropium 18 MICROgram(s) Capsule 1 Capsule(s) Inhalation daily    MEDICATIONS  (PRN):  acetaminophen   Tablet .. 650 milliGRAM(s) Oral every 6 hours PRN Temp greater or equal to 38C (100.4F), Mild Pain (1 - 3)  ALBUTerol    90 MICROgram(s) HFA Inhaler 1 Puff(s) Inhalation every 6 hours PRN Shortness of Breath and/or Wheezing  guaiFENesin   Syrup  (Sugar-Free) 100 milliGRAM(s) Oral every 6 hours PRN Cough  phenylephrine 0.5% Nasal 1 Spray(s) Nasal every 4 hours PRN Nasal Congestion  polyethylene glycol 3350 17 Gram(s) Oral daily PRN Constipation      PHYSICAL EXAM:    Vital Signs Last 24 Hrs  T(C): 35 (18 May 2020 05:37), Max: 36.4 (17 May 2020 17:49)  T(F): 95 (18 May 2020 05:37), Max: 97.6 (17 May 2020 17:49)  HR: 78 (18 May 2020 10:22) (65 - 81)  BP: 144/66 (18 May 2020 05:37) (138/54 - 144/66)  BP(mean): --  RR: 65 (17 May 2020 17:49) (65 - 65)  SpO2: 96% (18 May 2020 10:22) (94% - 96%)    General: alert  oriented x 1  sl lethargic ,  verbal few words only   Karnofsky Performance Score/Palliative Performance Status Version2:   20  %  HEENT: normal  dry mouth   Oral intake ability: unable  Diet: [NPO]    LABS:                        11.5   9.91  )-----------( 479      ( 18 May 2020 06:30 )             34.9     05-18    136  |  104  |  20  ----------------------------<  102<H>  4.4   |  24  |  0.93    Ca    8.1<L>      18 May 2020 06:30          RADIOLOGY & ADDITIONAL STUDIES:< from: Xray Chest 1 View-PORTABLE IMMEDIATE (05.16.20 @ 11:25) >    EXAM:  XR CHEST PORTABLE IMMED 1V      PROCEDURE DATE:  05/16/2020        INTERPRETATION:    Examination: XR CHEST IMMEDIATE    History:  Shortness of Breath, Cough,  Fever    Findings:  Interval improvement of bilateral lower lung airspace disease. Redemonstration of left upper and right lower lung calcified granulomata. Stable, magnified cardiac and mediastinal silhouette. Posttraumatic right rib deformities.    Impression:  1.  Improving airspace disease.      DISCRETE X-RAY DATA:  Percent of LEFT lung opacification: 34-66%  Percent of RIGHT lung opacification: 34-66%  Change in lung opacification from most recent x-ray (<=3 days): No Prior  Change from prior dated 3 or more days (same admission): Decrease          < from: CT Angio Chest w/ IV Cont (05.17.20 @ 19:09) >    EXAM:  CT ANGIO CHEST (W)AW IC      PROCEDURE DATE:  05/17/2020        INTERPRETATION:  CT ANGIO CHEST WITHOUT AND OR WITH IV CONTRAST    CLINICAL INFORMATION:  hypoxia     PROCEDURE INFORMATION:   Exam: CT Angiography Chest With Contrast   Exam date and time: 5/17/2020 6:52 PM   Age: 87 years old   Clinical indication: Other: Hypoxia     TECHNIQUE:   Imaging protocol: Computed tomographic angiography of the chest with   intravenous contrast.   Intravenous contrast: 75 cc Omnipaque 350  3D rendering: MIP and/or 3D reconstructed images were created by the   technologist.     COMPARISON:   CT ANGIO CHEST WITHOUT AND OR WITH IV CONTRAST 12/1/2019 7:03 PM     FINDINGS:   Pulmonary arteries: No evidence of acute pulmonary embolism to the segmental   level.   Aorta: Ascending aorta measuring 3.8 cm in diameter.     Lungs: Central and peripheral ground-glass opacities with septal thickening   within the bilateral lower lobes. Additional peripheral opacities with septal   thickening noted within the right middle lobe and bilateral upper lobes. Superimposed scarring and traction bronchiectasis.  Pleural space: Trace right pleural effusion. No pneumothorax.   Heart: Cardiomegaly.   Lymph nodes: Unremarkable. No enlarged lymph nodes.     Bones/joints: Bilateral shoulder degenerative changes. Moderate thoracic   spondylosis is present.   Soft tissues: Unremarkable.   Other findings: Underlying pulmonary fibrosis.     IMPRESSION:   1. Central and peripheral ground-glass opacities with septalthickening within   the bilateral lower lobes. Additional peripheral opacities with septal   thickening noted within the right middle lobe and bilateral upper lobes. These   are commonly reported findings for COVID-19 pneumonia. Other differential   considerations include influenza pneumonia and organizing pneumonia. Recommend   lab testing.  2. superimposed pulmonary fibrosis.   3. No evidence of acute pulmonary embolism to the segmental level.   4. Trace right pleural effusion.           ADVANCE DIRECTIVES: non  Full Code   Advanced Care Planning discussion total time spent:

## 2020-05-18 NOTE — PROGRESS NOTE ADULT - SUBJECTIVE AND OBJECTIVE BOX
Patient is a 87y old  Male who presents with a chief complaint of Hypoxia (18 May 2020 16:21)    Patient seen and examined at bedside.  Pt. confused, offers no complaints.    ALLERGIES:  No Known Allergies    MEDICATIONS  (STANDING):  aspirin enteric coated 81 milliGRAM(s) Oral daily  azithromycin   Tablet 500 milliGRAM(s) Oral every 24 hours  budesonide 160 MICROgram(s)/formoterol 4.5 MICROgram(s) Inhaler 2 Puff(s) Inhalation two times a day  enoxaparin Injectable 60 milliGRAM(s) SubCutaneous two times a day  folic acid 1 milliGRAM(s) Oral daily  furosemide   Injectable 20 milliGRAM(s) IV Push once  methylPREDNISolone sodium succinate Injectable 40 milliGRAM(s) IV Push every 12 hours  metoprolol tartrate 12.5 milliGRAM(s) Oral two times a day  pantoprazole    Tablet 40 milliGRAM(s) Oral before breakfast  senna 2 Tablet(s) Oral at bedtime  tiotropium 18 MICROgram(s) Capsule 1 Capsule(s) Inhalation daily    MEDICATIONS  (PRN):  acetaminophen   Tablet .. 650 milliGRAM(s) Oral every 6 hours PRN Temp greater or equal to 38C (100.4F), Mild Pain (1 - 3)  ALBUTerol    90 MICROgram(s) HFA Inhaler 1 Puff(s) Inhalation every 6 hours PRN Shortness of Breath and/or Wheezing  guaiFENesin   Syrup  (Sugar-Free) 100 milliGRAM(s) Oral every 6 hours PRN Cough  phenylephrine 0.5% Nasal 1 Spray(s) Nasal every 4 hours PRN Nasal Congestion  polyethylene glycol 3350 17 Gram(s) Oral daily PRN Constipation    Vital Signs Last 24 Hrs  T(F): 97.6 (18 May 2020 15:25), Max: 97.6 (17 May 2020 17:49)  HR: 60 (18 May 2020 15:25) (60 - 81)  BP: 159/66 (18 May 2020 15:25) (138/54 - 159/66)  RR: 65 (17 May 2020 17:49) (65 - 65)  SpO2: 94% (18 May 2020 15:25) (94% - 96%)  I&O's Summary    18 May 2020 07:01  -  18 May 2020 17:15  --------------------------------------------------------  IN: 450 mL / OUT: 0 mL / NET: 450 mL      PHYSICAL EXAM:  General: NAD, confused.  ENT: MMM  Neck: Supple, No JVD  Lungs: non-labored breathing, clear to auscultation bilaterally, no w/r/r  Cardio: RRR, S1/S2, No murmurs  Abdomen: Soft, Nontender, Nondistended; Bowel sounds present  Extremities: No calf tenderness, No pitting edema    LABS:                        11.5   9.91  )-----------( 479      ( 18 May 2020 06:30 )             34.9     05-18    136  |  104  |  20  ----------------------------<  102  4.4   |  24  |  0.93    Ca    8.1      18 May 2020 06:30    TPro  7.4  /  Alb  1.8  /  TBili  0.6  /  DBili  x   /  AST  34  /  ALT  30  /  AlkPhos  104  05-16    eGFR if Non African American: 74 mL/min/1.73M2 (05-18-20 @ 06:30)  eGFR if : 86 mL/min/1.73M2 (05-18-20 @ 06:30)    PT/INR - ( 16 May 2020 10:30 )   PT: 13.2 sec;   INR: 1.16 ratio         PTT - ( 16 May 2020 10:30 )  PTT:36.2 sec  Lactate, Blood: 1.4 mmol/L (05-16 @ 10:30)    CARDIAC MARKERS ( 16 May 2020 10:30 )  <.017 ng/mL / x     / 49 U/L / x     / x                ABG - ( 16 May 2020 10:45 )  pH, Arterial: 7.52  pH, Blood: x     /  pCO2: 29    /  pO2: 56    / HCO3: x     / Base Excess: x     /  SaO2: 90          RADIOLOGY & ADDITIONAL TESTS:  < from: CT Angio Chest w/ IV Cont (05.17.20 @ 19:09) >    IMPRESSION:   1. Central and peripheral ground-glass opacities with septalthickening within   the bilateral lower lobes. Additional peripheral opacities with septal   thickening noted within the right middle lobe and bilateral upper lobes. These   are commonly reported findings for COVID-19 pneumonia. Other differential   considerations include influenza pneumonia and organizing pneumonia. Recommend   lab testing.  2. superimposed pulmonary fibrosis.   3. No evidence of acute pulmonary embolism to the segmental level.   4. Trace right pleural effusion.     < end of copied text >  < from: TTE Echo Complete w/o contrast w/ Doppler (05.18.20 @ 07:40) >    Summary:   1. Technically difficult study   2. Sclerodegenerative disease of the aortic valve   3. Left ventricular ejection fraction, by visual estimation, is 50 to 55%.   4. Spectral Doppler shows impaired relaxation pattern of left ventricular myocardial filling (Grade I diastolic dysfunction).   5. Mild thickening and calcification of the anterior and posterior mitral valve leaflets.   6. Sclerotic aortic valve with normal opening.   7. Mild to moderate aortic regurgitation.   8. Moderate mitral insufficiency   9. Estimated pulmonary artery systolic pressure is 39.3 mmHg assuming a right atrial pressure of 10 mmHg, which is consistent with borderline pulmonary hypertension.  10. LA volume Index is 23.4 ml/m² ml/m2.    < end of copied text >    Care Discussed with Consultants/Other Providers: Patient is a 87y old  Male who presents with a chief complaint of Hypoxia (18 May 2020 16:21)    Interval Hx  Patient seen and examined at bedside.  Pt. confused, offers no complaints.    ALLERGIES:  No Known Allergies    MEDICATIONS  (STANDING):  aspirin enteric coated 81 milliGRAM(s) Oral daily  azithromycin   Tablet 500 milliGRAM(s) Oral every 24 hours  budesonide 160 MICROgram(s)/formoterol 4.5 MICROgram(s) Inhaler 2 Puff(s) Inhalation two times a day  enoxaparin Injectable 60 milliGRAM(s) SubCutaneous two times a day  folic acid 1 milliGRAM(s) Oral daily  furosemide   Injectable 20 milliGRAM(s) IV Push once  methylPREDNISolone sodium succinate Injectable 40 milliGRAM(s) IV Push every 12 hours  metoprolol tartrate 12.5 milliGRAM(s) Oral two times a day  pantoprazole    Tablet 40 milliGRAM(s) Oral before breakfast  senna 2 Tablet(s) Oral at bedtime  tiotropium 18 MICROgram(s) Capsule 1 Capsule(s) Inhalation daily    MEDICATIONS  (PRN):  acetaminophen   Tablet .. 650 milliGRAM(s) Oral every 6 hours PRN Temp greater or equal to 38C (100.4F), Mild Pain (1 - 3)  ALBUTerol    90 MICROgram(s) HFA Inhaler 1 Puff(s) Inhalation every 6 hours PRN Shortness of Breath and/or Wheezing  guaiFENesin   Syrup  (Sugar-Free) 100 milliGRAM(s) Oral every 6 hours PRN Cough  phenylephrine 0.5% Nasal 1 Spray(s) Nasal every 4 hours PRN Nasal Congestion  polyethylene glycol 3350 17 Gram(s) Oral daily PRN Constipation    Vital Signs Last 24 Hrs  T(F): 97.6 (18 May 2020 15:25), Max: 97.6 (17 May 2020 17:49)  HR: 60 (18 May 2020 15:25) (60 - 81)  BP: 159/66 (18 May 2020 15:25) (138/54 - 159/66)  RR: 65 (17 May 2020 17:49) (65 - 65)  SpO2: 94% (18 May 2020 15:25) (94% - 96%)  I&O's Summary    18 May 2020 07:01  -  18 May 2020 17:15  --------------------------------------------------------  IN: 450 mL / OUT: 0 mL / NET: 450 mL      PHYSICAL EXAM:  General: NAD, confused.  ENT: MMM  Neck: Supple, No JVD  Lungs: non-labored breathing, clear to auscultation bilaterally, no w/r/r  Cardio: RRR, S1/S2, No murmurs  Abdomen: Soft, Nontender, Nondistended; Bowel sounds present  Extremities: No calf tenderness, No pitting edema    LABS:                        11.5   9.91  )-----------( 479      ( 18 May 2020 06:30 )             34.9     05-18    136  |  104  |  20  ----------------------------<  102  4.4   |  24  |  0.93    Ca    8.1      18 May 2020 06:30    TPro  7.4  /  Alb  1.8  /  TBili  0.6  /  DBili  x   /  AST  34  /  ALT  30  /  AlkPhos  104  05-16    eGFR if Non African American: 74 mL/min/1.73M2 (05-18-20 @ 06:30)  eGFR if : 86 mL/min/1.73M2 (05-18-20 @ 06:30)    PT/INR - ( 16 May 2020 10:30 )   PT: 13.2 sec;   INR: 1.16 ratio         PTT - ( 16 May 2020 10:30 )  PTT:36.2 sec  Lactate, Blood: 1.4 mmol/L (05-16 @ 10:30)    CARDIAC MARKERS ( 16 May 2020 10:30 )  <.017 ng/mL / x     / 49 U/L / x     / x                ABG - ( 16 May 2020 10:45 )  pH, Arterial: 7.52  pH, Blood: x     /  pCO2: 29    /  pO2: 56    / HCO3: x     / Base Excess: x     /  SaO2: 90          RADIOLOGY & ADDITIONAL TESTS:  < from: CT Angio Chest w/ IV Cont (05.17.20 @ 19:09) >    IMPRESSION:   1. Central and peripheral ground-glass opacities with septalthickening within   the bilateral lower lobes. Additional peripheral opacities with septal   thickening noted within the right middle lobe and bilateral upper lobes. These   are commonly reported findings for COVID-19 pneumonia. Other differential   considerations include influenza pneumonia and organizing pneumonia. Recommend   lab testing.  2. superimposed pulmonary fibrosis.   3. No evidence of acute pulmonary embolism to the segmental level.   4. Trace right pleural effusion.     < end of copied text >  < from: TTE Echo Complete w/o contrast w/ Doppler (05.18.20 @ 07:40) >    Summary:   1. Technically difficult study   2. Sclerodegenerative disease of the aortic valve   3. Left ventricular ejection fraction, by visual estimation, is 50 to 55%.   4. Spectral Doppler shows impaired relaxation pattern of left ventricular myocardial filling (Grade I diastolic dysfunction).   5. Mild thickening and calcification of the anterior and posterior mitral valve leaflets.   6. Sclerotic aortic valve with normal opening.   7. Mild to moderate aortic regurgitation.   8. Moderate mitral insufficiency   9. Estimated pulmonary artery systolic pressure is 39.3 mmHg assuming a right atrial pressure of 10 mmHg, which is consistent with borderline pulmonary hypertension.  10. LA volume Index is 23.4 ml/m² ml/m2.    < end of copied text >    Care Discussed with Consultants/Other Providers: Intensivist/Palliative

## 2020-05-18 NOTE — PROGRESS NOTE ADULT - ASSESSMENT
87 male with pmh dvt/pe on xarelto, dementia, copd not on home oxygen, pulmonary fibrosis, recent admission [facility] 4/21 - 5/5 secondary to sob and covid pneumonia with hypoxia now returns with acute hypoxic respiratory failure.    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; on short course of IV steroids (5 days)  -given 1 dose of IV Lasix today for possible CHF fluid overload, stopped IVF  -continue spiriva, albuterol, symbicort, robitussin, on course of oral Zithromax  -continue IV steroids, day 3 of 5  -chest PT, incentive spirometry  -COVID PCR swab negative x 2;  5/16 and 5/4  -Echo done today; normal LV function; mild diastolic failure, grade 1    # Elevated D-Dimer; secondary to COVID  -D-Dimer found to be 1048, previous on 5/4 was 436  -continue treatment dose lovenox   -CTA of chest:  no PE    # Dementia  -chronic stable condition  -continue supportive care    # Essential Hypertension  -continue metoprolol 12.5 bid  -monitor BP closely    # Dysphagia  - speech and swallow evaluation completed, pt started on a diet    Pt.'s daughter, Ella was notified of pt's plan of care.  GOC to be discussed further with Palliative; Dr. Womack.

## 2020-05-18 NOTE — PROGRESS NOTE ADULT - SUBJECTIVE AND OBJECTIVE BOX
Follow-up Critical Care Progress Note  Chief Complaint : Hypoxemia        No new events overnight.  Denies SOB/CP.       Allergies :No Known Allergies      PAST MEDICAL & SURGICAL HISTORY:  Dementia  PE (pulmonary thromboembolism)  History of COPD  [place] (hard of hearing)  Deep vein thrombosis (DVT)  Dyslipidemia  CAD (coronary artery disease)  Peripheral vascular disease  History of hip surgery  History of mandibular surgery      Medications:  MEDICATIONS  (STANDING):  aspirin enteric coated 81 milliGRAM(s) Oral daily  azithromycin   Tablet 500 milliGRAM(s) Oral every 24 hours  budesonide 160 MICROgram(s)/formoterol 4.5 MICROgram(s) Inhaler 2 Puff(s) Inhalation two times a day  enoxaparin Injectable 60 milliGRAM(s) SubCutaneous two times a day  folic acid 1 milliGRAM(s) Oral daily  methylPREDNISolone sodium succinate Injectable 40 milliGRAM(s) IV Push every 12 hours  metoprolol tartrate 12.5 milliGRAM(s) Oral two times a day  pantoprazole    Tablet 40 milliGRAM(s) Oral before breakfast  senna 2 Tablet(s) Oral at bedtime  sodium chloride 0.9%. 1000 milliLiter(s) (75 mL/Hr) IV Continuous <Continuous>  tiotropium 18 MICROgram(s) Capsule 1 Capsule(s) Inhalation daily    MEDICATIONS  (PRN):  acetaminophen   Tablet .. 650 milliGRAM(s) Oral every 6 hours PRN Temp greater or equal to 38C (100.4F), Mild Pain (1 - 3)  ALBUTerol    90 MICROgram(s) HFA Inhaler 1 Puff(s) Inhalation every 6 hours PRN Shortness of Breath and/or Wheezing  guaiFENesin   Syrup  (Sugar-Free) 100 milliGRAM(s) Oral every 6 hours PRN Cough  phenylephrine 0.5% Nasal 1 Spray(s) Nasal every 4 hours PRN Nasal Congestion  polyethylene glycol 3350 17 Gram(s) Oral daily PRN Constipation      LABS:                        11.5   9.91  )-----------( 479      ( 18 May 2020 06:30 )             34.9     05-18    136  |  104  |  20  ----------------------------<  102<H>  4.4   |  24  |  0.93    Ca    8.1<L>      18 May 2020 06:30    < from: CT Angio Chest w/ IV Cont (05.17.20 @ 19:09) >    EXAM:  CT ANGIO CHEST (W)AW IC      PROCEDURE DATE:  05/17/2020        INTERPRETATION:  CT ANGIO CHEST WITHOUT AND OR WITH IV CONTRAST    CLINICAL INFORMATION:  hypoxia     PROCEDURE INFORMATION:   Exam: CT Angiography Chest With Contrast   Exam date and time: 5/17/2020 6:52 PM   Age: 87 years old   Clinical indication: Other: Hypoxia     TECHNIQUE:   Imaging protocol: Computed tomographic angiography of the chest with   intravenous contrast.   Intravenous contrast: 75 cc Omnipaque 350  3D rendering: MIP and/or 3D reconstructed images were created by the   technologist.     COMPARISON:   CT ANGIO CHEST WITHOUT AND OR WITH IV CONTRAST 12/1/2019 7:03 PM     FINDINGS:   Pulmonary arteries: No evidence of acute pulmonary embolism to the segmental   level.   Aorta: Ascending aorta measuring 3.8 cm in diameter.     Lungs: Central and peripheral ground-glass opacities with septal thickening   within the bilateral lower lobes. Additional peripheral opacities with septal   thickening noted within the right middle lobe and bilateral upper lobes. Superimposed scarring and traction bronchiectasis.  Pleural space: Trace right pleural effusion. No pneumothorax.   Heart: Cardiomegaly.   Lymph nodes: Unremarkable. No enlarged lymph nodes.     Bones/joints: Bilateral shoulder degenerative changes. Moderate thoracic   spondylosis is present.   Soft tissues: Unremarkable.   Other findings: Underlying pulmonary fibrosis.     IMPRESSION:   1. Central and peripheral ground-glass opacities with septalthickening within the bilateral lower lobes. Additional peripheral opacities with septal thickening noted within the right middle lobe and bilateral upper lobes. These are commonly reported findings for COVID-19 pneumonia. Other differential considerations include influenza pneumonia and organizing pneumonia. Recommend lab testing.  2. superimposed pulmonary fibrosis.   3. No evidence of acute pulmonary embolism to the segmental level.   4. Trace right pleural effusion.     Addendum created by Sujey Shore MD on 5/17/2020 10:33:03 PM EDT     < end of copied text >    < from: TTE Echo Complete w/o contrast w/ Doppler (05.18.20 @ 07:40) >  Summary:   1. Technically difficult study   2. Sclerodegenerative disease of the aortic valve   3. Left ventricular ejection fraction, by visual estimation, is 50 to 55%.   4. Spectral Doppler shows impaired relaxation pattern of left ventricular myocardial filling (Grade I diastolic dysfunction).   5. Mild thickening and calcification of the anterior and posterior mitral valve leaflets.   6. Sclerotic aortic valve with normal opening.   7. Mild to moderate aortic regurgitation.   8. Moderate mitral insufficiency   9. Estimated pulmonary artery systolic pressure is 39.3 mmHg assuming a right atrial pressure of 10 mmHg, which is consistent with borderline pulmonary hypertension.  10. LA volume Index is 23.4 ml/m² ml/m2.    < end of copied text >    VITALS:  T(C): 36.4 (05-18-20 @ 15:25), Max: 36.4 (05-17-20 @ 17:49)  T(F): 97.6 (05-18-20 @ 15:25), Max: 97.6 (05-17-20 @ 17:49)  HR: 60 (05-18-20 @ 15:25) (60 - 81)  BP: 159/66 (05-18-20 @ 15:25) (138/54 - 159/66)  BP(mean): --  ABP: --  ABP(mean): --  RR: 65 (05-17-20 @ 17:49) (65 - 65)  SpO2: 94% (05-18-20 @ 15:25) (94% - 96%)  CVP(mm Hg): --  CVP(cm H2O): --    Ins and Outs     05-18-20 @ 07:01  -  05-18-20 @ 16:21  --------------------------------------------------------  IN: 450 mL / OUT: 0 mL / NET: 450 mL        Height (cm): 170.18 (05-16-20 @ 10:21)  Weight (kg): 63.919468194 (05-16-20 @ 10:21)  BMI (kg/m2): 21.9 (05-16-20 @ 10:21)        I&O's Detail    18 May 2020 07:01  -  18 May 2020 16:21  --------------------------------------------------------  IN:    sodium chloride 0.9%.: 450 mL  Total IN: 450 mL    OUT:  Total OUT: 0 mL    Total NET: 450 mL

## 2020-05-18 NOTE — PROGRESS NOTE ADULT - ASSESSMENT
Physical Examination:  GENERAL:               Alert, Confused, No acute distress.    HEENT:                   No JVD, dry MM  PULM:                     Bilateral air entry, Clear to auscultation bilaterally, no significant sputum production, trace Rales, No Rhonchi, No Wheezing  NEURO:                  Alert, oriented, interactive, nonfocal, follows commands  PSYC:                      Calm, + Insight.    Assessment.  1. 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 Abnormal CT - Central and peripheral ground-glass opacities with septalthickening within the bilateral lower lobes. Additional peripheral opacities with septal thickening noted within the right middle lobe and bilateral upper lobes. These are commonly reported findings for COVID-19 pneumonia.  2. Chronic COPD with underlying IPF in patient who actively smokes  3. 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 Dementia, PVD, High chol, h/o CVA    Plan  Agree with trial of steroids x 5 days  will also recommend negative balance if CHF as cause, d/c ivf, lasix x 1    N/C O2 to maintain O2 sat > 90    unsure of active infection as wbc, procal, normal, can stop abx  continue current bronchodilators  continue Lovenox BID can switch to NOAC if desired    over all prognosis guarded, consider palliative care eval for GOC to be addressed early,

## 2020-05-18 NOTE — CONSULT NOTE ADULT - ATTENDING COMMENTS
I have personally seen and examined patient on the above date.  I discussed the case with Vilma Sutton NP and I agree with findings and plan as detailed per note above, which I have amended where appropriate.

## 2020-05-18 NOTE — CONSULT NOTE ADULT - ASSESSMENT
A/P 87 male with pmh dvt/pe on xarelto, dementia, copd not on home oxygen, pulmonary fibrosis, recent admission [facility] 4/21 - 5/5 secondary to sob and covid pneumonia with hypoxia now returns with acute hypoxic respiratory failure.    # Acute Hypoxic Respiratory Failure with viral  pneumonia  # Acute COPD Exacerbation  # Chronic Pulmonary Fibrosis  Pt presents with acute hypoxia Spo2 80% - improved to > 90% with nasal canula  chest xray reveals interval improvement in bilateral lower lung airspace dz compared to prior  ABG reveals respiratory alkalosis  pulmonary consult pending   continue spiriva, albuterol, symbicort, robitussin, steroids, zithromax  supplemental oxygen therapy maintain sats > 90% pt currently at 92% on 6 L NC  chest PT, incentive spirometry  follow up repeat covid pcr swab negative X2 5/16 and 5/4  Pulmonary consulted      # Elevated D-Dimer  # Hx PE/DVT on xarelto  D-Dimer found to be 1048,   continue treatment dose lovenox   follow up am labs    # Dementia  chronic stable condition  continue supportive care    # Essential Hypertension  continue metoprolol 12.5 bid  monitor BP closely    # Hyponatremia  pt appears clinically dehydrated - pt received normal saline bolus in ED  follow up bmp - gentle ivf hydration    # Hx of Dysphagia  nurse to preform bed side swallow eval since pt is more awake today   follow up speech and swallow evaluation- pending     #prophylactic measure  continue treatment dose lovenox and protonix    Palliative : asked for GOC, pt seen on previous admissions here by palliative has always been full code status.  case d/w med team today , pt seen bedside this AM, was awake, but oriented to self only, not location or date. Spoke only few words A/P 87 male with pmh dvt/pe on xarelto, dementia, copd not on home oxygen, pulmonary fibrosis, recent admission [facility] 4/21 - 5/5 secondary to sob and covid pneumonia with hypoxia now returns with acute hypoxic respiratory failure.    # Acute Hypoxic Respiratory Failure with viral  pneumonia  # Acute COPD Exacerbation  # Chronic Pulmonary Fibrosis  Pt presents with acute hypoxia Spo2 80% - improved to > 90% with nasal canula  chest xray reveals interval improvement in bilateral lower lung airspace dz compared to prior  ABG reveals respiratory alkalosis  pulmonary consult pending   continue spiriva, albuterol, symbicort, robitussin, steroids, zithromax  supplemental oxygen therapy maintain sats > 90% pt currently at 92% on 6 L NC  chest PT, incentive spirometry  follow up repeat covid pcr swab negative X2 5/16 and 5/4  Pulmonary consulted      # Elevated D-Dimer  # Hx PE/DVT on xarelto  D-Dimer found to be 1048,   continue treatment dose lovenox   follow up am labs    # Dementia  chronic stable condition  continue supportive care    # Essential Hypertension  continue metoprolol 12.5 bid  monitor BP closely    # Hyponatremia  pt appears clinically dehydrated - pt received normal saline bolus in ED  follow up bmp - gentle ivf hydration    # Hx of Dysphagia  nurse to preform bed side swallow eval since pt is more awake today   follow up speech and swallow evaluation- pending     #prophylactic measure  continue treatment dose lovenox and protonix    Palliative : asked for GOC, 89 yo male, pt seen on previous admissions here by palliative, has always been full code status. Case d/w med team today , pt seen bedside this AM, was awake, but oriented to self only, not location or date. Spoke only few words. Has moist cough w/ sl sob , currently on N/C 5-6 L. History as above,  reviewed  w/med team, and chart reviewed.   I called and spoke to pts daughter Ella, she says she lives nearby and helps her mom as caregiver. She says her mom is sick right now, but did not say with what. Ella says she and her sister and her mother make the healthcare decisions , there is no HCP paper as per Ella.  Ella and I reviewed pts history, pt is [place], lived at home, but has been in /out hospital and [facility] recently.       Ella reports her Dad is forgetful, was having difficulty swallowing at home and has been mostly bedridden for past few years. We discussed dementia and how disease progresses.    I asked if in event pts heart should stop and pt has difficulty breathing , how much intervention would he want ,  I discussed with her that her dads underlying health and lung issues in addition to having had covid  make him a poor candidate for cpr /intubation.  She says she will have to discuss this with her sister and her Mother. I gave her my contact info for questions.   She is to call me back after family discussion.   Will follow w/ med team for ongoing goc. A/P 87 male with pmh dvt/pe on xarelto, dementia, copd not on home oxygen, pulmonary fibrosis, recent admission [facility] 4/21 - 5/5 secondary to sob and covid pneumonia with hypoxia now returns with acute hypoxic respiratory failure.    # Acute Hypoxic Respiratory Failure with viral  pneumonia  # Acute COPD Exacerbation  # Chronic Pulmonary Fibrosis  Pt presents with acute hypoxia Spo2 80% - improved to > 90% with nasal canula  chest xray reveals interval improvement in bilateral lower lung airspace dz compared to prior  ABG reveals respiratory alkalosis  pulmonary consult pending   continue spiriva, albuterol, symbicort, robitussin, steroids, zithromax  supplemental oxygen therapy maintain sats > 90% pt currently at 92% on 6 L NC  chest PT, incentive spirometry  follow up repeat covid pcr swab negative X2 5/16 and 5/4  Pulmonary consulted      # Elevated D-Dimer  # Hx PE/DVT on xarelto  D-Dimer found to be 1048,   continue treatment dose lovenox   follow up am labs    # Dementia  chronic stable condition  continue supportive care    # Essential Hypertension  continue metoprolol 12.5 bid  monitor BP closely    # Hyponatremia  pt appears clinically dehydrated - pt received normal saline bolus in ED  follow up bmp - gentle ivf hydration    # Hx of Dysphagia  nurse to preform bed side swallow eval since pt is more awake today   follow up speech and swallow evaluation- pending     #prophylactic measure  continue treatment dose lovenox and protonix    Palliative : asked for GOC, 89 yo male, pt seen on previous admissions here by palliative, has always been full code status. Case d/w med team today , pt seen bedside this AM, was awake, but oriented to self only, not location or date. Spoke only few words. Has moist cough w/ sl sob , currently on N/C 5-6 L. History as above,  reviewed  w/med team, and chart reviewed.   I called and spoke to pts daughter Ella, she says she lives nearby and helps her mom as caregiver. She says her mom is sick right now, but did not say with what. Ella says she and her sister and her mother make the healthcare decisions , there is no HCP paper as per Ella.  Ella and I reviewed pts history, pt is [place], lived at home, but has been in /out hospital and [facility] recently.       Ella reports her Dad is forgetful, was having difficulty swallowing at home and has been mostly bedridden for past few years. We discussed dementia and how disease progresses.    I asked if in event pts heart should stop and pt has difficulty breathing , how much intervention would he want ,  I discussed with her that her dads underlying health and lung issues in addition to having had covid  make him a poor candidate for cpr /intubation.  She says she will have to discuss this with her sister and her Mother. I gave her my contact info for questions.   She is to call me back after family discussion.   Will follow w/ med team for ongoing goc.      .

## 2020-05-19 LAB
ALBUMIN SERPL ELPH-MCNC: 2.3 G/DL — LOW (ref 3.3–5)
ALP SERPL-CCNC: 106 U/L — SIGNIFICANT CHANGE UP (ref 40–120)
ALT FLD-CCNC: 54 U/L — HIGH (ref 10–45)
ANION GAP SERPL CALC-SCNC: 8 MMOL/L — SIGNIFICANT CHANGE UP (ref 5–17)
AST SERPL-CCNC: 38 U/L — SIGNIFICANT CHANGE UP (ref 10–40)
BILIRUB SERPL-MCNC: 0.5 MG/DL — SIGNIFICANT CHANGE UP (ref 0.2–1.2)
BUN SERPL-MCNC: 23 MG/DL — SIGNIFICANT CHANGE UP (ref 7–23)
CALCIUM SERPL-MCNC: 8.3 MG/DL — LOW (ref 8.4–10.5)
CHLORIDE SERPL-SCNC: 103 MMOL/L — SIGNIFICANT CHANGE UP (ref 96–108)
CO2 SERPL-SCNC: 29 MMOL/L — SIGNIFICANT CHANGE UP (ref 22–31)
CREAT SERPL-MCNC: 1.06 MG/DL — SIGNIFICANT CHANGE UP (ref 0.5–1.3)
CRP SERPL-MCNC: 1.58 MG/DL — HIGH (ref 0–0.4)
D DIMER BLD IA.RAPID-MCNC: 532 NG/ML DDU — HIGH
GLUCOSE SERPL-MCNC: 70 MG/DL — SIGNIFICANT CHANGE UP (ref 70–99)
HCT VFR BLD CALC: 43.4 % — SIGNIFICANT CHANGE UP (ref 39–50)
HGB BLD-MCNC: 14 G/DL — SIGNIFICANT CHANGE UP (ref 13–17)
MCHC RBC-ENTMCNC: 29.2 PG — SIGNIFICANT CHANGE UP (ref 27–34)
MCHC RBC-ENTMCNC: 32.3 GM/DL — SIGNIFICANT CHANGE UP (ref 32–36)
MCV RBC AUTO: 90.6 FL — SIGNIFICANT CHANGE UP (ref 80–100)
NRBC # BLD: 0 /100 WBCS — SIGNIFICANT CHANGE UP (ref 0–0)
NT-PROBNP SERPL-SCNC: 36 PG/ML — SIGNIFICANT CHANGE UP (ref 0–300)
PLATELET # BLD AUTO: 571 K/UL — HIGH (ref 150–400)
POTASSIUM SERPL-MCNC: 3.5 MMOL/L — SIGNIFICANT CHANGE UP (ref 3.5–5.3)
POTASSIUM SERPL-SCNC: 3.5 MMOL/L — SIGNIFICANT CHANGE UP (ref 3.5–5.3)
PROT SERPL-MCNC: 7.5 G/DL — SIGNIFICANT CHANGE UP (ref 6–8.3)
RBC # BLD: 4.79 M/UL — SIGNIFICANT CHANGE UP (ref 4.2–5.8)
RBC # FLD: 15.9 % — HIGH (ref 10.3–14.5)
SODIUM SERPL-SCNC: 140 MMOL/L — SIGNIFICANT CHANGE UP (ref 135–145)
WBC # BLD: 11.24 K/UL — HIGH (ref 3.8–10.5)
WBC # FLD AUTO: 11.24 K/UL — HIGH (ref 3.8–10.5)

## 2020-05-19 PROCEDURE — 99232 SBSQ HOSP IP/OBS MODERATE 35: CPT

## 2020-05-19 PROCEDURE — 99497 ADVNCD CARE PLAN 30 MIN: CPT

## 2020-05-19 PROCEDURE — 99233 SBSQ HOSP IP/OBS HIGH 50: CPT

## 2020-05-19 RX ADMIN — PANTOPRAZOLE SODIUM 40 MILLIGRAM(S): 20 TABLET, DELAYED RELEASE ORAL at 06:04

## 2020-05-19 RX ADMIN — Medication 12.5 MILLIGRAM(S): at 16:03

## 2020-05-19 RX ADMIN — SENNA PLUS 2 TABLET(S): 8.6 TABLET ORAL at 20:26

## 2020-05-19 RX ADMIN — Medication 81 MILLIGRAM(S): at 14:29

## 2020-05-19 RX ADMIN — Medication 1 MILLIGRAM(S): at 14:29

## 2020-05-19 RX ADMIN — TIOTROPIUM BROMIDE 1 CAPSULE(S): 18 CAPSULE ORAL; RESPIRATORY (INHALATION) at 09:36

## 2020-05-19 RX ADMIN — AZITHROMYCIN 500 MILLIGRAM(S): 500 TABLET, FILM COATED ORAL at 14:29

## 2020-05-19 RX ADMIN — Medication 40 MILLIGRAM(S): at 06:04

## 2020-05-19 RX ADMIN — ENOXAPARIN SODIUM 60 MILLIGRAM(S): 100 INJECTION SUBCUTANEOUS at 06:04

## 2020-05-19 RX ADMIN — BUDESONIDE AND FORMOTEROL FUMARATE DIHYDRATE 2 PUFF(S): 160; 4.5 AEROSOL RESPIRATORY (INHALATION) at 22:06

## 2020-05-19 RX ADMIN — Medication 40 MILLIGRAM(S): at 19:00

## 2020-05-19 RX ADMIN — BUDESONIDE AND FORMOTEROL FUMARATE DIHYDRATE 2 PUFF(S): 160; 4.5 AEROSOL RESPIRATORY (INHALATION) at 09:34

## 2020-05-19 RX ADMIN — Medication 12.5 MILLIGRAM(S): at 06:04

## 2020-05-19 RX ADMIN — ENOXAPARIN SODIUM 60 MILLIGRAM(S): 100 INJECTION SUBCUTANEOUS at 16:03

## 2020-05-19 NOTE — GOALS OF CARE CONVERSATION - ADVANCED CARE PLANNING - CONVERSATION DETAILS
NH paperwork and prior GOC conversations reviewed - pt is a full code
I called and spoke to pts daughter Ella, she says she lives nearby and helps her mom as caregiver. She says her mom is sick right now, but did not say with what. Ella says she and her sister and her mother make the healthcare decisions , there is no HCP paper as per Ella.  Ella and I reviewed pts history, pt is [place], lived at home, but has been in /out hospital and [facility] recently.       Ella reports her Dad is forgetful, was having difficulty swallowing at home and has been mostly bedridden for past few years. We discussed dementia and how disease progresses.    I asked if in event pts heart should stop and pt has difficulty breathing , how much intervention would he want ,  I discussed with her that her dads underlying health and lung issues in addition to having had covid  make him a poor candidate for cpr /intubation.  She says she will have to discuss this with her sister and her Mother. I gave her my contact info for questions.   She is to call me back after family discussion.
Had Face Time session with wife and daughter and patient this afternoon, family very  happy to see pt.  After session I spoke with daughter Ella and wife, we again reviewed pts condition and  discussed advanced directives/goc for pt. At this time daughter and wife say that they feel pt would not want cpr or intubation done, that if his heart or breathing stop, then let him go. I explained I would be completing a form with a verbal order for DNR DNI, daughter agreed.  [place] form completed and placed on chart . I also explained that pt may benefit from  home hospice services, daughter said she will speak to her family about it and consider these services. They will let us know if they are interested. They are hoping he can still return to [facility] first. Reviewed with med team .

## 2020-05-19 NOTE — PROGRESS NOTE ADULT - ASSESSMENT
A/P 87 male with pmh dvt/pe on xarelto, dementia, copd not on home oxygen, pulmonary fibrosis, recent admission [facility] 4/21 - 5/5 secondary to sob and covid pneumonia with hypoxia now returns with acute hypoxic respiratory failure.      # Acute Hypoxic Respiratory Failure with viral  pneumonia  # Acute COPD Exacerbation  # Chronic Pulmonary Fibrosis  Pt presents with acute hypoxia Spo2 80% - improved to > 90% with nasal canula  chest xray reveals interval improvement in bilateral lower lung airspace dz compared to prior  ABG reveals respiratory alkalosis  pulmonary following   continue spiriva, albuterol, symbicort, robitussin, steroids, zithromax  supplemental oxygen therapy maintain sats > 90% pt currently at 92% on 6 L NC  chest PT, incentive spirometry  follow up repeat covid pcr swab negative X2 5/16 and 5/4        # Elevated D-Dimer  # Hx PE/DVT on xarelto  D-Dimer found to be 1048,   continue treatment dose lovenox   follow up am labs    # Dementia  chronic stable condition  continue supportive care    # Essential Hypertension  continue metoprolol 12.5 bid  monitor BP closely    # Hyponatremia  pt appears clinically dehydrated - pt received normal saline bolus in ED  follow up bmp -     # Hx of Dysphagia  nurse to preform bed side swallow eval since pt is more awake today   follow up speech and swallow evaluation- pt cleared for pureed w/ nectar thick flds     #prophylactic measure  continue treatment dose lovenox and protonix    Palliative : f/u GOC called and spoke to daughter Ella this morning , updated her on pt., reviewed pt lung issues, copd, ipf, active smoker and s/p covid. Again discussed advanced directives for the pt.   Daughter says she has not had the chance to speak to her sister yet and also is looking to speak to her family  . It may also be helpful for family to see pt, so we have a planned face time call for  this  afternoon.  GOC discussion  ongoing.  GOC note to follow after today's  face time. A/P 87 male with pmh dvt/pe on xarelto, dementia, copd not on home oxygen, pulmonary fibrosis, recent admission [facility] 4/21 - 5/5 secondary to sob and covid pneumonia with hypoxia now returns with acute hypoxic respiratory failure.      # Acute Hypoxic Respiratory Failure with viral  pneumonia  # Acute COPD Exacerbation  # Chronic Pulmonary Fibrosis  Pt presents with acute hypoxia Spo2 80% - improved to > 90% with nasal canula  chest xray reveals interval improvement in bilateral lower lung airspace dz compared to prior  ABG reveals respiratory alkalosis  pulmonary following   continue spiriva, albuterol, symbicort, robitussin, steroids, zithromax  supplemental oxygen therapy maintain sats > 90% pt currently at 92% on 6 L NC  chest PT, incentive spirometry  follow up repeat covid pcr swab negative X2 5/16 and 5/4        # Elevated D-Dimer  # Hx PE/DVT on xarelto  D-Dimer found to be 1048,   continue treatment dose lovenox   follow up am labs    # Dementia  chronic stable condition  continue supportive care    # Essential Hypertension  continue metoprolol 12.5 bid  monitor BP closely    # Hyponatremia  pt appears clinically dehydrated - pt received normal saline bolus in ED  follow up bmp -     # Hx of Dysphagia  nurse to preform bed side swallow eval since pt is more awake today   follow up speech and swallow evaluation- pt cleared for pureed w/ nectar thick flds     #prophylactic measure  continue treatment dose lovenox and protonix    Palliative : f/u GOC called and spoke to daughter Ella this morning , updated her on pt., reviewed pt lung issues, copd, ipf, active smoker and s/p covid. Again discussed advanced directives for the pt.   Daughter says she has not had the chance to speak to her sister yet and also is looking to speak to her family  . It may also be helpful for family to see pt, so we have a planned face time call for  this  afternoon.  GOC discussion  ongoing.  GOC note to follow after today's  face time.  MIKA reviewed and signed.  DNR/I

## 2020-05-19 NOTE — PROGRESS NOTE ADULT - ASSESSMENT
Physical Examination:  GENERAL:               Alert, Confused, No acute distress.    HEENT:                   No JVD, dry MM  PULM:                     Bilateral air entry, Clear to auscultation bilaterally, no significant sputum production, trace Rales, No Rhonchi, No Wheezing  NEURO:                  Alert, oriented, interactive, nonfocal, follows commands  PSYC:                      Calm, + Insight.    Assessment.  1. 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 Abnormal CT - Central and peripheral ground-glass opacities with septalthickening within the bilateral lower lobes. Additional peripheral opacities with septal thickening noted within the right middle lobe and bilateral upper lobes. These are commonly reported findings for COVID-19 pneumonia.  2. Chronic COPD with underlying IPF in patient who actively smokes  3. 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 Dementia, PVD, High chol, h/o CVA    Plan  Agree with trial of steroids x 5 days  Taper N/C O2 to maintain O2 sat > 90  repeat CXR in 48 hrs   unsure of active infection as wbc, procal, normal, can stop abx  continue current bronchodilators  continue Lovenox BID can switch to NOAC if desired    over all prognosis guarded, consider palliative care eval for GOC to be addressed early,   d/w hospitalist and palliative care

## 2020-05-19 NOTE — PROGRESS NOTE ADULT - SUBJECTIVE AND OBJECTIVE BOX
Patient is a 87y old  Male who presents with a chief complaint of Hypoxia (19 May 2020 11:51)      Patient seen and examined at bedside.    ALLERGIES:  No Known Allergies    MEDICATIONS  (STANDING):  aspirin enteric coated 81 milliGRAM(s) Oral daily  azithromycin   Tablet 500 milliGRAM(s) Oral every 24 hours  budesonide 160 MICROgram(s)/formoterol 4.5 MICROgram(s) Inhaler 2 Puff(s) Inhalation two times a day  enoxaparin Injectable 60 milliGRAM(s) SubCutaneous two times a day  folic acid 1 milliGRAM(s) Oral daily  methylPREDNISolone sodium succinate Injectable 40 milliGRAM(s) IV Push every 12 hours  metoprolol tartrate 12.5 milliGRAM(s) Oral two times a day  pantoprazole    Tablet 40 milliGRAM(s) Oral before breakfast  senna 2 Tablet(s) Oral at bedtime  tiotropium 18 MICROgram(s) Capsule 1 Capsule(s) Inhalation daily    MEDICATIONS  (PRN):  acetaminophen   Tablet .. 650 milliGRAM(s) Oral every 6 hours PRN Temp greater or equal to 38C (100.4F), Mild Pain (1 - 3)  ALBUTerol    90 MICROgram(s) HFA Inhaler 1 Puff(s) Inhalation every 6 hours PRN Shortness of Breath and/or Wheezing  guaiFENesin   Syrup  (Sugar-Free) 100 milliGRAM(s) Oral every 6 hours PRN Cough  phenylephrine 0.5% Nasal 1 Spray(s) Nasal every 4 hours PRN Nasal Congestion  polyethylene glycol 3350 17 Gram(s) Oral daily PRN Constipation    Vital Signs Last 24 Hrs  T(F): 97.2 (19 May 2020 05:15), Max: 97.6 (18 May 2020 15:25)  HR: 68 (19 May 2020 09:34) (60 - 70)  BP: 167/76 (19 May 2020 05:15) (159/66 - 167/76)  RR: 16 (19 May 2020 05:15) (16 - 16)  SpO2: 95% (19 May 2020 09:34) (94% - 96%)  I&O's Summary    18 May 2020 07:01  -  19 May 2020 07:00  --------------------------------------------------------  IN: 630 mL / OUT: 0 mL / NET: 630 mL    19 May 2020 07:01  -  19 May 2020 12:26  --------------------------------------------------------  IN: 500 mL / OUT: 0 mL / NET: 500 mL      PHYSICAL EXAM:  General: NAD, A/O x 3  ENT: MMM  Neck: Supple, No JVD  Lungs: Clear to auscultation bilaterally  Cardio: RRR, S1/S2, No murmurs  Abdomen: Soft, Nontender, Nondistended; Bowel sounds present  Extremities: No calf tenderness, No pitting edema    LABS:                        14.0   11.24 )-----------( 571      ( 19 May 2020 06:58 )             43.4     05-19    140  |  103  |  23  ----------------------------<  70  3.5   |  29  |  1.06    Ca    8.3      19 May 2020 06:58    TPro  7.5  /  Alb  2.3  /  TBili  0.5  /  DBili  x   /  AST  38  /  ALT  54  /  AlkPhos  106  05-19    eGFR if Non African American: 63 mL/min/1.73M2 (05-19-20 @ 06:58)  eGFR if : 73 mL/min/1.73M2 (05-19-20 @ 06:58)      RADIOLOGY & ADDITIONAL TESTS:    Care Discussed with Consultants/Other Providers: Patient is a 87y old  Male who presents with a chief complaint of Hypoxia (19 May 2020 11:51)    Patient seen and examined at bedside.  Pt. with no events overnight.    ALLERGIES:  No Known Allergies    MEDICATIONS  (STANDING):  aspirin enteric coated 81 milliGRAM(s) Oral daily  azithromycin   Tablet 500 milliGRAM(s) Oral every 24 hours  budesonide 160 MICROgram(s)/formoterol 4.5 MICROgram(s) Inhaler 2 Puff(s) Inhalation two times a day  enoxaparin Injectable 60 milliGRAM(s) SubCutaneous two times a day  folic acid 1 milliGRAM(s) Oral daily  methylPREDNISolone sodium succinate Injectable 40 milliGRAM(s) IV Push every 12 hours  metoprolol tartrate 12.5 milliGRAM(s) Oral two times a day  pantoprazole    Tablet 40 milliGRAM(s) Oral before breakfast  senna 2 Tablet(s) Oral at bedtime  tiotropium 18 MICROgram(s) Capsule 1 Capsule(s) Inhalation daily    MEDICATIONS  (PRN):  acetaminophen   Tablet .. 650 milliGRAM(s) Oral every 6 hours PRN Temp greater or equal to 38C (100.4F), Mild Pain (1 - 3)  ALBUTerol    90 MICROgram(s) HFA Inhaler 1 Puff(s) Inhalation every 6 hours PRN Shortness of Breath and/or Wheezing  guaiFENesin   Syrup  (Sugar-Free) 100 milliGRAM(s) Oral every 6 hours PRN Cough  phenylephrine 0.5% Nasal 1 Spray(s) Nasal every 4 hours PRN Nasal Congestion  polyethylene glycol 3350 17 Gram(s) Oral daily PRN Constipation    Vital Signs Last 24 Hrs  T(F): 97.2 (19 May 2020 05:15), Max: 97.6 (18 May 2020 15:25)  HR: 68 (19 May 2020 09:34) (60 - 70)  BP: 167/76 (19 May 2020 05:15) (159/66 - 167/76)  RR: 16 (19 May 2020 05:15) (16 - 16)  SpO2: 95% (19 May 2020 09:34) (94% - 96%)  I&O's Summary    18 May 2020 07:01  -  19 May 2020 07:00  --------------------------------------------------------  IN: 630 mL / OUT: 0 mL / NET: 630 mL    19 May 2020 07:01  -  19 May 2020 12:26  --------------------------------------------------------  IN: 500 mL / OUT: 0 mL / NET: 500 mL      PHYSICAL EXAM:  General: NAD, Alert and calm.  ENT: MMM, no thrush  Neck: Supple, No JVD  Lungs: non-labored breathing, decreased breath sounds at bases  Cardio: RRR, S1/S2, No murmurs  Abdomen: Soft, Nontender, Nondistended; Bowel sounds present  Extremities: No calf tenderness, No pitting edema  Neuro:  no focal deficits    LABS:                        14.0   11.24 )-----------( 571      ( 19 May 2020 06:58 )             43.4     05-19    140  |  103  |  23  ----------------------------<  70  3.5   |  29  |  1.06    Ca    8.3      19 May 2020 06:58    TPro  7.5  /  Alb  2.3  /  TBili  0.5  /  DBili  x   /  AST  38  /  ALT  54  /  AlkPhos  106  05-19    eGFR if Non African American: 63 mL/min/1.73M2 (05-19-20 @ 06:58)  eGFR if : 73 mL/min/1.73M2 (05-19-20 @ 06:58)      RADIOLOGY & ADDITIONAL TESTS:    Care Discussed with Consultants/Other Providers:

## 2020-05-19 NOTE — PROGRESS NOTE ADULT - ASSESSMENT
87 male with pmh dvt/pe on xarelto, dementia, copd not on home oxygen, pulmonary fibrosis, recent admission [facility] 4/21 - 5/5 secondary to sob and covid pneumonia with hypoxia now returns with acute hypoxic respiratory failure.    # Acute Hypoxic Respiratory Failure with viral pneumonia  # Acute COPD Exacerbation  # Chronic Pulmonary Fibrosis  -continue oxygen support; on 10L of nc O2 sats at 92%, will decrease to 6>8L today; pt's baseline saturation prob lower 90's due to underlying lung dz.    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; on short course of IV steroids (5 days)  -given 1 dose of IV Lasix today for possible CHF fluid overload, stopped IVF  -continue spiriva, albuterol, symbicort, robitussin, on course of oral Zithromax  -continue IV steroids, day 3 of 5  -chest PT, incentive spirometry  -COVID PCR swab negative x 2;  5/16 and 5/4  -Echo done today; normal LV function; mild diastolic failure, grade 1    # Elevated D-Dimer; secondary to COVID  -D-Dimer found to be 1048, previous on 5/4 was 436  -continue treatment dose lovenox   -CTA of chest:  no PE    # Dementia  -chronic stable condition  -continue supportive care    # Essential Hypertension  -continue metoprolol 12.5 bid  -monitor BP closely    # Dysphagia  - speech and swallow evaluation completed, pt started on a diet    Pt.'s daughter, Ella was notified of pt's plan of care.  GOC to be discussed further with Palliative; Dr. Womack. 87 male with pmh dvt/pe on xarelto, dementia, copd not on home oxygen, pulmonary fibrosis, recent admission [facility] 4/21 - 5/5 secondary to sob and covid pneumonia with hypoxia now returns with acute hypoxic respiratory failure.    # Acute Hypoxic Respiratory Failure with viral pneumonia  # Acute COPD Exacerbation  # Chronic Pulmonary Fibrosis  -continue oxygen support; on 10L of nc O2 sats at 92%, will decrease to 6>8L today; pt's baseline saturation prob lower 90's due to underlying lung dz.    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; repeat CXR in 2 days  -continue spiriva, albuterol, symbicort, robitussin, on course of oral Zithromax; day 4 of 5  -continue IV steroids, day 4 of 5  -chest PT, incentive spirometry  -COVID PCR swab negative x 2;  5/16 and 5/4  -Echo done today; normal LV function; mild diastolic failure, grade 1    # Elevated D-Dimer; secondary to COVID  -D-Dimer 1048 > 532  -continue treatment dose lovenox   -CTA of chest:  no PE    # Dementia  -chronic stable condition  -continue supportive care    # Essential Hypertension  -continue metoprolol 12.5 bid  -monitor BP closely    # Dysphagia  - speech and swallow evaluation completed, pt started on a diet    Pt.'s daughter, Ella was notified of pt's plan of care.  GOC to be discussed further with Palliative; Dr. Womack.

## 2020-05-19 NOTE — PROGRESS NOTE ADULT - SUBJECTIVE AND OBJECTIVE BOX
Follow-up Pulmonary Progress Note  Chief Complaint : Hypoxemia      clinically similar to yesterday  no cp, cough  c/o sob  now on 10 L n/c      Allergies :No Known Allergies      PAST MEDICAL & SURGICAL HISTORY:  Dementia  PE (pulmonary thromboembolism)  History of COPD  [place] (hard of hearing)  Deep vein thrombosis (DVT)  Dyslipidemia  CAD (coronary artery disease)  Peripheral vascular disease  History of hip surgery  History of mandibular surgery      Medications:  MEDICATIONS  (STANDING):  aspirin enteric coated 81 milliGRAM(s) Oral daily  azithromycin   Tablet 500 milliGRAM(s) Oral every 24 hours  budesonide 160 MICROgram(s)/formoterol 4.5 MICROgram(s) Inhaler 2 Puff(s) Inhalation two times a day  enoxaparin Injectable 60 milliGRAM(s) SubCutaneous two times a day  folic acid 1 milliGRAM(s) Oral daily  methylPREDNISolone sodium succinate Injectable 40 milliGRAM(s) IV Push every 12 hours  metoprolol tartrate 12.5 milliGRAM(s) Oral two times a day  pantoprazole    Tablet 40 milliGRAM(s) Oral before breakfast  senna 2 Tablet(s) Oral at bedtime  tiotropium 18 MICROgram(s) Capsule 1 Capsule(s) Inhalation daily    MEDICATIONS  (PRN):  acetaminophen   Tablet .. 650 milliGRAM(s) Oral every 6 hours PRN Temp greater or equal to 38C (100.4F), Mild Pain (1 - 3)  ALBUTerol    90 MICROgram(s) HFA Inhaler 1 Puff(s) Inhalation every 6 hours PRN Shortness of Breath and/or Wheezing  guaiFENesin   Syrup  (Sugar-Free) 100 milliGRAM(s) Oral every 6 hours PRN Cough  phenylephrine 0.5% Nasal 1 Spray(s) Nasal every 4 hours PRN Nasal Congestion  polyethylene glycol 3350 17 Gram(s) Oral daily PRN Constipation      LABS:                        14.0   11.24 )-----------( 571      ( 19 May 2020 06:58 )             43.4     05-19    140  |  103  |  23  ----------------------------<  70  3.5   |  29  |  1.06    Ca    8.3<L>      19 May 2020 06:58    TPro  7.5  /  Alb  2.3<L>  /  TBili  0.5  /  DBili  x   /  AST  38  /  ALT  54<H>  /  AlkPhos  106  05-19    COVID  05-16-20 @ 10:30  COVID -   NotDetec  05-04-20 @ 13:15  COVID -   NotDetec  04-22-20 @ 08:40  COVID -   Detected<!!>  04-21-20 @ 15:35  COVID -   NotDetec      COVID Biomarkers    05-19-20 @ 06:58 ESR --  ---  CRP 1.58<H>  ---  DDimer  532<H>   ---   LDH --   ---   Ferritin --    05-16-20 @ 10:30 ESR --  ---  CRP 4.77<H>  ---  DDimer  1048<H>   ---   <H>   ---   Ferritin 499<H>    05-04-20 @ 06:50 ESR --  ---  CRP --  ---  DDimer  436<H>   ---   LDH --   ---   Ferritin 578<H>    05-01-20 @ 18:50 ESR --  ---  CRP --  ---  DDimer  580<H>   ---   LDH --   ---   Ferritin --    04-30-20 @ 07:50 ESR --  ---  CRP 11.06<H>  ---  DDimer  986<H>   ---   <H>   ---   Ferritin 820<H>    04-29-20 @ 12:10 ESR --  ---  CRP 10.93<H>  ---  DDimer  950<H>   ---   <H>   ---   Ferritin 894<H>    04-28-20 @ 07:29 ESR --  ---  CRP 12.71<H>  ---  DDimer  1634<H>   ---   <H>   ---   Ferritin 951<H>    04-26-20 @ 08:40 ESR --  ---  CRP 7.73<H>  ---  DDimer  988<H>   ---   <H>   ---   Ferritin 760<H>    04-25-20 @ 07:21 ESR --  ---  CRP 6.69<H>  ---  DDimer  1584<H>   ---   LDH --   ---   Ferritin 615<H>    04-24-20 @ 11:30 ESR --  ---  CRP --  ---  DDimer  2321<H>   ---   LDH --   ---   Ferritin --    04-21-20 @ 15:35 ESR --  ---  CRP 23.96<H>  ---  DDimer  6788   ---   LDH --   ---   Ferritin 894<H>            VITALS:  T(C): 36.2 (05-19-20 @ 05:15), Max: 36.4 (05-18-20 @ 15:25)  T(F): 97.2 (05-19-20 @ 05:15), Max: 97.6 (05-18-20 @ 15:25)  HR: 68 (05-19-20 @ 09:34) (60 - 70)  BP: 167/76 (05-19-20 @ 05:15) (159/66 - 167/76)  BP(mean): --  ABP: --  ABP(mean): --  RR: 16 (05-19-20 @ 05:15) (16 - 16)  SpO2: 95% (05-19-20 @ 09:34) (94% - 96%)  CVP(mm Hg): --  CVP(cm H2O): --    Ins and Outs     05-18-20 @ 07:01  -  05-19-20 @ 07:00  --------------------------------------------------------  IN: 630 mL / OUT: 0 mL / NET: 630 mL    05-19-20 @ 07:01  -  05-19-20 @ 11:11  --------------------------------------------------------  IN: 500 mL / OUT: 0 mL / NET: 500 mL                I&O's Detail    18 May 2020 07:01  -  19 May 2020 07:00  --------------------------------------------------------  IN:    Oral Fluid: 180 mL    sodium chloride 0.9%: 450 mL  Total IN: 630 mL    OUT:  Total OUT: 0 mL    Total NET: 630 mL      19 May 2020 07:01  -  19 May 2020 11:11  --------------------------------------------------------  IN:    Oral Fluid: 500 mL  Total IN: 500 mL    OUT:  Total OUT: 0 mL    Total NET: 500 mL

## 2020-05-19 NOTE — PROGRESS NOTE ADULT - SUBJECTIVE AND OBJECTIVE BOX
Progress: no events overnight, pt alert , confused ,is [place] on N/C 10 L today    Present Symptoms:   Dyspnea:   Nausea/Vomiting:   Anxiety:    Depressed Mood:   Fatigue:   Loss of appetite:   Pain:                   location:   Review of Systems: Unable to obtain due to poor mentation]    MEDICATIONS  (STANDING):  aspirin enteric coated 81 milliGRAM(s) Oral daily  azithromycin   Tablet 500 milliGRAM(s) Oral every 24 hours  budesonide 160 MICROgram(s)/formoterol 4.5 MICROgram(s) Inhaler 2 Puff(s) Inhalation two times a day  enoxaparin Injectable 60 milliGRAM(s) SubCutaneous two times a day  folic acid 1 milliGRAM(s) Oral daily  methylPREDNISolone sodium succinate Injectable 40 milliGRAM(s) IV Push every 12 hours  metoprolol tartrate 12.5 milliGRAM(s) Oral two times a day  pantoprazole    Tablet 40 milliGRAM(s) Oral before breakfast  senna 2 Tablet(s) Oral at bedtime  tiotropium 18 MICROgram(s) Capsule 1 Capsule(s) Inhalation daily    MEDICATIONS  (PRN):  acetaminophen   Tablet .. 650 milliGRAM(s) Oral every 6 hours PRN Temp greater or equal to 38C (100.4F), Mild Pain (1 - 3)  ALBUTerol    90 MICROgram(s) HFA Inhaler 1 Puff(s) Inhalation every 6 hours PRN Shortness of Breath and/or Wheezing  guaiFENesin   Syrup  (Sugar-Free) 100 milliGRAM(s) Oral every 6 hours PRN Cough  phenylephrine 0.5% Nasal 1 Spray(s) Nasal every 4 hours PRN Nasal Congestion  polyethylene glycol 3350 17 Gram(s) Oral daily PRN Constipation      PHYSICAL EXAM:  Vital Signs Last 24 Hrs  T(C): 36.2 (19 May 2020 05:15), Max: 36.4 (18 May 2020 15:25)  T(F): 97.2 (19 May 2020 05:15), Max: 97.6 (18 May 2020 15:25)  HR: 68 (19 May 2020 09:34) (60 - 70)  BP: 167/76 (19 May 2020 05:15) (159/66 - 167/76)  BP(mean): --  RR: 16 (19 May 2020 05:15) (16 - 16)  SpO2: 95% (19 May 2020 09:34) (94% - 96%)  General: alert  oriented to self       Diet:  pureed w/ nectar      LABS:                          14.0   11.24 )-----------( 571      ( 19 May 2020 06:58 )             43.4     05-19    140  |  103  |  23  ----------------------------<  70  3.5   |  29  |  1.06    Ca    8.3<L>      19 May 2020 06:58    TPro  7.5  /  Alb  2.3<L>  /  TBili  0.5  /  DBili  x   /  AST  38  /  ALT  54<H>  /  AlkPhos  106  05-19        RADIOLOGY & ADDITIONAL STUDIES:    ADVANCE DIRECTIVES:  full code  Advanced Care Planning discussion total time spent:

## 2020-05-20 LAB
ANION GAP SERPL CALC-SCNC: 10 MMOL/L — SIGNIFICANT CHANGE UP (ref 5–17)
BUN SERPL-MCNC: 27 MG/DL — HIGH (ref 7–23)
CALCIUM SERPL-MCNC: 8.4 MG/DL — SIGNIFICANT CHANGE UP (ref 8.4–10.5)
CHLORIDE SERPL-SCNC: 103 MMOL/L — SIGNIFICANT CHANGE UP (ref 96–108)
CO2 SERPL-SCNC: 23 MMOL/L — SIGNIFICANT CHANGE UP (ref 22–31)
CREAT SERPL-MCNC: 0.95 MG/DL — SIGNIFICANT CHANGE UP (ref 0.5–1.3)
GLUCOSE SERPL-MCNC: 98 MG/DL — SIGNIFICANT CHANGE UP (ref 70–99)
HCT VFR BLD CALC: 41 % — SIGNIFICANT CHANGE UP (ref 39–50)
HGB BLD-MCNC: 13.3 G/DL — SIGNIFICANT CHANGE UP (ref 13–17)
MCHC RBC-ENTMCNC: 29.4 PG — SIGNIFICANT CHANGE UP (ref 27–34)
MCHC RBC-ENTMCNC: 32.4 GM/DL — SIGNIFICANT CHANGE UP (ref 32–36)
MCV RBC AUTO: 90.5 FL — SIGNIFICANT CHANGE UP (ref 80–100)
NRBC # BLD: 0 /100 WBCS — SIGNIFICANT CHANGE UP (ref 0–0)
PLATELET # BLD AUTO: 350 K/UL — SIGNIFICANT CHANGE UP (ref 150–400)
POTASSIUM SERPL-MCNC: 4.5 MMOL/L — SIGNIFICANT CHANGE UP (ref 3.5–5.3)
POTASSIUM SERPL-SCNC: 4.5 MMOL/L — SIGNIFICANT CHANGE UP (ref 3.5–5.3)
RBC # BLD: 4.53 M/UL — SIGNIFICANT CHANGE UP (ref 4.2–5.8)
RBC # FLD: 15.7 % — HIGH (ref 10.3–14.5)
SODIUM SERPL-SCNC: 136 MMOL/L — SIGNIFICANT CHANGE UP (ref 135–145)
WBC # BLD: 11.07 K/UL — HIGH (ref 3.8–10.5)
WBC # FLD AUTO: 11.07 K/UL — HIGH (ref 3.8–10.5)

## 2020-05-20 PROCEDURE — 99232 SBSQ HOSP IP/OBS MODERATE 35: CPT

## 2020-05-20 PROCEDURE — 99233 SBSQ HOSP IP/OBS HIGH 50: CPT

## 2020-05-20 PROCEDURE — 71045 X-RAY EXAM CHEST 1 VIEW: CPT | Mod: 26

## 2020-05-20 RX ORDER — RIVAROXABAN 15 MG-20MG
10 KIT ORAL DAILY
Refills: 0 | Status: DISCONTINUED | OUTPATIENT
Start: 2020-05-20 | End: 2020-05-21

## 2020-05-20 RX ADMIN — Medication 1 MILLIGRAM(S): at 14:06

## 2020-05-20 RX ADMIN — Medication 12.5 MILLIGRAM(S): at 05:48

## 2020-05-20 RX ADMIN — BUDESONIDE AND FORMOTEROL FUMARATE DIHYDRATE 2 PUFF(S): 160; 4.5 AEROSOL RESPIRATORY (INHALATION) at 09:50

## 2020-05-20 RX ADMIN — PANTOPRAZOLE SODIUM 40 MILLIGRAM(S): 20 TABLET, DELAYED RELEASE ORAL at 05:48

## 2020-05-20 RX ADMIN — Medication 40 MILLIGRAM(S): at 05:48

## 2020-05-20 RX ADMIN — BUDESONIDE AND FORMOTEROL FUMARATE DIHYDRATE 2 PUFF(S): 160; 4.5 AEROSOL RESPIRATORY (INHALATION) at 21:21

## 2020-05-20 RX ADMIN — Medication 40 MILLIGRAM(S): at 18:30

## 2020-05-20 RX ADMIN — ENOXAPARIN SODIUM 60 MILLIGRAM(S): 100 INJECTION SUBCUTANEOUS at 05:47

## 2020-05-20 RX ADMIN — TIOTROPIUM BROMIDE 1 CAPSULE(S): 18 CAPSULE ORAL; RESPIRATORY (INHALATION) at 09:55

## 2020-05-20 RX ADMIN — AZITHROMYCIN 500 MILLIGRAM(S): 500 TABLET, FILM COATED ORAL at 14:06

## 2020-05-20 RX ADMIN — Medication 81 MILLIGRAM(S): at 14:06

## 2020-05-20 NOTE — PROGRESS NOTE ADULT - ATTENDING COMMENTS
I have personally seen and examined patient on the above date.  I discussed the case with NEL Luke and I agree with findings and plan as detailed per note above, which I have amended where appropriate.
I have personally seen and examined patient on the above date.  I discussed the case with NEL Luke and I agree with findings and plan as detailed per note above, which I have amended where appropriate.
I have personally seen and examined patient on the above date.  I discussed the case with Vilma Sutton NP and I agree with findings and plan as detailed per note above, which I have amended where appropriate.
I have personally seen and examined patient on the above date.  I discussed the case with Vilma Sutton NP and I agree with findings and plan as detailed per note above, which I have amended where appropriate.

## 2020-05-20 NOTE — PROGRESS NOTE ADULT - SUBJECTIVE AND OBJECTIVE BOX
Patient is a 87y old  Male who presents with a chief complaint of Hypoxia (20 May 2020 13:29)    Interval Hx  Patient seen and examined at bedside. Pt desatted to 70s this morning , intermittently requiring NRB . Afebrile overnight, lethargic .    ALLERGIES:  No Known Allergies    MEDICATIONS  (STANDING):  aspirin enteric coated 81 milliGRAM(s) Oral daily  budesonide 160 MICROgram(s)/formoterol 4.5 MICROgram(s) Inhaler 2 Puff(s) Inhalation two times a day  enoxaparin Injectable 60 milliGRAM(s) SubCutaneous two times a day  folic acid 1 milliGRAM(s) Oral daily  methylPREDNISolone sodium succinate Injectable 40 milliGRAM(s) IV Push every 12 hours  metoprolol tartrate 12.5 milliGRAM(s) Oral two times a day  pantoprazole    Tablet 40 milliGRAM(s) Oral before breakfast  senna 2 Tablet(s) Oral at bedtime  tiotropium 18 MICROgram(s) Capsule 1 Capsule(s) Inhalation daily    MEDICATIONS  (PRN):  acetaminophen   Tablet .. 650 milliGRAM(s) Oral every 6 hours PRN Temp greater or equal to 38C (100.4F), Mild Pain (1 - 3)  ALBUTerol    90 MICROgram(s) HFA Inhaler 1 Puff(s) Inhalation every 6 hours PRN Shortness of Breath and/or Wheezing  guaiFENesin   Syrup  (Sugar-Free) 100 milliGRAM(s) Oral every 6 hours PRN Cough  phenylephrine 0.5% Nasal 1 Spray(s) Nasal every 4 hours PRN Nasal Congestion  polyethylene glycol 3350 17 Gram(s) Oral daily PRN Constipation    Vital Signs Last 24 Hrs  T(F): 97.2 (20 May 2020 06:35), Max: 97.5 (19 May 2020 15:43)  HR: 54 (20 May 2020 09:35) (54 - 66)  BP: 140/47 (20 May 2020 06:35) (140/47 - 144/70)  RR: 16 (20 May 2020 10:02) (16 - 18)  SpO2: 97% (20 May 2020 10:02) (94% - 98%)  I&O's Summary    19 May 2020 07:01  -  20 May 2020 07:00  --------------------------------------------------------  IN: 1300 mL / OUT: 0 mL / NET: 1300 mL      PHYSICAL EXAM:  General: Lethargic  ENT: MMM, no thrush  Neck: Supple, No JVD  Lungs: B/l occasional rales, decreased breath sounds .  Cardio: RRR, S1/S2, No murmurs  Abdomen: Soft, Nontender, Nondistended; Bowel sounds present  Extremities: No calf tenderness, No pitting edema  Neuro:  no focal deficits    LABS:                        13.3   11.07 )-----------( 350      ( 20 May 2020 07:00 )             41.0     05-20    136  |  103  |  27  ----------------------------<  98  4.5   |  23  |  0.95    Ca    8.4      20 May 2020 07:00    TPro  7.5  /  Alb  2.3  /  TBili  0.5  /  DBili  x   /  AST  38  /  ALT  54  /  AlkPhos  106  05-19        eGFR if Non African American: 71 mL/min/1.73M2 (05-20-20 @ 07:00)  eGFR if : 83 mL/min/1.73M2 (05-20-20 @ 07:00)      RADIOLOGY & ADDITIONAL TESTS:        Care Discussed with Consultants/Other Providers: Dr. joaquin

## 2020-05-20 NOTE — PROGRESS NOTE ADULT - SUBJECTIVE AND OBJECTIVE BOX
Progress: no events reported overnight     Present Symptoms:   Dyspnea:   Nausea/Vomiting:   Anxiety:    Depressed Mood:   Fatigue:   Loss of appetite:   Pain:                   location:   Review of Systems: Unable to obtain due to poor mentation]    MEDICATIONS  (STANDING):  aspirin enteric coated 81 milliGRAM(s) Oral daily  azithromycin   Tablet 500 milliGRAM(s) Oral every 24 hours  budesonide 160 MICROgram(s)/formoterol 4.5 MICROgram(s) Inhaler 2 Puff(s) Inhalation two times a day  enoxaparin Injectable 60 milliGRAM(s) SubCutaneous two times a day  folic acid 1 milliGRAM(s) Oral daily  methylPREDNISolone sodium succinate Injectable 40 milliGRAM(s) IV Push every 12 hours  metoprolol tartrate 12.5 milliGRAM(s) Oral two times a day  pantoprazole    Tablet 40 milliGRAM(s) Oral before breakfast  senna 2 Tablet(s) Oral at bedtime  tiotropium 18 MICROgram(s) Capsule 1 Capsule(s) Inhalation daily    MEDICATIONS  (PRN):  acetaminophen   Tablet .. 650 milliGRAM(s) Oral every 6 hours PRN Temp greater or equal to 38C (100.4F), Mild Pain (1 - 3)  ALBUTerol    90 MICROgram(s) HFA Inhaler 1 Puff(s) Inhalation every 6 hours PRN Shortness of Breath and/or Wheezing  guaiFENesin   Syrup  (Sugar-Free) 100 milliGRAM(s) Oral every 6 hours PRN Cough  phenylephrine 0.5% Nasal 1 Spray(s) Nasal every 4 hours PRN Nasal Congestion  polyethylene glycol 3350 17 Gram(s) Oral daily PRN Constipation      PHYSICAL EXAM:  Vital Signs Last 24 Hrs  T(C): 36.2 (20 May 2020 06:35), Max: 36.4 (19 May 2020 15:43)  T(F): 97.2 (20 May 2020 06:35), Max: 97.5 (19 May 2020 15:43)  HR: 54 (20 May 2020 09:35) (54 - 66)  BP: 140/47 (20 May 2020 06:35) (140/47 - 144/70)  BP(mean): 87 (19 May 2020 15:43) (87 - 87)  RR: 16 (20 May 2020 10:02) (16 - 18)  SpO2: 97% (20 May 2020 10:02) (94% - 98%)  General: alert  oriented x __1__          LABS:                          13.3   11.07 )-----------( 350      ( 20 May 2020 07:00 )             41.0     05-20    136  |  103  |  27<H>  ----------------------------<  98  4.5   |  23  |  0.95    Ca    8.4      20 May 2020 07:00    TPro  7.5  /  Alb  2.3<L>  /  TBili  0.5  /  DBili  x   /  AST  38  /  ALT  54<H>  /  AlkPhos  106  05-19        RADIOLOGY & ADDITIONAL STUDIES:     ADVANCE DIRECTIVES:  MOLST  DNR/DNI  Advanced Care Planning discussion total time spent:

## 2020-05-20 NOTE — CHART NOTE - NSCHARTNOTEFT_GEN_A_CORE
Nutrition Initial Assessment    Nutrition Consult Received: Yes [   ]  No [X]    Reason for Initial Nutrition Assessment: length of stay     Source of Information: Unable to conduct a fact to face interview due to limited contact restrictions related to pt's medical condition and isolation precautions. Information obtained from a phone call with the pt and from the EMR.    Admitting Diagnosis: 87 male with pmh dvt/pe on xarelto, dementia, copd not on home oxygen, pulmonary fibrosis, recent admission [facility] 4/21 - 5/5 secondary to sob and covid pneumonia with hypoxia now returns with acute hypoxic respiratory failure.    PAST MEDICAL & SURGICAL HISTORY:  Dementia  PE (pulmonary thromboembolism)  History of COPD  [place] (hard of hearing)  Deep vein thrombosis (DVT)  Dyslipidemia  CAD (coronary artery disease)  Peripheral vascular disease  History of hip surgery  History of mandibular surgery      Subjective Information: Unable to obtain subjective history 2/2 dementia. Pt advanced to puree c nectar thick liquids per speech and swallow on 5/18. Pt tolerating diet and consuming % of meals per nursing documentation with feeding assistance. Weight history obtained from previous RD note: (4/27/20) 132.2lbs compared to current weight (5/19/20) 133.3lbs indicates stable weight over the last month. Will add Magic Cup TID given dysphagia and hx COVID 19/increased nutritional needs with COPD. Pt followed by palliative care; noted DNR/DNI, no documentation regarding alternative means of nutrition in EMR. NKFA per chart review. Last BM 5/20. No skin breakdown or edema noted per chart review. Encourage oral hydration.       GI Issues: last BM 5/20    Current Nutrition Order: Puree c nectar thick liquids     PO Intake:  %     Skin Integrity: no pressure ulcers     Labs:   05-20 Na136 mmol/L Glu 98 mg/dL K+ 4.5 mmol/L Cr  0.95 mg/dL BUN 27 mg/dL<H> Phos n/a   Alb n/a   PAB n/a         Medications:  MEDICATIONS  (STANDING):  aspirin enteric coated 81 milliGRAM(s) Oral daily  budesonide 160 MICROgram(s)/formoterol 4.5 MICROgram(s) Inhaler 2 Puff(s) Inhalation two times a day  enoxaparin Injectable 60 milliGRAM(s) SubCutaneous two times a day  folic acid 1 milliGRAM(s) Oral daily  methylPREDNISolone sodium succinate Injectable 40 milliGRAM(s) IV Push every 12 hours  metoprolol tartrate 12.5 milliGRAM(s) Oral two times a day  pantoprazole    Tablet 40 milliGRAM(s) Oral before breakfast  senna 2 Tablet(s) Oral at bedtime  tiotropium 18 MICROgram(s) Capsule 1 Capsule(s) Inhalation daily    MEDICATIONS  (PRN):  acetaminophen   Tablet .. 650 milliGRAM(s) Oral every 6 hours PRN Temp greater or equal to 38C (100.4F), Mild Pain (1 - 3)  ALBUTerol    90 MICROgram(s) HFA Inhaler 1 Puff(s) Inhalation every 6 hours PRN Shortness of Breath and/or Wheezing  guaiFENesin   Syrup  (Sugar-Free) 100 milliGRAM(s) Oral every 6 hours PRN Cough  phenylephrine 0.5% Nasal 1 Spray(s) Nasal every 4 hours PRN Nasal Congestion  polyethylene glycol 3350 17 Gram(s) Oral daily PRN Constipation    Admitted Anthropometrics:    Height (cm): 170.18 (05-16-20 @ 10:21)  Weight (kg): 63.596553880 (05-16-20 @ 10:21)  BMI (kg/m2): 21.9 (05-16-20 @ 10:21)    Nutrition Focused Physical Exam: Unable to complete due to limited isolation contact precautions at this time.     Estimated Energy Needs (30 kcal/kg- 35 kcal/kg): 1920-2240kcals/day   Estimated Protein Needs (1.2 g/kg- 1.3 g/kg): 77-83g/day   Based on weight of: 64kg/141lbs admission weight     [X] Nutrition Diagnosis:  1. Increased nutritional needs related to increased physiological demand with viral/chronic illness as evidenced by hx COVID 19, COPD  2. Chewing/swallowing difficulty related to dementia, debility as evidenced by puree c nectar thick liquids    Goal: pt will tolerate/consume >75% of most meals/supplements throughout hospital course     Nutrition Interventions: dysphagia diet     Recommendations:  1. Will provide Magic Cup TID  2. Diet consistency per speech and swallow.   3. Provide feeding assistance TID with encouragement of PO intake/oral hydration  4. Weekly weights    RD to follow-up per protocol.  Rhonda Briones RDN   Pager #[phone]

## 2020-05-20 NOTE — PROGRESS NOTE ADULT - SUBJECTIVE AND OBJECTIVE BOX
Follow-up Pulmonary Progress Note  Chief Complaint : Hypoxemia      on 10 L n/c with off nose, sat 94-6%  c/o sob  clinically appears the same      Allergies :No Known Allergies      PAST MEDICAL & SURGICAL HISTORY:  Dementia  PE (pulmonary thromboembolism)  History of COPD  [place] (hard of hearing)  Deep vein thrombosis (DVT)  Dyslipidemia  CAD (coronary artery disease)  Peripheral vascular disease  History of hip surgery  History of mandibular surgery      Medications:  MEDICATIONS  (STANDING):  aspirin enteric coated 81 milliGRAM(s) Oral daily  azithromycin   Tablet 500 milliGRAM(s) Oral every 24 hours  budesonide 160 MICROgram(s)/formoterol 4.5 MICROgram(s) Inhaler 2 Puff(s) Inhalation two times a day  enoxaparin Injectable 60 milliGRAM(s) SubCutaneous two times a day  folic acid 1 milliGRAM(s) Oral daily  methylPREDNISolone sodium succinate Injectable 40 milliGRAM(s) IV Push every 12 hours  metoprolol tartrate 12.5 milliGRAM(s) Oral two times a day  pantoprazole    Tablet 40 milliGRAM(s) Oral before breakfast  senna 2 Tablet(s) Oral at bedtime  tiotropium 18 MICROgram(s) Capsule 1 Capsule(s) Inhalation daily    MEDICATIONS  (PRN):  acetaminophen   Tablet .. 650 milliGRAM(s) Oral every 6 hours PRN Temp greater or equal to 38C (100.4F), Mild Pain (1 - 3)  ALBUTerol    90 MICROgram(s) HFA Inhaler 1 Puff(s) Inhalation every 6 hours PRN Shortness of Breath and/or Wheezing  guaiFENesin   Syrup  (Sugar-Free) 100 milliGRAM(s) Oral every 6 hours PRN Cough  phenylephrine 0.5% Nasal 1 Spray(s) Nasal every 4 hours PRN Nasal Congestion  polyethylene glycol 3350 17 Gram(s) Oral daily PRN Constipation      LABS:                        13.3   11.07 )-----------( 350      ( 20 May 2020 07:00 )             41.0     05-20    136  |  103  |  27<H>  ----------------------------<  98  4.5   |  23  |  0.95    Ca    8.4      20 May 2020 07:00    TPro  7.5  /  Alb  2.3<L>  /  TBili  0.5  /  DBili  x   /  AST  38  /  ALT  54<H>  /  AlkPhos  106  05-19    COVID  05-16-20 @ 10:30  COVID -   NotDetec  05-04-20 @ 13:15  COVID -   NotDetec  04-22-20 @ 08:40  COVID -   Detected<!!>  04-21-20 @ 15:35  COVID -   NotDetec      COVID Biomarkers    05-19-20 @ 06:58 ESR --  ---  CRP 1.58<H>  ---  DDimer  532<H>   ---   LDH --   ---   Ferritin --    05-16-20 @ 10:30 ESR --  ---  CRP 4.77<H>  ---  DDimer  1048<H>   ---   <H>   ---   Ferritin 499<H>    05-04-20 @ 06:50 ESR --  ---  CRP --  ---  DDimer  436<H>   ---   LDH --   ---   Ferritin 578<H>    05-01-20 @ 18:50 ESR --  ---  CRP --  ---  DDimer  580<H>   ---   LDH --   ---   Ferritin --    04-30-20 @ 07:50 ESR --  ---  CRP 11.06<H>  ---  DDimer  986<H>   ---   <H>   ---   Ferritin 820<H>    04-29-20 @ 12:10 ESR --  ---  CRP 10.93<H>  ---  DDimer  950<H>   ---   <H>   ---   Ferritin 894<H>    04-28-20 @ 07:29 ESR --  ---  CRP 12.71<H>  ---  DDimer  1634<H>   ---   <H>   ---   Ferritin 951<H>    04-26-20 @ 08:40 ESR --  ---  CRP 7.73<H>  ---  DDimer  988<H>   ---   <H>   ---   Ferritin 760<H>    04-25-20 @ 07:21 ESR --  ---  CRP 6.69<H>  ---  DDimer  1584<H>   ---   LDH --   ---   Ferritin 615<H>    04-24-20 @ 11:30 ESR --  ---  CRP --  ---  DDimer  2321<H>   ---   LDH --   ---   Ferritin --    04-21-20 @ 15:35 ESR --  ---  CRP 23.96<H>  ---  DDimer  6788   ---   LDH --   ---   Ferritin 894<H>                    Serum Pro-Brain Natriuretic Peptide: 36 pg/mL (05-19-20 @ 06:58)        CULTURES: (if applicable)          CAPILLARY BLOOD GLUCOSE          RADIOLOGY  CXR:      CT:    ECHO:      VITALS:  T(C): 36.2 (05-20-20 @ 06:35), Max: 36.4 (05-19-20 @ 15:43)  T(F): 97.2 (05-20-20 @ 06:35), Max: 97.5 (05-19-20 @ 15:43)  HR: 54 (05-20-20 @ 09:35) (54 - 66)  BP: 140/47 (05-20-20 @ 06:35) (140/47 - 144/70)  BP(mean): 87 (05-19-20 @ 15:43) (87 - 87)  ABP: --  ABP(mean): --  RR: 16 (05-20-20 @ 10:02) (16 - 18)  SpO2: 97% (05-20-20 @ 10:02) (94% - 98%)  CVP(mm Hg): --  CVP(cm H2O): --    Ins and Outs     05-19-20 @ 07:01  -  05-20-20 @ 07:00  --------------------------------------------------------  IN: 1300 mL / OUT: 0 mL / NET: 1300 mL                I&O's Detail    19 May 2020 07:01  -  20 May 2020 07:00  --------------------------------------------------------  IN:    Oral Fluid: 1300 mL  Total IN: 1300 mL    OUT:  Total OUT: 0 mL    Total NET: 1300 mL

## 2020-05-20 NOTE — PROGRESS NOTE ADULT - ASSESSMENT
87 male with pmh dvt/pe on xarelto, dementia, copd not on home oxygen, pulmonary fibrosis, recent admission [facility] 4/21 - 5/5 secondary to sob and covid pneumonia with hypoxia now returns with acute hypoxic respiratory failure.    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  -continue spiriva, albuterol, symbicort, robitussin, on course of oral Zithromax; day 5of 5--- stop after todays dose  -continue IV steroids, day 5 of 5--- may need tapering dose will follow with pulmonary   -chest PT, incentive spirometry  -COVID PCR swab negative x 2;  5/16 and 5/4  -Echo done ; normal LV function; mild diastolic failure, grade 1    # Elevated D-Dimer; secondary to COVID  -D-Dimer 1048 > 532  -was on treatment dose lovenox-- switch to po xarelto   -CTA of chest:  no PE    # Dementia  -chronic stable condition  -continue supportive care    # Essential Hypertension  -continue metoprolol 12.5 bid  -monitor BP closely    # Dysphagia  - speech and swallow evaluation completed, pt started on a diet    #Dispo : Family is thinking for pt to return to [facility] when medically stable.  pt is still requiring NRB intermittently , wean off o2 as tolerated .  #DNR/DNI  Updated Daughter Ella

## 2020-05-20 NOTE — PROGRESS NOTE ADULT - ASSESSMENT
A/P 87 male with pmh dvt/pe on xarelto, dementia, copd not on home oxygen, pulmonary fibrosis, recent admission Located within [facility] 4/21 - 5/5 secondary to sob and covid pneumonia with hypoxia now returns with acute hypoxic respiratory failure.      # Acute Hypoxic Respiratory Failure with viral  pneumonia  # Acute COPD Exacerbation  # Chronic Pulmonary Fibrosis  Pt presents with acute hypoxia Spo2 80% - improved to > 90% with nasal canula  chest xray reveals interval improvement in bilateral lower lung airspace dz compared to prior  ABG reveals respiratory alkalosis  pulmonary following   continue spiriva, albuterol, symbicort, robitussin, steroids, zithromax  supplemental oxygen therapy maintain sats > 90% pt currently at 92% on 6 L NC  chest PT, incentive spirometry  follow up repeat covid pcr swab negative X2 5/16 and 5/4        # Elevated D-Dimer  # Hx PE/DVT on xarelto  D-Dimer found to be 1048,   continue treatment dose lovenox   follow up am labs    # Dementia  chronic stable condition  continue supportive care    # Essential Hypertension  continue metoprolol 12.5 bid  monitor BP closely    # Hyponatremia  pt appears clinically dehydrated - pt received normal saline bolus in ED  follow up bmp -     # Hx of Dysphagia  nurse to preform bed side swallow eval since pt is more awake today   follow up speech and swallow evaluation- pt cleared for pureed w/ nectar thick flds     #prophylactic measure  continue treatment dose lovenox and protonix      Palliative :  f/u no new events overnight .  GOC established DNR/DNI , Molst on chart.   Has swabbed covid neg last done 5/16,  per pulm suspect possibly  post covid intersitial lung disease in patient who has underlying lung fibrosis-  suggests repeat CXR.  Family was thinking for pt to return to [facility] if/when medically stable.    Will follow pts clinical course. A/P 87 male with pmh dvt/pe on xarelto, dementia, copd not on home oxygen, pulmonary fibrosis, recent admission Located within [facility] 4/21 - 5/5 secondary to sob and covid pneumonia with hypoxia now returns with acute hypoxic respiratory failure.      # Acute Hypoxic Respiratory Failure with viral  pneumonia  # Acute COPD Exacerbation  # Chronic Pulmonary Fibrosis  Pt presents with acute hypoxia Spo2 80% - improved to > 90% with nasal canula  chest xray reveals interval improvement in bilateral lower lung airspace dz compared to prior  ABG reveals respiratory alkalosis  pulmonary following   continue spiriva, albuterol, symbicort, robitussin, steroids, zithromax  supplemental oxygen therapy maintain sats > 90% pt currently at 92% on 6 L NC  chest PT, incentive spirometry  follow up repeat covid pcr swab negative X2 5/16 and 5/4        # Elevated D-Dimer  # Hx PE/DVT on xarelto  D-Dimer found to be 1048,   continue treatment dose lovenox   follow up am labs    # Dementia  chronic stable condition  continue supportive care    # Essential Hypertension  continue metoprolol 12.5 bid  monitor BP closely    # Hyponatremia  pt appears clinically dehydrated - pt received normal saline bolus in ED  follow up bmp -     # Hx of Dysphagia  nurse to preform bed side swallow eval since pt is more awake today   follow up speech and swallow evaluation- pt cleared for pureed w/ nectar thick flds     #prophylactic measure  continue treatment dose lovenox and protonix      Palliative :  f/u no new events overnight .  GOC established DNR/DNI , Molst on chart.   Has swabbed covid neg last done 5/16,  per pulm suspect possibly  post covid intersitial lung disease in patient who has underlying lung fibrosis-  suggests repeat CXR.  Family was thinking for pt to return to [facility] if/when medically stable.    Will follow pts clinical course.  .

## 2020-05-21 ENCOUNTER — TRANSCRIPTION ENCOUNTER (OUTPATIENT)
Age: 85
End: 2020-05-21

## 2020-05-21 VITALS
OXYGEN SATURATION: 96 % | RESPIRATION RATE: 22 BRPM | HEART RATE: 67 BPM | DIASTOLIC BLOOD PRESSURE: 73 MMHG | SYSTOLIC BLOOD PRESSURE: 131 MMHG | TEMPERATURE: 98 F

## 2020-05-21 PROCEDURE — 36415 COLL VENOUS BLD VENIPUNCTURE: CPT

## 2020-05-21 PROCEDURE — 93005 ELECTROCARDIOGRAM TRACING: CPT

## 2020-05-21 PROCEDURE — 85379 FIBRIN DEGRADATION QUANT: CPT

## 2020-05-21 PROCEDURE — 82728 ASSAY OF FERRITIN: CPT

## 2020-05-21 PROCEDURE — 86140 C-REACTIVE PROTEIN: CPT

## 2020-05-21 PROCEDURE — 85610 PROTHROMBIN TIME: CPT

## 2020-05-21 PROCEDURE — U0003: CPT

## 2020-05-21 PROCEDURE — 93306 TTE W/DOPPLER COMPLETE: CPT

## 2020-05-21 PROCEDURE — 80053 COMPREHEN METABOLIC PANEL: CPT

## 2020-05-21 PROCEDURE — 82803 BLOOD GASES ANY COMBINATION: CPT

## 2020-05-21 PROCEDURE — 71045 X-RAY EXAM CHEST 1 VIEW: CPT

## 2020-05-21 PROCEDURE — 92610 EVALUATE SWALLOWING FUNCTION: CPT

## 2020-05-21 PROCEDURE — 92526 ORAL FUNCTION THERAPY: CPT

## 2020-05-21 PROCEDURE — 82550 ASSAY OF CK (CPK): CPT

## 2020-05-21 PROCEDURE — 94640 AIRWAY INHALATION TREATMENT: CPT

## 2020-05-21 PROCEDURE — 85027 COMPLETE CBC AUTOMATED: CPT

## 2020-05-21 PROCEDURE — 36600 WITHDRAWAL OF ARTERIAL BLOOD: CPT

## 2020-05-21 PROCEDURE — 83605 ASSAY OF LACTIC ACID: CPT

## 2020-05-21 PROCEDURE — 83615 LACTATE (LD) (LDH) ENZYME: CPT

## 2020-05-21 PROCEDURE — 83880 ASSAY OF NATRIURETIC PEPTIDE: CPT

## 2020-05-21 PROCEDURE — 84484 ASSAY OF TROPONIN QUANT: CPT

## 2020-05-21 PROCEDURE — 85730 THROMBOPLASTIN TIME PARTIAL: CPT

## 2020-05-21 PROCEDURE — 80048 BASIC METABOLIC PNL TOTAL CA: CPT

## 2020-05-21 PROCEDURE — 99239 HOSP IP/OBS DSCHRG MGMT >30: CPT

## 2020-05-21 PROCEDURE — 84145 PROCALCITONIN (PCT): CPT

## 2020-05-21 PROCEDURE — 96374 THER/PROPH/DIAG INJ IV PUSH: CPT

## 2020-05-21 PROCEDURE — 71275 CT ANGIOGRAPHY CHEST: CPT

## 2020-05-21 PROCEDURE — 99285 EMERGENCY DEPT VISIT HI MDM: CPT | Mod: 25

## 2020-05-21 RX ORDER — BUDESONIDE AND FORMOTEROL FUMARATE DIHYDRATE 160; 4.5 UG/1; UG/1
2 AEROSOL RESPIRATORY (INHALATION)
Qty: 0 | Refills: 0 | DISCHARGE

## 2020-05-21 RX ORDER — FERROUS SULFATE 325(65) MG
0 TABLET ORAL
Qty: 0 | Refills: 0 | DISCHARGE

## 2020-05-21 RX ORDER — RIVAROXABAN 15 MG-20MG
1 KIT ORAL
Qty: 0 | Refills: 0 | DISCHARGE
Start: 2020-05-21

## 2020-05-21 RX ADMIN — BUDESONIDE AND FORMOTEROL FUMARATE DIHYDRATE 2 PUFF(S): 160; 4.5 AEROSOL RESPIRATORY (INHALATION) at 10:38

## 2020-05-21 RX ADMIN — Medication 40 MILLIGRAM(S): at 04:53

## 2020-05-21 RX ADMIN — Medication 81 MILLIGRAM(S): at 11:29

## 2020-05-21 RX ADMIN — Medication 1 MILLIGRAM(S): at 11:30

## 2020-05-21 RX ADMIN — PANTOPRAZOLE SODIUM 40 MILLIGRAM(S): 20 TABLET, DELAYED RELEASE ORAL at 04:52

## 2020-05-21 RX ADMIN — RIVAROXABAN 10 MILLIGRAM(S): KIT at 11:30

## 2020-05-21 RX ADMIN — TIOTROPIUM BROMIDE 1 CAPSULE(S): 18 CAPSULE ORAL; RESPIRATORY (INHALATION) at 10:38

## 2020-05-21 NOTE — DISCHARGE NOTE PROVIDER - NSDCCPCAREPLAN_GEN_ALL_CORE_FT
PRINCIPAL DISCHARGE DIAGNOSIS  Diagnosis: COPD exacerbation  Assessment and Plan of Treatment: complete steroid taper  outpatient pulmonary follow-up      SECONDARY DISCHARGE DIAGNOSES  Diagnosis: Hyponatremia  Assessment and Plan of Treatment:     Diagnosis: Suspected 2019 novel coronavirus infection  Assessment and Plan of Treatment:     Diagnosis: COPD exacerbation  Assessment and Plan of Treatment:

## 2020-05-21 NOTE — DISCHARGE NOTE NURSING/CASE MANAGEMENT/SOCIAL WORK - PATIENT PORTAL LINK FT
You can access the FollowMyHealth Patient Portal offered by [facility] by registering at the following website: [email or web address]. By joining [facility]’s FollowMyHealth portal, you will also be able to view your health information using other applications (apps) compatible with our system.

## 2020-05-21 NOTE — PHARMACOTHERAPY INTERVENTION NOTE - COMMENTS
Pt completed 5 day course of IV steroids as per pulm. Per discussion with MD, will d/c methylprednisolone at this time.
Spoke with MD to clarify duration of therapy for azithromycin. Per discussion, will stop after today's dose to complete 5 days therapy.

## 2020-05-21 NOTE — DISCHARGE NOTE PROVIDER - HOSPITAL COURSE
Hospital Course        87y Male with PMHx of COVID19, COPD, pulmonary fibrosis, CAD presents with hypoxia, likely COPD exacerbation in setting of post-COVID ILD, improved with steroids and empiric antibiotics for possible aspiration pneumonia. Patient seen by speech - tolerated puree diet/nectar thick. O2 requirements improved prior to d/c. Suspect patient will be on long-term O2 going forward.         Source of Infection: pneumonia     Antibiotic / Last Day: Completed        Palliative Care / Advanced Care Planning    Code Status: DNR/DNI    Patient/Family agreeable to Hospice/Palliative (Y/N)? Y    Summary of Goals of Care Conversation: Consideration for hospice pending clinical course as outpatient        Discharging Provider:  Krzysztof Spain MD    Contact Info: Cell [phone] - Please call with any questions or concerns.        Outpatient Provider: Dr. De La Cruz        Signout given to  SNF Provider: Dr. De La Cruz

## 2020-05-21 NOTE — PROGRESS NOTE ADULT - SUBJECTIVE AND OBJECTIVE BOX
Follow-up Pulmonary Progress Note  Chief Complaint : Hypoxemia      pt seen and examined  alert, denies sob  sat high 90s on 4L n/c  no wheezing  states sob    Allergies :No Known Allergies      PAST MEDICAL & SURGICAL HISTORY:  Dementia  PE (pulmonary thromboembolism)  History of COPD  [place] (hard of hearing)  Deep vein thrombosis (DVT)  Dyslipidemia  CAD (coronary artery disease)  Peripheral vascular disease  History of hip surgery  History of mandibular surgery      Medications:  MEDICATIONS  (STANDING):  aspirin enteric coated 81 milliGRAM(s) Oral daily  budesonide 160 MICROgram(s)/formoterol 4.5 MICROgram(s) Inhaler 2 Puff(s) Inhalation two times a day  folic acid 1 milliGRAM(s) Oral daily  metoprolol tartrate 12.5 milliGRAM(s) Oral two times a day  pantoprazole    Tablet 40 milliGRAM(s) Oral before breakfast  rivaroxaban 10 milliGRAM(s) Oral daily  senna 2 Tablet(s) Oral at bedtime  tiotropium 18 MICROgram(s) Capsule 1 Capsule(s) Inhalation daily    MEDICATIONS  (PRN):  acetaminophen   Tablet .. 650 milliGRAM(s) Oral every 6 hours PRN Temp greater or equal to 38C (100.4F), Mild Pain (1 - 3)  ALBUTerol    90 MICROgram(s) HFA Inhaler 1 Puff(s) Inhalation every 6 hours PRN Shortness of Breath and/or Wheezing  guaiFENesin   Syrup  (Sugar-Free) 100 milliGRAM(s) Oral every 6 hours PRN Cough  phenylephrine 0.5% Nasal 1 Spray(s) Nasal every 4 hours PRN Nasal Congestion  polyethylene glycol 3350 17 Gram(s) Oral daily PRN Constipation      LABS:                        13.3   11.07 )-----------( 350      ( 20 May 2020 07:00 )             41.0     05-20    136  |  103  |  27<H>  ----------------------------<  98  4.5   |  23  |  0.95    Ca    8.4      20 May 2020 07:00    COVID  05-16-20 @ 10:30  COVID -   NotDetec  05-04-20 @ 13:15  COVID -   NotAllyn  04-22-20 @ 08:40  COVID -   Detected<!!>  04-21-20 @ 15:35  COVID [facility]      COVID Biomarkers    05-19-20 @ 06:58 ESR --  ---  CRP 1.58<H>  ---  DDimer  532<H>   ---   LDH --   ---   Ferritin --    05-16-20 @ 10:30 ESR --  ---  CRP 4.77<H>  ---  DDimer  1048<H>   ---   <H>   ---   Ferritin 499<H>    05-04-20 @ 06:50 ESR --  ---  CRP --  ---  DDimer  436<H>   ---   LDH --   ---   Ferritin 578<H>    05-01-20 @ 18:50 ESR --  ---  CRP --  ---  DDimer  580<H>   ---   LDH --   ---   Ferritin --    04-30-20 @ 07:50 ESR --  ---  CRP 11.06<H>  ---  DDimer  986<H>   ---   <H>   ---   Ferritin 820<H>    04-29-20 @ 12:10 ESR --  ---  CRP 10.93<H>  ---  DDimer  950<H>   ---   <H>   ---   Ferritin 894<H>    04-28-20 @ 07:29 ESR --  ---  CRP 12.71<H>  ---  DDimer  1634<H>   ---   <H>   ---   Ferritin 951<H>    04-26-20 @ 08:40 ESR --  ---  CRP 7.73<H>  ---  DDimer  988<H>   ---   <H>   ---   Ferritin 760<H>    04-25-20 @ 07:21 ESR --  ---  CRP 6.69<H>  ---  DDimer  1584<H>   ---   LDH --   ---   Ferritin 615<H>    04-24-20 @ 11:30 ESR --  ---  CRP --  ---  DDimer  2321<H>   ---   LDH --   ---   Ferritin --    04-21-20 @ 15:35 ESR --  ---  CRP 23.96<H>  ---  DDimer  6788   ---   LDH --   ---   Ferritin 894<H>      Serum Pro-Brain Natriuretic Peptide: 36 pg/mL (05-19-20 @ 06:58)    RADIOLOGY  CXR:  < from: Xray Chest 1 View-PORTABLE IMMEDIATE (05.20.20 @ 16:12) >    FINDINGS: Bilateral lung parenchymal airspace opacities are stable on the left and increasing on the right. Circumscribed hyperdense nodules are noted within the left upper lobe and right lower lung field, stable, likely related to parenchymal granulomas.  There is no evidence for pleural effusion or pneumothorax. No mediastinal shift is noted. Degenerative changes of the thoracic spine noted. Chronic appearing deformity of the right thoracic cage noted.      IMPRESSION: Bilateral lung parenchymal airspace opacities, stable on the left and increasing on the right. No evidence for pleural effusion or pneumothorax.        DISCRETE X-RAY DATA:  Percent of LEFT lung opacification: 1-33%  Percent of RIGHT lung opacification: 34-66%  Change in lung opacification from most recent x-ray (<=3 days): Increase  Change from prior dated 3 or more days (same admission): No Prior    < end of copied text >      VITALS:  T(C): 36.4 (05-21-20 @ 04:36), Max: 36.4 (05-20-20 @ 15:46)  T(F): 97.6 (05-21-20 @ 04:36), Max: 97.6 (05-20-20 @ 15:46)  HR: 58 (05-21-20 @ 04:36) (53 - 61)  BP: 131/56 (05-21-20 @ 04:36) (117/49 - 131/56)  BP(mean): --  ABP: --  ABP(mean): --  RR: 20 (05-21-20 @ 07:30) (16 - 28)  SpO2: 95% (05-21-20 @ 07:30) (88% - 98%)  CVP(mm Hg): --  CVP(cm H2O): --    Ins and Outs     05-20-20 @ 07:01  -  05-21-20 @ 07:00  --------------------------------------------------------  IN: 180 mL / OUT: 0 mL / NET: 180 mL    05-21-20 @ 07:01  -  05-21-20 @ 14:07  --------------------------------------------------------  IN: 1200 mL / OUT: 0 mL / NET: 1200 mL                I&O's Detail    20 May 2020 07:01  -  21 May 2020 07:00  --------------------------------------------------------  IN:    Oral Fluid: 180 mL  Total IN: 180 mL    OUT:  Total OUT: 0 mL    Total NET: 180 mL      21 May 2020 07:01  -  21 May 2020 14:07  --------------------------------------------------------  IN:    Oral Fluid: 1200 mL  Total IN: 1200 mL    OUT:  Total OUT: 0 mL    Total NET: 1200 mL

## 2020-05-21 NOTE — PROGRESS NOTE ADULT - ASSESSMENT
Physical Examination:  GENERAL:               Alert, Confused, No acute distress.    HEENT:                   No JVD, dry MM  PULM:                     Bilateral air entry, Clear to auscultation bilaterally, no significant sputum production, trace Rales, No Rhonchi, No Wheezing  NEURO:                  Alert, oriented, interactive, nonfocal, follows commands  PSYC:                      Calm, + Insight.    Assessment.  1. Hypoxia with Respiratory Alkalosis, no PE on CTA, suspect post covid intersitial lung disease in patient who has underlying lung fibrosis, Xray increasing infiltrates but patient clinically improving.   2. Abnormal CT - Central and peripheral ground-glass opacities with septal thickening within the bilateral lower lobes. Additional peripheral opacities with septal thickening noted within the right middle lobe and bilateral upper lobes. These are commonly reported findings for COVID-19 pneumonia.  2. Chronic COPD with underlying IPF in patient who was actively smoking  3. 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 Dementia, PVD, High chol, h/o CVA    Plan  will do PO steroid taper, decrease 10mg every 3 days to off  check cxr post taper; please note can remain abnormal   n/c o2 to maintain sat   continue current bronchodilators  A/c as per primary team for elevate ddimer for ~ 1-2 months duration.   d/w Hospitalist  Called DTSELINA Jaramillo [phone] - cmg left for call back.     dnr/i

## 2020-05-21 NOTE — DISCHARGE NOTE PROVIDER - NSDCMRMEDTOKEN_GEN_ALL_CORE_FT
acetaminophen 325 mg oral tablet: 2 tab(s) orally every 4 hours, As needed, Temp greater or equal to 38.5C (101.3F)  albuterol 90 mcg/inh inhalation aerosol: 2 puff(s) inhaled every 4 hours, As needed, Shortness of Breath and/or Wheezing  Aspirin Enteric Coated 81 mg oral delayed release tablet: 1 tab(s) orally once a day  ferrous sulfate 325 mg (65 mg elemental iron) oral tablet: 1 tab(s) orally once a day  folic acid 1 mg oral tablet: 1 tab(s) orally once a day  metoprolol: 12.5 milligram(s) orally 2 times a day  Milk of Magnesia 8% oral suspension: orally once a day, As Needed  MiraLax oral powder for reconstitution: orally once a day, As Needed  Pepcid 20 mg oral tablet: 1 tab(s) orally 2 times a day  predniSONE 10 mg oral tablet: 4 tab(s) orally once a day x 3 days  then 3 tabs orally once a day x 3 days  then 2 tabs orally once a day x 3 days  then 1 tab orally daily x 3 days  rivaroxaban 10 mg oral tablet: 1 tab(s) orally once a day  Senna 8.6 mg oral tablet: 1 tab(s) orally once a day (at bedtime)  Symbicort 160 mcg-4.5 mcg/inh inhalation aerosol: 2 puff(s) inhaled 2 times a day  thiamine 100 mg oral tablet: 1 tab(s) orally once a day  tiotropium 18 mcg inhalation capsule: 1 cap(s) inhaled once a day  traMADol 50 mg oral tablet: orally every 12 hours, As Needed

## 2020-05-21 NOTE — DISCHARGE NOTE PROVIDER - NSDCFUADDINST_GEN_ALL_CORE_FT
Outpatient speech follow-up    DNR/DNI    Pulmonary follow-up, repeat chest x-ray in 1 week, steroid taper (and consider long-term steroids pending clinical course)     Aspiration precautions - high risk for recurrent aspiration pneumonia

## 2020-05-21 NOTE — PROGRESS NOTE ADULT - ASSESSMENT
87 male with pmh dvt/pe on xarelto, dementia, copd not on home oxygen, pulmonary fibrosis, recent admission [facility] 4/21 - 5/5 secondary to sob and covid pneumonia with hypoxia now returns with acute hypoxic respiratory failure.    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 but would certainly recommend DVT ppx with Xarelto 10 mg daily for extended DVT ppx  x 1 month   -CTA of chest:  no PE    # Possible diagnosis of Dementia  -chronic stable condition  -continue supportive care    #Dysphagia  -Possible cognitive component  -Tolerated puree nectar diet  -Will need further speech therapy as outpatient to see if can advance diet  -CT head previously neg for CVA - will not tolerate MRI    #Essential Hypertension  -continue metoprolol 12.5 bid  -monitor BP closely    #Dispo : Return to [facility] today, tolerating and stable on NC   -Tolerating PO diet (puree with nectar)  -DNR/DNI  -Guarded prognosis, risk of recurrent aspiration events discussed with daughter  -Daughter, Ella, updated at length  -Stable for d/c, time spent on d/c 38 min

## 2020-05-21 NOTE — PROGRESS NOTE ADULT - SUBJECTIVE AND OBJECTIVE BOX
Patient is a 87y old  Male who presents with a chief complaint of Hypoxia (20 May 2020 14:14)      Patient seen and examined at bedside. No overnight events reported.     O2 Sat 96% on 4L NC at rest done by me personally    ALLERGIES:  No Known Allergies    MEDICATIONS  (STANDING):  aspirin enteric coated 81 milliGRAM(s) Oral daily  budesonide 160 MICROgram(s)/formoterol 4.5 MICROgram(s) Inhaler 2 Puff(s) Inhalation two times a day  folic acid 1 milliGRAM(s) Oral daily  metoprolol tartrate 12.5 milliGRAM(s) Oral two times a day  pantoprazole    Tablet 40 milliGRAM(s) Oral before breakfast  rivaroxaban 10 milliGRAM(s) Oral daily  senna 2 Tablet(s) Oral at bedtime  tiotropium 18 MICROgram(s) Capsule 1 Capsule(s) Inhalation daily    MEDICATIONS  (PRN):  acetaminophen   Tablet .. 650 milliGRAM(s) Oral every 6 hours PRN Temp greater or equal to 38C (100.4F), Mild Pain (1 - 3)  ALBUTerol    90 MICROgram(s) HFA Inhaler 1 Puff(s) Inhalation every 6 hours PRN Shortness of Breath and/or Wheezing  guaiFENesin   Syrup  (Sugar-Free) 100 milliGRAM(s) Oral every 6 hours PRN Cough  phenylephrine 0.5% Nasal 1 Spray(s) Nasal every 4 hours PRN Nasal Congestion  polyethylene glycol 3350 17 Gram(s) Oral daily PRN Constipation    Vital Signs Last 24 Hrs  T(F): 97.6 (21 May 2020 04:36), Max: 97.6 (20 May 2020 15:46)  HR: 58 (21 May 2020 04:36) (53 - 61)  BP: 131/56 (21 May 2020 04:36) (117/49 - 131/56)  RR: 20 (21 May 2020 07:30) (16 - 28)  SpO2: 95% (21 May 2020 07:30) (88% - 98%)  I&O's Summary    20 May 2020 07:01  -  21 May 2020 07:00  --------------------------------------------------------  IN: 180 mL / OUT: 0 mL / NET: 180 mL    21 May 2020 07:01  -  21 May 2020 12:39  --------------------------------------------------------  IN: 600 mL / OUT: 0 mL / NET: 600 mL      PHYSICAL EXAM:  General: NAD, follows simple commands  ENT: moist mucous membranes, no thrush  Neck: Supple, No JVD  Lungs: faint basilar crackles, good air entry, non-labored breathing  Cardio: RRR, S1/S2, No murmur  Abdomen: Soft, Nontender, Nondistended; Bowel sounds present  Extremities: No calf tenderness, No pitting edema    LABS:                        13.3   11.07 )-----------( 350      ( 20 May 2020 07:00 )             41.0     05-20    136  |  103  |  27  ----------------------------<  98  4.5   |  23  |  0.95    Ca    8.4      20 May 2020 07:00    TPro  7.5  /  Alb  2.3  /  TBili  0.5  /  DBili  x   /  AST  38  /  ALT  54  /  AlkPhos  106  05-19        eGFR if Non African American: 71 mL/min/1.73M2 (05-20-20 @ 07:00)  eGFR if : 83 mL/min/1.73M2 (05-20-20 @ 07:00)    RADIOLOGY & ADDITIONAL TESTS:  < from: Xray Chest 1 View-PORTABLE IMMEDIATE (05.20.20 @ 16:12) >    IMPRESSION: Bilateral lung parenchymal airspace opacities, stable on the left and increasing on the right. No evidence for pleural effusion or pneumothorax.    < end of copied text >

## 2020-06-17 NOTE — CDI QUERY NOTE - NSCDIOTHERTXTBX_GEN_ALL_CORE_HH
5/21/2020 note - # Acute Hypoxic Respiratory Failure with viral pneumonia.    D/C note - 87y Male with PMHx of COVID19, COPD, pulmonary fibrosis, CAD presents with hypoxia, likely COPD exacerbation in setting of post-COVID ILD, improved with steroids and empiric antibiotics for possible aspiration pneumonia.      Please clarify the type of pneumonia;    - Viral pneumonia due to COVID 19;  - Viral pneumonia due to other etiology with history of COVID 19  - Aspiration pneumonia;  - other(please specify).

## 2020-06-17 NOTE — CDI QUERY NOTE - NSCDIOTHERTXTBX2_GEN_ALL_CORE_FT
D/C note - Diagnosis: Suspected 2019 novel coronavirus infection    Please clarify;    - COVID 19 ruled in despite negative result;  - COVID 19 ruled out.      Thank you D/C note - Diagnosis: Suspected 2019 novel coronavirus infection    Please clarify;    - COVID -19 despite negative result with acute Acute Respiratory failure and interstitial lung diseases 2/2 COVID   - Acute respiratory failure and ILD as a sequela of COVID 19 with COVID 19 no longer present    - other(please specify).    Thank you

## 2020-09-09 NOTE — ED ADULT TRIAGE NOTE - NS ED NURSE AMBULANCES
CHIEF COMPLAINT:  Zhang Velasquez is a 34 month old male who presents to the clinic today for   Chief Complaint   Patient presents with   • Well Child     with Mother, New patient    .    HISTORY OF PRESENT ILLNESS:  Patient is brought into the clinic today by mother for well-child exam.  His chart was reviewed and updated.  He feels well.  He has been growing well.  He does have a splinter in his right hand.    There is no problem list on file for this patient.       No current outpatient medications on file.     No current facility-administered medications for this visit.         ALLERGIES:  No Known Allergies     History reviewed. No pertinent past medical history.     History reviewed. No pertinent surgical history.     Family History   Problem Relation Age of Onset   • Cancer, Lung Maternal Grandfather         Social History     Socioeconomic History   • Marital status: Single     Spouse name: Not on file   • Number of children: Not on file   • Years of education: Not on file   • Highest education level: Not on file   Occupational History   • Not on file   Social Needs   • Financial resource strain: Not on file   • Food insecurity     Worry: Not on file     Inability: Not on file   • Transportation needs     Medical: Not on file     Non-medical: Not on file   Tobacco Use   • Smoking status: Never Smoker   • Smokeless tobacco: Never Used   Substance and Sexual Activity   • Alcohol use: Not on file   • Drug use: Not on file   • Sexual activity: Not on file   Lifestyle   • Physical activity     Days per week: Not on file     Minutes per session: Not on file   • Stress: Not on file   Relationships   • Social connections     Talks on phone: Not on file     Gets together: Not on file     Attends Zoroastrianism service: Not on file     Active member of club or organization: Not on file     Attends meetings of clubs or organizations: Not on file     Relationship status: Not on file   • Intimate partner violence     Fear of  current or ex partner: Not on file     Emotionally abused: Not on file     Physically abused: Not on file     Forced sexual activity: Not on file   Other Topics Concern   • Not on file   Social History Narrative   • Not on file        Review of Systems:  GENERAL:  Negative for fever, chills, tiredness, malaise, weight loss, weight gain.  HEENT:  Negative for vision changes, hearing changes, epistaxis, sinus pressure and dysphagia  PULM:  Negative for cough, shortness of breath, sputum, hemoptysis and wheezing  CV:  Negative for chest pain, syncope, dyspnea on exertion, palpitation, orthopnea and edema.  GI:  Negative for negative for nausea, vomiting, diarrhea, constipation, change in bowel habits, bleeding and reflux  :  Negative for negative for incontinence, dysuria, hematuria, polyuria and discharge  MS:  Negative for negative for pain, swelling, muscular weakness and redness.  Skin:  Negative for rash, pruritis, lesions and jaundice  Neuro:  Negative for negative for loss of conscience, altered mental status, seizures and sensory or motor deficits  Psych:  Negative for negative for  anxiety, depression, agitation and dependencies    PHYSICAL EXAM:  General:  Alert and oriented x3, no acute distress, well developed, well nourished, well groomed, appropriate affect  Head:  normocephalic and atraumatic  Eyes:  pupils equal, round, reactive to light, extraocular motions intact, sclerae and conjuntivae are normal, lids and lashes normal, bilateral red reflexes present  Mouth: oral mucosa is moist, throat is non-injected, no tonsillar exudates  Ears: tympanic membranes are visualzed and clear, canals are clear and external ears are normal  Nose: nares are patent  Neck: supple, no thyromegaly, no lymphadenopathy  Chest: contour is normal with normal AP diameter, normal respiratory excursion and respiratory effort is not labored  Heart: regular rate and rhythm, no murmurs  Lungs: lungs are clear to auscultation with  normal inspiratory/expiratory sounds, no rales, no rhonchi and no wheezes  Abdomen: Soft, nontender, nondistended, no masses, no rebound, no guarding and no hepatosplenomegaly  Extremities: warm/dry/no edema strength is 5/5 equal bilaterally upper and lower extremities deep tendon reflexes 2/4 equal bilaterally upper and lower extremities  small splinter in the right hand  Skin:  no abnormal lesions or rashes.  Back: inspection shows no curvature, no discomfort to palpation of the midline and no costovertebral angle discomfort to palpation  Male Genitals: normal appearing external male genitalia, circumcised male phallus, bilateral descended testicles, no mass, no tenderness, no inguinal hernia bilaterally    ASSESSMENT: Encounter for routine child health examination without abnormal findings  (primary encounter diagnosis)  Comment:   Plan: The patient was educated on safe practices.    Splinter was removed       [facility]

## 2020-10-23 NOTE — ED ADULT TRIAGE NOTE - HEIGHT IN INCHES
Patient is not yet established with IM. Dr. Richardson is out of the office for the next week. Per separate encounter, atenolol was routed to Dr. Gill to review refill request.    7

## 2020-12-16 PROBLEM — Z87.09 HISTORY OF ACUTE BRONCHITIS: Status: RESOLVED | Noted: 2018-07-16 | Resolved: 2020-12-16

## 2021-01-18 NOTE — STROKE CODE NOTE - NIH STROKE SCALE: 3. VISUAL, QM
1/22/2021 Patient: Patricio Grady YOB: 1983 Date of Visit: 1/18/2021 Deborah Figueroa MD 
[address] [place] [address] [address] [address] [address] [employer] Dear Deborah Figueroa MD, Thank you for referring Ms. Rosaline Grady to [address] [place], [place] [address]br[place]  for evaluation. My notes for this consultation are attached. If you have questions, please do not hesitate to call me. I look forward to following your patient along with you.  
 
 
Sincerely, 
 
Santhosh Lyles MD 
 
 (0) No visual loss

## 2021-01-27 NOTE — PROGRESS NOTE ADULT - SUBJECTIVE AND OBJECTIVE BOX
Patient called Re: Back to work date post procedure   Patient is a 86y old  Male who presents with a chief complaint of s/p Right pontine CVA c functional deficits (24 Mar 2019 08:55)      Patient seen and examined at bedside. C/o cough, sputum.     ALLERGIES:  No Known Allergies    MEDICATIONS:  ALBUTerol/ipratropium for Nebulization 3 milliLiter(s) Nebulizer every 6 hours PRN  melatonin 3 milliGRAM(s) Oral at bedtime PRN  methylPREDNISolone sodium succinate Injectable 40 milliGRAM(s) IV Push every 12 hours    Vital Signs Last 24 Hrs  T(F): 97.3 (24 Mar 2019 08:20), Max: 97.7 (23 Mar 2019 21:23)  HR: 64 (24 Mar 2019 09:50) (62 - 93)  BP: 120/72 (24 Mar 2019 08:20) (120/72 - 138/68)  RR: 14 (24 Mar 2019 08:20) (12 - 15)  SpO2: 96% (24 Mar 2019 09:50) (92% - 98%)  I&O's Summary    23 Mar 2019 07:01  -  24 Mar 2019 07:00  --------------------------------------------------------  IN: 0 mL / OUT: 1 mL / NET: -1 mL        PHYSICAL EXAM:  General: NAD, comfortable   ENT: MMM  Neck: Supple, No JVD  Lungs: CTA, BLAE, No added sounds.  Cardio: RRR, S1/S2, No murmurs  Abdomen: Soft, NT/ND, BS+   Extremities: No edema  CNS: nonfocal     LABS:                        8.6    7.7   )-----------( 354      ( 23 Mar 2019 06:30 )             26.7     03-23    141  |  107  |  34  ----------------------------<  99  4.5   |  26  |  1.11    Ca    8.7      23 Mar 2019 06:30    TPro  7.1  /  Alb  2.8  /  TBili  0.5  /  DBili  x   /  AST  17  /  ALT  9   /  AlkPhos  100  03-23    eGFR if Non African American: 60 mL/min/1.73M2 (03-23-19 @ 06:30)  eGFR if : 69 mL/min/1.73M2 (03-23-19 @ 06:30)            03-20 Chol 113 mg/dL LDL 57 mg/dL HDL 39 mg/dL Trig 84 mg/dL        CAPILLARY BLOOD GLUCOSE        03-19 [id number] 4.9        Culture - Urine (collected 19 Mar 2019 17:25)  Source: .Urine Clean Catch (Midstream)  Final Report (20 Mar 2019 15:41):    <10,000 CFU/mL Normal Urogenital Keri        RADIOLOGY & ADDITIONAL TESTS:    Care Discussed with Consultants/Other Providers:

## 2021-03-08 NOTE — ED PROVIDER NOTE - NIH STROKE SCALE: 10. DYSARTHRIA, QM
Pt admitted this am with reports of abdominal pain and vomiting. H & P was reviewed. Imaging also reviewed  Pt was not in his room when I went to evaluate him. Bevelyn Sandifer scan has been ordered. Awaiting surgery input     CT abd:  Findings compatible with acute cholecystitis.  Ultrasound may be obtained to   evaluate for underlying cholelithiasis. Normal appendix. Splenomegaly. GB US:    1. Unchanged gallbladder wall thickening can be seen in the setting of   cholecystitis. 2. Hepatic steatosis. (0) Normal

## 2021-07-27 NOTE — SWALLOW BEDSIDE ASSESSMENT ADULT - SWALLOW EVAL: REHAB POTENTIAL
Medical Necessity Information: It is in your best interest to select a reason for this procedure from the list below. All of these items fulfill various CMS LCD requirements except the new and changing color options. Render Post-Care Instructions In Note?: no Show Topical Anesthesia Variable?: Yes Number Of Freeze-Thaw Cycles: 1 freeze-thaw cycle fair, will monitor progress closely Consent: The patient's consent was obtained including but not limited to risks of crusting, scabbing, blistering, scarring, darker or lighter pigmentary change, recurrence, incomplete removal and infection. Medical Necessity Clause: This procedure was medically necessary because the lesions that were treated were: Detail Level: Detailed Post-Care Instructions: I reviewed with the patient in detail post-care instructions. Patient is to wear sunprotection, and avoid picking at any of the treated lesions. Pt may apply Vaseline to crusted or scabbing areas.

## 2021-07-27 NOTE — ED ADULT NURSE NOTE - PRO INTERPRETER NEED 2
Additional Information: (Optional): The wound was cleaned, and a pressure dressing was applied.  The patient received detailed post-op instructions. Cameroonian

## 2021-10-09 NOTE — ED PROVIDER NOTE - CADM POA PRESS ULCER
Steroid insulin protocol    Rajiv Bedoya No

## 2021-11-17 NOTE — ED ADULT TRIAGE NOTE - PRO INTERPRETER NEED 2
normal... English Well appearing, awake, alert, oriented to person, place, time/situation and in no apparent distress.

## 2022-02-22 NOTE — H&P ADULT - NSHPPHYSICALEXAM_GEN_ALL_CORE
PHYSICAL EXAM:  GENERAL: NAD, well-groomed, well-developed, AO X 3  HEAD:  Atraumatic, Normocephalic  EYES: EOMI, PERRLA, conjunctiva and sclera clear  ENMT: No tonsillar erythema, exudates, or enlargement; Moist mucous membranes, Good dentition  NECK: Supple, No JVD  CHEST/LUNG: Clear to auscultation bilaterally; No rales, rhonchi, wheezing, or rubs  HEART: Regular rate and rhythm; S1/S2, No murmurs, rubs, or gallops  ABDOMEN: Soft, Nontender, Nondistended; Bowel sounds present  NERVOUS SYSTEM; CN 2-12 intact, No focal deficits 37.3

## 2022-04-19 NOTE — ED ADULT NURSE NOTE - NS PRO PASSIVE SMOKE EXP
2022        Re: Serge VILLARREAL Ankush    : 1999        To Whom It Concern:     This is to certify that the above named patient had an appointment at this office for professional attention on  22. Please excuse Serge from work  and 22.    Thank you,            Office of Tammy L Schladweiler, NP  [address] [facility] [address]    
Yes...

## 2022-06-06 NOTE — PROGRESS NOTE ADULT - PROBLEM/PLAN-3
Beka # [id number] and pill counts reviewed. Will send script for oxycodone 10 mg tab q4h PRN to pharmacy. Medication will likely require PA. The patient will follow up in 1 month.  
DISPLAY PLAN FREE TEXT
DISPLAY PLAN FREE TEXT

## 2022-07-05 NOTE — ED ADULT NURSE NOTE - NS ED NOTE ABUSE RESPONSE YN
pt co of left knee pain after colliding while playing basketball, cannot bare weight, no obvious deformity noted
Yes

## 2022-07-22 NOTE — ED ADULT NURSE NOTE - PAIN RATING/NUMBER SCALE (0-10): REST
Reason for Disposition   [1] MODERATE difficulty breathing (e g , speaks in phrases, SOB even at rest, pulse 100-120) AND [2] NEW-onset or WORSE than normal    Answer Assessment - Initial Assessment Questions  1  RESPIRATORY STATUS: "Describe your breathing?" (e g , wheezing, shortness of breath, unable to speak, severe coughing)       SOB  2  ONSET: "When did this breathing problem begin?"       earlier  3  PATTERN "Does the difficult breathing come and go, or has it been constant since it started?"       constant  4  SEVERITY: "How bad is your breathing?" (e g , mild, moderate, severe)     - MILD: No SOB at rest, mild SOB with walking, speaks normally in sentences, can lay down, no retractions, pulse < 100      - MODERATE: SOB at rest, SOB with minimal exertion and prefers to sit, cannot lie down flat, speaks in phrases, mild retractions, audible wheezing, pulse 100-120      - SEVERE: Very SOB at rest, speaks in single words, struggling to breathe, sitting hunched forward, retractions, pulse > 120       Moderate  5  RECURRENT SYMPTOM: "Have you had difficulty breathing before?" If Yes, ask: "When was the last time?" and "What happened that time?"       Discharged yesterday from the hospital  6  CARDIAC HISTORY: "Do you have any history of heart disease?" (e g , heart attack, angina, bypass surgery, angioplasty)       Kidney disease, Cardiomyopathy, CHF  7  LUNG HISTORY: "Do you have any history of lung disease?"  (e g , pulmonary embolus, asthma, emphysema)      Yes  8  CAUSE: "What do you think is causing the breathing problem?"       Unsure  9  OTHER SYMPTOMS: "Do you have any other symptoms? (e g , dizziness, runny nose, cough, chest pain, fever)      Slight Chest pain and sweats earlier today  BP:154/90   Pulse Ox:86%    HR:82  10  PREGNANCY: "Is there any chance you are pregnant?" "When was your last menstrual period?"        N/A  11   TRAVEL: "Have you traveled out of the country in the last month?"
(e g , travel history, exposures)        None    Protocols used: BREATHING DIFFICULTY-ADULT-AH
oral
5

## 2022-09-19 NOTE — PHYSICAL THERAPY INITIAL EVALUATION ADULT - GENERAL OBSERVATIONS, REHAB EVAL
Per VO by MD.    Future Appointments   Date Time Provider [facility]   11/9/2022  1:00 PM Rome Luna MD [facility] BS AMB   2/6/2023  9:20 AM Gil Watkins MD [facility] BS AMB
pt received supine in bed A+O x3 [place]

## 2022-10-14 NOTE — PROVIDER CONTACT NOTE (MEDICATION) - NAME OF MD/NP/PA/DO NOTIFIED:
Patient discharged to home  Discharge instructions given to patient and reviewed  All questions answered  Patient was taken to [facility] where her medications were picked up  Patient assisted into car with family  Dr Steele

## 2022-11-30 NOTE — PATIENT PROFILE ADULT - LAST ORAL INTAKE
Called [facility] and spoke to DANIKA Crockett to inform on update with transport. Writer was assured that the bed would still be available for patient. The nurse requested a phone call update after we get an updated timeframe from transport in the morning.    19-Mar-2019 19:30

## 2023-02-02 NOTE — CHART NOTE - NSCHARTNOTEFT_GEN_A_CORE
Called by RN as patient w/ increased work of breathing and becoming hypoxic on room air. On exam patient stating he is having trouble breathing. Noted to have diffuse rales bilaterally. Placed on 2L NC. STAT CXR w/ some increased PVC, and small b/l pleural effusions.   Plan:   2L NC  Place on continuos pulse ox overnight   D/C IVF  STAT Duoneb   20mg IV lasix     Will continue to monitor throughout the night. Spoke to Dr. Renae Cos . She will get the clearance/ office notes/ testing from Dr. Staley Shark office once cardiac evaluation is complete.

## 2023-02-07 NOTE — PHYSICAL THERAPY INITIAL EVALUATION ADULT - LIVES WITH, PROFILE
1) Take Furosemide 60 mg today and Furosemide 40 mg tomorrow then resume the regular dosing alternating Furosemide 40 mg daily one day with 20 mg daily the next    Weigh yourself every morning. Notify the CHF CLINIC if your weight increases 2 to 3 pounds in 1-2 days or 5 pounds in one week.   Notify the [facility]F CLINIC with worsening signs of heart failure: increased shortness of breath, increased swelling in your legs, decreased appetite.   Adhere to a low sodium diet (no more than 600mg of sodium per meal), and limit fluid intake to no more than 64 ounces each day.   Take your medications as instructed. Please DO NOT stop taking your medications without specific instructions from your physician.           [facility] [phone]  HOURS: Mon-Fri 7:00 am - 5:00 pm     spouse/children

## 2023-02-12 NOTE — PATIENT PROFILE ADULT - NSTOBACCO QUIT READY_GEN_A_CORE_SD
Attending Only
not motivated to quit/Patient says he smokes 3 cigarettes a day - one after breakfast, after lunch and after dinner.  Says he has no intention of quitting.

## 2023-02-16 NOTE — ED ADULT NURSE NOTE - PAIN: PRESENCE, MLM
denies pain/discomfort Dutasteride Pregnancy And Lactation Text: This medication is absolutely contraindicated in women, especially during pregnancy and breast feeding. Feminization of male fetuses is possible if taking while pregnant.

## 2023-04-14 NOTE — OCCUPATIONAL THERAPY INITIAL EVALUATION ADULT - EATING, PREVIOUS LEVEL OF FUNCTION, OT EVAL
Subjective   Patient ID: Julianne Wisdom is a 37 y.o. female who presents for Annual Exam (And follow up labs ).    Well Adult Physical   Patient here for a comprehensive physical exam.The patient reports no problems    Do you take any herbs or supplements that were not prescribed by a doctor? no   Are you taking calcium supplements? no   Are you taking aspirin daily? no     History:  LMP: Patient's last menstrual period was 2023.  Last pap date: 3/2021  Abnormal pap? no  : 0  Para: 0             Review of Systems   Constitutional:  Negative for activity change, appetite change, chills, diaphoresis, fatigue, fever and unexpected weight change.   HENT:  Negative for congestion, ear pain, hearing loss, nosebleeds, postnasal drip, rhinorrhea, sinus pressure, sneezing, sore throat, tinnitus, trouble swallowing and voice change.    Eyes:  Negative for photophobia, pain, discharge, redness, itching and visual disturbance.   Respiratory:  Negative for cough, choking, chest tightness, shortness of breath and wheezing.    Cardiovascular:  Negative for chest pain, palpitations and leg swelling.   Gastrointestinal:  Negative for abdominal distention, abdominal pain, blood in stool, constipation, diarrhea, nausea and vomiting.   Endocrine: Negative for cold intolerance, heat intolerance, polydipsia and polyuria.   Genitourinary:  Negative for dysuria, flank pain, frequency, hematuria and urgency.   Musculoskeletal:  Negative for arthralgias, back pain, joint swelling, myalgias, neck pain and neck stiffness.   Skin:  Negative for rash and wound.   Allergic/Immunologic: Negative for immunocompromised state.   Neurological:  Negative for dizziness, tremors, seizures, syncope, facial asymmetry, speech difficulty, weakness, light-headedness, numbness and headaches.   Hematological:  Negative for adenopathy. Does not bruise/bleed easily.   Psychiatric/Behavioral:  Negative for agitation, behavioral problems, confusion,  "dysphoric mood, hallucinations, self-injury, sleep disturbance and suicidal ideas. The patient is not nervous/anxious.        Objective   /87 (BP Location: Right arm, Patient Position: Sitting, BP Cuff Size: Large adult)   Pulse 82   Temp 36.4 °C (97.5 °F) (Temporal)   Resp 18   Ht 1.6 m (5' 3\")   Wt 98 kg (216 lb)   LMP 02/01/2023   SpO2 97%   BMI 38.26 kg/m²     Physical Exam  Constitutional:       General: She is not in acute distress.     Appearance: She is not ill-appearing or diaphoretic.   HENT:      Head: Normocephalic and atraumatic.      Right Ear: External ear normal.      Left Ear: External ear normal.      Nose: Nose normal. No rhinorrhea.   Eyes:      General: Lids are normal. No scleral icterus.        Right eye: No discharge.         Left eye: No discharge.      Conjunctiva/sclera: Conjunctivae normal.   Cardiovascular:      Rate and Rhythm: Normal rate and regular rhythm.      Pulses: Normal pulses.      Heart sounds: No murmur heard.  Pulmonary:      Effort: Pulmonary effort is normal. No respiratory distress.      Breath sounds: No decreased breath sounds, wheezing, rhonchi or rales.   Abdominal:      General: Bowel sounds are normal. There is no distension.      Palpations: Abdomen is soft. There is no mass.      Tenderness: There is no abdominal tenderness. There is no guarding or rebound.   Musculoskeletal:         General: No swelling, tenderness or deformity.      Cervical back: No rigidity or tenderness.      Right lower leg: No edema.      Left lower leg: No edema.   Lymphadenopathy:      Cervical: No cervical adenopathy.      Upper Body:      Right upper body: No supraclavicular adenopathy.      Left upper body: No supraclavicular adenopathy.   Skin:     General: Skin is warm and dry.      Coloration: Skin is not jaundiced or pale.      Findings: No erythema, lesion or rash.   Neurological:      General: No focal deficit present.      Mental Status: She is alert and oriented " to person, place, and time.      Sensory: No sensory deficit.      Motor: No weakness or tremor.      Coordination: Coordination normal.      Gait: Gait normal.   Psychiatric:         Mood and Affect: Mood normal. Affect is not inappropriate.         Behavior: Behavior normal.         Assessment/Plan   Diagnoses and all orders for this visit:  Routine general medical examination at a health care facility  -     CBC and Auto Differential; Future  -     Comprehensive Metabolic Panel; Future  -     Lipid Panel; Future  -     Magnesium; Future  -     TSH with reflex to Free T4 if abnormal; Future  -     Follow Up In [facility] - PCP; Future  Primary hypertension  -     amLODIPine (Norvasc) 2.5 mg tablet; Take 1 tablet (2.5 mg) by mouth once daily.  -     CBC and Auto Differential; Future  -     Comprehensive Metabolic Panel; Future  -     Lipid Panel; Future  -     Magnesium; Future  -     TSH with reflex to Free T4 if abnormal; Future  -     Follow Up In [facility] - PCP; Future  Medication management  -     norethindrone-e.estradioL-iron (Blisovi Fe 1/20, 28,) 1 mg-20 mcg (21)/75 mg (7) tablet; Take 1 tablet by mouth once daily.  -     Follow Up In [facility] - PCP; Future          independent

## 2023-06-01 NOTE — PATIENT PROFILE ADULT - OVER THE PAST TWO WEEKS, HAVE YOU FELT LITTLE INTEREST OR PLEASURE IN DOING THINGS?
Lauro Grajeda was seen 06/01/2023 for an audiological evaluation.      Otoscopy revealed clear view of both external ear canals and tympanic membranes.  No obstructive cerumen present in either ear canal.       Audiogram results revealed a mild-to-severe sensorineural hearing loss for the right ear and a mild-to-severe sensorineural hearing loss for the left ear.  Speech Reception Thresholds were 50 dBHL for the right ear and 55 dBHL for the left ear.  Word recognition scores were good for the right ear and good for the left ear.     Audiogram results were reviewed in detail with patient and all questions were answered. Results will be reviewed by ENT and/or referring provider at the completion of this note.     Recommend continued daily use of binaural amplification and annual audiogram to monitor hearing loss.          no

## 2023-07-10 NOTE — PATIENT PROFILE ADULT - NSPROMEDSPUMP_GEN_A_NUR
[FreeTextEntry1] : patient here with wife\par after speaking with him Friday - that night and sat mornngn had some diff with void, urgency and hesitncay with siow flow and specks of blood in urine \par did not do any bike riding \par cont to take flomax \par off CIPRO and now sxs gone\par able to move CT urogram up to TOmorrow \par here for f/u :\par 1- prostate exam  benign\par 2- UROFLOW - voided 120 ml decreased rate of  8 ml /sec in 23 sec and nornal bell pattern, pvr 10 ml \par 3- patien to cont flomax\par 4- will discuss ct findings and need for office cysto or cysto with UPPER TRACT proceduer --pendng results 
medication pump(s) used

## 2023-07-25 NOTE — PROGRESS NOTE ADULT - PROBLEM/PLAN-1
DISPLAY PLAN FREE TEXT Detail Level: Simple Render Risk Assessment In Note?: no Additional Notes: Recommend changing under arm deodorants.

## 2023-08-21 NOTE — PATIENT PROFILE ADULT - FALL HARM RISK CONCLUSION
I spoke with patient   Do trial of extended release form metformin  Start just one daily  Stop IR metformin  Patient will let us know how this works  Vasquez Denson MD     Fall with Harm Risk

## 2023-10-03 NOTE — ED ADULT TRIAGE NOTE - WEIGHT IN LBS
----- Message from Jaxson Patrick MD sent at 10/3/2023  7:36 AM CDT -----  Regarding: stent  Please get patient in any day this week for stent removal, she has a string. Nurse visit is okay if I'm not here      ----- Message -----  From: Solomon Branch MD  Sent: 10/3/2023   1:27 AM CDT  To: Jaxson Patrick MD    Pt came to ER to get Stent out.  Please read my note.  Dr. Moody said [facility] will reschedule her for 10/3 or 10/4.  I think she went to the wrong location.      Thank you,  Althea Branch MD     139.9

## 2024-02-26 NOTE — PATIENT PROFILE ADULT - BRADEN MOISTURE
"EMERGENCY DEPARTMENT NOTE     Name: Kristen Chapman    Age/Sex: 71 year old male   MRN: [id number]   Evaluation Date & Time:  2/25/2024  6:32 PM    PCP:    Issa Cook   ED Provider: Cesar Floyd D.O.       CHIEF COMPLAINT    Drainage from Incision       DIAGNOSIS & DISPOSITION/MEDICAL DECISION MAKING     1. 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      Medical Decision Making  Patient on exam had nontender abdomen.  He had peritoneal drain in place which was leaking around the tubing. 3 small puncture wounds in the anterior lateral abdomen from prior paracentesis also leaking clear fluid.  Discussed case with Dr. Hurley interventional radiology.  Various maneuvers were attempted but the drain remained abnormally functioning not draining significant fluid.  He recommended admission for observation with IR consultation in the a.m. for drain replacement.  Case was discussed with the hospitalist service      Interventions: Peritoneal drain flushing and manipulation  Discharge Vital Signs:/64   Pulse 62   Temp 97.6  F (36.4  C) (Temporal)   Resp 25   Ht 1.676 m (5' 6\")   Wt 54.9 kg (121 lb)   SpO2 100%   BMI 19.53 kg/m       DISPOSITION: Admit to [facility] observation/[facility]    Diagnostic studies:  Imaging:  No orders to display      Lab:  Labs Ordered and Resulted from Time of ED Arrival to Time of ED Departure - No data to display     EKG: Sinus rhythm         Triage note reviewed:Patient had recent paracentesis (2/21), is leaking from incision site.  Pt was here yesterday with same c/o.            History:  Supplemental history from: Family Member/Significant Other patient's son  External Record(s) "
(3) occasionally moist
reviewed: Other: Interventional Radiology Appointment 2/22/24    Work Up:  Chart documentation includes differential considered and any EKGs or imaging independently interpreted by provider, where specified.  In additional to work up documented, I considered the following work up: CT of the abdomen or analysis of ascitic fluid but deferred secondary to low suspicion for acute intra-abdominal process or SBP   Extrnal consultation:  Discussion of management with another provider: Interventional radiology, hospitalist     Complicating factors:  Care impacted by chronic illness: Cancer/Chemotherapy, cirrhosis  Care affected by social determinants of health: N/A    Disposition considerations: Admit.    At the conclusion of the encounter I discussed the results of all of the tests and the disposition. The questions were answered. The patient or family acknowledged understanding and was agreeable with the care plan.    TOTAL CRITICAL CARE TIME (EXCLUDING PROCEDURES): Not applicable    PROCEDURES:   None    EMERGENCY DEPARTMENT COURSE   6:57 PM I met with the patient to gather history and to perform my initial exam.  We discussed treatment options and the plan for care while in the Emergency Department.  7:20 PM Spoke with Dr. Collins, interventional radiology. Discussed patient's case and recommendations.  9:00 PM I spoke with the hospitalist, Dr. Brannon. We discussed the patient's case and they agree to admit the patient.     ED INTERVENTIONS     Medications   lidocaine 1 % 0.1-1 mL (has no administration in time range)   lidocaine (LMX4) cream (has no administration in time range)   sodium chloride (PF) 0.9% PF flush 3 mL (has no administration in time range)   sodium chloride (PF) 0.9% PF flush 3 mL (has no administration in time range)   senna-docusate (SENOKOT-S/PERICOLACE) 8.6-50 MG per tablet 1 tablet (has no administration in time range)     Or   senna-docusate (SENOKOT-S/PERICOLACE) 8.6-50 MG per tablet 2 tablet (has 
no administration in time range)   calcium carbonate (TUMS) chewable tablet 1,000 mg (has no administration in time range)       DISCHARGE MEDICATIONS        Review of your medicines        UNREVIEWED medicines. Ask your doctor about these medicines        Dose / Directions   acetaminophen 325 MG tablet  Commonly known as: TYLENOL      Dose: 650 mg  Take 650 mg by mouth every 6 hours as needed  Refills: 0     furosemide 20 MG tablet  Commonly known as: LASIX  Used for: Cirrhosis of liver with ascites, unspecified hepatic cirrhosis type (H)      Dose: 20 mg  Take 1 tablet (20 mg) by mouth daily  Quantity: 90 tablet  Refills: 3     levothyroxine 175 MCG tablet  Commonly known as: SYNTHROID/LEVOTHROID  Used for: Acquired hypothyroidism      Dose: 175 mcg  Take 1 tablet (175 mcg) by mouth daily  Quantity: 60 tablet  Refills: 1     megestrol 40 MG/ML suspension  Commonly known as: MEGACE  Used for: Squamous cell carcinoma of nasopharynx (H)      Dose: 400 mg  Take 10 mLs (400 mg) by mouth daily  Quantity: 480 mL  Refills: 1     ondansetron 4 MG tablet  Commonly known as: ZOFRAN  Used for: Squamous cell carcinoma of nasopharynx (H)      Dose: 4 mg  Take 1 tablet (4 mg) by mouth every 6 hours as needed for nausea (vomiting)  Quantity: 30 tablet  Refills: 1     spironolactone 50 MG tablet  Commonly known as: ALDACTONE  Used for: Cirrhosis of liver with ascites, unspecified hepatic cirrhosis type (H)      Dose: 50 mg  Take 1 tablet (50 mg) by mouth daily  Quantity: 90 tablet  Refills: 3     tenofovir 300 MG tablet  Commonly known as: VIREAD  Used for: Chronic viral hepatitis B without delta agent and without coma (H), Cirrhosis of liver with ascites, unspecified hepatic cirrhosis type (H)      Dose: 300 mg  Take 1 tablet (300 mg) by mouth every other day  Quantity: 45 tablet  Refills: 3                INFORMATION SOURCE AND LIMITATIONS    History/Exam limitations: None  Patient information was obtained from: Son, 
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    The patient had a peritoneal drainage tube placed on Thursday (2/22/24) and radha had paracentesis at the same time. For the last 3 days the patient has had drainage around the peritoneal drainage tube and through the paracentesis site. He was seen in the ED yesterday (2/24/24) for the drainage. In the ED they placed Dermabond over the paracentesis site to close the incision and an ostomy bag was placed around the patient's peritoneal drainage tube. This morning the paracentesis site opened back up and has continued to drain prompting his return to the ED.    Friday (2/23/24) after the tube was placed he had about 1.7L of ouput. Saturday (2/24/24) had 300 ml of output. Today he has had no more than 100 ml output. He denies any chest pain, shortness of breath, or abdominal pain, and denies any other complaints at this time.    REVIEW OF SYSTEMS:   All other systems reviewed and are negative except as noted above in HPI.    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    
Chronic kidney disease, stage 3 (H)    Anosmia    Other ascites     Past Surgical History:   Procedure Laterality Date    ENDOSCOPIC ENDONASAL SURGERY Bilateral 9/7/2021    Procedure: Nasal Endoscopy with Biopsy;  Surgeon: Ky Centeno MD;  Location: [facility] OR    IR CHEST PORT PLACEMENT > 5 YRS OF AGE  3/2/2020    IR CHEST PORT PLACEMENT > 5 YRS OF AGE  3/2/2020    IR GASTROSTOMY TUBE INSERTION  4/27/2020    IR GASTROSTOMY TUBE PERCUTANEOUS PLCMNT  4/27/2020    IR INTRAPERITONEAL CATH TUNNEL ASCITES  2/22/2024    IR PORT PLACEMENT >5 YEARS  3/2/2020    IR PORT PLACEMENT >5 YEARS  3/2/2020    IR T-FASTENER REMOVAL  6/5/2020    IR T-FASTENER REMOVAL  6/5/2020       Allergies   Allergen Reactions    Oxycodone Itching       OUTPATIENT MEDICATIONS     New Prescriptions    No medications on file      Vitals:    02/25/24 1930 02/25/24 2000 02/25/24 2015 02/25/24 2030   BP: 106/58 106/60 109/61 112/64   Pulse: 63 63 66 66   Resp:       Temp:       TempSrc:       SpO2: 100% 100% 100% 100%   Weight:       Height:           Physical Exam   Constitutional: Oriented to person, place, and time. Appears well-developed and well-nourished.   Cardiovascular: Normal rate, regular rhythm and normal heart sounds.    Pulmonary/Chest: Normal effort  and breath sounds normal.   Abdominal: Soft. Bowel sounds are normal. Not markedly distended. Leakage around peritoneal drainage tube in right lower abdomen and from puncture wounds at site of previous paracentesis.  Musculoskeletal: Normal range of motion.     DIAGNOSTICS    LABORATORY FINDINGS (REVIEWED AND INTERPRETED):  Labs Ordered and Resulted from Time of ED Arrival to Time of ED Departure - No data to display      IMAGING (REVIEWED AND INTERPRETED):  No orders to display           Julio C HELMS, am serving as a scribe to document services personally performed by Cesar Floyd D.O., based on my observation and the provider s statements to me.    Cesar HELMS D.O., 
attest that Julio C Deluna is acting in a scribe capacity, has observed my performance of the services and has documented them in accordance with my direction.    Cesar Floyd D.O.  EMERGENCY MEDICINE   02/25/24  [facility] EMERGENCY DEPARTMENT  [address] [place] [id number]  [phone]  Dept: [phone]     Cesar Floyd DO  02/26/24 0355    
Detail Level: Simple
Detail Level: Detailed

## 2024-04-16 NOTE — ED ADULT NURSE NOTE - OBJECTIVE STATEMENT
As per patient he has had pain in hip for years and has worsened in last 2 days, pain is in right hip Current and Past Psychiatric Diagnoses Current and Past Psychiatric Diagnoses/Presenting Symptoms/Treatment Related Factors/Activating Events/Stressors

## 2024-06-19 NOTE — ED PROVIDER NOTE - GASTROINTESTINAL NEGATIVE STATEMENT, MLM
There are no Wet Read(s) to document.
no abdominal pain, no bloating, no constipation, no diarrhea, no nausea and no vomiting.

## 2024-11-22 NOTE — H&P ADULT - NSHPSOCIALHISTORY_GEN_ALL_CORE
unable to provide secondary to patient mental status and dementia Abnormal as indicated, otherwise normal...

## 2024-12-03 NOTE — PATIENT PROFILE ADULT - NSPROHMRESPSYMPCOND_GEN_A_NUR
Anticoagulation: Return Visit    Pt here for routine f/u. Pt is on warfarin for DVT, with INR goal of 2 - 3.    Current warfarin regimen: 5 mg Tuesday, 7.5 mg ROW    Assessment  (-) missed or extra doses  (-) change in medications  (-) change in vitamin K intake  (-) EtOH use  (-) signs/sx of VTE or stroke  (-) new bleeding/bruising  (-) recent falls  (-) upcoming surgeries/procedures    Plan   Pt counseled to seek emergent care in case of excessive bleeding/bruising, or if sx of VTE/stroke occur. Patient's INR = 2.4 today, which is within the desired therapeutic range of 2 - 3. Plan is to continue with the current warfarin regimen. RTC in 5 weeks (1/9/24) per pt request.    Given Rx this visit: Not needed    10 minutes were spent with the patient in direct face-to-face contact reviewing PT/INR results and anticoagulation management.  
COPD

## 2025-02-25 NOTE — PATIENT PROFILE ADULT - ANY IMPAIRED UPPER EXTREMITY FUNCTION WITHIN A WEEK PRIOR TO ADMISSION RELATED TO?
no
Quality 226: Preventive Care And Screening: Tobacco Use: Screening And Cessation Intervention: Patient screened for tobacco use and is an ex/non-smoker
Detail Level: Detailed
Quality 431: Preventive Care And Screening: Unhealthy Alcohol Use - Screening: Patient not identified as an unhealthy alcohol user when screened for unhealthy alcohol use using a systematic screening method
Quality 130: Documentation Of Current Medications In The Medical Record: Current Medications Documented

## 2025-03-27 NOTE — PRE-OP CHECKLIST - WEIGHT IN LBS
If patient call back appointment need to be rescheduled for later time on the same day 4/28 is ok, or call can be transferred to [facility].   Thanks!   150.3

## 2025-05-07 NOTE — ED ADULT TRIAGE NOTE - NS ED NURSE BANDS TYPE
soft, nontender, nondistended, no guarding or rigidity, no masses palpable, normal bowel sounds, no hepatosplenomegaly, no rebound tenderness
Name band;